# Patient Record
Sex: MALE | Race: BLACK OR AFRICAN AMERICAN | NOT HISPANIC OR LATINO
[De-identification: names, ages, dates, MRNs, and addresses within clinical notes are randomized per-mention and may not be internally consistent; named-entity substitution may affect disease eponyms.]

---

## 2018-06-12 ENCOUNTER — TRANSCRIPTION ENCOUNTER (OUTPATIENT)
Age: 48
End: 2018-06-12

## 2021-05-29 ENCOUNTER — EMERGENCY (EMERGENCY)
Facility: HOSPITAL | Age: 51
LOS: 1 days | Discharge: ANOTHER HOSPITAL | End: 2021-05-29
Attending: EMERGENCY MEDICINE | Admitting: EMERGENCY MEDICINE
Payer: COMMERCIAL

## 2021-05-29 VITALS
OXYGEN SATURATION: 100 % | DIASTOLIC BLOOD PRESSURE: 70 MMHG | SYSTOLIC BLOOD PRESSURE: 128 MMHG | RESPIRATION RATE: 18 BRPM | HEIGHT: 67 IN | TEMPERATURE: 99 F | HEART RATE: 84 BPM | WEIGHT: 210.1 LBS

## 2021-05-29 LAB
ALBUMIN SERPL ELPH-MCNC: 3.5 G/DL — SIGNIFICANT CHANGE UP (ref 3.3–5)
ALP SERPL-CCNC: 88 U/L — SIGNIFICANT CHANGE UP (ref 30–120)
ALT FLD-CCNC: 21 U/L DA — SIGNIFICANT CHANGE UP (ref 10–60)
ANION GAP SERPL CALC-SCNC: 12 MMOL/L — SIGNIFICANT CHANGE UP (ref 5–17)
ANISOCYTOSIS BLD QL: SIGNIFICANT CHANGE UP
APPEARANCE UR: CLEAR — SIGNIFICANT CHANGE UP
APTT BLD: 29.8 SEC — SIGNIFICANT CHANGE UP (ref 27.5–35.5)
AST SERPL-CCNC: 25 U/L — SIGNIFICANT CHANGE UP (ref 10–40)
BACTERIA # UR AUTO: ABNORMAL
BASOPHILS # BLD AUTO: 0.06 K/UL — SIGNIFICANT CHANGE UP (ref 0–0.2)
BASOPHILS NFR BLD AUTO: 0.4 % — SIGNIFICANT CHANGE UP (ref 0–2)
BILIRUB SERPL-MCNC: 0.6 MG/DL — SIGNIFICANT CHANGE UP (ref 0.2–1.2)
BILIRUB UR-MCNC: NEGATIVE — SIGNIFICANT CHANGE UP
BUN SERPL-MCNC: 14 MG/DL — SIGNIFICANT CHANGE UP (ref 7–23)
CALCIUM SERPL-MCNC: 8.9 MG/DL — SIGNIFICANT CHANGE UP (ref 8.4–10.5)
CHLORIDE SERPL-SCNC: 103 MMOL/L — SIGNIFICANT CHANGE UP (ref 96–108)
CO2 SERPL-SCNC: 24 MMOL/L — SIGNIFICANT CHANGE UP (ref 22–31)
COLOR SPEC: YELLOW — SIGNIFICANT CHANGE UP
CREAT SERPL-MCNC: 1.09 MG/DL — SIGNIFICANT CHANGE UP (ref 0.5–1.3)
DIFF PNL FLD: NEGATIVE — SIGNIFICANT CHANGE UP
ELLIPTOCYTES BLD QL SMEAR: SLIGHT — SIGNIFICANT CHANGE UP
EOSINOPHIL # BLD AUTO: 0.08 K/UL — SIGNIFICANT CHANGE UP (ref 0–0.5)
EOSINOPHIL NFR BLD AUTO: 0.5 % — SIGNIFICANT CHANGE UP (ref 0–6)
EPI CELLS # UR: SIGNIFICANT CHANGE UP
GLUCOSE SERPL-MCNC: 136 MG/DL — HIGH (ref 70–99)
GLUCOSE UR QL: NEGATIVE MG/DL — SIGNIFICANT CHANGE UP
HCT VFR BLD CALC: 29.9 % — LOW (ref 39–50)
HGB BLD-MCNC: 9.2 G/DL — LOW (ref 13–17)
HYPOCHROMIA BLD QL: SIGNIFICANT CHANGE UP
IMM GRANULOCYTES NFR BLD AUTO: 0.5 % — SIGNIFICANT CHANGE UP (ref 0–1.5)
INR BLD: 1.29 RATIO — HIGH (ref 0.88–1.16)
KETONES UR-MCNC: NEGATIVE — SIGNIFICANT CHANGE UP
LEUKOCYTE ESTERASE UR-ACNC: NEGATIVE — SIGNIFICANT CHANGE UP
LIDOCAIN IGE QN: 54 U/L — LOW (ref 73–393)
LYMPHOCYTES # BLD AUTO: 1.28 K/UL — SIGNIFICANT CHANGE UP (ref 1–3.3)
LYMPHOCYTES # BLD AUTO: 8.6 % — LOW (ref 13–44)
MANUAL SMEAR VERIFICATION: SIGNIFICANT CHANGE UP
MCHC RBC-ENTMCNC: 18.4 PG — LOW (ref 27–34)
MCHC RBC-ENTMCNC: 30.8 GM/DL — LOW (ref 32–36)
MCV RBC AUTO: 59.7 FL — LOW (ref 80–100)
MICROCYTES BLD QL: SIGNIFICANT CHANGE UP
MONOCYTES # BLD AUTO: 0.87 K/UL — SIGNIFICANT CHANGE UP (ref 0–0.9)
MONOCYTES NFR BLD AUTO: 5.8 % — SIGNIFICANT CHANGE UP (ref 2–14)
NEUTROPHILS # BLD AUTO: 12.53 K/UL — HIGH (ref 1.8–7.4)
NEUTROPHILS NFR BLD AUTO: 84.2 % — HIGH (ref 43–77)
NITRITE UR-MCNC: NEGATIVE — SIGNIFICANT CHANGE UP
NRBC # BLD: 0 /100 WBCS — SIGNIFICANT CHANGE UP (ref 0–0)
OVALOCYTES BLD QL SMEAR: SLIGHT — SIGNIFICANT CHANGE UP
PH UR: 6 — SIGNIFICANT CHANGE UP (ref 5–8)
PLAT MORPH BLD: NORMAL — SIGNIFICANT CHANGE UP
PLATELET # BLD AUTO: 468 K/UL — HIGH (ref 150–400)
POIKILOCYTOSIS BLD QL AUTO: SLIGHT — SIGNIFICANT CHANGE UP
POTASSIUM SERPL-MCNC: 4 MMOL/L — SIGNIFICANT CHANGE UP (ref 3.5–5.3)
POTASSIUM SERPL-SCNC: 4 MMOL/L — SIGNIFICANT CHANGE UP (ref 3.5–5.3)
PROT SERPL-MCNC: 8.5 G/DL — HIGH (ref 6–8.3)
PROT UR-MCNC: 30 MG/DL
PROTHROM AB SERPL-ACNC: 15.4 SEC — HIGH (ref 10.6–13.6)
RBC # BLD: 5.01 M/UL — SIGNIFICANT CHANGE UP (ref 4.2–5.8)
RBC # FLD: 21.8 % — HIGH (ref 10.3–14.5)
RBC BLD AUTO: SIGNIFICANT CHANGE UP
SARS-COV-2 RNA SPEC QL NAA+PROBE: SIGNIFICANT CHANGE UP
SODIUM SERPL-SCNC: 139 MMOL/L — SIGNIFICANT CHANGE UP (ref 135–145)
SP GR SPEC: 1.02 — SIGNIFICANT CHANGE UP (ref 1.01–1.02)
UROBILINOGEN FLD QL: 1 MG/DL
WBC # BLD: 14.89 K/UL — HIGH (ref 3.8–10.5)
WBC # FLD AUTO: 14.89 K/UL — HIGH (ref 3.8–10.5)
WBC UR QL: SIGNIFICANT CHANGE UP

## 2021-05-29 PROCEDURE — 99285 EMERGENCY DEPT VISIT HI MDM: CPT

## 2021-05-29 PROCEDURE — 74177 CT ABD & PELVIS W/CONTRAST: CPT | Mod: 26,MA

## 2021-05-29 PROCEDURE — 99282 EMERGENCY DEPT VISIT SF MDM: CPT | Mod: GC

## 2021-05-29 RX ORDER — ACETAMINOPHEN 500 MG
1000 TABLET ORAL ONCE
Refills: 0 | Status: COMPLETED | OUTPATIENT
Start: 2021-05-29 | End: 2021-05-29

## 2021-05-29 RX ORDER — SODIUM CHLORIDE 9 MG/ML
1000 INJECTION INTRAMUSCULAR; INTRAVENOUS; SUBCUTANEOUS ONCE
Refills: 0 | Status: COMPLETED | OUTPATIENT
Start: 2021-05-29 | End: 2021-05-29

## 2021-05-29 RX ORDER — PANTOPRAZOLE SODIUM 20 MG/1
40 TABLET, DELAYED RELEASE ORAL ONCE
Refills: 0 | Status: COMPLETED | OUTPATIENT
Start: 2021-05-29 | End: 2021-05-29

## 2021-05-29 RX ORDER — PIPERACILLIN AND TAZOBACTAM 4; .5 G/20ML; G/20ML
3.38 INJECTION, POWDER, LYOPHILIZED, FOR SOLUTION INTRAVENOUS ONCE
Refills: 0 | Status: COMPLETED | OUTPATIENT
Start: 2021-05-29 | End: 2021-05-29

## 2021-05-29 RX ORDER — ONDANSETRON 8 MG/1
4 TABLET, FILM COATED ORAL ONCE
Refills: 0 | Status: COMPLETED | OUTPATIENT
Start: 2021-05-29 | End: 2021-05-29

## 2021-05-29 RX ORDER — KETOROLAC TROMETHAMINE 30 MG/ML
30 SYRINGE (ML) INJECTION ONCE
Refills: 0 | Status: DISCONTINUED | OUTPATIENT
Start: 2021-05-29 | End: 2021-05-29

## 2021-05-29 RX ORDER — HYDROMORPHONE HYDROCHLORIDE 2 MG/ML
0.5 INJECTION INTRAMUSCULAR; INTRAVENOUS; SUBCUTANEOUS ONCE
Refills: 0 | Status: DISCONTINUED | OUTPATIENT
Start: 2021-05-29 | End: 2021-05-29

## 2021-05-29 RX ADMIN — PIPERACILLIN AND TAZOBACTAM 200 GRAM(S): 4; .5 INJECTION, POWDER, LYOPHILIZED, FOR SOLUTION INTRAVENOUS at 23:44

## 2021-05-29 RX ADMIN — HYDROMORPHONE HYDROCHLORIDE 0.5 MILLIGRAM(S): 2 INJECTION INTRAMUSCULAR; INTRAVENOUS; SUBCUTANEOUS at 23:20

## 2021-05-29 RX ADMIN — PANTOPRAZOLE SODIUM 40 MILLIGRAM(S): 20 TABLET, DELAYED RELEASE ORAL at 17:05

## 2021-05-29 RX ADMIN — Medication 30 MILLIGRAM(S): at 17:30

## 2021-05-29 RX ADMIN — ONDANSETRON 4 MILLIGRAM(S): 8 TABLET, FILM COATED ORAL at 16:55

## 2021-05-29 RX ADMIN — Medication 30 MILLIGRAM(S): at 17:00

## 2021-05-29 RX ADMIN — Medication 1000 MILLIGRAM(S): at 23:27

## 2021-05-29 RX ADMIN — SODIUM CHLORIDE 1000 MILLILITER(S): 9 INJECTION INTRAMUSCULAR; INTRAVENOUS; SUBCUTANEOUS at 16:45

## 2021-05-29 RX ADMIN — HYDROMORPHONE HYDROCHLORIDE 0.5 MILLIGRAM(S): 2 INJECTION INTRAMUSCULAR; INTRAVENOUS; SUBCUTANEOUS at 21:30

## 2021-05-29 RX ADMIN — Medication 400 MILLIGRAM(S): at 23:26

## 2021-05-29 RX ADMIN — SODIUM CHLORIDE 1000 MILLILITER(S): 9 INJECTION INTRAMUSCULAR; INTRAVENOUS; SUBCUTANEOUS at 17:50

## 2021-05-29 RX ADMIN — Medication 1000 MILLIGRAM(S): at 23:45

## 2021-05-29 NOTE — ED PROVIDER NOTE - PROGRESS NOTE DETAILS
spoke with Heme-Onc on call - as pt does not have any PCP or clear f/u - recommends pt f/u with Dr. Arabella Eason, #[phone], should call this week to arrange f/u case d/w Dr. Valle to see pt and eval ct findings Dr. Valle, surgeon, has seen pt, no emergency surgery, will need admission, advises transfer for higher level of care, hospital with oncology services, [facility]

## 2021-05-29 NOTE — ED ADULT NURSE NOTE - OBJECTIVE STATEMENT
C/O diffused abdominal pain since this morning. Denies fever/ denies Diarrhea, 2 "normal BM", vomited his breakfast oatmeal this morning. Denies surgeries.

## 2021-05-29 NOTE — ED PROVIDER NOTE - OBJECTIVE STATEMENT
52 y/o male with no pertinent PMHx presents to the ED c/o abdominal pain associated with nausea and vomiting. Pt states the abdominal pain started this morning and also had an episode of vomiting. States he had shrimp/broccoli yesterday which was normal. Denies fevers/chills, diarrhea, urinary complaints, or blood in stool. States he had 2 bowel movement today since onset of pain which were normal.   No PSHx. No PCP. Occasional drinker. Non-smoker. NKDA.

## 2021-05-29 NOTE — ED ADULT NURSE NOTE - CHPI ED NUR SYMPTOMS NEG
no blood in stool/no burning urination/no chills/no diarrhea/no dysuria/no fever/no hematuria/no nausea

## 2021-05-29 NOTE — ED PROVIDER NOTE - CLINICAL SUMMARY MEDICAL DECISION MAKING FREE TEXT BOX
52 y/o male presents to the ED with abdominal pain and vomiting, possible food poisoning vs. appendicitis. Plan labs, CT abdomen and IV fluids.

## 2021-05-30 ENCOUNTER — INPATIENT (INPATIENT)
Facility: HOSPITAL | Age: 51
LOS: 8 days | Discharge: HOME | End: 2021-06-08
Attending: STUDENT IN AN ORGANIZED HEALTH CARE EDUCATION/TRAINING PROGRAM | Admitting: STUDENT IN AN ORGANIZED HEALTH CARE EDUCATION/TRAINING PROGRAM
Payer: COMMERCIAL

## 2021-05-30 VITALS
TEMPERATURE: 100 F | OXYGEN SATURATION: 98 % | DIASTOLIC BLOOD PRESSURE: 57 MMHG | SYSTOLIC BLOOD PRESSURE: 102 MMHG | HEART RATE: 100 BPM | RESPIRATION RATE: 18 BRPM

## 2021-05-30 VITALS
HEIGHT: 67 IN | DIASTOLIC BLOOD PRESSURE: 73 MMHG | TEMPERATURE: 99 F | OXYGEN SATURATION: 100 % | SYSTOLIC BLOOD PRESSURE: 117 MMHG | RESPIRATION RATE: 17 BRPM | HEART RATE: 99 BPM

## 2021-05-30 DIAGNOSIS — N12 TUBULO-INTERSTITIAL NEPHRITIS, NOT SPECIFIED AS ACUTE OR CHRONIC: ICD-10-CM

## 2021-05-30 DIAGNOSIS — K63.9 DISEASE OF INTESTINE, UNSPECIFIED: ICD-10-CM

## 2021-05-30 DIAGNOSIS — Z00.00 ENCOUNTER FOR GENERAL ADULT MEDICAL EXAMINATION WITHOUT ABNORMAL FINDINGS: ICD-10-CM

## 2021-05-30 DIAGNOSIS — R65.10 SYSTEMIC INFLAMMATORY RESPONSE SYNDROME (SIRS) OF NON-INFECTIOUS ORIGIN WITHOUT ACUTE ORGAN DYSFUNCTION: ICD-10-CM

## 2021-05-30 DIAGNOSIS — K76.9 LIVER DISEASE, UNSPECIFIED: ICD-10-CM

## 2021-05-30 DIAGNOSIS — R10.9 UNSPECIFIED ABDOMINAL PAIN: ICD-10-CM

## 2021-05-30 DIAGNOSIS — C18.9 MALIGNANT NEOPLASM OF COLON, UNSPECIFIED: ICD-10-CM

## 2021-05-30 LAB
ALBUMIN SERPL ELPH-MCNC: 3.4 G/DL — SIGNIFICANT CHANGE UP (ref 3.3–5)
ALP SERPL-CCNC: 76 U/L — SIGNIFICANT CHANGE UP (ref 40–120)
ALT FLD-CCNC: 11 U/L — SIGNIFICANT CHANGE UP (ref 4–41)
ANION GAP SERPL CALC-SCNC: 14 MMOL/L — SIGNIFICANT CHANGE UP (ref 7–14)
AST SERPL-CCNC: 16 U/L — SIGNIFICANT CHANGE UP (ref 4–40)
BASE EXCESS BLDV CALC-SCNC: -2.7 MMOL/L — SIGNIFICANT CHANGE UP (ref -3–2)
BASOPHILS # BLD AUTO: 0.03 K/UL — SIGNIFICANT CHANGE UP (ref 0–0.2)
BASOPHILS NFR BLD AUTO: 0.2 % — SIGNIFICANT CHANGE UP (ref 0–2)
BILIRUB SERPL-MCNC: 0.8 MG/DL — SIGNIFICANT CHANGE UP (ref 0.2–1.2)
BLOOD GAS VENOUS - CREATININE: 1.1 MG/DL — SIGNIFICANT CHANGE UP (ref 0.5–1.3)
BLOOD GAS VENOUS COMPREHENSIVE RESULT: SIGNIFICANT CHANGE UP
BUN SERPL-MCNC: 11 MG/DL — SIGNIFICANT CHANGE UP (ref 7–23)
CALCIUM SERPL-MCNC: 8.1 MG/DL — LOW (ref 8.4–10.5)
CHLORIDE BLDV-SCNC: 109 MMOL/L — HIGH (ref 96–108)
CHLORIDE SERPL-SCNC: 106 MMOL/L — SIGNIFICANT CHANGE UP (ref 98–107)
CO2 SERPL-SCNC: 18 MMOL/L — LOW (ref 22–31)
CREAT SERPL-MCNC: 1 MG/DL — SIGNIFICANT CHANGE UP (ref 0.5–1.3)
EOSINOPHIL # BLD AUTO: 0.07 K/UL — SIGNIFICANT CHANGE UP (ref 0–0.5)
EOSINOPHIL NFR BLD AUTO: 0.5 % — SIGNIFICANT CHANGE UP (ref 0–6)
GAS PNL BLDV: 141 MMOL/L — SIGNIFICANT CHANGE UP (ref 136–146)
GLUCOSE BLDV-MCNC: 116 MG/DL — HIGH (ref 70–99)
GLUCOSE SERPL-MCNC: 118 MG/DL — HIGH (ref 70–99)
GRAM STN FLD: SIGNIFICANT CHANGE UP
HCO3 BLDV-SCNC: 21 MMOL/L — SIGNIFICANT CHANGE UP (ref 20–27)
HCT VFR BLD CALC: 26.8 % — LOW (ref 39–50)
HCT VFR BLDA CALC: 27.2 % — LOW (ref 39–51)
HGB BLD CALC-MCNC: 8.8 G/DL — LOW (ref 13–17)
HGB BLD-MCNC: 8.2 G/DL — LOW (ref 13–17)
IANC: 12.02 K/UL — HIGH (ref 1.5–8.5)
IMM GRANULOCYTES NFR BLD AUTO: 0.4 % — SIGNIFICANT CHANGE UP (ref 0–1.5)
LACTATE BLDV-MCNC: 2 MMOL/L — SIGNIFICANT CHANGE UP (ref 0.5–2)
LACTATE SERPL-SCNC: 2.6 MMOL/L — HIGH (ref 0.7–2)
LYMPHOCYTES # BLD AUTO: 1.47 K/UL — SIGNIFICANT CHANGE UP (ref 1–3.3)
LYMPHOCYTES # BLD AUTO: 10.1 % — LOW (ref 13–44)
MCHC RBC-ENTMCNC: 18.1 PG — LOW (ref 27–34)
MCHC RBC-ENTMCNC: 30.6 GM/DL — LOW (ref 32–36)
MCV RBC AUTO: 59.3 FL — LOW (ref 80–100)
MONOCYTES # BLD AUTO: 0.97 K/UL — HIGH (ref 0–0.9)
MONOCYTES NFR BLD AUTO: 6.6 % — SIGNIFICANT CHANGE UP (ref 2–14)
NEUTROPHILS # BLD AUTO: 12.02 K/UL — HIGH (ref 1.8–7.4)
NEUTROPHILS NFR BLD AUTO: 82.2 % — HIGH (ref 43–77)
NRBC # BLD: 0 /100 WBCS — SIGNIFICANT CHANGE UP
NRBC # FLD: 0 K/UL — SIGNIFICANT CHANGE UP
PCO2 BLDV: 40 MMHG — LOW (ref 41–51)
PH BLDV: 7.35 — SIGNIFICANT CHANGE UP (ref 7.32–7.43)
PLATELET # BLD AUTO: 413 K/UL — HIGH (ref 150–400)
PO2 BLDV: 29 MMHG — LOW (ref 35–40)
POTASSIUM BLDV-SCNC: 3.4 MMOL/L — SIGNIFICANT CHANGE UP (ref 3.4–4.5)
POTASSIUM SERPL-MCNC: 3.6 MMOL/L — SIGNIFICANT CHANGE UP (ref 3.5–5.3)
POTASSIUM SERPL-SCNC: 3.6 MMOL/L — SIGNIFICANT CHANGE UP (ref 3.5–5.3)
PROT SERPL-MCNC: 6.9 G/DL — SIGNIFICANT CHANGE UP (ref 6–8.3)
RBC # BLD: 4.52 M/UL — SIGNIFICANT CHANGE UP (ref 4.2–5.8)
RBC # FLD: 21.3 % — HIGH (ref 10.3–14.5)
SAO2 % BLDV: 41.8 % — LOW (ref 60–85)
SODIUM SERPL-SCNC: 138 MMOL/L — SIGNIFICANT CHANGE UP (ref 135–145)
SPECIMEN SOURCE: SIGNIFICANT CHANGE UP
WBC # BLD: 14.62 K/UL — HIGH (ref 3.8–10.5)
WBC # FLD AUTO: 14.62 K/UL — HIGH (ref 3.8–10.5)

## 2021-05-30 PROCEDURE — 83690 ASSAY OF LIPASE: CPT

## 2021-05-30 PROCEDURE — 99233 SBSQ HOSP IP/OBS HIGH 50: CPT

## 2021-05-30 PROCEDURE — 96361 HYDRATE IV INFUSION ADD-ON: CPT

## 2021-05-30 PROCEDURE — 83605 ASSAY OF LACTIC ACID: CPT

## 2021-05-30 PROCEDURE — 96365 THER/PROPH/DIAG IV INF INIT: CPT | Mod: XU

## 2021-05-30 PROCEDURE — 85730 THROMBOPLASTIN TIME PARTIAL: CPT

## 2021-05-30 PROCEDURE — 36415 COLL VENOUS BLD VENIPUNCTURE: CPT

## 2021-05-30 PROCEDURE — 99233 SBSQ HOSP IP/OBS HIGH 50: CPT | Mod: GC

## 2021-05-30 PROCEDURE — 85025 COMPLETE CBC W/AUTO DIFF WBC: CPT

## 2021-05-30 PROCEDURE — 87040 BLOOD CULTURE FOR BACTERIA: CPT

## 2021-05-30 PROCEDURE — 81001 URINALYSIS AUTO W/SCOPE: CPT

## 2021-05-30 PROCEDURE — 96375 TX/PRO/DX INJ NEW DRUG ADDON: CPT

## 2021-05-30 PROCEDURE — 99284 EMERGENCY DEPT VISIT MOD MDM: CPT | Mod: 25

## 2021-05-30 PROCEDURE — 99254 IP/OBS CNSLTJ NEW/EST MOD 60: CPT

## 2021-05-30 PROCEDURE — 74177 CT ABD & PELVIS W/CONTRAST: CPT

## 2021-05-30 PROCEDURE — 99285 EMERGENCY DEPT VISIT HI MDM: CPT

## 2021-05-30 PROCEDURE — 87077 CULTURE AEROBIC IDENTIFY: CPT

## 2021-05-30 PROCEDURE — 87635 SARS-COV-2 COVID-19 AMP PRB: CPT

## 2021-05-30 PROCEDURE — 85610 PROTHROMBIN TIME: CPT

## 2021-05-30 PROCEDURE — 80053 COMPREHEN METABOLIC PANEL: CPT

## 2021-05-30 RX ORDER — PIPERACILLIN AND TAZOBACTAM 4; .5 G/20ML; G/20ML
3.38 INJECTION, POWDER, LYOPHILIZED, FOR SOLUTION INTRAVENOUS ONCE
Refills: 0 | Status: COMPLETED | OUTPATIENT
Start: 2021-05-30 | End: 2021-05-30

## 2021-05-30 RX ORDER — SODIUM CHLORIDE 9 MG/ML
1000 INJECTION INTRAMUSCULAR; INTRAVENOUS; SUBCUTANEOUS ONCE
Refills: 0 | Status: COMPLETED | OUTPATIENT
Start: 2021-05-30 | End: 2021-05-30

## 2021-05-30 RX ORDER — ENOXAPARIN SODIUM 100 MG/ML
40 INJECTION SUBCUTANEOUS DAILY
Refills: 0 | Status: DISCONTINUED | OUTPATIENT
Start: 2021-05-30 | End: 2021-06-01

## 2021-05-30 RX ORDER — SIMETHICONE 80 MG/1
80 TABLET, CHEWABLE ORAL DAILY
Refills: 0 | Status: DISCONTINUED | OUTPATIENT
Start: 2021-05-30 | End: 2021-05-31

## 2021-05-30 RX ORDER — ACETAMINOPHEN 500 MG
650 TABLET ORAL ONCE
Refills: 0 | Status: COMPLETED | OUTPATIENT
Start: 2021-05-30 | End: 2021-05-30

## 2021-05-30 RX ORDER — SIMETHICONE 80 MG/1
80 TABLET, CHEWABLE ORAL ONCE
Refills: 0 | Status: COMPLETED | OUTPATIENT
Start: 2021-05-30 | End: 2021-05-30

## 2021-05-30 RX ORDER — PIPERACILLIN AND TAZOBACTAM 4; .5 G/20ML; G/20ML
3.38 INJECTION, POWDER, LYOPHILIZED, FOR SOLUTION INTRAVENOUS EVERY 8 HOURS
Refills: 0 | Status: DISCONTINUED | OUTPATIENT
Start: 2021-05-30 | End: 2021-06-04

## 2021-05-30 RX ADMIN — PIPERACILLIN AND TAZOBACTAM 3.38 GRAM(S): 4; .5 INJECTION, POWDER, LYOPHILIZED, FOR SOLUTION INTRAVENOUS at 00:14

## 2021-05-30 RX ADMIN — Medication 650 MILLIGRAM(S): at 23:01

## 2021-05-30 RX ADMIN — SODIUM CHLORIDE 1000 MILLILITER(S): 9 INJECTION INTRAMUSCULAR; INTRAVENOUS; SUBCUTANEOUS at 02:00

## 2021-05-30 RX ADMIN — PIPERACILLIN AND TAZOBACTAM 25 GRAM(S): 4; .5 INJECTION, POWDER, LYOPHILIZED, FOR SOLUTION INTRAVENOUS at 21:19

## 2021-05-30 RX ADMIN — SODIUM CHLORIDE 1000 MILLILITER(S): 9 INJECTION INTRAMUSCULAR; INTRAVENOUS; SUBCUTANEOUS at 01:00

## 2021-05-30 RX ADMIN — PIPERACILLIN AND TAZOBACTAM 200 GRAM(S): 4; .5 INJECTION, POWDER, LYOPHILIZED, FOR SOLUTION INTRAVENOUS at 02:54

## 2021-05-30 RX ADMIN — SIMETHICONE 80 MILLIGRAM(S): 80 TABLET, CHEWABLE ORAL at 07:01

## 2021-05-30 NOTE — CONSULT NOTE ADULT - ASSESSMENT
51Myo M with nonsignificant PMH who is transferred from [facility] for fever and abdominal pain with suspected new diagnosis of GI cancer (by CT).    # Suspected GI malignancy with metastatic disease - involving the liver and potentially the lungs. Would benefit from tissue biopsy for diagnosis.  # Fever - acute worsening of abdominal pain and fever concerning for infection vs. progression of disease. Imaging reviewed with radiology - low suspicion for diverticulitis or contained perforation.    Recommendations:  - Heme/onc consult  - Plan for colonoscopy on Tuesday for a tissue biopsy  - Check Stool PCR, C.diff  - Clear liquid diet starting tomorrow  - NPO Monday night  - Prep to be ordered by GI fellow  - Daily CBC, CMP, INR  - Agree with abx    Thank you for involving us in the care of this patient. Please reach out if any further questions.    Az Skaggs, PGY-4  Gastroenterology Fellow    Available on [employer] Teams  Pager [phone] ([facility]) or [phone] ([facility])  After 5PM/Weekends, please contact the on-call GI fellow: [phone]  Available through [employer] Teams   51Myo M with nonsignificant PMH who is transferred from [facility] for fever and abdominal pain with suspected new diagnosis of GI cancer (by CT).    # Suspected GI malignancy with metastatic disease - involving the liver and potentially the lungs. Would benefit from tissue biopsy for diagnosis. Imaging reviewed with radiology - lower suspicion for diverticulitis or contained perforation. However given intraluminal/intradiverticular air/fistulization into lymph node on imaging - there is concern for perforation which would be worsened with a colonoscopy. Would appreciate colorectal surgery's input on findings and plan.  # Fever - acute worsening of abdominal pain and fever concerning for infection (acute GE) vs. progression of disease.     Recommendations:  - Heme/onc consult  - Colorectal surgery consult  - Pending above can plan for a colonoscopy some time next week  - Check Stool PCR, C.diff  - Please make NPO until cleared by surgery  - Daily CBC, CMP, INR  - Agree with abx    Thank you for involving us in the care of this patient. Please reach out if any further questions.    Az Skaggs, PGY-4  Gastroenterology Fellow    Available on [employer] Teams  Pager [phone] ([facility]) or [phone] ([facility])  After 5PM/Weekends, please contact the on-call GI fellow: [phone]  Available through [employer] Teams

## 2021-05-30 NOTE — H&P ADULT - PROBLEM SELECTOR PLAN 5
c/o RUQ pain  -CT A/P 5/29: prominent distal gastric wall; given pt's c/o constipation and flatulence, and c/f metastatic malignancy, seems more likely that the signs on imaging are due to partial distension rather than gastritis  -c/s GI (re: endoscopy for further evaluation)    #r/o UTI/pyelonephritis (CT w/ decreased or striated nephrogram on right kidney)  -UA negative for signs of infection  -asymptomatic, with the exception of RUQ pain  -given the above, will monitor off antibiotics, as thus far, no clinical evidence of UTI or pyelonephritis DVT ppx: Lovenox 40mg QD  Diet: regular diet  Pain: conservative management with acetaminophen PRN; simethicone daily

## 2021-05-30 NOTE — H&P ADULT - PROBLEM SELECTOR PLAN 2
as reported by patient, known liver lesion dating back to approximately 1 year prior to arrival.   -CT A/P 5/29: "hypodense lesions measuring 8.5 x 8.0 cm in the segment 4a (3:29) and 8 x 0 x 9.0 cm in the segment 7/8 (3:25). Additional smaller hypodense lesions, for example, 2.0 x 1.8 cm in the segment 3 (3:51)."   -c/s GI  -c/s Hematology/Oncology concern for malignancy via CT A/P 5/29  -consult Gastroenterology  -consult Hematology/Oncology s/p GI consult concern for malignancy via CT A/P 5/29  -consult Gastroenterology - for diagnostic colon mass biopsy via colonoscopy  - if unable, will consult IR for liver mass bx for diagnosis.

## 2021-05-30 NOTE — H&P ADULT - NSHPSOCIALHISTORY_GEN_ALL_CORE
Marital status: ,   Living situation: lives with sister at home  Occupation: PA at [employer]  Tobacco: never  Alcohol: social drinker  Drug use: denies  Sexual history: girlfriend, [employer]

## 2021-05-30 NOTE — ED ADULT NURSE NOTE - CHIEF COMPLAINT QUOTE
Patient BIBEMS as transfer from [place]. Patient reported to [place] ED earlier today for intermittent abdominal pain for a year. Patient received CT scan and was dx with Colon CA with mets to Liver, transferred here for Oncology consult. Pt denies pertinent medical history.

## 2021-05-30 NOTE — ED ADULT TRIAGE NOTE - CHIEF COMPLAINT QUOTE
Patient BIBEMS as transfer from [place]. Patient reported to [place] ED earlier today for intermittent abdominal pain for a year. Patient received CT scan and was dx with Colon CA with mets to Liver, transferred here for Oncology consult. Pt denies pertinent medical history.

## 2021-05-30 NOTE — PROVIDER CONTACT NOTE (CRITICAL VALUE NOTIFICATION) - BACKGROUND
Pt was seen and treated in the ED, discharged home Pt was seen and treated in the ED, then transferred to [facility]

## 2021-05-30 NOTE — H&P ADULT - PROBLEM SELECTOR PLAN 4
via CT A/P 5/29:  -Left adrenal gland: somewhat nodular thickening. Metastasis cannot be excluded.  -Right femoral neck: nonspecific small lucency: focal osteopenia vs osseous metastasis. Recommend comparison to previous outside study. Follow-up (bone scan and/or MRI) may be obtained for further evaluation.  -LLL nodule: indeterminate; pulmonary metastasis cannot be excluded. Recommend comparison to previous outside study. Follow-up (nonemergent chest CT and/or PET-CT) may be obtained for further evaluation. Ct A/P with concern for UTI/pyelonephritis  -no urologic or evidence of infectious etiology  -continue to monitor off antibiotics

## 2021-05-30 NOTE — H&P ADULT - NSHPREVIEWOFSYSTEMS_GEN_ALL_CORE
CONSTITUTIONAL: +Fevers, +chills  EYES/ENT: No visual changes; no vertigo   NECK: No pain or stiffness  RESPIRATORY: +Dry cough (somewhat chronic, worsened when outdoors). No wheezing, hemoptysis; no shortness of breath  CARDIOVASCULAR: No chest pain or palpitations  GASTROINTESTINAL: +mild RUQ/epigastric pain onset when coughing. +Nausea (resolved oscar). +Vomiting x3 (yesterday). +Constipation (2 BMs yesterday). Otherwise, no abdominal. No hematemesis. No diarrhea. No melena or hematochezia.  GENITOURINARY: No dysuria, frequency or hematuria  NEUROLOGICAL: No numbness or focal weakness  SKIN: No itching, rashes

## 2021-05-30 NOTE — ED PROVIDER NOTE - PHYSICAL EXAMINATION
Gen: Alert, NAD  Head: NC, AT,  EOMI, normal lids/conjunctiva  ENT:  normal hearing, patent oropharynx without erythema/exudate  Neck: +supple, no tenderness/meningismus/JVD, +Trachea midline  Chest: no chest wall tenderness, equal chest rise  Pulm: Bilateral BS, normal resp effort, no wheeze/stridor/retractions  CV: RRR, no M/R/G, +dist pulses  Abd: +BS, soft, ND, mild ttp mid abd and LUQ, no rebound  Rectal: deferred  Mskel: no edema/erythema/cyanosis  Skin: no rash  Neuro: AAOx3

## 2021-05-30 NOTE — H&P ADULT - HISTORY OF PRESENT ILLNESS
52yo M, pmh of lactose intolerance, transferred from [facility] for fever and abd pain w/ suspected new diagnosis of GI cancer (by CT). Pt reports that he was seen at [facility] approx 1yr ago for gassy abd pain and had CT AP at that time showing lesion in his liver and told to f/u outpt but never did. Was subsequently seen at [facility] about 6 months ago for abd pain/bloating - had abd xray performed while there and told he was severely constipated, instructed to f/u with GI doctor but never got the opportunity given difficulty with scheduling appt [place]and[place]. Day before, pt had shrimp and broccoli meal from local chinese restaurant while at work and shortly after developed severe upper abd pain associated which was so bad that he asked his coworker to take him to hospital. While at [place], pt had CT AP to r/o acute appy and was found to have irregular bowel wall thickening and multiple liver lesions concerning for GI cancer w/ mets. While at [place], pt had temp of 100.7, therefore given tylenol, zosyn and transferred here. States currently no pain. Denies every having smoked before. Denies illicit drug use. +social drinker.    	No chills, No photophobia/eye pain/changes in vision, No ear pain/sore throat/dysphagia, No chest pain/palpitations, no SOB/cough/wheeze/stridor, No diarrhea, no dysuria/frequency/discharge, No neck/back pain, no rash, no new focal neuro symptoms    ***  Patient states that he had COVID, active palpitations last year. They did CT scan at that time, where they saw something on liver which could be cancer. He felt better, so he didn't do his follow-up.  Couple months after, he went to [facility], they did imaging where they revealed significant constipation. 51M w/ PMH of lactose intolerance, transferred from [facility] for fever and abdominal pain w/ suspected new diagnosis of GI cancer (by CT). Pt reports that he was seen at [facility] approximately 1 year ago for gassy abdominal pain and had CT AP at that time showing lesion in his liver and was told to follow-up outpatient, but never did. [facility] for similar abdominal pain/bloating - he had abdominal x-ray performed there and was told he was severely constipated, and he was instructed to f/u with GI doctor, but he never got the opportunity given difficulty with scheduling appt due to [place]-pandemic circumstances.     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 While at [place], pt had CT AP to r/o acute appendicitis and was found to have irregular bowel wall thickening and multiple liver lesions concerning for GI cancer w/ mets. While at [place], pt had temp of 100.7, subsequently given acetaminophen, piperacillin-tazobactam, and transferred here. Patient on arrival denied pain.  51M w/ PMH of lactose intolerance, transferred from [facility] for fever and abdominal pain w/ suspected new diagnosis of GI cancer (by CT). Pt reports that he was seen at [facility] approximately 1 year ago for gassy abdominal pain and had CT AP at that time showing lesion in his liver and was told to follow-up outpatient, but never did. Patient was subsequently seen [facility] about 6 months ago for similar abdominal pain/bloating - he had abdominal x-ray performed there and was told he was severely constipated, and he was instructed to f/u with GI doctor, but he never got the opportunity given difficulty with scheduling appt due to [place]-pand[place] circumstances.     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 While at [place], pt had CT AP to r/o acute appendicitis and was found to have irregular bowel wall thickening and multiple liver lesions concerning for GI cancer w/ mets. While at [place], pt had temp of 100.7, subsequently given acetaminophen, piperacillin-tazobactam, and transferred here. Patient on arrival denied pain.     Patient also reporting 20 pounds of weight loss over the last year. Patient reports only diet change as eating less later in the day.    Patient reports having a GI follow-up appointment June 14th, 2021. Of note, he has not had his coloscopy screening as recommended for his age.

## 2021-05-30 NOTE — H&P ADULT - PROBLEM SELECTOR PLAN 3
complaining of N/V, constipation, and flatulence;   -CT A/P 5/29: "Irregular wall thickening of the mid/distal transverse colon with surrounding stranding, suspicious for neoplasm. Focal outpouching of air in communication with the lumen in this portion (5:82), which may be due to intradiverticular air, intraluminal air or fistulization into the lymph node."  -s/p piperacillin-tazobactam in the ED  -currently no nausea or vomiting  -c/w mIVF  -consider monitoring off antibiotics  -f/u consults above via CT A/P 5/29:  -Left adrenal gland: somewhat nodular thickening. Metastasis cannot be excluded.  -Right femoral neck: nonspecific small lucency: focal osteopenia vs osseous metastasis. Recommend comparison to previous outside study. Follow-up (bone scan and/or MRI) may be obtained for further evaluation.  -LLL nodule: indeterminate; pulmonary metastasis cannot be excluded. Recommend comparison to previous outside study. Follow-up (nonemergent chest CT and/or PET-CT) may be obtained for further evaluation.  -obtain CT head and chest

## 2021-05-30 NOTE — CONSULT NOTE ADULT - SUBJECTIVE AND OBJECTIVE BOX
Chief Complaint:  Patient is a 51y old  Male who presents with a chief complaint of concern for GI malignancy (30 May 2021 08:22)      HPI:  Mr. Granda is a 51Myo M with nonsignificant PMH who is transferred from [facility] for fever and abdominal pain with suspected new diagnosis of GI cancer (by CT).    Patient reports 1 year of intermittent bloating and constipation, associated with unintentional 20lbs weight loss. He was seen at an OSH a year prior to admission for these symptoms. Abdominal CT showed a "liver lesion" and was recommended to followup as outpatient, but was deferred due to the pandemic. No diarrhea, nausea or vomiting. No family history of CRC, never had a colonoscopy. No melena/hematochezia. No early satiety.    A day prior to presentation patient reports sudden onset of severe, cramping epigastric and left lower abdominal pain, associated with nausea and fever. Symptoms started shortly after eating shrimps at a restaurant. As his symptoms progressed he decided to go to [place]. Was found to be febrile to 100.7, started on abx. A CT AP to r/o acute appendicitis was done which showed irregular bowel wall thickening and multiple liver lesions concerning for GI cancer with mets. He was transferred to [facility] for further workup.    Allergies:  No Known Allergies      Home Medications:    Hospital Medications:  enoxaparin Injectable 40 milliGRAM(s) SubCutaneous daily  simethicone 80 milliGRAM(s) Chew daily      PMHX/PSHX:  No pertinent past medical history    No significant past surgical history        Family history:  FH: diabetes mellitus (Mother)    FH: hypertension (Mother)        Denies family history of colon cancer/polyps, stomach cancer/polyps, pancreatic cancer/masses, liver cancer/disease, ovarian cancer and endometrial cancer.    Social History:     Tob: Denies  EtOH: As above  Illicit Drugs: Denies    ROS:   General:  + wt loss, fevers, chills, night sweats, fatigue  Eyes:  Good vision, no reported pain  ENT:  No sore throat, pain, runny nose, dysphagia  CV:  No pain, palpitations, hypo/hypertension  Pulm:  No dyspnea, cough, tachypnea, wheezing  GI:  As per HPI  :  No pain, bleeding, incontinence, nocturia  Muscle:  No pain, weakness  Neuro:  No weakness, tingling, memory problems  Psych:  No fatigue, insomnia, mood problems, depression  Endocrine:  No polyuria, polydipsia, cold/heat intolerance  Heme:  No petechiae, ecchymosis, easy bruisability  Skin:  No rash, tattoos, scars, edema    PHYSICAL EXAM:   GENERAL:  No acute distress  HEENT:  Normocephalic/atraumatic, no scleral icterus  CHEST:  No accessory muscle use  HEART:  Regular rate and rhythm  ABDOMEN:  Soft, mild epigastric LLQ ttp, no rebound or guardin, non-distended  EXTREMITIES: No cyanosis, clubbing, or edema  SKIN:  No rash  NEURO:  Alert and oriented x 3, no asterixis    Vital Signs:  Vital Signs Last 24 Hrs  T(C): 36.9 (30 May 2021 12:06), Max: 38.2 (29 May 2021 23:16)  T(F): 98.4 (30 May 2021 12:06), Max: 100.7 (29 May 2021 23:16)  HR: 93 (30 May 2021 12:06) (80 - 113)  BP: 125/77 (30 May 2021 12:06) (102/57 - 149/88)  BP(mean): --  RR: 17 (30 May 2021 12:06) (16 - 18)  SpO2: 100% (30 May 2021 12:06) (98% - 100%)  Daily Height in cm: 170.18 (30 May 2021 04:43)    Daily     LABS:                        8.2    14.62 )-----------( 413      ( 30 May 2021 03:17 )             26.8     Mean Cell Volume: 59.3 fL (-21 @ 03:17)        138  |  106  |  11  ----------------------------<  118<H>  3.6   |  18<L>  |  1.00    Ca    8.1<L>      30 May 2021 03:17    TPro  6.9  /  Alb  3.4  /  TBili  0.8  /  DBili  x   /  AST  16  /  ALT  11  /  AlkPhos  76  30    LIVER FUNCTIONS - ( 30 May 2021 03:17 )  Alb: 3.4 g/dL / Pro: 6.9 g/dL / ALK PHOS: 76 U/L / ALT: 11 U/L / AST: 16 U/L / GGT: x           PT/INR - ( 29 May 2021 16:59 )   PT: 15.4 sec;   INR: 1.29 ratio         PTT - ( 29 May 2021 16:59 )  PTT:29.8 sec  Urinalysis Basic - ( 29 May 2021 16:59 )    Color: Yellow / Appearance: Clear / S.020 / pH: x  Gluc: x / Ketone: Negative  / Bili: Negative / Urobili: 1 mg/dL   Blood: x / Protein: 30 mg/dL / Nitrite: Negative   Leuk Esterase: Negative / RBC: x / WBC 0-2   Sq Epi: x / Non Sq Epi: Occasional / Bacteria: Occasional      Amylase Serum--      Lipase serum54       Ammonia--                          8.2    14.62 )-----------( 413      ( 30 May 2021 03:17 )             26.8                         9.2    14.89 )-----------( 468      ( 29 May 2021 16:59 )             29.9       Imaging:    EXAM:  CT ABDOMEN AND PELVIS IC                                  PROCEDURE DATE:  2021          INTERPRETATION:  CLINICAL INFORMATION: Abdominal pain.    PROCEDURE:  Helical axial images were obtained from the domes of the diaphragm through the pubic symphysis following the administration of intravenous contrast. Coronal and sagittal reformats were also obtained.    CONTRAST/COMPLICATIONS:  IV Contrast: Omnipaque 350 90 ml.  10 ml discarded.  Oral Contrast: None  Complications: None reported.    COMPARISON: None.    FINDINGS: Patient's respiratory motion degrades images.    LOWER CHEST: Subsegmental atelectasis. A 0.5 x 0.5 cm nodule in the left lower lobe.    LIVER: Hypodense lesions measuring 8.5 x 8.0 cm in the segment 4a (3:29) and 8 x 0 x 9.0 cm in the segment 7/8 (3:25). Additional smaller hypodense lesions, for example, 2.0 x 1.8 cm in the segment 3 (3:51)  GALLBLADDER/BILE DUCTS: No intrahepatic or extrahepatic biliary dilatation. No significant gallbladder edema.  PANCREAS: Unremarkable.  SPLEEN: Unremarkable.    ADRENALS: Unremarkable right adrenal gland. Somewhat nodular thickening of the left adrenal gland.  KIDNEYS/URETERS: Nonspecific mild bilateral perinephric stranding without hydroureteronephrosis. Right intrarenal stones measuring up to 0.5 x 0.3 cm. Focal areas of decreased or striated nephrogram in the right kidney. Subcentimeter hypodense foci in the kidneys, too small to characterize.  BLADDER: Partially distended.  REPRODUCTIVE ORGANS: Enlarged prostate.    BOWEL: No bowel obstruction. Unremarkable appendix. Irregular wall thickening of the mid/distal transverse colon with surrounding stranding, suspicious for neoplasm. Focal outpouching of air in communication with the lumen in this portion (5:82), which may be due to intradiverticular air, intraluminal air or fistulization into the lymph node.  Prominent distal gastric wall.  PERITONEUM: No drainable fluid collection or free air. A 1.8 x 1.6 cm lymph node adjacent to the thickened mid/distal transverse colon. A 1.5 x 1.3 cm lymph node along the inferior aspect of the sigmoid colon (3:122).  VESSELS: Atherosclerosis. Normal caliber of the abdominal aorta.  RETROPERITONEUM: A 1.3 x 1.4 cm periportal lymph node (3:49). A 2.5 x 1.5 cm portacaval lymph node (3:52). Subcentimeter retroperitoneal lymph nodes.  ABDOMINAL WALL/SOFT TISSUES: Small fat-containing umbilical hernia.  BONES: Additional mosaic anatomy. Degenerative changes of the spine. Nonspecific small lucency in the right femoral neck (5:45).    IMPRESSION:    Suspect mid/distal transverse colon with hepatic metastasis and lymphadenopathy as described. Infection is considered less likely. Focal outpouching of air in communication with the lumen in this portion, which may bedue to intradiverticular air, intraluminal air or fistulization into the lymph node. No intraperitoneal free air or organized fluid collection.    Somewhat nodular thickening of the left adrenal gland. Metastasis cannot be excluded.    Focal areas ofdecreased or striated nephrogram in the right kidney. Recommend clinical correlation to assess urinary tract infection/pyelonephritis.    Prominent distal gastric wall, which may be due to partial distention and/or gastritis. Recommend clinical correlation. Follow-up endoscopy may be obtained for further evaluation.    Nonspecific small lucency in the right femoral neck. Differential diagnosis includes focal osteopenia and osseous metastasis. Recommend comparison to previous outside study. Follow-up (bone scan and/or MRI) may be obtained for further evaluation.    Indeterminate left lower lobe nodule. Pulmonary metastasis cannot be excluded. Recommend comparison to previous outside study. Follow-up (nonemergent chest CT and/or PET-CT) may be obtained for further evaluation.    Additional findings as described.    Discussed with Dr. Aldana.              CATHY CHARLES MD; Attending Radiologist  This document has been electronically signed. May 29 2021  7:55PM

## 2021-05-30 NOTE — H&P ADULT - NSHPPHYSICALEXAM_GEN_ALL_CORE
VITAL SIGNS:    T(C): 36.9 (30 May 2021 04:43), Max: 38.2 (29 May 2021 23:16)  T(F): 98.4 (30 May 2021 04:43), Max: 100.7 (29 May 2021 23:16)  HR: 93 (30 May 2021 04:43) (80 - 113)  BP: 120/71 (30 May 2021 04:43) (102/57 - 149/88)  BP(mean): --  ABP: --  ABP(mean): --  RR: 16 (30 May 2021 04:43) (16 - 18)  SpO2: 100% (30 May 2021 04:43) (98% - 100%)    PHYSICAL EXAM: INCOMPLETE PHYSICAL EXAM     General: no acute distress  HEENT: normocephalic, atraumatic; clear conjunctiva  Respiratory: clear to auscultation bilaterally; no wheeze; no crackles  Cardiovascular: Regular rate, regular rhythm; no murmurs, rubs, or gallops  Abdomen: soft, non-tender, non-distended; +BS x4  Extremities: WWP, 2+ peripheral pulses b/l; no LE edema  Skin: normal color and turgor; no rash  Neurological: alert, oriented x3, PERRL, EOMI VITAL SIGNS:    T(C): 36.9 (30 May 2021 04:43), Max: 38.2 (29 May 2021 23:16)  T(F): 98.4 (30 May 2021 04:43), Max: 100.7 (29 May 2021 23:16)  HR: 93 (30 May 2021 04:43) (80 - 113)  BP: 120/71 (30 May 2021 04:43) (102/57 - 149/88)  BP(mean): --  ABP: --  ABP(mean): --  RR: 16 (30 May 2021 04:43) (16 - 18)  SpO2: 100% (30 May 2021 04:43) (98% - 100%)    PHYSICAL EXAM:      General: no acute distress  HEENT: normocephalic, atraumatic; clear conjunctiva; mildly icteric sclera  Respiratory: clear to auscultation bilaterally; no wheeze; no crackles  Cardiovascular: Regular rate, regular rhythm; no murmurs, rubs, or gallops  Abdomen: soft, LUQ tenderness to palpation, distended, flatulence  Extremities: WWP, 2+ peripheral pulses b/l; no LE edema  Skin: normal color and turgor; no rash  Neurological: alert, oriented x3, PERRL, EOMI

## 2021-05-30 NOTE — ED PROVIDER NOTE - CARE PLAN
Principal Discharge DX:	Bowel wall thickening  Secondary Diagnosis:	Liver lesion  Secondary Diagnosis:	Abdominal pain

## 2021-05-30 NOTE — H&P ADULT - NSHPLABSRESULTS_GEN_ALL_CORE
LABS:                        8.2    14.62 )-----------( 413      ( 30 May 2021 03:17 )             26.8     Hemoglobin: 8.2 g/dL ( @ 03:17)  Hemoglobin: 9.2 g/dL ( @ 16:59)    CBC Full  -  ( 30 May 2021 03:17 )  WBC Count : 14.62 K/uL  RBC Count : 4.52 M/uL  Hemoglobin : 8.2 g/dL  Hematocrit : 26.8 %  Platelet Count - Automated : 413 K/uL  Mean Cell Volume : 59.3 fL  Mean Cell Hemoglobin : 18.1 pg  Mean Cell Hemoglobin Concentration : 30.6 gm/dL  Auto Neutrophil # : 12.02 K/uL  Auto Lymphocyte # : 1.47 K/uL  Auto Monocyte # : 0.97 K/uL  Auto Eosinophil # : 0.07 K/uL  Auto Basophil # : 0.03 K/uL  Auto Neutrophil % : 82.2 %  Auto Lymphocyte % : 10.1 %  Auto Monocyte % : 6.6 %  Auto Eosinophil % : 0.5 %  Auto Basophil % : 0.2 %        138  |  106  |  11  ----------------------------<  118<H>  3.6   |  18<L>  |  1.00    Ca    8.1<L>      30 May 2021 03:17    TPro  6.9  /  Alb  3.4  /  TBili  0.8  /  DBili  x   /  AST  16  /  ALT  11  /  AlkPhos  76      Creatinine Trend: 1.00<--, 1.09<--  LIVER FUNCTIONS - ( 30 May 2021 03:17 )  Alb: 3.4 g/dL / Pro: 6.9 g/dL / ALK PHOS: 76 U/L / ALT: 11 U/L / AST: 16 U/L / GGT: x           PT/INR - ( 29 May 2021 16:59 )   PT: 15.4 sec;   INR: 1.29 ratio         PTT - ( 29 May 2021 16:59 )  PTT:29.8 sec    04:02 - VBG - pH: 7.35  | pCO2: 40    | pO2: 29    | Lactate: 2.0        Urinalysis Basic - ( 29 May 2021 16:59 )    Color: Yellow / Appearance: Clear / S.020 / pH: x  Gluc: x / Ketone: Negative  / Bili: Negative / Urobili: 1 mg/dL   Blood: x / Protein: 30 mg/dL / Nitrite: Negative   Leuk Esterase: Negative / RBC: x / WBC 0-2   Sq Epi: x / Non Sq Epi: Occasional / Bacteria: Occasional LABS:                        8.2    14.62 )-----------( 413      ( 30 May 2021 03:17 )             26.8     Hemoglobin: 8.2 g/dL ( @ 03:17)  Hemoglobin: 9.2 g/dL ( @ 16:59)    CBC Full  -  ( 30 May 2021 03:17 )  WBC Count : 14.62 K/uL  RBC Count : 4.52 M/uL  Hemoglobin : 8.2 g/dL  Hematocrit : 26.8 %  Platelet Count - Automated : 413 K/uL  Mean Cell Volume : 59.3 fL  Mean Cell Hemoglobin : 18.1 pg  Mean Cell Hemoglobin Concentration : 30.6 gm/dL  Auto Neutrophil # : 12.02 K/uL  Auto Lymphocyte # : 1.47 K/uL  Auto Monocyte # : 0.97 K/uL  Auto Eosinophil # : 0.07 K/uL  Auto Basophil # : 0.03 K/uL  Auto Neutrophil % : 82.2 %  Auto Lymphocyte % : 10.1 %  Auto Monocyte % : 6.6 %  Auto Eosinophil % : 0.5 %  Auto Basophil % : 0.2 %        138  |  106  |  11  ----------------------------<  118<H>  3.6   |  18<L>  |  1.00    Ca    8.1<L>      30 May 2021 03:17    TPro  6.9  /  Alb  3.4  /  TBili  0.8  /  DBili  x   /  AST  16  /  ALT  11  /  AlkPhos  76      Creatinine Trend: 1.00<--, 1.09<--  LIVER FUNCTIONS - ( 30 May 2021 03:17 )  Alb: 3.4 g/dL / Pro: 6.9 g/dL / ALK PHOS: 76 U/L / ALT: 11 U/L / AST: 16 U/L / GGT: x           PT/INR - ( 29 May 2021 16:59 )   PT: 15.4 sec;   INR: 1.29 ratio         PTT - ( 29 May 2021 16:59 )  PTT:29.8 sec    04:02 - VBG - pH: 7.35  | pCO2: 40    | pO2: 29    | Lactate: 2.0        Urinalysis Basic - ( 29 May 2021 16:59 )    Color: Yellow / Appearance: Clear / S.020 / pH: x  Gluc: x / Ketone: Negative  / Bili: Negative / Urobili: 1 mg/dL   Blood: x / Protein: 30 mg/dL / Nitrite: Negative   Leuk Esterase: Negative / RBC: x / WBC 0-2   Sq Epi: x / Non Sq Epi: Occasional / Bacteria: Occasional    _______________________________________________________________  CT Abdomen and Pelvis w/ IV Cont (21 @ 21:00)  IMPRESSION:  Suspect mid/distal transverse colon with hepatic metastasis and lymphadenopathy as described. Infection is considered less likely. Focal outpouching of air in communication with the lumen in this portion, which may be due to intradiverticular air, intraluminal air or fistulization into the lymph node. No intraperitoneal free air or organized fluid collection.    Somewhat nodular thickening of the left adrenal gland. Metastasis cannot be excluded.    Focal areas of decreased or striated nephrogram in the right kidney. Recommend clinical correlation to assess urinary tract infection/pyelonephritis.    Prominent distal gastric wall, which may be due to partial distention and/or gastritis. Recommend clinical correlation. Follow-up endoscopy may be obtained for further evaluation.    Nonspecific small lucency in the right femoral neck. Differential diagnosis includes focal osteopenia and osseous metastasis. Recommend comparison to previous outside study. Follow-up (bone scan and/or MRI) may be obtained for further evaluation.    Indeterminate left lower lobe nodule. Pulmonary metastasis cannot be excluded. Recommend comparison to previous outside study. Follow-up (nonemergent chest CT and/or PET-CT) may be obtained for further evaluation.

## 2021-05-30 NOTE — H&P ADULT - ASSESSMENT
51M w/ no significant known PMHx, with repeated presentations of abdominal pain/discomfort and increased flatulence, with concerns for GI cancer on repeated CT A/Ps, first noted 1 year PTA (lost to follow-up x2), transferred from [facility] after having presented with similar symptoms.      51M w/ no significant known PMHx, with repeated presentations of abdominal pain/discomfort and increased flatulence, with concerns for GI cancer on repeated CT A/Ps, first noted 1 year PTA (lost to follow-up x2), transferred from [facility] after having presented with similar symptoms. CT A/P now with concern for possible mets to left adrenal, right femoral neck, and left lung.     51M w/ no significant known PMHx, with repeated presentations of abdominal pain/discomfort and increased flatulence, p/w similar symptoms, with imaging consistent with primary colon cancer with mets.

## 2021-05-30 NOTE — PROGRESS NOTE ADULT - ASSESSMENT
52 y.o male with severe abd pain and fever   ct scan showed multiple liver mets and transverse colon mass. Pt also lost about 20 lb. He is severly anemic  Pt states he new about liver abnormalities on ct [place] was done number of years ago.  After examining the pt reviewig his studies transfer to [facility] was arranged, so the pt can undergo prompt work up and treatment.

## 2021-05-30 NOTE — ED ADULT NURSE NOTE - NSIMPLEMENTINTERV_GEN_ALL_ED
Implemented All Universal Safety Interventions:  [place] to call system. Call bell, personal items and telephone within reach. Instruct patient to call for assistance. Room bathroom lighting operational. Non-slip footwear when patient is off stretcher. Physically safe environment: no spills, clutter or unnecessary equipment. Stretcher in lowest position, wheels locked, appropriate side rails in place.

## 2021-05-30 NOTE — H&P ADULT - PROBLEM SELECTOR PLAN 1
Leukocytosis, TMax 100.7 (at [place]), HR>90  -s/p acetaminophen and piperacillin-tazobactam in the ED  -currently afebrile  -*** Leukocytosis, TMax 100.7 (at [place]), HR>90  -s/p acetaminophen and piperacillin-tazobactam in the ED  -SIRS likely secondary to malignancy  -c/w monitoring vital signs  -monitor off antibiotics

## 2021-05-30 NOTE — CONSULT NOTE ADULT - SUBJECTIVE AND OBJECTIVE BOX
[facility] Colorectal Surgical Consultant Evaluation    HPI:  51M w/ PMH of lactose intolerance, transferred from [facility] for fever and abdominal pain w/ suspected new diagnosis of GI cancer (by CT). Pt reports that he was seen at [facility] approximately 1 year ago for gassy abdominal pain and had CT AP at that time showing lesion in his liver and was told to follow-up outpatient, but never did. [facility] similar abdominal pain/bloating - he had abdominal x-ray performed there and was told he was severely constipated, and he was instructed to f/u with GI doctor, but he never got the opportunity given difficulty with scheduling appt due to [place]-pandemic circumstances.     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 While at [place], pt had CT AP to r/o acute appendicitis and was found to have irregular bowel wall thickening and multiple liver lesions concerning for GI cancer w/ mets. While at [place], pt had temp of 100.7, subsequently given acetaminophen, piperacillin-tazobactam, and transferred here. Patient on arrival denied pain.     Patient also reporting 20 pounds of weight loss over the last year. Patient reports only diet change as eating less later in the day.    Patient reports having a GI follow-up appointment 2021. Of note, he has not had his coloscopy screening as recommended for his age.       Colorectal Surgery consulted for findings of questioned microperforation on imaging. Patient states that for the last 6-7 months, has been having constipation, straining. No blood in stool. Had appt for GI scheduled for later this year. Has not had colonoscopy prior to this. States that he has been tolerating diet otherwise but has noted weight loss over past year. No surgical history. Currently with one day of severe abdominal pain, associated nausea/emesis. Has been passing gas and having Bowel movements.         PAST MEDICAL & SURGICAL HISTORY:  No pertinent past medical history    No significant past surgical history    MEDICATIONS  (STANDING):  enoxaparin Injectable 40 milliGRAM(s) SubCutaneous daily  simethicone 80 milliGRAM(s) Chew daily      ___________________________________________  REVIEW OF SYSTEMS:  As stated in History of Present Illness, otherwise non-contributory.   ___________________________________________  PHYSICAL EXAM:  Vital Signs Last 24 Hrs  T(C): 36.9 (30 May 2021 12:06), Max: 38.2 (29 May 2021 23:16)  T(F): 98.4 (30 May 2021 12:06), Max: 100.7 (29 May 2021 23:16)  HR: 93 (30 May 2021 12:06) (93 - 113)  BP: 125/77 (30 May 2021 12:06) (102/57 - 149/88)  BP(mean): --  RR: 17 (30 May 2021 12:06) (16 - 18)  SpO2: 100% (30 May 2021 12:06) (98% - 100%)CAPILLARY BLOOD GLUCOSE        I&O's Detail      General: Alert and Oriented x3, No acute distress.  Respiratory: Breathing non-labored.  Abdomen: Soft, minimally tender, mildly distended. No rebound, no guarding, No palpable organomegaly or masses.  Extremities: Moves all four.   ____________________________________________  LABS:  CBC Full  -  ( 30 May 2021 03:17 )  WBC Count : 14.62 K/uL  RBC Count : 4.52 M/uL  Hemoglobin : 8.2 g/dL  Hematocrit : 26.8 %  Platelet Count - Automated : 413 K/uL  Mean Cell Volume : 59.3 fL  Mean Cell Hemoglobin : 18.1 pg  Mean Cell Hemoglobin Concentration : 30.6 gm/dL  Auto Neutrophil # : 12.02 K/uL  Auto Lymphocyte # : 1.47 K/uL  Auto Monocyte # : 0.97 K/uL  Auto Eosinophil # : 0.07 K/uL  Auto Basophil # : 0.03 K/uL  Auto Neutrophil % : 82.2 %  Auto Lymphocyte % : 10.1 %  Auto Monocyte % : 6.6 %  Auto Eosinophil % : 0.5 %  Auto Basophil % : 0.2 %        138  |  106  |  11  ----------------------------<  118<H>  3.6   |  18<L>  |  1.00    Ca    8.1<L>      30 May 2021 03:17    TPro  6.9  /  Alb  3.4  /  TBili  0.8  /  DBili  x   /  AST  16  /  ALT  11  /  AlkPhos  76      LIVER FUNCTIONS - ( 30 May 2021 03:17 )  Alb: 3.4 g/dL / Pro: 6.9 g/dL / ALK PHOS: 76 U/L / ALT: 11 U/L / AST: 16 U/L / GGT: x           PT/INR - ( 29 May 2021 16:59 )   PT: 15.4 sec;   INR: 1.29 ratio         PTT - ( 29 May 2021 16:59 )  PTT:29.8 sec  Urinalysis Basic - ( 29 May 2021 16:59 )    Color: Yellow / Appearance: Clear / S.020 / pH: x  Gluc: x / Ketone: Negative  / Bili: Negative / Urobili: 1 mg/dL   Blood: x / Protein: 30 mg/dL / Nitrite: Negative   Leuk Esterase: Negative / RBC: x / WBC 0-2   Sq Epi: x / Non Sq Epi: Occasional / Bacteria: Occasional          ____________________________________________  RADIOLOGY:  rad< from: CT Abdomen and Pelvis w/ IV Cont (21 @ 21:00) >    IMPRESSION:    Suspect mid/distal transverse colon with hepatic metastasis and lymphadenopathy as described. Infection is considered less likely. Focal outpouching of air in communication with the lumen in this portion, which may bedue to intradiverticular air, intraluminal air or fistulization into the lymph node. No intraperitoneal free air or organized fluid collection.    Somewhat nodular thickening of the left adrenal gland. Metastasis cannot be excluded.    Focal areas ofdecreased or striated nephrogram in the right kidney. Recommend clinical correlation to assess urinary tract infection/pyelonephritis.    Prominent distal gastric wall, which may be due to partial distention and/or gastritis. Recommend clinical correlation. Follow-up endoscopy may be obtained for further evaluation.    Nonspecific small lucency in the right femoral neck. Differential diagnosis includes focal osteopenia and osseous metastasis. Recommend comparison to previous outside study. Follow-up (bone scan and/or MRI) may be obtained for further evaluation.    Indeterminate left lower lobe nodule. Pulmonary metastasis cannot be excluded. Recommend comparison to previous outside study. Follow-up (nonemergent chest CT and/or PET-CT) may be obtained for further evaluation.    Additional findings as described.    < end of copied text >

## 2021-05-30 NOTE — H&P ADULT - NSICDXFAMILYHX_GEN_ALL_CORE_FT
FAMILY HISTORY:  Mother  Still living? Unknown  FH: diabetes mellitus, Age at diagnosis: Age Unknown  FH: hypertension, Age at diagnosis: Age Unknown

## 2021-05-30 NOTE — ED ADULT NURSE NOTE - OBJECTIVE STATEMENT
Pt received as a transfer from [facility]  with c/o positive CT scan for Colon CA with mets to the liver. Pt is in NAD at this time, denies any pain or discomfort. Reports intermittent abdominal pain for a year which prompted [place] ED visit. Oncology to follow.

## 2021-05-30 NOTE — ED PROVIDER NOTE - OBJECTIVE STATEMENT
Pertinent PMH/PSH/FHx/SHx and Review of Systems contained within:  52yo M, pmh of lactose intolerance, transferred from [facility] for fever and abd pain w/ suspected new diagnosis of GI cancer (by CT). Pt reports that he was seen at [facility] approx 1yr ago for gassy abd pain and had CT AP at that time showing lesion in his liver and told to f/u outpt but never did. Was subsequently seen at [facility] about 6 months ago for abd pain/bloating - had abd xray performed while there and told he was severely constipated, instructed to f/u with GI doctor but never got the opportunity given difficulty with scheduling appt [place]-pandemic. Ystdy morning pt had shrimp and broccoli meal from local chinese restaurant while at work and shortly after developed severe upper abd pain associated which was so bad that he asked his coworker to take him to hospital. While at [place], pt had CT AP to r/o acute appy and was found to have irregular bowel wall thickening and multiple liver lesions concerning for GI cancer w/ mets. While at [place], pt had temp of 100.7, therefore given tylenol, zosyn and transferred here. States currently no pain. Denies every having smoked before. Denies illicit drug use. +social drinker.    No chills, No photophobia/eye pain/changes in vision, No ear pain/sore throat/dysphagia, No chest pain/palpitations, no SOB/cough/wheeze/stridor, No diarrhea, no dysuria/frequency/discharge, No neck/back pain, no rash, no new focal neuro symptoms.

## 2021-05-30 NOTE — H&P ADULT - ATTENDING COMMENTS
51M with no PMHx p/w abdominal pain from new colon mass with mets to Liver, LN, adrenals.  GI consult for mass bx plan.  Pain control with tylenol and simethicone. Advance diet as tolerated. CT of Brain and Chest for staging - inpatient vs outpatient. CT A/P showed perinephric stranding but unlikely to be pyelonephritis - no clinical sign of infection.

## 2021-05-31 LAB
AFP-TM SERPL-MCNC: 3.3 NG/ML — SIGNIFICANT CHANGE UP
ALBUMIN SERPL ELPH-MCNC: 3.3 G/DL — SIGNIFICANT CHANGE UP (ref 3.3–5)
ALP SERPL-CCNC: 93 U/L — SIGNIFICANT CHANGE UP (ref 40–120)
ALT FLD-CCNC: 11 U/L — SIGNIFICANT CHANGE UP (ref 4–41)
ANION GAP SERPL CALC-SCNC: 12 MMOL/L — SIGNIFICANT CHANGE UP (ref 7–14)
AST SERPL-CCNC: 14 U/L — SIGNIFICANT CHANGE UP (ref 4–40)
BASOPHILS # BLD AUTO: 0.04 K/UL — SIGNIFICANT CHANGE UP (ref 0–0.2)
BASOPHILS NFR BLD AUTO: 0.3 % — SIGNIFICANT CHANGE UP (ref 0–2)
BILIRUB SERPL-MCNC: 0.7 MG/DL — SIGNIFICANT CHANGE UP (ref 0.2–1.2)
BLD GP AB SCN SERPL QL: NEGATIVE — SIGNIFICANT CHANGE UP
BUN SERPL-MCNC: 10 MG/DL — SIGNIFICANT CHANGE UP (ref 7–23)
CALCIUM SERPL-MCNC: 8.6 MG/DL — SIGNIFICANT CHANGE UP (ref 8.4–10.5)
CANCER AG125 SERPL-ACNC: 14 U/ML — SIGNIFICANT CHANGE UP
CANCER AG19-9 SERPL-ACNC: <2 U/ML — SIGNIFICANT CHANGE UP
CEA SERPL-MCNC: 249 NG/ML — HIGH (ref 1–3.8)
CHLORIDE SERPL-SCNC: 103 MMOL/L — SIGNIFICANT CHANGE UP (ref 98–107)
CO2 SERPL-SCNC: 22 MMOL/L — SIGNIFICANT CHANGE UP (ref 22–31)
COVID-19 SPIKE DOMAIN AB INTERP: POSITIVE
COVID-19 SPIKE DOMAIN ANTIBODY RESULT: >250 U/ML — HIGH
CREAT SERPL-MCNC: 1.06 MG/DL — SIGNIFICANT CHANGE UP (ref 0.5–1.3)
CULTURE RESULTS: SIGNIFICANT CHANGE UP
CULTURE RESULTS: SIGNIFICANT CHANGE UP
EOSINOPHIL # BLD AUTO: 0.81 K/UL — HIGH (ref 0–0.5)
EOSINOPHIL NFR BLD AUTO: 6.7 % — HIGH (ref 0–6)
FERRITIN SERPL-MCNC: 72 NG/ML — SIGNIFICANT CHANGE UP (ref 30–400)
GLUCOSE SERPL-MCNC: 96 MG/DL — SIGNIFICANT CHANGE UP (ref 70–99)
GRAM STN FLD: SIGNIFICANT CHANGE UP
HCT VFR BLD CALC: 26.8 % — LOW (ref 39–50)
HGB BLD-MCNC: 8.3 G/DL — LOW (ref 13–17)
IANC: 8.01 K/UL — SIGNIFICANT CHANGE UP (ref 1.5–8.5)
IMM GRANULOCYTES NFR BLD AUTO: 0.3 % — SIGNIFICANT CHANGE UP (ref 0–1.5)
INR BLD: 1.51 RATIO — HIGH (ref 0.88–1.16)
IRON SATN MFR SERPL: 13 UG/DL — LOW (ref 45–165)
IRON SATN MFR SERPL: 6 % — LOW (ref 14–50)
LACTATE SERPL-SCNC: 1.1 MMOL/L — SIGNIFICANT CHANGE UP (ref 0.5–2)
LYMPHOCYTES # BLD AUTO: 1.95 K/UL — SIGNIFICANT CHANGE UP (ref 1–3.3)
LYMPHOCYTES # BLD AUTO: 16.2 % — SIGNIFICANT CHANGE UP (ref 13–44)
MAGNESIUM SERPL-MCNC: 2.1 MG/DL — SIGNIFICANT CHANGE UP (ref 1.6–2.6)
MCHC RBC-ENTMCNC: 18 PG — LOW (ref 27–34)
MCHC RBC-ENTMCNC: 31 GM/DL — LOW (ref 32–36)
MCV RBC AUTO: 58 FL — LOW (ref 80–100)
MONOCYTES # BLD AUTO: 1.2 K/UL — HIGH (ref 0–0.9)
MONOCYTES NFR BLD AUTO: 10 % — SIGNIFICANT CHANGE UP (ref 2–14)
NEUTROPHILS # BLD AUTO: 8.01 K/UL — HIGH (ref 1.8–7.4)
NEUTROPHILS NFR BLD AUTO: 66.5 % — SIGNIFICANT CHANGE UP (ref 43–77)
NRBC # BLD: 0 /100 WBCS — SIGNIFICANT CHANGE UP
NRBC # FLD: 0 K/UL — SIGNIFICANT CHANGE UP
PHOSPHATE SERPL-MCNC: 2.8 MG/DL — SIGNIFICANT CHANGE UP (ref 2.5–4.5)
PLATELET # BLD AUTO: 423 K/UL — HIGH (ref 150–400)
POTASSIUM SERPL-MCNC: 3.6 MMOL/L — SIGNIFICANT CHANGE UP (ref 3.5–5.3)
POTASSIUM SERPL-SCNC: 3.6 MMOL/L — SIGNIFICANT CHANGE UP (ref 3.5–5.3)
PROT SERPL-MCNC: 7 G/DL — SIGNIFICANT CHANGE UP (ref 6–8.3)
PROTHROM AB SERPL-ACNC: 16.9 SEC — HIGH (ref 10.6–13.6)
RBC # BLD: 4.62 M/UL — SIGNIFICANT CHANGE UP (ref 4.2–5.8)
RBC # FLD: 21.3 % — HIGH (ref 10.3–14.5)
RH IG SCN BLD-IMP: POSITIVE — SIGNIFICANT CHANGE UP
SARS-COV-2 IGG+IGM SERPL QL IA: >250 U/ML — HIGH
SARS-COV-2 IGG+IGM SERPL QL IA: POSITIVE
SODIUM SERPL-SCNC: 137 MMOL/L — SIGNIFICANT CHANGE UP (ref 135–145)
SPECIMEN SOURCE: SIGNIFICANT CHANGE UP
TIBC SERPL-MCNC: 219 UG/DL — LOW (ref 220–430)
UIBC SERPL-MCNC: 206 UG/DL — SIGNIFICANT CHANGE UP (ref 110–370)
WBC # BLD: 12.05 K/UL — HIGH (ref 3.8–10.5)
WBC # FLD AUTO: 12.05 K/UL — HIGH (ref 3.8–10.5)

## 2021-05-31 PROCEDURE — 99231 SBSQ HOSP IP/OBS SF/LOW 25: CPT

## 2021-05-31 PROCEDURE — 99233 SBSQ HOSP IP/OBS HIGH 50: CPT | Mod: GC

## 2021-05-31 PROCEDURE — 99233 SBSQ HOSP IP/OBS HIGH 50: CPT

## 2021-05-31 PROCEDURE — 71260 CT THORAX DX C+: CPT | Mod: 26

## 2021-05-31 RX ORDER — SIMETHICONE 80 MG/1
80 TABLET, CHEWABLE ORAL
Refills: 0 | Status: DISCONTINUED | OUTPATIENT
Start: 2021-05-31 | End: 2021-06-04

## 2021-05-31 RX ORDER — SODIUM CHLORIDE 9 MG/ML
1000 INJECTION, SOLUTION INTRAVENOUS
Refills: 0 | Status: DISCONTINUED | OUTPATIENT
Start: 2021-05-31 | End: 2021-06-04

## 2021-05-31 RX ADMIN — PIPERACILLIN AND TAZOBACTAM 25 GRAM(S): 4; .5 INJECTION, POWDER, LYOPHILIZED, FOR SOLUTION INTRAVENOUS at 06:13

## 2021-05-31 RX ADMIN — PIPERACILLIN AND TAZOBACTAM 25 GRAM(S): 4; .5 INJECTION, POWDER, LYOPHILIZED, FOR SOLUTION INTRAVENOUS at 22:56

## 2021-05-31 RX ADMIN — Medication 650 MILLIGRAM(S): at 00:30

## 2021-05-31 RX ADMIN — PIPERACILLIN AND TAZOBACTAM 25 GRAM(S): 4; .5 INJECTION, POWDER, LYOPHILIZED, FOR SOLUTION INTRAVENOUS at 14:00

## 2021-05-31 RX ADMIN — SODIUM CHLORIDE 100 MILLILITER(S): 9 INJECTION, SOLUTION INTRAVENOUS at 12:51

## 2021-05-31 RX ADMIN — ENOXAPARIN SODIUM 40 MILLIGRAM(S): 100 INJECTION SUBCUTANEOUS at 12:52

## 2021-05-31 RX ADMIN — SIMETHICONE 80 MILLIGRAM(S): 80 TABLET, CHEWABLE ORAL at 17:39

## 2021-05-31 NOTE — PROGRESS NOTE ADULT - ASSESSMENT
51M w/ no significant known PMHx, with repeated presentations of abdominal pain/discomfort and increased flatulence, p/w similar symptoms, with imaging consistent with primary colon cancer with mets.

## 2021-05-31 NOTE — PROGRESS NOTE ADULT - PROBLEM SELECTOR PLAN 2
concern for malignancy via CT A/P 5/29  -consult Gastroenterology - for diagnostic colon mass biopsy via colonoscopy  - if unable, will consult IR for liver mass bx for diagnosis. concern for malignancy via CT A/P 5/29  - Gastroenterology consulted for diagnostic colon mass biopsy via colonoscopy  - GI recommending Colorectal Surgery consult given colon lesion w/ contained area of air - may be diverticula/intraluminal, but cannot r/o contained perf in area of mass or fistula to LN as per radiology.  - Colorectal Surgery recs appreciated, recommend performing colonoscopy this admission for tissue biopsy and site marking, Colorectal Surgery can be on standby in setting perforation worsens during/after procedure.  - Will likely consult IR for liver mass bx for diagnosis.

## 2021-05-31 NOTE — PROGRESS NOTE ADULT - PROBLEM SELECTOR PLAN 5
DVT ppx: Lovenox 40mg QD  Diet: regular diet  Pain: conservative management with acetaminophen PRN; simethicone daily DVT ppx: Lovenox 40mg QD  Diet: NPO for now, per Colorectal Surgery recs  Pain: conservative management with acetaminophen PRN; simethicone daily

## 2021-05-31 NOTE — PROGRESS NOTE ADULT - ATTENDING COMMENTS
Improving with abx  Keep NPO at this time  Hold on colonoscopy currently given fevers and concern re: microperf  Consider IR bx of hepatic lesion for diagnostic purposes  Discussed with surgical team directly

## 2021-05-31 NOTE — PROGRESS NOTE ADULT - PROBLEM SELECTOR PLAN 1
Leukocytosis, TMax 100.7 (at [place]), HR>90  -s/p acetaminophen and piperacillin-tazobactam in the ED  -SIRS likely secondary to malignancy  -c/w monitoring vital signs  -monitor off antibiotics Leukocytosis, TMax 100.7 (at [place]), HR>90  -s/p acetaminophen and piperacillin-tazobactam in the ED  -SIRS likely secondary to malignancy  -c/w monitoring vital signs  -Colorectal surgery recommendations appreciated, started on piperacillin-tazobactam

## 2021-05-31 NOTE — PROGRESS NOTE ADULT - ATTENDING COMMENTS
Patient seen and examined, case d/w house staff.    51M h/o liver lesion, lost to follow up, now p/w abd pain, found to have colon lesion with liver mets, c/f metastatic colon ca.  PE: abdomen soft, no ruq tenderness, luq ttp    labs reviewed: wbc 12, microcytic anemia hgb 8.3, cea 249, bcx 5/30: gnr 2/2 bottles  CT Abd/pelvis 5/29:   Suspect mid/distal transverse colon with hepatic metastasis and lymphadenopathy as described. Infection is considered less likely. Focal outpouching of air in communication with the lumen in this portion, which may be due to intradiverticular air, intraluminal air or fistulization into the lymph node. No intraperitoneal free air or organized fluid collection.      A/p:  # anemia, colon/liver mass: c/f metastatic colon ca,   CT showed irregular wall thickening mid/distal transverse colon with liver mets and adenopathy c/w metastatic disease   f/u colorectal sx/GI to see if they can perform colonoscopy  otherwise consult IR for liver bx, both for diagnosis/staging  staging CT chest w/ contrast  advance diet if no procedure planned today  check iron panel, likely [place]    # sepsis w/ gnr bacteremia  -likely i/s/o colon cancer  c/w zosyn, f/u speciation/sensitivity, r/o liver abscess    # DVT ppx, hypercoagulable state d/t malignancy

## 2021-05-31 NOTE — PROGRESS NOTE ADULT - PROBLEM SELECTOR PLAN 3
via CT A/P 5/29:  -Left adrenal gland: somewhat nodular thickening. Metastasis cannot be excluded.  -Right femoral neck: nonspecific small lucency: focal osteopenia vs osseous metastasis. Recommend comparison to previous outside study. Follow-up (bone scan and/or MRI) may be obtained for further evaluation.  -LLL nodule: indeterminate; pulmonary metastasis cannot be excluded. Recommend comparison to previous outside study. Follow-up (nonemergent chest CT and/or PET-CT) may be obtained for further evaluation.  -obtain CT head and chest via CT A/P 5/29:  -Left adrenal gland: somewhat nodular thickening. Metastasis cannot be excluded.  -Right femoral neck: nonspecific small lucency: focal osteopenia vs osseous metastasis. Recommend comparison to previous outside study. Follow-up (bone scan and/or MRI) may be obtained for further evaluation.  -LLL nodule: indeterminate; pulmonary metastasis cannot be excluded. Recommend comparison to previous outside study. Follow-up (nonemergent chest CT and/or PET-CT) may be obtained for further evaluation.  -f/u CT Chest (for staging)

## 2021-05-31 NOTE — PROGRESS NOTE ADULT - ASSESSMENT
Mr. Granda 51 Year-Old Gentleman presenting with abdominal pain, fevers, nausea/emesis, found to have likely colon malignancy with metastasis and questioned perforation.     PLAN:  - Agree with NPO, IV antibiotics: Zosyn.   - Patient will likely benefit from colonic resection prior to administration of chemo for metastases, however needs to complete full malignancy workup:   - Please obtain tumor markers in AM: CEA, CA-19-9, .   - Please obtain dedicated CT Chest for staging (ordered).   - Appreciate GI evaluation, recommend performing colonoscopy this admission for tissue biopsy and site marking, Colorectal Surgery can be on standby in setting perforation worsens during/after procedure.    A Team Surgery #[id number]

## 2021-05-31 NOTE — PROGRESS NOTE ADULT - ASSESSMENT
51Myo M with nonsignificant PMH who is transferred from [facility] for fever and abdominal pain with suspected new diagnosis of GI cancer (by CT).    # Suspected GI malignancy with metastatic disease - involving the liver and potentially the lungs. Would benefit from tissue biopsy for diagnosis. Imaging reviewed with radiology - lower suspicion for diverticulitis or contained perforation. However given intraluminal/intradiverticular air/fistulization into lymph node on imaging - there is concern for perforation which would be worsened with a colonoscopy. Colorectal surgery recs appreciated. Given ongoing fever and abdominal tenderness would hold off from colonoscopy for ~48h/until defervescence.  # Fever - acute worsening of abdominal pain and fever concerning for infection (acute GE) vs. progression of disease.     Recommendations:  - Heme/onc consult  - Colorectal surgery recs appreciated  - c/w antibiotics  - Pending above can plan for a colonoscopy some time next week when patient is afebrile and abdominal pain improved  - Check Stool PCR, C.diff if has diarrhea  - Please make NPO until cleared by surgery  - Daily CBC, CMP, INR    Thank you for involving us in the care of this patient. Please reach out if any further questions.    Az Skaggs, PGY-4  Gastroenterology Fellow    Available on [employer] Teams  Pager [phone] ([facility]) or [phone] ([facility])  After 5PM/Weekends, please contact the on-call GI fellow: [phone]  Available through [employer] Teams 51Myo M with nonsignificant PMH who is transferred from [facility] for fever and abdominal pain with suspected new diagnosis of GI cancer (by CT).    # Suspected GI malignancy with metastatic disease - involving the liver and potentially the lungs. Would benefit from tissue biopsy for diagnosis. Imaging reviewed with radiology - lower suspicion for diverticulitis or contained perforation. However given intraluminal/intradiverticular air/fistulization into lymph node on imaging - there is concern for perforation which would be worsened with a colonoscopy. Colorectal surgery recs appreciated. Given ongoing fever and abdominal tenderness would hold off from colonoscopy for ~48h/until defervescence.  # Fever - acute worsening of abdominal pain and fever concerning for infection (acute GE) vs. progression of disease. Preliminary reports of G- rods on blood Cx.    Recommendations:  - Heme/onc consult  - Colorectal surgery recs appreciated  - c/w antibiotics  - NPO  - Pending above can plan for a colonoscopy some time next week when patient is afebrile and abdominal pain improved  - Check Stool PCR, C.diff if has diarrhea  - Daily CBC, CMP, INR    Thank you for involving us in the care of this patient. Please reach out if any further questions.    Az Skaggs, PGY-4  Gastroenterology Fellow    Available on [employer] Teams  Pager [phone] ([facility]) or [phone] ([facility])  After 5PM/Weekends, please contact the on-call GI fellow: [phone]  Available through [employer] Teams

## 2021-05-31 NOTE — PROGRESS NOTE ADULT - SUBJECTIVE AND OBJECTIVE BOX
Interval Events:  - low grade fever overnight (100.4F)    S: Patient seen and examined at bedside and doing well. States he is passing gas but no BM. Pain is improved since yesterday. Denies fevers, chills, nausea, emesis, chest pain, SOB.    O: Vital Signs  T(C): 36.7 (05-31 @ 06:16), Max: 38 (05-30 @ 22:50)  HR: 85 (05-31 @ 06:16) (85 - 96)  BP: 131/78 (05-31 @ 06:16) (125/77 - 136/84)  RR: 16 (05-31 @ 06:16) (16 - 17)  SpO2: 100% (05-31 @ 06:16) (100% - 100%)  General: alert and oriented, NAD  Resp: airway patent, respirations unlabored  CVS: regular rate and rhythm  Abdomen: soft, mild LLQ tender,  mildly distended  Extremities: no edema  Skin: warm, dry, appropriate color                          8.2    14.62 )-----------( 413      ( 30 May 2021 03:17 )             26.8   05-30    138  |  106  |  11  ----------------------------<  118<H>  3.6   |  18<L>  |  1.00    Ca    8.1<L>      30 May 2021 03:17    TPro  6.9  /  Alb  3.4  /  TBili  0.8  /  DBili  x   /  AST  16  /  ALT  11  /  AlkPhos  76  05-30

## 2021-05-31 NOTE — PROGRESS NOTE ADULT - SUBJECTIVE AND OBJECTIVE BOX
Gentry Posadas MD PGY-1  Internal Medicine  Pager [phone] / [phone]    INTERVAL HPI / OVERNIGHT EVENTS:  -    SUBJECTIVE: Patient seen and examined at bedside.   -    INCOMPLETE ROS   CONSTITUTIONAL: No fevers or chills  EYES/ENT: No visual changes; no vertigo   NECK: No pain or stiffness  RESPIRATORY: No cough, wheezing, hemoptysis; no shortness of breath  CARDIOVASCULAR: No chest pain or palpitations  GASTROINTESTINAL: No abdominal or epigastric pain. No nausea, vomiting, or hematemesis. No diarrhea or constipation. No melena or hematochezia.  GENITOURINARY: No dysuria, frequency or hematuria  NEUROLOGICAL: No numbness or focal weakness  SKIN: No itching, rashes    OBJECTIVE:    VITAL SIGNS:  ICU Vital Signs Last 24 Hrs  T(C): 36.7 (31 May 2021 06:16), Max: 38 (30 May 2021 22:50)  T(F): 98.1 (31 May 2021 06:16), Max: 100.4 (30 May 2021 22:50)  HR: 85 (31 May 2021 06:16) (85 - 96)  BP: 131/78 (31 May 2021 06:16) (125/77 - 136/84)  BP(mean): --  ABP: --  ABP(mean): --  RR: 16 (31 May 2021 06:16) (16 - 17)  SpO2: 100% (31 May 2021 06:16) (100% - 100%)          PHYSICAL EXAM: INCOMPLETE PHYSICAL EXAM     General: no acute distress  HEENT: normocephalic, atraumatic; clear conjunctiva  Respiratory: clear to auscultation bilaterally; no wheeze; no crackles  Cardiovascular: Regular rate, regular rhythm; no murmurs, rubs, or gallops  Abdomen: soft, non-tender, non-distended; +BS x4  Extremities: WWP, 2+ peripheral pulses b/l; no LE edema  Skin: normal color and turgor; no rash  Neurological: alert, oriented x3, PERRL, EOMI    MEDICATIONS:  MEDICATIONS  (STANDING):  enoxaparin Injectable 40 milliGRAM(s) SubCutaneous daily  piperacillin/tazobactam IVPB.. 3.375 Gram(s) IV Intermittent every 8 hours    MEDICATIONS  (PRN):  simethicone 80 milliGRAM(s) Chew two times a day PRN Upset Stomach      ALLERGIES:  Allergies    No Known Allergies    Intolerances        LABS:                        8.3    12.05 )-----------( 423      ( 31 May 2021 07:56 )             26.8     Hemoglobin: 8.3 g/dL ( @ 07:56)  Hemoglobin: 8.2 g/dL ( @ 03:17)  Hemoglobin: 9.2 g/dL ( @ 16:59)    CBC Full  -  ( 31 May 2021 07:56 )  WBC Count : 12.05 K/uL  RBC Count : 4.62 M/uL  Hemoglobin : 8.3 g/dL  Hematocrit : 26.8 %  Platelet Count - Automated : 423 K/uL  Mean Cell Volume : 58.0 fL  Mean Cell Hemoglobin : 18.0 pg  Mean Cell Hemoglobin Concentration : 31.0 gm/dL  Auto Neutrophil # : 8.01 K/uL  Auto Lymphocyte # : 1.95 K/uL  Auto Monocyte # : 1.20 K/uL  Auto Eosinophil # : 0.81 K/uL  Auto Basophil # : 0.04 K/uL  Auto Neutrophil % : 66.5 %  Auto Lymphocyte % : 16.2 %  Auto Monocyte % : 10.0 %  Auto Eosinophil % : 6.7 %  Auto Basophil % : 0.3 %        137  |  103  |  10  ----------------------------<  96  3.6   |  22  |  1.06    Ca    8.6      31 May 2021 07:56  Phos  2.8       Mg     2.1         TPro  7.0  /  Alb  3.3  /  TBili  0.7  /  DBili  x   /  AST  14  /  ALT  11  /  AlkPhos  93      Creatinine Trend: 1.06<--, 1.00<--, 1.09<--  LIVER FUNCTIONS - ( 31 May 2021 07:56 )  Alb: 3.3 g/dL / Pro: 7.0 g/dL / ALK PHOS: 93 U/L / ALT: 11 U/L / AST: 14 U/L / GGT: x           PT/INR - ( 31 May 2021 07:56 )   PT: 16.9 sec;   INR: 1.51 ratio         PTT - ( 29 May 2021 16:59 )  PTT:29.8 sec    hs Troponin:            Urinalysis Basic - ( 29 May 2021 16:59 )    Color: Yellow / Appearance: Clear / S.020 / pH: x  Gluc: x / Ketone: Negative  / Bili: Negative / Urobili: 1 mg/dL   Blood: x / Protein: 30 mg/dL / Nitrite: Negative   Leuk Esterase: Negative / RBC: x / WBC 0-2   Sq Epi: x / Non Sq Epi: Occasional / Bacteria: Occasional      CSF:                      EKG:   MICROBIOLOGY:    Culture - Blood (collected 30 May 2021 12:12)  Source: .Blood Blood-Peripheral  Gram Stain (30 May 2021 21:56):    Growth in anaerobic bottle: Gram Negative Rods  Preliminary Report (31 May 2021 00:30):    Growth in anaerobic bottle: Gram Negative Rods    **BCID performed. No targets detected.**    ***Blood Panel PCR results on this specimen are available    approximately 3 hours after the Gram stain result.***    Gram stain, PCR, and/or culture results may not always    correspond due to difference in methodologies.    ************************************************************    This PCR assay was performed by multiplex PCR. This    Assay tests for 66 bacterial and resistance gene targets.    Please refer to the [facility] test directory    at [email or web address] for details.    Culture - Blood (collected 30 May 2021 12:12)  Source: .Blood Blood-Peripheral  Gram Stain (31 May 2021 00:42):    Growth in anaerobic bottle: Gram Negative Rods  Preliminary Report (31 May 2021 00:42):    Growth in anaerobic bottle: Gram Negative Rods      IMAGING:      Labs, imaging, EKG personally reviewed    RADIOLOGY & ADDITIONAL TESTS: Reviewed. Gentry Posadas MD PGY-1  Internal Medicine  Pager [phone] / [phone]    INTERVAL HPI / OVERNIGHT EVENTS:  -Colorectal surgery consulted for question of perforation  -GNR in anaerobic bottles  -Tmax 100.4 overnight, on piperacillin-tazobactam    SUBJECTIVE: Patient seen and examined at bedside.   -reports fever, however felt fine otherwise  -reports improvement in abdominal pain  -denies diarrhea, nausea, vomiting, cough, sob, cp, or other symptoms     OBJECTIVE:    VITAL SIGNS:  ICU Vital Signs Last 24 Hrs  T(C): 36.7 (31 May 2021 06:16), Max: 38 (30 May 2021 22:50)  T(F): 98.1 (31 May 2021 06:16), Max: 100.4 (30 May 2021 22:50)  HR: 85 (31 May 2021 06:16) (85 - 96)  BP: 131/78 (31 May 2021 06:16) (125/77 - 136/84)  BP(mean): --  ABP: --  ABP(mean): --  RR: 16 (31 May 2021 06:16) (16 - 17)  SpO2: 100% (31 May 2021 06:16) (100% - 100%)      PHYSICAL EXAM:     General: no acute distress  HEENT: normocephalic, atraumatic; clear conjunctiva; mildly icteric sclera  Respiratory: clear to auscultation bilaterally; no wheeze; no crackles  Cardiovascular: Regular rate, regular rhythm; no murmurs, rubs, or gallops  Abdomen: soft, no tenderness to palpation, distended, flatulence  Extremities: WWP, 2+ peripheral pulses b/l; no LE edema  Skin: normal color and turgor; no rash  Neurological: alert, oriented x3, PERRL, EOMI    MEDICATIONS:  MEDICATIONS  (STANDING):  enoxaparin Injectable 40 milliGRAM(s) SubCutaneous daily  piperacillin/tazobactam IVPB.. 3.375 Gram(s) IV Intermittent every 8 hours    MEDICATIONS  (PRN):  simethicone 80 milliGRAM(s) Chew two times a day PRN Upset Stomach      ALLERGIES:  Allergies    No Known Allergies    Intolerances        LABS:                        8.3    12.05 )-----------( 423      ( 31 May 2021 07:56 )             26.8     Hemoglobin: 8.3 g/dL ( @ 07:56)  Hemoglobin: 8.2 g/dL ( @ 03:17)  Hemoglobin: 9.2 g/dL ( @ 16:59)    CBC Full  -  ( 31 May 2021 07:56 )  WBC Count : 12.05 K/uL  RBC Count : 4.62 M/uL  Hemoglobin : 8.3 g/dL  Hematocrit : 26.8 %  Platelet Count - Automated : 423 K/uL  Mean Cell Volume : 58.0 fL  Mean Cell Hemoglobin : 18.0 pg  Mean Cell Hemoglobin Concentration : 31.0 gm/dL  Auto Neutrophil # : 8.01 K/uL  Auto Lymphocyte # : 1.95 K/uL  Auto Monocyte # : 1.20 K/uL  Auto Eosinophil # : 0.81 K/uL  Auto Basophil # : 0.04 K/uL  Auto Neutrophil % : 66.5 %  Auto Lymphocyte % : 16.2 %  Auto Monocyte % : 10.0 %  Auto Eosinophil % : 6.7 %  Auto Basophil % : 0.3 %        137  |  103  |  10  ----------------------------<  96  3.6   |  22  |  1.06    Ca    8.6      31 May 2021 07:56  Phos  2.8       Mg     2.1         TPro  7.0  /  Alb  3.3  /  TBili  0.7  /  DBili  x   /  AST  14  /  ALT  11  /  AlkPhos  93      Creatinine Trend: 1.06<--, 1.00<--, 1.09<--  LIVER FUNCTIONS - ( 31 May 2021 07:56 )  Alb: 3.3 g/dL / Pro: 7.0 g/dL / ALK PHOS: 93 U/L / ALT: 11 U/L / AST: 14 U/L / GGT: x           PT/INR - ( 31 May 2021 07:56 )   PT: 16.9 sec;   INR: 1.51 ratio         PTT - ( 29 May 2021 16:59 )  PTT:29.8 sec    hs Troponin:            Urinalysis Basic - ( 29 May 2021 16:59 )    Color: Yellow / Appearance: Clear / S.020 / pH: x  Gluc: x / Ketone: Negative  / Bili: Negative / Urobili: 1 mg/dL   Blood: x / Protein: 30 mg/dL / Nitrite: Negative   Leuk Esterase: Negative / RBC: x / WBC 0-2   Sq Epi: x / Non Sq Epi: Occasional / Bacteria: Occasional     MICROBIOLOGY:    Culture - Blood (collected 30 May 2021 12:12)  Source: .Blood Blood-Peripheral  Gram Stain (30 May 2021 21:56):    Growth in anaerobic bottle: Gram Negative Rods  Preliminary Report (31 May 2021 00:30):    Growth in anaerobic bottle: Gram Negative Rods    **BCID performed. No targets detected.**    ***Blood Panel PCR results on this specimen are available    approximately 3 hours after the Gram stain result.***    Gram stain, PCR, and/or culture results may not always    correspond due to difference in methodologies.    ************************************************************    This PCR assay was performed by multiplex PCR. This    Assay tests for 66 bacterial and resistance gene targets.    Please refer to the [facility] test directory    at [email or web address] for details.    Culture - Blood (collected 30 May 2021 12:12)  Source: .Blood Blood-Peripheral  Gram Stain (31 May 2021 00:42):    Growth in anaerobic bottle: Gram Negative Rods  Preliminary Report (31 May 2021 00:42):    Growth in anaerobic bottle: Gram Negative Rods         Labs, imaging, EKG personally reviewed    RADIOLOGY & ADDITIONAL TESTS: Reviewed.

## 2021-05-31 NOTE — PROGRESS NOTE ADULT - PROBLEM SELECTOR PLAN 4
Ct A/P with concern for UTI/pyelonephritis  -no urologic or evidence of infectious etiology  -continue to monitor off antibiotics Ct A/P with concern for UTI/pyelonephritis  -no urologic or evidence of infectious etiology  -no active intervention at this time

## 2021-05-31 NOTE — PROGRESS NOTE ADULT - SUBJECTIVE AND OBJECTIVE BOX
Interval Events:   - Low grade fever overnight  - Reports some improvement of abdominal pain    Allergies:  No Known Allergies        Hospital Medications:  dextrose 5% + sodium chloride 0.9%. 1000 milliLiter(s) IV Continuous <Continuous>  enoxaparin Injectable 40 milliGRAM(s) SubCutaneous daily  piperacillin/tazobactam IVPB.. 3.375 Gram(s) IV Intermittent every 8 hours  simethicone 80 milliGRAM(s) Chew two times a day PRN      PMHX/PSHX:  No pertinent past medical history    No significant past surgical history        Family history:  FH: diabetes mellitus (Mother)    FH: hypertension (Mother)        ROS:   General:  + wt loss, fevers, chills, night sweats, fatigue  Eyes:  Good vision, no reported pain  ENT:  No sore throat, pain, runny nose, dysphagia  CV:  No pain, palpitations, hypo/hypertension  Pulm:  No dyspnea, cough, tachypnea, wheezing  GI:  As per HPI  :  No pain, bleeding, incontinence, nocturia  Muscle:  No pain, weakness  Neuro:  No weakness, tingling, memory problems  Psych:  No fatigue, insomnia, mood problems, depression  Endocrine:  No polyuria, polydipsia, cold/heat intolerance  Heme:  No petechiae, ecchymosis, easy bruisability  Skin:  No rash, tattoos, scars, edema      PHYSICAL EXAM:   Vital Signs:  Vital Signs Last 24 Hrs  T(C): 36.6 (31 May 2021 12:51), Max: 38 (30 May 2021 22:50)  T(F): 97.9 (31 May 2021 12:51), Max: 100.4 (30 May 2021 22:50)  HR: 87 (31 May 2021 12:51) (85 - 96)  BP: 141/91 (31 May 2021 12:51) (131/78 - 141/91)  BP(mean): --  RR: 17 (31 May 2021 12:51) (16 - 17)  SpO2: 100% (31 May 2021 12:51) (100% - 100%)  Daily     Daily     GENERAL:  No acute distress  HEENT:  Normocephalic/atraumatic, no scleral icterus  CHEST:  No accessory muscle use  HEART:  Regular rate and rhythm  ABDOMEN:  Soft, mild epigastric and LLQ ttp, no rebound or guarding, non-distended  EXTREMITIES: No cyanosis, clubbing, or edema  SKIN:  No rash  NEURO:  Alert and oriented x 3, no asterixis    LABS:                        8.3    12.05 )-----------( 423      ( 31 May 2021 07:56 )             26.8     Mean Cell Volume: 58.0 fL (-21 @ 07:56)        137  |  103  |  10  ----------------------------<  96  3.6   |  22  |  1.06    Ca    8.6      31 May 2021 07:56  Phos  2.8       Mg     2.1         TPro  7.0  /  Alb  3.3  /  TBili  0.7  /  DBili  x   /  AST  14  /  ALT  11  /  AlkPhos  93      LIVER FUNCTIONS - ( 31 May 2021 07:56 )  Alb: 3.3 g/dL / Pro: 7.0 g/dL / ALK PHOS: 93 U/L / ALT: 11 U/L / AST: 14 U/L / GGT: x           PT/INR - ( 31 May 2021 07:56 )   PT: 16.9 sec;   INR: 1.51 ratio         PTT - ( 29 May 2021 16:59 )  PTT:29.8 sec  Urinalysis Basic - ( 29 May 2021 16:59 )    Color: Yellow / Appearance: Clear / S.020 / pH: x  Gluc: x / Ketone: Negative  / Bili: Negative / Urobili: 1 mg/dL   Blood: x / Protein: 30 mg/dL / Nitrite: Negative   Leuk Esterase: Negative / RBC: x / WBC 0-2   Sq Epi: x / Non Sq Epi: Occasional / Bacteria: Occasional                              8.3    12.05 )-----------( 423      ( 31 May 2021 07:56 )             26.8                         8.2    14.62 )-----------( 413      ( 30 May 2021 03:17 )             26.8                         9.2    14.89 )-----------( 468      ( 29 May 2021 16:59 )             29.9       Imaging:

## 2021-06-01 LAB
ANION GAP SERPL CALC-SCNC: 12 MMOL/L — SIGNIFICANT CHANGE UP (ref 7–14)
APTT BLD: 28.9 SEC — SIGNIFICANT CHANGE UP (ref 27–36.3)
BUN SERPL-MCNC: 9 MG/DL — SIGNIFICANT CHANGE UP (ref 7–23)
CALCIUM SERPL-MCNC: 8.4 MG/DL — SIGNIFICANT CHANGE UP (ref 8.4–10.5)
CHLORIDE SERPL-SCNC: 103 MMOL/L — SIGNIFICANT CHANGE UP (ref 98–107)
CO2 SERPL-SCNC: 22 MMOL/L — SIGNIFICANT CHANGE UP (ref 22–31)
CREAT SERPL-MCNC: 1.06 MG/DL — SIGNIFICANT CHANGE UP (ref 0.5–1.3)
GLUCOSE SERPL-MCNC: 108 MG/DL — HIGH (ref 70–99)
HCT VFR BLD CALC: 24.6 % — LOW (ref 39–50)
HGB BLD-MCNC: 7.6 G/DL — LOW (ref 13–17)
INR BLD: 1.49 RATIO — HIGH (ref 0.88–1.16)
MAGNESIUM SERPL-MCNC: 2.2 MG/DL — SIGNIFICANT CHANGE UP (ref 1.6–2.6)
MCHC RBC-ENTMCNC: 18.1 PG — LOW (ref 27–34)
MCHC RBC-ENTMCNC: 30.9 GM/DL — LOW (ref 32–36)
MCV RBC AUTO: 58.4 FL — LOW (ref 80–100)
NRBC # BLD: 0 /100 WBCS — SIGNIFICANT CHANGE UP
NRBC # FLD: 0 K/UL — SIGNIFICANT CHANGE UP
PHOSPHATE SERPL-MCNC: 3.2 MG/DL — SIGNIFICANT CHANGE UP (ref 2.5–4.5)
PLATELET # BLD AUTO: 436 K/UL — HIGH (ref 150–400)
POTASSIUM SERPL-MCNC: 3.3 MMOL/L — LOW (ref 3.5–5.3)
POTASSIUM SERPL-SCNC: 3.3 MMOL/L — LOW (ref 3.5–5.3)
PROTHROM AB SERPL-ACNC: 16.8 SEC — HIGH (ref 10.6–13.6)
RBC # BLD: 4.21 M/UL — SIGNIFICANT CHANGE UP (ref 4.2–5.8)
RBC # FLD: 21 % — HIGH (ref 10.3–14.5)
SODIUM SERPL-SCNC: 137 MMOL/L — SIGNIFICANT CHANGE UP (ref 135–145)
WBC # BLD: 9.94 K/UL — SIGNIFICANT CHANGE UP (ref 3.8–10.5)
WBC # FLD AUTO: 9.94 K/UL — SIGNIFICANT CHANGE UP (ref 3.8–10.5)

## 2021-06-01 PROCEDURE — 99233 SBSQ HOSP IP/OBS HIGH 50: CPT | Mod: GC

## 2021-06-01 PROCEDURE — 99447 NTRPROF PH1/NTRNET/EHR 11-20: CPT

## 2021-06-01 PROCEDURE — 99255 IP/OBS CONSLTJ NEW/EST HI 80: CPT | Mod: GC

## 2021-06-01 PROCEDURE — 99232 SBSQ HOSP IP/OBS MODERATE 35: CPT | Mod: GC

## 2021-06-01 RX ORDER — FERROUS SULFATE 325(65) MG
325 TABLET ORAL
Refills: 0 | Status: DISCONTINUED | OUTPATIENT
Start: 2021-06-01 | End: 2021-06-04

## 2021-06-01 RX ORDER — POTASSIUM CHLORIDE 20 MEQ
40 PACKET (EA) ORAL ONCE
Refills: 0 | Status: COMPLETED | OUTPATIENT
Start: 2021-06-01 | End: 2021-06-01

## 2021-06-01 RX ADMIN — Medication 100 MILLIGRAM(S): at 10:45

## 2021-06-01 RX ADMIN — PIPERACILLIN AND TAZOBACTAM 25 GRAM(S): 4; .5 INJECTION, POWDER, LYOPHILIZED, FOR SOLUTION INTRAVENOUS at 06:18

## 2021-06-01 RX ADMIN — PIPERACILLIN AND TAZOBACTAM 25 GRAM(S): 4; .5 INJECTION, POWDER, LYOPHILIZED, FOR SOLUTION INTRAVENOUS at 13:57

## 2021-06-01 RX ADMIN — SODIUM CHLORIDE 100 MILLILITER(S): 9 INJECTION, SOLUTION INTRAVENOUS at 13:57

## 2021-06-01 RX ADMIN — Medication 40 MILLIEQUIVALENT(S): at 10:45

## 2021-06-01 RX ADMIN — PIPERACILLIN AND TAZOBACTAM 25 GRAM(S): 4; .5 INJECTION, POWDER, LYOPHILIZED, FOR SOLUTION INTRAVENOUS at 21:15

## 2021-06-01 NOTE — CONSULT NOTE ADULT - ATTENDING COMMENTS
Presented w/ persistent pain and was found to have bilobar liver lesions (R>L) and a distal transverse colon thickening/mass w/ associated microperforation    - continue NPO and IVFs  - continue IV abx  - recommend GI for colonoscopy, understand increased risk of perf given microperf but we will be available should this happen and plan for immediate surgery and resection  - given perforation, despite the likely metastatic nature, recommend surgery during this admission and then chemo once he has healed    Adekemi MD Fadi  Attending Physician
52 yo M presenting with sudden onset abdominal pain after meal associated with fevers to 101+ and altered bowel habits.  Suspected to have food poisoning, but on imaging found to have suspected metastatic malignancy with mass in transverse colon, sig LAD, and large lesions (8cm+) in liver.  On CT 1 year ago, there was possible liver lesion that was never followed up.  Colon lesion does have contained area of air - may be diverticula/intraluminal, but cannot r/o contained perf in area of mass or fistula to LN as per radiology.  Patient with some abdominal discomfort but not rigid abdomen.  Still with some fevers.  While less likely, need to r/o microperforation 2/2 malignancy at this time.  After assessing this, can make decision about further workup.    - Colorectal surgical consult  - IV abx - Zosyn  - Pan culture including stool studies if truly having diarrhea  - Make NPO until we clarify this microperforation questions with surgery  - Pending above, can clarify if colonoscopy vs. IR bx of hepatic lesion is appropriate next step in workup  - DVT ppx    GI to follow
Pt seen examined and d/w fellow. Agree with above A/P. History revd / confirmed with pt at bedside. History as above with several encounters with liver findings / GI sxs that weree not followed up earlier. Recent admit ./ then transfer with h/o abd pain, wt loss, fever as above. C]Rad evaluations revd and as above c/w met disease , likely colon origin. Pt found to have clostridium bacteremia with species known to be found in setting of colon cancer. Started on abx and feels better today, less abd pain / abd bloating. PE sclerae anicteric Lungs clear heart RRR S1S2 abd mildly distended, NABS soft / question of dullness to percussion RUQ w/o clearly palp liver edge, neuro grossly nonfocal. A/P Long d/w pt re findings which are highly suspicious for met colon cancer based on rad findings. Away definitive path findings with molecular markers to assist in optimal treatment decision - making . This was d/w pt . The treatable but incurable nature of this met cancer was revd and the GOC were also revd. Pt undertstands. Needs to complete abx course for clostridium acteremia an dthen once stable an d/c'd needs to f/u at the [facility] to begin pall treatment- will help coordinate
51 m developed abd pain about a year ago and imaging showed liver lesions and was supposed to follow up as outpatient but lost to follow, now p/w worsening abd pain, febrile, leukocytosis 14  blood cx: clostridium septicum  CT: Suspect mid/distal transverse colon with hepatic metastasis and lymphadenopathy . Focal outpouching of air in communication with the lumen in this portion, which may be due to intradiverticular air, intraluminal air or fistulization into the lymph node.   now plan for IR liver biopsy as colonoscopy was considered high risk for perf    fever, leukocytosis, sepsis with clostridium septicum bacteremia in the setting of likely colon ca and mets (no biopsy yet)    * f/u the repeat blood cx  * DC clinda and c/w zosyn  * f/u the IR biopsy   * monitor the WBC/diff and temp curve    The above assessment and plan was discussed with the primary team    Sharron Snow MD  Pager [phone]  After 5pm and on weekends call [phone]

## 2021-06-01 NOTE — PROGRESS NOTE ADULT - SUBJECTIVE AND OBJECTIVE BOX
Interval Events:   - Afebrile  - Bcx growing Clostridium septicum     Allergies:  No Known Allergies      Hospital Medications:  dextrose 5% + sodium chloride 0.9%. 1000 milliLiter(s) IV Continuous <Continuous>  enoxaparin Injectable 40 milliGRAM(s) SubCutaneous daily  piperacillin/tazobactam IVPB.. 3.375 Gram(s) IV Intermittent every 8 hours  simethicone 80 milliGRAM(s) Chew two times a day PRN      PMHX/PSHX:  No pertinent past medical history    No significant past surgical history        Family history:  FH: diabetes mellitus (Mother)    FH: hypertension (Mother)        ROS:   General:  + wt loss, fevers, chills, night sweats, fatigue  Eyes:  Good vision, no reported pain  ENT:  No sore throat, pain, runny nose, dysphagia  CV:  No pain, palpitations, hypo/hypertension  Pulm:  No dyspnea, cough, tachypnea, wheezing  GI:  As per HPI  :  No pain, bleeding, incontinence, nocturia  Muscle:  No pain, weakness  Neuro:  No weakness, tingling, memory problems  Psych:  No fatigue, insomnia, mood problems, depression  Endocrine:  No polyuria, polydipsia, cold/heat intolerance  Heme:  No petechiae, ecchymosis, easy bruisability  Skin:  No rash, tattoos, scars, edema    PHYSICAL EXAM:   Vital Signs:  Vital Signs Last 24 Hrs  T(C): 36.8 (01 Jun 2021 06:15), Max: 37.3 (31 May 2021 22:55)  T(F): 98.2 (01 Jun 2021 06:15), Max: 99.2 (31 May 2021 22:55)  HR: 92 (01 Jun 2021 06:15) (87 - 92)  BP: 137/90 (01 Jun 2021 06:15) (137/90 - 141/91)  BP(mean): --  RR: 17 (01 Jun 2021 06:15) (17 - 18)  SpO2: 100% (01 Jun 2021 06:15) (100% - 100%)  Daily     Daily     GENERAL:  No acute distress  HEENT:  Normocephalic/atraumatic, no scleral icterus  CHEST:  No accessory muscle use  HEART:  Regular rate and rhythm  ABDOMEN:  Soft, mild epigastric and LLQ ttp, no rebound or guarding, non-distended  EXTREMITIES: No cyanosis, clubbing, or edema  SKIN:  No rash  NEURO:  Alert and oriented x 3, no asterixis    LABS:                        8.3    12.05 )-----------( 423      ( 31 May 2021 07:56 )             26.8     Mean Cell Volume: 58.0 fL (05-31-21 @ 07:56)    05-31    137  |  103  |  10  ----------------------------<  96  3.6   |  22  |  1.06    Ca    8.6      31 May 2021 07:56  Phos  2.8     05-31  Mg     2.1     05-31    TPro  7.0  /  Alb  3.3  /  TBili  0.7  /  DBili  x   /  AST  14  /  ALT  11  /  AlkPhos  93  05-31    LIVER FUNCTIONS - ( 31 May 2021 07:56 )  Alb: 3.3 g/dL / Pro: 7.0 g/dL / ALK PHOS: 93 U/L / ALT: 11 U/L / AST: 14 U/L / GGT: x           PT/INR - ( 31 May 2021 07:56 )   PT: 16.9 sec;   INR: 1.51 ratio                                     8.3    12.05 )-----------( 423      ( 31 May 2021 07:56 )             26.8                         8.2    14.62 )-----------( 413      ( 30 May 2021 03:17 )             26.8                         9.2    14.89 )-----------( 468      ( 29 May 2021 16:59 )             29.9       Imaging:

## 2021-06-01 NOTE — PROGRESS NOTE ADULT - ATTENDING COMMENTS
Abdominal pain, fever curve improving.   Mild TTP on exam, improving.   Remains on antibiotics for bacteremia/fevers.   Suspect metastatic colon cancer. Bacteremia with Clostridium septicum likely associated with underlying malignancy.   Per colorectal surgery, may plan for colon resection prior to future therapies for his metastatic cancer.   Discussed colonoscopy and associated risks with patient - he is agreeable.   Will tentatively plan for colonoscopy on Thursday if remains optimized. Clear liquid diet tomorrow. GI to order prep for tomorrow (6/2).

## 2021-06-01 NOTE — CONSULT NOTE ADULT - REASON FOR ADMISSION
concern for GI malignancy

## 2021-06-01 NOTE — PROGRESS NOTE ADULT - PROBLEM SELECTOR PLAN 5
DVT ppx: Lovenox 40mg QD  Diet: NPO for now, per Colorectal Surgery recs  Pain: conservative management with acetaminophen PRN; simethicone daily

## 2021-06-01 NOTE — PROGRESS NOTE ADULT - PROBLEM SELECTOR PLAN 4
Ct A/P with concern for UTI/pyelonephritis  -no urologic or evidence of infectious etiology  -no active intervention at this time

## 2021-06-01 NOTE — CONSULT NOTE ADULT - ASSESSMENT
51M w/ PMH of lactose intolerance, transferred from [facility] for fever and abdominal pain w/ suspected new diagnosis of metastatic GI cancer.    # R/o new diagnosis of metastatic malignancy- favor GI primary  Ct chest/abd/pelvis reviewed, results as above, pertinent for irregular wall thickening of the mid-distal transverse colon with surrounding stranding, colonic lymphadenopathy with focal outpouching of air in communication with lumen concerning for possible perforation, hypodense liver lesions, one of which measuring 8.5cm, and CT chest showing b/l subcentimeter pulmonary nodules with left mammary and cardiophrenic angle lymph nodes measuring <10mm  Given concern for possible perforation, appreciate GI eval and recs, will not pursue colonoscopy at this time  Will need tissue bx for definitive diagnosis- appreciate IR consult for liver lesion biopsy- please follow up on when this can be done. Suspect colon cancer primary though this needs to be proven with tissue bx.   Would appreciate colorectal surgery eval and recs.     #Clostridium septicum bacteremia   Pt found to have fever at OSH and blood cultures + clostridium septicum  Currently on clindamycin  Based on literature search- c. septicum bacteremia has a clear association with malignancies and highly known to be associated with colorectal malignancy (But also with AML and MDS). The reported rates of C. septicum infection with underlying malignancy ranges from 50-85%. It can lead to direct, spontaneous infections of the bowel and peritoneal cavity, therefore bacteremia with this organism may be an unexpected initial presentation of undiagnosed colon carcinoma.    Given the above info, suspicion for colorectal primary is even higher, but still need tissue diagnosis   Repeat blood cultures, follow up sensitivities    #Microcytic anemia   Pt with downtrending hemoglobin, MCV in 50's  In the setting of likely GI malignancy with possible slow GI bleed   Iron studies consistent with iron deficiency- can start oral supplementation 325mg every other day for optimal absorption  Holding off on colonoscopy for now     Will follow closely pending path     Saima Cantrell MD  Hematology Oncology Fellow, PGY-5  [facility] Pager: [phone]/ [facility] Pager: [phone]       51M w/ PMH of lactose intolerance, transferred from [facility] for fever and abdominal pain w/ suspected new diagnosis of metastatic GI cancer.    # R/o new diagnosis of metastatic malignancy- favor GI primary  Pt with 1 year hx of abd discomfort a/w liver lesion   CEA elevated 249  Ct chest/abd/pelvis reviewed, results as above, pertinent for irregular wall thickening of the mid-distal transverse colon with surrounding stranding, colonic lymphadenopathy with focal outpouching of air in communication with lumen concerning for possible perforation, hypodense liver lesions, one of which measuring 8.5cm, and CT chest showing b/l subcentimeter pulmonary nodules with left mammary and cardiophrenic angle lymph nodes measuring <10mm  Given concern for possible perforation, appreciate GI eval and recs, will not pursue colonoscopy at this time  Will need tissue bx for definitive diagnosis- appreciate IR consult for liver lesion biopsy- please follow up on when this can be done. Suspect colon cancer primary though this needs to be proven with tissue bx.   Would appreciate colorectal surgery eval and recs.     #Clostridium septicum bacteremia   Pt found to have fever at OSH and blood cultures + clostridium septicum  Currently on clindamycin  Based on literature search- c. septicum bacteremia has a clear association with malignancies and highly known to be associated with colorectal malignancy (But also with AML and MDS). The reported rates of C. septicum infection with underlying malignancy ranges from 50-85%. It can lead to direct, spontaneous infections of the bowel and peritoneal cavity, therefore bacteremia with this organism may be an unexpected initial presentation of undiagnosed colon carcinoma.    Given the above info, suspicion for colorectal primary is even higher, but still need tissue diagnosis   Repeat blood cultures, follow up sensitivities    #Microcytic anemia   Pt with downtrending hemoglobin, MCV in 50's  In the setting of likely GI malignancy with possible slow GI bleed   Iron studies consistent with iron deficiency- can start oral supplementation 325mg every other day for optimal absorption  Holding off on colonoscopy for now     Will follow closely pending path     Saima Cantrell MD  Hematology Oncology Fellow, PGY-5  [facility] Pager: [phone]/ [facility] Pager: [phone]       51M w/ PMH of lactose intolerance, transferred from [facility] for fever and abdominal pain w/ suspected new diagnosis of metastatic GI cancer.    # R/o new diagnosis of metastatic malignancy- favor GI primary  Pt with 1 year hx of abd discomfort a/w liver lesion   CEA elevated 249  Ct chest/abd/pelvis reviewed, results as above, pertinent for irregular wall thickening of the mid-distal transverse colon with surrounding stranding, colonic lymphadenopathy with focal outpouching of air in communication with lumen concerning for possible perforation, hypodense liver lesions, one of which measuring 8.5cm, and CT chest showing b/l subcentimeter pulmonary nodules with left mammary and cardiophrenic angle lymph nodes measuring <10mm  Given concern for possible perforation, appreciate GI eval and recs, will not pursue colonoscopy at this time  Will need tissue bx for definitive diagnosis- appreciate IR consult for liver lesion biopsy- please follow up on when this can be done. Suspect colon cancer primary though this needs to be proven with tissue bx.   Would appreciate colorectal surgery eval and recs.     #Clostridium septicum bacteremia   Pt found to have fever at OSH and blood cultures + clostridium septicum  Currently on clindamycin  Based on literature search- c. septicum bacteremia has a clear association with malignancies and highly known to be associated with colorectal malignancy (But also with AML and MDS). The reported rates of C. septicum infection with underlying malignancy ranges from 50-85%. It can lead to direct, spontaneous infections of the bowel and peritoneal cavity, therefore bacteremia with this organism may be an unexpected initial presentation of undiagnosed colon carcinoma.    One such case report is featured here: https://www.ncbi.nlm.nih.gov/pmc/articles/ILQ6374763/  Given the above info, suspicion for colorectal primary is even higher, but still need tissue diagnosis   Repeat blood cultures, follow up sensitivities    #Microcytic anemia   Pt with downtrending hemoglobin, MCV in 50's  In the setting of likely GI malignancy with possible slow GI bleed   Iron studies consistent with iron deficiency- can start oral supplementation 325mg every other day for optimal absorption  Holding off on colonoscopy for now     Will follow closely pending path     Saima Cantrell MD  Hematology Oncology Fellow, PGY-5  [facility] Pager: [phone]/ [facility] Pager: [phone]       51M w/ PMH of lactose intolerance, transferred from [facility] for fever and abdominal pain w/ suspected new diagnosis of metastatic GI cancer.    # R/o new diagnosis of metastatic malignancy- favor GI primary  Pt with 1 year hx of abd discomfort a/w liver lesion   CEA elevated 249  Ct chest/abd/pelvis reviewed, results as above, pertinent for irregular wall thickening of the mid-distal transverse colon with surrounding stranding, colonic lymphadenopathy with focal outpouching of air in communication with lumen concerning for possible perforation, hypodense liver lesions, one of which measuring 8.5cm, and CT chest showing b/l subcentimeter pulmonary nodules with left mammary and cardiophrenic angle lymph nodes measuring <10mm  Given concern for possible perforation, appreciate GI eval and recs, will not pursue colonoscopy at this time  Will need tissue bx for definitive diagnosis- appreciate IR consult for liver lesion biopsy- please follow up on when this can be done. Suspect colon cancer primary though this needs to be proven with tissue bx.   Would appreciate colorectal surgery eval and recs.     #Clostridium septicum bacteremia   Pt found to have fever at OSH and blood cultures + clostridium septicum  Currently on clindamycin  Based on literature search- c. septicum bacteremia has a clear association with malignancies and highly known to be associated with colorectal malignancy (But also with AML and MDS). The reported rates of C. septicum infection with underlying malignancy ranges from 50-85%. It can lead to direct, spontaneous infections of the bowel and peritoneal cavity, therefore bacteremia with this organism may be an unexpected initial presentation of undiagnosed colon carcinoma.    One such case report is featured here: https://www.ncbi.nlm.nih.gov/pmc/articles/TYR5178216/  Given the above info, suspicion for colorectal primary is even higher, but still need tissue diagnosis   Repeat blood cultures, follow up sensitivities    #Microcytic anemia   Pt with downtrending hemoglobin, MCV in 50's  In the setting of likely GI malignancy with possible slow GI bleed   Iron studies consistent with iron deficiency- can start oral supplementation 325mg every other day for optimal absorption  Holding off on colonoscopy for now   Transfuse PRN for hgb <7    Will follow closely pending path     Saima Cantrell MD  Hematology Oncology Fellow, PGY-5  [facility] Pager: [phone]/ [facility] Pager: [phone]

## 2021-06-01 NOTE — PROGRESS NOTE ADULT - PROBLEM SELECTOR PLAN 3
via CT A/P 5/29:  -Left adrenal gland: somewhat nodular thickening. Metastasis cannot be excluded.  -Right femoral neck: nonspecific small lucency: focal osteopenia vs osseous metastasis. Recommend comparison to previous outside study. Follow-up (bone scan and/or MRI) may be obtained for further evaluation.  -LLL nodule: indeterminate; pulmonary metastasis cannot be excluded. Recommend comparison to previous outside study. Follow-up (nonemergent chest CT and/or PET-CT) may be obtained for further evaluation.  -CT Chest obtained for staging, b/l subcentimeter pulmonary nodules up to 6 mm, c/f metastatic disease  -Hem/Onc consulted, recs appreciated: 325mg iron QOD, repeat bcx, f/u sensitivities

## 2021-06-01 NOTE — CONSULT NOTE ADULT - ASSESSMENT
Assessment:  The patient is a 51 year old male with past medical history of lactose intolerance who was transferred from [facility] for fever and abdominal pain with suspected new diagnosis of GI cancer. He reports that he was seen at [facility] approximately 1 year ago for gassy abdominal pain and had abdominal imaging at that time showing lesion in his liver and was told to follow-up outpatient, but never did. Patient was subsequently seen at an urgent care center about 6 months ago for similar abdominal pain and bloating. He had abdominal x-ray performed there and was told he was severely constipated, and he was instructed to follow-up with a GI doctor, but he never got the opportunity given difficulty with scheduling appointment due to [place]-pandemic circumstances. 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 While at [facility], he had CT abdomen and pelvis done to rule out acute appendicitis and was found to have irregular bowel wall thickening and multiple liver lesions concerning for GI cancer with metastatic disease. He was found to have clostridium septicum bacteremia and infectious disease was consulted for further recommendations.    Plan:  # Clostridium septicum bacteremia  - Continue intravenous clindamycin and intravenous piperacillin-tazobactam  - Await final blood cultures with sensitivities   - Monitor fever curve  - Trend CBC and CMP daily  - ID will continue to follow      Prakash Wiggins MD PGY-4   Fellow, Infectious Diseases   Pager: [phone]  If no response, after 5pm and on weekends: Call [phone]   Assessment:  The patient is a 51 year old male with past medical history of lactose intolerance who was transferred from [facility] for fever and abdominal pain with suspected new diagnosis of GI cancer. He reports that he was seen at [facility] approximately 1 year ago for gassy abdominal pain and had abdominal imaging at that time showing lesion in his liver and was told to follow-up outpatient, but never did. Patient was subsequently seen at an urgent care center about 6 months ago for similar abdominal pain and bloating. He had abdominal x-ray performed there and was told he was severely constipated, and he was instructed to follow-up with a GI doctor, but he never got the opportunity given difficulty with scheduling appointment due to [place]-pandemic circumstances. 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 While at [facility], he had CT abdomen and pelvis done to rule out acute appendicitis and was found to have irregular bowel wall thickening and multiple liver lesions concerning for GI cancer with metastatic disease. He was found to have clostridium septicum bacteremia and infectious disease was consulted for further recommendations.    Plan:  # Clostridium septicum bacteremia due to occult GI malignancy  - Continue intravenous piperacillin-tazobactam  - Discontinue intravenous clindamycin  - Await final blood cultures with sensitivities   - Monitor fever curve  - Trend CBC and CMP daily  - ID will continue to follow      Prakash Wiggins MD PGY-4   Fellow, Infectious Diseases   Pager: [phone]  If no response, after 5pm and on weekends: Call [phone]

## 2021-06-01 NOTE — CONSULT NOTE ADULT - SUBJECTIVE AND OBJECTIVE BOX
Vascular & Interventional Radiology Brief Consult Note    Evaluate for Procedure: Liver lesion biopsy     HPI: 51y Male with admitted with colonic perforation thought be secondary to colonic mass. Multiple hepatic lesions noted.     Allergies:   Medications (Abx/Cardiac/Anticoagulation/Blood Products)  enoxaparin Injectable: 40 milliGRAM(s) SubCutaneous (05-31 @ 12:52)  piperacillin/tazobactam IVPB..: 25 mL/Hr IV Intermittent (05-31 @ 22:56)    Data:    T(C): 37.3  HR: 91  BP: 140/87  RR: 18  SpO2: 100%    -WBC 12.05 / HgB 8.3 / Hct 26.8 / Plt 423  -Na 137 / Cl 103 / BUN 10 / Glucose 96  -K 3.6 / CO2 22 / Cr 1.06  -ALT 11 / Alk Phos 93 / T.Bili 0.7  -INR1.51    Imaging: Ct abd pelvis with large hepatic masses. Focal narrowing of the colon with associated perforation

## 2021-06-01 NOTE — PROGRESS NOTE ADULT - ATTENDING COMMENTS
50 yo M with no PMH p/w large colon mass with multiple liver/lung masses highly suspicious for metastatic colon ca. Case c/b Clostridium septicum bacteremia, likely colonic source. will need tissue bx and infection source control. GI unable to perform colonoscopy d/t risk of perforation, pending surgery eval. NPO for now. C/w zosyn/clindamycin as per ID.

## 2021-06-01 NOTE — CONSULT NOTE ADULT - PROVIDER SPECIALTY LIST ADULT
Reminder sent to the Physician's Nurse MSG Pool via In-Basket to review and sign.    
Intervent Radiology
Infectious Disease
Heme/Onc
Colorectal Surgery
Gastroenterology

## 2021-06-01 NOTE — CONSULT NOTE ADULT - SUBJECTIVE AND OBJECTIVE BOX
The patient is a 51 y old  Male who presents with a chief complaint of concern for abdominal pain.     HPI:  The patient is a 51 year old male with past medical history of lactose intolerance who was transferred from [facility] for fever and abdominal pain with suspected new diagnosis of GI cancer. He reports that he was seen at [facility] approximately 1 year ago for gassy abdominal pain and had abdominal imaging at that time showing lesion in his liver and was told to follow-up outpatient, but never did. Patient was subsequently seen at an urgent care center about 6 months ago for similar abdominal pain and bloating. He had abdominal x-ray performed there and was told he was severely constipated, and he was instructed to follow-up with a GI doctor, but he never got the opportunity given difficulty with scheduling appointment due to [place]-pand[place] circumstances. 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 While at [facility], he had CT abdomen and pelvis done to rule out acute appendicitis and was found to have irregular bowel wall thickening and multiple liver lesions concerning for GI cancer with metastatic disease. While at [facility], he had temperature of 100.7 and subsequently given acetaminophen and intravenous piperacillin-tazobactam, and was subsequently transferred to [facility]. He reports a 20 pound weight loss over the last year. He was evaluated by GI who did not recommend colonoscopy due to an intraluminal/intradiverticular air/fistulization into lymph node on imaging with concern for possible perforation. As per hematology/oncology, impression is high likelihood for gastrointestinal malignancy. IR was consulted for possible liver biopsy of the lesions.    CBC initially showed neutrophilic leukocytosis which resolved with microcytic anemia and reactive thrombocytosis. CMP showed hypokalemia and hyperglycemia. INR was 1.49. Urinalysis showed mild proteinuria otherwise unremarkable. COVID-19 PCR was negative. COVID-19 Arjun Ab was positive. Blood cultures x 2 showed Clostridium septicum pending sensitivities. CT chest with IV contrast showed bilateral subcentimeter pulmonary nodules measuring up to 6 mm, are concerning for metastatic disease with an enlarged left internal mammary lymph node and cardiophrenic angle lymph nodes are concerning for malignancy. There No change in the hypodense hepatic lesions better evaluated on CT abdomen/pelvis from 5/29/2021 that were concerning for metastatic disease. CT abdomen and pelvis with IV contrast showed mid/distal transverse colon with hepatic metastasis and lymphadenopathy. Irregular wall thickening of the mid/distal transverse colon with surrounding stranding, suspicious for neoplasm. Focal outpouching of air in communication with the lumen in this portion (5:82), which may be due to intradiverticular air, intraluminal air or fistulization into the lymph node.  Prominent distal gastric wall. He was started on intravenous clindamycin and intravenous piperacillin-tazobactam. Blood cultures x 2 are in process.           prior hospital charts reviewed [x]  primary team notes reviewed [x]  other consultant notes reviewed [x]    PAST MEDICAL & SURGICAL HISTORY:  No pertinent past medical history  No significant past surgical history        Allergies  No Known Allergies    ANTIMICROBIALS (past 90 days)  MEDICATIONS  (STANDING):    piperacillin/tazobactam IVPB..   25 mL/Hr IV Intermittent (06-01-21 @ 06:18)   25 mL/Hr IV Intermittent (05-31-21 @ 22:56)   25 mL/Hr IV Intermittent (05-31-21 @ 14:00)   25 mL/Hr IV Intermittent (05-31-21 @ 06:13)   25 mL/Hr IV Intermittent (05-30-21 @ 21:19)    piperacillin/tazobactam IVPB...   200 mL/Hr IV Intermittent (05-30-21 @ 02:54)        clindamycin IVPB    clindamycin IVPB 900 once  clindamycin IVPB 900 every 8 hours  piperacillin/tazobactam IVPB.. 3.375 every 8 hours    OTHER MEDS: MEDICATIONS  (STANDING):  simethicone 80 two times a day PRN    SOCIAL HISTORY:   Denies smoking, alcohol, or recreational drug use     FAMILY HISTORY:  FH: diabetes mellitus (Mother)    FH: hypertension (Mother)      REVIEW OF SYSTEMS  [  ] ROS unobtainable because:    [x] All other systems negative except as noted below:	    Constitutional:  [x] fever [ ] chills  [x] weight loss  [ ] weakness  Skin:  [ ] rash [ ] phlebitis	  Eyes: [ ] icterus [ ] pain  [ ] discharge	  ENMT: [ ] sore throat  [ ] thrush [ ] ulcers [ ] exudates  Respiratory: [ ] dyspnea [ ] hemoptysis [ ] cough [ ] sputum	  Cardiovascular:  [ ] chest pain [ ] palpitations [ ] edema	  Gastrointestinal:  [ ] nausea [ ] vomiting [ ] diarrhea [ ] constipation [x] pain	  Genitourinary:  [ ] dysuria [ ] frequency [ ] hematuria [ ] discharge [ ] flank pain  [ ] incontinence  Musculoskeletal:  [ ] myalgias [ ] arthralgias [ ] arthritis  [ ] back pain  Neurological:  [ ] headache [ ] seizures  [ ] confusion/altered mental status  Psychiatric:  [ ] anxiety [ ] depression	  Hematology/Lymphatics:  [ ] lymphadenopathy  Endocrine:  [ ] adrenal [ ] thyroid  Allergic/Immunologic:	 [ ] transplant [ ] seasonal    Vital Signs Last 24 Hrs  T(F): 98.2 (06-01-21 @ 06:15), Max: 100.7 (05-29-21 @ 23:16)  Vital Signs Last 24 Hrs  HR: 92 (06-01-21 @ 06:15) (87 - 92)  BP: 137/90 (06-01-21 @ 06:15) (137/90 - 141/91)  RR: 17 (06-01-21 @ 06:15)  SpO2: 100% (06-01-21 @ 06:15) (100% - 100%)  Wt(kg): --    PHYSICAL EXAM:  Constitutional: non-toxic, no distress  HEAD/EYES: anicteric, no conjunctival injection  ENT:  supple, no thrush  Cardiovascular:   normal S1, S2, no murmur, no edema  Respiratory:  clear BS bilaterally, no wheezes, no rales  GI:  soft, non-tender, normal bowel sounds  :  no bustamante, no CVA tenderness  Musculoskeletal:  no synovitis, normal ROM  Neurologic: awake and alert, normal strength, no focal findings  Skin:  no rash, no erythema, no phlebitis  Heme/Onc: no lymphadenopathy   Psychiatric:  awake, alert, appropriate mood                            7.6    9.94  )-----------( 436      ( 01 Jun 2021 07:53 )             24.6   06-01    137  |  103  |  9   ----------------------------<  108<H>  3.3<L>   |  22  |  1.06    Ca    8.4      01 Jun 2021 07:53  Phos  3.2     06-01  Mg     2.2     06-01    TPro  7.0  /  Alb  3.3  /  TBili  0.7  /  DBili  x   /  AST  14  /  ALT  11  /  AlkPhos  93  05-31    MICROBIOLOGY:  Culture - Blood (collected 30 May 2021 12:12)  Source: .Blood Blood-Peripheral  Gram Stain (30 May 2021 21:56):    Growth in anaerobic bottle: Gram Negative Rods  Final Report (31 May 2021 19:58):    Growth in anaerobic bottle: Clostridium septicum "Susceptibilities not    performed"    Please Note:************************************************    Clostridium septicum    may appear as gram negative rods in direct smears of clinical specimens.    **BCIDperformed. No targets detected.**    ***Blood Panel PCR results on this specimen are available    approximately 3 hours after the Gram stain result.***    Gram stain, PCR, and/or culture results may not always    correspond due to difference in methodologies.    ************************************************************    This PCR assay was performed by multiplex PCR. This    Assay tests for 66 bacterial and resistance gene targets.    Please refer to the [facility] [employer] test directory    at [email or web address] for details.    Culture - Blood (collected 30 May 2021 12:12)  Source: .Blood Blood-Peripheral  Gram Stain (31 May 2021 00:42):    Growth in anaerobic bottle: Gram Negative Rods  Final Report (31 May 2021 22:32):    Growth in anaerobic bottle: Clostridium septicum "Susceptibilities not    performed"    Please Note:************************************************    Clostridium septicum    may appear as gram negative rods in direct smears of clinical specimens.                  RADIOLOGY:    < from: CT Abdomen and Pelvis w/ IV Cont (05.29.21 @ 21:00) >  IMPRESSION:    Suspect mid/distal transverse colon with hepatic metastasis and lymphadenopathy as described. Infection is considered less likely. Focal outpouching of air in communication with the lumen in this portion, which may bedue to intradiverticular air, intraluminal air or fistulization into the lymph node. No intraperitoneal free air or organized fluid collection.    Somewhat nodular thickening of the left adrenal gland. Metastasis cannot be excluded.    Focal areas ofdecreased or striated nephrogram in the right kidney. Recommend clinical correlation to assess urinary tract infection/pyelonephritis.    Prominent distal gastric wall, which may be due to partial distention and/or gastritis. Recommend clinical correlation. Follow-up endoscopy may be obtained for further evaluation.    Nonspecific small lucency in the right femoral neck. Differential diagnosis includes focal osteopenia and osseous metastasis. Recommend comparison to previous outside study. Follow-up (bone scan and/or MRI) may be obtained for further evaluation.    Indeterminate left lower lobe nodule. Pulmonary metastasis cannot be excluded. Recommend comparison to previous outside study. Follow-up (nonemergent chest CT and/or PET-CT) may be obtained for further evaluation.    < end of copied text >    < from: CT Chest w/ IV Cont (05.31.21 @ 15:07) >  FINDINGS:    LUNGS AND AIRWAYS: Bilateral subcentimeter pulmonary nodules in random distribution.  The largest nodules measure:  -A 6 mm right upper lobe anterior segment nodule (3-53).  -A 5 mm left lower lobe posterior segment nodule (3-95).  Bilateral lower lobe linear atelectasis. The airways are normal.  PLEURA: No pleural effusion or pneumothorax.  MEDIASTINUM AND MARS: The right cardiophrenic angle, upper paratracheal, prevascular, right hilar and subcarinal lymph nodes measure less than 10 mm in the short axis. However, there is an enlargedleft internal mammary lymph node.  VESSELS: Within normal limits.  HEART: Heart size is normal. No pericardial effusion.  CHEST WALL AND LOWER NECK: Within normal limits.  VISUALIZED UPPER ABDOMEN: Ill-defined hypodense hepatic lesions, the largest within segment 7/8 measuring 8.8 x 8.0 cm, unchanged.  BONES: No lytic or blastic lesions.    IMPRESSION:  1.  The bilateral subcentimeter pulmonary nodules measuring up to 6 mm, are concerning for metastatic disease.  2.  Enlarged left internal mammary lymph node and cardiophrenic angle lymph nodes are concerning for malignancy.  3.  No change in the hypodense hepatic lesions better evaluated on CT abdomen/pelvis from 5/29/2021 that were concerning for metastatic disease.      < end of copied text >   The patient is a 51 y old  Male who presents with a chief complaint of concern for abdominal pain.     HPI:  The patient is a 51 year old male with past medical history of lactose intolerance who was transferred from [facility] for fever and abdominal pain with suspected new diagnosis of GI cancer. He reports that he was seen at [facility] approximately 1 year ago for gassy abdominal pain and had abdominal imaging at that time showing lesion in his liver and was told to follow-up outpatient, but never did. Patient was subsequently seen at an urgent care center about 6 months ago for similar abdominal pain and bloating. He had abdominal x-ray performed there and was told he was severely constipated, and he was instructed to follow-up with a GI doctor, but he never got the opportunity given difficulty with scheduling appointment due to [place]-pand[place] circumstances. 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 While at [facility], he had CT abdomen and pelvis done to rule out acute appendicitis and was found to have irregular bowel wall thickening and multiple liver lesions concerning for GI cancer with metastatic disease. While at [facility], he had temperature of 100.7 and subsequently given acetaminophen and intravenous piperacillin-tazobactam, and was subsequently transferred to [facility]. He reports a 20 pound weight loss over the last year. He was evaluated by GI who did not recommend colonoscopy due to an intraluminal/intradiverticular air/fistulization into lymph node on imaging with concern for possible perforation. As per hematology/oncology, impression is high likelihood for gastrointestinal malignancy. IR was consulted for possible liver biopsy of the lesions.    CBC initially showed neutrophilic leukocytosis which resolved with microcytic anemia and reactive thrombocytosis. CMP showed hypokalemia and hyperglycemia. INR was 1.49. Urinalysis showed mild proteinuria otherwise unremarkable. COVID-19 PCR was negative. COVID-19 Arjun Ab was positive. Blood cultures x 2 showed Clostridium septicum pending sensitivities. CT chest with IV contrast showed bilateral subcentimeter pulmonary nodules measuring up to 6 mm, are concerning for metastatic disease with an enlarged left internal mammary lymph node and cardiophrenic angle lymph nodes are concerning for malignancy. There No change in the hypodense hepatic lesions better evaluated on CT abdomen/pelvis from 5/29/2021 that were concerning for metastatic disease. CT abdomen and pelvis with IV contrast showed mid/distal transverse colon with hepatic metastasis and lymphadenopathy. Irregular wall thickening of the mid/distal transverse colon with surrounding stranding, suspicious for neoplasm. Focal outpouching of air in communication with the lumen in this portion (5:82), which may be due to intradiverticular air, intraluminal air or fistulization into the lymph node.  Prominent distal gastric wall. He was started on intravenous clindamycin and intravenous piperacillin-tazobactam. Blood cultures x 2 are in process.           prior hospital charts reviewed [x]  primary team notes reviewed [x]  other consultant notes reviewed [x]    PAST MEDICAL & SURGICAL HISTORY:  No pertinent past medical history  No significant past surgical history        Allergies  No Known Allergies    ANTIMICROBIALS (past 90 days)  MEDICATIONS  (STANDING):    piperacillin/tazobactam IVPB..   25 mL/Hr IV Intermittent (06-01-21 @ 06:18)   25 mL/Hr IV Intermittent (05-31-21 @ 22:56)   25 mL/Hr IV Intermittent (05-31-21 @ 14:00)   25 mL/Hr IV Intermittent (05-31-21 @ 06:13)   25 mL/Hr IV Intermittent (05-30-21 @ 21:19)    piperacillin/tazobactam IVPB...   200 mL/Hr IV Intermittent (05-30-21 @ 02:54)        clindamycin IVPB    clindamycin IVPB 900 once  clindamycin IVPB 900 every 8 hours  piperacillin/tazobactam IVPB.. 3.375 every 8 hours    OTHER MEDS: MEDICATIONS  (STANDING):  simethicone 80 two times a day PRN    SOCIAL HISTORY:   Denies smoking, alcohol, or recreational drug use     FAMILY HISTORY:  FH: diabetes mellitus (Mother)    FH: hypertension (Mother)      REVIEW OF SYSTEMS  [  ] ROS unobtainable because:    [x] All other systems negative except as noted below:	    Constitutional:  [x] fever [ ] chills  [x] weight loss  [ ] weakness  Skin:  [ ] rash [ ] phlebitis	  Eyes: [ ] icterus [ ] pain  [ ] discharge	  ENMT: [ ] sore throat  [ ] thrush [ ] ulcers [ ] exudates  Respiratory: [ ] dyspnea [ ] hemoptysis [ ] cough [ ] sputum	  Cardiovascular:  [ ] chest pain [ ] palpitations [ ] edema	  Gastrointestinal:  [ ] nausea [ ] vomiting [ ] diarrhea [x] constipation [x] pain	  Genitourinary:  [ ] dysuria [ ] frequency [ ] hematuria [ ] discharge [ ] flank pain  [ ] incontinence  Musculoskeletal:  [ ] myalgias [ ] arthralgias [ ] arthritis  [ ] back pain  Neurological:  [ ] headache [ ] seizures  [ ] confusion/altered mental status  Psychiatric:  [ ] anxiety [ ] depression	  Hematology/Lymphatics:  [ ] lymphadenopathy  Endocrine:  [ ] adrenal [ ] thyroid  Allergic/Immunologic:	 [ ] transplant [ ] seasonal    Vital Signs Last 24 Hrs  T(F): 98.2 (06-01-21 @ 06:15), Max: 100.7 (05-29-21 @ 23:16)  Vital Signs Last 24 Hrs  HR: 92 (06-01-21 @ 06:15) (87 - 92)  BP: 137/90 (06-01-21 @ 06:15) (137/90 - 141/91)  RR: 17 (06-01-21 @ 06:15)  SpO2: 100% (06-01-21 @ 06:15) (100% - 100%)  Wt(kg): --    PHYSICAL EXAM:  Constitutional: non-toxic, no distress  HEAD/EYES: anicteric, no conjunctival injection  ENT:  supple, no thrush  Cardiovascular:   normal S1, S2, no murmur, no edema  Respiratory:  clear BS bilaterally, no wheezes, no rales  GI:  soft, non-tender, normal bowel sounds  :  no bustamante, no CVA tenderness  Musculoskeletal:  no synovitis, normal ROM  Neurologic: awake and alert, normal strength, no focal findings  Skin:  no rash, no erythema, no phlebitis  Heme/Onc: no lymphadenopathy   Psychiatric:  awake, alert, appropriate mood                            7.6    9.94  )-----------( 436      ( 01 Jun 2021 07:53 )             24.6   06-01    137  |  103  |  9   ----------------------------<  108<H>  3.3<L>   |  22  |  1.06    Ca    8.4      01 Jun 2021 07:53  Phos  3.2     06-01  Mg     2.2     06-01    TPro  7.0  /  Alb  3.3  /  TBili  0.7  /  DBili  x   /  AST  14  /  ALT  11  /  AlkPhos  93  05-31    MICROBIOLOGY:  Culture - Blood (collected 30 May 2021 12:12)  Source: .Blood Blood-Peripheral  Gram Stain (30 May 2021 21:56):    Growth in anaerobic bottle: Gram Negative Rods  Final Report (31 May 2021 19:58):    Growth in anaerobic bottle: Clostridium septicum "Susceptibilities not    performed"    Please Note:************************************************    Clostridium septicum    may appear as gram negative rods in direct smears of clinical specimens.    **BCIDperformed. No targets detected.**    ***Blood Panel PCR results on this specimen are available    approximately 3 hours after the Gram stain result.***    Gram stain, PCR, and/or culture results may not always    correspond due to difference in methodologies.    ************************************************************    This PCR assay was performed by multiplex PCR. This    Assay tests for 66 bacterial and resistance gene targets.    Please refer to the [facility] [employer] test directory    at [email or web address] for details.    Culture - Blood (collected 30 May 2021 12:12)  Source: .Blood Blood-Peripheral  Gram Stain (31 May 2021 00:42):    Growth in anaerobic bottle: Gram Negative Rods  Final Report (31 May 2021 22:32):    Growth in anaerobic bottle: Clostridium septicum "Susceptibilities not    performed"    Please Note:************************************************    Clostridium septicum    may appear as gram negative rods in direct smears of clinical specimens.                  RADIOLOGY:    < from: CT Abdomen and Pelvis w/ IV Cont (05.29.21 @ 21:00) >  IMPRESSION:    Suspect mid/distal transverse colon with hepatic metastasis and lymphadenopathy as described. Infection is considered less likely. Focal outpouching of air in communication with the lumen in this portion, which may bedue to intradiverticular air, intraluminal air or fistulization into the lymph node. No intraperitoneal free air or organized fluid collection.    Somewhat nodular thickening of the left adrenal gland. Metastasis cannot be excluded.    Focal areas ofdecreased or striated nephrogram in the right kidney. Recommend clinical correlation to assess urinary tract infection/pyelonephritis.    Prominent distal gastric wall, which may be due to partial distention and/or gastritis. Recommend clinical correlation. Follow-up endoscopy may be obtained for further evaluation.    Nonspecific small lucency in the right femoral neck. Differential diagnosis includes focal osteopenia and osseous metastasis. Recommend comparison to previous outside study. Follow-up (bone scan and/or MRI) may be obtained for further evaluation.    Indeterminate left lower lobe nodule. Pulmonary metastasis cannot be excluded. Recommend comparison to previous outside study. Follow-up (nonemergent chest CT and/or PET-CT) may be obtained for further evaluation.    < end of copied text >    < from: CT Chest w/ IV Cont (05.31.21 @ 15:07) >  FINDINGS:    LUNGS AND AIRWAYS: Bilateral subcentimeter pulmonary nodules in random distribution.  The largest nodules measure:  -A 6 mm right upper lobe anterior segment nodule (3-53).  -A 5 mm left lower lobe posterior segment nodule (3-95).  Bilateral lower lobe linear atelectasis. The airways are normal.  PLEURA: No pleural effusion or pneumothorax.  MEDIASTINUM AND MARS: The right cardiophrenic angle, upper paratracheal, prevascular, right hilar and subcarinal lymph nodes measure less than 10 mm in the short axis. However, there is an enlargedleft internal mammary lymph node.  VESSELS: Within normal limits.  HEART: Heart size is normal. No pericardial effusion.  CHEST WALL AND LOWER NECK: Within normal limits.  VISUALIZED UPPER ABDOMEN: Ill-defined hypodense hepatic lesions, the largest within segment 7/8 measuring 8.8 x 8.0 cm, unchanged.  BONES: No lytic or blastic lesions.    IMPRESSION:  1.  The bilateral subcentimeter pulmonary nodules measuring up to 6 mm, are concerning for metastatic disease.  2.  Enlarged left internal mammary lymph node and cardiophrenic angle lymph nodes are concerning for malignancy.  3.  No change in the hypodense hepatic lesions better evaluated on CT abdomen/pelvis from 5/29/2021 that were concerning for metastatic disease.      < end of copied text >   The patient is a 51 y old  Male who presents with a chief complaint of concern for abdominal pain.     HPI:  The patient is a 51 year old male with past medical history of lactose intolerance who was transferred from [facility] for fever and abdominal pain with suspected new diagnosis of GI cancer. He reports that he was seen at [facility] approximately 1 year ago for gassy abdominal pain and had abdominal imaging at that time showing lesion in his liver and was told to follow-up outpatient, but never did. Patient was subsequently seen at an urgent care center about 6 months ago for similar abdominal pain and bloating. He had abdominal x-ray performed there and was told he was severely constipated, and he was instructed to follow-up with a GI doctor, but he never got the opportunity given difficulty with scheduling appointment due to [place]-pand[place] circumstances. 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 While at [facility], he had CT abdomen and pelvis done to rule out acute appendicitis and was found to have irregular bowel wall thickening and multiple liver lesions concerning for GI cancer with metastatic disease. While at [facility], he had temperature of 100.7 and subsequently given acetaminophen and intravenous piperacillin-tazobactam, and was subsequently transferred to [facility]. He reports a 20 pound weight loss over the last year. He was evaluated by GI who did not recommend colonoscopy due to an intraluminal/intradiverticular air/fistulization into lymph node on imaging with concern for possible perforation. As per hematology/oncology, impression is high likelihood for gastrointestinal malignancy. IR was consulted for possible liver biopsy of the lesions.    CBC initially showed neutrophilic leukocytosis which resolved with microcytic anemia and reactive thrombocytosis. CMP showed hypokalemia and hyperglycemia. INR was 1.49. Urinalysis showed mild proteinuria otherwise unremarkable. COVID-19 PCR was negative. COVID-19 Arjun Ab was positive. Blood cultures x 2 showed Clostridium septicum pending sensitivities. CT chest with IV contrast showed bilateral subcentimeter pulmonary nodules measuring up to 6 mm, are concerning for metastatic disease with an enlarged left internal mammary lymph node and cardiophrenic angle lymph nodes are concerning for malignancy. There No change in the hypodense hepatic lesions better evaluated on CT abdomen/pelvis from 5/29/2021 that were concerning for metastatic disease. CT abdomen and pelvis with IV contrast showed mid/distal transverse colon with hepatic metastasis and lymphadenopathy. Irregular wall thickening of the mid/distal transverse colon with surrounding stranding, suspicious for neoplasm. Focal outpouching of air in communication with the lumen in this portion (5:82), which may be due to intradiverticular air, intraluminal air or fistulization into the lymph node.  Prominent distal gastric wall. He was started on intravenous clindamycin and intravenous piperacillin-tazobactam. Blood cultures x 2 are in process.           prior hospital charts reviewed [x]  primary team notes reviewed [x]  other consultant notes reviewed [x]    PAST MEDICAL & SURGICAL HISTORY:  No pertinent past medical history  No significant past surgical history        Allergies  No Known Allergies    ANTIMICROBIALS (past 90 days)  MEDICATIONS  (STANDING):    piperacillin/tazobactam IVPB..   25 mL/Hr IV Intermittent (06-01-21 @ 06:18)   25 mL/Hr IV Intermittent (05-31-21 @ 22:56)   25 mL/Hr IV Intermittent (05-31-21 @ 14:00)   25 mL/Hr IV Intermittent (05-31-21 @ 06:13)   25 mL/Hr IV Intermittent (05-30-21 @ 21:19)    piperacillin/tazobactam IVPB...   200 mL/Hr IV Intermittent (05-30-21 @ 02:54)        clindamycin IVPB    clindamycin IVPB 900 once  clindamycin IVPB 900 every 8 hours  piperacillin/tazobactam IVPB.. 3.375 every 8 hours    OTHER MEDS: MEDICATIONS  (STANDING):  simethicone 80 two times a day PRN    SOCIAL HISTORY:  from [facility], lives with family Denies smoking, alcohol, or recreational drug use   no recent travel    FAMILY HISTORY:  FH: diabetes mellitus (Mother)    FH: hypertension (Mother)      REVIEW OF SYSTEMS  [  ] ROS unobtainable because:    [x] All other systems negative except as noted below:	    Constitutional:  [x] fever [ ] chills  [x] weight loss  [ ] weakness  Skin:  [ ] rash [ ] phlebitis	  Eyes: [ ] icterus [ ] pain  [ ] discharge	  ENMT: [ ] sore throat  [ ] thrush [ ] ulcers [ ] exudates  Respiratory: [ ] dyspnea [ ] hemoptysis [ ] cough [ ] sputum	  Cardiovascular:  [ ] chest pain [ ] palpitations [ ] edema	  Gastrointestinal:  [ ] nausea [ ] vomiting [ ] diarrhea [x] constipation [x] pain	  Genitourinary:  [ ] dysuria [ ] frequency [ ] hematuria [ ] discharge [ ] flank pain  [ ] incontinence  Musculoskeletal:  [ ] myalgias [ ] arthralgias [ ] arthritis  [ ] back pain  Neurological:  [ ] headache [ ] seizures  [ ] confusion/altered mental status  Psychiatric:  [ ] anxiety [ ] depression	  Hematology/Lymphatics:  [ ] lymphadenopathy  Endocrine:  [ ] adrenal [ ] thyroid  Allergic/Immunologic:	 [ ] transplant [ ] seasonal    Vital Signs Last 24 Hrs  T(F): 98.2 (06-01-21 @ 06:15), Max: 100.7 (05-29-21 @ 23:16)  Vital Signs Last 24 Hrs  HR: 92 (06-01-21 @ 06:15) (87 - 92)  BP: 137/90 (06-01-21 @ 06:15) (137/90 - 141/91)  RR: 17 (06-01-21 @ 06:15)  SpO2: 100% (06-01-21 @ 06:15) (100% - 100%)  Wt(kg): --    PHYSICAL EXAM:  Constitutional: non-toxic, no distress  HEAD/EYES: anicteric, no conjunctival injection  ENT:  supple, no thrush  Cardiovascular:   normal S1, S2, no murmur, no edema  Respiratory:  clear BS bilaterally, no wheezes, no rales  GI:  soft, non-tender, normal bowel sounds  :  no bustamante, no CVA tenderness  Musculoskeletal:  no synovitis, normal ROM  Neurologic: awake and alert, normal strength, no focal findings  Skin:  no rash, no erythema, no phlebitis  Heme/Onc: no lymphadenopathy   Psychiatric:  awake, alert, appropriate mood                            7.6    9.94  )-----------( 436      ( 01 Jun 2021 07:53 )             24.6   06-01    137  |  103  |  9   ----------------------------<  108<H>  3.3<L>   |  22  |  1.06    Ca    8.4      01 Jun 2021 07:53  Phos  3.2     06-01  Mg     2.2     06-01    TPro  7.0  /  Alb  3.3  /  TBili  0.7  /  DBili  x   /  AST  14  /  ALT  11  /  AlkPhos  93  05-31    MICROBIOLOGY:  Culture - Blood (collected 30 May 2021 12:12)  Source: .Blood Blood-Peripheral  Gram Stain (30 May 2021 21:56):    Growth in anaerobic bottle: Gram Negative Rods  Final Report (31 May 2021 19:58):    Growth in anaerobic bottle: Clostridium septicum "Susceptibilities not    performed"    Please Note:************************************************    Clostridium septicum    may appear as gram negative rods in direct smears of clinical specimens.    **BCIDperformed. No targets detected.**    ***Blood Panel PCR results on this specimen are available    approximately 3 hours after the Gram stain result.***    Gram stain, PCR, and/or culture results may not always    correspond due to difference in methodologies.    ************************************************************    This PCR assay was performed by multiplex PCR. This    Assay tests for 66 bacterial and resistance gene targets.    Please refer to the [facility] test directory    at [email or web address] for details.    Culture - Blood (collected 30 May 2021 12:12)  Source: .Blood Blood-Peripheral  Gram Stain (31 May 2021 00:42):    Growth in anaerobic bottle: Gram Negative Rods  Final Report (31 May 2021 22:32):    Growth in anaerobic bottle: Clostridium septicum "Susceptibilities not    performed"    Please Note:************************************************    Clostridium septicum    may appear as gram negative rods in direct smears of clinical specimens.                  RADIOLOGY:    < from: CT Abdomen and Pelvis w/ IV Cont (05.29.21 @ 21:00) >  IMPRESSION:    Suspect mid/distal transverse colon with hepatic metastasis and lymphadenopathy as described. Infection is considered less likely. Focal outpouching of air in communication with the lumen in this portion, which may bedue to intradiverticular air, intraluminal air or fistulization into the lymph node. No intraperitoneal free air or organized fluid collection.    Somewhat nodular thickening of the left adrenal gland. Metastasis cannot be excluded.    Focal areas ofdecreased or striated nephrogram in the right kidney. Recommend clinical correlation to assess urinary tract infection/pyelonephritis.    Prominent distal gastric wall, which may be due to partial distention and/or gastritis. Recommend clinical correlation. Follow-up endoscopy may be obtained for further evaluation.    Nonspecific small lucency in the right femoral neck. Differential diagnosis includes focal osteopenia and osseous metastasis. Recommend comparison to previous outside study. Follow-up (bone scan and/or MRI) may be obtained for further evaluation.    Indeterminate left lower lobe nodule. Pulmonary metastasis cannot be excluded. Recommend comparison to previous outside study. Follow-up (nonemergent chest CT and/or PET-CT) may be obtained for further evaluation.    < end of copied text >    < from: CT Chest w/ IV Cont (05.31.21 @ 15:07) >  FINDINGS:    LUNGS AND AIRWAYS: Bilateral subcentimeter pulmonary nodules in random distribution.  The largest nodules measure:  -A 6 mm right upper lobe anterior segment nodule (3-53).  -A 5 mm left lower lobe posterior segment nodule (3-95).  Bilateral lower lobe linear atelectasis. The airways are normal.  PLEURA: No pleural effusion or pneumothorax.  MEDIASTINUM AND MARS: The right cardiophrenic angle, upper paratracheal, prevascular, right hilar and subcarinal lymph nodes measure less than 10 mm in the short axis. However, there is an enlargedleft internal mammary lymph node.  VESSELS: Within normal limits.  HEART: Heart size is normal. No pericardial effusion.  CHEST WALL AND LOWER NECK: Within normal limits.  VISUALIZED UPPER ABDOMEN: Ill-defined hypodense hepatic lesions, the largest within segment 7/8 measuring 8.8 x 8.0 cm, unchanged.  BONES: No lytic or blastic lesions.    IMPRESSION:  1.  The bilateral subcentimeter pulmonary nodules measuring up to 6 mm, are concerning for metastatic disease.  2.  Enlarged left internal mammary lymph node and cardiophrenic angle lymph nodes are concerning for malignancy.  3.  No change in the hypodense hepatic lesions better evaluated on CT abdomen/pelvis from 5/29/2021 that were concerning for metastatic disease.      < end of copied text >

## 2021-06-01 NOTE — PROGRESS NOTE ADULT - PROBLEM SELECTOR PLAN 1
Leukocytosis, TMax 100.7 (at [place]), HR>90  -s/p acetaminophen and piperacillin-tazobactam in the ED  -SIRS likely secondary to malignancy  -c/w monitoring vital signs  -blood culture speciation: clostridium septicum; c/w IV piperacillin-tazobactam, added IV clindamycin (6/1)  -Colorectal surgery notified, will follow-up re: source control (6/1)  -Infectious disease consulted; c/w antibiotics above

## 2021-06-01 NOTE — PROGRESS NOTE ADULT - ASSESSMENT
51Myo M with nonsignificant PMH who is transferred from [facility] for fever and abdominal pain with suspected new diagnosis of GI cancer (by CT).    # Suspected GI malignancy with metastatic disease - involving the liver and potentially the lungs. Would benefit from tissue biopsy for diagnosis. Imaging reviewed with radiology - lower suspicion for diverticulitis or contained perforation. However given intraluminal/intradiverticular air/fistulization into lymph node on imaging - there is concern for perforation which would be worsened with a colonoscopy. Colorectal surgery recs appreciated. Hold off from colonoscopy for now - Would continue to monitor and re-assess re: timing of colonoscopy  # Clostridium septicum bacteremia - potentially colonic source. Afebrile for >24h    Recommendations:  - Heme/onc consult  - IR consult for possible liver biopsy  - Colorectal surgery recs appreciated  - c/w zosyn  - NPO  - Daily CBC, CMP, INR    Thank you for involving us in the care of this patient. Please reach out if any further questions.    Az Skaggs, PGY-4  Gastroenterology Fellow    Available on [employer] Teams  Pager [phone] ([facility]) or [phone] ([facility])  After 5PM/Weekends, please contact the on-call GI fellow: [phone]  Available through [employer] Teams

## 2021-06-01 NOTE — CONSULT NOTE ADULT - ASSESSMENT
Assessment/Plan:   -51y Male with colonic perforation likely 2/2 malignancy. Patient with active bacteremia and fever. Could plan for hepatic lesion biopsy this week with Dr. Bean. Can plan for later this week depending on results of culture and clinical stability.   - will follow up to determine best day for biopsy.   - please call IR with any updates. Can touch base with IR on 6/2 to confirm date of procedure.   - case reviewed with Dr. Bean

## 2021-06-01 NOTE — CONSULT NOTE ADULT - CONSULT REASON
C/f new malignancy
Concern for perforated GI malignancy
Concern for GI malignancy
Liver lesion biopsy
Clostridium septicum bacteremia

## 2021-06-01 NOTE — PROGRESS NOTE ADULT - SUBJECTIVE AND OBJECTIVE BOX
Gentry Posaads MD PGY-1  Internal Medicine  Pager [phone] / [phone]    INTERVAL HPI / OVERNIGHT EVENTS:  -blood culture speciated: clostridium septicum (currently on piperacillin-tazobactam, afebrile, no abdominal pain)  -CT chest: 6mm pulmonary nodule; enlarged left internal mammary lymph node  -remains NPO    SUBJECTIVE: Patient seen and examined at bedside.   -abdominal pain remains resolved;   -no bowel movement overnight; no nausea, vomiting, cough, sob, cp, or other symptoms  -reports ambulating the hallways without difficulty      OBJECTIVE:    VITAL SIGNS:  ICU Vital Signs Last 24 Hrs  T(C): 36.8 (01 Jun 2021 06:15), Max: 37.3 (31 May 2021 22:55)  T(F): 98.2 (01 Jun 2021 06:15), Max: 99.2 (31 May 2021 22:55)  HR: 92 (01 Jun 2021 06:15) (87 - 92)  BP: 137/90 (01 Jun 2021 06:15) (137/90 - 141/91)  BP(mean): --  ABP: --  ABP(mean): --  RR: 17 (01 Jun 2021 06:15) (17 - 18)  SpO2: 100% (01 Jun 2021 06:15) (100% - 100%)          PHYSICAL EXAM:      General: no acute distress  HEENT: normocephalic, atraumatic;   Respiratory: clear to auscultation bilaterally; no wheeze; no crackles  Cardiovascular: Regular rate, regular rhythm; no murmurs, rubs, or gallops  Abdomen: soft, no tenderness to palpation, distended  Extremities: WWP, 2+ peripheral pulses b/l; no LE edema  Skin: normal color and turgor; no rash  Neurological: alert, oriented x3, PERRL, EOMI    MEDICATIONS:  MEDICATIONS  (STANDING):  clindamycin IVPB      clindamycin IVPB 900 milliGRAM(s) IV Intermittent every 8 hours  dextrose 5% + sodium chloride 0.9%. 1000 milliLiter(s) (100 mL/Hr) IV Continuous <Continuous>  piperacillin/tazobactam IVPB.. 3.375 Gram(s) IV Intermittent every 8 hours    MEDICATIONS  (PRN):  simethicone 80 milliGRAM(s) Chew two times a day PRN Upset Stomach      ALLERGIES:  Allergies    No Known Allergies    Intolerances        LABS:                        7.6    9.94  )-----------( 436      ( 01 Jun 2021 07:53 )             24.6     Hemoglobin: 7.6 g/dL (06-01 @ 07:53)  Hemoglobin: 8.3 g/dL (05-31 @ 07:56)  Hemoglobin: 8.2 g/dL (05-30 @ 03:17)  Hemoglobin: 9.2 g/dL (05-29 @ 16:59)    CBC Full  -  ( 01 Jun 2021 07:53 )  WBC Count : 9.94 K/uL  RBC Count : 4.21 M/uL  Hemoglobin : 7.6 g/dL  Hematocrit : 24.6 %  Platelet Count - Automated : 436 K/uL  Mean Cell Volume : 58.4 fL  Mean Cell Hemoglobin : 18.1 pg  Mean Cell Hemoglobin Concentration : 30.9 gm/dL  Auto Neutrophil # : x  Auto Lymphocyte # : x  Auto Monocyte # : x  Auto Eosinophil # : x  Auto Basophil # : x  Auto Neutrophil % : x  Auto Lymphocyte % : x  Auto Monocyte % : x  Auto Eosinophil % : x  Auto Basophil % : x    06-01    137  |  103  |  9   ----------------------------<  108<H>  3.3<L>   |  22  |  1.06    Ca    8.4      01 Jun 2021 07:53  Phos  3.2     06-01  Mg     2.2     06-01    TPro  7.0  /  Alb  3.3  /  TBili  0.7  /  DBili  x   /  AST  14  /  ALT  11  /  AlkPhos  93  05-31    Creatinine Trend: 1.06<--, 1.06<--, 1.00<--, 1.09<--  LIVER FUNCTIONS - ( 31 May 2021 07:56 )  Alb: 3.3 g/dL / Pro: 7.0 g/dL / ALK PHOS: 93 U/L / ALT: 11 U/L / AST: 14 U/L / GGT: x           PT/INR - ( 01 Jun 2021 07:53 )   PT: 16.8 sec;   INR: 1.49 ratio         PTT - ( 01 Jun 2021 07:53 )  PTT:28.9 sec     MICROBIOLOGY:    Culture - Blood (collected 30 May 2021 12:12)  Source: .Blood Blood-Peripheral  Gram Stain (30 May 2021 21:56):    Growth in anaerobic bottle: Gram Negative Rods  Final Report (31 May 2021 19:58):    Growth in anaerobic bottle: Clostridium septicum "Susceptibilities not    performed"    Please Note:************************************************    Clostridium septicum    may appear as gram negative rods in direct smears of clinical specimens.    **BCIDperformed. No targets detected.**    ***Blood Panel PCR results on this specimen are available    approximately 3 hours after the Gram stain result.***    Gram stain, PCR, and/or culture results may not always    correspond due to difference in methodologies.    ************************************************************    This PCR assay was performed by multiplex PCR. This    Assay tests for 66 bacterial and resistance gene targets.    Please refer to the [facility] test directory    at [email or web address] for details.    Culture - Blood (collected 30 May 2021 12:12)  Source: .Blood Blood-Peripheral  Gram Stain (31 May 2021 00:42):    Growth in anaerobic bottle: Gram Negative Rods  Final Report (31 May 2021 22:32):    Growth in anaerobic bottle: Clostridium septicum "Susceptibilities not    performed"    Please Note:************************************************    Clostridium septicum    may appear as gram negative rods in direct smears of clinical specimens.      IMAGING:  CT Chest w/ IV Cont (05.31.21 @ 15:07)  FINDINGS:    LUNGS AND AIRWAYS: Bilateral subcentimeter pulmonary nodules in random distribution.  The largest nodules measure:  -A 6 mm right upper lobe anterior segment nodule (3-53).  -A 5 mm left lower lobe posterior segment nodule (3-95).  Bilateral lower lobe linear atelectasis. The airways are normal.  PLEURA: No pleural effusion or pneumothorax.  MEDIASTINUM AND MARS: The right cardiophrenic angle, upper paratracheal, prevascular, right hilar and subcarinal lymph nodes measure less than 10 mm in the short axis. However, there is an enlargedleft internal mammary lymph node.  VESSELS: Within normal limits.  HEART: Heart size is normal. No pericardial effusion.  CHEST WALL AND LOWER NECK: Within normal limits.  VISUALIZED UPPER ABDOMEN: Ill-defined hypodense hepatic lesions, the largest within segment 7/8 measuring 8.8 x 8.0 cm, unchanged.  BONES: No lytic or blastic lesions.    IMPRESSION:  1.  The bilateral subcentimeter pulmonary nodules measuring up to 6 mm, are concerning for metastatic disease.  2.  Enlarged left internal mammary lymph node and cardiophrenic angle lymph nodes are concerning for malignancy.  3.  No change in the hypodense hepatic lesions better evaluated on CT abdomen/pelvis from 5/29/2021 that were concerning for metastatic disease.      Labs, imaging, EKG personally reviewed    RADIOLOGY & ADDITIONAL TESTS: Reviewed.

## 2021-06-01 NOTE — CONSULT NOTE ADULT - SUBJECTIVE AND OBJECTIVE BOX
REASON FOR CONSULTATION: C/f new metastatic malignancy     HPI: 51M w/ PMH of lactose intolerance, transferred from [facility] for fever and abdominal pain w/ suspected new diagnosis of GI cancer (by CT). Pt reports that he was seen at [facility] approximately 1 year ago for gassy abdominal pain and had CT AP at that time showing lesion in his liver and was told to follow-up outpatient, but never did. [facility] for similar abdominal pain/bloating - he had abdominal x-ray performed there and was told he was severely constipated, and he was instructed to f/u with GI doctor, but he never got the opportunity given difficulty with scheduling appt due to [facility]-pandemic circumstances.     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 While at [place], pt had CT AP to r/o acute appendicitis and was found to have irregular bowel wall thickening and multiple liver lesions concerning for GI cancer w/ mets. While at [place], pt had temp of 100.7, subsequently given acetaminophen, piperacillin-tazobactam, and transferred here. Patient on arrival denied pain.     Patient also reporting 20 pounds of weight loss over the last year. Patient reports only diet change as eating less later in the day.    Patient reports having a GI follow-up appointment June 14th, 2021. Of note, he has not had his colonoscopy screening as recommended for his age.      REVIEW OF SYSTEMS:    CONSTITUTIONAL: +fever, +weight loss  EYES/ENT: No visual changes;  No vertigo or throat pain   NECK: No pain or stiffness  RESPIRATORY: No cough, wheezing, hemoptysis; No shortness of breath  CARDIOVASCULAR: No chest pain or palpitations  GASTROINTESTINAL: +Abd pain  GENITOURINARY: No dysuria, frequency or hematuria  NEUROLOGICAL: No numbness or weakness  SKIN: No itching, burning, rashes, or lesions   All other review of systems is negative unless indicated above.    Allergies    No Known Allergies    Intolerances        MEDICATIONS  (STANDING):  clindamycin IVPB      clindamycin IVPB 900 milliGRAM(s) IV Intermittent once  clindamycin IVPB 900 milliGRAM(s) IV Intermittent every 8 hours  dextrose 5% + sodium chloride 0.9%. 1000 milliLiter(s) (100 mL/Hr) IV Continuous <Continuous>  piperacillin/tazobactam IVPB.. 3.375 Gram(s) IV Intermittent every 8 hours  potassium chloride    Tablet ER 40 milliEquivalent(s) Oral once    MEDICATIONS  (PRN):  simethicone 80 milliGRAM(s) Chew two times a day PRN Upset Stomach      Vital Signs Last 24 Hrs  T(C): 36.8 (01 Jun 2021 06:15), Max: 37.3 (31 May 2021 22:55)  T(F): 98.2 (01 Jun 2021 06:15), Max: 99.2 (31 May 2021 22:55)  HR: 92 (01 Jun 2021 06:15) (87 - 92)  BP: 137/90 (01 Jun 2021 06:15) (137/90 - 141/91)  BP(mean): --  RR: 17 (01 Jun 2021 06:15) (17 - 18)  SpO2: 100% (01 Jun 2021 06:15) (100% - 100%)    PHYSICAL EXAM:      LABS:                        7.6    9.94  )-----------( 436      ( 01 Jun 2021 07:53 )             24.6     06-01    137  |  103  |  9   ----------------------------<  108<H>  3.3<L>   |  22  |  1.06    Ca    8.4      01 Jun 2021 07:53  Phos  3.2     06-01  Mg     2.2     06-01    TPro  7.0  /  Alb  3.3  /  TBili  0.7  /  DBili  x   /  AST  14  /  ALT  11  /  AlkPhos  93  05-31    PT/INR - ( 01 Jun 2021 07:53 )   PT: 16.8 sec;   INR: 1.49 ratio         PTT - ( 01 Jun 2021 07:53 )  PTT:28.9 sec          RADIOLOGY & ADDITIONAL STUDIES:    < from: CT Chest w/ IV Cont (05.31.21 @ 15:07) >    EXAM:  CT CHEST IC        PROCEDURE DATE:  May 31 2021         INTERPRETATION:  CLINICAL INFORMATION: Metastatic colon cancer    COMPARISON: CT abdomen and pelvis from 5/29/2021    CONTRAST/COMPLICATIONS:  IV Contrast: Omnipaque 350  40 cc administered   10 cc discarded  Oral Contrast: NONE  Complications: None reported at time of study completion    PROCEDURE:  CT of the Chest was performed.  Sagittal and coronal reformats were performed.    FINDINGS:    LUNGS AND AIRWAYS: Bilateral subcentimeter pulmonary nodules in random distribution.  The largest nodules measure:  -A 6 mm right upper lobe anterior segment nodule (3-53).  -A 5 mm left lower lobe posterior segment nodule (3-95).  Bilateral lower lobe linear atelectasis. The airways are normal.  PLEURA: No pleural effusion or pneumothorax.  MEDIASTINUM AND MARS: The right cardiophrenic angle, upper paratracheal, prevascular, right hilar and subcarinal lymph nodes measure less than 10 mm in the short axis. However, there is an enlargedleft internal mammary lymph node.  VESSELS: Within normal limits.  HEART: Heart size is normal. No pericardial effusion.  CHEST WALL AND LOWER NECK: Within normal limits.  VISUALIZED UPPER ABDOMEN: Ill-defined hypodense hepatic lesions, the largest within segment 7/8 measuring 8.8 x 8.0 cm, unchanged.  BONES: No lytic or blastic lesions.    IMPRESSION:  1.  The bilateral subcentimeter pulmonary nodules measuring up to 6 mm, are concerning for metastatic disease.  2.  Enlarged left internal mammary lymph node and cardiophrenic angle lymph nodes are concerning for malignancy.  3.  No change in the hypodense hepatic lesions better evaluated on CT abdomen/pelvis from 5/29/2021 that were concerning for metastatic disease.              AMARILIS GARCIA MD; Resident Radiology  This document has been electronically signed.  JOSIAH RODRIGUEZ MD; Attending Radiologist  This document has been electronically signed. May 31 2021  3:42PM    < end of copied text >  < from: CT Abdomen and Pelvis w/ IV Cont (05.29.21 @ 21:00) >  EXAM:  CT ABDOMEN AND PELVIS IC                                  PROCEDURE DATE:  05/29/2021          INTERPRETATION:  CLINICAL INFORMATION: Abdominal pain.    PROCEDURE:  Helical axial images were obtained from the domes of the diaphragm through the pubic symphysis following the administration of intravenous contrast. Coronal and sagittal reformats were also obtained.    CONTRAST/COMPLICATIONS:  IV Contrast: Omnipaque 350 90 ml.  10 ml discarded.  Oral Contrast: None  Complications: None reported.    COMPARISON: None.    FINDINGS: Patient's respiratory motion degrades images.    LOWER CHEST: Subsegmental atelectasis. A 0.5 x 0.5 cm nodule in the left lower lobe.    LIVER: Hypodense lesions measuring 8.5 x 8.0 cm in the segment 4a (3:29) and 8 x 0 x 9.0 cm in the segment 7/8 (3:25). Additional smaller hypodense lesions, for example, 2.0 x 1.8 cm in the segment 3 (3:51)  GALLBLADDER/BILE DUCTS: No intrahepatic or extrahepatic biliary dilatation. No significant gallbladder edema.  PANCREAS: Unremarkable.  SPLEEN: Unremarkable.    ADRENALS: Unremarkable right adrenal gland. Somewhat nodular thickening of the left adrenal gland.  KIDNEYS/URETERS: Nonspecific mild bilateral perinephric stranding without hydroureteronephrosis. Right intrarenal stones measuring up to 0.5 x 0.3 cm. Focal areas of decreased or striated nephrogram in the right kidney. Subcentimeter hypodense foci in the kidneys, too small to characterize.  BLADDER: Partially distended.  REPRODUCTIVE ORGANS: Enlarged prostate.    BOWEL: No bowel obstruction. Unremarkable appendix. Irregular wall thickening of the mid/distal transverse colon with surrounding stranding, suspicious for neoplasm. Focal outpouching of air in communication with the lumen in this portion (5:82), which may be due to intradiverticular air, intraluminal air or fistulization into the lymph node.  Prominent distal gastric wall.  PERITONEUM: No drainable fluid collection or free air. A 1.8 x 1.6 cm lymph node adjacent to the thickened mid/distal transverse colon. A 1.5 x 1.3 cm lymph node along the inferior aspect of the sigmoid colon (3:122).  VESSELS: Atherosclerosis. Normal caliber of the abdominal aorta.  RETROPERITONEUM: A 1.3 x 1.4 cm periportal lymph node (3:49). A 2.5 x 1.5 cm portacaval lymph node (3:52). Subcentimeter retroperitoneal lymph nodes.  ABDOMINAL WALL/SOFT TISSUES: Small fat-containing umbilical hernia.  BONES: Additional mosaic anatomy. Degenerative changes of the spine. Nonspecific small lucency in the right femoral neck (5:45).    IMPRESSION:    Suspect mid/distal transverse colon with hepatic metastasis and lymphadenopathy as described. Infection is considered less likely. Focal outpouching of air in communication with the lumen in this portion, which may bedue to intradiverticular air, intraluminal air or fistulization into the lymph node. No intraperitoneal free air or organized fluid collection.    Somewhat nodular thickening of the left adrenal gland. Metastasis cannot be excluded.    Focal areas ofdecreased or striated nephrogram in the right kidney. Recommend clinical correlation to assess urinary tract infection/pyelonephritis.    Prominent distal gastric wall, which may be due to partial distention and/or gastritis. Recommend clinical correlation. Follow-up endoscopy may be obtained for further evaluation.    Nonspecific small lucency in the right femoral neck. Differential diagnosis includes focal osteopenia and osseous metastasis. Recommend comparison to previous outside study. Follow-up (bone scan and/or MRI) may be obtained for further evaluation.    Indeterminate left lower lobe nodule. Pulmonary metastasis cannot be excluded. Recommend comparison to previous outside study. Follow-up (nonemergent chest CT and/or PET-CT) may be obtained for further evaluation.    Additional findings as described.    Discussed with Dr. Aldana.              CATHY CHARLES MD; Attending Radiologist  This document has been electronically signed. May 29 2021  7:55PM    < end of copied text >      PATHOLOGY:  Pending      REASON FOR CONSULTATION: C/f new metastatic malignancy     HPI: 51M w/ PMH of lactose intolerance, transferred from [facility] for fever and abdominal pain w/ suspected new diagnosis of GI cancer (by CT). Pt reports that he was seen at [facility] approximately 1 year ago for gassy abdominal pain and had CT AP at that time showing lesion in his liver and was told to follow-up outpatient, but never did. [facility] for similar abdominal pain/bloating - he had abdominal x-ray performed there and was told he was severely constipated, and he was instructed to f/u with GI doctor, but he never got the opportunity given difficulty with scheduling appt due to [facility]-pandemic circumstances.     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 While at [place], pt had CT AP to r/o acute appendicitis and was found to have irregular bowel wall thickening and multiple liver lesions concerning for GI cancer w/ mets. While at [place], pt had temp of 100.7, subsequently given acetaminophen, piperacillin-tazobactam, and transferred here. Patient on arrival denied pain.     Patient also reporting 20 pounds of weight loss over the last year. Patient reports only diet change as eating less later in the day.    Patient reports having a GI follow-up appointment June 14th, 2021. Of note, he has not had his colonoscopy screening as recommended for his age.      REVIEW OF SYSTEMS:    CONSTITUTIONAL: +fever, +weight loss  EYES/ENT: No visual changes;  No vertigo or throat pain   NECK: No pain or stiffness  RESPIRATORY: No cough, wheezing, hemoptysis; No shortness of breath  CARDIOVASCULAR: No chest pain or palpitations  GASTROINTESTINAL: +Abd pain  GENITOURINARY: No dysuria, frequency or hematuria  NEUROLOGICAL: No numbness or weakness  SKIN: No itching, burning, rashes, or lesions   All other review of systems is negative unless indicated above.    Allergies    No Known Allergies    Intolerances        MEDICATIONS  (STANDING):  clindamycin IVPB      clindamycin IVPB 900 milliGRAM(s) IV Intermittent once  clindamycin IVPB 900 milliGRAM(s) IV Intermittent every 8 hours  dextrose 5% + sodium chloride 0.9%. 1000 milliLiter(s) (100 mL/Hr) IV Continuous <Continuous>  piperacillin/tazobactam IVPB.. 3.375 Gram(s) IV Intermittent every 8 hours  potassium chloride    Tablet ER 40 milliEquivalent(s) Oral once    MEDICATIONS  (PRN):  simethicone 80 milliGRAM(s) Chew two times a day PRN Upset Stomach      Vital Signs Last 24 Hrs  T(C): 36.8 (01 Jun 2021 06:15), Max: 37.3 (31 May 2021 22:55)  T(F): 98.2 (01 Jun 2021 06:15), Max: 99.2 (31 May 2021 22:55)  HR: 92 (01 Jun 2021 06:15) (87 - 92)  BP: 137/90 (01 Jun 2021 06:15) (137/90 - 141/91)  BP(mean): --  RR: 17 (01 Jun 2021 06:15) (17 - 18)  SpO2: 100% (01 Jun 2021 06:15) (100% - 100%)    PHYSICAL EXAM:    GENERAL: NAD, well-groomed, well-developed  HEAD:  Atraumatic, Normocephalic  EYES: EOMI, PERRLA, conjunctiva and sclera clear  ENMT: No tonsillar erythema, exudates, or enlargement; Moist mucous membranes, Good dentition, No lesions  NECK: Supple, No JVD, Normal thyroid  CHEST/LUNG: Clear to percussion bilaterally; No rales, rhonchi, wheezing, or rubs  HEART: Regular rate and rhythm; No murmurs, rubs, or gallops  ABDOMEN: +pain on deep palpation to LUQ. No hepatomegaly. Abd otherwise soft, Nondistended; Bowel sounds present  VASCULAR:  2+ Peripheral Pulses, No clubbing, cyanosis, or edema  LYMPH: No lymphadenopathy noted  SKIN: No rashes or lesions  NERVOUS SYSTEM:  Alert & Oriented X3, no focal neuro deficits    LABS:                        7.6    9.94  )-----------( 436      ( 01 Jun 2021 07:53 )             24.6     06-01    137  |  103  |  9   ----------------------------<  108<H>  3.3<L>   |  22  |  1.06    Ca    8.4      01 Jun 2021 07:53  Phos  3.2     06-01  Mg     2.2     06-01    TPro  7.0  /  Alb  3.3  /  TBili  0.7  /  DBili  x   /  AST  14  /  ALT  11  /  AlkPhos  93  05-31    PT/INR - ( 01 Jun 2021 07:53 )   PT: 16.8 sec;   INR: 1.49 ratio         PTT - ( 01 Jun 2021 07:53 )  PTT:28.9 sec          RADIOLOGY & ADDITIONAL STUDIES:    < from: CT Chest w/ IV Cont (05.31.21 @ 15:07) >    EXAM:  CT CHEST IC        PROCEDURE DATE:  May 31 2021         INTERPRETATION:  CLINICAL INFORMATION: Metastatic colon cancer    COMPARISON: CT abdomen and pelvis from 5/29/2021    CONTRAST/COMPLICATIONS:  IV Contrast: Omnipaque 350  40 cc administered   10 cc discarded  Oral Contrast: NONE  Complications: None reported at time of study completion    PROCEDURE:  CT of the Chest was performed.  Sagittal and coronal reformats were performed.    FINDINGS:    LUNGS AND AIRWAYS: Bilateral subcentimeter pulmonary nodules in random distribution.  The largest nodules measure:  -A 6 mm right upper lobe anterior segment nodule (3-53).  -A 5 mm left lower lobe posterior segment nodule (3-95).  Bilateral lower lobe linear atelectasis. The airways are normal.  PLEURA: No pleural effusion or pneumothorax.  MEDIASTINUM AND MARS: The right cardiophrenic angle, upper paratracheal, prevascular, right hilar and subcarinal lymph nodes measure less than 10 mm in the short axis. However, there is an enlargedleft internal mammary lymph node.  VESSELS: Within normal limits.  HEART: Heart size is normal. No pericardial effusion.  CHEST WALL AND LOWER NECK: Within normal limits.  VISUALIZED UPPER ABDOMEN: Ill-defined hypodense hepatic lesions, the largest within segment 7/8 measuring 8.8 x 8.0 cm, unchanged.  BONES: No lytic or blastic lesions.    IMPRESSION:  1.  The bilateral subcentimeter pulmonary nodules measuring up to 6 mm, are concerning for metastatic disease.  2.  Enlarged left internal mammary lymph node and cardiophrenic angle lymph nodes are concerning for malignancy.  3.  No change in the hypodense hepatic lesions better evaluated on CT abdomen/pelvis from 5/29/2021 that were concerning for metastatic disease.              AMARILIS GARCAI MD; Resident Radiology  This document has been electronically signed.  JOSIAH RODRIGUEZ MD; Attending Radiologist  This document has been electronically signed. May 31 2021  3:42PM    < end of copied text >  < from: CT Abdomen and Pelvis w/ IV Cont (05.29.21 @ 21:00) >  EXAM:  CT ABDOMEN AND PELVIS IC                                  PROCEDURE DATE:  05/29/2021          INTERPRETATION:  CLINICAL INFORMATION: Abdominal pain.    PROCEDURE:  Helical axial images were obtained from the domes of the diaphragm through the pubic symphysis following the administration of intravenous contrast. Coronal and sagittal reformats were also obtained.    CONTRAST/COMPLICATIONS:  IV Contrast: Omnipaque 350 90 ml.  10 ml discarded.  Oral Contrast: None  Complications: None reported.    COMPARISON: None.    FINDINGS: Patient's respiratory motion degrades images.    LOWER CHEST: Subsegmental atelectasis. A 0.5 x 0.5 cm nodule in the left lower lobe.    LIVER: Hypodense lesions measuring 8.5 x 8.0 cm in the segment 4a (3:29) and 8 x 0 x 9.0 cm in the segment 7/8 (3:25). Additional smaller hypodense lesions, for example, 2.0 x 1.8 cm in the segment 3 (3:51)  GALLBLADDER/BILE DUCTS: No intrahepatic or extrahepatic biliary dilatation. No significant gallbladder edema.  PANCREAS: Unremarkable.  SPLEEN: Unremarkable.    ADRENALS: Unremarkable right adrenal gland. Somewhat nodular thickening of the left adrenal gland.  KIDNEYS/URETERS: Nonspecific mild bilateral perinephric stranding without hydroureteronephrosis. Right intrarenal stones measuring up to 0.5 x 0.3 cm. Focal areas of decreased or striated nephrogram in the right kidney. Subcentimeter hypodense foci in the kidneys, too small to characterize.  BLADDER: Partially distended.  REPRODUCTIVE ORGANS: Enlarged prostate.    BOWEL: No bowel obstruction. Unremarkable appendix. Irregular wall thickening of the mid/distal transverse colon with surrounding stranding, suspicious for neoplasm. Focal outpouching of air in communication with the lumen in this portion (5:82), which may be due to intradiverticular air, intraluminal air or fistulization into the lymph node.  Prominent distal gastric wall.  PERITONEUM: No drainable fluid collection or free air. A 1.8 x 1.6 cm lymph node adjacent to the thickened mid/distal transverse colon. A 1.5 x 1.3 cm lymph node along the inferior aspect of the sigmoid colon (3:122).  VESSELS: Atherosclerosis. Normal caliber of the abdominal aorta.  RETROPERITONEUM: A 1.3 x 1.4 cm periportal lymph node (3:49). A 2.5 x 1.5 cm portacaval lymph node (3:52). Subcentimeter retroperitoneal lymph nodes.  ABDOMINAL WALL/SOFT TISSUES: Small fat-containing umbilical hernia.  BONES: Additional mosaic anatomy. Degenerative changes of the spine. Nonspecific small lucency in the right femoral neck (5:45).    IMPRESSION:    Suspect mid/distal transverse colon with hepatic metastasis and lymphadenopathy as described. Infection is considered less likely. Focal outpouching of air in communication with the lumen in this portion, which may bedue to intradiverticular air, intraluminal air or fistulization into the lymph node. No intraperitoneal free air or organized fluid collection.    Somewhat nodular thickening of the left adrenal gland. Metastasis cannot be excluded.    Focal areas ofdecreased or striated nephrogram in the right kidney. Recommend clinical correlation to assess urinary tract infection/pyelonephritis.    Prominent distal gastric wall, which may be due to partial distention and/or gastritis. Recommend clinical correlation. Follow-up endoscopy may be obtained for further evaluation.    Nonspecific small lucency in the right femoral neck. Differential diagnosis includes focal osteopenia and osseous metastasis. Recommend comparison to previous outside study. Follow-up (bone scan and/or MRI) may be obtained for further evaluation.    Indeterminate left lower lobe nodule. Pulmonary metastasis cannot be excluded. Recommend comparison to previous outside study. Follow-up (nonemergent chest CT and/or PET-CT) may be obtained for further evaluation.    Additional findings as described.    Discussed with Dr. Aldana.              CATHY CHARLES MD; Attending Radiologist  This document has been electronically signed. May 29 2021  7:55PM    < end of copied text >      PATHOLOGY:  Pending

## 2021-06-02 LAB
ANION GAP SERPL CALC-SCNC: 11 MMOL/L — SIGNIFICANT CHANGE UP (ref 7–14)
BUN SERPL-MCNC: 6 MG/DL — LOW (ref 7–23)
CALCIUM SERPL-MCNC: 8.3 MG/DL — LOW (ref 8.4–10.5)
CHLORIDE SERPL-SCNC: 104 MMOL/L — SIGNIFICANT CHANGE UP (ref 98–107)
CO2 SERPL-SCNC: 22 MMOL/L — SIGNIFICANT CHANGE UP (ref 22–31)
CREAT SERPL-MCNC: 1 MG/DL — SIGNIFICANT CHANGE UP (ref 0.5–1.3)
GLUCOSE SERPL-MCNC: 119 MG/DL — HIGH (ref 70–99)
HCT VFR BLD CALC: 24.8 % — LOW (ref 39–50)
HGB BLD-MCNC: 7.6 G/DL — LOW (ref 13–17)
INR BLD: 1.38 RATIO — HIGH (ref 0.88–1.16)
MAGNESIUM SERPL-MCNC: 2 MG/DL — SIGNIFICANT CHANGE UP (ref 1.6–2.6)
MCHC RBC-ENTMCNC: 17.9 PG — LOW (ref 27–34)
MCHC RBC-ENTMCNC: 30.6 GM/DL — LOW (ref 32–36)
MCV RBC AUTO: 58.5 FL — LOW (ref 80–100)
NRBC # BLD: 0 /100 WBCS — SIGNIFICANT CHANGE UP
NRBC # FLD: 0 K/UL — SIGNIFICANT CHANGE UP
PHOSPHATE SERPL-MCNC: 3.1 MG/DL — SIGNIFICANT CHANGE UP (ref 2.5–4.5)
PLATELET # BLD AUTO: 438 K/UL — HIGH (ref 150–400)
POTASSIUM SERPL-MCNC: 3.5 MMOL/L — SIGNIFICANT CHANGE UP (ref 3.5–5.3)
POTASSIUM SERPL-SCNC: 3.5 MMOL/L — SIGNIFICANT CHANGE UP (ref 3.5–5.3)
PROTHROM AB SERPL-ACNC: 15.6 SEC — HIGH (ref 10.6–13.6)
RBC # BLD: 4.24 M/UL — SIGNIFICANT CHANGE UP (ref 4.2–5.8)
RBC # FLD: 21 % — HIGH (ref 10.3–14.5)
SODIUM SERPL-SCNC: 137 MMOL/L — SIGNIFICANT CHANGE UP (ref 135–145)
WBC # BLD: 9.06 K/UL — SIGNIFICANT CHANGE UP (ref 3.8–10.5)
WBC # FLD AUTO: 9.06 K/UL — SIGNIFICANT CHANGE UP (ref 3.8–10.5)

## 2021-06-02 PROCEDURE — 99231 SBSQ HOSP IP/OBS SF/LOW 25: CPT

## 2021-06-02 PROCEDURE — 99232 SBSQ HOSP IP/OBS MODERATE 35: CPT | Mod: GC

## 2021-06-02 PROCEDURE — 99233 SBSQ HOSP IP/OBS HIGH 50: CPT | Mod: GC

## 2021-06-02 PROCEDURE — 99232 SBSQ HOSP IP/OBS MODERATE 35: CPT

## 2021-06-02 RX ORDER — SOD SULF/SODIUM/NAHCO3/KCL/PEG
4000 SOLUTION, RECONSTITUTED, ORAL ORAL ONCE
Refills: 0 | Status: COMPLETED | OUTPATIENT
Start: 2021-06-02 | End: 2021-06-02

## 2021-06-02 RX ADMIN — PIPERACILLIN AND TAZOBACTAM 25 GRAM(S): 4; .5 INJECTION, POWDER, LYOPHILIZED, FOR SOLUTION INTRAVENOUS at 22:03

## 2021-06-02 RX ADMIN — SODIUM CHLORIDE 100 MILLILITER(S): 9 INJECTION, SOLUTION INTRAVENOUS at 13:26

## 2021-06-02 RX ADMIN — Medication 10 MILLIGRAM(S): at 18:51

## 2021-06-02 RX ADMIN — PIPERACILLIN AND TAZOBACTAM 25 GRAM(S): 4; .5 INJECTION, POWDER, LYOPHILIZED, FOR SOLUTION INTRAVENOUS at 13:29

## 2021-06-02 RX ADMIN — PIPERACILLIN AND TAZOBACTAM 25 GRAM(S): 4; .5 INJECTION, POWDER, LYOPHILIZED, FOR SOLUTION INTRAVENOUS at 05:43

## 2021-06-02 RX ADMIN — Medication 4000 MILLILITER(S): at 18:51

## 2021-06-02 RX ADMIN — Medication 325 MILLIGRAM(S): at 09:18

## 2021-06-02 NOTE — PROGRESS NOTE ADULT - PROBLEM SELECTOR PLAN 3
via CT A/P 5/29:  -Left adrenal gland: somewhat nodular thickening. Metastasis cannot be excluded.  -Right femoral neck: nonspecific small lucency: focal osteopenia vs osseous metastasis. Recommend comparison to previous outside study. Follow-up (bone scan and/or MRI) may be obtained for further evaluation.  -LLL nodule: indeterminate; pulmonary metastasis cannot be excluded. Recommend comparison to previous outside study. Follow-up (nonemergent chest CT and/or PET-CT) may be obtained for further evaluation.  -CT Chest obtained for staging, b/l subcentimeter pulmonary nodules up to 6 mm, c/f metastatic disease  -Hem/Onc consulted, recs appreciated: 325mg iron QOD, repeat bcx, f/u sensitivities via CT A/P 5/29:  -Left adrenal gland: somewhat nodular thickening. Metastasis cannot be excluded.  -Right femoral neck: nonspecific small lucency: focal osteopenia vs osseous metastasis. Recommend comparison to previous outside study. Follow-up (bone scan and/or MRI) may be obtained for further evaluation.  -LLL nodule: indeterminate; pulmonary metastasis cannot be excluded. Recommend comparison to previous outside study. Follow-up (nonemergent chest CT and/or PET-CT) may be obtained for further evaluation.  -CT Chest obtained for staging, b/l subcentimeter pulmonary nodules up to 6 mm, c/f metastatic disease  -Hem/Onc consulted, recs appreciated: 325mg iron QOD, repeat bcx, f/u sensitivities, obtain liver biopsy

## 2021-06-02 NOTE — PROGRESS NOTE ADULT - SUBJECTIVE AND OBJECTIVE BOX
Gentry Posadas MD PGY-1  Internal Medicine  Pager [phone] / [phone]    INTERVAL HPI / OVERNIGHT EVENTS:  -    SUBJECTIVE: Patient seen and examined at bedside.   -    INCOMPLETE ROS   CONSTITUTIONAL: No fevers or chills  EYES/ENT: No visual changes; no vertigo   NECK: No pain or stiffness  RESPIRATORY: No cough, wheezing, hemoptysis; no shortness of breath  CARDIOVASCULAR: No chest pain or palpitations  GASTROINTESTINAL: No abdominal or epigastric pain. No nausea, vomiting, or hematemesis. No diarrhea or constipation. No melena or hematochezia.  GENITOURINARY: No dysuria, frequency or hematuria  NEUROLOGICAL: No numbness or focal weakness  SKIN: No itching, rashes    OBJECTIVE:    VITAL SIGNS:  ICU Vital Signs Last 24 Hrs  T(C): 36.7 (02 Jun 2021 05:41), Max: 36.8 (01 Jun 2021 21:51)  T(F): 98.1 (02 Jun 2021 05:41), Max: 98.2 (01 Jun 2021 21:51)  HR: 83 (02 Jun 2021 05:41) (83 - 89)  BP: 130/85 (02 Jun 2021 05:41) (130/85 - 143/84)  BP(mean): --  ABP: --  ABP(mean): --  RR: 18 (02 Jun 2021 05:41) (18 - 18)  SpO2: 100% (02 Jun 2021 05:41) (100% - 100%)          PHYSICAL EXAM: INCOMPLETE PHYSICAL EXAM     General: no acute distress  HEENT: normocephalic, atraumatic; clear conjunctiva  Respiratory: clear to auscultation bilaterally; no wheeze; no crackles  Cardiovascular: Regular rate, regular rhythm; no murmurs, rubs, or gallops  Abdomen: soft, non-tender, non-distended; +BS x4  Extremities: WWP, 2+ peripheral pulses b/l; no LE edema  Skin: normal color and turgor; no rash  Neurological: alert, oriented x3, PERRL, EOMI    MEDICATIONS:  MEDICATIONS  (STANDING):  dextrose 5% + sodium chloride 0.9%. 1000 milliLiter(s) (100 mL/Hr) IV Continuous <Continuous>  ferrous    sulfate 325 milliGRAM(s) Oral <User Schedule>  piperacillin/tazobactam IVPB.. 3.375 Gram(s) IV Intermittent every 8 hours    MEDICATIONS  (PRN):  simethicone 80 milliGRAM(s) Chew two times a day PRN Upset Stomach      ALLERGIES:  Allergies    No Known Allergies    Intolerances        LABS:                        7.6    9.94  )-----------( 436      ( 01 Jun 2021 07:53 )             24.6     Hemoglobin: 7.6 g/dL (06-01 @ 07:53)  Hemoglobin: 8.3 g/dL (05-31 @ 07:56)  Hemoglobin: 8.2 g/dL (05-30 @ 03:17)  Hemoglobin: 9.2 g/dL (05-29 @ 16:59)    CBC Full  -  ( 01 Jun 2021 07:53 )  WBC Count : 9.94 K/uL  RBC Count : 4.21 M/uL  Hemoglobin : 7.6 g/dL  Hematocrit : 24.6 %  Platelet Count - Automated : 436 K/uL  Mean Cell Volume : 58.4 fL  Mean Cell Hemoglobin : 18.1 pg  Mean Cell Hemoglobin Concentration : 30.9 gm/dL  Auto Neutrophil # : x  Auto Lymphocyte # : x  Auto Monocyte # : x  Auto Eosinophil # : x  Auto Basophil # : x  Auto Neutrophil % : x  Auto Lymphocyte % : x  Auto Monocyte % : x  Auto Eosinophil % : x  Auto Basophil % : x    06-01    137  |  103  |  9   ----------------------------<  108<H>  3.3<L>   |  22  |  1.06    Ca    8.4      01 Jun 2021 07:53  Phos  3.2     06-01  Mg     2.2     06-01    TPro  7.0  /  Alb  3.3  /  TBili  0.7  /  DBili  x   /  AST  14  /  ALT  11  /  AlkPhos  93  05-31    Creatinine Trend: 1.06<--, 1.06<--, 1.00<--, 1.09<--  LIVER FUNCTIONS - ( 31 May 2021 07:56 )  Alb: 3.3 g/dL / Pro: 7.0 g/dL / ALK PHOS: 93 U/L / ALT: 11 U/L / AST: 14 U/L / GGT: x           PT/INR - ( 01 Jun 2021 07:53 )   PT: 16.8 sec;   INR: 1.49 ratio         PTT - ( 01 Jun 2021 07:53 )  PTT:28.9 sec    MICROBIOLOGY:    Culture - Blood (collected 30 May 2021 12:12)  Source: .Blood Blood-Peripheral  Gram Stain (30 May 2021 21:56):    Growth in anaerobic bottle: Gram Negative Rods  Final Report (31 May 2021 19:58):    Growth in anaerobic bottle: Clostridium septicum "Susceptibilities not    performed"    Please Note:************************************************    Clostridium septicum    may appear as gram negative rods in direct smears of clinical specimens.    **BCIDperformed. No targets detected.**    ***Blood Panel PCR results on this specimen are available    approximately 3 hours after the Gram stain result.***    Gram stain, PCR, and/or culture results may not always    correspond due to difference in methodologies.    ************************************************************    This PCR assay was performed by multiplex PCR. This    Assay tests for 66 bacterial and resistance gene targets.    Please refer to the [facility] test directory    at [email or web address] for details.    Culture - Blood (collected 30 May 2021 12:12)  Source: .Blood Blood-Peripheral  Gram Stain (31 May 2021 00:42):    Growth in anaerobic bottle: Gram Negative Rods  Final Report (31 May 2021 22:32):    Growth in anaerobic bottle: Clostridium septicum "Susceptibilities not    performed"    Please Note:************************************************    Clostridium septicum    may appear as gram negative rods in direct smears of clinical specimens.      IMAGING:      Labs, imaging, EKG personally reviewed    RADIOLOGY & ADDITIONAL TESTS: Reviewed. Gentry Posadas MD PGY-1  Internal Medicine  Pager [phone] / [phone]    INTERVAL HPI / OVERNIGHT EVENTS:  -NAEON  -HEM/ONC and ID consulted  -clear liquid diet, per GI    SUBJECTIVE: Patient seen and examined at bedside.    -abdominal pain remains resolved;   -no bowel movement overnight; no nausea, vomiting, cough, sob, cp, or other symptoms      OBJECTIVE:    VITAL SIGNS:  ICU Vital Signs Last 24 Hrs  T(C): 36.7 (02 Jun 2021 05:41), Max: 36.8 (01 Jun 2021 21:51)  T(F): 98.1 (02 Jun 2021 05:41), Max: 98.2 (01 Jun 2021 21:51)  HR: 83 (02 Jun 2021 05:41) (83 - 89)  BP: 130/85 (02 Jun 2021 05:41) (130/85 - 143/84)  BP(mean): --  ABP: --  ABP(mean): --  RR: 18 (02 Jun 2021 05:41) (18 - 18)  SpO2: 100% (02 Jun 2021 05:41) (100% - 100%)     PHYSICAL EXAM:      General: no acute distress  HEENT: normocephalic, atraumatic;   Respiratory: clear to auscultation bilaterally; no wheeze; no crackles  Cardiovascular: Regular rate, regular rhythm; no murmurs, rubs, or gallops  Abdomen: soft, no tenderness to palpation, distended  Extremities: WWP, 2+ peripheral pulses b/l; no LE edema  Skin: normal color and turgor; no rash  Neurological: alert, oriented x3, PERRL, EOMI      MEDICATIONS:  MEDICATIONS  (STANDING):  dextrose 5% + sodium chloride 0.9%. 1000 milliLiter(s) (100 mL/Hr) IV Continuous <Continuous>  ferrous    sulfate 325 milliGRAM(s) Oral <User Schedule>  piperacillin/tazobactam IVPB.. 3.375 Gram(s) IV Intermittent every 8 hours    MEDICATIONS  (PRN):  simethicone 80 milliGRAM(s) Chew two times a day PRN Upset Stomach      ALLERGIES:  Allergies    No Known Allergies    Intolerances        LABS:                        7.6    9.94  )-----------( 436      ( 01 Jun 2021 07:53 )             24.6     Hemoglobin: 7.6 g/dL (06-01 @ 07:53)  Hemoglobin: 8.3 g/dL (05-31 @ 07:56)  Hemoglobin: 8.2 g/dL (05-30 @ 03:17)  Hemoglobin: 9.2 g/dL (05-29 @ 16:59)    CBC Full  -  ( 01 Jun 2021 07:53 )  WBC Count : 9.94 K/uL  RBC Count : 4.21 M/uL  Hemoglobin : 7.6 g/dL  Hematocrit : 24.6 %  Platelet Count - Automated : 436 K/uL  Mean Cell Volume : 58.4 fL  Mean Cell Hemoglobin : 18.1 pg  Mean Cell Hemoglobin Concentration : 30.9 gm/dL  Auto Neutrophil # : x  Auto Lymphocyte # : x  Auto Monocyte # : x  Auto Eosinophil # : x  Auto Basophil # : x  Auto Neutrophil % : x  Auto Lymphocyte % : x  Auto Monocyte % : x  Auto Eosinophil % : x  Auto Basophil % : x    06-01    137  |  103  |  9   ----------------------------<  108<H>  3.3<L>   |  22  |  1.06    Ca    8.4      01 Jun 2021 07:53  Phos  3.2     06-01  Mg     2.2     06-01    TPro  7.0  /  Alb  3.3  /  TBili  0.7  /  DBili  x   /  AST  14  /  ALT  11  /  AlkPhos  93  05-31    Creatinine Trend: 1.06<--, 1.06<--, 1.00<--, 1.09<--  LIVER FUNCTIONS - ( 31 May 2021 07:56 )  Alb: 3.3 g/dL / Pro: 7.0 g/dL / ALK PHOS: 93 U/L / ALT: 11 U/L / AST: 14 U/L / GGT: x           PT/INR - ( 01 Jun 2021 07:53 )   PT: 16.8 sec;   INR: 1.49 ratio         PTT - ( 01 Jun 2021 07:53 )  PTT:28.9 sec    MICROBIOLOGY:    Culture - Blood (collected 30 May 2021 12:12)  Source: .Blood Blood-Peripheral  Gram Stain (30 May 2021 21:56):    Growth in anaerobic bottle: Gram Negative Rods  Final Report (31 May 2021 19:58):    Growth in anaerobic bottle: Clostridium septicum "Susceptibilities not    performed"    Please Note:************************************************    Clostridium septicum    may appear as gram negative rods in direct smears of clinical specimens.    **BCIDperformed. No targets detected.**    ***Blood Panel PCR results on this specimen are available    approximately 3 hours after the Gram stain result.***    Gram stain, PCR, and/or culture results may not always    correspond due to difference in methodologies.    ************************************************************    This PCR assay was performed by multiplex PCR. This    Assay tests for 66 bacterial and resistance gene targets.    Please refer to the [facility] test directory    at [email or web address] for details.    Culture - Blood (collected 30 May 2021 12:12)  Source: .Blood Blood-Peripheral  Gram Stain (31 May 2021 00:42):    Growth in anaerobic bottle: Gram Negative Rods  Final Report (31 May 2021 22:32):    Growth in anaerobic bottle: Clostridium septicum "Susceptibilities not    performed"    Please Note:************************************************    Clostridium septicum    may appear as gram negative rods in direct smears of clinical specimens.      IMAGING:      Labs, imaging, EKG personally reviewed    RADIOLOGY & ADDITIONAL TESTS: Reviewed.

## 2021-06-02 NOTE — PROGRESS NOTE ADULT - ASSESSMENT
51Myo M with nonsignificant PMH who is transferred from [facility] for fever and abdominal pain with suspected new diagnosis of GI cancer (by CT).    Impression:  # Abnormal imaging with likely colonic cancer with metastatic disease - involving the liver and potentially the lungs.   # Clostridium septicum bacteremia - potentially colonic source. Afebrile for >24h    Recommendations:  - Will plan for colonoscopy tomorrow    - Clear liquid diet.  - NPO after midnight   - Daily CBC, CMP, INR      Park Fuchs   Gastroenterology Fellow  Pager: [phone]  Please call answering service [phone] / on-call GI fellow after 5pm and before 8am, and on weekends.

## 2021-06-02 NOTE — PROGRESS NOTE ADULT - SUBJECTIVE AND OBJECTIVE BOX
Interval Events:   No abdominal pain  No nausea /vomiting / diarrhea   No melena / bloody bm     Hospital Medications:  dextrose 5% + sodium chloride 0.9%. 1000 milliLiter(s) IV Continuous <Continuous>  ferrous    sulfate 325 milliGRAM(s) Oral <User Schedule>  piperacillin/tazobactam IVPB.. 3.375 Gram(s) IV Intermittent every 8 hours  simethicone 80 milliGRAM(s) Chew two times a day PRN        ROS:   General:  No fevers, chills or night sweats  ENT:  No sore throat or dysphagia  CV:  No pain or palpitations  Resp:  No dyspnea, cough or  wheezing  GI:  as above  Skin:  No rash or edema  Neuro: no weakness   Hematologic: no bleeding  Musculoskeletal: no muscle pain or join pain  Psych: no agitation      PHYSICAL EXAM:   Vital Signs:  Vital Signs Last 24 Hrs  T(C): 36.7 (02 Jun 2021 05:41), Max: 36.8 (01 Jun 2021 21:51)  T(F): 98.1 (02 Jun 2021 05:41), Max: 98.2 (01 Jun 2021 21:51)  HR: 83 (02 Jun 2021 05:41) (83 - 89)  BP: 130/85 (02 Jun 2021 05:41) (130/85 - 143/84)  BP(mean): --  RR: 18 (02 Jun 2021 05:41) (18 - 18)  SpO2: 100% (02 Jun 2021 05:41) (100% - 100%)  Daily     Daily     GENERAL:  NAD, Appears stated age  HEENT:  NC/AT,  conjunctivae clear and pink, sclera -anicteric  CHEST:  Normal Effort, Breath sounds clear  HEART:  RRR, S1 + S2, no murmurs  ABDOMEN:  Soft, non-tender, non-distended, normoactive bowel sounds,  no masses  EXTREMITIES:  no cyanosis or edema  SKIN:  Warm & Dry. No rash or erythema  NEURO:  Alert, oriented, no focal deficit    LABS:                        7.6    9.06  )-----------( 438      ( 02 Jun 2021 07:59 )             24.8     Mean Cell Volume: 58.5 fL (06-02-21 @ 07:59)    06-01    137  |  103  |  9   ----------------------------<  108<H>  3.3<L>   |  22  |  1.06    Ca    8.4      01 Jun 2021 07:53  Phos  3.2     06-01  Mg     2.2     06-01        PT/INR - ( 01 Jun 2021 07:53 )   PT: 16.8 sec;   INR: 1.49 ratio         PTT - ( 01 Jun 2021 07:53 )  PTT:28.9 sec                            7.6    9.06  )-----------( 438      ( 02 Jun 2021 07:59 )             24.8                         7.6    9.94  )-----------( 436      ( 01 Jun 2021 07:53 )             24.6                         8.3    12.05 )-----------( 423      ( 31 May 2021 07:56 )             26.8       Imaging:

## 2021-06-02 NOTE — PROGRESS NOTE ADULT - ATTENDING COMMENTS
Denies any abdominal pain. No more fevers. Having BMs.    abd soft, non-distended, non-tender to palpation    - continue CLD and NPO @ midnight for colonoscopy w/ GI tomorrow  - following colonoscopy only CLD because will plan for surgery Friday  - please make sure he is medically cleared for surgery and has a recent COVID test  - will consult WOCN for ostomy marking     Radha Aponte MD  Attending Physician

## 2021-06-02 NOTE — PROGRESS NOTE ADULT - SUBJECTIVE AND OBJECTIVE BOX
SURGERY DAILY PROGRESS NOTE:       SUBJECTIVE/ROS: Patient examined at bedside. No acute events overnight  Denies nausea, vomiting, chest pain, shortness of breath         MEDICATIONS  (STANDING):  dextrose 5% + sodium chloride 0.9%. 1000 milliLiter(s) (100 mL/Hr) IV Continuous <Continuous>  ferrous    sulfate 325 milliGRAM(s) Oral <User Schedule>  piperacillin/tazobactam IVPB.. 3.375 Gram(s) IV Intermittent every 8 hours    MEDICATIONS  (PRN):  simethicone 80 milliGRAM(s) Chew two times a day PRN Upset Stomach      OBJECTIVE:    Vital Signs Last 24 Hrs  T(C): 36.7 (02 Jun 2021 05:41), Max: 36.8 (01 Jun 2021 21:51)  T(F): 98.1 (02 Jun 2021 05:41), Max: 98.2 (01 Jun 2021 21:51)  HR: 83 (02 Jun 2021 05:41) (83 - 89)  BP: 130/85 (02 Jun 2021 05:41) (130/85 - 143/84)  BP(mean): --  RR: 18 (02 Jun 2021 05:41) (18 - 18)  SpO2: 100% (02 Jun 2021 05:41) (100% - 100%)        I&O's Detail      Daily     Daily     LABS:                        7.6    9.94  )-----------( 436      ( 01 Jun 2021 07:53 )             24.6     06-01    137  |  103  |  9   ----------------------------<  108<H>  3.3<L>   |  22  |  1.06    Ca    8.4      01 Jun 2021 07:53  Phos  3.2     06-01  Mg     2.2     06-01    TPro  7.0  /  Alb  3.3  /  TBili  0.7  /  DBili  x   /  AST  14  /  ALT  11  /  AlkPhos  93  05-31    PT/INR - ( 01 Jun 2021 07:53 )   PT: 16.8 sec;   INR: 1.49 ratio         PTT - ( 01 Jun 2021 07:53 )  PTT:28.9 sec                  PHYSICAL EXAM:  Constitutional: well developed, well nourished, NAD  Eyes: anicteric  ENMT: normal facies, symmetric  Respiratory: Breathing comfortably    Gastrointestinal: abdomen soft, nontender, nondistended.   Extremities: FROM, warm  Neurological: intact, non-focal  Psychiatric: oriented x 3; appropriate      EXAM:  CT CHEST IC        PROCEDURE DATE:  May 31 2021         INTERPRETATION:  CLINICAL INFORMATION: Metastatic colon cancer    COMPARISON: CT abdomen and pelvis from 5/29/2021    CONTRAST/COMPLICATIONS:  IV Contrast: Omnipaque 350  40 cc administered   10 cc discarded  Oral Contrast: NONE  Complications: None reported at time of study completion    PROCEDURE:  CT of the Chest was performed.  Sagittal and coronal reformats were performed.    FINDINGS:    LUNGS AND AIRWAYS: Bilateral subcentimeter pulmonary nodules in random distribution.  The largest nodules measure:  -A 6 mm right upper lobe anterior segment nodule (3-53).  -A 5 mm left lower lobe posterior segment nodule (3-95).  Bilateral lower lobe linear atelectasis. The airways are normal.  PLEURA: No pleural effusion or pneumothorax.  MEDIASTINUM AND MARS: The right cardiophrenic angle, upper paratracheal, prevascular, right hilar and subcarinal lymph nodes measure less than 10 mm in the short axis. However, there is an enlargedleft internal mammary lymph node.  VESSELS: Within normal limits.  HEART: Heart size is normal. No pericardial effusion.  CHEST WALL AND LOWER NECK: Within normal limits.  VISUALIZED UPPER ABDOMEN: Ill-defined hypodense hepatic lesions, the largest within segment 7/8 measuring 8.8 x 8.0 cm, unchanged.  BONES: No lytic or blastic lesions.    IMPRESSION:  1.  The bilateral subcentimeter pulmonary nodules measuring up to 6 mm, are concerning for metastatic disease.  2.  Enlarged left internal mammary lymph node and cardiophrenic angle lymph nodes are concerning for malignancy.  3.  No change in the hypodense hepatic lesions better evaluated on CT abdomen/pelvis from 5/29/2021 that were concerning for metastatic disease.

## 2021-06-02 NOTE — PROGRESS NOTE ADULT - PROBLEM SELECTOR PLAN 5
DVT ppx: will follow-up with specialties regarding any plans for invasive procedures  Diet: CLD (per GI, via Colorectal surgery)  Pain: conservative management with acetaminophen PRN; simethicone daily

## 2021-06-02 NOTE — PROGRESS NOTE ADULT - ATTENDING COMMENTS
52 yo M with no PMH p/w large colon mass with multiple liver/lung masses highly suspicious for metastatic colon ca. Case c/b Clostridium septicum bacteremia, likely colonic source.   -c/w zosyn as per ID. f/u repeat blood cx to ensure clearance of bacteremia  -will need tissue bx. scheduled for colonoscopy tomorrow with GI/surgery. IR also consulted for liver bx. will confirm date

## 2021-06-02 NOTE — PROGRESS NOTE ADULT - PROBLEM SELECTOR PLAN 1
Leukocytosis, TMax 100.7 (at [place]), HR>90  -s/p acetaminophen and piperacillin-tazobactam in the ED  -SIRS likely secondary to malignancy  -c/w monitoring vital signs  -blood culture speciation: clostridium septicum; c/w IV piperacillin-tazobactam (5/30- ), IV clindamycin (6/1-6/1)  -Colorectal surgery notified, will follow-up re: source control (6/1); will f/u recs  -Infectious disease consulted; c/w antibiotics above Leukocytosis, TMax 100.7 (at [place]), HR>90  -s/p acetaminophen and piperacillin-tazobactam in the ED  -SIRS likely secondary to malignancy  -c/w monitoring vital signs  -blood culture speciation: clostridium septicum; c/w IV piperacillin-tazobactam (5/30- ), IV clindamycin (6/1-6/1)  -Colorectal surgery notified, will follow-up re: source control (6/1); will f/u recs  -Infectious disease consulted: c/w piperacillin-tazobactam  -f/u 6/1 blood cultures:

## 2021-06-02 NOTE — PROGRESS NOTE ADULT - PROBLEM SELECTOR PLAN 2
concern for malignancy via CT A/P 5/29  - Gastroenterology consulted for diagnostic colon mass biopsy via colonoscopy  - GI recommending Colorectal Surgery consult given colon lesion w/ contained area of air - may be diverticula/intraluminal, but cannot r/o contained perf in area of mass or fistula to LN as per radiology.  - Colorectal Surgery recs appreciated, recommended colonoscopy this admission for tissue biopsy and site marking  - - (will f/u Colorectal Surgery given clostridium septicum, as above)  - Consulted IR for liver mass bx for diagnosis (anticipate likely deferring of biopsy i/s/o c. septicum infection) concern for malignancy via CT A/P 5/29  - Gastroenterology consulted for diagnostic colon mass biopsy via colonoscopy  - GI recommending Colorectal Surgery consult given colon lesion w/ contained area of air - may be diverticula/intraluminal, but cannot r/o contained perf in area of mass or fistula to LN as per radiology.  - Colorectal Surgery recs appreciated, recommended colonoscopy this admission for tissue biopsy and site marking  - Consulted IR for liver mass bx for diagnosis; will f/u plans i/s/o current bacteremia

## 2021-06-02 NOTE — PROGRESS NOTE ADULT - SUBJECTIVE AND OBJECTIVE BOX
Follow Up: clostridium septicum bacteremia      Interval History: pt afebrile, repeat blood cx negative, no diarrhea    ROS:      All other systems negative    Constitutional: no fever, no chills  Cardiovascular:  no chest pain, no palpitation  Respiratory:  no SOB, no cough  GI:  + abd pain, no vomiting, no diarrhea  urinary: no dysuria, no hematuria, no flank pain  musculoskeletal:  no joint pain, no joint swelling  skin:  no rash  neurology:  no headache, no seizure      Allergies  No Known Allergies        ANTIMICROBIALS:  piperacillin/tazobactam IVPB.. 3.375 every 8 hours      OTHER MEDS:  bisacodyl 10 milliGRAM(s) Oral once  dextrose 5% + sodium chloride 0.9%. 1000 milliLiter(s) IV Continuous <Continuous>  ferrous    sulfate 325 milliGRAM(s) Oral <User Schedule>  polyethylene glycol/electrolyte Solution. 4000 milliLiter(s) Oral once  simethicone 80 milliGRAM(s) Chew two times a day PRN      Vital Signs Last 24 Hrs  T(C): 36.9 (02 Jun 2021 12:22), Max: 36.9 (02 Jun 2021 12:22)  T(F): 98.5 (02 Jun 2021 12:22), Max: 98.5 (02 Jun 2021 12:22)  HR: 91 (02 Jun 2021 12:22) (83 - 91)  BP: 146/94 (02 Jun 2021 12:22) (130/85 - 146/94)  BP(mean): --  RR: 17 (02 Jun 2021 12:22) (17 - 18)  SpO2: 100% (02 Jun 2021 12:22) (100% - 100%)    Physical Exam:  General:    NAD,  non toxic  Cardio:     regular S1, S2,  no murmur  Respiratory:    clear b/l,    no wheezing  abd:     soft,   BS +,   no tenderness  :   no CVAT,  no suprapubic tenderness,   no  bustamante  Musculoskeletal:   no joint swelling  vascular: no phlebitis  Skin:    no rash                          7.6    9.06  )-----------( 438      ( 02 Jun 2021 07:59 )             24.8       06-02    137  |  104  |  6<L>  ----------------------------<  119<H>  3.5   |  22  |  1.00    Ca    8.3<L>      02 Jun 2021 07:59  Phos  3.1     06-02  Mg     2.0     06-02            MICROBIOLOGY:  v  .Blood Blood-Peripheral  06-01-21   No growth to date.  --  --      .Blood Blood-Peripheral  05-30-21   Growth in anaerobic bottle: Clostridium septicum "Susceptibilities not  performed"  Please Note:************************************************  Clostridium septicum  may appear as gram negative rods in direct smears of clinical specimens.  --    Growth in anaerobic bottle: Gram Negative Rods                RADIOLOGY:  Images independently visualized and reviewed personally, findings as below  < from: CT Chest w/ IV Cont (05.31.21 @ 15:07) >    IMPRESSION:  1.  The bilateral subcentimeter pulmonary nodules measuring up to 6 mm, are concerning for metastatic disease.  2.  Enlarged left internal mammary lymph node and cardiophrenic angle lymph nodes are concerning for malignancy.  3.  No change in the hypodense hepatic lesions better evaluated on CT abdomen/pelvis from 5/29/2021 that were concerning for metastatic disease.    < end of copied text >  < from: CT Abdomen and Pelvis w/ IV Cont (05.29.21 @ 21:00) >  IMPRESSION:    Suspect mid/distal transverse colon with hepatic metastasis and lymphadenopathy as described. Infection is considered less likely. Focal outpouching of air in communication with the lumen in this portion, which may bedue to intradiverticular air, intraluminal air or fistulization into the lymph node. No intraperitoneal free air or organized fluid collection.    Somewhat nodular thickening of the left adrenal gland. Metastasis cannot be excluded.    Focal areas ofdecreased or striated nephrogram in the right kidney. Recommend clinical correlation to assess urinary tract infection/pyelonephritis.    Prominent distal gastric wall, which may be due to partial distention and/or gastritis. Recommend clinical correlation. Follow-up endoscopy may be obtained for further evaluation.    Nonspecific small lucency in the right femoral neck. Differential diagnosis includes focal osteopenia and osseous metastasis. Recommend comparison to previous outside study. Follow-up (bone scan and/or MRI) may be obtained for further evaluation.    Indeterminate left lower lobe nodule. Pulmonary metastasis cannot be excluded. Recommend comparison to previous outside study. Follow-up (nonemergent chest CT and/or PET-CT) may be obtained for further evaluation.    Additional findings as described.    < end of copied text >

## 2021-06-02 NOTE — PROGRESS NOTE ADULT - ASSESSMENT
Mr. Granda 51 Year-Old Gentleman presenting with abdominal pain, fevers, nausea/emesis, found to have likely colon malignancy with metastasis and questioned perforation.     PLAN:  - Agree with NPO, IV antibiotics: Zosyn.   - Patient will likely benefit from colonic resection prior to administration of chemo for metastases, however needs to complete full malignancy workup:   - Please obtain tumor markers in AM: CEA, CA-19-9, ; Elevated CEA but normal CA 19-9 and   - CT chest concerning for metastatic disease  - Will follow up IR for liver biopsy   - Appreciate GI evaluation, recommend performing colonoscopy this admission for tissue biopsy and site marking, Colorectal Surgery can be on standby in setting perforation worsens during/after procedure.    [facility] Team Surgery #[id number]     Mr. Granda 51 Year-Old Gentleman presenting with abdominal pain, fevers, nausea/emesis, found to have likely colon malignancy with metastasis and questioned perforation.     PLAN:  - Agree with NPO, IV antibiotics: Zosyn.   - Patient will likely benefit from colonic resection prior to administration of chemo for metastases, however needs to complete full malignancy workup:   - Please obtain tumor markers in AM: CEA, CA-19-9, ; Elevated CEA but normal CA 19-9 and   - CT chest concerning for metastatic disease  - Will plan for OR Friday, please keep on CLD after colonoscopy and continue to hold lovenox so patient can get PCEA on Friday  - For GI please tattoo lesion  - Will consent for laparoscopic partial colectomy, possible open, possible ostomy   - Appreciate GI evaluation, recommend performing colonoscopy this admission for tissue biopsy and site marking, Colorectal Surgery can be on standby in setting perforation worsens during/after procedure.    A Team Surgery #[id number]

## 2021-06-02 NOTE — PROGRESS NOTE ADULT - ASSESSMENT
51 m developed abd pain about a year ago and imaging showed liver lesions and was supposed to follow up as outpatient but lost to follow, now p/w worsening abd pain, febrile, leukocytosis 14  blood cx: clostridium septicum  CT: Suspect mid/distal transverse colon with hepatic metastasis and lymphadenopathy . Focal outpouching of air in communication with the lumen in this portion, which may be due to intradiverticular air, intraluminal air or fistulization into the lymph node.       fever, leukocytosis, sepsis with clostridium septicum bacteremia in the setting of likely colon ca and mets (no biopsy yet)    * repeat blood cx negative 6/1  * c/w zosyn, will need a 2 week course from the negative blood cx ( does not have to be IV, can switch to oral if there is plan for discharge)  * plan for colonoscopy and then OR  * monitor the WBC/diff and temp curve    The above assessment and plan was discussed with the primary team    Sharron Snow MD  Pager [phone]  After 5pm and on weekends call [phone]

## 2021-06-03 ENCOUNTER — TRANSCRIPTION ENCOUNTER (OUTPATIENT)
Age: 51
End: 2021-06-03

## 2021-06-03 ENCOUNTER — RESULT REVIEW (OUTPATIENT)
Age: 51
End: 2021-06-03

## 2021-06-03 LAB
ANION GAP SERPL CALC-SCNC: 13 MMOL/L — SIGNIFICANT CHANGE UP (ref 7–14)
BUN SERPL-MCNC: 4 MG/DL — LOW (ref 7–23)
CALCIUM SERPL-MCNC: 8.8 MG/DL — SIGNIFICANT CHANGE UP (ref 8.4–10.5)
CHLORIDE SERPL-SCNC: 104 MMOL/L — SIGNIFICANT CHANGE UP (ref 98–107)
CO2 SERPL-SCNC: 22 MMOL/L — SIGNIFICANT CHANGE UP (ref 22–31)
CREAT SERPL-MCNC: 1.15 MG/DL — SIGNIFICANT CHANGE UP (ref 0.5–1.3)
GLUCOSE SERPL-MCNC: 112 MG/DL — HIGH (ref 70–99)
HCT VFR BLD CALC: 24.8 % — LOW (ref 39–50)
HGB BLD-MCNC: 7.6 G/DL — LOW (ref 13–17)
INR BLD: 1.46 RATIO — HIGH (ref 0.88–1.16)
MAGNESIUM SERPL-MCNC: 2.1 MG/DL — SIGNIFICANT CHANGE UP (ref 1.6–2.6)
MCHC RBC-ENTMCNC: 17.8 PG — LOW (ref 27–34)
MCHC RBC-ENTMCNC: 30.6 GM/DL — LOW (ref 32–36)
MCV RBC AUTO: 58.1 FL — LOW (ref 80–100)
NRBC # BLD: 0 /100 WBCS — SIGNIFICANT CHANGE UP
NRBC # FLD: 0 K/UL — SIGNIFICANT CHANGE UP
PHOSPHATE SERPL-MCNC: 3.4 MG/DL — SIGNIFICANT CHANGE UP (ref 2.5–4.5)
PLATELET # BLD AUTO: 478 K/UL — HIGH (ref 150–400)
POTASSIUM SERPL-MCNC: 3.5 MMOL/L — SIGNIFICANT CHANGE UP (ref 3.5–5.3)
POTASSIUM SERPL-SCNC: 3.5 MMOL/L — SIGNIFICANT CHANGE UP (ref 3.5–5.3)
PROTHROM AB SERPL-ACNC: 16.3 SEC — HIGH (ref 10.6–13.6)
RBC # BLD: 4.27 M/UL — SIGNIFICANT CHANGE UP (ref 4.2–5.8)
RBC # FLD: 21.4 % — HIGH (ref 10.3–14.5)
SARS-COV-2 RNA SPEC QL NAA+PROBE: SIGNIFICANT CHANGE UP
SODIUM SERPL-SCNC: 139 MMOL/L — SIGNIFICANT CHANGE UP (ref 135–145)
WBC # BLD: 8.99 K/UL — SIGNIFICANT CHANGE UP (ref 3.8–10.5)
WBC # FLD AUTO: 8.99 K/UL — SIGNIFICANT CHANGE UP (ref 3.8–10.5)

## 2021-06-03 PROCEDURE — 99231 SBSQ HOSP IP/OBS SF/LOW 25: CPT

## 2021-06-03 PROCEDURE — 88305 TISSUE EXAM BY PATHOLOGIST: CPT | Mod: 26

## 2021-06-03 PROCEDURE — 45380 COLONOSCOPY AND BIOPSY: CPT | Mod: 59,GC,53

## 2021-06-03 PROCEDURE — 45385 COLONOSCOPY W/LESION REMOVAL: CPT | Mod: GC,53

## 2021-06-03 PROCEDURE — 43235 EGD DIAGNOSTIC BRUSH WASH: CPT | Mod: GC

## 2021-06-03 PROCEDURE — 99233 SBSQ HOSP IP/OBS HIGH 50: CPT | Mod: GC

## 2021-06-03 RX ORDER — METRONIDAZOLE 500 MG
250 TABLET ORAL ONCE
Refills: 0 | Status: COMPLETED | OUTPATIENT
Start: 2021-06-03 | End: 2021-06-03

## 2021-06-03 RX ORDER — NEOMYCIN SULFATE 500 MG/1
500 TABLET ORAL ONCE
Refills: 0 | Status: COMPLETED | OUTPATIENT
Start: 2021-06-03 | End: 2021-06-03

## 2021-06-03 RX ORDER — PHYTONADIONE (VIT K1) 5 MG
5 TABLET ORAL ONCE
Refills: 0 | Status: COMPLETED | OUTPATIENT
Start: 2021-06-03 | End: 2021-06-03

## 2021-06-03 RX ORDER — METRONIDAZOLE 500 MG
250 TABLET ORAL ONCE
Refills: 0 | Status: COMPLETED | OUTPATIENT
Start: 2021-06-04 | End: 2021-06-04

## 2021-06-03 RX ORDER — GABAPENTIN 400 MG/1
600 CAPSULE ORAL ONCE
Refills: 0 | Status: COMPLETED | OUTPATIENT
Start: 2021-06-04 | End: 2021-06-04

## 2021-06-03 RX ORDER — POLYETHYLENE GLYCOL 3350 17 G/17G
17 POWDER, FOR SOLUTION ORAL ONCE
Refills: 0 | Status: COMPLETED | OUTPATIENT
Start: 2021-06-04 | End: 2021-06-04

## 2021-06-03 RX ORDER — NEOMYCIN SULFATE 500 MG/1
500 TABLET ORAL ONCE
Refills: 0 | Status: COMPLETED | OUTPATIENT
Start: 2021-06-04 | End: 2021-06-04

## 2021-06-03 RX ORDER — CELECOXIB 200 MG/1
400 CAPSULE ORAL ONCE
Refills: 0 | Status: COMPLETED | OUTPATIENT
Start: 2021-06-04 | End: 2021-06-04

## 2021-06-03 RX ADMIN — NEOMYCIN SULFATE 500 MILLIGRAM(S): 500 TABLET ORAL at 18:12

## 2021-06-03 RX ADMIN — Medication 250 MILLIGRAM(S): at 21:45

## 2021-06-03 RX ADMIN — SODIUM CHLORIDE 100 MILLILITER(S): 9 INJECTION, SOLUTION INTRAVENOUS at 12:57

## 2021-06-03 RX ADMIN — NEOMYCIN SULFATE 500 MILLIGRAM(S): 500 TABLET ORAL at 21:30

## 2021-06-03 RX ADMIN — Medication 250 MILLIGRAM(S): at 18:11

## 2021-06-03 RX ADMIN — PIPERACILLIN AND TAZOBACTAM 25 GRAM(S): 4; .5 INJECTION, POWDER, LYOPHILIZED, FOR SOLUTION INTRAVENOUS at 14:03

## 2021-06-03 RX ADMIN — PIPERACILLIN AND TAZOBACTAM 25 GRAM(S): 4; .5 INJECTION, POWDER, LYOPHILIZED, FOR SOLUTION INTRAVENOUS at 22:13

## 2021-06-03 RX ADMIN — PIPERACILLIN AND TAZOBACTAM 25 GRAM(S): 4; .5 INJECTION, POWDER, LYOPHILIZED, FOR SOLUTION INTRAVENOUS at 06:08

## 2021-06-03 NOTE — PROGRESS NOTE ADULT - PROBLEM SELECTOR PLAN 5
DVT ppx: will follow-up with specialties regarding any plans for invasive procedures  Diet: CLD (per GI, via Colorectal surgery)  Pain: conservative management with acetaminophen PRN; simethicone daily DVT ppx: will follow-up with specialties regarding any plans for invasive procedures  Diet: CLD (per GI, via Colorectal surgery)  Pain: conservative management with acetaminophen PRN; simethicone daily    Patient medically optimized and cleared for surgery.

## 2021-06-03 NOTE — PROGRESS NOTE ADULT - ATTENDING COMMENTS
S/p colonoscopy (and EGD) today without any issues.   Scope demonstrated distal transverse colon mass partially obstructing, tattoo placed distal to lesion, unable to traverse it to evaluate proximal extent    abd soft, non-distended, non-tender to palpation    - continue CLD  - a couple of doses of miralax, neomycin and flagyl for bowel prep  - NPO @ 4AM  - vit K for elevated INR, repeat in morning  - ostomy marking    Radha Aponte MD  Attending Physician

## 2021-06-03 NOTE — DISCHARGE NOTE PROVIDER - NSFOLLOWUPCLINICS_GEN_ALL_ED_FT
[facility]  Hematology/Oncology  [address] [id number]  Phone: [phone]  Fax:

## 2021-06-03 NOTE — DISCHARGE NOTE PROVIDER - NSDCCPCAREPLAN_GEN_ALL_CORE_FT
PRINCIPAL DISCHARGE DIAGNOSIS  Diagnosis: Bowel wall thickening  Assessment and Plan of Treatment:       SECONDARY DISCHARGE DIAGNOSES  Diagnosis: Liver lesion  Assessment and Plan of Treatment:     Diagnosis: Abdominal pain  Assessment and Plan of Treatment:      PRINCIPAL DISCHARGE DIAGNOSIS  Diagnosis: Bowel wall thickening  Assessment and Plan of Treatment: WOUND CARE:  Please keep incisions clean and dry. Please do not Scrub or rub incisions. Do not use lotion or powder on incisions.   BATHING: You may shower and/or sponge bathe. You may use warm soapy water in the shower and rinse, pat dry.  ACTIVITY: No heavy lifting or straining. Otherwise, you may return to your usual level of physical activity. If you are taking narcotic pain medication DO NOT drive a car, operate machinery or make important decisions.  DIET: Return to your usual diet.  NOTIFY YOUR SURGEON IF: You have any bleeding that does not stop, any pus draining from your wound(s), increased pain at surgical site, any fever (over 100.4 F) persistent nausea/vomiting, or if your pain is not controlled on your discharge pain medications.  Please follow up with your primary care physician in 1-2 weeks regarding your hospitalization.  Please follow up with your surgeon, Dr. Aponte in 1 week. Please call office to make an appointment.         SECONDARY DISCHARGE DIAGNOSES  Diagnosis: Liver lesion  Assessment and Plan of Treatment:     Diagnosis: Abdominal pain  Assessment and Plan of Treatment:

## 2021-06-03 NOTE — DISCHARGE NOTE PROVIDER - NSDCFUADDAPPT_GEN_ALL_CORE_FT
[facility] New Patient Scheduling Unit phone number [phone]. An email will be sent and [facility] will reach out to patient to make an appointment.

## 2021-06-03 NOTE — PROGRESS NOTE ADULT - SUBJECTIVE AND OBJECTIVE BOX
Gentry Posadas MD PGY-1  Internal Medicine  Pager [phone] / [phone]    INTERVAL HPI / OVERNIGHT EVENTS:  -NAEON    SUBJECTIVE: Patient seen and examined at bedside. ### PATIENT IN ENDOSCOPY, WILL SEE IN THE AFTERNOON ###  -    INCOMPLETE ROS   CONSTITUTIONAL: No fevers or chills  EYES/ENT: No visual changes; no vertigo   NECK: No pain or stiffness  RESPIRATORY: No cough, wheezing, hemoptysis; no shortness of breath  CARDIOVASCULAR: No chest pain or palpitations  GASTROINTESTINAL: No abdominal or epigastric pain. No nausea, vomiting, or hematemesis. No diarrhea or constipation. No melena or hematochezia.  GENITOURINARY: No dysuria, frequency or hematuria  NEUROLOGICAL: No numbness or focal weakness  SKIN: No itching, rashes    OBJECTIVE:    VITAL SIGNS:  ICU Vital Signs Last 24 Hrs  T(C): 36.1 (03 Jun 2021 10:35), Max: 36.9 (02 Jun 2021 12:22)  T(F): 97 (03 Jun 2021 10:35), Max: 98.5 (02 Jun 2021 12:22)  HR: 79 (03 Jun 2021 11:05) (79 - 93)  BP: 144/90 (03 Jun 2021 11:05) (107/64 - 169/89)  BP(mean): --  ABP: --  ABP(mean): --  RR: 23 (03 Jun 2021 11:05) (17 - 23)  SpO2: 97% (03 Jun 2021 11:05) (96% - 100%)      PHYSICAL EXAM: INCOMPLETE PHYSICAL EXAM     General: no acute distress  HEENT: normocephalic, atraumatic; clear conjunctiva  Respiratory: clear to auscultation bilaterally; no wheeze; no crackles  Cardiovascular: Regular rate, regular rhythm; no murmurs, rubs, or gallops  Abdomen: soft, non-tender, non-distended; +BS x4  Extremities: WWP, 2+ peripheral pulses b/l; no LE edema  Skin: normal color and turgor; no rash  Neurological: alert, oriented x3, PERRL, EOMI    MEDICATIONS:  MEDICATIONS  (STANDING):  dextrose 5% + sodium chloride 0.9%. 1000 milliLiter(s) (100 mL/Hr) IV Continuous <Continuous>  ferrous    sulfate 325 milliGRAM(s) Oral <User Schedule>  piperacillin/tazobactam IVPB.. 3.375 Gram(s) IV Intermittent every 8 hours    MEDICATIONS  (PRN):  simethicone 80 milliGRAM(s) Chew two times a day PRN Upset Stomach      ALLERGIES:  Allergies    No Known Allergies    Intolerances        LABS:                        7.6    8.99  )-----------( 478      ( 03 Jun 2021 07:36 )             24.8     Hemoglobin: 7.6 g/dL (06-03 @ 07:36)  Hemoglobin: 7.6 g/dL (06-02 @ 07:59)  Hemoglobin: 7.6 g/dL (06-01 @ 07:53)  Hemoglobin: 8.3 g/dL (05-31 @ 07:56)  Hemoglobin: 8.2 g/dL (05-30 @ 03:17)    CBC Full  -  ( 03 Jun 2021 07:36 )  WBC Count : 8.99 K/uL  RBC Count : 4.27 M/uL  Hemoglobin : 7.6 g/dL  Hematocrit : 24.8 %  Platelet Count - Automated : 478 K/uL  Mean Cell Volume : 58.1 fL  Mean Cell Hemoglobin : 17.8 pg  Mean Cell Hemoglobin Concentration : 30.6 gm/dL  Auto Neutrophil # : x  Auto Lymphocyte # : x  Auto Monocyte # : x  Auto Eosinophil # : x  Auto Basophil # : x  Auto Neutrophil % : x  Auto Lymphocyte % : x  Auto Monocyte % : x  Auto Eosinophil % : x  Auto Basophil % : x    06-03    139  |  104  |  4<L>  ----------------------------<  112<H>  3.5   |  22  |  1.15    Ca    8.8      03 Jun 2021 07:36  Phos  3.4     06-03  Mg     2.1     06-03      Creatinine Trend: 1.15<--, 1.00<--, 1.06<--, 1.06<--, 1.00<--, 1.09<--    PT/INR - ( 03 Jun 2021 07:36 )   PT: 16.3 sec;   INR: 1.46 ratio      MICROBIOLOGY:    Culture - Blood (collected 01 Jun 2021 12:30)  Source: .Blood Blood-Peripheral  Preliminary Report (02 Jun 2021 13:02):    No growth to date.    Culture - Blood (collected 01 Jun 2021 12:30)  Source: .Blood Blood-Peripheral  Preliminary Report (02 Jun 2021 13:02):    No growth to date.      IMAGING:      Labs, imaging, EKG personally reviewed    RADIOLOGY & ADDITIONAL TESTS: Reviewed.  ### PATIENT IN ENDOSCOPY, WILL SEE IN THE AFTERNOON ###    Gentry Posadas MD PGY-1  Internal Medicine  Pager [phone] / [phone]    INTERVAL HPI / OVERNIGHT EVENTS:  -NAEON    SUBJECTIVE: Patient seen and examined at bedside.    -    INCOMPLETE ROS   CONSTITUTIONAL: No fevers or chills  EYES/ENT: No visual changes; no vertigo   NECK: No pain or stiffness  RESPIRATORY: No cough, wheezing, hemoptysis; no shortness of breath  CARDIOVASCULAR: No chest pain or palpitations  GASTROINTESTINAL: No abdominal or epigastric pain. No nausea, vomiting, or hematemesis. No diarrhea or constipation. No melena or hematochezia.  GENITOURINARY: No dysuria, frequency or hematuria  NEUROLOGICAL: No numbness or focal weakness  SKIN: No itching, rashes    OBJECTIVE:    VITAL SIGNS:  ICU Vital Signs Last 24 Hrs  T(C): 36.1 (03 Jun 2021 10:35), Max: 36.9 (02 Jun 2021 12:22)  T(F): 97 (03 Jun 2021 10:35), Max: 98.5 (02 Jun 2021 12:22)  HR: 79 (03 Jun 2021 11:05) (79 - 93)  BP: 144/90 (03 Jun 2021 11:05) (107/64 - 169/89)  BP(mean): --  ABP: --  ABP(mean): --  RR: 23 (03 Jun 2021 11:05) (17 - 23)  SpO2: 97% (03 Jun 2021 11:05) (96% - 100%)      PHYSICAL EXAM: INCOMPLETE PHYSICAL EXAM     General: no acute distress  HEENT: normocephalic, atraumatic; clear conjunctiva  Respiratory: clear to auscultation bilaterally; no wheeze; no crackles  Cardiovascular: Regular rate, regular rhythm; no murmurs, rubs, or gallops  Abdomen: soft, non-tender, non-distended; +BS x4  Extremities: WWP, 2+ peripheral pulses b/l; no LE edema  Skin: normal color and turgor; no rash  Neurological: alert, oriented x3, PERRL, EOMI    MEDICATIONS:  MEDICATIONS  (STANDING):  dextrose 5% + sodium chloride 0.9%. 1000 milliLiter(s) (100 mL/Hr) IV Continuous <Continuous>  ferrous    sulfate 325 milliGRAM(s) Oral <User Schedule>  piperacillin/tazobactam IVPB.. 3.375 Gram(s) IV Intermittent every 8 hours    MEDICATIONS  (PRN):  simethicone 80 milliGRAM(s) Chew two times a day PRN Upset Stomach      ALLERGIES:  Allergies    No Known Allergies    Intolerances        LABS:                        7.6    8.99  )-----------( 478      ( 03 Jun 2021 07:36 )             24.8     Hemoglobin: 7.6 g/dL (06-03 @ 07:36)  Hemoglobin: 7.6 g/dL (06-02 @ 07:59)  Hemoglobin: 7.6 g/dL (06-01 @ 07:53)  Hemoglobin: 8.3 g/dL (05-31 @ 07:56)  Hemoglobin: 8.2 g/dL (05-30 @ 03:17)    CBC Full  -  ( 03 Jun 2021 07:36 )  WBC Count : 8.99 K/uL  RBC Count : 4.27 M/uL  Hemoglobin : 7.6 g/dL  Hematocrit : 24.8 %  Platelet Count - Automated : 478 K/uL  Mean Cell Volume : 58.1 fL  Mean Cell Hemoglobin : 17.8 pg  Mean Cell Hemoglobin Concentration : 30.6 gm/dL  Auto Neutrophil # : x  Auto Lymphocyte # : x  Auto Monocyte # : x  Auto Eosinophil # : x  Auto Basophil # : x  Auto Neutrophil % : x  Auto Lymphocyte % : x  Auto Monocyte % : x  Auto Eosinophil % : x  Auto Basophil % : x    06-03    139  |  104  |  4<L>  ----------------------------<  112<H>  3.5   |  22  |  1.15    Ca    8.8      03 Jun 2021 07:36  Phos  3.4     06-03  Mg     2.1     06-03      Creatinine Trend: 1.15<--, 1.00<--, 1.06<--, 1.06<--, 1.00<--, 1.09<--    PT/INR - ( 03 Jun 2021 07:36 )   PT: 16.3 sec;   INR: 1.46 ratio      MICROBIOLOGY:    Culture - Blood (collected 01 Jun 2021 12:30)  Source: .Blood Blood-Peripheral  Preliminary Report (02 Jun 2021 13:02):    No growth to date.    Culture - Blood (collected 01 Jun 2021 12:30)  Source: .Blood Blood-Peripheral  Preliminary Report (02 Jun 2021 13:02):    No growth to date.      IMAGING:    Colonoscopy (06.03.21 @ 09:18)  Impression:          - Likely malignant circumferential (involving more than                        two-thirds of the lumen circumferencec) obstructing                        (incomplete) mass in the transverse colon. Biopsied.                       Tattooed distally.                       - One 15 mm polyp in the descending colon, removed with                        a hot snare. Resected and retrieved.  Recommendation:      - Await pathology results.  - Follow up colorectal surgery recommendations.                       - Perform an upper GI endoscopy today given CT findings                        of gastric abnormality.      Upper Endoscopy (06.03.21 @ 09:17)   Impression:          - Normal esophagus.                       - Z-line regular, 40 cm from the incisors.                       - Normal stomach.                       - Normal examined duodenum.                       - No specimens collected.  Recommendation:      - [facility].                       - Diet per colorectal surgery recommendations.      Labs, imaging, EKG personally reviewed    RADIOLOGY & ADDITIONAL TESTS: Reviewed. Gentry Posadas MD PGY-1  Internal Medicine  Pager [phone] / [phone]    INTERVAL HPI / OVERNIGHT EVENTS:  -NAEON  -upper endoscopy and colonoscopy this morning    SUBJECTIVE: Patient seen and examined at bedside.      CONSTITUTIONAL: No fevers or chills  EYES/ENT: No visual changes; no vertigo   NECK: No pain or stiffness  RESPIRATORY: No cough, wheezing, hemoptysis; no shortness of breath  CARDIOVASCULAR: No chest pain or palpitations  GASTROINTESTINAL: No abdominal or epigastric pain. No nausea, vomiting, or hematemesis.  GENITOURINARY: No dysuria, frequency or hematuria  NEUROLOGICAL: No numbness or focal weakness  SKIN: No itching, rashes    OBJECTIVE:    VITAL SIGNS:  ICU Vital Signs Last 24 Hrs  T(C): 36.1 (03 Jun 2021 10:35), Max: 36.9 (02 Jun 2021 12:22)  T(F): 97 (03 Jun 2021 10:35), Max: 98.5 (02 Jun 2021 12:22)  HR: 79 (03 Jun 2021 11:05) (79 - 93)  BP: 144/90 (03 Jun 2021 11:05) (107/64 - 169/89)  BP(mean): --  ABP: --  ABP(mean): --  RR: 23 (03 Jun 2021 11:05) (17 - 23)  SpO2: 97% (03 Jun 2021 11:05) (96% - 100%)      PHYSICAL EXAM:      General: no acute distress  HEENT: normocephalic, atraumatic;   Respiratory: clear to auscultation bilaterally; no wheeze; no crackles  Cardiovascular: Regular rate, regular rhythm; no murmurs, rubs, or gallops  Abdomen: soft, no tenderness to palpation, distended  Extremities: WWP, 2+ peripheral pulses b/l; no LE edema  Skin: normal color and turgor; no rash  Neurological: alert, oriented x3, PERRL, EOMI      MEDICATIONS:  MEDICATIONS  (STANDING):  dextrose 5% + sodium chloride 0.9%. 1000 milliLiter(s) (100 mL/Hr) IV Continuous <Continuous>  ferrous    sulfate 325 milliGRAM(s) Oral <User Schedule>  piperacillin/tazobactam IVPB.. 3.375 Gram(s) IV Intermittent every 8 hours    MEDICATIONS  (PRN):  simethicone 80 milliGRAM(s) Chew two times a day PRN Upset Stomach      ALLERGIES:  Allergies    No Known Allergies    Intolerances        LABS:                        7.6    8.99  )-----------( 478      ( 03 Jun 2021 07:36 )             24.8     Hemoglobin: 7.6 g/dL (06-03 @ 07:36)  Hemoglobin: 7.6 g/dL (06-02 @ 07:59)  Hemoglobin: 7.6 g/dL (06-01 @ 07:53)  Hemoglobin: 8.3 g/dL (05-31 @ 07:56)  Hemoglobin: 8.2 g/dL (05-30 @ 03:17)    CBC Full  -  ( 03 Jun 2021 07:36 )  WBC Count : 8.99 K/uL  RBC Count : 4.27 M/uL  Hemoglobin : 7.6 g/dL  Hematocrit : 24.8 %  Platelet Count - Automated : 478 K/uL  Mean Cell Volume : 58.1 fL  Mean Cell Hemoglobin : 17.8 pg  Mean Cell Hemoglobin Concentration : 30.6 gm/dL  Auto Neutrophil # : x  Auto Lymphocyte # : x  Auto Monocyte # : x  Auto Eosinophil # : x  Auto Basophil # : x  Auto Neutrophil % : x  Auto Lymphocyte % : x  Auto Monocyte % : x  Auto Eosinophil % : x  Auto Basophil % : x    06-03    139  |  104  |  4<L>  ----------------------------<  112<H>  3.5   |  22  |  1.15    Ca    8.8      03 Jun 2021 07:36  Phos  3.4     06-03  Mg     2.1     06-03      Creatinine Trend: 1.15<--, 1.00<--, 1.06<--, 1.06<--, 1.00<--, 1.09<--    PT/INR - ( 03 Jun 2021 07:36 )   PT: 16.3 sec;   INR: 1.46 ratio      MICROBIOLOGY:    Culture - Blood (collected 01 Jun 2021 12:30)  Source: .Blood Blood-Peripheral  Preliminary Report (02 Jun 2021 13:02):    No growth to date.    Culture - Blood (collected 01 Jun 2021 12:30)  Source: .Blood Blood-Peripheral  Preliminary Report (02 Jun 2021 13:02):    No growth to date.      IMAGING:    Colonoscopy (06.03.21 @ 09:18)  Impression:          - Likely malignant circumferential (involving more than                        two-thirds of the lumen circumferencec) obstructing                        (incomplete) mass in the transverse colon. Biopsied.                       Tattooed distally.                       - One 15 mm polyp in the descending colon, removed with                        a hot snare. Resected and retrieved.  Recommendation:      - Await pathology results.  - Follow up colorectal surgery recommendations.                       - Perform an upper GI endoscopy today given CT findings                        of gastric abnormality.      Upper Endoscopy (06.03.21 @ 09:17)   Impression:          - Normal esophagus.                       - Z-line regular, 40 cm from the incisors.                       - Normal stomach.                       - Normal examined duodenum.                       - No specimens collected.  Recommendation:      - [facility].                       - Diet per colorectal surgery recommendations.      Labs, imaging, EKG personally reviewed    RADIOLOGY & ADDITIONAL TESTS: Reviewed.

## 2021-06-03 NOTE — PROGRESS NOTE ADULT - ASSESSMENT
Mr. Granda 51 Year-Old Gentleman presenting with abdominal pain, fevers, nausea/emesis, found to have likely colon malignancy with metastasis and questioned perforation.     PLAN:  - F/u scope today  - Will put in orders for ERP protocol for prep for tomorrows surgery  - Please hold lovenox for PCEA prior to surgery  - Please obtain COVID swab  - Please obtain pre-operative labs including PT/INR/PTT  - Can have clears until 4am on 6/4/21    [facility] Surgery #[id number]     Mr. Granda 51 Year-Old Gentleman presenting with abdominal pain, fevers, nausea/emesis, found to have likely colon malignancy with metastasis and questioned perforation.     PLAN:  - F/u scope today  - Will put in orders for ERP protocol for prep for tomorrows surgery  - Please hold lovenox for PCEA prior to surgery  - Please obtain COVID swab  - Please obtain pre-operative labs including PT/INR/PTT  - Can have clears until 4am on 6/4/21  - Will need ostomy nurse consult for marking for potential ostomy     [facility] Surgery #[id number]

## 2021-06-03 NOTE — PROGRESS NOTE ADULT - PROBLEM SELECTOR PLAN 3
via CT A/P 5/29:  -Left adrenal gland: somewhat nodular thickening. Metastasis cannot be excluded.  -Right femoral neck: nonspecific small lucency: focal osteopenia vs osseous metastasis. Recommend comparison to previous outside study. Follow-up (bone scan and/or MRI) may be obtained for further evaluation.  -LLL nodule: indeterminate; pulmonary metastasis cannot be excluded. Recommend comparison to previous outside study. Follow-up (nonemergent chest CT and/or PET-CT) may be obtained for further evaluation.  -CT Chest obtained for staging, b/l subcentimeter pulmonary nodules up to 6 mm, c/f metastatic disease  -Hem/Onc consulted, recs appreciated: 325mg iron QOD, repeat bcx, f/u sensitivities, obtain liver biopsy

## 2021-06-03 NOTE — PROGRESS NOTE ADULT - SUBJECTIVE AND OBJECTIVE BOX
SURGERY DAILY PROGRESS NOTE:       SUBJECTIVE/ROS: Patient examined at bedside. No acute events overnight, s/p bowel prep with clear stools in the AM. Follow up scope by GI  Denies nausea, vomiting, chest pain, shortness of breath         MEDICATIONS  (STANDING):  dextrose 5% + sodium chloride 0.9%. 1000 milliLiter(s) (100 mL/Hr) IV Continuous <Continuous>  ferrous    sulfate 325 milliGRAM(s) Oral <User Schedule>  piperacillin/tazobactam IVPB.. 3.375 Gram(s) IV Intermittent every 8 hours    MEDICATIONS  (PRN):  simethicone 80 milliGRAM(s) Chew two times a day PRN Upset Stomach      OBJECTIVE:    Vital Signs Last 24 Hrs  T(C): 36.7 (03 Jun 2021 06:06), Max: 36.9 (02 Jun 2021 12:22)  T(F): 98 (03 Jun 2021 06:06), Max: 98.5 (02 Jun 2021 12:22)  HR: 84 (03 Jun 2021 06:06) (81 - 91)  BP: 140/82 (03 Jun 2021 06:06) (140/82 - 155/93)  BP(mean): --  RR: 17 (03 Jun 2021 06:06) (17 - 17)  SpO2: 100% (03 Jun 2021 06:06) (100% - 100%)        I&O's Detail      Daily     Daily     LABS:                        7.6    9.06  )-----------( 438      ( 02 Jun 2021 07:59 )             24.8     06-02    137  |  104  |  6<L>  ----------------------------<  119<H>  3.5   |  22  |  1.00    Ca    8.3<L>      02 Jun 2021 07:59  Phos  3.1     06-02  Mg     2.0     06-02      PT/INR - ( 02 Jun 2021 07:59 )   PT: 15.6 sec;   INR: 1.38 ratio         PTT - ( 01 Jun 2021 07:53 )  PTT:28.9 sec                  PHYSICAL EXAM:  Constitutional: well developed, well nourished, NAD  Eyes: anicteric  ENMT: normal facies, symmetric  Respiratory: Breathing comfortably    Gastrointestinal: abdomen soft, nontender, nondistended.   Extremities: FROM, warm  Neurological: intact, non-focal  Psychiatric: oriented x 3; appropriate

## 2021-06-03 NOTE — PROGRESS NOTE ADULT - PROBLEM SELECTOR PLAN 1
Leukocytosis, TMax 100.7 (at [place]), HR>90  -s/p acetaminophen and piperacillin-tazobactam in the ED  -SIRS likely secondary to malignancy  -c/w monitoring vital signs  -blood culture speciation: clostridium septicum; c/w IV piperacillin-tazobactam (5/30- ), IV clindamycin (6/1-6/1)  -Colorectal surgery notified, will follow-up re: source control (6/1); will f/u recs  -Infectious disease consulted: c/w piperacillin-tazobactam  -f/u 6/1 blood cultures: NGTD Leukocytosis, TMax 100.7 (at [place]), HR>90  -s/p acetaminophen and piperacillin-tazobactam in the ED  -SIRS likely secondary to malignancy  -c/w monitoring vital signs  -blood culture speciation: clostridium septicum; c/w IV piperacillin-tazobactam (5/30- ), IV clindamycin (6/1-6/1)  -Colorectal surgery aware of above  -Infectious disease consulted: c/w piperacillin-tazobactam  -f/u 6/1 blood cultures: NGTD

## 2021-06-03 NOTE — DISCHARGE NOTE PROVIDER - NSDCMRMEDTOKEN_GEN_ALL_CORE_FT
acetaminophen 500 mg oral tablet: 2 tab(s) orally every 6 hours  ibuprofen 600 mg oral tablet: 1 tab(s) orally every 6 hours  Lovenox 40 mg/0.4 mL injectable solution: 40 milligram(s) subcutaneously once a day   metroNIDAZOLE 500 mg oral tablet: 1 tab(s) orally every 8 hours   acetaminophen 500 mg oral tablet: 2 tab(s) orally every 6 hours  Flagyl 500 mg oral tablet: 1 tab(s) orally every 8 hours MDD:3 tablets  LAST DAY 6/15/21.  ibuprofen 600 mg oral tablet: 1 tab(s) orally every 6 hours  Lovenox 40 mg/0.4 mL injectable solution: 40 milligram(s) subcutaneously once a day   metroNIDAZOLE 500 mg oral tablet: 1 tab(s) orally every 8 hours  oxyCODONE 5 mg oral tablet: 1 tab(s) orally every 6 hours MDD:4 tabs

## 2021-06-03 NOTE — CHART NOTE - NSCHARTNOTEFT_GEN_A_CORE
Paged regarding patient with acute onset palpitations, chest pressure, and severe bilateral lower extremity pain.    Per patient and RN staff -- patient was relaxing in bed, he was started on vitamin K IVPB, and five minutes after, patient had acute onset of the symptoms above. Patient's blood pressure at that time noted to be 179/110, heart rate 98.. Patient's vitamin K IVPB was paused. Patient's symptoms improved and he returned to baseline. Unclear cause of pain, however given the close association with start of vitamin K IVPB, recommending discontinuing vitamin K IVPB for now. Can re-address in the AM.    Gentry Posadas MD

## 2021-06-03 NOTE — DISCHARGE NOTE PROVIDER - NSDCCPTREATMENT_GEN_ALL_CORE_FT
PRINCIPAL PROCEDURE  Procedure: Laparoscopic left colectomy  Findings and Treatment: extended Left colectomy

## 2021-06-03 NOTE — PROGRESS NOTE ADULT - PROBLEM SELECTOR PLAN 2
concern for malignancy via CT A/P 5/29  - Gastroenterology consulted for diagnostic colon mass biopsy via colonoscopy  - GI recommending Colorectal Surgery consult given colon lesion w/ contained area of air - may be diverticula/intraluminal, but cannot r/o contained perf in area of mass or fistula to LN as per radiology.  - Colorectal Surgery recs appreciated, recommended colonoscopy this admission for tissue biopsy and site marking  - Consulted IR for liver mass bx for diagnosis; will f/u plans i/s/o current bacteremia concern for malignancy via CT A/P 5/29  - Gastroenterology consulted for diagnostic colon mass biopsy via colonoscopy  - GI recommending Colorectal Surgery consult given colon lesion w/ contained area of air - may be diverticula/intraluminal, but cannot r/o contained perf in area of mass or fistula to LN as per radiology.  - Colorectal Surgery recs appreciated, recommended colonoscopy this admission for tissue biopsy and site marking  - Consulted IR for liver mass bx for diagnosis; will f/u plans i/s/o current bacteremia  - Colonoscopy: mass obstructing 2/3 lumen in transverse colon, biopsied; distal area tattooed  - Colorectal planning for OR on 6/4/21  - f/u COVID swab

## 2021-06-03 NOTE — DISCHARGE NOTE PROVIDER - CARE PROVIDER_API CALL
Radha Aponte)  Surgery  [address], [address]  [place] [address]  Phone: [phone]  Fax: [phone]  Follow Up Time: 1 week

## 2021-06-03 NOTE — PROGRESS NOTE ADULT - ATTENDING COMMENTS
52 yo M with no PMH p/w large colon mass with multiple liver/lung masses highly suspicious for metastatic colon ca. Case c/b Clostridium septicum bacteremia, likely colonic source.   -c/w zosyn as per ID. repeat blood cx NGTD  -s/p EGD/colonoscopy today. plan for OR tomorrow with surgery. pt is medically optimized for planned surgery

## 2021-06-03 NOTE — ADVANCED PRACTICE NURSE CONSULT - ASSESSMENT
Abdomen assessed in lying, sitting and bending position. Stoma josue made in LLQ and RLQ, below umbilicus, impinging on the midline, avoiding creases/folds, within the rectus muscle. Explained stoma creation, and introduced pouching system to patient.  Patient able to understand use of pouching system and frequency of pouching system changes/emptying pouching system. Other questions answered about lifestyle after surgery with pouching system. Will continue to monitor patient after surgery for post-operative ostomy teaching.

## 2021-06-04 ENCOUNTER — RESULT REVIEW (OUTPATIENT)
Age: 51
End: 2021-06-04

## 2021-06-04 LAB
ALBUMIN SERPL ELPH-MCNC: 2.9 G/DL — LOW (ref 3.3–5)
ALP SERPL-CCNC: 73 U/L — SIGNIFICANT CHANGE UP (ref 40–120)
ALT FLD-CCNC: 10 U/L — SIGNIFICANT CHANGE UP (ref 4–41)
ANION GAP SERPL CALC-SCNC: 10 MMOL/L — SIGNIFICANT CHANGE UP (ref 7–14)
ANION GAP SERPL CALC-SCNC: 12 MMOL/L — SIGNIFICANT CHANGE UP (ref 7–14)
ANION GAP SERPL CALC-SCNC: 13 MMOL/L — SIGNIFICANT CHANGE UP (ref 7–14)
APTT BLD: 29.3 SEC — SIGNIFICANT CHANGE UP (ref 27–36.3)
AST SERPL-CCNC: 15 U/L — SIGNIFICANT CHANGE UP (ref 4–40)
BILIRUB SERPL-MCNC: 0.3 MG/DL — SIGNIFICANT CHANGE UP (ref 0.2–1.2)
BLD GP AB SCN SERPL QL: NEGATIVE — SIGNIFICANT CHANGE UP
BUN SERPL-MCNC: 3 MG/DL — LOW (ref 7–23)
CALCIUM SERPL-MCNC: 7.5 MG/DL — LOW (ref 8.4–10.5)
CALCIUM SERPL-MCNC: 7.6 MG/DL — LOW (ref 8.4–10.5)
CALCIUM SERPL-MCNC: 8.4 MG/DL — SIGNIFICANT CHANGE UP (ref 8.4–10.5)
CHLORIDE SERPL-SCNC: 107 MMOL/L — SIGNIFICANT CHANGE UP (ref 98–107)
CHLORIDE SERPL-SCNC: 109 MMOL/L — HIGH (ref 98–107)
CHLORIDE SERPL-SCNC: 110 MMOL/L — HIGH (ref 98–107)
CO2 SERPL-SCNC: 19 MMOL/L — LOW (ref 22–31)
CO2 SERPL-SCNC: 21 MMOL/L — LOW (ref 22–31)
CO2 SERPL-SCNC: 22 MMOL/L — SIGNIFICANT CHANGE UP (ref 22–31)
CREAT SERPL-MCNC: 0.94 MG/DL — SIGNIFICANT CHANGE UP (ref 0.5–1.3)
CREAT SERPL-MCNC: 0.95 MG/DL — SIGNIFICANT CHANGE UP (ref 0.5–1.3)
CREAT SERPL-MCNC: 1.06 MG/DL — SIGNIFICANT CHANGE UP (ref 0.5–1.3)
GLUCOSE BLDC GLUCOMTR-MCNC: 104 MG/DL — HIGH (ref 70–99)
GLUCOSE BLDC GLUCOMTR-MCNC: 112 MG/DL — HIGH (ref 70–99)
GLUCOSE BLDC GLUCOMTR-MCNC: 135 MG/DL — HIGH (ref 70–99)
GLUCOSE SERPL-MCNC: 108 MG/DL — HIGH (ref 70–99)
GLUCOSE SERPL-MCNC: 443 MG/DL — HIGH (ref 70–99)
GLUCOSE SERPL-MCNC: 555 MG/DL — CRITICAL HIGH (ref 70–99)
HCT VFR BLD CALC: 24 % — LOW (ref 39–50)
HGB BLD-MCNC: 7.3 G/DL — LOW (ref 13–17)
INR BLD: 1.45 RATIO — HIGH (ref 0.88–1.16)
MAGNESIUM SERPL-MCNC: 1.7 MG/DL — SIGNIFICANT CHANGE UP (ref 1.6–2.6)
MAGNESIUM SERPL-MCNC: 1.8 MG/DL — SIGNIFICANT CHANGE UP (ref 1.6–2.6)
MCHC RBC-ENTMCNC: 18.1 PG — LOW (ref 27–34)
MCHC RBC-ENTMCNC: 30.4 GM/DL — LOW (ref 32–36)
MCV RBC AUTO: 59.6 FL — LOW (ref 80–100)
NRBC # BLD: 0 /100 WBCS — SIGNIFICANT CHANGE UP
NRBC # FLD: 0 K/UL — SIGNIFICANT CHANGE UP
PHOSPHATE SERPL-MCNC: 2.6 MG/DL — SIGNIFICANT CHANGE UP (ref 2.5–4.5)
PHOSPHATE SERPL-MCNC: 3 MG/DL — SIGNIFICANT CHANGE UP (ref 2.5–4.5)
PLATELET # BLD AUTO: 436 K/UL — HIGH (ref 150–400)
POTASSIUM SERPL-MCNC: 3 MMOL/L — LOW (ref 3.5–5.3)
POTASSIUM SERPL-MCNC: 3.1 MMOL/L — LOW (ref 3.5–5.3)
POTASSIUM SERPL-MCNC: 3.4 MMOL/L — LOW (ref 3.5–5.3)
POTASSIUM SERPL-SCNC: 3 MMOL/L — LOW (ref 3.5–5.3)
POTASSIUM SERPL-SCNC: 3.1 MMOL/L — LOW (ref 3.5–5.3)
POTASSIUM SERPL-SCNC: 3.4 MMOL/L — LOW (ref 3.5–5.3)
PROT SERPL-MCNC: 6.1 G/DL — SIGNIFICANT CHANGE UP (ref 6–8.3)
PROTHROM AB SERPL-ACNC: 16.4 SEC — HIGH (ref 10.6–13.6)
RBC # BLD: 4.03 M/UL — LOW (ref 4.2–5.8)
RBC # FLD: 21.2 % — HIGH (ref 10.3–14.5)
RH IG SCN BLD-IMP: POSITIVE — SIGNIFICANT CHANGE UP
SODIUM SERPL-SCNC: 141 MMOL/L — SIGNIFICANT CHANGE UP (ref 135–145)
WBC # BLD: 8.5 K/UL — SIGNIFICANT CHANGE UP (ref 3.8–10.5)
WBC # FLD AUTO: 8.5 K/UL — SIGNIFICANT CHANGE UP (ref 3.8–10.5)

## 2021-06-04 PROCEDURE — 88342 IMHCHEM/IMCYTCHM 1ST ANTB: CPT | Mod: 26

## 2021-06-04 PROCEDURE — 99231 SBSQ HOSP IP/OBS SF/LOW 25: CPT | Mod: GC

## 2021-06-04 PROCEDURE — 88309 TISSUE EXAM BY PATHOLOGIST: CPT | Mod: 26

## 2021-06-04 PROCEDURE — 99233 SBSQ HOSP IP/OBS HIGH 50: CPT | Mod: GC

## 2021-06-04 PROCEDURE — 99232 SBSQ HOSP IP/OBS MODERATE 35: CPT

## 2021-06-04 PROCEDURE — 44204 LAPARO PARTIAL COLECTOMY: CPT

## 2021-06-04 PROCEDURE — 88341 IMHCHEM/IMCYTCHM EA ADD ANTB: CPT | Mod: 26

## 2021-06-04 RX ORDER — ACETAMINOPHEN 500 MG
1000 TABLET ORAL EVERY 6 HOURS
Refills: 0 | Status: COMPLETED | OUTPATIENT
Start: 2021-06-04 | End: 2021-06-05

## 2021-06-04 RX ORDER — POTASSIUM CHLORIDE 20 MEQ
10 PACKET (EA) ORAL
Refills: 0 | Status: COMPLETED | OUTPATIENT
Start: 2021-06-04 | End: 2021-06-04

## 2021-06-04 RX ORDER — IBUPROFEN 200 MG
600 TABLET ORAL EVERY 6 HOURS
Refills: 0 | Status: COMPLETED | OUTPATIENT
Start: 2021-06-04 | End: 2022-05-03

## 2021-06-04 RX ORDER — ONDANSETRON 8 MG/1
4 TABLET, FILM COATED ORAL ONCE
Refills: 0 | Status: DISCONTINUED | OUTPATIENT
Start: 2021-06-04 | End: 2021-06-04

## 2021-06-04 RX ORDER — KETOROLAC TROMETHAMINE 30 MG/ML
15 SYRINGE (ML) INJECTION EVERY 6 HOURS
Refills: 0 | Status: DISCONTINUED | OUTPATIENT
Start: 2021-06-04 | End: 2021-06-05

## 2021-06-04 RX ORDER — OXYCODONE HYDROCHLORIDE 5 MG/1
2.5 TABLET ORAL EVERY 4 HOURS
Refills: 0 | Status: DISCONTINUED | OUTPATIENT
Start: 2021-06-04 | End: 2021-06-06

## 2021-06-04 RX ORDER — POTASSIUM CHLORIDE 20 MEQ
40 PACKET (EA) ORAL ONCE
Refills: 0 | Status: COMPLETED | OUTPATIENT
Start: 2021-06-04 | End: 2021-06-04

## 2021-06-04 RX ORDER — HYDROMORPHONE HYDROCHLORIDE 2 MG/ML
1 INJECTION INTRAMUSCULAR; INTRAVENOUS; SUBCUTANEOUS
Refills: 0 | Status: DISCONTINUED | OUTPATIENT
Start: 2021-06-04 | End: 2021-06-04

## 2021-06-04 RX ORDER — SODIUM CHLORIDE 9 MG/ML
1000 INJECTION, SOLUTION INTRAVENOUS
Refills: 0 | Status: DISCONTINUED | OUTPATIENT
Start: 2021-06-04 | End: 2021-06-05

## 2021-06-04 RX ORDER — ACETAMINOPHEN 500 MG
1000 TABLET ORAL EVERY 6 HOURS
Refills: 0 | Status: COMPLETED | OUTPATIENT
Start: 2021-06-04 | End: 2022-05-03

## 2021-06-04 RX ORDER — PIPERACILLIN AND TAZOBACTAM 4; .5 G/20ML; G/20ML
3.38 INJECTION, POWDER, LYOPHILIZED, FOR SOLUTION INTRAVENOUS EVERY 8 HOURS
Refills: 0 | Status: DISCONTINUED | OUTPATIENT
Start: 2021-06-04 | End: 2021-06-07

## 2021-06-04 RX ORDER — HEPARIN SODIUM 5000 [USP'U]/ML
5000 INJECTION INTRAVENOUS; SUBCUTANEOUS EVERY 8 HOURS
Refills: 0 | Status: DISCONTINUED | OUTPATIENT
Start: 2021-06-04 | End: 2021-06-07

## 2021-06-04 RX ORDER — HYDROMORPHONE HYDROCHLORIDE 2 MG/ML
0.5 INJECTION INTRAMUSCULAR; INTRAVENOUS; SUBCUTANEOUS
Refills: 0 | Status: DISCONTINUED | OUTPATIENT
Start: 2021-06-04 | End: 2021-06-04

## 2021-06-04 RX ORDER — MAGNESIUM SULFATE 500 MG/ML
2 VIAL (ML) INJECTION ONCE
Refills: 0 | Status: COMPLETED | OUTPATIENT
Start: 2021-06-04 | End: 2021-06-04

## 2021-06-04 RX ADMIN — NEOMYCIN SULFATE 500 MILLIGRAM(S): 500 TABLET ORAL at 05:22

## 2021-06-04 RX ADMIN — Medication 100 MILLIEQUIVALENT(S): at 10:28

## 2021-06-04 RX ADMIN — Medication 1 ENEMA: at 09:32

## 2021-06-04 RX ADMIN — Medication 100 MILLIEQUIVALENT(S): at 11:36

## 2021-06-04 RX ADMIN — POLYETHYLENE GLYCOL 3350 17 GRAM(S): 17 POWDER, FOR SOLUTION ORAL at 05:23

## 2021-06-04 RX ADMIN — Medication 1 ENEMA: at 11:41

## 2021-06-04 RX ADMIN — POLYETHYLENE GLYCOL 3350 17 GRAM(S): 17 POWDER, FOR SOLUTION ORAL at 03:29

## 2021-06-04 RX ADMIN — Medication 50 GRAM(S): at 11:36

## 2021-06-04 RX ADMIN — HYDROMORPHONE HYDROCHLORIDE 0.5 MILLIGRAM(S): 2 INJECTION INTRAMUSCULAR; INTRAVENOUS; SUBCUTANEOUS at 19:10

## 2021-06-04 RX ADMIN — Medication 400 MILLIGRAM(S): at 21:52

## 2021-06-04 RX ADMIN — Medication 250 MILLIGRAM(S): at 05:22

## 2021-06-04 RX ADMIN — Medication 40 MILLIEQUIVALENT(S): at 10:29

## 2021-06-04 RX ADMIN — Medication 1000 MILLIGRAM(S): at 22:10

## 2021-06-04 RX ADMIN — PIPERACILLIN AND TAZOBACTAM 25 GRAM(S): 4; .5 INJECTION, POWDER, LYOPHILIZED, FOR SOLUTION INTRAVENOUS at 05:28

## 2021-06-04 RX ADMIN — Medication 325 MILLIGRAM(S): at 09:31

## 2021-06-04 RX ADMIN — HYDROMORPHONE HYDROCHLORIDE 0.5 MILLIGRAM(S): 2 INJECTION INTRAMUSCULAR; INTRAVENOUS; SUBCUTANEOUS at 19:25

## 2021-06-04 RX ADMIN — HEPARIN SODIUM 5000 UNIT(S): 5000 INJECTION INTRAVENOUS; SUBCUTANEOUS at 21:53

## 2021-06-04 RX ADMIN — SODIUM CHLORIDE 100 MILLILITER(S): 9 INJECTION, SOLUTION INTRAVENOUS at 12:03

## 2021-06-04 RX ADMIN — CELECOXIB 400 MILLIGRAM(S): 200 CAPSULE ORAL at 11:35

## 2021-06-04 RX ADMIN — PIPERACILLIN AND TAZOBACTAM 25 GRAM(S): 4; .5 INJECTION, POWDER, LYOPHILIZED, FOR SOLUTION INTRAVENOUS at 21:52

## 2021-06-04 RX ADMIN — GABAPENTIN 600 MILLIGRAM(S): 400 CAPSULE ORAL at 11:35

## 2021-06-04 NOTE — PROVIDER CONTACT NOTE (CRITICAL VALUE NOTIFICATION) - BACKGROUND
Dx: disorder of the intestines
pt admitted for disorder of intestine. no pertinent pmh.
Dx: disorder of the intestines

## 2021-06-04 NOTE — PROGRESS NOTE ADULT - ASSESSMENT
Mr. Granda 51 Year-Old Gentleman presenting with abdominal pain, fevers, nausea/emesis, found to have likely colon malignancy with metastasis and questioned perforation.     PLAN:  - f/u AM labs  - f/u INR after Vit K was stopped  - OR today for partial colectomy and possible ostomy (marked by ostomy nurse)  - Consent in chart     [facility] #[id number]

## 2021-06-04 NOTE — BRIEF OPERATIVE NOTE - OPERATION/FINDINGS
Laparoscopic extended left hemicolectomy. Mass noted to be extending from mid to distal transverse colon, w/ extensive adhesions associated with omentum, small bowel, and greater curvature of stomach. Careful lysis of adhesions carried out to separate the colon from adhered structures. Lateral to medial approach taken, colon mobilized from hepatic flexure to distal descending colon, and extra corporialized. Colon stapled w/ TA stapler w/ at least 5cm proximal and distal margins, and side-to-side anastomosis done w/ MARY stapler. Stapler line oversewn w/ interrupted stitches. Abdomen thoroughly inspected for signs of bleeding, none note, and irrigated. Fascia closed w/ loop PDS, skin w/ Monocryl. Midline incision skin closed over penrose drain.

## 2021-06-04 NOTE — PROGRESS NOTE ADULT - PROBLEM SELECTOR PLAN 5
DVT ppx: will follow-up with specialties regarding any plans for invasive procedures  Diet: CLD (when not NPO)  Pain: conservative management with acetaminophen PRN; simethicone daily DVT ppx: holding pharmacologic ppx, surgery planned for 6/4  Diet: CLD (when not NPO)  Pain: conservative management with acetaminophen PRN; simethicone daily

## 2021-06-04 NOTE — CHART NOTE - NSCHARTNOTEFT_GEN_A_CORE
POST OP CHECK    SUBJECTIVE/ROS: Patient feels well. Pain well controlled. Denies nausea, vomiting, chest pain, shortness of breath     MEDICATIONS  (STANDING):  acetaminophen   Tablet .. 1000 milliGRAM(s) Oral every 6 hours  acetaminophen  IVPB .. 1000 milliGRAM(s) IV Intermittent every 6 hours  heparin   Injectable 5000 Unit(s) SubCutaneous every 8 hours  ibuprofen  Tablet. 600 milliGRAM(s) Oral every 6 hours  ketorolac   Injectable 15 milliGRAM(s) IV Push every 6 hours  lactated ringers. 1000 milliLiter(s) (40 mL/Hr) IV Continuous <Continuous>  piperacillin/tazobactam IVPB.. 3.375 Gram(s) IV Intermittent every 8 hours    MEDICATIONS  (PRN):  oxyCODONE    IR 2.5 milliGRAM(s) Oral every 4 hours PRN Moderate Pain (4 - 6)      OBJECTIVE:    Vital Signs Last 24 Hrs  T(C): 36.9 (04 Jun 2021 21:42), Max: 37 (04 Jun 2021 12:07)  T(F): 98.5 (04 Jun 2021 21:42), Max: 98.6 (04 Jun 2021 12:07)  HR: 79 (04 Jun 2021 21:42) (79 - 95)  BP: 144/95 (04 Jun 2021 21:42) (130/82 - 166/87)  BP(mean): 100 (04 Jun 2021 20:15) (89 - 101)  RR: 20 (04 Jun 2021 21:42) (15 - 20)  SpO2: 99% (04 Jun 2021 21:42) (95% - 100%)    I&O's Detail    04 Jun 2021 07:01  -  04 Jun 2021 21:57  --------------------------------------------------------  IN:    Lactated Ringers: 80 mL    Oral Fluid: 60 mL  Total IN: 140 mL    OUT:    Indwelling Catheter - Urethral (mL): 200 mL  Total OUT: 200 mL    Total NET: -60 mL          Daily     Daily     LABS:                        7.3    8.50  )-----------( 436      ( 04 Jun 2021 06:31 )             24.0     06-04    141  |  107  |  3<L>  ----------------------------<  108<H>  3.4<L>   |  22  |  1.06    Ca    8.4      04 Jun 2021 09:58  Phos  2.6     06-04  Mg     1.7     06-04    TPro  6.1  /  Alb  2.9<L>  /  TBili  0.3  /  DBili  x   /  AST  15  /  ALT  10  /  AlkPhos  73  06-04    PT/INR - ( 04 Jun 2021 06:31 )   PT: 16.4 sec;   INR: 1.45 ratio         PTT - ( 04 Jun 2021 06:31 )  PTT:29.3 sec      PHYSICAL EXAM:  General: AAOx3, NAD, lying comfortably in bed  Respiratory: nonlabored breathing  Abdomen: non-distended, soft, appropriately tender  Extremities: no edema    ASSESSMENT AND PLAN:   Lap extended left hemicolectomy with primary anastomosis   [ ]CLD  [ ] ERAS  [ ]keep bustamante in until tomorrow  [ ] received 1 u pRBC in OR  [ ] pain control    [facility] [id number] POST OP CHECK    SUBJECTIVE/ROS: Patient feels well. Pain well controlled. Denies nausea, vomiting, chest pain, shortness of breath     MEDICATIONS  (STANDING):  acetaminophen   Tablet .. 1000 milliGRAM(s) Oral every 6 hours  acetaminophen  IVPB .. 1000 milliGRAM(s) IV Intermittent every 6 hours  heparin   Injectable 5000 Unit(s) SubCutaneous every 8 hours  ibuprofen  Tablet. 600 milliGRAM(s) Oral every 6 hours  ketorolac   Injectable 15 milliGRAM(s) IV Push every 6 hours  lactated ringers. 1000 milliLiter(s) (40 mL/Hr) IV Continuous <Continuous>  piperacillin/tazobactam IVPB.. 3.375 Gram(s) IV Intermittent every 8 hours    MEDICATIONS  (PRN):  oxyCODONE    IR 2.5 milliGRAM(s) Oral every 4 hours PRN Moderate Pain (4 - 6)      OBJECTIVE:    Vital Signs Last 24 Hrs  T(C): 36.9 (04 Jun 2021 21:42), Max: 37 (04 Jun 2021 12:07)  T(F): 98.5 (04 Jun 2021 21:42), Max: 98.6 (04 Jun 2021 12:07)  HR: 79 (04 Jun 2021 21:42) (79 - 95)  BP: 144/95 (04 Jun 2021 21:42) (130/82 - 166/87)  BP(mean): 100 (04 Jun 2021 20:15) (89 - 101)  RR: 20 (04 Jun 2021 21:42) (15 - 20)  SpO2: 99% (04 Jun 2021 21:42) (95% - 100%)    I&O's Detail    04 Jun 2021 07:01  -  04 Jun 2021 21:57  --------------------------------------------------------  IN:    Lactated Ringers: 80 mL    Oral Fluid: 60 mL  Total IN: 140 mL    OUT:    Indwelling Catheter - Urethral (mL): 200 mL  Total OUT: 200 mL    Total NET: -60 mL          Daily     Daily     LABS:                        7.3    8.50  )-----------( 436      ( 04 Jun 2021 06:31 )             24.0     06-04    141  |  107  |  3<L>  ----------------------------<  108<H>  3.4<L>   |  22  |  1.06    Ca    8.4      04 Jun 2021 09:58  Phos  2.6     06-04  Mg     1.7     06-04    TPro  6.1  /  Alb  2.9<L>  /  TBili  0.3  /  DBili  x   /  AST  15  /  ALT  10  /  AlkPhos  73  06-04    PT/INR - ( 04 Jun 2021 06:31 )   PT: 16.4 sec;   INR: 1.45 ratio         PTT - ( 04 Jun 2021 06:31 )  PTT:29.3 sec      PHYSICAL EXAM:  General: AAOx3, NAD, lying comfortably in bed  Respiratory: nonlabored breathing  Abdomen: non-distended, soft, appropriately tender, incisions c/d/i, center dressing with strikethrough  Extremities: no edema    ASSESSMENT AND PLAN:   Lap extended left hemicolectomy with primary anastomosis   [ ]CLD  [ ] ERAS  [ ]keep bustamante in until tomorrow  [ ] received 1 u pRBC in OR  [ ] pain control    [facility] [id number]

## 2021-06-04 NOTE — PROGRESS NOTE ADULT - SUBJECTIVE AND OBJECTIVE BOX
Interval Events:   No abdominal pain  No nausea /vomiting / diarrhea   No melena / bloody bm     Hospital Medications:  celecoxib 400 milliGRAM(s) Oral once  dextrose 5% + sodium chloride 0.9%. 1000 milliLiter(s) IV Continuous <Continuous>  ferrous    sulfate 325 milliGRAM(s) Oral <User Schedule>  gabapentin 600 milliGRAM(s) Oral once  piperacillin/tazobactam IVPB.. 3.375 Gram(s) IV Intermittent every 8 hours  potassium chloride  10 mEq/100 mL IVPB 10 milliEquivalent(s) IV Intermittent every 1 hour  saline laxative (FLEET) Rectal Enema 1 Enema Rectal once  simethicone 80 milliGRAM(s) Chew two times a day PRN        ROS:   General:  No fevers, chills or night sweats  ENT:  No sore throat or dysphagia  CV:  No pain or palpitations  Resp:  No dyspnea, cough or  wheezing  GI:  as above  Skin:  No rash or edema  Neuro: no weakness   Hematologic: no bleeding  Musculoskeletal: no muscle pain or join pain  Psych: no agitation      PHYSICAL EXAM:   Vital Signs:  Vital Signs Last 24 Hrs  T(C): 36.9 (04 Jun 2021 04:47), Max: 37.1 (03 Jun 2021 21:13)  T(F): 98.5 (04 Jun 2021 04:47), Max: 98.8 (03 Jun 2021 21:13)  HR: 94 (04 Jun 2021 04:47) (79 - 98)  BP: 141/71 (04 Jun 2021 04:47) (127/70 - 179/110)  BP(mean): --  RR: 18 (04 Jun 2021 04:47) (17 - 23)  SpO2: 99% (04 Jun 2021 04:47) (96% - 100%)  Daily     Daily     GENERAL:  NAD, Appears stated age  HEENT:  NC/AT,  conjunctivae clear and pink, sclera -anicteric  CHEST:  Normal Effort, Breath sounds clear  HEART:  RRR, S1 + S2, no murmurs  ABDOMEN:  Soft, non-tender, non-distended, normoactive bowel sounds,  no masses  EXTREMITIES:  no cyanosis or edema  SKIN:  Warm & Dry. No rash or erythema  NEURO:  Alert, oriented, no focal deficit    LABS:                        7.3    8.50  )-----------( 436      ( 04 Jun 2021 06:31 )             24.0     Mean Cell Volume: 59.6 fL (06-04-21 @ 06:31)    06-04    141  |  107  |  3<L>  ----------------------------<  108<H>  3.4<L>   |  22  |  1.06    Ca    8.4      04 Jun 2021 09:58  Phos  2.6     06-04  Mg     1.7     06-04    TPro  6.1  /  Alb  2.9<L>  /  TBili  0.3  /  DBili  x   /  AST  15  /  ALT  10  /  AlkPhos  73  06-04    LIVER FUNCTIONS - ( 04 Jun 2021 06:31 )  Alb: 2.9 g/dL / Pro: 6.1 g/dL / ALK PHOS: 73 U/L / ALT: 10 U/L / AST: 15 U/L / GGT: x           PT/INR - ( 04 Jun 2021 06:31 )   PT: 16.4 sec;   INR: 1.45 ratio         PTT - ( 04 Jun 2021 06:31 )  PTT:29.3 sec                            7.3    8.50  )-----------( 436      ( 04 Jun 2021 06:31 )             24.0                         7.6    8.99  )-----------( 478      ( 03 Jun 2021 07:36 )             24.8                         7.6    9.06  )-----------( 438      ( 02 Jun 2021 07:59 )             24.8       Imaging:  < from: Colonoscopy (06.03.21 @ 09:18) >  Findings:       An infiltrative, friable, and partially obstructing large mass was found        in the transverse colon. The mass was circumferential (involving more        than two-thirds of the lumen circumferencec). The mass causing        moderate-severe stenosis of the colonic lumen, which was partially        traversed. Given looping and stricturing in this region, the colonoscopy        was not further advanced and the proximal extent of the mass was not        identified. Oozing with scope contact was noted. Biopsies were taken        with a cold forceps for histology. Area distal to mass was tattooed with        an injection of Spot (carbon black).       A 15 mm polyp was found in the descending colon. The polyp was Meg        classification Ip (protruding, pedunculated). The polyp was removed with        a hot snare. Resection and retrieval were complete.       The exam was otherwise normal throughout the examined colon.                         Impression:          - Likely malignant circumferential (involving more than                        two-thirds of the lumen circumferencec) obstructing                        (incomplete) mass in the transverse colon. Biopsied.                       Tattooed distally.                       - One 15 mm polyp in the descending colon, removed with                        a hot snare. Resected and retrieved.    < end of copied text >

## 2021-06-04 NOTE — PROGRESS NOTE ADULT - PROBLEM SELECTOR PLAN 2
concern for malignancy via CT A/P 5/29  - Gastroenterology consulted for diagnostic colon mass biopsy via colonoscopy  - GI recommending Colorectal Surgery consult given colon lesion w/ contained area of air - may be diverticula/intraluminal, but cannot r/o contained perf in area of mass or fistula to LN as per radiology.  - Colorectal Surgery recs appreciated, recommended colonoscopy this admission for tissue biopsy and site marking  - Consulted IR for liver mass bx for diagnosis; will f/u plans i/s/o current bacteremia  - Colonoscopy: mass obstructing 2/3 lumen in transverse colon, biopsied; distal area tattooed  - Colorectal planning for OR on 6/4/21  - COVID swab: not detected

## 2021-06-04 NOTE — PROGRESS NOTE ADULT - PROBLEM SELECTOR PLAN 1
Leukocytosis, TMax 100.7 (at [place]), HR>90  -s/p acetaminophen and piperacillin-tazobactam in the ED  -SIRS likely secondary to malignancy  -c/w monitoring vital signs  -blood culture speciation: clostridium septicum; c/w IV piperacillin-tazobactam (5/30- ), IV clindamycin (6/1-6/1)  -Infectious disease consulted: c/w piperacillin-tazobactam  -f/u 6/1 blood cultures: NGTD  -Colorectal Surgery planning for OR on 6/4

## 2021-06-04 NOTE — PROVIDER CONTACT NOTE (CRITICAL VALUE NOTIFICATION) - ACTION/TREATMENT ORDERED:
MD notified. Check blood sugar, text page result, will continue to monitor.
MD notified and made aware. continue zosyn and tylenol ordered for 100.4. continue to monitor.
MD notified. continue zosyn. continue to monitor

## 2021-06-04 NOTE — PROVIDER CONTACT NOTE (CRITICAL VALUE NOTIFICATION) - ASSESSMENT
no acute distress
patient states feels like he has a fever. temp=100.4
glucose 555, pt is asymptomatic. resting comfortable in bed.

## 2021-06-04 NOTE — PROVIDER CONTACT NOTE (CRITICAL VALUE NOTIFICATION) - PERSON GIVING RESULT:
Gagandeep Ritchie/ [facility]
NONA HOPE
Aissatou Merrill RN from [facility] who received results from [facility] by JETHRO Omer

## 2021-06-04 NOTE — PROGRESS NOTE ADULT - PROBLEM SELECTOR PROBLEM 2
R/O Colon malignancy

## 2021-06-04 NOTE — PROVIDER CONTACT NOTE (CRITICAL VALUE NOTIFICATION) - SITUATION
Blood culture 5/30/21 preliminary report of gram negative rods in anaerobic bottle
Aissatou Merrill RN from [facility] who received results from [facility] by JETHRO Omer . Preliminary report of gram negative rods in anaerobic bottle 5/29/21
glucose 555

## 2021-06-04 NOTE — PROGRESS NOTE ADULT - ASSESSMENT
51Myo M with nonsignificant PMH who is transferred from [facility] for fever and abdominal pain with abnormal imaging likely colon malignancy with metastasis and questioned perforation.  s/p colonoscopy - Likely malignant circumferential (involving more than two-thirds of the lumen circumference) obstructing (incomplete) mass in the transverse colon. Biopsied. Tattooed distally.   - One 15 mm polyp in the descending colon, removed with a hot snare. Resected and retrieved.    Impression:  # Likely malignant partially obstructing large mass was found in the transverse colon. Biopsied. Tattooed distally.  # One 15 mm polyp in the descending colon, removed with a hot snare. Resected and retrieved.    Recommendations:  - NPO with plan for OR today for partial colectomy as per colorectal surgery.   - Follow up pathology results     Park Fuchs   Gastroenterology Fellow  Pager: [phone]  Please call answering service [phone] / on-call GI fellow after 5pm and before 8am, and on weekends.

## 2021-06-04 NOTE — PROGRESS NOTE ADULT - SUBJECTIVE AND OBJECTIVE BOX
SURGERY DAILY PROGRESS NOTE:       SUBJECTIVE/ROS: Patient examined at bedside. When IVBP vit K started. Patient with acute onset LE pain and chest pain, Vit K stopped. This AM patient feels well, claims is having clear stool  Denies nausea, vomiting, chest pain, shortness of breath         MEDICATIONS  (STANDING):  celecoxib 400 milliGRAM(s) Oral once  dextrose 5% + sodium chloride 0.9%. 1000 milliLiter(s) (100 mL/Hr) IV Continuous <Continuous>  ferrous    sulfate 325 milliGRAM(s) Oral <User Schedule>  gabapentin 600 milliGRAM(s) Oral once  piperacillin/tazobactam IVPB.. 3.375 Gram(s) IV Intermittent every 8 hours  saline laxative (FLEET) Rectal Enema 1 Enema Rectal once  saline laxative (FLEET) Rectal Enema 1 Enema Rectal once    MEDICATIONS  (PRN):  simethicone 80 milliGRAM(s) Chew two times a day PRN Upset Stomach      OBJECTIVE:    Vital Signs Last 24 Hrs  T(C): 36.9 (04 Jun 2021 04:47), Max: 37.1 (03 Jun 2021 21:13)  T(F): 98.5 (04 Jun 2021 04:47), Max: 98.8 (03 Jun 2021 21:13)  HR: 94 (04 Jun 2021 04:47) (79 - 98)  BP: 141/71 (04 Jun 2021 04:47) (107/64 - 179/110)  BP(mean): --  RR: 18 (04 Jun 2021 04:47) (17 - 23)  SpO2: 99% (04 Jun 2021 04:47) (96% - 100%)        I&O's Detail      Daily Height in cm: 170.2 (03 Jun 2021 08:27)    Daily     LABS:                        7.6    8.99  )-----------( 478      ( 03 Jun 2021 07:36 )             24.8     06-03    139  |  104  |  4<L>  ----------------------------<  112<H>  3.5   |  22  |  1.15    Ca    8.8      03 Jun 2021 07:36  Phos  3.4     06-03  Mg     2.1     06-03      PT/INR - ( 03 Jun 2021 07:36 )   PT: 16.3 sec;   INR: 1.46 ratio                           PHYSICAL EXAM:  Constitutional: well developed, well nourished, NAD  Eyes: anicteric  ENMT: normal facies, symmetric  Respiratory: Breathing comfortably    Gastrointestinal: abdomen soft, nontender, nondistended.   Extremities: FROM, warm  Neurological: intact, non-focal  Psychiatric: oriented x 3; appropriate

## 2021-06-04 NOTE — PROGRESS NOTE ADULT - PROBLEM SELECTOR PROBLEM 3
R/O Metastatic cancer

## 2021-06-04 NOTE — PROGRESS NOTE ADULT - SUBJECTIVE AND OBJECTIVE BOX
Follow Up: clostridium septicum bacteremia      Interval History: pt afebrile, repeat blood cx negative, going to OR today    ROS:      All other systems negative    Constitutional: no fever, no chills  Cardiovascular:  no chest pain, no palpitation  Respiratory:  no SOB, no cough  GI:  no abd pain, no vomiting, no diarrhea  urinary: no dysuria, no hematuria, no flank pain  musculoskeletal:  no joint pain, no joint swelling  skin:  no rash  neurology:  no headache, no seizure        Allergies  No Known Allergies        ANTIMICROBIALS:  piperacillin/tazobactam IVPB.. 3.375 every 8 hours      OTHER MEDS:  dextrose 5% + sodium chloride 0.9%. 1000 milliLiter(s) IV Continuous <Continuous>  ferrous    sulfate 325 milliGRAM(s) Oral <User Schedule>  potassium chloride  10 mEq/100 mL IVPB 10 milliEquivalent(s) IV Intermittent every 1 hour  simethicone 80 milliGRAM(s) Chew two times a day PRN      Vital Signs Last 24 Hrs  T(C): 36.9 (04 Jun 2021 04:47), Max: 37.1 (03 Jun 2021 21:13)  T(F): 98.5 (04 Jun 2021 04:47), Max: 98.8 (03 Jun 2021 21:13)  HR: 94 (04 Jun 2021 04:47) (79 - 98)  BP: 141/71 (04 Jun 2021 04:47) (127/70 - 179/110)  BP(mean): --  RR: 18 (04 Jun 2021 04:47) (17 - 18)  SpO2: 99% (04 Jun 2021 04:47) (96% - 100%)    Physical Exam:  General:    NAD,  non toxic  Cardio:     regular S1, S2,  no murmur  Respiratory:    clear b/l,    no wheezing  abd:     soft,   BS +,   no tenderness  :   no CVAT,  no suprapubic tenderness,   no  bustamante  Musculoskeletal:   no joint swelling  vascular: no phlebitis  Skin:    no rash                          7.3    8.50  )-----------( 436      ( 04 Jun 2021 06:31 )             24.0       06-04    141  |  107  |  3<L>  ----------------------------<  108<H>  3.4<L>   |  22  |  1.06    Ca    8.4      04 Jun 2021 09:58  Phos  2.6     06-04  Mg     1.7     06-04    TPro  6.1  /  Alb  2.9<L>  /  TBili  0.3  /  DBili  x   /  AST  15  /  ALT  10  /  AlkPhos  73  06-04          MICROBIOLOGY:  v  .Blood Blood-Peripheral  06-01-21   No growth to date.  --  --      .Blood Blood-Peripheral  05-30-21   Growth in anaerobic bottle: Clostridium septicum "Susceptibilities not  performed"  Please Note:************************************************  Clostridium septicum  may appear as gram negative rods in direct smears of clinical specimens.  --    Growth in anaerobic bottle: Gram Negative Rods                RADIOLOGY:  Images independently visualized and reviewed personally, findings as below  < from: CT Chest w/ IV Cont (05.31.21 @ 15:07) >    IMPRESSION:  1.  The bilateral subcentimeter pulmonary nodules measuring up to 6 mm, are concerning for metastatic disease.  2.  Enlarged left internal mammary lymph node and cardiophrenic angle lymph nodes are concerning for malignancy.  3.  No change in the hypodense hepatic lesions better evaluated on CT abdomen/pelvis from 5/29/2021 that were concerning for metastatic disease.      < end of copied text >

## 2021-06-04 NOTE — PROVIDER CONTACT NOTE (CRITICAL VALUE NOTIFICATION) - TEST AND RESULT REPORTED:
Blood culture 5/30/21 preliminary report of gram negative rods in anaerobic bottle
Preliminary report of gram negative rods in anaerobic bottle 5/29/21
glucose 555

## 2021-06-04 NOTE — PROGRESS NOTE ADULT - ASSESSMENT
51 m developed abd pain about a year ago and imaging showed liver lesions and was supposed to follow up as outpatient but lost to follow, now p/w worsening abd pain, febrile, leukocytosis 14  blood cx: clostridium septicum  CT: Suspect mid/distal transverse colon with hepatic metastasis and lymphadenopathy . Focal outpouching of air in communication with the lumen in this portion, which may be due to intradiverticular air, intraluminal air or fistulization into the lymph node.       fever, leukocytosis, sepsis with clostridium septicum bacteremia in the setting of likely colon ca and mets  pt is going to OR today    * repeat blood cx negative 6/1  * c/w zosyn, will need a 2 week course from the negative blood cx until 6/15  * c/w zosyn while in the hospital and if ready for discharge, can switch to po flagyl 500 q 8 to complete the course 6/15  * plan for colonoscopy and then OR  * monitor the WBC/diff and temp curve  * will sign off, please call with questions    The above assessment and plan was discussed with the primary team    Sharron Snow MD  Pager [phone]  After 5pm and on weekends call [phone]

## 2021-06-04 NOTE — PROGRESS NOTE ADULT - ATTENDING COMMENTS
50 yo M with no PMH p/w large colon mass with multiple liver/lung masses highly suspicious for metastatic colon ca. Case c/b Clostridium septicum bacteremia, likely colonic source.   -c/w zosyn -> flagyl x 2 weeks as per ID. repeat blood cx 6/1 NGTD  -s/p EGD/colonoscopy today. scheduled for OR today. will f/u surgery recs

## 2021-06-04 NOTE — PROGRESS NOTE ADULT - SUBJECTIVE AND OBJECTIVE BOX
Gentry Posadas MD PGY-1  Internal Medicine  Pager [phone] / [phone]    INTERVAL HPI / OVERNIGHT EVENTS / ROS:  -When IVBP vitamin K started. Patient with acute onset LE pain and chest pain, vitamin K stopped.  -patient feels fine this morning  -BMP glucose 500s, rechecked with point of care finger stick showing glucose in the 100s  -Potassium 3.1 on same lab, re-ordered STAT BMP, starting supplementation in the interim given patient had been hypokalemic in the past    OBJECTIVE:    VITAL SIGNS:  ICU Vital Signs Last 24 Hrs  T(C): 36.9 (04 Jun 2021 04:47), Max: 37.1 (03 Jun 2021 21:13)  T(F): 98.5 (04 Jun 2021 04:47), Max: 98.8 (03 Jun 2021 21:13)  HR: 94 (04 Jun 2021 04:47) (79 - 98)  BP: 141/71 (04 Jun 2021 04:47) (107/64 - 179/110)  BP(mean): --  ABP: --  ABP(mean): --  RR: 18 (04 Jun 2021 04:47) (17 - 23)  SpO2: 99% (04 Jun 2021 04:47) (96% - 100%)      PHYSICAL EXAM:      General: no acute distress  HEENT: normocephalic, atraumatic;   Respiratory: clear to auscultation bilaterally; no wheeze; no crackles  Cardiovascular: Regular rate, regular rhythm; no murmurs, rubs, or gallops  Abdomen: soft, no tenderness to palpation, distended  Extremities: WWP, 2+ peripheral pulses b/l; no LE edema  Skin: normal color and turgor; no rash  Neurological: alert, oriented x3, PERRL, EOMI    MEDICATIONS:  MEDICATIONS  (STANDING):  celecoxib 400 milliGRAM(s) Oral once  dextrose 5% + sodium chloride 0.9%. 1000 milliLiter(s) (100 mL/Hr) IV Continuous <Continuous>  ferrous    sulfate 325 milliGRAM(s) Oral <User Schedule>  gabapentin 600 milliGRAM(s) Oral once  piperacillin/tazobactam IVPB.. 3.375 Gram(s) IV Intermittent every 8 hours  potassium chloride   Powder 40 milliEquivalent(s) Oral once  potassium chloride  10 mEq/100 mL IVPB 10 milliEquivalent(s) IV Intermittent every 1 hour  saline laxative (FLEET) Rectal Enema 1 Enema Rectal once  saline laxative (FLEET) Rectal Enema 1 Enema Rectal once    MEDICATIONS  (PRN):  simethicone 80 milliGRAM(s) Chew two times a day PRN Upset Stomach      ALLERGIES:  Allergies    No Known Allergies    Intolerances        LABS:                        7.3    8.50  )-----------( 436      ( 04 Jun 2021 06:31 )             24.0     Hemoglobin: 7.3 g/dL (06-04 @ 06:31)  Hemoglobin: 7.6 g/dL (06-03 @ 07:36)  Hemoglobin: 7.6 g/dL (06-02 @ 07:59)  Hemoglobin: 7.6 g/dL (06-01 @ 07:53)  Hemoglobin: 8.3 g/dL (05-31 @ 07:56)    CBC Full  -  ( 04 Jun 2021 06:31 )  WBC Count : 8.50 K/uL  RBC Count : 4.03 M/uL  Hemoglobin : 7.3 g/dL  Hematocrit : 24.0 %  Platelet Count - Automated : 436 K/uL  Mean Cell Volume : 59.6 fL  Mean Cell Hemoglobin : 18.1 pg  Mean Cell Hemoglobin Concentration : 30.4 gm/dL  Auto Neutrophil # : x  Auto Lymphocyte # : x  Auto Monocyte # : x  Auto Eosinophil # : x  Auto Basophil # : x  Auto Neutrophil % : x  Auto Lymphocyte % : x  Auto Monocyte % : x  Auto Eosinophil % : x  Auto Basophil % : x    06-04    141  |  109<H>  |  3<L>  ----------------------------<  555<HH>  3.1<L>   |  19<L>  |  0.95    Ca    7.5<L>      04 Jun 2021 06:31  Phos  3.0     06-04  Mg     1.8     06-04    TPro  6.1  /  Alb  2.9<L>  /  TBili  0.3  /  DBili  x   /  AST  15  /  ALT  10  /  AlkPhos  73  06-04    Creatinine Trend: 0.95<--, 1.15<--, 1.00<--, 1.06<--, 1.06<--, 1.00<--  LIVER FUNCTIONS - ( 04 Jun 2021 06:31 )  Alb: 2.9 g/dL / Pro: 6.1 g/dL / ALK PHOS: 73 U/L / ALT: 10 U/L / AST: 15 U/L / GGT: x           PT/INR - ( 04 Jun 2021 06:31 )   PT: 16.4 sec;   INR: 1.45 ratio        PTT - ( 04 Jun 2021 06:31 )  PTT:29.3 sec    MICROBIOLOGY:    Culture - Blood (collected 01 Jun 2021 12:30)  Source: .Blood Blood-Peripheral  Preliminary Report (02 Jun 2021 13:02):    No growth to date.    Culture - Blood (collected 01 Jun 2021 12:30)  Source: .Blood Blood-Peripheral  Preliminary Report (02 Jun 2021 13:02):    No growth to date.      IMAGING:      Labs, imaging, EKG personally reviewed    RADIOLOGY & ADDITIONAL TESTS: Reviewed.

## 2021-06-05 LAB
ANION GAP SERPL CALC-SCNC: 13 MMOL/L — SIGNIFICANT CHANGE UP (ref 7–14)
APTT BLD: 29.6 SEC — SIGNIFICANT CHANGE UP (ref 27–36.3)
BUN SERPL-MCNC: 6 MG/DL — LOW (ref 7–23)
CALCIUM SERPL-MCNC: 8.3 MG/DL — LOW (ref 8.4–10.5)
CHLORIDE SERPL-SCNC: 104 MMOL/L — SIGNIFICANT CHANGE UP (ref 98–107)
CO2 SERPL-SCNC: 21 MMOL/L — LOW (ref 22–31)
CREAT SERPL-MCNC: 0.97 MG/DL — SIGNIFICANT CHANGE UP (ref 0.5–1.3)
GLUCOSE SERPL-MCNC: 109 MG/DL — HIGH (ref 70–99)
HCT VFR BLD CALC: 30.8 % — LOW (ref 39–50)
HGB BLD-MCNC: 9.7 G/DL — LOW (ref 13–17)
INR BLD: 1.45 RATIO — HIGH (ref 0.88–1.16)
MAGNESIUM SERPL-MCNC: 2.1 MG/DL — SIGNIFICANT CHANGE UP (ref 1.6–2.6)
MCHC RBC-ENTMCNC: 19.4 PG — LOW (ref 27–34)
MCHC RBC-ENTMCNC: 31.5 GM/DL — LOW (ref 32–36)
MCV RBC AUTO: 61.5 FL — LOW (ref 80–100)
NRBC # BLD: 0 /100 WBCS — SIGNIFICANT CHANGE UP
NRBC # FLD: 0.02 K/UL — HIGH
PHOSPHATE SERPL-MCNC: 3.7 MG/DL — SIGNIFICANT CHANGE UP (ref 2.5–4.5)
PLATELET # BLD AUTO: 484 K/UL — HIGH (ref 150–400)
POTASSIUM SERPL-MCNC: 4.1 MMOL/L — SIGNIFICANT CHANGE UP (ref 3.5–5.3)
POTASSIUM SERPL-SCNC: 4.1 MMOL/L — SIGNIFICANT CHANGE UP (ref 3.5–5.3)
PROTHROM AB SERPL-ACNC: 16.4 SEC — HIGH (ref 10.6–13.6)
RBC # BLD: 5.01 M/UL — SIGNIFICANT CHANGE UP (ref 4.2–5.8)
RBC # FLD: 25.1 % — HIGH (ref 10.3–14.5)
SODIUM SERPL-SCNC: 138 MMOL/L — SIGNIFICANT CHANGE UP (ref 135–145)
WBC # BLD: 11.33 K/UL — HIGH (ref 3.8–10.5)
WBC # FLD AUTO: 11.33 K/UL — HIGH (ref 3.8–10.5)

## 2021-06-05 PROCEDURE — 99447 NTRPROF PH1/NTRNET/EHR 11-20: CPT

## 2021-06-05 RX ORDER — MAGNESIUM OXIDE 400 MG ORAL TABLET 241.3 MG
1000 TABLET ORAL EVERY 12 HOURS
Refills: 0 | Status: DISCONTINUED | OUTPATIENT
Start: 2021-06-05 | End: 2021-06-08

## 2021-06-05 RX ADMIN — Medication 15 MILLIGRAM(S): at 00:50

## 2021-06-05 RX ADMIN — Medication 400 MILLIGRAM(S): at 17:58

## 2021-06-05 RX ADMIN — PIPERACILLIN AND TAZOBACTAM 25 GRAM(S): 4; .5 INJECTION, POWDER, LYOPHILIZED, FOR SOLUTION INTRAVENOUS at 06:09

## 2021-06-05 RX ADMIN — HEPARIN SODIUM 5000 UNIT(S): 5000 INJECTION INTRAVENOUS; SUBCUTANEOUS at 06:09

## 2021-06-05 RX ADMIN — HEPARIN SODIUM 5000 UNIT(S): 5000 INJECTION INTRAVENOUS; SUBCUTANEOUS at 14:22

## 2021-06-05 RX ADMIN — Medication 15 MILLIGRAM(S): at 18:28

## 2021-06-05 RX ADMIN — PIPERACILLIN AND TAZOBACTAM 25 GRAM(S): 4; .5 INJECTION, POWDER, LYOPHILIZED, FOR SOLUTION INTRAVENOUS at 21:53

## 2021-06-05 RX ADMIN — Medication 1000 MILLIGRAM(S): at 18:28

## 2021-06-05 RX ADMIN — Medication 15 MILLIGRAM(S): at 14:52

## 2021-06-05 RX ADMIN — Medication 15 MILLIGRAM(S): at 00:18

## 2021-06-05 RX ADMIN — Medication 400 MILLIGRAM(S): at 06:05

## 2021-06-05 RX ADMIN — Medication 15 MILLIGRAM(S): at 14:22

## 2021-06-05 RX ADMIN — PIPERACILLIN AND TAZOBACTAM 25 GRAM(S): 4; .5 INJECTION, POWDER, LYOPHILIZED, FOR SOLUTION INTRAVENOUS at 14:22

## 2021-06-05 RX ADMIN — Medication 15 MILLIGRAM(S): at 06:09

## 2021-06-05 RX ADMIN — Medication 15 MILLIGRAM(S): at 17:58

## 2021-06-05 RX ADMIN — SODIUM CHLORIDE 40 MILLILITER(S): 9 INJECTION, SOLUTION INTRAVENOUS at 14:21

## 2021-06-05 RX ADMIN — HEPARIN SODIUM 5000 UNIT(S): 5000 INJECTION INTRAVENOUS; SUBCUTANEOUS at 21:53

## 2021-06-05 NOTE — PROVIDER CONTACT NOTE (OTHER) - BACKGROUND
dx: disease of the intestines
Patient admitted for disorder of intestine. no significant past medical history
dx: disease of the intestines
Patient is S/P laparoscopic external Left colocecostomy

## 2021-06-05 NOTE — PROGRESS NOTE ADULT - SUBJECTIVE AND OBJECTIVE BOX
SURGERY DAILY PROGRESS NOTE:    SUBJECTIVE/ROS: Patient feels well. Seen and examined at bedside.  Denies nausea, vomiting, chest pain, shortness of breath     MEDICATIONS  (STANDING):  acetaminophen   Tablet .. 1000 milliGRAM(s) Oral every 6 hours  acetaminophen  IVPB .. 1000 milliGRAM(s) IV Intermittent every 6 hours  heparin   Injectable 5000 Unit(s) SubCutaneous every 8 hours  ibuprofen  Tablet. 600 milliGRAM(s) Oral every 6 hours  ketorolac   Injectable 15 milliGRAM(s) IV Push every 6 hours  lactated ringers. 1000 milliLiter(s) (40 mL/Hr) IV Continuous <Continuous>  piperacillin/tazobactam IVPB.. 3.375 Gram(s) IV Intermittent every 8 hours    MEDICATIONS  (PRN):  oxyCODONE    IR 2.5 milliGRAM(s) Oral every 4 hours PRN Moderate Pain (4 - 6)      OBJECTIVE:    Vital Signs Last 24 Hrs  T(C): 36.9 (04 Jun 2021 21:42), Max: 37 (04 Jun 2021 12:07)  T(F): 98.5 (04 Jun 2021 21:42), Max: 98.6 (04 Jun 2021 12:07)  HR: 79 (04 Jun 2021 21:42) (79 - 95)  BP: 144/95 (04 Jun 2021 21:42) (130/82 - 166/87)  BP(mean): 100 (04 Jun 2021 20:15) (89 - 101)  RR: 20 (04 Jun 2021 21:42) (15 - 20)  SpO2: 99% (04 Jun 2021 21:42) (95% - 100%)    I&O's Detail    04 Jun 2021 07:01  -  05 Jun 2021 01:37  --------------------------------------------------------  IN:    IV PiggyBack: 100 mL    Lactated Ringers: 240 mL    Oral Fluid: 60 mL  Total IN: 400 mL    OUT:    Indwelling Catheter - Urethral (mL): 200 mL  Total OUT: 200 mL    Total NET: 200 mL          Daily     Daily     LABS:                        7.3    8.50  )-----------( 436      ( 04 Jun 2021 06:31 )             24.0     06-04    141  |  107  |  3<L>  ----------------------------<  108<H>  3.4<L>   |  22  |  1.06    Ca    8.4      04 Jun 2021 09:58  Phos  2.6     06-04  Mg     1.7     06-04    TPro  6.1  /  Alb  2.9<L>  /  TBili  0.3  /  DBili  x   /  AST  15  /  ALT  10  /  AlkPhos  73  06-04    PT/INR - ( 04 Jun 2021 06:31 )   PT: 16.4 sec;   INR: 1.45 ratio         PTT - ( 04 Jun 2021 06:31 )  PTT:29.3 sec        PHYSICAL EXAM:  General: AAOx3, NAD, lying comfortably in bed  Respiratory: nonlabored breathing  Abdomen: non-distended, soft, appropriately tender  Extremities: no edema    ASSESSMENT AND PLAN:   Lap extended left hemicolectomy with primary anastomosis   [ ]CLD  [ ] ERAS  [ ]keep bustamante in until tomorrow  [ ] received 1 u pRBC in OR  [ ] pain control    [facility] [id number]. SURGERY DAILY PROGRESS NOTE:    SUBJECTIVE/ROS: Patient feels well. Seen and examined at bedside.  Denies passing flatus or BM (-,-). Pain well controlled. Denies nausea, vomiting, chest pain, shortness of breath     MEDICATIONS  (STANDING):  acetaminophen   Tablet .. 1000 milliGRAM(s) Oral every 6 hours  acetaminophen  IVPB .. 1000 milliGRAM(s) IV Intermittent every 6 hours  heparin   Injectable 5000 Unit(s) SubCutaneous every 8 hours  ibuprofen  Tablet. 600 milliGRAM(s) Oral every 6 hours  ketorolac   Injectable 15 milliGRAM(s) IV Push every 6 hours  lactated ringers. 1000 milliLiter(s) (40 mL/Hr) IV Continuous <Continuous>  piperacillin/tazobactam IVPB.. 3.375 Gram(s) IV Intermittent every 8 hours    MEDICATIONS  (PRN):  oxyCODONE    IR 2.5 milliGRAM(s) Oral every 4 hours PRN Moderate Pain (4 - 6)      OBJECTIVE:    Vital Signs Last 24 Hrs  T(C): 36.9 (04 Jun 2021 21:42), Max: 37 (04 Jun 2021 12:07)  T(F): 98.5 (04 Jun 2021 21:42), Max: 98.6 (04 Jun 2021 12:07)  HR: 79 (04 Jun 2021 21:42) (79 - 95)  BP: 144/95 (04 Jun 2021 21:42) (130/82 - 166/87)  BP(mean): 100 (04 Jun 2021 20:15) (89 - 101)  RR: 20 (04 Jun 2021 21:42) (15 - 20)  SpO2: 99% (04 Jun 2021 21:42) (95% - 100%)    I&O's Detail    04 Jun 2021 07:01  -  05 Jun 2021 01:37  --------------------------------------------------------  IN:    IV PiggyBack: 100 mL    Lactated Ringers: 240 mL    Oral Fluid: 60 mL  Total IN: 400 mL    OUT:    Indwelling Catheter - Urethral (mL): 200 mL  Total OUT: 200 mL    Total NET: 200 mL          Daily     Daily     LABS:                        7.3    8.50  )-----------( 436      ( 04 Jun 2021 06:31 )             24.0     06-04    141  |  107  |  3<L>  ----------------------------<  108<H>  3.4<L>   |  22  |  1.06    Ca    8.4      04 Jun 2021 09:58  Phos  2.6     06-04  Mg     1.7     06-04    TPro  6.1  /  Alb  2.9<L>  /  TBili  0.3  /  DBili  x   /  AST  15  /  ALT  10  /  AlkPhos  73  06-04    PT/INR - ( 04 Jun 2021 06:31 )   PT: 16.4 sec;   INR: 1.45 ratio         PTT - ( 04 Jun 2021 06:31 )  PTT:29.3 sec        PHYSICAL EXAM:  General: AAOx3, NAD, lying comfortably in bed  Respiratory: nonlabored breathing  Abdomen: non-distended, soft, appropriately tender  Extremities: no edema  Drain: s/s  NGT: bilious    ASSESSMENT AND PLAN:   Lap extended left hemicolectomy with primary anastomosis     PLAN:  - CLD  - ERAS  - keep bustamante in until tomorrow  - received 1 u pRBC in OR  - pain control    [facility] [id number]. SURGERY DAILY PROGRESS NOTE:    SUBJECTIVE/ROS: Patient feels well. Seen and examined at bedside.  Denies passing flatus or BM (-,-). Pain well controlled. Denies nausea, vomiting, chest pain, shortness of breath     MEDICATIONS  (STANDING):  acetaminophen   Tablet .. 1000 milliGRAM(s) Oral every 6 hours  acetaminophen  IVPB .. 1000 milliGRAM(s) IV Intermittent every 6 hours  heparin   Injectable 5000 Unit(s) SubCutaneous every 8 hours  ibuprofen  Tablet. 600 milliGRAM(s) Oral every 6 hours  ketorolac   Injectable 15 milliGRAM(s) IV Push every 6 hours  lactated ringers. 1000 milliLiter(s) (40 mL/Hr) IV Continuous <Continuous>  piperacillin/tazobactam IVPB.. 3.375 Gram(s) IV Intermittent every 8 hours    MEDICATIONS  (PRN):  oxyCODONE    IR 2.5 milliGRAM(s) Oral every 4 hours PRN Moderate Pain (4 - 6)      OBJECTIVE:    Vital Signs Last 24 Hrs  T(C): 36.9 (04 Jun 2021 21:42), Max: 37 (04 Jun 2021 12:07)  T(F): 98.5 (04 Jun 2021 21:42), Max: 98.6 (04 Jun 2021 12:07)  HR: 79 (04 Jun 2021 21:42) (79 - 95)  BP: 144/95 (04 Jun 2021 21:42) (130/82 - 166/87)  BP(mean): 100 (04 Jun 2021 20:15) (89 - 101)  RR: 20 (04 Jun 2021 21:42) (15 - 20)  SpO2: 99% (04 Jun 2021 21:42) (95% - 100%)    I&O's Detail    04 Jun 2021 07:01  -  05 Jun 2021 01:37  --------------------------------------------------------  IN:    IV PiggyBack: 100 mL    Lactated Ringers: 240 mL    Oral Fluid: 60 mL  Total IN: 400 mL    OUT:    Indwelling Catheter - Urethral (mL): 200 mL  Total OUT: 200 mL    Total NET: 200 mL          Daily     Daily     LABS:                        7.3    8.50  )-----------( 436      ( 04 Jun 2021 06:31 )             24.0     06-04    141  |  107  |  3<L>  ----------------------------<  108<H>  3.4<L>   |  22  |  1.06    Ca    8.4      04 Jun 2021 09:58  Phos  2.6     06-04  Mg     1.7     06-04    TPro  6.1  /  Alb  2.9<L>  /  TBili  0.3  /  DBili  x   /  AST  15  /  ALT  10  /  AlkPhos  73  06-04    PT/INR - ( 04 Jun 2021 06:31 )   PT: 16.4 sec;   INR: 1.45 ratio         PTT - ( 04 Jun 2021 06:31 )  PTT:29.3 sec        PHYSICAL EXAM:  General: AAOx3, NAD, lying comfortably in bed  Respiratory: nonlabored breathing  Abdomen: non-distended, soft, appropriately tender  Extremities: no edema  Drain: s/s  NGT: bilious    ASSESSMENT AND PLAN:   Lap extended left hemicolectomy with primary anastomosis     PLAN:  - CLD; ADAT  - ERAS  - keep bustamante in until tomorrow  - received 1 u pRBC in OR  - pain control    [facility] [id number]. SURGERY DAILY PROGRESS NOTE:    SUBJECTIVE/ROS: Patient feels well. Seen and examined at bedside.  Denies passing flatus or BM (-,-). Pain well controlled. Denies nausea, vomiting, chest pain, shortness of breath     MEDICATIONS  (STANDING):  acetaminophen   Tablet .. 1000 milliGRAM(s) Oral every 6 hours  acetaminophen  IVPB .. 1000 milliGRAM(s) IV Intermittent every 6 hours  heparin   Injectable 5000 Unit(s) SubCutaneous every 8 hours  ibuprofen  Tablet. 600 milliGRAM(s) Oral every 6 hours  ketorolac   Injectable 15 milliGRAM(s) IV Push every 6 hours  lactated ringers. 1000 milliLiter(s) (40 mL/Hr) IV Continuous <Continuous>  piperacillin/tazobactam IVPB.. 3.375 Gram(s) IV Intermittent every 8 hours    MEDICATIONS  (PRN):  oxyCODONE    IR 2.5 milliGRAM(s) Oral every 4 hours PRN Moderate Pain (4 - 6)      OBJECTIVE:    Vital Signs Last 24 Hrs  T(C): 36.9 (04 Jun 2021 21:42), Max: 37 (04 Jun 2021 12:07)  T(F): 98.5 (04 Jun 2021 21:42), Max: 98.6 (04 Jun 2021 12:07)  HR: 79 (04 Jun 2021 21:42) (79 - 95)  BP: 144/95 (04 Jun 2021 21:42) (130/82 - 166/87)  BP(mean): 100 (04 Jun 2021 20:15) (89 - 101)  RR: 20 (04 Jun 2021 21:42) (15 - 20)  SpO2: 99% (04 Jun 2021 21:42) (95% - 100%)    I&O's Detail    04 Jun 2021 07:01  -  05 Jun 2021 01:37  --------------------------------------------------------  IN:    IV PiggyBack: 100 mL    Lactated Ringers: 240 mL    Oral Fluid: 60 mL  Total IN: 400 mL    OUT:    Indwelling Catheter - Urethral (mL): 200 mL  Total OUT: 200 mL    Total NET: 200 mL          Daily     Daily     LABS:                        7.3    8.50  )-----------( 436      ( 04 Jun 2021 06:31 )             24.0     06-04    141  |  107  |  3<L>  ----------------------------<  108<H>  3.4<L>   |  22  |  1.06    Ca    8.4      04 Jun 2021 09:58  Phos  2.6     06-04  Mg     1.7     06-04    TPro  6.1  /  Alb  2.9<L>  /  TBili  0.3  /  DBili  x   /  AST  15  /  ALT  10  /  AlkPhos  73  06-04    PT/INR - ( 04 Jun 2021 06:31 )   PT: 16.4 sec;   INR: 1.45 ratio         PTT - ( 04 Jun 2021 06:31 )  PTT:29.3 sec        PHYSICAL EXAM:  General: AAOx3, NAD, lying comfortably in bed  Respiratory: nonlabored breathing  Abdomen: non-distended, soft, appropriately tender  Extremities: no edema  Drain: s/s  NGT: bilious    ASSESSMENT AND PLAN:   Lap extended left hemicolectomy with primary anastomosis     PLAN:  - CLD; ADAT  - ERAS  - keep bustamante in until tomorrow  - received 1 u pRBC in OR  - AROBF  - pain control    [facility] [id number]

## 2021-06-05 NOTE — PROVIDER CONTACT NOTE (OTHER) - ACTION/TREATMENT ORDERED:
MD notified. ordered to recheck temp in 1 hour.
MD at bedside and assessed patient. Agrees to stop phytonadine infusion and to continue to monitor
MD notified. tylenol to be ordered. continue to monitor.
Notify MD Wilkerson. and continue to monitor patient

## 2021-06-06 LAB
ANION GAP SERPL CALC-SCNC: 12 MMOL/L — SIGNIFICANT CHANGE UP (ref 7–14)
APTT BLD: 27 SEC — SIGNIFICANT CHANGE UP (ref 27–36.3)
BUN SERPL-MCNC: 8 MG/DL — SIGNIFICANT CHANGE UP (ref 7–23)
CALCIUM SERPL-MCNC: 7.9 MG/DL — LOW (ref 8.4–10.5)
CHLORIDE SERPL-SCNC: 104 MMOL/L — SIGNIFICANT CHANGE UP (ref 98–107)
CO2 SERPL-SCNC: 23 MMOL/L — SIGNIFICANT CHANGE UP (ref 22–31)
CREAT SERPL-MCNC: 1.08 MG/DL — SIGNIFICANT CHANGE UP (ref 0.5–1.3)
CULTURE RESULTS: SIGNIFICANT CHANGE UP
CULTURE RESULTS: SIGNIFICANT CHANGE UP
GLUCOSE SERPL-MCNC: 114 MG/DL — HIGH (ref 70–99)
HCT VFR BLD CALC: 29.8 % — LOW (ref 39–50)
HGB BLD-MCNC: 9.2 G/DL — LOW (ref 13–17)
INR BLD: 1.4 RATIO — HIGH (ref 0.88–1.16)
MAGNESIUM SERPL-MCNC: 2 MG/DL — SIGNIFICANT CHANGE UP (ref 1.6–2.6)
MCHC RBC-ENTMCNC: 19 PG — LOW (ref 27–34)
MCHC RBC-ENTMCNC: 30.9 GM/DL — LOW (ref 32–36)
MCV RBC AUTO: 61.7 FL — LOW (ref 80–100)
NRBC # BLD: 0 /100 WBCS — SIGNIFICANT CHANGE UP
NRBC # FLD: 0 K/UL — SIGNIFICANT CHANGE UP
PHOSPHATE SERPL-MCNC: 2.5 MG/DL — SIGNIFICANT CHANGE UP (ref 2.5–4.5)
PLATELET # BLD AUTO: 456 K/UL — HIGH (ref 150–400)
POTASSIUM SERPL-MCNC: 3.8 MMOL/L — SIGNIFICANT CHANGE UP (ref 3.5–5.3)
POTASSIUM SERPL-SCNC: 3.8 MMOL/L — SIGNIFICANT CHANGE UP (ref 3.5–5.3)
PROTHROM AB SERPL-ACNC: 15.7 SEC — HIGH (ref 10.6–13.6)
RBC # BLD: 4.83 M/UL — SIGNIFICANT CHANGE UP (ref 4.2–5.8)
RBC # FLD: 25.4 % — HIGH (ref 10.3–14.5)
SODIUM SERPL-SCNC: 139 MMOL/L — SIGNIFICANT CHANGE UP (ref 135–145)
SPECIMEN SOURCE: SIGNIFICANT CHANGE UP
SPECIMEN SOURCE: SIGNIFICANT CHANGE UP
WBC # BLD: 13.61 K/UL — HIGH (ref 3.8–10.5)
WBC # FLD AUTO: 13.61 K/UL — HIGH (ref 3.8–10.5)

## 2021-06-06 RX ORDER — IBUPROFEN 200 MG
600 TABLET ORAL EVERY 6 HOURS
Refills: 0 | Status: DISCONTINUED | OUTPATIENT
Start: 2021-06-06 | End: 2021-06-08

## 2021-06-06 RX ORDER — ACETAMINOPHEN 500 MG
1000 TABLET ORAL EVERY 6 HOURS
Refills: 0 | Status: DISCONTINUED | OUTPATIENT
Start: 2021-06-06 | End: 2021-06-08

## 2021-06-06 RX ORDER — SODIUM,POTASSIUM PHOSPHATES 278-250MG
2 POWDER IN PACKET (EA) ORAL ONCE
Refills: 0 | Status: COMPLETED | OUTPATIENT
Start: 2021-06-06 | End: 2021-06-06

## 2021-06-06 RX ORDER — OXYCODONE HYDROCHLORIDE 5 MG/1
2.5 TABLET ORAL EVERY 4 HOURS
Refills: 0 | Status: DISCONTINUED | OUTPATIENT
Start: 2021-06-06 | End: 2021-06-08

## 2021-06-06 RX ADMIN — HEPARIN SODIUM 5000 UNIT(S): 5000 INJECTION INTRAVENOUS; SUBCUTANEOUS at 05:18

## 2021-06-06 RX ADMIN — Medication 1000 MILLIGRAM(S): at 18:27

## 2021-06-06 RX ADMIN — Medication 1000 MILLIGRAM(S): at 12:09

## 2021-06-06 RX ADMIN — Medication 2 TABLET(S): at 13:15

## 2021-06-06 RX ADMIN — PIPERACILLIN AND TAZOBACTAM 25 GRAM(S): 4; .5 INJECTION, POWDER, LYOPHILIZED, FOR SOLUTION INTRAVENOUS at 05:17

## 2021-06-06 RX ADMIN — Medication 1000 MILLIGRAM(S): at 18:57

## 2021-06-06 RX ADMIN — MAGNESIUM OXIDE 400 MG ORAL TABLET 1000 MILLIGRAM(S): 241.3 TABLET ORAL at 18:29

## 2021-06-06 RX ADMIN — HEPARIN SODIUM 5000 UNIT(S): 5000 INJECTION INTRAVENOUS; SUBCUTANEOUS at 21:29

## 2021-06-06 RX ADMIN — Medication 1000 MILLIGRAM(S): at 23:59

## 2021-06-06 RX ADMIN — HEPARIN SODIUM 5000 UNIT(S): 5000 INJECTION INTRAVENOUS; SUBCUTANEOUS at 13:15

## 2021-06-06 RX ADMIN — MAGNESIUM OXIDE 400 MG ORAL TABLET 1000 MILLIGRAM(S): 241.3 TABLET ORAL at 05:18

## 2021-06-06 RX ADMIN — Medication 1000 MILLIGRAM(S): at 11:39

## 2021-06-06 RX ADMIN — Medication 600 MILLIGRAM(S): at 11:40

## 2021-06-06 RX ADMIN — Medication 600 MILLIGRAM(S): at 12:10

## 2021-06-06 RX ADMIN — PIPERACILLIN AND TAZOBACTAM 25 GRAM(S): 4; .5 INJECTION, POWDER, LYOPHILIZED, FOR SOLUTION INTRAVENOUS at 13:15

## 2021-06-06 RX ADMIN — Medication 600 MILLIGRAM(S): at 21:29

## 2021-06-06 RX ADMIN — PIPERACILLIN AND TAZOBACTAM 25 GRAM(S): 4; .5 INJECTION, POWDER, LYOPHILIZED, FOR SOLUTION INTRAVENOUS at 21:29

## 2021-06-06 RX ADMIN — Medication 600 MILLIGRAM(S): at 22:15

## 2021-06-06 NOTE — PROGRESS NOTE ADULT - SUBJECTIVE AND OBJECTIVE BOX
Interval Events:  - No acute events overnight    S: Patient seen and examined at bedside and doing well. Endorses ambulating several times yesterday. Still no flatus/BM. Denies fevers, chills, nausea, emesis, chest pain, SOB.    O: Vital Signs  T(C): 36.9 (06-05 @ 21:24), Max: 36.9 (06-05 @ 21:24)  HR: 83 (06-05 @ 21:24) (78 - 99)  BP: 133/83 (06-05 @ 21:24) (125/79 - 156/100)  RR: 17 (06-05 @ 21:24) (16 - 20)  SpO2: 98% (06-05 @ 21:24) (98% - 100%)  06-04-21 @ 07:01  -  06-05-21 @ 07:00  --------------------------------------------------------  IN:  Total IN: 0 mL    OUT:    Indwelling Catheter - Urethral (mL): 850 mL  Total OUT: 850 mL    Total NET: -850 mL      06-05-21 @ 07:01  -  06-06-21 @ 00:39  --------------------------------------------------------  IN:  Total IN: 0 mL    OUT:    Indwelling Catheter - Urethral (mL): 1250 mL  Total OUT: 1250 mL    Total NET: -1250 mL        PHYSICAL EXAM:  General: AAOx3, NAD, lying comfortably in bed  Respiratory: nonlabored breathing  Abdomen: non-distended, soft, appropriately tender  Extremities: no edema  Drain: s/s                            9.7    11.33 )-----------( 484      ( 05 Jun 2021 07:54 )             30.8   06-05    138  |  104  |  6<L>  ----------------------------<  109<H>  4.1   |  21<L>  |  0.97    Ca    8.3<L>      05 Jun 2021 07:54  Phos  3.7     06-05  Mg     2.1     06-05    TPro  6.1  /  Alb  2.9<L>  /  TBili  0.3  /  DBili  x   /  AST  15  /  ALT  10  /  AlkPhos  73  06-04   Interval Events:  - No acute events overnight    S: Patient seen and examined at bedside and doing well. Endorses ambulating several times yesterday. Still no flatus/BM. Tolerating CLD. Denies fevers, chills, nausea, emesis, chest pain, SOB.    O: Vital Signs  T(C): 36.9 (06-05 @ 21:24), Max: 36.9 (06-05 @ 21:24)  HR: 83 (06-05 @ 21:24) (78 - 99)  BP: 133/83 (06-05 @ 21:24) (125/79 - 156/100)  RR: 17 (06-05 @ 21:24) (16 - 20)  SpO2: 98% (06-05 @ 21:24) (98% - 100%)  06-04-21 @ 07:01  -  06-05-21 @ 07:00  --------------------------------------------------------  IN:  Total IN: 0 mL    OUT:    Indwelling Catheter - Urethral (mL): 850 mL  Total OUT: 850 mL    Total NET: -850 mL      06-05-21 @ 07:01  -  06-06-21 @ 00:39  --------------------------------------------------------  IN:  Total IN: 0 mL    OUT:    Indwelling Catheter - Urethral (mL): 1250 mL  Total OUT: 1250 mL    Total NET: -1250 mL        PHYSICAL EXAM:  General: AAOx3, NAD, lying comfortably in bed  Respiratory: nonlabored breathing  Abdomen: non-distended, soft, appropriately tender  Extremities: no edema  Drain: s/s                            9.7    11.33 )-----------( 484      ( 05 Jun 2021 07:54 )             30.8   06-05    138  |  104  |  6<L>  ----------------------------<  109<H>  4.1   |  21<L>  |  0.97    Ca    8.3<L>      05 Jun 2021 07:54  Phos  3.7     06-05  Mg     2.1     06-05    TPro  6.1  /  Alb  2.9<L>  /  TBili  0.3  /  DBili  x   /  AST  15  /  ALT  10  /  AlkPhos  73  06-04

## 2021-06-06 NOTE — PROGRESS NOTE ADULT - ASSESSMENT
50yo M POD2 s/p Lap extended left hemicolectomy with primary anastomosis     PLAN:  - CLD; ADAT  - ERAS  - DC bustamante; TOV  - received 1 u pRBC in OR  - AROBF  - pain control    [facility] [id number] 52yo M POD2 s/p Lap extended left hemicolectomy with primary anastomosis     PLAN:  - Adv to LRD  - ERAS  - DC bustamante; TOV  - received 1 u pRBC in OR  - AROBF  - pain control    [facility] [id number]

## 2021-06-06 NOTE — PROGRESS NOTE ADULT - ATTENDING COMMENTS
abdominal wall reconstruction   -LRD  -headache overnight, will aggressively control HTN and anesthesia to evaluate to remove epidural  -oob  -monitor uop  -dvt ppx  -abdominal binder s/p left colectomy  -await gi function  -oob  -dvt ppx  -pain control  -lrd  -ERP

## 2021-06-07 LAB
ANION GAP SERPL CALC-SCNC: 13 MMOL/L — SIGNIFICANT CHANGE UP (ref 7–14)
BUN SERPL-MCNC: 8 MG/DL — SIGNIFICANT CHANGE UP (ref 7–23)
CALCIUM SERPL-MCNC: 8 MG/DL — LOW (ref 8.4–10.5)
CHLORIDE SERPL-SCNC: 104 MMOL/L — SIGNIFICANT CHANGE UP (ref 98–107)
CO2 SERPL-SCNC: 23 MMOL/L — SIGNIFICANT CHANGE UP (ref 22–31)
CREAT SERPL-MCNC: 1.04 MG/DL — SIGNIFICANT CHANGE UP (ref 0.5–1.3)
GLUCOSE SERPL-MCNC: 94 MG/DL — SIGNIFICANT CHANGE UP (ref 70–99)
HCT VFR BLD CALC: 28.4 % — LOW (ref 39–50)
HGB BLD-MCNC: 8.8 G/DL — LOW (ref 13–17)
MAGNESIUM SERPL-MCNC: 2.1 MG/DL — SIGNIFICANT CHANGE UP (ref 1.6–2.6)
MCHC RBC-ENTMCNC: 19.1 PG — LOW (ref 27–34)
MCHC RBC-ENTMCNC: 31 GM/DL — LOW (ref 32–36)
MCV RBC AUTO: 61.7 FL — LOW (ref 80–100)
NRBC # BLD: 0 /100 WBCS — SIGNIFICANT CHANGE UP
NRBC # FLD: 0 K/UL — SIGNIFICANT CHANGE UP
PHOSPHATE SERPL-MCNC: 3 MG/DL — SIGNIFICANT CHANGE UP (ref 2.5–4.5)
PLATELET # BLD AUTO: 446 K/UL — HIGH (ref 150–400)
POTASSIUM SERPL-MCNC: 3.6 MMOL/L — SIGNIFICANT CHANGE UP (ref 3.5–5.3)
POTASSIUM SERPL-SCNC: 3.6 MMOL/L — SIGNIFICANT CHANGE UP (ref 3.5–5.3)
RBC # BLD: 4.6 M/UL — SIGNIFICANT CHANGE UP (ref 4.2–5.8)
RBC # FLD: 25.6 % — HIGH (ref 10.3–14.5)
SODIUM SERPL-SCNC: 140 MMOL/L — SIGNIFICANT CHANGE UP (ref 135–145)
SURGICAL PATHOLOGY STUDY: SIGNIFICANT CHANGE UP
WBC # BLD: 11.42 K/UL — HIGH (ref 3.8–10.5)
WBC # FLD AUTO: 11.42 K/UL — HIGH (ref 3.8–10.5)

## 2021-06-07 PROCEDURE — 99232 SBSQ HOSP IP/OBS MODERATE 35: CPT | Mod: GC

## 2021-06-07 RX ORDER — ACETAMINOPHEN 500 MG
2 TABLET ORAL
Qty: 0 | Refills: 0 | DISCHARGE
Start: 2021-06-07

## 2021-06-07 RX ORDER — METRONIDAZOLE 500 MG
500 TABLET ORAL EVERY 8 HOURS
Refills: 0 | Status: DISCONTINUED | OUTPATIENT
Start: 2021-06-07 | End: 2021-06-08

## 2021-06-07 RX ORDER — METRONIDAZOLE 500 MG
1 TABLET ORAL
Qty: 24 | Refills: 0
Start: 2021-06-07 | End: 2021-06-14

## 2021-06-07 RX ORDER — OXYCODONE HYDROCHLORIDE 5 MG/1
1 TABLET ORAL
Qty: 8 | Refills: 0
Start: 2021-06-07

## 2021-06-07 RX ORDER — ENOXAPARIN SODIUM 100 MG/ML
40 INJECTION SUBCUTANEOUS DAILY
Refills: 0 | Status: DISCONTINUED | OUTPATIENT
Start: 2021-06-07 | End: 2021-06-08

## 2021-06-07 RX ORDER — CALCIUM GLUCONATE 100 MG/ML
1 VIAL (ML) INTRAVENOUS ONCE
Refills: 0 | Status: COMPLETED | OUTPATIENT
Start: 2021-06-07 | End: 2021-06-07

## 2021-06-07 RX ORDER — ENOXAPARIN SODIUM 100 MG/ML
40 INJECTION SUBCUTANEOUS
Qty: 1120 | Refills: 0
Start: 2021-06-07 | End: 2021-07-04

## 2021-06-07 RX ORDER — METRONIDAZOLE 500 MG
1 TABLET ORAL
Qty: 0 | Refills: 0 | DISCHARGE
Start: 2021-06-07 | End: 2021-06-15

## 2021-06-07 RX ORDER — POTASSIUM CHLORIDE 20 MEQ
20 PACKET (EA) ORAL
Refills: 0 | Status: COMPLETED | OUTPATIENT
Start: 2021-06-07 | End: 2021-06-08

## 2021-06-07 RX ORDER — IBUPROFEN 200 MG
1 TABLET ORAL
Qty: 0 | Refills: 0 | DISCHARGE
Start: 2021-06-07

## 2021-06-07 RX ADMIN — Medication 500 MILLIGRAM(S): at 22:02

## 2021-06-07 RX ADMIN — Medication 1000 MILLIGRAM(S): at 00:50

## 2021-06-07 RX ADMIN — Medication 600 MILLIGRAM(S): at 15:34

## 2021-06-07 RX ADMIN — Medication 1000 MILLIGRAM(S): at 06:30

## 2021-06-07 RX ADMIN — Medication 600 MILLIGRAM(S): at 09:18

## 2021-06-07 RX ADMIN — Medication 600 MILLIGRAM(S): at 15:04

## 2021-06-07 RX ADMIN — Medication 1000 MILLIGRAM(S): at 18:35

## 2021-06-07 RX ADMIN — PIPERACILLIN AND TAZOBACTAM 25 GRAM(S): 4; .5 INJECTION, POWDER, LYOPHILIZED, FOR SOLUTION INTRAVENOUS at 05:29

## 2021-06-07 RX ADMIN — Medication 1000 MILLIGRAM(S): at 11:12

## 2021-06-07 RX ADMIN — Medication 100 GRAM(S): at 10:36

## 2021-06-07 RX ADMIN — Medication 1000 MILLIGRAM(S): at 11:42

## 2021-06-07 RX ADMIN — Medication 600 MILLIGRAM(S): at 09:48

## 2021-06-07 RX ADMIN — Medication 500 MILLIGRAM(S): at 15:03

## 2021-06-07 RX ADMIN — Medication 1000 MILLIGRAM(S): at 18:05

## 2021-06-07 RX ADMIN — Medication 600 MILLIGRAM(S): at 22:32

## 2021-06-07 RX ADMIN — ENOXAPARIN SODIUM 40 MILLIGRAM(S): 100 INJECTION SUBCUTANEOUS at 15:03

## 2021-06-07 RX ADMIN — MAGNESIUM OXIDE 400 MG ORAL TABLET 1000 MILLIGRAM(S): 241.3 TABLET ORAL at 18:04

## 2021-06-07 RX ADMIN — MAGNESIUM OXIDE 400 MG ORAL TABLET 1000 MILLIGRAM(S): 241.3 TABLET ORAL at 05:30

## 2021-06-07 RX ADMIN — HEPARIN SODIUM 5000 UNIT(S): 5000 INJECTION INTRAVENOUS; SUBCUTANEOUS at 05:31

## 2021-06-07 RX ADMIN — Medication 1000 MILLIGRAM(S): at 05:30

## 2021-06-07 RX ADMIN — Medication 600 MILLIGRAM(S): at 22:02

## 2021-06-07 RX ADMIN — Medication 20 MILLIEQUIVALENT(S): at 18:04

## 2021-06-07 NOTE — DIETITIAN INITIAL EVALUATION ADULT. - PROBLEM SELECTOR PLAN 4
Ct A/P with concern for UTI/pyelonephritis  -no urologic or evidence of infectious etiology  -continue to monitor off antibiotics

## 2021-06-07 NOTE — DIETITIAN INITIAL EVALUATION ADULT. - PROBLEM SELECTOR PLAN 3
via CT A/P 5/29:  -Left adrenal gland: somewhat nodular thickening. Metastasis cannot be excluded.  -Right femoral neck: nonspecific small lucency: focal osteopenia vs osseous metastasis. Recommend comparison to previous outside study. Follow-up (bone scan and/or MRI) may be obtained for further evaluation.  -LLL nodule: indeterminate; pulmonary metastasis cannot be excluded. Recommend comparison to previous outside study. Follow-up (nonemergent chest CT and/or PET-CT) may be obtained for further evaluation.  -obtain CT head and chest

## 2021-06-07 NOTE — DIETITIAN INITIAL EVALUATION ADULT. - REASON INDICATOR FOR ASSESSMENT
Denies known Latex allergy or symptoms of Latex sensitivity.  Medications verified, no changes.  Tobacco verified.  Ellis Rogers is a 11 year old male presenting with Sore throat, tonsils are huge, URI red and white on tonsils. Started Friday. Here with dad   Initial Dietitian Evaluation 2/2 to extended length of stay.

## 2021-06-07 NOTE — DIETITIAN INITIAL EVALUATION ADULT. - ADD RECOMMEND
1. Monitor weights, labs, BM's, skin integrity, p.o. intake. 2. Please Encourage po intake, assist with meals and menu selections, provide alternatives PRN. 3. Consider Multivitamin with minerals for micronutrient coverage.

## 2021-06-07 NOTE — DIETITIAN INITIAL EVALUATION ADULT. - PROBLEM SELECTOR PLAN 1
Leukocytosis, TMax 100.7 (at [place]), HR>90  -s/p acetaminophen and piperacillin-tazobactam in the ED  -SIRS likely secondary to malignancy  -c/w monitoring vital signs  -monitor off antibiotics

## 2021-06-07 NOTE — PROGRESS NOTE ADULT - ASSESSMENT
52yo M POD3 s/p Lap extended left hemicolectomy with primary anastomosis.    PLAN:  ERAS protocol  - pain control prn  - abx: zosyn  - diet: LRD   - DVT ppx: SQH   - DC penrose drain upon discharge  - Calculate Caprini and consider AC upon discharge      [facility]   #[id number]

## 2021-06-07 NOTE — PROGRESS NOTE ADULT - SUBJECTIVE AND OBJECTIVE BOX
INTERVAL HPI/OVERNIGHT EVENTS:  Patient S&E at bedside. No o/n events. No complaints. Eating regular diet, having bowel movements, passing flatus and ambulating without difficulty.     VITAL SIGNS:  T(F): 98.4 (06-07-21 @ 14:22)  HR: 79 (06-07-21 @ 14:22)  BP: 140/92 (06-07-21 @ 14:22)  RR: 18 (06-07-21 @ 14:22)  SpO2: 100% (06-07-21 @ 14:22)  Wt(kg): --    PHYSICAL EXAM:    Constitutional: NAD  Eyes: EOMI, sclera non-icteric  Neck: supple  Respiratory: CTAB, no wheezes or crackles   Cardiovascular: RRR  Gastrointestinal: soft, NTND, + BS. Laprascopic surgical sites with steristrips c/d/i  Extremities: no cyanosis, clubbing or edema   Neurological: awake and alert      MEDICATIONS  (STANDING):  acetaminophen   Tablet .. 1000 milliGRAM(s) Oral every 6 hours  enoxaparin Injectable 40 milliGRAM(s) SubCutaneous daily  ibuprofen  Tablet. 600 milliGRAM(s) Oral every 6 hours  magnesium oxide 1000 milliGRAM(s) Oral every 12 hours  metroNIDAZOLE    Tablet 500 milliGRAM(s) Oral every 8 hours  potassium chloride    Tablet ER 20 milliEquivalent(s) Oral two times a day    MEDICATIONS  (PRN):  oxyCODONE    IR 2.5 milliGRAM(s) Oral every 4 hours PRN Moderate Pain (4 - 6)      Allergies    No Known Allergies    Intolerances    lactose (Unknown)      LABS:                        8.8    11.42 )-----------( 446      ( 07 Jun 2021 07:39 )             28.4     06-07    140  |  104  |  8   ----------------------------<  94  3.6   |  23  |  1.04    Ca    8.0<L>      07 Jun 2021 07:39  Phos  3.0     06-07  Mg     2.1     06-07      PT/INR - ( 06 Jun 2021 07:21 )   PT: 15.7 sec;   INR: 1.40 ratio         PTT - ( 06 Jun 2021 07:21 )  PTT:27.0 sec      RADIOLOGY & ADDITIONAL TESTS:  Studies reviewed.

## 2021-06-07 NOTE — PROGRESS NOTE ADULT - ATTENDING SUPERVISION STATEMENT
Fellow
Resident
Fellow
Resident

## 2021-06-07 NOTE — PROGRESS NOTE ADULT - ATTENDING COMMENTS
Doing well. Having bowel function and tolerating diet. Having expected manageable post-op pain.     abd soft, non-distended, non-tender to palpation  incisions c/d/i w/ steristrips in place and penrose in place    - continue LRD  - scheduled tylenol and motrin w/ oxy prn for breakthrough  - transition to flagyl for bacteremia per ID's recs to continue till the 15th (appreciate assistance)  - d/c today or tomorrow w/ prophylactic lovenox    Radha Aponte MD  Attending Physician

## 2021-06-07 NOTE — DIETITIAN INITIAL EVALUATION ADULT. - OTHER INFO
Met with patient at bedside. Patient reports tolerating diet well. Consuming >75% of meals. Patient denies any nausea/vomiting/diarrhea/constipation or difficulty chewing and swallowing. NKFA. Lactose intolerant.  Patient reports usual weight to be 212lbs however, current admission. weight 198.8lbs. ?Question accuracy of current weight recorded. Patient had questions about diet. RD reviewed verbal and written instructions on Low fiber diet. Patient receptive to information provided and verbalized good understanding.

## 2021-06-07 NOTE — DIETITIAN INITIAL EVALUATION ADULT. - PERTINENT LABORATORY DATA
06-07 Na140 mmol/L Glu 94 mg/dL K+ 3.6 mmol/L Cr  1.04 mg/dL BUN 8 mg/dL 06-07 Phos 3.0 mg/dL 06-04 Alb 2.9 g/dL<L>

## 2021-06-07 NOTE — DIETITIAN INITIAL EVALUATION ADULT. - PROBLEM SELECTOR PLAN 2
concern for malignancy via CT A/P 5/29  -consult Gastroenterology - for diagnostic colon mass biopsy via colonoscopy  - if unable, will consult IR for liver mass bx for diagnosis.

## 2021-06-07 NOTE — PROGRESS NOTE ADULT - ATTENDING COMMENTS
52 yo M w/ PMH of lactose intolerance, transferred from [facility] for fever and abdominal pain w/ suspected new diagnosis of metastatic GI cancer, likely colon cancer. He had suspected perforation of colon so is s/p laparoscopic left hemicolectomy with primary anastomosis on 6/4. He is recovering well and will be discharge to home. [place] [facility] [place]

## 2021-06-07 NOTE — PROGRESS NOTE ADULT - SUBJECTIVE AND OBJECTIVE BOX
SURGERY DAILY PROGRESS NOTE:     INTERVAL: no acute events overnight    SUBJECTIVE/ROS: Patient feels well. -/-. Denies nausea, vomiting, chest pain, shortness of breath     MEDICATIONS  (STANDING):  acetaminophen   Tablet .. 1000 milliGRAM(s) Oral every 6 hours  heparin   Injectable 5000 Unit(s) SubCutaneous every 8 hours  ibuprofen  Tablet. 600 milliGRAM(s) Oral every 6 hours  magnesium oxide 1000 milliGRAM(s) Oral every 12 hours  piperacillin/tazobactam IVPB.. 3.375 Gram(s) IV Intermittent every 8 hours    MEDICATIONS  (PRN):  oxyCODONE    IR 2.5 milliGRAM(s) Oral every 4 hours PRN Moderate Pain (4 - 6)      OBJECTIVE:    Vital Signs Last 24 Hrs  T(C): 36.8 (06 Jun 2021 21:44), Max: 37 (06 Jun 2021 10:23)  T(F): 98.3 (06 Jun 2021 21:44), Max: 98.6 (06 Jun 2021 10:23)  HR: 77 (06 Jun 2021 21:44) (69 - 89)  BP: 144/92 (06 Jun 2021 21:44) (117/71 - 144/92)  BP(mean): --  RR: 17 (06 Jun 2021 21:44) (17 - 19)  SpO2: 100% (06 Jun 2021 21:44) (97% - 100%)    I&O's Detail    05 Jun 2021 07:01  -  06 Jun 2021 07:00  --------------------------------------------------------  IN:    IV PiggyBack: 200 mL    Lactated Ringers: 480 mL    Oral Fluid: 360 mL  Total IN: 1040 mL    OUT:    Indwelling Catheter - Urethral (mL): 2050 mL  Total OUT: 2050 mL    Total NET: -1010 mL      06 Jun 2021 07:01  -  07 Jun 2021 01:34  --------------------------------------------------------  IN:    IV PiggyBack: 100 mL    Oral Fluid: 1040 mL  Total IN: 1140 mL    OUT:    Voided (mL): 1350 mL  Total OUT: 1350 mL    Total NET: -210 mL          Daily     Daily     LABS:                        9.2    13.61 )-----------( 456      ( 06 Jun 2021 07:21 )             29.8     06-06    139  |  104  |  8   ----------------------------<  114<H>  3.8   |  23  |  1.08    Ca    7.9<L>      06 Jun 2021 07:21  Phos  2.5     06-06  Mg     2.0     06-06      PT/INR - ( 06 Jun 2021 07:21 )   PT: 15.7 sec;   INR: 1.40 ratio         PTT - ( 06 Jun 2021 07:21 )  PTT:27.0 sec          PHYSICAL EXAM:  General: AAOx3, NAD, lying comfortably in bed  Respiratory: nonlabored breathing  Abdomen: non-distended, soft, appropriately tender  Extremities: no edema  Drain: s/s    50yo M POD2 s/p Lap extended left hemicolectomy with primary anastomosis     PLAN:  - Adv to LRD  - ERAS  - DC bustamante; TOV  - received 1 u pRBC in OR  - AROBF  - pain control    A Team Surgery [id number] SURGERY DAILY PROGRESS NOTE:     INTERVAL: no acute events overnight    SUBJECTIVE/ROS: Patient feels well. Denies nausea, vomiting, shortness of breath. Tolerating LRD. Passing flatus, but no bowel movement yet.      MEDICATIONS  (STANDING):  acetaminophen   Tablet .. 1000 milliGRAM(s) Oral every 6 hours  heparin   Injectable 5000 Unit(s) SubCutaneous every 8 hours  ibuprofen  Tablet. 600 milliGRAM(s) Oral every 6 hours  magnesium oxide 1000 milliGRAM(s) Oral every 12 hours  piperacillin/tazobactam IVPB.. 3.375 Gram(s) IV Intermittent every 8 hours    MEDICATIONS  (PRN):  oxyCODONE    IR 2.5 milliGRAM(s) Oral every 4 hours PRN Moderate Pain (4 - 6)      OBJECTIVE:    Vital Signs Last 24 Hrs  T(C): 36.8 (06 Jun 2021 21:44), Max: 37 (06 Jun 2021 10:23)  T(F): 98.3 (06 Jun 2021 21:44), Max: 98.6 (06 Jun 2021 10:23)  HR: 77 (06 Jun 2021 21:44) (69 - 89)  BP: 144/92 (06 Jun 2021 21:44) (117/71 - 144/92)  BP(mean): --  RR: 17 (06 Jun 2021 21:44) (17 - 19)  SpO2: 100% (06 Jun 2021 21:44) (97% - 100%)    I&O's Detail    05 Jun 2021 07:01  -  06 Jun 2021 07:00  --------------------------------------------------------  IN:    IV PiggyBack: 200 mL    Lactated Ringers: 480 mL    Oral Fluid: 360 mL  Total IN: 1040 mL    OUT:    Indwelling Catheter - Urethral (mL): 2050 mL  Total OUT: 2050 mL    Total NET: -1010 mL      06 Jun 2021 07:01  -  07 Jun 2021 01:34  --------------------------------------------------------  IN:    IV PiggyBack: 100 mL    Oral Fluid: 1040 mL  Total IN: 1140 mL    OUT:    Voided (mL): 1350 mL  Total OUT: 1350 mL    Total NET: -210 mL          Daily     Daily     LABS:                        9.2    13.61 )-----------( 456      ( 06 Jun 2021 07:21 )             29.8     06-06    139  |  104  |  8   ----------------------------<  114<H>  3.8   |  23  |  1.08    Ca    7.9<L>      06 Jun 2021 07:21  Phos  2.5     06-06  Mg     2.0     06-06      PT/INR - ( 06 Jun 2021 07:21 )   PT: 15.7 sec;   INR: 1.40 ratio         PTT - ( 06 Jun 2021 07:21 )  PTT:27.0 sec          PHYSICAL EXAM:  General: AAOx3, NAD, lying comfortably in bed  Respiratory: nonlabored breathing, airway patent   Abdomen: soft, nontender, nondistended, Penrose drain intact.   Extremities: no edema

## 2021-06-07 NOTE — DIETITIAN INITIAL EVALUATION ADULT. - PROBLEM SELECTOR PLAN 5
DVT ppx: Lovenox 40mg QD  Diet: regular diet  Pain: conservative management with acetaminophen PRN; simethicone daily

## 2021-06-08 ENCOUNTER — TRANSCRIPTION ENCOUNTER (OUTPATIENT)
Age: 51
End: 2021-06-08

## 2021-06-08 VITALS
DIASTOLIC BLOOD PRESSURE: 72 MMHG | SYSTOLIC BLOOD PRESSURE: 130 MMHG | HEART RATE: 89 BPM | RESPIRATION RATE: 18 BRPM | OXYGEN SATURATION: 100 % | TEMPERATURE: 98 F

## 2021-06-08 LAB
ANION GAP SERPL CALC-SCNC: 12 MMOL/L — SIGNIFICANT CHANGE UP (ref 7–14)
BUN SERPL-MCNC: 9 MG/DL — SIGNIFICANT CHANGE UP (ref 7–23)
CALCIUM SERPL-MCNC: 8.4 MG/DL — SIGNIFICANT CHANGE UP (ref 8.4–10.5)
CHLORIDE SERPL-SCNC: 104 MMOL/L — SIGNIFICANT CHANGE UP (ref 98–107)
CO2 SERPL-SCNC: 21 MMOL/L — LOW (ref 22–31)
CREAT SERPL-MCNC: 0.92 MG/DL — SIGNIFICANT CHANGE UP (ref 0.5–1.3)
GLUCOSE SERPL-MCNC: 95 MG/DL — SIGNIFICANT CHANGE UP (ref 70–99)
HCT VFR BLD CALC: 27.2 % — LOW (ref 39–50)
HGB BLD-MCNC: 8.6 G/DL — LOW (ref 13–17)
MAGNESIUM SERPL-MCNC: 2.1 MG/DL — SIGNIFICANT CHANGE UP (ref 1.6–2.6)
MCHC RBC-ENTMCNC: 19.4 PG — LOW (ref 27–34)
MCHC RBC-ENTMCNC: 31.6 GM/DL — LOW (ref 32–36)
MCV RBC AUTO: 61.4 FL — LOW (ref 80–100)
NRBC # BLD: 0 /100 WBCS — SIGNIFICANT CHANGE UP
NRBC # FLD: 0 K/UL — SIGNIFICANT CHANGE UP
PHOSPHATE SERPL-MCNC: 2.7 MG/DL — SIGNIFICANT CHANGE UP (ref 2.5–4.5)
PLATELET # BLD AUTO: 432 K/UL — HIGH (ref 150–400)
POTASSIUM SERPL-MCNC: 3.8 MMOL/L — SIGNIFICANT CHANGE UP (ref 3.5–5.3)
POTASSIUM SERPL-SCNC: 3.8 MMOL/L — SIGNIFICANT CHANGE UP (ref 3.5–5.3)
RBC # BLD: 4.43 M/UL — SIGNIFICANT CHANGE UP (ref 4.2–5.8)
RBC # FLD: 25.8 % — HIGH (ref 10.3–14.5)
SODIUM SERPL-SCNC: 137 MMOL/L — SIGNIFICANT CHANGE UP (ref 135–145)
WBC # BLD: 12.77 K/UL — HIGH (ref 3.8–10.5)
WBC # FLD AUTO: 12.77 K/UL — HIGH (ref 3.8–10.5)

## 2021-06-08 RX ORDER — OXYCODONE HYDROCHLORIDE 5 MG/1
0.5 TABLET ORAL
Qty: 6 | Refills: 0
Start: 2021-06-08

## 2021-06-08 RX ORDER — OXYCODONE HYDROCHLORIDE 5 MG/1
0.5 TABLET ORAL
Qty: 4 | Refills: 0
Start: 2021-06-08

## 2021-06-08 RX ADMIN — Medication 1000 MILLIGRAM(S): at 06:22

## 2021-06-08 RX ADMIN — Medication 600 MILLIGRAM(S): at 04:32

## 2021-06-08 RX ADMIN — Medication 20 MILLIEQUIVALENT(S): at 05:51

## 2021-06-08 RX ADMIN — Medication 600 MILLIGRAM(S): at 12:09

## 2021-06-08 RX ADMIN — Medication 500 MILLIGRAM(S): at 13:38

## 2021-06-08 RX ADMIN — Medication 1000 MILLIGRAM(S): at 00:16

## 2021-06-08 RX ADMIN — Medication 1000 MILLIGRAM(S): at 13:39

## 2021-06-08 RX ADMIN — Medication 600 MILLIGRAM(S): at 05:02

## 2021-06-08 RX ADMIN — Medication 1000 MILLIGRAM(S): at 00:46

## 2021-06-08 RX ADMIN — Medication 1000 MILLIGRAM(S): at 05:52

## 2021-06-08 RX ADMIN — Medication 1000 MILLIGRAM(S): at 14:09

## 2021-06-08 RX ADMIN — MAGNESIUM OXIDE 400 MG ORAL TABLET 1000 MILLIGRAM(S): 241.3 TABLET ORAL at 05:51

## 2021-06-08 RX ADMIN — Medication 600 MILLIGRAM(S): at 11:39

## 2021-06-08 RX ADMIN — Medication 500 MILLIGRAM(S): at 05:51

## 2021-06-08 RX ADMIN — ENOXAPARIN SODIUM 40 MILLIGRAM(S): 100 INJECTION SUBCUTANEOUS at 13:38

## 2021-06-08 NOTE — PROGRESS NOTE ADULT - ASSESSMENT
Patient is a 51 year old male with a PMHx of of lactose intolerance who was transferred from [facility] for fever and abdominal pain with suspected new diagnosis of GI cancer (by CT).  Patient is now S/P laparoscopic left colectomy on 6/4/21.      PLAN:  - Low fiber diet  - Penrose drain removed  - Continue with PO Flagyl until 6/15/21 per Infectious disease recommendations  - Out of bed  - Pain control  - VTE prophylaxis with Lovenox subcutaneous  - Discharge planning home today with Lovenox subcutaneous      #[id number]  A Team Surgery

## 2021-06-08 NOTE — DISCHARGE NOTE NURSING/CASE MANAGEMENT/SOCIAL WORK - NSDCPNINST_GEN_ALL_CORE
Please NOTIFY MD for any of the following s/s: S/S infection (Fever >100.4, chills, increased redness, increased bleeding, pus-like drainage from incision line), uncontrolled pain not relieved by pain medications, persistent nausea/vomiting or inability to tolerate diet. No heavy lifting; No driving while taking narcotic pain medications.

## 2021-06-08 NOTE — DISCHARGE NOTE NURSING/CASE MANAGEMENT/SOCIAL WORK - NSDCFUADDAPPT_GEN_ALL_CORE_FT
[facility] New Patient Scheduling Unit phone number [phone]. An email will be sent and [facility] will reach out to patient to make an appointment.

## 2021-06-08 NOTE — PROGRESS NOTE ADULT - NSICDXPILOT_GEN_ALL_CORE
[place]

## 2021-06-08 NOTE — PROGRESS NOTE ADULT - SUBJECTIVE AND OBJECTIVE BOX
SURGERY DAILY PROGRESS NOTE:     INTERVAL: no acute events    SUBJECTIVE/ROS: Patient feels well. Seen and examined at bedside. +/-Denies nausea, vomiting, chest pain, shortness of breath     MEDICATIONS  (STANDING):  acetaminophen   Tablet .. 1000 milliGRAM(s) Oral every 6 hours  enoxaparin Injectable 40 milliGRAM(s) SubCutaneous daily  ibuprofen  Tablet. 600 milliGRAM(s) Oral every 6 hours  magnesium oxide 1000 milliGRAM(s) Oral every 12 hours  metroNIDAZOLE    Tablet 500 milliGRAM(s) Oral every 8 hours  potassium chloride    Tablet ER 20 milliEquivalent(s) Oral two times a day    MEDICATIONS  (PRN):  oxyCODONE    IR 2.5 milliGRAM(s) Oral every 4 hours PRN Moderate Pain (4 - 6)      OBJECTIVE:    Vital Signs Last 24 Hrs  T(C): 37 (07 Jun 2021 21:49), Max: 37 (07 Jun 2021 02:36)  T(F): 98.6 (07 Jun 2021 21:49), Max: 98.6 (07 Jun 2021 02:36)  HR: 83 (07 Jun 2021 21:49) (77 - 93)  BP: 126/68 (07 Jun 2021 21:49) (122/75 - 140/92)  BP(mean): --  RR: 18 (07 Jun 2021 21:49) (18 - 18)  SpO2: 97% (07 Jun 2021 21:49) (97% - 100%)    I&O's Detail    06 Jun 2021 07:01  -  07 Jun 2021 07:00  --------------------------------------------------------  IN:    IV PiggyBack: 100 mL    Oral Fluid: 1280 mL  Total IN: 1380 mL    OUT:    Voided (mL): 1950 mL  Total OUT: 1950 mL    Total NET: -570 mL      07 Jun 2021 07:01  -  08 Jun 2021 01:47  --------------------------------------------------------  IN:    IV PiggyBack: 50 mL    Oral Fluid: 1160 mL  Total IN: 1210 mL    OUT:    Voided (mL): 2250 mL  Total OUT: 2250 mL    Total NET: -1040 mL          Daily     Daily     LABS:                        8.8    11.42 )-----------( 446      ( 07 Jun 2021 07:39 )             28.4     06-07    140  |  104  |  8   ----------------------------<  94  3.6   |  23  |  1.04    Ca    8.0<L>      07 Jun 2021 07:39  Phos  3.0     06-07  Mg     2.1     06-07      PT/INR - ( 06 Jun 2021 07:21 )   PT: 15.7 sec;   INR: 1.40 ratio         PTT - ( 06 Jun 2021 07:21 )  PTT:27.0 sec        PHYSICAL EXAM:  General: AAOx3, NAD, lying comfortably in bed  Respiratory: nonlabored breathing, airway patent   Abdomen: soft, nontender, nondistended, Penrose drain intact.   Extremities: no edema        52yo M POD4 s/p Lap extended left hemicolectomy with primary anastomosis.    PLAN:  ERAS protocol  - pain control prn  - abx: zosyn  - diet: LRD   - DVT ppx: SQH   - DC penrose drain upon discharge  - Calculate Caprini and consider AC upon discharge      A Team Surgery   #[id number] Surgery Daily Progress Note  =====================================================  Interval / Overnight Events: Penrose removed on rounds this AM.      HPI:  Patient is a 51 year old male with a PMHx of of lactose intolerance who was transferred from [facility] for fever and abdominal pain with suspected new diagnosis of GI cancer (by CT). (30 May 2021 08:22)      PAST MEDICAL & SURGICAL HISTORY:  No pertinent past medical history  No significant past surgical history      ALLERGIES:  lactose (Unknown)  No Known Allergies    --------------------------------------------------------------------------------------    MEDICATIONS:    Neurologic Medications  acetaminophen   Tablet .. 1000 milliGRAM(s) Oral every 6 hours  ibuprofen  Tablet. 600 milliGRAM(s) Oral every 6 hours  oxyCODONE    IR 2.5 milliGRAM(s) Oral every 4 hours PRN Moderate Pain (4 - 6)    Gastrointestinal Medications  magnesium oxide 1000 milliGRAM(s) Oral every 12 hours    Hematologic/Oncologic Medications  enoxaparin Injectable 40 milliGRAM(s) SubCutaneous daily    Antimicrobial/Immunologic Medications  metroNIDAZOLE    Tablet 500 milliGRAM(s) Oral every 8 hours    --------------------------------------------------------------------------------------    VITAL SIGNS:  T(C): 37.1 (08 Jun 2021 05:46), Max: 37.1 (08 Jun 2021 05:46)  T(F): 98.8 (08 Jun 2021 05:46), Max: 98.8 (08 Jun 2021 05:46)  HR: 84 (08 Jun 2021 05:46) (79 - 86)  BP: 117/63 (08 Jun 2021 05:46) (117/63 - 140/92)  RR: 18 (08 Jun 2021 05:46) (18 - 19)  SpO2: 100% (08 Jun 2021 05:46) (97% - 100%)    --------------------------------------------------------------------------------------    INS AND OUTS:    07 Jun 2021 07:01  -  08 Jun 2021 07:00  --------------------------------------------------------  IN:    IV PiggyBack: 50 mL    Oral Fluid: 1600 mL  Total IN: 1650 mL    OUT:    Voided (mL): 2600 mL  Total OUT: 2600 mL    Total NET: -950 mL    --------------------------------------------------------------------------------------    EXAM    NEUROLOGY  Exam: Normal, in no acute distress.    HEENT  Exam: Normocephalic, atraumatic.    RESPIRATORY  Exam: Normal expansion / effort.    CARDIOVASCULAR  Exam: S1, S2.  Regular rate and rhythm.    GI/NUTRITION  Exam: Abdomen soft, Non-tender, Non-distended.  Incisions clean, dry and intact.  Penrose drain removed.  Site covered with gauze and tape.    Current Diet: Low fiber diet    MUSCULOSKELETAL  Exam: All extremities moving spontaneously without limitations.      METABOLIC / FLUIDS / ELECTROLYTES  magnesium oxide 1000 milliGRAM(s) Oral every 12 hours      HEMATOLOGIC  [x] VTE Prophylaxis: enoxaparin Injectable 40 milliGRAM(s) SubCutaneous daily      INFECTIOUS DISEASE  Antimicrobials/Immunologic Medications:  metroNIDAZOLE    Tablet 500 milliGRAM(s) Oral every 8 hours    --------------------------------------------------------------------------------------

## 2021-06-08 NOTE — DISCHARGE NOTE NURSING/CASE MANAGEMENT/SOCIAL WORK - PATIENT PORTAL LINK FT
You can access the FollowMyHealth Patient Portal offered by [facility] by registering at the following website: [email or web address]. By joining [facility]’s FollowMyHealth portal, you will also be able to view your health information using other applications (apps) compatible with our system.

## 2021-06-11 LAB — SURGICAL PATHOLOGY STUDY: SIGNIFICANT CHANGE UP

## 2021-06-15 ENCOUNTER — OUTPATIENT (OUTPATIENT)
Dept: OUTPATIENT SERVICES | Facility: HOSPITAL | Age: 51
LOS: 1 days | Discharge: HOME | End: 2021-06-15
Payer: COMMERCIAL

## 2021-06-15 DIAGNOSIS — C20 MALIGNANT NEOPLASM OF RECTUM: ICD-10-CM

## 2021-06-16 ENCOUNTER — APPOINTMENT (OUTPATIENT)
Dept: HEMATOLOGY ONCOLOGY | Facility: CLINIC | Age: 51
End: 2021-06-16
Payer: MEDICARE

## 2021-06-16 ENCOUNTER — RESULT REVIEW (OUTPATIENT)
Age: 51
End: 2021-06-16

## 2021-06-16 ENCOUNTER — NON-APPOINTMENT (OUTPATIENT)
Age: 51
End: 2021-06-16

## 2021-06-16 ENCOUNTER — LABORATORY RESULT (OUTPATIENT)
Age: 51
End: 2021-06-16

## 2021-06-16 VITALS
SYSTOLIC BLOOD PRESSURE: 138 MMHG | HEIGHT: 66.93 IN | WEIGHT: 197.53 LBS | TEMPERATURE: 97.9 F | OXYGEN SATURATION: 100 % | HEART RATE: 109 BPM | DIASTOLIC BLOOD PRESSURE: 84 MMHG | RESPIRATION RATE: 18 BRPM | BODY MASS INDEX: 31 KG/M2

## 2021-06-16 DIAGNOSIS — Z78.9 OTHER SPECIFIED HEALTH STATUS: ICD-10-CM

## 2021-06-16 LAB
BASOPHILS # BLD AUTO: 0.06 K/UL — SIGNIFICANT CHANGE UP (ref 0–0.2)
BASOPHILS NFR BLD AUTO: 0.6 % — SIGNIFICANT CHANGE UP (ref 0–2)
EOSINOPHIL # BLD AUTO: 0.64 K/UL — HIGH (ref 0–0.5)
EOSINOPHIL NFR BLD AUTO: 6.8 % — HIGH (ref 0–6)
HCT VFR BLD CALC: 25.9 % — LOW (ref 39–50)
HGB BLD-MCNC: 8.1 G/DL — LOW (ref 13–17)
IMM GRANULOCYTES NFR BLD AUTO: 0.4 % — SIGNIFICANT CHANGE UP (ref 0–1.5)
LYMPHOCYTES # BLD AUTO: 1.39 K/UL — SIGNIFICANT CHANGE UP (ref 1–3.3)
LYMPHOCYTES # BLD AUTO: 14.8 % — SIGNIFICANT CHANGE UP (ref 13–44)
MCHC RBC-ENTMCNC: 19.2 PG — LOW (ref 27–34)
MCHC RBC-ENTMCNC: 31.3 G/DL — LOW (ref 32–36)
MCV RBC AUTO: 61.5 FL — LOW (ref 80–100)
MONOCYTES # BLD AUTO: 0.82 K/UL — SIGNIFICANT CHANGE UP (ref 0–0.9)
MONOCYTES NFR BLD AUTO: 8.7 % — SIGNIFICANT CHANGE UP (ref 2–14)
NEUTROPHILS # BLD AUTO: 6.44 K/UL — SIGNIFICANT CHANGE UP (ref 1.8–7.4)
NEUTROPHILS NFR BLD AUTO: 68.7 % — SIGNIFICANT CHANGE UP (ref 43–77)
NRBC # BLD: 0 /100 WBCS — SIGNIFICANT CHANGE UP (ref 0–0)
PLATELET # BLD AUTO: 626 K/UL — HIGH (ref 150–400)
RBC # BLD: 4.21 M/UL — SIGNIFICANT CHANGE UP (ref 4.2–5.8)
RBC # FLD: 25.9 % — HIGH (ref 10.3–14.5)
WBC # BLD: 9.39 K/UL — SIGNIFICANT CHANGE UP (ref 3.8–10.5)
WBC # FLD AUTO: 9.39 K/UL — SIGNIFICANT CHANGE UP (ref 3.8–10.5)

## 2021-06-16 PROCEDURE — 99215 OFFICE O/P EST HI 40 MIN: CPT

## 2021-06-16 PROCEDURE — 99072 ADDL SUPL MATRL&STAF TM PHE: CPT

## 2021-06-17 ENCOUNTER — APPOINTMENT (OUTPATIENT)
Dept: SURGERY | Facility: CLINIC | Age: 51
End: 2021-06-17
Payer: MEDICARE

## 2021-06-17 VITALS
HEART RATE: 105 BPM | HEIGHT: 66 IN | DIASTOLIC BLOOD PRESSURE: 78 MMHG | SYSTOLIC BLOOD PRESSURE: 131 MMHG | TEMPERATURE: 97.7 F | OXYGEN SATURATION: 99 % | WEIGHT: 197 LBS | BODY MASS INDEX: 31.66 KG/M2

## 2021-06-17 LAB
ALBUMIN SERPL ELPH-MCNC: 4.1 G/DL
ALP BLD-CCNC: 97 U/L
ALT SERPL-CCNC: 14 U/L
ANION GAP SERPL CALC-SCNC: 12 MMOL/L
APPEARANCE: CLEAR
APTT BLD: 32.5 SEC
AST SERPL-CCNC: 24 U/L
BILIRUB SERPL-MCNC: 0.4 MG/DL
BILIRUBIN URINE: NEGATIVE
BLOOD URINE: NEGATIVE
BUN SERPL-MCNC: 8 MG/DL
CALCIUM SERPL-MCNC: 9.3 MG/DL
CEA SERPL-MCNC: 248 NG/ML
CHLORIDE SERPL-SCNC: 102 MMOL/L
CO2 SERPL-SCNC: 23 MMOL/L
COLOR: YELLOW
CREAT SERPL-MCNC: 1.05 MG/DL
CREAT SPEC-SCNC: 294 MG/DL
GLUCOSE QUALITATIVE U: NEGATIVE
GLUCOSE SERPL-MCNC: 109 MG/DL
HAV IGM SER QL: NONREACTIVE
HBV CORE IGM SER QL: NONREACTIVE
HBV SURFACE AG SER QL: NONREACTIVE
HCV AB SER QL: NONREACTIVE
HCV S/CO RATIO: 0.27 S/CO
INR PPP: 1.21 RATIO
KETONES URINE: NEGATIVE
LEUKOCYTE ESTERASE URINE: NEGATIVE
NITRITE URINE: NEGATIVE
PH URINE: 6.5
POTASSIUM SERPL-SCNC: 4.3 MMOL/L
PROT SERPL-MCNC: 7.5 G/DL
PROT UR-MCNC: 28 MG/DL
PROTEIN URINE: NORMAL
PT BLD: 14.2 SEC
SODIUM SERPL-SCNC: 137 MMOL/L
SPECIFIC GRAVITY URINE: 1.02
UROBILINOGEN URINE: NORMAL

## 2021-06-17 PROCEDURE — 99024 POSTOP FOLLOW-UP VISIT: CPT

## 2021-06-18 PROBLEM — Z78.9 NEVER SMOKED TOBACCO: Status: ACTIVE | Noted: 2021-06-18

## 2021-06-18 RX ORDER — CHROMIUM 200 MCG
TABLET ORAL
Refills: 0 | Status: DISCONTINUED | COMMUNITY

## 2021-06-18 RX ORDER — ASCORBIC ACID 500 MG
TABLET ORAL
Refills: 0 | Status: DISCONTINUED | COMMUNITY

## 2021-06-18 NOTE — HISTORY OF PRESENT ILLNESS
[FreeTextEntry1] : Mr. Taryn Granda is a 51y.o. M with recent diagnosis of metastatic colon cancer presenting for his post-op visit. He presented to [facility] ED with abdominal pain and was found to have a distal transverse colon lesion with an associated microperforation. He underwent a colonoscopy w/ GI which demonstrated an endoscopically obstructing distal transverse colon mass. Given the impending obstruction and recent microperforation the decision was made to proceed with surgery and then adjuvant therapy. He was taken to the OR on 6/4/21 for an uncomplicated laparoscopic extended left colectomy. His post-op course was uneventful and he was discharged home on POD 3 with prophylactic lovenox. Today he reports that he is doing well. He is tolerating a LFD, having bowel function and not really having any issues with pain. He has met Dr. Gardner of medical oncology and they have discuss adjuvant therapy.

## 2021-06-18 NOTE — PHYSICAL EXAM
[Exam Deferred] : exam was deferred [Normal Rate and Rhythm] : normal rate and rhythm [Alert] : alert [Oriented to Person] : oriented to person [Oriented to Place] : oriented to place [Oriented to Time] : oriented to time [JVD] : no jugular venous distention  [de-identified] : soft, non-distended, non-tender to palpation, incisions c/d/i w/o erythema or fluctuance [de-identified] : awake, alert, in NAD [de-identified] : normocephalic, atraumatic, EOMI, nl conjunctiva [de-identified] : b/l chest rise, EWOB on RA [de-identified] : deferred [de-identified] : normal strength [de-identified] : abdominal incisions as above [de-identified] : normal mood and affect

## 2021-06-22 PROBLEM — Z78.9 NON-SMOKER: Status: ACTIVE | Noted: 2021-06-17

## 2021-06-22 PROBLEM — Z78.9 CURRENT NON-DRINKER OF ALCOHOL: Status: ACTIVE | Noted: 2021-06-17

## 2021-06-22 NOTE — PHYSICAL EXAM
[Restricted in physically strenuous activity but ambulatory and able to carry out work of a light or sedentary nature] : Status 1- Restricted in physically strenuous activity but ambulatory and able to carry out work of a light or sedentary nature, e.g., light house work, office work [Normal] : affect appropriate [de-identified] : well healing midline incision

## 2021-06-22 NOTE — HISTORY OF PRESENT ILLNESS
[Disease: _____________________] : Disease: [unfilled] [T: ___] : T[unfilled] [N: ___] : N[unfilled] [M: ___] : M[unfilled] [AJCC Stage: ____] : AJCC Stage: [unfilled] [de-identified] : Mr Granda in 2021 at the age of 51 presented to the hospital with abdominal pain and underwent imaging that revealed a distal transverse colon lesion with evidence of microperforation.\par He is also underwent a colonoscopy which demonstrated an endoscopically obstructing distal transverse colon mass.  Given the potential for impending obstruction as well as microperforation seen on imaging decision was made to take patient to the OR for a left hemicolectomy and anastomosis.\par Imaging done at that time also showed evidence of metastatic disease to the liver and potentially lung and a questionable lesion as well.  Patient presents with his sister for initial visit and to discuss palliative chemotherapy. [de-identified] : Microsatellite stable [FreeTextEntry1] : Status post resection

## 2021-06-22 NOTE — RESULTS/DATA
[FreeTextEntry1] : EXAM: CT CHEST IC\par \par \par PROCEDURE DATE: May 31 2021\par \par \par \par INTERPRETATION: CLINICAL INFORMATION: Metastatic colon cancer\par \par COMPARISON: CT abdomen and pelvis from 5/29/2021\par \par CONTRAST/COMPLICATIONS:\par IV Contrast: Omnipaque 350 40 cc administered 10 cc discarded\par Oral Contrast: NONE\par Complications: None reported at time of study completion\par \par PROCEDURE:\par CT of the Chest was performed.\par Sagittal and coronal reformats were performed.\par \par FINDINGS:\par \par LUNGS AND AIRWAYS: Bilateral subcentimeter pulmonary nodules in random distribution.\par The largest nodules measure:\par -A 6 mm right upper lobe anterior segment nodule (3-53).\par -A 5 mm left lower lobe posterior segment nodule (3-95).\par Bilateral lower lobe linear atelectasis. The airways are normal.\par PLEURA: No pleural effusion or pneumothorax.\par MEDIASTINUM AND MARS: The right cardiophrenic angle, upper paratracheal, prevascular, right hilar and subcarinal lymph nodes measure less than 10 mm in the short axis. However, there is an enlarged left internal mammary lymph node.\par VESSELS: Within normal limits.\par HEART: Heart size is normal. No pericardial effusion.\par CHEST WALL AND LOWER NECK: Within normal limits.\par VISUALIZED UPPER ABDOMEN: Ill-defined hypodense hepatic lesions, the largest within segment 7/8 measuring 8.8 x 8.0 cm, unchanged.\par BONES: No lytic or blastic lesions.\par \par IMPRESSION:\par 1. The bilateral subcentimeter pulmonary nodules measuring up to 6 mm, are concerning for metastatic disease.\par 2. Enlarged left internal mammary lymph node and cardiophrenic angle lymph nodes are concerning for malignancy.\par 3. No change in the hypodense hepatic lesions better evaluated on CT abdomen/pelvis from 5/29/2021 that were concerning for metastatic disease.\par \par \par EXAM: CT ABDOMEN AND PELVIS IC\par \par \par \par \par \par PROCEDURE DATE: 05/29/2021\par \par \par \par INTERPRETATION: CLINICAL INFORMATION: Abdominal pain.\par \par PROCEDURE:\par Helical axial images were obtained from the domes of the diaphragm through the pubic symphysis following the administration of intravenous contrast. Coronal and sagittal reformats were also obtained.\par \par CONTRAST/COMPLICATIONS:\par IV Contrast: Omnipaque 350 90 ml. 10 ml discarded.\par Oral Contrast: None\par Complications: None reported.\par \par COMPARISON: None.\par \par FINDINGS: Patient's respiratory motion degrades images.\par \par LOWER CHEST: Subsegmental atelectasis. A 0.5 x 0.5 cm nodule in the left lower lobe.\par \par LIVER: Hypodense lesions measuring 8.5 x 8.0 cm in the segment 4a (3:29) and 8 x 0 x 9.0 cm in the segment 7/8 (3:25). Additional smaller hypodense lesions, for example, 2.0 x 1.8 cm in the segment 3 (3:51)\par GALLBLADDER/BILE DUCTS: No intrahepatic or extrahepatic biliary dilatation. No significant gallbladder edema.\par PANCREAS: Unremarkable.\par SPLEEN: Unremarkable.\par \par ADRENALS: Unremarkable right adrenal gland. Somewhat nodular thickening of the left adrenal gland.\par KIDNEYS/URETERS: Nonspecific mild bilateral perinephric stranding without hydroureteronephrosis. Right intrarenal stones measuring up to 0.5 x 0.3 cm. Focal areas of decreased or striated nephrogram in the right kidney. Subcentimeter hypodense foci in the kidneys, too small to characterize.\par BLADDER: Partially distended.\par REPRODUCTIVE ORGANS: Enlarged prostate.\par \par BOWEL: No bowel obstruction. Unremarkable appendix. Irregular wall thickening of the mid/distal transverse colon with surrounding stranding, suspicious for neoplasm. Focal outpouching of air in communication with the lumen in this portion (5:82), which may be due to intradiverticular air, intraluminal air or fistulization into the lymph node.\par Prominent distal gastric wall.\par PERITONEUM: No drainable fluid collection or free air. A 1.8 x 1.6 cm lymph node adjacent to the thickened mid/distal transverse colon. A 1.5 x 1.3 cm lymph node along the inferior aspect of the sigmoid colon (3:122).\par VESSELS: Atherosclerosis. Normal caliber of the abdominal aorta.\par RETROPERITONEUM: A 1.3 x 1.4 cm periportal lymph node (3:49). A 2.5 x 1.5 cm portacaval lymph node (3:52). Subcentimeter retroperitoneal lymph nodes.\par ABDOMINAL WALL/SOFT TISSUES: Small fat-containing umbilical hernia.\par BONES: Additional mosaic anatomy. Degenerative changes of the spine. Nonspecific small lucency in the right femoral neck (5:45).\par \par IMPRESSION:\par \par Suspect mid/distal transverse colon with hepatic metastasis and lymphadenopathy as described. Infection is considered less likely. Focal outpouching of air in communication with the lumen in this portion, which may be due to intradiverticular air, intraluminal air or fistulization into the lymph node. No intraperitoneal free air or organized fluid collection.\par \par Somewhat nodular thickening of the left adrenal gland. Metastasis cannot be excluded.\par \par Focal areas of decreased or striated nephrogram in the right kidney. Recommend clinical correlation to assess urinary tract infection/pyelonephritis.\par \par Prominent distal gastric wall, which may be due to partial distention and/or gastritis. Recommend clinical correlation. Follow-up endoscopy may be obtained for further evaluation.\par \par Nonspecific small lucency in the right femoral neck. Differential diagnosis includes focal osteopenia and osseous metastasis. Recommend comparison to previous outside study. Follow-up (bone scan and/or MRI) may be obtained for further evaluation.\par \par Indeterminate left lower lobe nodule. Pulmonary metastasis cannot be excluded. Recommend comparison to previous outside study. Follow-up (nonemergent chest CT and/or PET-CT) may be obtained for further evaluation.\par

## 2021-06-22 NOTE — CONSULT LETTER
[Dear  ___] : Dear  [unfilled], [Consult Letter:] : I had the pleasure of evaluating your patient, [unfilled]. [Please see my note below.] : Please see my note below. [Consult Closing:] : Thank you very much for allowing me to participate in the care of this patient.  If you have any questions, please do not hesitate to contact me. [Sincerely,] : Sincerely, [FreeTextEntry3] : Dre Gardner MD, FACP \par  of Medicine \par Division of Hematology/Oncology\par [facility]\par [facility] \par [address]\[place] [address]\par Tel: [phone]\par Fax: [phone]\par \par \par

## 2021-06-22 NOTE — REASON FOR VISIT
[Initial Consultation] : an initial consultation [Family Member] : family member [FreeTextEntry2] : colon cancer

## 2021-06-22 NOTE — ASSESSMENT
[Palliative] : Goals of care discussed with patient: Palliative [Palliative Care Plan] : not applicable at this time [FreeTextEntry1] : Patient will proceed with the mFOLFOX chemotherapy regimen.\par Patient has no dMMR or MSI-H disease.\par Patient had no prior systemic treatment for metastatic disease.\par Patient had no continuous daily use of Vitamin D supplements =2000 IU per day in the past 12 months prior to enrollment.\par Patient has completed any major surgery/open biopsy  more than  4 weeks prior to registration.\par Patient has completed any minor surgery/core biopsy  more than 1 week prior to registration.\par Patient does not have resectable metastatic disease for which potentially curative metastasectomy is planned.\par Patient has no “currently active” second malignancy other than non-melanoma skin cancers or cervical carcinoma in situ.\par Patient has no significant history of bleeding events or bleeding diathesis more than 6 months of registration. Patient has no history of arterial thrombotic events (i.e transient ischemic attack, cerebrovascular accident, unstable angina, angina requiring surgical or medical intervention, or myocardial infarction) =6 months of registration.\par Patient has no history of clinically significant peripheral artery disease more than 6 months of registration.\par Patient has no history of uncontrolled congestive heart failure defined as NYHA Class III or greater.\par Patient has no history of gastrointestinal (GI) perforation more than12 months of registration. Patient has no history of malabsorption, uncontrolled vomiting or diarrhea, or any other disease significantly affecting GI function that could interfere with the absorption of oral agents.\par Patient has no history of allergic reaction attributed to compounds of similar chemical or biological composition to the study agents.\par Patient has no uncontrolled hypertension.\par Patient has no serious or non-healing wound, ulcer, or bone fracture. Patient has no uncontrolled intercurrent illness (i.e psychiatric illness/social situations) that may increase the risks associated with participation or treatment on the study or interfere with the conduct of the study/interpretation of the study results.\par Patient is not known HIV positive.\par Patient has no known pre-existing hypercalcemia  more than6 months of registration.\par Patient has no known active hyperparathyroid disease or other serious disturbance of calcium metabolism  more than5 years of registration.\par Patient has no predisposing colonic or small bowel disorders in which symptoms are uncontrolled (indicated by >3 watery or soft stools daily in patients without a colostomy or ileostomy).\par Patient has no symptomatic genitourinary stones =12 months of enrollment.\par Patient does not have brain metastases or leptomeningeal disease.\par Patient has no history of uncontrolled seizure disorders.\par Patient does not have grade 2 = peripheral neuropathy, neurosensory toxicity, or neuromotor toxicity as per CTCAE v5.0.\par Patient can swallow oral formulations of the agent.\par Patient has no concurrent use of other anti-cancer therapy (i.e chemotherapy, targeted, and/or biological agents).

## 2021-06-23 ENCOUNTER — NON-APPOINTMENT (OUTPATIENT)
Age: 51
End: 2021-06-23

## 2021-06-24 LAB
FULL GENE SEQUENCE RESULT: NORMAL
INTERPRETATION PGDFRB: NORMAL
Lab: NORMAL
REF LAB TEST METHOD: NORMAL
REVIEWED BY: NORMAL
TA REPEAT RESULT: NORMAL
TEST PERFORMANCE INFO SPEC: NORMAL

## 2021-06-25 ENCOUNTER — NON-APPOINTMENT (OUTPATIENT)
Age: 51
End: 2021-06-25

## 2021-06-28 ENCOUNTER — RESULT REVIEW (OUTPATIENT)
Age: 51
End: 2021-06-28

## 2021-06-28 ENCOUNTER — OUTPATIENT (OUTPATIENT)
Dept: OUTPATIENT SERVICES | Facility: HOSPITAL | Age: 51
LOS: 1 days | End: 2021-06-28
Payer: COMMERCIAL

## 2021-06-28 ENCOUNTER — APPOINTMENT (OUTPATIENT)
Dept: CT IMAGING | Facility: IMAGING CENTER | Age: 51
End: 2021-06-28
Payer: MEDICARE

## 2021-06-28 ENCOUNTER — APPOINTMENT (OUTPATIENT)
Dept: HEMATOLOGY ONCOLOGY | Facility: CLINIC | Age: 51
End: 2021-06-28

## 2021-06-28 DIAGNOSIS — C18.9 MALIGNANT NEOPLASM OF COLON, UNSPECIFIED: ICD-10-CM

## 2021-06-28 DIAGNOSIS — Z00.8 ENCOUNTER FOR OTHER GENERAL EXAMINATION: ICD-10-CM

## 2021-06-28 LAB
ANISOCYTOSIS BLD QL: SLIGHT — SIGNIFICANT CHANGE UP
BASOPHILS # BLD AUTO: 0.08 K/UL — SIGNIFICANT CHANGE UP (ref 0–0.2)
BASOPHILS NFR BLD AUTO: 1 % — SIGNIFICANT CHANGE UP (ref 0–2)
ELLIPTOCYTES BLD QL SMEAR: SLIGHT — SIGNIFICANT CHANGE UP
EOSINOPHIL # BLD AUTO: 1.35 K/UL — HIGH (ref 0–0.5)
EOSINOPHIL NFR BLD AUTO: 16 % — HIGH (ref 0–6)
FERRITIN SERPL-MCNC: 40 NG/ML
HCT VFR BLD CALC: 25.8 % — LOW (ref 39–50)
HGB BLD-MCNC: 7.9 G/DL — LOW (ref 13–17)
HYPOCHROMIA BLD QL: SIGNIFICANT CHANGE UP
IRON SATN MFR SERPL: 4 %
IRON SERPL-MCNC: 14 UG/DL
LG PLATELETS BLD QL AUTO: SLIGHT — SIGNIFICANT CHANGE UP
LYMPHOCYTES # BLD AUTO: 1.52 K/UL — SIGNIFICANT CHANGE UP (ref 1–3.3)
LYMPHOCYTES # BLD AUTO: 18 % — SIGNIFICANT CHANGE UP (ref 13–44)
MCHC RBC-ENTMCNC: 18.6 PG — LOW (ref 27–34)
MCHC RBC-ENTMCNC: 30.6 G/DL — LOW (ref 32–36)
MCV RBC AUTO: 60.8 FL — LOW (ref 80–100)
MICROCYTES BLD QL: SIGNIFICANT CHANGE UP
MONOCYTES # BLD AUTO: 0.42 K/UL — SIGNIFICANT CHANGE UP (ref 0–0.9)
MONOCYTES NFR BLD AUTO: 5 % — SIGNIFICANT CHANGE UP (ref 2–14)
NEUTROPHILS # BLD AUTO: 5.06 K/UL — SIGNIFICANT CHANGE UP (ref 1.8–7.4)
NEUTROPHILS NFR BLD AUTO: 60 % — SIGNIFICANT CHANGE UP (ref 43–77)
NRBC # BLD: 1 /100 — HIGH (ref 0–0)
NRBC # BLD: SIGNIFICANT CHANGE UP /100 WBCS (ref 0–0)
OVALOCYTES BLD QL SMEAR: SLIGHT — SIGNIFICANT CHANGE UP
PLAT MORPH BLD: NORMAL — SIGNIFICANT CHANGE UP
PLATELET # BLD AUTO: 489 K/UL — HIGH (ref 150–400)
POIKILOCYTOSIS BLD QL AUTO: SLIGHT — SIGNIFICANT CHANGE UP
RBC # BLD: 4.24 M/UL — SIGNIFICANT CHANGE UP (ref 4.2–5.8)
RBC # FLD: 25.1 % — HIGH (ref 10.3–14.5)
RBC BLD AUTO: ABNORMAL
SCHISTOCYTES BLD QL AUTO: SLIGHT — SIGNIFICANT CHANGE UP
TARGETS BLD QL SMEAR: SLIGHT — SIGNIFICANT CHANGE UP
TIBC SERPL-MCNC: 323 UG/DL
UIBC SERPL-MCNC: 309 UG/DL
WBC # BLD: 8.44 K/UL — SIGNIFICANT CHANGE UP (ref 3.8–10.5)
WBC # FLD AUTO: 8.44 K/UL — SIGNIFICANT CHANGE UP (ref 3.8–10.5)

## 2021-06-28 PROCEDURE — 74177 CT ABD & PELVIS W/CONTRAST: CPT

## 2021-06-28 PROCEDURE — 71260 CT THORAX DX C+: CPT | Mod: 26

## 2021-06-28 PROCEDURE — 86850 RBC ANTIBODY SCREEN: CPT

## 2021-06-28 PROCEDURE — 74177 CT ABD & PELVIS W/CONTRAST: CPT | Mod: 26

## 2021-06-28 PROCEDURE — 86900 BLOOD TYPING SEROLOGIC ABO: CPT

## 2021-06-28 PROCEDURE — 86923 COMPATIBILITY TEST ELECTRIC: CPT

## 2021-06-28 PROCEDURE — 71260 CT THORAX DX C+: CPT

## 2021-06-28 PROCEDURE — 86901 BLOOD TYPING SEROLOGIC RH(D): CPT

## 2021-06-29 ENCOUNTER — RESULT REVIEW (OUTPATIENT)
Age: 51
End: 2021-06-29

## 2021-06-29 ENCOUNTER — OUTPATIENT (OUTPATIENT)
Dept: OUTPATIENT SERVICES | Facility: HOSPITAL | Age: 51
LOS: 1 days | End: 2021-06-29
Payer: COMMERCIAL

## 2021-06-29 VITALS
SYSTOLIC BLOOD PRESSURE: 128 MMHG | OXYGEN SATURATION: 100 % | HEART RATE: 81 BPM | RESPIRATION RATE: 21 BRPM | DIASTOLIC BLOOD PRESSURE: 82 MMHG

## 2021-06-29 VITALS
WEIGHT: 197.98 LBS | SYSTOLIC BLOOD PRESSURE: 135 MMHG | RESPIRATION RATE: 16 BRPM | OXYGEN SATURATION: 100 % | TEMPERATURE: 99 F | DIASTOLIC BLOOD PRESSURE: 83 MMHG | HEART RATE: 98 BPM | HEIGHT: 67 IN

## 2021-06-29 DIAGNOSIS — C18.4 MALIGNANT NEOPLASM OF TRANSVERSE COLON: ICD-10-CM

## 2021-06-29 PROCEDURE — 77001 FLUOROGUIDE FOR VEIN DEVICE: CPT | Mod: 26

## 2021-06-29 PROCEDURE — 76937 US GUIDE VASCULAR ACCESS: CPT | Mod: 26

## 2021-06-29 PROCEDURE — C1788: CPT

## 2021-06-29 PROCEDURE — 36561 INSERT TUNNELED CV CATH: CPT

## 2021-06-29 PROCEDURE — C1894: CPT

## 2021-06-29 PROCEDURE — 76937 US GUIDE VASCULAR ACCESS: CPT

## 2021-06-29 PROCEDURE — 77001 FLUOROGUIDE FOR VEIN DEVICE: CPT

## 2021-06-29 PROCEDURE — C1769: CPT

## 2021-06-29 NOTE — PRE PROCEDURE NOTE - PRE PROCEDURE EVALUATION
Interventional Radiology    HPI: 51y Male with recent dx of metastatic colon CA who presents for a chest wall port placement. Per patient he is planned for initiation of chemotherapy on 7/11. Denies f/c/n/v, SOB, CP.     NPO since 7pm last night  Denies being on anticoagulation  Denies issues with anesthesia in the past    Allergies: NKDA  Medications (Abx/Cardiac/Anticoagulation/Blood Products)      Data:    T(C): --  HR: --  BP: --  RR: --  SpO2: --    Exam  General: No acute distress, A&Ox3  Chest: Non labored breathing    -WBC 8.44 / HgB 7.9 / Hct 25.8 / Plt 489    Plan: 51y Male presents for chest wall port placement   -Risks/Benefits/alternatives explained with the patient and/or healthcare proxy and witnessed informed consent obtained.

## 2021-06-29 NOTE — PROCEDURE NOTE - PROCEDURE FINDINGS AND DETAILS
Chest Port Placement    Discharge Instructions  - You have had a chest port implanted in your chest.   - The port is ready for use.  - You may shower in 48 hours. No soaking or swimming for 2 weeks or until the site is completely healed.  - Keep the area covered and dry for the next 7 days. It may be removed by a chemotherapy nurse as needed for treatment.  - Do not perform any heavy lifting or put tension on the area for the next week or until the site is healed.  - Do not remove steri-strips. They will fall of in 2-3 weeks  - You may resume your normal diet.  - You may resume your normal medications however you should wait 48 hours before restarting aspirin, plavix, or blood thinners.  - It is normal to experience some pain over the site for the next few days. You may take apply ice to the area (20 minutes on, 20 minutes off) and take Tylenol for that pain. Do not take more frequently than every 6 hours and do not exceed more than 3000mg of Tylenol in a 24 hour period.    - You were given conscious sedation which may make you drowsy, therefore you need someone to stay with you until the morning following the procedure.  - Do not drive, engage in heavy lifting or strenuous activity, or drink any alcoholic beverages for the next 24 hours.   - You may resume normal activity in 24 hours.    Notify your primary physician and/or Interventional Radiology IMMEDIATELY if you experience any of the following       - Fever of 100.5       - Chills or Rigors/ Shakes       - Swelling and/or Redness in the area around the port       - Worsening Pain       - Blood soaked bandages or worsening bleeding       - Lightheadedness and/or dizziness upon standing       - Chest Pain/ Tightness       - Shortness of Breath       - Difficulty walking    If you have a problem that you believe requires IMMEDIATE attention, please go to your NEAREST Emergency Room. If you believe your problem can safely wait until you speak to a physician, please call Interventional Radiology for any concerns.    During Normal Weekday Business Hours- You can contact the Interventional Radiology department during normal business hours via telephone.  During Evenings and Weekends- If you need to contact Interventional Radiology during off hours, do so by calling the hospital and requesting to be connected to the Interventional Radiologist on call. Interventional Radiology Brief Operative Note    Procedure: Chest port placement    Operators: Dr. Nowak, Dr. Church    Anesthesia (type): IV sedation and local    Contrast: None    EBL: < 5 cc    Specimens Removed: None    Implants:     Complications: None    Condition/Disposition: Stable/Recovery then home    Findings/Follow up Plan of Care:  1.) Successful right chest port placement  2.) Keep dressing clean and dry  3.) Ok to access port    Please contact us if the clinical situation changes or if you have any questions/comments/concerns.    Sander Church PGY-4 Interventional Radiology Brief Operative Note    Procedure: Chest port placement    Operators: Dr. Nowak, Dr. Church    Anesthesia (type): IV sedation and local    Contrast: None    EBL: < 5 cc    Specimens Removed: None    Implants: 8 Fr Smartport powerport    Complications: None    Condition/Disposition: Stable/Recovery then home    Findings/Follow up Plan of Care:  1.) Successful right chest port placement  2.) Keep dressing clean and dry  3.) Ok to access port    Please contact us if the clinical situation changes or if you have any questions/comments/concerns.    Sander Church PGY-4

## 2021-06-29 NOTE — ASU DISCHARGE PLAN (ADULT/PEDIATRIC) - ASU DC SPECIAL INSTRUCTIONSFT
Chest Port Placement    Discharge Instructions  - You have had a chest port implanted in your chest.   - The port is ready for use.  - You may shower in 48 hours. No soaking or swimming for 2 weeks or until the site is completely healed.  - Keep the area covered and dry for the next 2days. It may be removed by a chemotherapy nurse as needed for treatment.  - Do not perform any heavy lifting or put tension on the area for the next week or until the site is healed.  - You may resume your normal diet.  - You may resume your normal medications however you should wait 48 hours before restarting aspirin, ibuprofen, plavix, or blood thinners.  - It is normal to experience some pain over the site for the next few days. You may take Tylenol for that pain. Do not take more frequently than every 6 hours and do not exceed more than 3000mg of Tylenol in a 24 hour period.  - You were given conscious sedation which may make you drowsy, therefore you need someone to stay with you until the morning following the procedure.  - Do not drive, engage in heavy lifting or strenuous activity, or drink any alcoholic beverages for the next 24 hours.   - You may resume normal activity in 24 hours.    Notify your primary physician and/or Interventional Radiology IMMEDIATELY if you experience any of the following       - Fever of 101F or 38C       - Chills or Rigors/ Shakes       - Swelling and/or Redness in the area around the port       - Worsening Pain       - Blood soaked bandages or worsening bleeding       - Lightheadedness and/or dizziness upon standing       - Chest Pain/ Tightness       - Shortness of Breath       - Difficulty walking    If you have a problem that you believe requires IMMEDIATE attention, please go to your NEAREST Emergency Room. If you believe your problem can safely wait until you speak to a physician, please call Interventional Radiology for any concerns.    Please feel free to contact us at [phone] if any problems arise. After 6PM, Monday through Friday, on weekends and on holidays, please call [phone] and ask for the radiology resident on call to be paged. no

## 2021-06-29 NOTE — PRE-ANESTHESIA EVALUATION ADULT - MALLAMPATI CLASS
Patient with RUQ pain/ back pain that started around 2200 last night.  +nausea but no vomiting. No measured fever, but has felt chilled. Class II - visualization of the soft palate, fauces, and uvula

## 2021-06-29 NOTE — ASU DISCHARGE PLAN (ADULT/PEDIATRIC) - NURSING INSTRUCTIONS
Please feel free to contact us at [phone] if any problems arise. After 6PM, Monday through Friday, on weekends and on holidays, please call [phone] and ask for the radiology resident on call to be paged

## 2021-06-30 PROBLEM — C18.9 MALIGNANT NEOPLASM OF COLON, UNSPECIFIED: Chronic | Status: ACTIVE | Noted: 2021-06-29

## 2021-07-01 ENCOUNTER — APPOINTMENT (OUTPATIENT)
Dept: INFUSION THERAPY | Facility: HOSPITAL | Age: 51
End: 2021-07-01

## 2021-07-01 ENCOUNTER — NON-APPOINTMENT (OUTPATIENT)
Age: 51
End: 2021-07-01

## 2021-07-01 ENCOUNTER — APPOINTMENT (OUTPATIENT)
Dept: HEMATOLOGY ONCOLOGY | Facility: CLINIC | Age: 51
End: 2021-07-01
Payer: MEDICARE

## 2021-07-01 VITALS
SYSTOLIC BLOOD PRESSURE: 132 MMHG | OXYGEN SATURATION: 99 % | HEIGHT: 67.8 IN | DIASTOLIC BLOOD PRESSURE: 85 MMHG | BODY MASS INDEX: 30.15 KG/M2 | HEART RATE: 96 BPM | RESPIRATION RATE: 18 BRPM | WEIGHT: 196.65 LBS | TEMPERATURE: 97.1 F

## 2021-07-01 DIAGNOSIS — C18.4 MALIGNANT NEOPLASM OF TRANSVERSE COLON: ICD-10-CM

## 2021-07-01 PROCEDURE — 99072 ADDL SUPL MATRL&STAF TM PHE: CPT

## 2021-07-01 PROCEDURE — 99214 OFFICE O/P EST MOD 30 MIN: CPT

## 2021-07-01 NOTE — REASON FOR VISIT
[Follow-Up Visit] : a follow-up [Research Visit] : a research  [Family Member] : family member [FreeTextEntry2] : colon cancer

## 2021-07-01 NOTE — PHYSICAL EXAM
[Fully active, able to carry on all pre-disease performance without restriction] : Status 0 - Fully active, able to carry on all pre-disease performance without restriction [Normal] : affect appropriate [de-identified] : well healing midline incision  [de-identified] : port site incisions

## 2021-07-01 NOTE — HISTORY OF PRESENT ILLNESS
[Date: ____________] : Patient's last distress assessment performed on [unfilled]. [Disease: _____________________] : Disease: [unfilled] [T: ___] : T[unfilled] [N: ___] : N[unfilled] [M: ___] : M[unfilled] [AJCC Stage: ____] : AJCC Stage: [unfilled] [de-identified] : Mr Granda in 2021 at the age of 51 presented to the hospital with abdominal pain and underwent imaging that revealed a distal transverse colon lesion with evidence of microperforation.\par He is also underwent a colonoscopy which demonstrated an endoscopically obstructing distal transverse colon mass.  Given the potential for impending obstruction as well as microperforation seen on imaging decision was made to take patient to the OR for a left hemicolectomy and anastomosis.\par Imaging done at that time also showed evidence of metastatic disease to the liver and potentially lung and a questionable lesion as well.  Patient presents with his sister for initial visit and to discuss palliative chemotherapy.\par \par Enrolled in [employer] Study  [de-identified] : Microsatellite stable [FreeTextEntry1] : Status post resection [de-identified] : comes in for follow up and has not acute complaints\par had CT chest/abd/pelvis done to assess extent of disease prior to starting treatment \par MRI abd and Bone scan scheduled for upcomign week \par no abdominal pain

## 2021-07-02 ENCOUNTER — RESULT REVIEW (OUTPATIENT)
Age: 51
End: 2021-07-02

## 2021-07-02 ENCOUNTER — APPOINTMENT (OUTPATIENT)
Dept: NUCLEAR MEDICINE | Facility: IMAGING CENTER | Age: 51
End: 2021-07-02
Payer: MEDICARE

## 2021-07-02 ENCOUNTER — NON-APPOINTMENT (OUTPATIENT)
Age: 51
End: 2021-07-02

## 2021-07-02 ENCOUNTER — OUTPATIENT (OUTPATIENT)
Dept: OUTPATIENT SERVICES | Facility: HOSPITAL | Age: 51
LOS: 1 days | End: 2021-07-02
Payer: COMMERCIAL

## 2021-07-02 DIAGNOSIS — Z00.8 ENCOUNTER FOR OTHER GENERAL EXAMINATION: ICD-10-CM

## 2021-07-02 DIAGNOSIS — C18.9 MALIGNANT NEOPLASM OF COLON, UNSPECIFIED: ICD-10-CM

## 2021-07-02 PROCEDURE — 78306 BONE IMAGING WHOLE BODY: CPT | Mod: 26

## 2021-07-02 PROCEDURE — 78306 BONE IMAGING WHOLE BODY: CPT

## 2021-07-02 PROCEDURE — A9561: CPT

## 2021-07-02 PROCEDURE — 78830 RP LOCLZJ TUM SPECT W/CT 1: CPT

## 2021-07-02 PROCEDURE — 78830 RP LOCLZJ TUM SPECT W/CT 1: CPT | Mod: 26

## 2021-07-05 ENCOUNTER — OUTPATIENT (OUTPATIENT)
Dept: OUTPATIENT SERVICES | Facility: HOSPITAL | Age: 51
LOS: 1 days | End: 2021-07-05
Payer: COMMERCIAL

## 2021-07-05 ENCOUNTER — APPOINTMENT (OUTPATIENT)
Dept: MRI IMAGING | Facility: IMAGING CENTER | Age: 51
End: 2021-07-05
Payer: MEDICARE

## 2021-07-05 DIAGNOSIS — C18.4 MALIGNANT NEOPLASM OF TRANSVERSE COLON: ICD-10-CM

## 2021-07-05 PROCEDURE — 74183 MRI ABD W/O CNTR FLWD CNTR: CPT | Mod: 26

## 2021-07-05 PROCEDURE — A9581: CPT

## 2021-07-05 PROCEDURE — 74183 MRI ABD W/O CNTR FLWD CNTR: CPT

## 2021-07-06 ENCOUNTER — RESULT REVIEW (OUTPATIENT)
Age: 51
End: 2021-07-06

## 2021-07-06 ENCOUNTER — APPOINTMENT (OUTPATIENT)
Dept: HEMATOLOGY ONCOLOGY | Facility: CLINIC | Age: 51
End: 2021-07-06

## 2021-07-06 LAB
ALBUMIN SERPL ELPH-MCNC: 4.1 G/DL
ALP BLD-CCNC: 148 U/L
ALT SERPL-CCNC: 108 U/L
ANION GAP SERPL CALC-SCNC: 15 MMOL/L
AST SERPL-CCNC: 126 U/L
BASOPHILS # BLD AUTO: 0.08 K/UL — SIGNIFICANT CHANGE UP (ref 0–0.2)
BASOPHILS NFR BLD AUTO: 0.7 % — SIGNIFICANT CHANGE UP (ref 0–2)
BILIRUB SERPL-MCNC: 1 MG/DL
BUN SERPL-MCNC: 9 MG/DL
CALCIUM SERPL-MCNC: 9.4 MG/DL
CEA SERPL-MCNC: 334 NG/ML
CHLORIDE SERPL-SCNC: 101 MMOL/L
CO2 SERPL-SCNC: 23 MMOL/L
CREAT SERPL-MCNC: 1.05 MG/DL
CREAT SPEC-SCNC: 169 MG/DL
CREAT/PROT UR: 0.1 RATIO
EOSINOPHIL # BLD AUTO: 1.27 K/UL — HIGH (ref 0–0.5)
EOSINOPHIL NFR BLD AUTO: 11 % — HIGH (ref 0–6)
GLUCOSE SERPL-MCNC: 106 MG/DL
HCT VFR BLD CALC: 33.6 % — LOW (ref 39–50)
HGB BLD-MCNC: 10.4 G/DL — LOW (ref 13–17)
IMM GRANULOCYTES NFR BLD AUTO: 0.6 % — SIGNIFICANT CHANGE UP (ref 0–1.5)
LYMPHOCYTES # BLD AUTO: 17.4 % — SIGNIFICANT CHANGE UP (ref 13–44)
LYMPHOCYTES # BLD AUTO: 2.01 K/UL — SIGNIFICANT CHANGE UP (ref 1–3.3)
MAGNESIUM SERPL-MCNC: 2.1 MG/DL
MCHC RBC-ENTMCNC: 20.2 PG — LOW (ref 27–34)
MCHC RBC-ENTMCNC: 31 G/DL — LOW (ref 32–36)
MCV RBC AUTO: 65.2 FL — LOW (ref 80–100)
MONOCYTES # BLD AUTO: 1.32 K/UL — HIGH (ref 0–0.9)
MONOCYTES NFR BLD AUTO: 11.4 % — SIGNIFICANT CHANGE UP (ref 2–14)
NEUTROPHILS # BLD AUTO: 6.82 K/UL — SIGNIFICANT CHANGE UP (ref 1.8–7.4)
NEUTROPHILS NFR BLD AUTO: 58.9 % — SIGNIFICANT CHANGE UP (ref 43–77)
NRBC # BLD: 0 /100 WBCS — SIGNIFICANT CHANGE UP (ref 0–0)
PHOSPHATE SERPL-MCNC: 3.8 MG/DL
PLATELET # BLD AUTO: 390 K/UL — SIGNIFICANT CHANGE UP (ref 150–400)
POTASSIUM SERPL-SCNC: 4.9 MMOL/L
PROT SERPL-MCNC: 7.5 G/DL
PROT UR-MCNC: 15 MG/DL
RBC # BLD: 5.15 M/UL — SIGNIFICANT CHANGE UP (ref 4.2–5.8)
RBC # FLD: 29.3 % — HIGH (ref 10.3–14.5)
SODIUM SERPL-SCNC: 139 MMOL/L
WBC # BLD: 11.57 K/UL — HIGH (ref 3.8–10.5)
WBC # FLD AUTO: 11.57 K/UL — HIGH (ref 3.8–10.5)

## 2021-07-07 ENCOUNTER — LABORATORY RESULT (OUTPATIENT)
Age: 51
End: 2021-07-07

## 2021-07-07 ENCOUNTER — APPOINTMENT (OUTPATIENT)
Dept: INFUSION THERAPY | Facility: HOSPITAL | Age: 51
End: 2021-07-07

## 2021-07-07 ENCOUNTER — APPOINTMENT (OUTPATIENT)
Dept: HEMATOLOGY ONCOLOGY | Facility: CLINIC | Age: 51
End: 2021-07-07
Payer: MEDICARE

## 2021-07-07 VITALS
HEART RATE: 113 BPM | OXYGEN SATURATION: 100 % | SYSTOLIC BLOOD PRESSURE: 127 MMHG | TEMPERATURE: 97.4 F | BODY MASS INDEX: 29.68 KG/M2 | DIASTOLIC BLOOD PRESSURE: 82 MMHG | RESPIRATION RATE: 14 BRPM | WEIGHT: 194.01 LBS

## 2021-07-07 PROCEDURE — 99072 ADDL SUPL MATRL&STAF TM PHE: CPT

## 2021-07-07 PROCEDURE — 99213 OFFICE O/P EST LOW 20 MIN: CPT

## 2021-07-08 ENCOUNTER — NON-APPOINTMENT (OUTPATIENT)
Age: 51
End: 2021-07-08

## 2021-07-08 ENCOUNTER — APPOINTMENT (OUTPATIENT)
Dept: INFUSION THERAPY | Facility: HOSPITAL | Age: 51
End: 2021-07-08

## 2021-07-08 DIAGNOSIS — Z51.11 ENCOUNTER FOR ANTINEOPLASTIC CHEMOTHERAPY: ICD-10-CM

## 2021-07-08 DIAGNOSIS — R11.2 NAUSEA WITH VOMITING, UNSPECIFIED: ICD-10-CM

## 2021-07-08 PROCEDURE — 93010 ELECTROCARDIOGRAM REPORT: CPT

## 2021-07-08 NOTE — REASON FOR VISIT
[facility] Internal Medicine Progress Note  Patient: Luis Antonio Branch 71 y o  male   MRN: [id number]  PCP: Collins Hawkins MD  Unit/Bed#: -01 Encounter: [id number]  Date Of Visit: 19    Assessment:    Principal Problem:    Acute renal failure superimposed on chronic kidney disease ([facility] )  Active Problems:    Other hyperlipidemia    Type 2 diabetes mellitus with hyperglycemia ([place] [address] )    Essential hypertension    Acute on chronic congestive heart failure ([facility] )      Plan:    · 1  Acute on chronic renal failure- Patient on dialysis  Will f/u nephrology recommendations  · 2  SOB- mostlikely secondary to #1  Cardiology following  Patient 2d echo EF 65%  · 3  History of left renal cell carcinoma s/p nephrectomy   · 4  DM- on lantus and ISS  · 5  Smoking- on chantix  ·        VTE Pharmacologic Prophylaxis:   Pharmacologic: Heparin  Mechanical VTE Prophylaxis in Place: Yes    Patient Centered Rounds: I have performed bedside rounds with nursing staff today  Discussions with Specialists or Other Care Team Provider:     Education and Discussions with Family / Patient:     Time Spent for Care: 20 minutes  More than 50% of total time spent on counseling and coordination of care as described above  Current Length of Stay: 3 day(s)    Current Patient Status: Inpatient   Certification Statement: The patient will continue to require additional inpatient hospital stay due to ESRD on HD    Discharge Plan / Estimated Discharge Date: once above issues resolve      Code Status: Level 1 - Full Code      Subjective:   Patient seen and examined at bedside  Patient has no new complaints  Objective:     Vitals:   Temp (24hrs), Av 2 °F (36 8 °C), Min:97 6 °F (36 4 °C), Max:98 8 °F (37 1 °C)    Temp:  [97 6 °F (36 4 °C)-98 8 °F (37 1 °C)] 98 8 °F (37 1 °C)  HR:  [81-98] 93  Resp:  [18] 18  BP: (105-145)/(51-81) 125/79  SpO2:  [92 %-95 %] 93 %  Body mass index is 38 88 kg/m²       Input and Output Summary (last 24 hours): Intake/Output Summary (Last 24 hours) at 02/03/19 1006  Last data filed at 02/03/19 0901   Gross per 24 hour   Intake              880 ml   Output             3450 ml   Net            -2570 ml       Physical Exam:     Physical Exam   Constitutional: He is oriented to person, place, and time  He appears well-developed and well-nourished  HENT:   Head: Normocephalic and atraumatic  Eyes: Pupils are equal, round, and reactive to light  Conjunctivae and EOM are normal    Neck: Normal range of motion  Neck supple  No JVD present  No tracheal deviation present  No thyromegaly present  Cardiovascular: Normal rate, regular rhythm and normal heart sounds  Exam reveals no gallop and no friction rub  No murmur heard  Pulmonary/Chest: Effort normal and breath sounds normal  No respiratory distress  He has no wheezes  He has no rales  Abdominal: Soft  Bowel sounds are normal  He exhibits no distension  There is no tenderness  There is no rebound  Musculoskeletal: He exhibits edema  Neurological: He is alert and oriented to person, place, and time  Skin: Skin is warm and dry  No rash noted  No erythema  Additional Data:     Labs:      Results from last 7 days  Lab Units 02/03/19  0616   WBC Thousand/uL 12 66*   HEMOGLOBIN g/dL 11 8*   HEMATOCRIT % 37 1   PLATELETS Thousands/uL 252   NEUTROS PCT % 63   LYMPHS PCT % 21   MONOS PCT % 9   EOS PCT % 5       Results from last 7 days  Lab Units 02/03/19  0616  01/31/19  0557 01/31/19  0010   POTASSIUM mmol/L 3 6  < > 4 7  --    CHLORIDE mmol/L 99*  < > 103  --    CO2 mmol/L 31  < > 22  --    CO2, I-STAT mmol/L  --   --   --  23   BUN mg/dL 34*  < > 59*  --    CREATININE mg/dL 4 16*  < > 5 50*  --    CALCIUM mg/dL 8 5  < > 8 3  --    ALK PHOS U/L  --   --  91  --    ALT U/L  --   --  10*  --    AST U/L  --   --  9  --    GLUCOSE, ISTAT mg/dl  --   --   --  368*   < > = values in this interval not displayed      Results from last 7 days  Lab Units 01/31/19  0557   INR  1 12       * I Have Reviewed All Lab Data Listed Above  * Additional Pertinent Lab Tests Reviewed: [facility] Admission Reviewed    Imaging:    Imaging Reports Reviewed Today Include:   Imaging Personally Reviewed by Myself Includes:      Recent Cultures (last 7 days):           Last 24 Hours Medication List:     Current Facility-Administered Medications:  acetaminophen 650 mg Oral Q6H PRN Radha Ivy MD   amLODIPine 10 mg Oral Daily Greg Fuchs PA-C   b complex-vitamin C-folic acid 1 capsule Oral Daily With Shanice Cason MD   calcium acetate 667 mg Oral TID With Meals Tiffanie Nathan MD   doxercalciferol 2 mcg Intravenous Once per day on Mon Wed Fri Tiffanie Nathan MD   furosemide 80 mg Intravenous BID (diuretic) Israel Alonzo PA-C   heparin (porcine) 5,000 Units Subcutaneous Q8H [address] Greg Fuchs PA-C   influenza vaccine 0 5 mL Intramuscular Prior to discharge Radha Ivy MD   insulin glargine 40 Units Subcutaneous HS Greg Fuchs PA-C   insulin lispro 2-12 Units Subcutaneous TID AC Greg Fuchs PA-C   insulin lispro 2-12 Units Subcutaneous HS Greg Fuchs PA-C   insulin lispro 20 Units Subcutaneous TID With Meals Greg Fuchs PA-C   ondansetron 4 mg Intravenous Q8H PRN Greg Fuchs PA-C   varenicline 1 mg Oral BID Greg Fuchs PA-C        Today, Patient Was Seen By: Radha Ivy MD    ** Please Note: This note has been constructed using a voice recognition system   ** [Follow-Up Visit] : a follow-up [Research Visit] : a research  [Family Member] : family member [FreeTextEntry2] : colon cancer

## 2021-07-08 NOTE — PHYSICAL EXAM
[Fully active, able to carry on all pre-disease performance without restriction] : Status 0 - Fully active, able to carry on all pre-disease performance without restriction [Normal] : affect appropriate [de-identified] : anicteric  [de-identified] : no jvd  [de-identified] : well healing midline incision  [de-identified] : port site incisions

## 2021-07-08 NOTE — ASSESSMENT
[FreeTextEntry1] : Patient will proceed with the mFOLFOX chemotherapy regimen.\par Patient has no dMMR or MSI-H disease.\par Patient had no prior systemic treatment for metastatic disease.\par Patient had no continuous daily use of Vitamin D supplements =2000 IU per day in the past 12 months prior to enrollment.\par Patient has completed any major surgery/open biopsy  more than  4 weeks prior to registration.\par Patient has completed any minor surgery/core biopsy  more than 1 week prior to registration.\par Patient does not have resectable metastatic disease for which potentially curative metastasectomy is planned.\par Patient has no “currently active” second malignancy other than non-melanoma skin cancers or cervical carcinoma in situ.\par Patient has no significant history of bleeding events or bleeding diathesis more than 6 months of registration. Patient has no history of arterial thrombotic events (i.e transient ischemic attack, cerebrovascular accident, unstable angina, angina requiring surgical or medical intervention, or myocardial infarction) =6 months of registration.\par Patient has no history of clinically significant peripheral artery disease more than 6 months of registration.\par Patient has no history of uncontrolled congestive heart failure defined as NYHA Class III or greater.\par Patient has no history of gastrointestinal (GI) perforation more than12 months of registration. Patient has no history of malabsorption, uncontrolled vomiting or diarrhea, or any other disease significantly affecting GI function that could interfere with the absorption of oral agents.\par Patient has no history of allergic reaction attributed to compounds of similar chemical or biological composition to the study agents.\par Patient has no uncontrolled hypertension.\par Patient has no serious or non-healing wound, ulcer, or bone fracture. Patient has no uncontrolled intercurrent illness (i.e psychiatric illness/social situations) that may increase the risks associated with participation or treatment on the study or interfere with the conduct of the study/interpretation of the study results.\par Patient is not known HIV positive.\par Patient has no known pre-existing hypercalcemia  more than6 months of registration.\par Patient has no known active hyperparathyroid disease or other serious disturbance of calcium metabolism  more than5 years of registration.\par Patient has no predisposing colonic or small bowel disorders in which symptoms are uncontrolled (indicated by >3 watery or soft stools daily in patients without a colostomy or ileostomy).\par Patient has no symptomatic genitourinary stones =12 months of enrollment.\par Patient does not have brain metastases or leptomeningeal disease.\par Patient has no history of uncontrolled seizure disorders.\par Patient does not have grade 2 = peripheral neuropathy, neurosensory toxicity, or neuromotor toxicity as per CTCAE v5.0.\par Patient can swallow oral formulations of the agent.\par Patient has no concurrent use of other anti-cancer therapy (i.e chemotherapy, targeted, and/or biological agents).

## 2021-07-08 NOTE — HISTORY OF PRESENT ILLNESS
[Disease: _____________________] : Disease: [unfilled] [T: ___] : T[unfilled] [N: ___] : N[unfilled] [M: ___] : M[unfilled] [AJCC Stage: ____] : AJCC Stage: [unfilled] [de-identified] : Mr Granda in 2021 at the age of 51 presented to the hospital with abdominal pain and underwent imaging that revealed a distal transverse colon lesion with evidence of microperforation.\par He is also underwent a colonoscopy which demonstrated an endoscopically obstructing distal transverse colon mass.  Given the potential for impending obstruction as well as microperforation seen on imaging decision was made to take patient to the OR for a left hemicolectomy and anastomosis.\par Imaging done at that time also showed evidence of metastatic disease to the liver and potentially lung and a questionable lesion as well.  Patient presents with his sister for initial visit and to discuss palliative chemotherapy.\par \par Enrolled in [employer] Study  [de-identified] : Microsatellite stable [FreeTextEntry1] : Status post resection [de-identified] : comes in for follow up and has not acute complaints\par  MRI abd shows bilateral liver metastasis  and Bone scan doesn't show bone as suggested on prior CT  \par no abdominal pain \par s/p port placement

## 2021-07-09 ENCOUNTER — NON-APPOINTMENT (OUTPATIENT)
Age: 51
End: 2021-07-09

## 2021-07-10 ENCOUNTER — APPOINTMENT (OUTPATIENT)
Dept: INFUSION THERAPY | Facility: HOSPITAL | Age: 51
End: 2021-07-10

## 2021-07-13 DIAGNOSIS — Z45.2 ENCOUNTER FOR ADJUSTMENT AND MANAGEMENT OF VASCULAR ACCESS DEVICE: ICD-10-CM

## 2021-07-13 DIAGNOSIS — C18.9 MALIGNANT NEOPLASM OF COLON, UNSPECIFIED: ICD-10-CM

## 2021-07-15 ENCOUNTER — OUTPATIENT (OUTPATIENT)
Dept: OUTPATIENT SERVICES | Facility: HOSPITAL | Age: 51
LOS: 1 days | Discharge: HOME | End: 2021-07-15

## 2021-07-15 ENCOUNTER — APPOINTMENT (OUTPATIENT)
Dept: SURGERY | Facility: CLINIC | Age: 51
End: 2021-07-15

## 2021-07-15 ENCOUNTER — NON-APPOINTMENT (OUTPATIENT)
Age: 51
End: 2021-07-15

## 2021-07-15 DIAGNOSIS — C20 MALIGNANT NEOPLASM OF RECTUM: ICD-10-CM

## 2021-07-17 ENCOUNTER — NON-APPOINTMENT (OUTPATIENT)
Age: 51
End: 2021-07-17

## 2021-07-19 ENCOUNTER — RESULT REVIEW (OUTPATIENT)
Age: 51
End: 2021-07-19

## 2021-07-19 ENCOUNTER — APPOINTMENT (OUTPATIENT)
Dept: HEMATOLOGY ONCOLOGY | Facility: CLINIC | Age: 51
End: 2021-07-19

## 2021-07-19 LAB
BASOPHILS # BLD AUTO: 0.06 K/UL — SIGNIFICANT CHANGE UP (ref 0–0.2)
BASOPHILS NFR BLD AUTO: 0.9 % — SIGNIFICANT CHANGE UP (ref 0–2)
EOSINOPHIL # BLD AUTO: 0.22 K/UL — SIGNIFICANT CHANGE UP (ref 0–0.5)
EOSINOPHIL NFR BLD AUTO: 3.2 % — SIGNIFICANT CHANGE UP (ref 0–6)
HCT VFR BLD CALC: 33.2 % — LOW (ref 39–50)
HGB BLD-MCNC: 10.6 G/DL — LOW (ref 13–17)
IMM GRANULOCYTES NFR BLD AUTO: 0.7 % — SIGNIFICANT CHANGE UP (ref 0–1.5)
LYMPHOCYTES # BLD AUTO: 2.06 K/UL — SIGNIFICANT CHANGE UP (ref 1–3.3)
LYMPHOCYTES # BLD AUTO: 30.2 % — SIGNIFICANT CHANGE UP (ref 13–44)
MCHC RBC-ENTMCNC: 20 PG — LOW (ref 27–34)
MCHC RBC-ENTMCNC: 31.9 G/DL — LOW (ref 32–36)
MCV RBC AUTO: 62.8 FL — LOW (ref 80–100)
MONOCYTES # BLD AUTO: 0.74 K/UL — SIGNIFICANT CHANGE UP (ref 0–0.9)
MONOCYTES NFR BLD AUTO: 10.9 % — SIGNIFICANT CHANGE UP (ref 2–14)
NEUTROPHILS # BLD AUTO: 3.69 K/UL — SIGNIFICANT CHANGE UP (ref 1.8–7.4)
NEUTROPHILS NFR BLD AUTO: 54.1 % — SIGNIFICANT CHANGE UP (ref 43–77)
NRBC # BLD: 0 /100 WBCS — SIGNIFICANT CHANGE UP (ref 0–0)
PLATELET # BLD AUTO: 453 K/UL — HIGH (ref 150–400)
RBC # BLD: 5.29 M/UL — SIGNIFICANT CHANGE UP (ref 4.2–5.8)
RBC # FLD: 29.2 % — HIGH (ref 10.3–14.5)
WBC # BLD: 6.82 K/UL — SIGNIFICANT CHANGE UP (ref 3.8–10.5)
WBC # FLD AUTO: 6.82 K/UL — SIGNIFICANT CHANGE UP (ref 3.8–10.5)

## 2021-07-20 ENCOUNTER — APPOINTMENT (OUTPATIENT)
Dept: INFUSION THERAPY | Facility: HOSPITAL | Age: 51
End: 2021-07-20

## 2021-07-21 ENCOUNTER — APPOINTMENT (OUTPATIENT)
Dept: HEMATOLOGY ONCOLOGY | Facility: CLINIC | Age: 51
End: 2021-07-21
Payer: MEDICARE

## 2021-07-21 ENCOUNTER — APPOINTMENT (OUTPATIENT)
Dept: INFUSION THERAPY | Facility: HOSPITAL | Age: 51
End: 2021-07-21

## 2021-07-21 ENCOUNTER — NON-APPOINTMENT (OUTPATIENT)
Age: 51
End: 2021-07-21

## 2021-07-21 ENCOUNTER — RESULT REVIEW (OUTPATIENT)
Age: 51
End: 2021-07-21

## 2021-07-21 VITALS
OXYGEN SATURATION: 100 % | BODY MASS INDEX: 30.69 KG/M2 | HEART RATE: 90 BPM | WEIGHT: 200.62 LBS | SYSTOLIC BLOOD PRESSURE: 132 MMHG | DIASTOLIC BLOOD PRESSURE: 83 MMHG | TEMPERATURE: 98 F | RESPIRATION RATE: 14 BRPM

## 2021-07-21 DIAGNOSIS — D50.9 IRON DEFICIENCY ANEMIA, UNSPECIFIED: ICD-10-CM

## 2021-07-21 LAB
ALBUMIN SERPL ELPH-MCNC: 4.1 G/DL
ALP BLD-CCNC: 133 U/L
ALT SERPL-CCNC: 42 U/L
ANION GAP SERPL CALC-SCNC: 11 MMOL/L
APPEARANCE: CLEAR
AST SERPL-CCNC: 24 U/L
BASOPHILS # BLD AUTO: 0.04 K/UL — SIGNIFICANT CHANGE UP (ref 0–0.2)
BASOPHILS NFR BLD AUTO: 0.6 % — SIGNIFICANT CHANGE UP (ref 0–2)
BILIRUB SERPL-MCNC: 0.4 MG/DL
BILIRUBIN URINE: NEGATIVE
BLOOD URINE: NEGATIVE
BUN SERPL-MCNC: 20 MG/DL
CALCIUM SERPL-MCNC: 9.6 MG/DL
CHLORIDE SERPL-SCNC: 103 MMOL/L
CO2 SERPL-SCNC: 26 MMOL/L
COLOR: YELLOW
CREAT SERPL-MCNC: 1.05 MG/DL
EOSINOPHIL # BLD AUTO: 0.28 K/UL — SIGNIFICANT CHANGE UP (ref 0–0.5)
EOSINOPHIL NFR BLD AUTO: 4.1 % — SIGNIFICANT CHANGE UP (ref 0–6)
GLUCOSE QUALITATIVE U: NEGATIVE
GLUCOSE SERPL-MCNC: 156 MG/DL
HCT VFR BLD CALC: 33.5 % — LOW (ref 39–50)
HGB BLD-MCNC: 10.4 G/DL — LOW (ref 13–17)
IMM GRANULOCYTES NFR BLD AUTO: 0.3 % — SIGNIFICANT CHANGE UP (ref 0–1.5)
KETONES URINE: NEGATIVE
LEUKOCYTE ESTERASE URINE: NEGATIVE
LYMPHOCYTES # BLD AUTO: 2.02 K/UL — SIGNIFICANT CHANGE UP (ref 1–3.3)
LYMPHOCYTES # BLD AUTO: 29.6 % — SIGNIFICANT CHANGE UP (ref 13–44)
MAGNESIUM SERPL-MCNC: 2.3 MG/DL
MCHC RBC-ENTMCNC: 20 PG — LOW (ref 27–34)
MCHC RBC-ENTMCNC: 31 G/DL — LOW (ref 32–36)
MCV RBC AUTO: 64.3 FL — LOW (ref 80–100)
MONOCYTES # BLD AUTO: 0.75 K/UL — SIGNIFICANT CHANGE UP (ref 0–0.9)
MONOCYTES NFR BLD AUTO: 11 % — SIGNIFICANT CHANGE UP (ref 2–14)
NEUTROPHILS # BLD AUTO: 3.71 K/UL — SIGNIFICANT CHANGE UP (ref 1.8–7.4)
NEUTROPHILS NFR BLD AUTO: 54.4 % — SIGNIFICANT CHANGE UP (ref 43–77)
NITRITE URINE: NEGATIVE
NRBC # BLD: 0 /100 WBCS — SIGNIFICANT CHANGE UP (ref 0–0)
PH URINE: 6.5
PHOSPHATE SERPL-MCNC: 3.2 MG/DL
PLATELET # BLD AUTO: 390 K/UL — SIGNIFICANT CHANGE UP (ref 150–400)
POTASSIUM SERPL-SCNC: 4.9 MMOL/L
PROT SERPL-MCNC: 7.2 G/DL
PROTEIN URINE: NEGATIVE
RBC # BLD: 5.21 M/UL — SIGNIFICANT CHANGE UP (ref 4.2–5.8)
RBC # FLD: 28.9 % — HIGH (ref 10.3–14.5)
SODIUM SERPL-SCNC: 140 MMOL/L
SPECIFIC GRAVITY URINE: 1.03
UROBILINOGEN URINE: NORMAL
WBC # BLD: 6.82 K/UL — SIGNIFICANT CHANGE UP (ref 3.8–10.5)
WBC # FLD AUTO: 6.82 K/UL — SIGNIFICANT CHANGE UP (ref 3.8–10.5)

## 2021-07-21 PROCEDURE — 99072 ADDL SUPL MATRL&STAF TM PHE: CPT

## 2021-07-21 PROCEDURE — 99214 OFFICE O/P EST MOD 30 MIN: CPT

## 2021-07-22 ENCOUNTER — NON-APPOINTMENT (OUTPATIENT)
Age: 51
End: 2021-07-22

## 2021-07-23 ENCOUNTER — APPOINTMENT (OUTPATIENT)
Dept: INFUSION THERAPY | Facility: HOSPITAL | Age: 51
End: 2021-07-23

## 2021-07-23 ENCOUNTER — NON-APPOINTMENT (OUTPATIENT)
Age: 51
End: 2021-07-23

## 2021-07-27 ENCOUNTER — APPOINTMENT (OUTPATIENT)
Dept: SURGERY | Facility: CLINIC | Age: 51
End: 2021-07-27
Payer: MEDICARE

## 2021-07-27 VITALS
WEIGHT: 200 LBS | BODY MASS INDEX: 31.39 KG/M2 | HEIGHT: 67 IN | DIASTOLIC BLOOD PRESSURE: 79 MMHG | SYSTOLIC BLOOD PRESSURE: 115 MMHG | HEART RATE: 103 BPM

## 2021-07-27 VITALS — TEMPERATURE: 97.7 F

## 2021-07-27 PROBLEM — D50.9 IRON DEFICIENCY ANEMIA: Status: ACTIVE | Noted: 2021-07-01

## 2021-07-27 PROCEDURE — 99024 POSTOP FOLLOW-UP VISIT: CPT

## 2021-07-27 NOTE — PHYSICAL EXAM
[Exam Deferred] : exam was deferred [JVD] : no jugular venous distention  [Normal Rate and Rhythm] : normal rate and rhythm [Alert] : alert [Oriented to Person] : oriented to person [Oriented to Place] : oriented to place [Oriented to Time] : oriented to time [de-identified] : soft, non-distended, non-tender to palpation, well healing incisions c/d/i w/o erythema or fluctuance [de-identified] : awake, alert, in NAD [de-identified] : normocephalic, atraumatic, EOMI, nl conjunctiva [de-identified] : b/l chest rise, EWOB on RA [de-identified] : deferred [de-identified] : normal strength [de-identified] : abdominal incisions as above [de-identified] : normal mood and affect

## 2021-07-27 NOTE — HISTORY OF PRESENT ILLNESS
[FreeTextEntry1] : Mr. Taryn Granda is a 51y.o. M with recent diagnosis of metastatic colon cancer presenting for a scheduled follow-up visit. He presented to [facility] ED with abdominal pain and was found to have a distal transverse colon lesion with an associated microperforation. He underwent a colonoscopy w/ GI which demonstrated an endoscopically obstructing distal transverse colon mass. Given the impending obstruction and recent microperforation the decision was made to proceed with surgery and then adjuvant therapy. He was taken to the OR on 6/4/21 for an uncomplicated laparoscopic extended left colectomy. His post-op course was uneventful and he was discharged home on POD 3 with prophylactic lovenox. He has since started chemo and reports that he is tolerating it. He did have some malaise and feverish feelings w/ the 2nd round but they resolved and he has no problems currently. Has a good appetite and though he is having a little bit of constipation he was warned it was a side effect of his chemo and he denies any blood in the stools. No issues with his incisions.

## 2021-07-27 NOTE — HISTORY OF PRESENT ILLNESS
[Disease: _____________________] : Disease: [unfilled] [T: ___] : T[unfilled] [N: ___] : N[unfilled] [M: ___] : M[unfilled] [AJCC Stage: ____] : AJCC Stage: [unfilled] [de-identified] : Mr Granda in 2021 at the age of 51 presented to the hospital with abdominal pain and underwent imaging that revealed a distal transverse colon lesion with evidence of microperforation.\par He is also underwent a colonoscopy which demonstrated an endoscopically obstructing distal transverse colon mass.  Given the potential for impending obstruction as well as microperforation seen on imaging decision was made to take patient to the OR for a left hemicolectomy and anastomosis.\par Imaging done at that time also showed evidence of metastatic disease to the liver and potentially lung and a questionable lesion as well.  Patient presents with his sister for initial visit and to discuss palliative chemotherapy.\par \par Enrolled in [employer] \par \par Started FOLFOX + Vit D July 7th, 2021  [de-identified] : Microsatellite stable [de-identified] : KRAS G12D  [FreeTextEntry1] : FOLFOX VitD ([employer] study) [de-identified] : tolerated cycle 1 FOLFOX\par grade 1 peripheral neuropathy for 1-2 day\par feels well, gaining weight , no abdomninal pain \par no nausea or emesis

## 2021-07-27 NOTE — PHYSICAL EXAM
[Fully active, able to carry on all pre-disease performance without restriction] : Status 0 - Fully active, able to carry on all pre-disease performance without restriction [Normal] : affect appropriate [de-identified] : anicteric  [de-identified] : no jvd  [de-identified] : well healing midline incision

## 2021-07-27 NOTE — ASSESSMENT
[FreeTextEntry1] : 51y.o. M w/ recent diagnosis of metastatic colon cancer (to the liver) s/p laparoscopic extended left colectomy on 6/4/21 presenting for a follow-up visit. Final pathology [id number].

## 2021-08-02 ENCOUNTER — RESULT REVIEW (OUTPATIENT)
Age: 51
End: 2021-08-02

## 2021-08-02 ENCOUNTER — APPOINTMENT (OUTPATIENT)
Dept: HEMATOLOGY ONCOLOGY | Facility: CLINIC | Age: 51
End: 2021-08-02

## 2021-08-02 LAB
ANISOCYTOSIS BLD QL: SLIGHT — SIGNIFICANT CHANGE UP
BASOPHILS # BLD AUTO: 0.06 K/UL — SIGNIFICANT CHANGE UP (ref 0–0.2)
BASOPHILS NFR BLD AUTO: 1 % — SIGNIFICANT CHANGE UP (ref 0–2)
ELLIPTOCYTES BLD QL SMEAR: SLIGHT — SIGNIFICANT CHANGE UP
EOSINOPHIL # BLD AUTO: 0.34 K/UL — SIGNIFICANT CHANGE UP (ref 0–0.5)
EOSINOPHIL NFR BLD AUTO: 6 % — SIGNIFICANT CHANGE UP (ref 0–6)
HCT VFR BLD CALC: 32.8 % — LOW (ref 39–50)
HGB BLD-MCNC: 10 G/DL — LOW (ref 13–17)
HYPOCHROMIA BLD QL: SLIGHT — SIGNIFICANT CHANGE UP
LYMPHOCYTES # BLD AUTO: 2.47 K/UL — SIGNIFICANT CHANGE UP (ref 1–3.3)
LYMPHOCYTES # BLD AUTO: 44 % — SIGNIFICANT CHANGE UP (ref 13–44)
MCHC RBC-ENTMCNC: 19.4 PG — LOW (ref 27–34)
MCHC RBC-ENTMCNC: 30.5 G/DL — LOW (ref 32–36)
MCV RBC AUTO: 63.7 FL — LOW (ref 80–100)
MICROCYTES BLD QL: SLIGHT — SIGNIFICANT CHANGE UP
MONOCYTES # BLD AUTO: 0.56 K/UL — SIGNIFICANT CHANGE UP (ref 0–0.9)
MONOCYTES NFR BLD AUTO: 10 % — SIGNIFICANT CHANGE UP (ref 2–14)
NEUTROPHILS # BLD AUTO: 2.19 K/UL — SIGNIFICANT CHANGE UP (ref 1.8–7.4)
NEUTROPHILS NFR BLD AUTO: 39 % — LOW (ref 43–77)
NRBC # BLD: 0 /100 — SIGNIFICANT CHANGE UP (ref 0–0)
NRBC # BLD: SIGNIFICANT CHANGE UP /100 WBCS (ref 0–0)
PLAT MORPH BLD: NORMAL — SIGNIFICANT CHANGE UP
PLATELET # BLD AUTO: 242 K/UL — SIGNIFICANT CHANGE UP (ref 150–400)
POIKILOCYTOSIS BLD QL AUTO: SLIGHT — SIGNIFICANT CHANGE UP
RBC # BLD: 5.15 M/UL — SIGNIFICANT CHANGE UP (ref 4.2–5.8)
RBC # FLD: 28.3 % — HIGH (ref 10.3–14.5)
RBC BLD AUTO: SIGNIFICANT CHANGE UP
WBC # BLD: 5.62 K/UL — SIGNIFICANT CHANGE UP (ref 3.8–10.5)
WBC # FLD AUTO: 5.62 K/UL — SIGNIFICANT CHANGE UP (ref 3.8–10.5)

## 2021-08-03 ENCOUNTER — APPOINTMENT (OUTPATIENT)
Dept: INFUSION THERAPY | Facility: HOSPITAL | Age: 51
End: 2021-08-03

## 2021-08-03 LAB
ALBUMIN SERPL ELPH-MCNC: 4.2 G/DL
ALP BLD-CCNC: 109 U/L
ALT SERPL-CCNC: 26 U/L
ANION GAP SERPL CALC-SCNC: 12 MMOL/L
APPEARANCE: CLEAR
AST SERPL-CCNC: 24 U/L
BILIRUB SERPL-MCNC: 0.2 MG/DL
BILIRUBIN URINE: NEGATIVE
BLOOD URINE: NEGATIVE
BUN SERPL-MCNC: 18 MG/DL
CALCIUM SERPL-MCNC: 9.3 MG/DL
CHLORIDE SERPL-SCNC: 104 MMOL/L
CO2 SERPL-SCNC: 23 MMOL/L
COLOR: NORMAL
CREAT SERPL-MCNC: 0.97 MG/DL
CREAT SPEC-SCNC: 104 MG/DL
CREAT/PROT UR: 0.1 RATIO
GLUCOSE QUALITATIVE U: NEGATIVE
GLUCOSE SERPL-MCNC: 96 MG/DL
KETONES URINE: NEGATIVE
LEUKOCYTE ESTERASE URINE: NEGATIVE
MAGNESIUM SERPL-MCNC: 2 MG/DL
NITRITE URINE: NEGATIVE
PH URINE: 6.5
PHOSPHATE SERPL-MCNC: 3.5 MG/DL
POTASSIUM SERPL-SCNC: 4.5 MMOL/L
PROT SERPL-MCNC: 7.2 G/DL
PROT UR-MCNC: 8 MG/DL
PROTEIN URINE: NEGATIVE
SODIUM SERPL-SCNC: 139 MMOL/L
SPECIFIC GRAVITY URINE: 1.02
UROBILINOGEN URINE: NORMAL

## 2021-08-04 ENCOUNTER — NON-APPOINTMENT (OUTPATIENT)
Age: 51
End: 2021-08-04

## 2021-08-04 ENCOUNTER — APPOINTMENT (OUTPATIENT)
Dept: INFUSION THERAPY | Facility: HOSPITAL | Age: 51
End: 2021-08-04

## 2021-08-04 ENCOUNTER — APPOINTMENT (OUTPATIENT)
Dept: HEMATOLOGY ONCOLOGY | Facility: CLINIC | Age: 51
End: 2021-08-04
Payer: MEDICARE

## 2021-08-04 VITALS
DIASTOLIC BLOOD PRESSURE: 94 MMHG | OXYGEN SATURATION: 100 % | WEIGHT: 200.62 LBS | BODY MASS INDEX: 31.42 KG/M2 | HEART RATE: 93 BPM | RESPIRATION RATE: 14 BRPM | TEMPERATURE: 98 F | SYSTOLIC BLOOD PRESSURE: 138 MMHG

## 2021-08-04 PROCEDURE — 99214 OFFICE O/P EST MOD 30 MIN: CPT

## 2021-08-04 NOTE — HISTORY OF PRESENT ILLNESS
[Disease: _____________________] : Disease: [unfilled] [T: ___] : T[unfilled] [N: ___] : N[unfilled] [M: ___] : M[unfilled] [AJCC Stage: ____] : AJCC Stage: [unfilled] [de-identified] : Mr Granda in 2021 at the age of 51 presented to the hospital with abdominal pain and underwent imaging that revealed a distal transverse colon lesion with evidence of microperforation.\par He is also underwent a colonoscopy which demonstrated an endoscopically obstructing distal transverse colon mass.  Given the potential for impending obstruction as well as microperforation seen on imaging decision was made to take patient to the OR for a left hemicolectomy and anastomosis.\par Imaging done at that time also showed evidence of metastatic disease to the liver and potentially lung and a questionable lesion as well.  Patient presents with his sister for initial visit and to discuss palliative chemotherapy.\par \par Enrolled in [employer] \par \par Started FOLFOX + Vit D July 7th, 2021 (bevacizumab added cycle #2) [de-identified] : Microsatellite stable [de-identified] : KRAS G12D  [FreeTextEntry1] : FOLFOX + Bevacizumab +VitD ([employer] study) [de-identified] : tolerated cycle 2 FOLFOX\par grade 1 peripheral neuropathy for 1-2 day\par feels well, gaining weight , no abdomninal pain \par no nausea or emesis \par no abdominal pain

## 2021-08-04 NOTE — PHYSICAL EXAM
[Fully active, able to carry on all pre-disease performance without restriction] : Status 0 - Fully active, able to carry on all pre-disease performance without restriction [Normal] : affect appropriate [de-identified] : anicteric  [de-identified] : no jvd  [de-identified] : well healing midline incision

## 2021-08-05 DIAGNOSIS — C18.9 MALIGNANT NEOPLASM OF COLON, UNSPECIFIED: ICD-10-CM

## 2021-08-05 DIAGNOSIS — Z51.11 ENCOUNTER FOR ANTINEOPLASTIC CHEMOTHERAPY: ICD-10-CM

## 2021-08-05 DIAGNOSIS — R11.2 NAUSEA WITH VOMITING, UNSPECIFIED: ICD-10-CM

## 2021-08-06 ENCOUNTER — APPOINTMENT (OUTPATIENT)
Dept: INFUSION THERAPY | Facility: HOSPITAL | Age: 51
End: 2021-08-06

## 2021-08-13 ENCOUNTER — RESULT REVIEW (OUTPATIENT)
Age: 51
End: 2021-08-13

## 2021-08-15 ENCOUNTER — OUTPATIENT (OUTPATIENT)
Dept: OUTPATIENT SERVICES | Facility: HOSPITAL | Age: 51
LOS: 1 days | Discharge: HOME | End: 2021-08-15

## 2021-08-15 DIAGNOSIS — C18.9 MALIGNANT NEOPLASM OF COLON, UNSPECIFIED: ICD-10-CM

## 2021-08-16 ENCOUNTER — RESULT REVIEW (OUTPATIENT)
Age: 51
End: 2021-08-16

## 2021-08-16 ENCOUNTER — APPOINTMENT (OUTPATIENT)
Dept: HEMATOLOGY ONCOLOGY | Facility: CLINIC | Age: 51
End: 2021-08-16

## 2021-08-16 LAB
ANISOCYTOSIS BLD QL: SLIGHT — SIGNIFICANT CHANGE UP
BASOPHILS # BLD AUTO: 0 K/UL — SIGNIFICANT CHANGE UP (ref 0–0.2)
BASOPHILS NFR BLD AUTO: 0 % — SIGNIFICANT CHANGE UP (ref 0–2)
ELLIPTOCYTES BLD QL SMEAR: SLIGHT — SIGNIFICANT CHANGE UP
EOSINOPHIL # BLD AUTO: 0.19 K/UL — SIGNIFICANT CHANGE UP (ref 0–0.5)
EOSINOPHIL NFR BLD AUTO: 4 % — SIGNIFICANT CHANGE UP (ref 0–6)
HCT VFR BLD CALC: 33.2 % — LOW (ref 39–50)
HGB BLD-MCNC: 10.4 G/DL — LOW (ref 13–17)
HYPOCHROMIA BLD QL: SLIGHT — SIGNIFICANT CHANGE UP
LYMPHOCYTES # BLD AUTO: 2.16 K/UL — SIGNIFICANT CHANGE UP (ref 1–3.3)
LYMPHOCYTES # BLD AUTO: 46 % — HIGH (ref 13–44)
MACROCYTES BLD QL: SLIGHT — SIGNIFICANT CHANGE UP
MCHC RBC-ENTMCNC: 19.9 PG — LOW (ref 27–34)
MCHC RBC-ENTMCNC: 31.3 G/DL — LOW (ref 32–36)
MCV RBC AUTO: 63.6 FL — LOW (ref 80–100)
METAMYELOCYTES # FLD: 1 % — HIGH (ref 0–0)
MICROCYTES BLD QL: SLIGHT — SIGNIFICANT CHANGE UP
MONOCYTES # BLD AUTO: 0.52 K/UL — SIGNIFICANT CHANGE UP (ref 0–0.9)
MONOCYTES NFR BLD AUTO: 11 % — SIGNIFICANT CHANGE UP (ref 2–14)
NEUTROPHILS # BLD AUTO: 1.78 K/UL — LOW (ref 1.8–7.4)
NEUTROPHILS NFR BLD AUTO: 38 % — LOW (ref 43–77)
NRBC # BLD: 0 /100 — SIGNIFICANT CHANGE UP (ref 0–0)
NRBC # BLD: SIGNIFICANT CHANGE UP /100 WBCS (ref 0–0)
PLAT MORPH BLD: NORMAL — SIGNIFICANT CHANGE UP
PLATELET # BLD AUTO: 236 K/UL — SIGNIFICANT CHANGE UP (ref 150–400)
POIKILOCYTOSIS BLD QL AUTO: SLIGHT — SIGNIFICANT CHANGE UP
RBC # BLD: 5.22 M/UL — SIGNIFICANT CHANGE UP (ref 4.2–5.8)
RBC # FLD: 28.1 % — HIGH (ref 10.3–14.5)
RBC BLD AUTO: SIGNIFICANT CHANGE UP
TARGETS BLD QL SMEAR: SLIGHT — SIGNIFICANT CHANGE UP
WBC # BLD: 4.69 K/UL — SIGNIFICANT CHANGE UP (ref 3.8–10.5)
WBC # FLD AUTO: 4.69 K/UL — SIGNIFICANT CHANGE UP (ref 3.8–10.5)

## 2021-08-17 ENCOUNTER — APPOINTMENT (OUTPATIENT)
Dept: INFUSION THERAPY | Facility: HOSPITAL | Age: 51
End: 2021-08-17

## 2021-08-18 ENCOUNTER — APPOINTMENT (OUTPATIENT)
Dept: HEMATOLOGY ONCOLOGY | Facility: CLINIC | Age: 51
End: 2021-08-18
Payer: COMMERCIAL

## 2021-08-18 ENCOUNTER — NON-APPOINTMENT (OUTPATIENT)
Age: 51
End: 2021-08-18

## 2021-08-18 ENCOUNTER — APPOINTMENT (OUTPATIENT)
Dept: INFUSION THERAPY | Facility: HOSPITAL | Age: 51
End: 2021-08-18

## 2021-08-18 VITALS
OXYGEN SATURATION: 100 % | SYSTOLIC BLOOD PRESSURE: 144 MMHG | HEART RATE: 98 BPM | RESPIRATION RATE: 16 BRPM | DIASTOLIC BLOOD PRESSURE: 86 MMHG | WEIGHT: 207.23 LBS | BODY MASS INDEX: 32.46 KG/M2 | TEMPERATURE: 97.9 F

## 2021-08-18 DIAGNOSIS — R11.2 NAUSEA WITH VOMITING, UNSPECIFIED: ICD-10-CM

## 2021-08-18 DIAGNOSIS — Z51.11 ENCOUNTER FOR ANTINEOPLASTIC CHEMOTHERAPY: ICD-10-CM

## 2021-08-18 PROCEDURE — 99213 OFFICE O/P EST LOW 20 MIN: CPT

## 2021-08-18 NOTE — HISTORY OF PRESENT ILLNESS
[de-identified] : Mr Granda in 2021 at the age of 51 presented to the hospital with abdominal pain and underwent imaging that revealed a distal transverse colon lesion with evidence of microperforation.\par He is also underwent a colonoscopy which demonstrated an endoscopically obstructing distal transverse colon mass. Given the potential for impending obstruction as well as microperforation seen on imaging decision was made to take patient to the OR for a left hemicolectomy and anastomosis.\par Imaging done at that time also showed evidence of metastatic disease to the liver and potentially lung and a questionable lesion as well. Patient presents with his sister for initial visit and to discuss palliative chemotherapy.\par \par Enrolled in [employer] \par \par Started FOLFOX + Vit D July 7th, 2021 (bevacizumab added cycle #2). \par \par Disease: Transverse colon adenocarcinoma \par TNM stage: T4a, N0, M1 \par AJCC Stage: IV  \par Tumor Markers: Microsatellite stable \par \par KRAS G12D \par  [FreeTextEntry1] : FOLFOX + Bevacizumab +VitD ([employer] study [de-identified] : overall tolerating treatment well. denies any c/o cold neuropathy in first 2 days, then resolves. good appetite, gained some weight. Moving bowels regularly. \par needs a letter to allow him to go back to work by Aug 29.

## 2021-08-19 ENCOUNTER — NON-APPOINTMENT (OUTPATIENT)
Age: 51
End: 2021-08-19

## 2021-08-20 ENCOUNTER — APPOINTMENT (OUTPATIENT)
Dept: INFUSION THERAPY | Facility: HOSPITAL | Age: 51
End: 2021-08-20

## 2021-08-22 LAB
ALBUMIN SERPL ELPH-MCNC: 4.1 G/DL
ALP BLD-CCNC: 98 U/L
ALT SERPL-CCNC: 27 U/L
ANION GAP SERPL CALC-SCNC: 10 MMOL/L
AST SERPL-CCNC: 27 U/L
BILIRUB SERPL-MCNC: 0.6 MG/DL
BUN SERPL-MCNC: 13 MG/DL
CALCIUM SERPL-MCNC: 9 MG/DL
CHLORIDE SERPL-SCNC: 105 MMOL/L
CO2 SERPL-SCNC: 26 MMOL/L
CREAT SERPL-MCNC: 1.22 MG/DL
CREAT SPEC-SCNC: 166 MG/DL
CREAT/PROT UR: 0 RATIO
GLUCOSE SERPL-MCNC: 166 MG/DL
POTASSIUM SERPL-SCNC: 4.7 MMOL/L
PROT SERPL-MCNC: 6.9 G/DL
PROT UR-MCNC: 7 MG/DL
SODIUM SERPL-SCNC: 141 MMOL/L

## 2021-08-23 ENCOUNTER — APPOINTMENT (OUTPATIENT)
Dept: HEMATOLOGY ONCOLOGY | Facility: CLINIC | Age: 51
End: 2021-08-23
Payer: COMMERCIAL

## 2021-08-23 ENCOUNTER — APPOINTMENT (OUTPATIENT)
Dept: CT IMAGING | Facility: IMAGING CENTER | Age: 51
End: 2021-08-23
Payer: COMMERCIAL

## 2021-08-23 ENCOUNTER — APPOINTMENT (OUTPATIENT)
Dept: INFUSION THERAPY | Facility: HOSPITAL | Age: 51
End: 2021-08-23

## 2021-08-23 ENCOUNTER — OUTPATIENT (OUTPATIENT)
Dept: OUTPATIENT SERVICES | Facility: HOSPITAL | Age: 51
LOS: 1 days | End: 2021-08-23
Payer: COMMERCIAL

## 2021-08-23 VITALS
OXYGEN SATURATION: 99 % | DIASTOLIC BLOOD PRESSURE: 89 MMHG | HEIGHT: 66.97 IN | SYSTOLIC BLOOD PRESSURE: 129 MMHG | WEIGHT: 208.54 LBS | TEMPERATURE: 97.8 F | RESPIRATION RATE: 18 BRPM | BODY MASS INDEX: 32.73 KG/M2 | HEART RATE: 86 BPM

## 2021-08-23 DIAGNOSIS — Z00.8 ENCOUNTER FOR OTHER GENERAL EXAMINATION: ICD-10-CM

## 2021-08-23 PROCEDURE — 71260 CT THORAX DX C+: CPT

## 2021-08-23 PROCEDURE — 74177 CT ABD & PELVIS W/CONTRAST: CPT | Mod: 26

## 2021-08-23 PROCEDURE — 99215 OFFICE O/P EST HI 40 MIN: CPT

## 2021-08-23 PROCEDURE — 71260 CT THORAX DX C+: CPT | Mod: 26

## 2021-08-23 PROCEDURE — 82565 ASSAY OF CREATININE: CPT

## 2021-08-23 PROCEDURE — 74177 CT ABD & PELVIS W/CONTRAST: CPT

## 2021-08-24 NOTE — HISTORY OF PRESENT ILLNESS
[Disease: _____________________] : Disease: [unfilled] [T: ___] : T[unfilled] [N: ___] : N[unfilled] [M: ___] : M[unfilled] [AJCC Stage: ____] : AJCC Stage: [unfilled] [de-identified] : Mr Granda in 2021 at the age of 51 presented to the hospital with abdominal pain and underwent imaging that revealed a distal transverse colon lesion with evidence of microperforation.\par He is also underwent a colonoscopy which demonstrated an endoscopically obstructing distal transverse colon mass.  Given the potential for impending obstruction as well as microperforation seen on imaging decision was made to take patient to the OR for a left hemicolectomy and anastomosis.\par Imaging done at that time also showed evidence of metastatic disease to the liver and potentially lung and a questionable lesion as well.  Patient presents with his sister for initial visit and to discuss palliative chemotherapy.\par \par Enrolled in [employer] \par \par Started FOLFOX + Vit D July 7th, 2021 (bevacizumab added cycle #2) [de-identified] : Microsatellite stable [de-identified] : KRAS G12D  [FreeTextEntry1] : FOLFOX + Bevacizumab +VitD ([employer] study) [de-identified] : presents for acute follow up after imaging showed response to therapy however PE noted \par no CP, SOB or CASTILLO

## 2021-08-24 NOTE — REASON FOR VISIT
[Follow-Up Visit] : a follow-up [Urgent Visit] : an urgent  [Research Visit] : a research  [Family Member] : family member [FreeTextEntry2] : colon cancer

## 2021-08-30 ENCOUNTER — RESULT REVIEW (OUTPATIENT)
Age: 51
End: 2021-08-30

## 2021-08-30 ENCOUNTER — APPOINTMENT (OUTPATIENT)
Dept: HEMATOLOGY ONCOLOGY | Facility: CLINIC | Age: 51
End: 2021-08-30

## 2021-08-30 ENCOUNTER — LABORATORY RESULT (OUTPATIENT)
Age: 51
End: 2021-08-30

## 2021-08-30 LAB
ANISOCYTOSIS BLD QL: SLIGHT — SIGNIFICANT CHANGE UP
BASOPHILS # BLD AUTO: 0.02 K/UL — SIGNIFICANT CHANGE UP (ref 0–0.2)
BASOPHILS NFR BLD AUTO: 0.5 % — SIGNIFICANT CHANGE UP (ref 0–2)
ELLIPTOCYTES BLD QL SMEAR: SLIGHT — SIGNIFICANT CHANGE UP
EOSINOPHIL # BLD AUTO: 0.11 K/UL — SIGNIFICANT CHANGE UP (ref 0–0.5)
EOSINOPHIL NFR BLD AUTO: 2.8 % — SIGNIFICANT CHANGE UP (ref 0–6)
HCT VFR BLD CALC: 34.2 % — LOW (ref 39–50)
HGB BLD-MCNC: 10.8 G/DL — LOW (ref 13–17)
HYPOCHROMIA BLD QL: SLIGHT — SIGNIFICANT CHANGE UP
IMM GRANULOCYTES NFR BLD AUTO: 0.3 % — SIGNIFICANT CHANGE UP (ref 0–1.5)
LYMPHOCYTES # BLD AUTO: 1.95 K/UL — SIGNIFICANT CHANGE UP (ref 1–3.3)
LYMPHOCYTES # BLD AUTO: 49 % — HIGH (ref 13–44)
MACROCYTES BLD QL: SLIGHT — SIGNIFICANT CHANGE UP
MCHC RBC-ENTMCNC: 20.6 PG — LOW (ref 27–34)
MCHC RBC-ENTMCNC: 31.6 G/DL — LOW (ref 32–36)
MCV RBC AUTO: 65.1 FL — LOW (ref 80–100)
MICROCYTES BLD QL: SLIGHT — SIGNIFICANT CHANGE UP
MONOCYTES # BLD AUTO: 0.57 K/UL — SIGNIFICANT CHANGE UP (ref 0–0.9)
MONOCYTES NFR BLD AUTO: 14.3 % — HIGH (ref 2–14)
NEUTROPHILS # BLD AUTO: 1.32 K/UL — LOW (ref 1.8–7.4)
NEUTROPHILS NFR BLD AUTO: 33.1 % — LOW (ref 43–77)
NRBC # BLD: 0 /100 WBCS — SIGNIFICANT CHANGE UP (ref 0–0)
PLAT MORPH BLD: NORMAL — SIGNIFICANT CHANGE UP
PLATELET # BLD AUTO: 188 K/UL — SIGNIFICANT CHANGE UP (ref 150–400)
POIKILOCYTOSIS BLD QL AUTO: SLIGHT — SIGNIFICANT CHANGE UP
RBC # BLD: 5.25 M/UL — SIGNIFICANT CHANGE UP (ref 4.2–5.8)
RBC # FLD: 27.7 % — HIGH (ref 10.3–14.5)
RBC BLD AUTO: ABNORMAL
TARGETS BLD QL SMEAR: SLIGHT — SIGNIFICANT CHANGE UP
WBC # BLD: 3.98 K/UL — SIGNIFICANT CHANGE UP (ref 3.8–10.5)
WBC # FLD AUTO: 3.98 K/UL — SIGNIFICANT CHANGE UP (ref 3.8–10.5)

## 2021-08-31 LAB
ALBUMIN SERPL ELPH-MCNC: 3.9 G/DL
ALP BLD-CCNC: 80 U/L
ALT SERPL-CCNC: 30 U/L
ANION GAP SERPL CALC-SCNC: 10 MMOL/L
AST SERPL-CCNC: 30 U/L
BILIRUB SERPL-MCNC: 0.5 MG/DL
BUN SERPL-MCNC: 17 MG/DL
CALCIUM SERPL-MCNC: 8.7 MG/DL
CHLORIDE SERPL-SCNC: 106 MMOL/L
CO2 SERPL-SCNC: 24 MMOL/L
CREAT SERPL-MCNC: 1.11 MG/DL
CREAT SPEC-SCNC: 165 MG/DL
CREAT/PROT UR: 0 RATIO
GLUCOSE SERPL-MCNC: 132 MG/DL
POTASSIUM SERPL-SCNC: 4.2 MMOL/L
PROT SERPL-MCNC: 6.8 G/DL
PROT UR-MCNC: 7 MG/DL
SODIUM SERPL-SCNC: 140 MMOL/L

## 2021-09-01 ENCOUNTER — LABORATORY RESULT (OUTPATIENT)
Age: 51
End: 2021-09-01

## 2021-09-01 ENCOUNTER — APPOINTMENT (OUTPATIENT)
Dept: INFUSION THERAPY | Facility: HOSPITAL | Age: 51
End: 2021-09-01

## 2021-09-01 ENCOUNTER — APPOINTMENT (OUTPATIENT)
Dept: HEMATOLOGY ONCOLOGY | Facility: CLINIC | Age: 51
End: 2021-09-01
Payer: COMMERCIAL

## 2021-09-01 ENCOUNTER — NON-APPOINTMENT (OUTPATIENT)
Age: 51
End: 2021-09-01

## 2021-09-01 VITALS
RESPIRATION RATE: 16 BRPM | WEIGHT: 209.44 LBS | TEMPERATURE: 98 F | BODY MASS INDEX: 32.83 KG/M2 | DIASTOLIC BLOOD PRESSURE: 94 MMHG | OXYGEN SATURATION: 100 % | HEART RATE: 81 BPM | SYSTOLIC BLOOD PRESSURE: 146 MMHG

## 2021-09-01 PROCEDURE — 99214 OFFICE O/P EST MOD 30 MIN: CPT

## 2021-09-03 ENCOUNTER — NON-APPOINTMENT (OUTPATIENT)
Age: 51
End: 2021-09-03

## 2021-09-03 ENCOUNTER — APPOINTMENT (OUTPATIENT)
Dept: INFUSION THERAPY | Facility: HOSPITAL | Age: 51
End: 2021-09-03

## 2021-09-03 RX ORDER — METRONIDAZOLE 500 MG/1
500 TABLET ORAL
Qty: 24 | Refills: 0 | Status: DISCONTINUED | COMMUNITY
Start: 2021-06-08

## 2021-09-03 RX ORDER — OXYCODONE 5 MG/1
5 TABLET ORAL
Qty: 8 | Refills: 0 | Status: DISCONTINUED | COMMUNITY
Start: 2021-06-08

## 2021-09-03 RX ORDER — ENOXAPARIN SODIUM 100 MG/ML
40 INJECTION SUBCUTANEOUS
Qty: 11 | Refills: 0 | Status: DISCONTINUED | COMMUNITY
Start: 2021-06-08

## 2021-09-03 NOTE — PHYSICAL EXAM
[Fully active, able to carry on all pre-disease performance without restriction] : Status 0 - Fully active, able to carry on all pre-disease performance without restriction [Normal] : affect appropriate [de-identified] : well healing incisons

## 2021-09-03 NOTE — HISTORY OF PRESENT ILLNESS
[Disease: _____________________] : Disease: [unfilled] [T: ___] : T[unfilled] [N: ___] : N[unfilled] [M: ___] : M[unfilled] [AJCC Stage: ____] : AJCC Stage: [unfilled] [de-identified] : Mr Granda in 2021 at the age of 51 presented to the hospital with abdominal pain and underwent imaging that revealed a distal transverse colon lesion with evidence of microperforation.\par He is also underwent a colonoscopy which demonstrated an endoscopically obstructing distal transverse colon mass.  Given the potential for impending obstruction as well as microperforation seen on imaging decision was made to take patient to the OR for a left hemicolectomy and anastomosis.\par Imaging done at that time also showed evidence of metastatic disease to the liver and potentially lung and a questionable lesion as well.  Patient presents with his sister for initial visit and to discuss palliative chemotherapy.\par \par Enrolled in [employer] \par \par Started FOLFOX + Vit D July 7th, 2021 (bevacizumab added cycle #2) [de-identified] : Microsatellite stable [de-identified] : KRAS G12D , NABILA [FreeTextEntry1] : FOLFOX + Bevacizumab +VitD ([employer] study) [de-identified] : presents for follow up , back at work \par minimal neuropathy\par tolerating lovenox\par no fever or chills

## 2021-09-13 ENCOUNTER — RESULT REVIEW (OUTPATIENT)
Age: 51
End: 2021-09-13

## 2021-09-13 ENCOUNTER — LABORATORY RESULT (OUTPATIENT)
Age: 51
End: 2021-09-13

## 2021-09-13 ENCOUNTER — OUTPATIENT (OUTPATIENT)
Dept: OUTPATIENT SERVICES | Facility: HOSPITAL | Age: 51
LOS: 1 days | Discharge: HOME | End: 2021-09-13

## 2021-09-13 ENCOUNTER — APPOINTMENT (OUTPATIENT)
Dept: HEMATOLOGY ONCOLOGY | Facility: CLINIC | Age: 51
End: 2021-09-13

## 2021-09-13 DIAGNOSIS — C18.9 MALIGNANT NEOPLASM OF COLON, UNSPECIFIED: ICD-10-CM

## 2021-09-13 LAB
ANISOCYTOSIS BLD QL: SLIGHT — SIGNIFICANT CHANGE UP
BASOPHILS # BLD AUTO: 0 K/UL — SIGNIFICANT CHANGE UP (ref 0–0.2)
BASOPHILS NFR BLD AUTO: 0 % — SIGNIFICANT CHANGE UP (ref 0–2)
ELLIPTOCYTES BLD QL SMEAR: SLIGHT — SIGNIFICANT CHANGE UP
EOSINOPHIL # BLD AUTO: 0.04 K/UL — SIGNIFICANT CHANGE UP (ref 0–0.5)
EOSINOPHIL NFR BLD AUTO: 1 % — SIGNIFICANT CHANGE UP (ref 0–6)
HCT VFR BLD CALC: 35.3 % — LOW (ref 39–50)
HGB BLD-MCNC: 11.2 G/DL — LOW (ref 13–17)
HYPOCHROMIA BLD QL: SLIGHT — SIGNIFICANT CHANGE UP
LYMPHOCYTES # BLD AUTO: 2.4 K/UL — SIGNIFICANT CHANGE UP (ref 1–3.3)
LYMPHOCYTES # BLD AUTO: 60 % — HIGH (ref 13–44)
MACROCYTES BLD QL: SLIGHT — SIGNIFICANT CHANGE UP
MCHC RBC-ENTMCNC: 20.7 PG — LOW (ref 27–34)
MCHC RBC-ENTMCNC: 31.7 G/DL — LOW (ref 32–36)
MCV RBC AUTO: 65.2 FL — LOW (ref 80–100)
MICROCYTES BLD QL: SLIGHT — SIGNIFICANT CHANGE UP
MONOCYTES # BLD AUTO: 0.8 K/UL — SIGNIFICANT CHANGE UP (ref 0–0.9)
MONOCYTES NFR BLD AUTO: 20 % — HIGH (ref 2–14)
NEUTROPHILS # BLD AUTO: 0.76 K/UL — LOW (ref 1.8–7.4)
NEUTROPHILS NFR BLD AUTO: 19 % — LOW (ref 43–77)
NRBC # BLD: 1 /100 — HIGH (ref 0–0)
NRBC # BLD: SIGNIFICANT CHANGE UP /100 WBCS (ref 0–0)
PLAT MORPH BLD: NORMAL — SIGNIFICANT CHANGE UP
PLATELET # BLD AUTO: 214 K/UL — SIGNIFICANT CHANGE UP (ref 150–400)
POIKILOCYTOSIS BLD QL AUTO: SLIGHT — SIGNIFICANT CHANGE UP
RBC # BLD: 5.41 M/UL — SIGNIFICANT CHANGE UP (ref 4.2–5.8)
RBC # FLD: 27.9 % — HIGH (ref 10.3–14.5)
RBC BLD AUTO: ABNORMAL
TARGETS BLD QL SMEAR: SLIGHT — SIGNIFICANT CHANGE UP
WBC # BLD: 4 K/UL — SIGNIFICANT CHANGE UP (ref 3.8–10.5)
WBC # FLD AUTO: 4 K/UL — SIGNIFICANT CHANGE UP (ref 3.8–10.5)

## 2021-09-14 LAB
ALBUMIN SERPL ELPH-MCNC: 4.1 G/DL
ALP BLD-CCNC: 78 U/L
ALT SERPL-CCNC: 31 U/L
ANION GAP SERPL CALC-SCNC: 12 MMOL/L
AST SERPL-CCNC: 32 U/L
BILIRUB SERPL-MCNC: 0.6 MG/DL
BUN SERPL-MCNC: 12 MG/DL
CALCIUM SERPL-MCNC: 9.2 MG/DL
CHLORIDE SERPL-SCNC: 102 MMOL/L
CO2 SERPL-SCNC: 24 MMOL/L
CREAT SERPL-MCNC: 0.92 MG/DL
CREAT SPEC-SCNC: 153 MG/DL
CREAT/PROT UR: 0.1 RATIO
GLUCOSE SERPL-MCNC: 145 MG/DL
POTASSIUM SERPL-SCNC: 5 MMOL/L
PROT SERPL-MCNC: 7 G/DL
PROT UR-MCNC: 15 MG/DL
SODIUM SERPL-SCNC: 137 MMOL/L

## 2021-09-15 ENCOUNTER — APPOINTMENT (OUTPATIENT)
Dept: HEMATOLOGY ONCOLOGY | Facility: CLINIC | Age: 51
End: 2021-09-15
Payer: COMMERCIAL

## 2021-09-15 ENCOUNTER — RESULT REVIEW (OUTPATIENT)
Age: 51
End: 2021-09-15

## 2021-09-15 ENCOUNTER — APPOINTMENT (OUTPATIENT)
Dept: INFUSION THERAPY | Facility: HOSPITAL | Age: 51
End: 2021-09-15

## 2021-09-15 ENCOUNTER — NON-APPOINTMENT (OUTPATIENT)
Age: 51
End: 2021-09-15

## 2021-09-15 ENCOUNTER — LABORATORY RESULT (OUTPATIENT)
Age: 51
End: 2021-09-15

## 2021-09-15 VITALS
SYSTOLIC BLOOD PRESSURE: 152 MMHG | OXYGEN SATURATION: 99 % | BODY MASS INDEX: 33.18 KG/M2 | WEIGHT: 211.64 LBS | HEART RATE: 83 BPM | RESPIRATION RATE: 16 BRPM | DIASTOLIC BLOOD PRESSURE: 100 MMHG | TEMPERATURE: 98.1 F

## 2021-09-15 VITALS — DIASTOLIC BLOOD PRESSURE: 103 MMHG | SYSTOLIC BLOOD PRESSURE: 153 MMHG

## 2021-09-15 DIAGNOSIS — R11.2 NAUSEA WITH VOMITING, UNSPECIFIED: ICD-10-CM

## 2021-09-15 DIAGNOSIS — Z51.11 ENCOUNTER FOR ANTINEOPLASTIC CHEMOTHERAPY: ICD-10-CM

## 2021-09-15 LAB
ANISOCYTOSIS BLD QL: SLIGHT — SIGNIFICANT CHANGE UP
APPEARANCE: CLEAR
BASOPHILS # BLD AUTO: 0.02 K/UL — SIGNIFICANT CHANGE UP (ref 0–0.2)
BASOPHILS NFR BLD AUTO: 0.4 % — SIGNIFICANT CHANGE UP (ref 0–2)
BILIRUBIN URINE: NEGATIVE
BLOOD URINE: NEGATIVE
COLOR: NORMAL
ELLIPTOCYTES BLD QL SMEAR: SLIGHT — SIGNIFICANT CHANGE UP
EOSINOPHIL # BLD AUTO: 0.08 K/UL — SIGNIFICANT CHANGE UP (ref 0–0.5)
EOSINOPHIL NFR BLD AUTO: 1.6 % — SIGNIFICANT CHANGE UP (ref 0–6)
GLUCOSE QUALITATIVE U: NEGATIVE
HCT VFR BLD CALC: 35.7 % — LOW (ref 39–50)
HGB BLD-MCNC: 11.4 G/DL — LOW (ref 13–17)
HYPOCHROMIA BLD QL: SLIGHT — SIGNIFICANT CHANGE UP
IMM GRANULOCYTES NFR BLD AUTO: 0.2 % — SIGNIFICANT CHANGE UP (ref 0–1.5)
KETONES URINE: NEGATIVE
LEUKOCYTE ESTERASE URINE: ABNORMAL
LYMPHOCYTES # BLD AUTO: 2.42 K/UL — SIGNIFICANT CHANGE UP (ref 1–3.3)
LYMPHOCYTES # BLD AUTO: 49.3 % — HIGH (ref 13–44)
MAGNESIUM SERPL-MCNC: 2.1 MG/DL
MCHC RBC-ENTMCNC: 20.7 PG — LOW (ref 27–34)
MCHC RBC-ENTMCNC: 31.9 G/DL — LOW (ref 32–36)
MCV RBC AUTO: 64.9 FL — LOW (ref 80–100)
MICROCYTES BLD QL: SLIGHT — SIGNIFICANT CHANGE UP
MONOCYTES # BLD AUTO: 0.76 K/UL — SIGNIFICANT CHANGE UP (ref 0–0.9)
MONOCYTES NFR BLD AUTO: 15.5 % — HIGH (ref 2–14)
NEUTROPHILS # BLD AUTO: 1.62 K/UL — LOW (ref 1.8–7.4)
NEUTROPHILS NFR BLD AUTO: 33 % — LOW (ref 43–77)
NITRITE URINE: NEGATIVE
NRBC # BLD: 0 /100 WBCS — SIGNIFICANT CHANGE UP (ref 0–0)
PH URINE: 6.5
PHOSPHATE SERPL-MCNC: 3.9 MG/DL
PLAT MORPH BLD: NORMAL — SIGNIFICANT CHANGE UP
PLATELET # BLD AUTO: 210 K/UL — SIGNIFICANT CHANGE UP (ref 150–400)
POIKILOCYTOSIS BLD QL AUTO: SLIGHT — SIGNIFICANT CHANGE UP
PROTEIN URINE: NEGATIVE
RBC # BLD: 5.5 M/UL — SIGNIFICANT CHANGE UP (ref 4.2–5.8)
RBC # FLD: 27.7 % — HIGH (ref 10.3–14.5)
RBC BLD AUTO: ABNORMAL
SPECIFIC GRAVITY URINE: 1.01
TARGETS BLD QL SMEAR: SLIGHT — SIGNIFICANT CHANGE UP
UROBILINOGEN URINE: NORMAL
WBC # BLD: 4.91 K/UL — SIGNIFICANT CHANGE UP (ref 3.8–10.5)
WBC # FLD AUTO: 4.91 K/UL — SIGNIFICANT CHANGE UP (ref 3.8–10.5)

## 2021-09-15 PROCEDURE — 99214 OFFICE O/P EST MOD 30 MIN: CPT

## 2021-09-15 NOTE — HISTORY OF PRESENT ILLNESS
[Disease: _____________________] : Disease: [unfilled] [T: ___] : T[unfilled] [N: ___] : N[unfilled] [M: ___] : M[unfilled] [AJCC Stage: ____] : AJCC Stage: [unfilled] [de-identified] : Mr Granda in 2021 at the age of 51 presented to the hospital with abdominal pain and underwent imaging that revealed a distal transverse colon lesion with evidence of microperforation.\par He is also underwent a colonoscopy which demonstrated an endoscopically obstructing distal transverse colon mass.  Given the potential for impending obstruction as well as microperforation seen on imaging decision was made to take patient to the OR for a left hemicolectomy and anastomosis.\par Imaging done at that time also showed evidence of metastatic disease to the liver and potentially lung and a questionable lesion as well.  Patient presents with his sister for initial visit and to discuss palliative chemotherapy.\par \par Enrolled in [employer] \par \par Started FOLFOX + Vit D July 7th, 2021 (bevacizumab added cycle #2)\par 8/23 scan shows PE started on Lovenox   [de-identified] : Microsatellite stable [de-identified] : KRAS G12D , NABILA [FreeTextEntry1] : FOLFOX + Bevacizumab +VitD ([employer] study) [de-identified] : presents for follow up ,working full time  \par minimal neuropathy\par tolerating lovenox\par no fever or chills

## 2021-09-15 NOTE — PHYSICAL EXAM
[Fully active, able to carry on all pre-disease performance without restriction] : Status 0 - Fully active, able to carry on all pre-disease performance without restriction [Normal] : affect appropriate [de-identified] : well healing incisons

## 2021-09-17 ENCOUNTER — APPOINTMENT (OUTPATIENT)
Dept: INFUSION THERAPY | Facility: HOSPITAL | Age: 51
End: 2021-09-17

## 2021-09-24 ENCOUNTER — RESULT REVIEW (OUTPATIENT)
Age: 51
End: 2021-09-24

## 2021-09-27 ENCOUNTER — RESULT REVIEW (OUTPATIENT)
Age: 51
End: 2021-09-27

## 2021-09-27 ENCOUNTER — LABORATORY RESULT (OUTPATIENT)
Age: 51
End: 2021-09-27

## 2021-09-27 ENCOUNTER — APPOINTMENT (OUTPATIENT)
Dept: HEMATOLOGY ONCOLOGY | Facility: CLINIC | Age: 51
End: 2021-09-27

## 2021-09-27 LAB
ALBUMIN SERPL ELPH-MCNC: 3.8 G/DL
ALP BLD-CCNC: 83 U/L
ALT SERPL-CCNC: 23 U/L
ANION GAP SERPL CALC-SCNC: 13 MMOL/L
ANISOCYTOSIS BLD QL: SLIGHT — SIGNIFICANT CHANGE UP
APPEARANCE: CLEAR
AST SERPL-CCNC: 24 U/L
BASOPHILS # BLD AUTO: 0.02 K/UL — SIGNIFICANT CHANGE UP (ref 0–0.2)
BASOPHILS NFR BLD AUTO: 0.4 % — SIGNIFICANT CHANGE UP (ref 0–2)
BILIRUB SERPL-MCNC: 0.4 MG/DL
BILIRUBIN URINE: NEGATIVE
BLOOD URINE: NEGATIVE
BUN SERPL-MCNC: 15 MG/DL
CALCIUM SERPL-MCNC: 9.1 MG/DL
CHLORIDE SERPL-SCNC: 103 MMOL/L
CO2 SERPL-SCNC: 22 MMOL/L
COLOR: NORMAL
CREAT SERPL-MCNC: 1.06 MG/DL
DACRYOCYTES BLD QL SMEAR: SLIGHT — SIGNIFICANT CHANGE UP
EOSINOPHIL # BLD AUTO: 0.03 K/UL — SIGNIFICANT CHANGE UP (ref 0–0.5)
EOSINOPHIL NFR BLD AUTO: 0.6 % — SIGNIFICANT CHANGE UP (ref 0–6)
GLUCOSE QUALITATIVE U: NEGATIVE
GLUCOSE SERPL-MCNC: 216 MG/DL
HCT VFR BLD CALC: 34.5 % — LOW (ref 39–50)
HGB BLD-MCNC: 11.1 G/DL — LOW (ref 13–17)
IMM GRANULOCYTES NFR BLD AUTO: 0.2 % — SIGNIFICANT CHANGE UP (ref 0–1.5)
KETONES URINE: NEGATIVE
LEUKOCYTE ESTERASE URINE: ABNORMAL
LYMPHOCYTES # BLD AUTO: 2.12 K/UL — SIGNIFICANT CHANGE UP (ref 1–3.3)
LYMPHOCYTES # BLD AUTO: 44.4 % — HIGH (ref 13–44)
MAGNESIUM SERPL-MCNC: 2 MG/DL
MCHC RBC-ENTMCNC: 21.1 PG — LOW (ref 27–34)
MCHC RBC-ENTMCNC: 32.2 G/DL — SIGNIFICANT CHANGE UP (ref 32–36)
MCV RBC AUTO: 65.5 FL — LOW (ref 80–100)
MICROCYTES BLD QL: SLIGHT — SIGNIFICANT CHANGE UP
MONOCYTES # BLD AUTO: 0.76 K/UL — SIGNIFICANT CHANGE UP (ref 0–0.9)
MONOCYTES NFR BLD AUTO: 15.9 % — HIGH (ref 2–14)
NEUTROPHILS # BLD AUTO: 1.83 K/UL — SIGNIFICANT CHANGE UP (ref 1.8–7.4)
NEUTROPHILS NFR BLD AUTO: 38.5 % — LOW (ref 43–77)
NITRITE URINE: NEGATIVE
NRBC # BLD: 0 /100 WBCS — SIGNIFICANT CHANGE UP (ref 0–0)
PH URINE: 6
PHOSPHATE SERPL-MCNC: 4.2 MG/DL
PLAT MORPH BLD: NORMAL — SIGNIFICANT CHANGE UP
PLATELET # BLD AUTO: 220 K/UL — SIGNIFICANT CHANGE UP (ref 150–400)
POIKILOCYTOSIS BLD QL AUTO: SLIGHT — SIGNIFICANT CHANGE UP
POTASSIUM SERPL-SCNC: 4.6 MMOL/L
PROT SERPL-MCNC: 6.6 G/DL
PROTEIN URINE: NEGATIVE
RBC # BLD: 5.27 M/UL — SIGNIFICANT CHANGE UP (ref 4.2–5.8)
RBC # FLD: 27.5 % — HIGH (ref 10.3–14.5)
RBC BLD AUTO: ABNORMAL
SODIUM SERPL-SCNC: 138 MMOL/L
SPECIFIC GRAVITY URINE: 1.02
TARGETS BLD QL SMEAR: SLIGHT — SIGNIFICANT CHANGE UP
UROBILINOGEN URINE: NORMAL
WBC # BLD: 4.77 K/UL — SIGNIFICANT CHANGE UP (ref 3.8–10.5)
WBC # FLD AUTO: 4.77 K/UL — SIGNIFICANT CHANGE UP (ref 3.8–10.5)

## 2021-09-28 ENCOUNTER — NON-APPOINTMENT (OUTPATIENT)
Age: 51
End: 2021-09-28

## 2021-09-29 ENCOUNTER — APPOINTMENT (OUTPATIENT)
Dept: INFUSION THERAPY | Facility: HOSPITAL | Age: 51
End: 2021-09-29

## 2021-09-29 ENCOUNTER — APPOINTMENT (OUTPATIENT)
Dept: HEMATOLOGY ONCOLOGY | Facility: CLINIC | Age: 51
End: 2021-09-29
Payer: COMMERCIAL

## 2021-09-29 ENCOUNTER — NON-APPOINTMENT (OUTPATIENT)
Age: 51
End: 2021-09-29

## 2021-09-29 VITALS — DIASTOLIC BLOOD PRESSURE: 87 MMHG | SYSTOLIC BLOOD PRESSURE: 137 MMHG

## 2021-09-29 VITALS
TEMPERATURE: 98.1 F | HEART RATE: 100 BPM | BODY MASS INDEX: 33.18 KG/M2 | OXYGEN SATURATION: 99 % | RESPIRATION RATE: 16 BRPM | DIASTOLIC BLOOD PRESSURE: 105 MMHG | SYSTOLIC BLOOD PRESSURE: 152 MMHG | WEIGHT: 211.64 LBS

## 2021-09-29 PROCEDURE — 99215 OFFICE O/P EST HI 40 MIN: CPT

## 2021-10-01 ENCOUNTER — APPOINTMENT (OUTPATIENT)
Dept: INFUSION THERAPY | Facility: HOSPITAL | Age: 51
End: 2021-10-01

## 2021-10-05 NOTE — PHYSICAL EXAM
[Fully active, able to carry on all pre-disease performance without restriction] : Status 0 - Fully active, able to carry on all pre-disease performance without restriction [Normal] : affect appropriate [de-identified] : well healed incisons

## 2021-10-05 NOTE — HISTORY OF PRESENT ILLNESS
[Disease: _____________________] : Disease: [unfilled] [T: ___] : T[unfilled] [N: ___] : N[unfilled] [M: ___] : M[unfilled] [AJCC Stage: ____] : AJCC Stage: [unfilled] [de-identified] : Mr Granda in 2021 at the age of 51 presented to the hospital with abdominal pain and underwent imaging that revealed a distal transverse colon lesion with evidence of microperforation.\par He is also underwent a colonoscopy which demonstrated an endoscopically obstructing distal transverse colon mass.  Given the potential for impending obstruction as well as microperforation seen on imaging decision was made to take patient to the OR for a left hemicolectomy and anastomosis.\par Imaging done at that time also showed evidence of metastatic disease to the liver and potentially lung and a questionable lesion as well.  Patient presents with his sister for initial visit and to discuss palliative chemotherapy.\par \par Enrolled in [employer] \par \par Started FOLFOX + Vit D July 7th, 2021 (bevacizumab added cycle #2)\par 8/23 scan shows PE started on Lovenox   [de-identified] : Microsatellite stable [de-identified] : KRAS G12D , NABILA [FreeTextEntry1] : FOLFOX + Bevacizumab +VitD ([employer] study) [de-identified] : presents for follow up ,working full time  \par minimal neuropathy\par tolerating lovenox\par no fever or chills , no abdominal pain \par no new self reported AEs

## 2021-10-11 ENCOUNTER — RESULT REVIEW (OUTPATIENT)
Age: 51
End: 2021-10-11

## 2021-10-11 ENCOUNTER — APPOINTMENT (OUTPATIENT)
Dept: HEMATOLOGY ONCOLOGY | Facility: CLINIC | Age: 51
End: 2021-10-11

## 2021-10-11 LAB
ALBUMIN SERPL ELPH-MCNC: 4.2 G/DL
ALP BLD-CCNC: 92 U/L
ALT SERPL-CCNC: 32 U/L
ANION GAP SERPL CALC-SCNC: 12 MMOL/L
ANISOCYTOSIS BLD QL: SLIGHT — SIGNIFICANT CHANGE UP
AST SERPL-CCNC: 25 U/L
BASOPHILS # BLD AUTO: 0.02 K/UL — SIGNIFICANT CHANGE UP (ref 0–0.2)
BASOPHILS NFR BLD AUTO: 0.4 % — SIGNIFICANT CHANGE UP (ref 0–2)
BILIRUB SERPL-MCNC: 0.5 MG/DL
BUN SERPL-MCNC: 17 MG/DL
CALCIUM SERPL-MCNC: 8.9 MG/DL
CEA SERPL-MCNC: 167 NG/ML
CHLORIDE SERPL-SCNC: 100 MMOL/L
CO2 SERPL-SCNC: 23 MMOL/L
CREAT SERPL-MCNC: 1.01 MG/DL
DACRYOCYTES BLD QL SMEAR: SLIGHT — SIGNIFICANT CHANGE UP
ELLIPTOCYTES BLD QL SMEAR: SLIGHT — SIGNIFICANT CHANGE UP
EOSINOPHIL # BLD AUTO: 0.03 K/UL — SIGNIFICANT CHANGE UP (ref 0–0.5)
EOSINOPHIL NFR BLD AUTO: 0.6 % — SIGNIFICANT CHANGE UP (ref 0–6)
GLUCOSE SERPL-MCNC: 369 MG/DL
HCT VFR BLD CALC: 34.9 % — LOW (ref 39–50)
HGB BLD-MCNC: 11.6 G/DL — LOW (ref 13–17)
IMM GRANULOCYTES NFR BLD AUTO: 0.2 % — SIGNIFICANT CHANGE UP (ref 0–1.5)
LYMPHOCYTES # BLD AUTO: 2.63 K/UL — SIGNIFICANT CHANGE UP (ref 1–3.3)
LYMPHOCYTES # BLD AUTO: 51.5 % — HIGH (ref 13–44)
MAGNESIUM SERPL-MCNC: 1.9 MG/DL
MCHC RBC-ENTMCNC: 21.9 PG — LOW (ref 27–34)
MCHC RBC-ENTMCNC: 33.2 G/DL — SIGNIFICANT CHANGE UP (ref 32–36)
MCV RBC AUTO: 66 FL — LOW (ref 80–100)
MICROCYTES BLD QL: SLIGHT — SIGNIFICANT CHANGE UP
MONOCYTES # BLD AUTO: 0.9 K/UL — SIGNIFICANT CHANGE UP (ref 0–0.9)
MONOCYTES NFR BLD AUTO: 17.6 % — HIGH (ref 2–14)
NEUTROPHILS # BLD AUTO: 1.52 K/UL — LOW (ref 1.8–7.4)
NEUTROPHILS NFR BLD AUTO: 29.7 % — LOW (ref 43–77)
NRBC # BLD: 0 /100 WBCS — SIGNIFICANT CHANGE UP (ref 0–0)
PHOSPHATE SERPL-MCNC: 3.9 MG/DL
PLAT MORPH BLD: NORMAL — SIGNIFICANT CHANGE UP
PLATELET # BLD AUTO: 165 K/UL — SIGNIFICANT CHANGE UP (ref 150–400)
POIKILOCYTOSIS BLD QL AUTO: SLIGHT — SIGNIFICANT CHANGE UP
POTASSIUM SERPL-SCNC: 4.7 MMOL/L
PROT SERPL-MCNC: 7 G/DL
RBC # BLD: 5.29 M/UL — SIGNIFICANT CHANGE UP (ref 4.2–5.8)
RBC # FLD: 26.5 % — HIGH (ref 10.3–14.5)
RBC BLD AUTO: ABNORMAL
SODIUM SERPL-SCNC: 135 MMOL/L
TARGETS BLD QL SMEAR: SLIGHT — SIGNIFICANT CHANGE UP
WBC # BLD: 5.11 K/UL — SIGNIFICANT CHANGE UP (ref 3.8–10.5)
WBC # FLD AUTO: 5.11 K/UL — SIGNIFICANT CHANGE UP (ref 3.8–10.5)

## 2021-10-12 LAB
CREAT SPEC-SCNC: 109 MG/DL
CREAT/PROT UR: 0.1 RATIO
PROT UR-MCNC: 10 MG/DL

## 2021-10-13 ENCOUNTER — NON-APPOINTMENT (OUTPATIENT)
Age: 51
End: 2021-10-13

## 2021-10-13 ENCOUNTER — LABORATORY RESULT (OUTPATIENT)
Age: 51
End: 2021-10-13

## 2021-10-13 ENCOUNTER — APPOINTMENT (OUTPATIENT)
Dept: HEMATOLOGY ONCOLOGY | Facility: CLINIC | Age: 51
End: 2021-10-13
Payer: COMMERCIAL

## 2021-10-13 ENCOUNTER — APPOINTMENT (OUTPATIENT)
Dept: INFUSION THERAPY | Facility: HOSPITAL | Age: 51
End: 2021-10-13

## 2021-10-13 VITALS
TEMPERATURE: 98.1 F | RESPIRATION RATE: 16 BRPM | OXYGEN SATURATION: 99 % | BODY MASS INDEX: 32.14 KG/M2 | WEIGHT: 205.03 LBS | SYSTOLIC BLOOD PRESSURE: 138 MMHG | DIASTOLIC BLOOD PRESSURE: 90 MMHG | HEART RATE: 85 BPM

## 2021-10-13 PROCEDURE — 99214 OFFICE O/P EST MOD 30 MIN: CPT

## 2021-10-13 RX ORDER — NITROFURANTOIN (MONOHYDRATE/MACROCRYSTALS) 25; 75 MG/1; MG/1
100 CAPSULE ORAL
Qty: 14 | Refills: 0 | Status: DISCONTINUED | COMMUNITY
Start: 2021-09-29 | End: 2021-10-13

## 2021-10-14 ENCOUNTER — OUTPATIENT (OUTPATIENT)
Dept: OUTPATIENT SERVICES | Facility: HOSPITAL | Age: 51
LOS: 1 days | Discharge: HOME | End: 2021-10-14

## 2021-10-14 ENCOUNTER — NON-APPOINTMENT (OUTPATIENT)
Age: 51
End: 2021-10-14

## 2021-10-14 DIAGNOSIS — C18.9 MALIGNANT NEOPLASM OF COLON, UNSPECIFIED: ICD-10-CM

## 2021-10-15 ENCOUNTER — APPOINTMENT (OUTPATIENT)
Dept: INFUSION THERAPY | Facility: HOSPITAL | Age: 51
End: 2021-10-15

## 2021-10-15 ENCOUNTER — APPOINTMENT (OUTPATIENT)
Dept: HEMATOLOGY ONCOLOGY | Facility: CLINIC | Age: 51
End: 2021-10-15

## 2021-10-17 NOTE — HISTORY OF PRESENT ILLNESS
[Disease: _____________________] : Disease: [unfilled] [T: ___] : T[unfilled] [N: ___] : N[unfilled] [M: ___] : M[unfilled] [AJCC Stage: ____] : AJCC Stage: [unfilled] [de-identified] : Mr Granda in 2021 at the age of 51 presented to the hospital with abdominal pain and underwent imaging that revealed a distal transverse colon lesion with evidence of microperforation.\par He is also underwent a colonoscopy which demonstrated an endoscopically obstructing distal transverse colon mass.  Given the potential for impending obstruction as well as microperforation seen on imaging decision was made to take patient to the OR for a left hemicolectomy and anastomosis.\par Imaging done at that time also showed evidence of metastatic disease to the liver and potentially lung and a questionable lesion as well.  Patient presents with his sister for initial visit and to discuss palliative chemotherapy.\par \par Enrolled in [employer] \par \par Started FOLFOX + Vit D July 7th, 2021 (bevacizumab added cycle #2)\par 8/23 scan shows PE started on Lovenox   [de-identified] : Microsatellite stable [de-identified] : KRAS G12D , NABILA [FreeTextEntry1] : FOLFOX + Bevacizumab +VitD ([employer] study) [de-identified] : dysuria resolved but has noted he is urinating often\par no abdominal pain \par been complaint with lovenox\par still doesn't have PMD

## 2021-10-17 NOTE — PHYSICAL EXAM
[Fully active, able to carry on all pre-disease performance without restriction] : Status 0 - Fully active, able to carry on all pre-disease performance without restriction [Normal] : affect appropriate [de-identified] : well healed incisons

## 2021-10-20 ENCOUNTER — APPOINTMENT (OUTPATIENT)
Dept: CT IMAGING | Facility: IMAGING CENTER | Age: 51
End: 2021-10-20
Payer: COMMERCIAL

## 2021-10-20 ENCOUNTER — OUTPATIENT (OUTPATIENT)
Dept: OUTPATIENT SERVICES | Facility: HOSPITAL | Age: 51
LOS: 1 days | End: 2021-10-20
Payer: COMMERCIAL

## 2021-10-20 DIAGNOSIS — C18.9 MALIGNANT NEOPLASM OF COLON, UNSPECIFIED: ICD-10-CM

## 2021-10-20 PROCEDURE — 74177 CT ABD & PELVIS W/CONTRAST: CPT

## 2021-10-20 PROCEDURE — 74177 CT ABD & PELVIS W/CONTRAST: CPT | Mod: 26

## 2021-10-20 PROCEDURE — 71260 CT THORAX DX C+: CPT

## 2021-10-20 PROCEDURE — 71260 CT THORAX DX C+: CPT | Mod: 26

## 2021-10-21 ENCOUNTER — NON-APPOINTMENT (OUTPATIENT)
Age: 51
End: 2021-10-21

## 2021-10-21 ENCOUNTER — APPOINTMENT (OUTPATIENT)
Dept: INTERNAL MEDICINE | Facility: CLINIC | Age: 51
End: 2021-10-21
Payer: COMMERCIAL

## 2021-10-21 VITALS
BODY MASS INDEX: 32.8 KG/M2 | WEIGHT: 209 LBS | RESPIRATION RATE: 14 BRPM | TEMPERATURE: 97.8 F | HEART RATE: 98 BPM | SYSTOLIC BLOOD PRESSURE: 146 MMHG | DIASTOLIC BLOOD PRESSURE: 90 MMHG | OXYGEN SATURATION: 98 % | HEIGHT: 66.97 IN

## 2021-10-21 DIAGNOSIS — Z23 ENCOUNTER FOR IMMUNIZATION: ICD-10-CM

## 2021-10-21 DIAGNOSIS — Z00.00 ENCOUNTER FOR GENERAL ADULT MEDICAL EXAMINATION W/OUT ABNORMAL FINDINGS: ICD-10-CM

## 2021-10-21 PROCEDURE — 99386 PREV VISIT NEW AGE 40-64: CPT | Mod: 25

## 2021-10-21 PROCEDURE — 81003 URINALYSIS AUTO W/O SCOPE: CPT | Mod: QW

## 2021-10-21 PROCEDURE — G0008: CPT

## 2021-10-21 PROCEDURE — 90686 IIV4 VACC NO PRSV 0.5 ML IM: CPT

## 2021-10-21 NOTE — HISTORY OF PRESENT ILLNESS
[de-identified] : 51 y.o. M with PMHx of colon cancer (diagnosed 4 months ago) s/p hemicolectomy and currently on chemo, HTN, and PE (currently on lovenox) presents as new pt to establish care. Pt with complaint of high blood sugars, FS was 360 yesterday morning, fasting.  Also complaining of polyuria and polydipsia. Pt checks BP and it is usually 140/90 or higher. Pt recently treated for UTI 2 weeks ago but still having burning with urination though improved.

## 2021-10-21 NOTE — PHYSICAL EXAM
[No Acute Distress] : no acute distress [Well Nourished] : well nourished [Well Developed] : well developed [Well-Appearing] : well-appearing [Normal Sclera/Conjunctiva] : normal sclera/conjunctiva [PERRL] : pupils equal round and reactive to light [EOMI] : extraocular movements intact [Normal Outer Ear/Nose] : the outer ears and nose were normal in appearance [Normal Oropharynx] : the oropharynx was normal [No JVD] : no jugular venous distention [No Lymphadenopathy] : no lymphadenopathy [Supple] : supple [Thyroid Normal, No Nodules] : the thyroid was normal and there were no nodules present [No Respiratory Distress] : no respiratory distress  [No Accessory Muscle Use] : no accessory muscle use [Clear to Auscultation] : lungs were clear to auscultation bilaterally [Normal Rate] : normal rate  [Regular Rhythm] : with a regular rhythm [Normal S1, S2] : normal S1 and S2 [No Murmur] : no murmur heard [No Carotid Bruits] : no carotid bruits [No Abdominal Bruit] : a ~M bruit was not heard ~T in the abdomen [No Varicosities] : no varicosities [Pedal Pulses Present] : the pedal pulses are present [No Edema] : there was no peripheral edema [No Palpable Aorta] : no palpable aorta [No Extremity Clubbing/Cyanosis] : no extremity clubbing/cyanosis [Soft] : abdomen soft [Non Tender] : non-tender [Non-distended] : non-distended [No Masses] : no abdominal mass palpated [No HSM] : no HSM [Normal Bowel Sounds] : normal bowel sounds [Normal Posterior Cervical Nodes] : no posterior cervical lymphadenopathy [Normal Anterior Cervical Nodes] : no anterior cervical lymphadenopathy [No CVA Tenderness] : no CVA  tenderness [No Spinal Tenderness] : no spinal tenderness [No Joint Swelling] : no joint swelling [Grossly Normal Strength/Tone] : grossly normal strength/tone [No Rash] : no rash [Coordination Grossly Intact] : coordination grossly intact [No Focal Deficits] : no focal deficits [Normal Gait] : normal gait [Normal Affect] : the affect was normal [Deep Tendon Reflexes (DTR)] : deep tendon reflexes were 2+ and symmetric [Normal Insight/Judgement] : insight and judgment were intact [de-identified] : chemoport present [de-identified] : surgical scars present

## 2021-10-21 NOTE — PLAN
[FreeTextEntry1] : HCM: \par -check labs \par - pt vaccianted against covid \par -flu shot today \par \par HTN: increase amlodipine to 10mg daily, call back with home readings in 1-2 weeks \par Hyperglycemia: likely due to steroids, check a1c, will likely need meds \par Colon cancer: s/p hemicolectomy, currently on chemo, followed by onc \par UTI symptoms: urine dip negative, f/u urine cx \par PE: continue lovenox 140mg qd

## 2021-10-22 ENCOUNTER — LABORATORY RESULT (OUTPATIENT)
Age: 51
End: 2021-10-22

## 2021-10-22 LAB
APPEARANCE: CLEAR
BACTERIA: NEGATIVE
BILIRUBIN URINE: NEGATIVE
BLOOD URINE: NEGATIVE
COLOR: COLORLESS
GLUCOSE QUALITATIVE U: ABNORMAL
HYALINE CASTS: 0 /LPF
KETONES URINE: NORMAL
LEUKOCYTE ESTERASE URINE: NEGATIVE
MICROSCOPIC-UA: NORMAL
NITRITE URINE: NEGATIVE
PH URINE: 6.5
PROTEIN URINE: NEGATIVE
RED BLOOD CELLS URINE: 1 /HPF
SPECIFIC GRAVITY URINE: 1.03
SQUAMOUS EPITHELIAL CELLS: 0 /HPF
UROBILINOGEN URINE: NORMAL
WHITE BLOOD CELLS URINE: 2 /HPF

## 2021-10-26 ENCOUNTER — APPOINTMENT (OUTPATIENT)
Dept: HEMATOLOGY ONCOLOGY | Facility: CLINIC | Age: 51
End: 2021-10-26

## 2021-10-26 LAB
25(OH)D3 SERPL-MCNC: 50.4 NG/ML
ALBUMIN SERPL ELPH-MCNC: 4.1 G/DL
ALP BLD-CCNC: 96 U/L
ALT SERPL-CCNC: 27 U/L
ANION GAP SERPL CALC-SCNC: 20 MMOL/L
AST SERPL-CCNC: 21 U/L
BACTERIA UR CULT: ABNORMAL
BASOPHILS # BLD AUTO: 0.09 K/UL
BASOPHILS NFR BLD AUTO: 1.7 %
BILIRUB SERPL-MCNC: 0.5 MG/DL
BUN SERPL-MCNC: 14 MG/DL
CALCIUM SERPL-MCNC: 9.4 MG/DL
CHLORIDE SERPL-SCNC: 97 MMOL/L
CHOLEST SERPL-MCNC: 290 MG/DL
CO2 SERPL-SCNC: 16 MMOL/L
CREAT SERPL-MCNC: 0.93 MG/DL
CREAT SPEC-SCNC: 25 MG/DL
EOSINOPHIL # BLD AUTO: 0 K/UL
EOSINOPHIL NFR BLD AUTO: 0 %
ESTIMATED AVERAGE GLUCOSE: 255 MG/DL
FOLATE SERPL-MCNC: >20 NG/ML
GLUCOSE SERPL-MCNC: 379 MG/DL
HBA1C MFR BLD HPLC: 10.5 %
HCT VFR BLD CALC: 37.6 %
HDLC SERPL-MCNC: 54 MG/DL
HGB BLD-MCNC: 12.5 G/DL
LDLC SERPL CALC-MCNC: 209 MG/DL
LYMPHOCYTES # BLD AUTO: 2.54 K/UL
LYMPHOCYTES NFR BLD AUTO: 47 %
MAN DIFF?: NORMAL
MCHC RBC-ENTMCNC: 22 PG
MCHC RBC-ENTMCNC: 33.2 GM/DL
MCV RBC AUTO: 66.2 FL
MICROALBUMIN 24H UR DL<=1MG/L-MCNC: 1.8 MG/DL
MICROALBUMIN/CREAT 24H UR-RTO: 74 MG/G
MONOCYTES # BLD AUTO: 0.7 K/UL
MONOCYTES NFR BLD AUTO: 13 %
NEUTROPHILS # BLD AUTO: 2.02 K/UL
NEUTROPHILS NFR BLD AUTO: 37.4 %
NONHDLC SERPL-MCNC: 236 MG/DL
PLATELET # BLD AUTO: 188 K/UL
POTASSIUM SERPL-SCNC: 4.9 MMOL/L
PROT SERPL-MCNC: 7.6 G/DL
PSA SERPL-MCNC: 5.91 NG/ML
RBC # BLD: 5.68 M/UL
RBC # FLD: 26.4 %
SODIUM SERPL-SCNC: 133 MMOL/L
TRIGL SERPL-MCNC: 137 MG/DL
TSH SERPL-ACNC: 1.48 UIU/ML
VIT B12 SERPL-MCNC: 1081 PG/ML
WBC # FLD AUTO: 5.41 K/UL

## 2021-10-27 ENCOUNTER — NON-APPOINTMENT (OUTPATIENT)
Age: 51
End: 2021-10-27

## 2021-10-27 ENCOUNTER — LABORATORY RESULT (OUTPATIENT)
Age: 51
End: 2021-10-27

## 2021-10-27 ENCOUNTER — APPOINTMENT (OUTPATIENT)
Dept: INFUSION THERAPY | Facility: HOSPITAL | Age: 51
End: 2021-10-27

## 2021-10-27 ENCOUNTER — RESULT REVIEW (OUTPATIENT)
Age: 51
End: 2021-10-27

## 2021-10-27 ENCOUNTER — APPOINTMENT (OUTPATIENT)
Dept: HEMATOLOGY ONCOLOGY | Facility: CLINIC | Age: 51
End: 2021-10-27
Payer: COMMERCIAL

## 2021-10-27 VITALS
SYSTOLIC BLOOD PRESSURE: 131 MMHG | DIASTOLIC BLOOD PRESSURE: 84 MMHG | RESPIRATION RATE: 14 BRPM | OXYGEN SATURATION: 98 % | BODY MASS INDEX: 30.76 KG/M2 | HEART RATE: 106 BPM | WEIGHT: 196.21 LBS | TEMPERATURE: 98.1 F

## 2021-10-27 DIAGNOSIS — R11.2 NAUSEA WITH VOMITING, UNSPECIFIED: ICD-10-CM

## 2021-10-27 DIAGNOSIS — Z51.11 ENCOUNTER FOR ANTINEOPLASTIC CHEMOTHERAPY: ICD-10-CM

## 2021-10-27 LAB
BASOPHILS # BLD AUTO: 0.02 K/UL — SIGNIFICANT CHANGE UP (ref 0–0.2)
BASOPHILS NFR BLD AUTO: 0.3 % — SIGNIFICANT CHANGE UP (ref 0–2)
EOSINOPHIL # BLD AUTO: 0.03 K/UL — SIGNIFICANT CHANGE UP (ref 0–0.5)
EOSINOPHIL NFR BLD AUTO: 0.5 % — SIGNIFICANT CHANGE UP (ref 0–6)
HCT VFR BLD CALC: 39.3 % — SIGNIFICANT CHANGE UP (ref 39–50)
HGB BLD-MCNC: 12.9 G/DL — LOW (ref 13–17)
IMM GRANULOCYTES NFR BLD AUTO: 0.3 % — SIGNIFICANT CHANGE UP (ref 0–1.5)
LYMPHOCYTES # BLD AUTO: 1.98 K/UL — SIGNIFICANT CHANGE UP (ref 1–3.3)
LYMPHOCYTES # BLD AUTO: 33.3 % — SIGNIFICANT CHANGE UP (ref 13–44)
MCHC RBC-ENTMCNC: 22.1 PG — LOW (ref 27–34)
MCHC RBC-ENTMCNC: 32.8 G/DL — SIGNIFICANT CHANGE UP (ref 32–36)
MCV RBC AUTO: 67.2 FL — LOW (ref 80–100)
MONOCYTES # BLD AUTO: 0.72 K/UL — SIGNIFICANT CHANGE UP (ref 0–0.9)
MONOCYTES NFR BLD AUTO: 12.1 % — SIGNIFICANT CHANGE UP (ref 2–14)
NEUTROPHILS # BLD AUTO: 3.18 K/UL — SIGNIFICANT CHANGE UP (ref 1.8–7.4)
NEUTROPHILS NFR BLD AUTO: 53.5 % — SIGNIFICANT CHANGE UP (ref 43–77)
NRBC # BLD: 0 /100 WBCS — SIGNIFICANT CHANGE UP (ref 0–0)
PLATELET # BLD AUTO: 205 K/UL — SIGNIFICANT CHANGE UP (ref 150–400)
RBC # BLD: 5.85 M/UL — HIGH (ref 4.2–5.8)
RBC # FLD: 26.1 % — HIGH (ref 10.3–14.5)
WBC # BLD: 5.95 K/UL — SIGNIFICANT CHANGE UP (ref 3.8–10.5)
WBC # FLD AUTO: 5.95 K/UL — SIGNIFICANT CHANGE UP (ref 3.8–10.5)

## 2021-10-27 PROCEDURE — 99214 OFFICE O/P EST MOD 30 MIN: CPT

## 2021-10-27 NOTE — PHYSICAL EXAM
[Fully active, able to carry on all pre-disease performance without restriction] : Status 0 - Fully active, able to carry on all pre-disease performance without restriction [Normal] : affect appropriate [de-identified] : well healed incisons

## 2021-10-27 NOTE — HISTORY OF PRESENT ILLNESS
[Disease: _____________________] : Disease: [unfilled] [T: ___] : T[unfilled] [N: ___] : N[unfilled] [M: ___] : M[unfilled] [AJCC Stage: ____] : AJCC Stage: [unfilled] [de-identified] : Mr Granda in 2021 at the age of 51 presented to the hospital with abdominal pain and underwent imaging that revealed a distal transverse colon lesion with evidence of microperforation.\par He is also underwent a colonoscopy which demonstrated an endoscopically obstructing distal transverse colon mass.  Given the potential for impending obstruction as well as microperforation seen on imaging decision was made to take patient to the OR for a left hemicolectomy and anastomosis.\par Imaging done at that time also showed evidence of metastatic disease to the liver and potentially lung and a questionable lesion as well.  Patient presents with his sister for initial visit and to discuss palliative chemotherapy.\par \par Enrolled in [employer] \par \par Started FOLFOX + Vit D July 7th, 2021 (bevacizumab added cycle #2)\par 8/23 scan shows PE started on Lovenox   [de-identified] : Microsatellite stable [de-identified] : KRAS G12D , NABILA [FreeTextEntry1] : FOLFOX + Bevacizumab +VitD ([employer] study) [de-identified] : states neuropathy more persistant than before grade 2 \par no abdominal pain \par started meds for Diabetes earlier this week as well as statin for cholesterol and ACE for DM

## 2021-10-29 ENCOUNTER — APPOINTMENT (OUTPATIENT)
Dept: INFUSION THERAPY | Facility: HOSPITAL | Age: 51
End: 2021-10-29

## 2021-11-08 ENCOUNTER — RESULT REVIEW (OUTPATIENT)
Age: 51
End: 2021-11-08

## 2021-11-08 ENCOUNTER — APPOINTMENT (OUTPATIENT)
Dept: HEMATOLOGY ONCOLOGY | Facility: CLINIC | Age: 51
End: 2021-11-08

## 2021-11-08 LAB
ALBUMIN SERPL ELPH-MCNC: 4.2 G/DL
ALP BLD-CCNC: 72 U/L
ALT SERPL-CCNC: 36 U/L
ANION GAP SERPL CALC-SCNC: 17 MMOL/L
AST SERPL-CCNC: 36 U/L
BASOPHILS # BLD AUTO: 0.08 K/UL — SIGNIFICANT CHANGE UP (ref 0–0.2)
BASOPHILS NFR BLD AUTO: 2 % — SIGNIFICANT CHANGE UP (ref 0–2)
BILIRUB SERPL-MCNC: 0.5 MG/DL
BUN SERPL-MCNC: 10 MG/DL
CALCIUM SERPL-MCNC: 9.4 MG/DL
CEA SERPL-MCNC: 272 NG/ML
CHLORIDE SERPL-SCNC: 97 MMOL/L
CO2 SERPL-SCNC: 22 MMOL/L
CREAT SERPL-MCNC: 0.91 MG/DL
EOSINOPHIL # BLD AUTO: 0.04 K/UL — SIGNIFICANT CHANGE UP (ref 0–0.5)
EOSINOPHIL NFR BLD AUTO: 1 % — SIGNIFICANT CHANGE UP (ref 0–6)
GLUCOSE SERPL-MCNC: 352 MG/DL
HCT VFR BLD CALC: 36.8 % — LOW (ref 39–50)
HGB BLD-MCNC: 12.1 G/DL — LOW (ref 13–17)
LYMPHOCYTES # BLD AUTO: 1.88 K/UL — SIGNIFICANT CHANGE UP (ref 1–3.3)
LYMPHOCYTES # BLD AUTO: 50 % — HIGH (ref 13–44)
MCHC RBC-ENTMCNC: 22.8 PG — LOW (ref 27–34)
MCHC RBC-ENTMCNC: 32.9 G/DL — SIGNIFICANT CHANGE UP (ref 32–36)
MCV RBC AUTO: 69.4 FL — LOW (ref 80–100)
MONOCYTES # BLD AUTO: 0.56 K/UL — SIGNIFICANT CHANGE UP (ref 0–0.9)
MONOCYTES NFR BLD AUTO: 15 % — HIGH (ref 2–14)
MYELOCYTES NFR BLD: 1 % — HIGH (ref 0–0)
NEUTROPHILS # BLD AUTO: 1.16 K/UL — LOW (ref 1.8–7.4)
NEUTROPHILS NFR BLD AUTO: 31 % — LOW (ref 43–77)
NRBC # BLD: 0 /100 — SIGNIFICANT CHANGE UP (ref 0–0)
NRBC # BLD: SIGNIFICANT CHANGE UP /100 WBCS (ref 0–0)
PLAT MORPH BLD: NORMAL — SIGNIFICANT CHANGE UP
PLATELET # BLD AUTO: 150 K/UL — SIGNIFICANT CHANGE UP (ref 150–400)
POTASSIUM SERPL-SCNC: 4.8 MMOL/L
PROT SERPL-MCNC: 7.1 G/DL
RBC # BLD: 5.3 M/UL — SIGNIFICANT CHANGE UP (ref 4.2–5.8)
RBC # FLD: 25.2 % — HIGH (ref 10.3–14.5)
RBC BLD AUTO: SIGNIFICANT CHANGE UP
SODIUM SERPL-SCNC: 137 MMOL/L
WBC # BLD: 3.75 K/UL — LOW (ref 3.8–10.5)
WBC # FLD AUTO: 3.75 K/UL — LOW (ref 3.8–10.5)

## 2021-11-09 LAB
CREAT SPEC-SCNC: 105 MG/DL
CREAT/PROT UR: 0.1 RATIO
PROT UR-MCNC: 12 MG/DL

## 2021-11-10 ENCOUNTER — APPOINTMENT (OUTPATIENT)
Dept: HEMATOLOGY ONCOLOGY | Facility: CLINIC | Age: 51
End: 2021-11-10
Payer: COMMERCIAL

## 2021-11-10 ENCOUNTER — APPOINTMENT (OUTPATIENT)
Dept: INFUSION THERAPY | Facility: HOSPITAL | Age: 51
End: 2021-11-10

## 2021-11-10 ENCOUNTER — RESULT REVIEW (OUTPATIENT)
Age: 51
End: 2021-11-10

## 2021-11-10 ENCOUNTER — NON-APPOINTMENT (OUTPATIENT)
Age: 51
End: 2021-11-10

## 2021-11-10 VITALS
RESPIRATION RATE: 14 BRPM | OXYGEN SATURATION: 98 % | TEMPERATURE: 98.1 F | HEART RATE: 95 BPM | DIASTOLIC BLOOD PRESSURE: 84 MMHG | WEIGHT: 198.42 LBS | BODY MASS INDEX: 31.11 KG/M2 | SYSTOLIC BLOOD PRESSURE: 136 MMHG

## 2021-11-10 LAB
BASOPHILS # BLD AUTO: 0 K/UL — SIGNIFICANT CHANGE UP (ref 0–0.2)
BASOPHILS NFR BLD AUTO: 0 % — SIGNIFICANT CHANGE UP (ref 0–2)
EOSINOPHIL # BLD AUTO: 0.05 K/UL — SIGNIFICANT CHANGE UP (ref 0–0.5)
EOSINOPHIL NFR BLD AUTO: 1 % — SIGNIFICANT CHANGE UP (ref 0–6)
HCT VFR BLD CALC: 34.8 % — LOW (ref 39–50)
HGB BLD-MCNC: 11.7 G/DL — LOW (ref 13–17)
LYMPHOCYTES # BLD AUTO: 2.03 K/UL — SIGNIFICANT CHANGE UP (ref 1–3.3)
LYMPHOCYTES # BLD AUTO: 45 % — HIGH (ref 13–44)
MCHC RBC-ENTMCNC: 22.8 PG — LOW (ref 27–34)
MCHC RBC-ENTMCNC: 33.6 G/DL — SIGNIFICANT CHANGE UP (ref 32–36)
MCV RBC AUTO: 67.8 FL — LOW (ref 80–100)
MONOCYTES # BLD AUTO: 0.59 K/UL — SIGNIFICANT CHANGE UP (ref 0–0.9)
MONOCYTES NFR BLD AUTO: 13 % — SIGNIFICANT CHANGE UP (ref 2–14)
NEUTROPHILS # BLD AUTO: 1.85 K/UL — SIGNIFICANT CHANGE UP (ref 1.8–7.4)
NEUTROPHILS NFR BLD AUTO: 41 % — LOW (ref 43–77)
NRBC # BLD: 0 /100 — SIGNIFICANT CHANGE UP (ref 0–0)
NRBC # BLD: SIGNIFICANT CHANGE UP /100 WBCS (ref 0–0)
PLAT MORPH BLD: NORMAL — SIGNIFICANT CHANGE UP
PLATELET # BLD AUTO: 113 K/UL — LOW (ref 150–400)
RBC # BLD: 5.13 M/UL — SIGNIFICANT CHANGE UP (ref 4.2–5.8)
RBC # FLD: 25 % — HIGH (ref 10.3–14.5)
RBC BLD AUTO: SIGNIFICANT CHANGE UP
WBC # BLD: 4.52 K/UL — SIGNIFICANT CHANGE UP (ref 3.8–10.5)
WBC # FLD AUTO: 4.52 K/UL — SIGNIFICANT CHANGE UP (ref 3.8–10.5)

## 2021-11-10 PROCEDURE — 99214 OFFICE O/P EST MOD 30 MIN: CPT

## 2021-11-10 RX ORDER — SULFAMETHOXAZOLE AND TRIMETHOPRIM 800; 160 MG/1; MG/1
800-160 TABLET ORAL
Qty: 14 | Refills: 0 | Status: DISCONTINUED | COMMUNITY
Start: 2021-10-26 | End: 2021-11-10

## 2021-11-10 NOTE — PHYSICAL EXAM
[Fully active, able to carry on all pre-disease performance without restriction] : Status 0 - Fully active, able to carry on all pre-disease performance without restriction [Normal] : affect appropriate [de-identified] : anicteric  [de-identified] : well healed incisons

## 2021-11-10 NOTE — HISTORY OF PRESENT ILLNESS
[Disease: _____________________] : Disease: [unfilled] [T: ___] : T[unfilled] [N: ___] : N[unfilled] [M: ___] : M[unfilled] [AJCC Stage: ____] : AJCC Stage: [unfilled] [de-identified] : Mr Granda in 2021 at the age of 51 presented to the hospital with abdominal pain and underwent imaging that revealed a distal transverse colon lesion with evidence of microperforation.\par He is also underwent a colonoscopy which demonstrated an endoscopically obstructing distal transverse colon mass.  Given the potential for impending obstruction as well as microperforation seen on imaging decision was made to take patient to the OR for a left hemicolectomy and anastomosis.\par Imaging done at that time also showed evidence of metastatic disease to the liver and potentially lung and a questionable lesion as well.  Patient presents with his sister for initial visit and to discuss palliative chemotherapy.\par \par Enrolled in [employer] \par \par Started FOLFOX + Vit D July 7th, 2021 (bevacizumab added cycle #2)\par 8/23 scan shows PE started on Lovenox  \par October 2021 oxaliplatin held  [de-identified] : Microsatellite stable [de-identified] : KRAS G12D , NABILA [FreeTextEntry1] : FOLFOX + Bevacizumab +VitD ([employer] study) --> 5FU Yolis + Vit D  [de-identified] : states neuropathy stable grade 1-2 \par no abdominal pain \par reports home FS is better but recent random glucose over 300\par BP better controlled

## 2021-11-11 ENCOUNTER — NON-APPOINTMENT (OUTPATIENT)
Age: 51
End: 2021-11-11

## 2021-11-12 ENCOUNTER — APPOINTMENT (OUTPATIENT)
Dept: INFUSION THERAPY | Facility: HOSPITAL | Age: 51
End: 2021-11-12

## 2021-11-20 ENCOUNTER — OUTPATIENT (OUTPATIENT)
Dept: OUTPATIENT SERVICES | Facility: HOSPITAL | Age: 51
LOS: 1 days | Discharge: HOME | End: 2021-11-20

## 2021-11-20 DIAGNOSIS — C18.9 MALIGNANT NEOPLASM OF COLON, UNSPECIFIED: ICD-10-CM

## 2021-11-22 ENCOUNTER — RESULT REVIEW (OUTPATIENT)
Age: 51
End: 2021-11-22

## 2021-11-22 ENCOUNTER — APPOINTMENT (OUTPATIENT)
Dept: INTERNAL MEDICINE | Facility: CLINIC | Age: 51
End: 2021-11-22
Payer: COMMERCIAL

## 2021-11-22 ENCOUNTER — NON-APPOINTMENT (OUTPATIENT)
Age: 51
End: 2021-11-22

## 2021-11-22 ENCOUNTER — APPOINTMENT (OUTPATIENT)
Dept: HEMATOLOGY ONCOLOGY | Facility: CLINIC | Age: 51
End: 2021-11-22

## 2021-11-22 VITALS
WEIGHT: 195 LBS | SYSTOLIC BLOOD PRESSURE: 120 MMHG | OXYGEN SATURATION: 98 % | TEMPERATURE: 98 F | HEIGHT: 66.97 IN | DIASTOLIC BLOOD PRESSURE: 70 MMHG | RESPIRATION RATE: 14 BRPM | BODY MASS INDEX: 30.61 KG/M2 | HEART RATE: 100 BPM

## 2021-11-22 DIAGNOSIS — N50.819 TESTICULAR PAIN, UNSPECIFIED: ICD-10-CM

## 2021-11-22 LAB
BASOPHILS # BLD AUTO: 0 K/UL — SIGNIFICANT CHANGE UP (ref 0–0.2)
BASOPHILS NFR BLD AUTO: 0 % — SIGNIFICANT CHANGE UP (ref 0–2)
DACRYOCYTES BLD QL SMEAR: SLIGHT — SIGNIFICANT CHANGE UP
EOSINOPHIL # BLD AUTO: 0 K/UL — SIGNIFICANT CHANGE UP (ref 0–0.5)
EOSINOPHIL NFR BLD AUTO: 0 % — SIGNIFICANT CHANGE UP (ref 0–6)
HCT VFR BLD CALC: 38.1 % — LOW (ref 39–50)
HGB BLD-MCNC: 12.7 G/DL — LOW (ref 13–17)
LYMPHOCYTES # BLD AUTO: 2.3 K/UL — SIGNIFICANT CHANGE UP (ref 1–3.3)
LYMPHOCYTES # BLD AUTO: 47 % — HIGH (ref 13–44)
MCHC RBC-ENTMCNC: 23.3 PG — LOW (ref 27–34)
MCHC RBC-ENTMCNC: 33.3 G/DL — SIGNIFICANT CHANGE UP (ref 32–36)
MCV RBC AUTO: 69.9 FL — LOW (ref 80–100)
MONOCYTES # BLD AUTO: 0.25 K/UL — SIGNIFICANT CHANGE UP (ref 0–0.9)
MONOCYTES NFR BLD AUTO: 5 % — SIGNIFICANT CHANGE UP (ref 2–14)
MYELOCYTES NFR BLD: 2 % — HIGH (ref 0–0)
NEUTROPHILS # BLD AUTO: 2.25 K/UL — SIGNIFICANT CHANGE UP (ref 1.8–7.4)
NEUTROPHILS NFR BLD AUTO: 46 % — SIGNIFICANT CHANGE UP (ref 43–77)
NRBC # BLD: 1 /100 — HIGH (ref 0–0)
NRBC # BLD: SIGNIFICANT CHANGE UP /100 WBCS (ref 0–0)
PLAT MORPH BLD: NORMAL — SIGNIFICANT CHANGE UP
PLATELET # BLD AUTO: 249 K/UL — SIGNIFICANT CHANGE UP (ref 150–400)
POIKILOCYTOSIS BLD QL AUTO: SLIGHT — SIGNIFICANT CHANGE UP
RBC # BLD: 5.45 M/UL — SIGNIFICANT CHANGE UP (ref 4.2–5.8)
RBC # FLD: 24.3 % — HIGH (ref 10.3–14.5)
RBC BLD AUTO: ABNORMAL
WBC # BLD: 4.9 K/UL — SIGNIFICANT CHANGE UP (ref 3.8–10.5)
WBC # FLD AUTO: 4.9 K/UL — SIGNIFICANT CHANGE UP (ref 3.8–10.5)

## 2021-11-22 PROCEDURE — 99214 OFFICE O/P EST MOD 30 MIN: CPT

## 2021-11-22 RX ORDER — CANAGLIFLOZIN 100 MG/1
100 TABLET, FILM COATED ORAL DAILY
Qty: 30 | Refills: 2 | Status: DISCONTINUED | COMMUNITY
Start: 2021-10-26 | End: 2021-11-22

## 2021-11-22 NOTE — HISTORY OF PRESENT ILLNESS
[de-identified] : Pt here for f/u. BP is better controlled. Pt complaining of frequent urination but no dysuria. ALso complaining of pain in left testicle, on and off for several months. Invokana was not covered but pt is tolerating metformin. Checking his FS, still in the mid to high 200s.

## 2021-11-22 NOTE — PLAN
[FreeTextEntry1] : T2DM: add glipizide to metformin for uncontrolled sugars\par POlyuria/testicular pain: check UA/urine culture, check US of testicle, reinforced need to f/u with uro

## 2021-11-22 NOTE — PHYSICAL EXAM
[No Acute Distress] : no acute distress [Well-Appearing] : well-appearing [Normal Voice/Communication] : normal voice/communication [No Respiratory Distress] : no respiratory distress  [No Accessory Muscle Use] : no accessory muscle use [Clear to Auscultation] : lungs were clear to auscultation bilaterally [Normal Rate] : normal rate  [Regular Rhythm] : with a regular rhythm [No Murmur] : no murmur heard [Soft] : abdomen soft [Non Tender] : non-tender [No Masses] : no abdominal mass palpated [Normal Bowel Sounds] : normal bowel sounds [No CVA Tenderness] : no CVA  tenderness [No Focal Deficits] : no focal deficits [Alert and Oriented x3] : oriented to person, place, and time

## 2021-11-23 ENCOUNTER — APPOINTMENT (OUTPATIENT)
Dept: HEMATOLOGY ONCOLOGY | Facility: CLINIC | Age: 51
End: 2021-11-23
Payer: COMMERCIAL

## 2021-11-23 ENCOUNTER — NON-APPOINTMENT (OUTPATIENT)
Age: 51
End: 2021-11-23

## 2021-11-23 LAB
ALBUMIN SERPL ELPH-MCNC: 4 G/DL
ALP BLD-CCNC: 88 U/L
ALT SERPL-CCNC: 25 U/L
ANION GAP SERPL CALC-SCNC: 18 MMOL/L
APPEARANCE: CLEAR
APPEARANCE: CLEAR
AST SERPL-CCNC: 20 U/L
BACTERIA: ABNORMAL
BILIRUB SERPL-MCNC: 0.4 MG/DL
BILIRUBIN URINE: NEGATIVE
BILIRUBIN URINE: NEGATIVE
BLOOD URINE: ABNORMAL
BLOOD URINE: NEGATIVE
BUN SERPL-MCNC: 19 MG/DL
CALCIUM SERPL-MCNC: 9.3 MG/DL
CHLORIDE SERPL-SCNC: 90 MMOL/L
CO2 SERPL-SCNC: 21 MMOL/L
COLOR: COLORLESS
COLOR: NORMAL
CREAT SERPL-MCNC: 1.02 MG/DL
CREAT SPEC-SCNC: 21 MG/DL
CREAT/PROT UR: 0.4 RATIO
GLUCOSE QUALITATIVE U: ABNORMAL
GLUCOSE QUALITATIVE U: ABNORMAL
GLUCOSE SERPL-MCNC: 581 MG/DL
HYALINE CASTS: 0 /LPF
KETONES URINE: ABNORMAL
KETONES URINE: NORMAL
LEUKOCYTE ESTERASE URINE: ABNORMAL
LEUKOCYTE ESTERASE URINE: NEGATIVE
MAGNESIUM SERPL-MCNC: 2.1 MG/DL
MICROSCOPIC-UA: NORMAL
NITRITE URINE: NEGATIVE
NITRITE URINE: NEGATIVE
PH URINE: 7
PH URINE: 7
PHOSPHATE SERPL-MCNC: 4 MG/DL
POTASSIUM SERPL-SCNC: 5.6 MMOL/L
PROT SERPL-MCNC: 7.2 G/DL
PROT UR-MCNC: 9 MG/DL
PROTEIN URINE: NEGATIVE
PROTEIN URINE: NEGATIVE
RED BLOOD CELLS URINE: 9 /HPF
SODIUM SERPL-SCNC: 129 MMOL/L
SPECIFIC GRAVITY URINE: 1.03
SPECIFIC GRAVITY URINE: 1.03
SQUAMOUS EPITHELIAL CELLS: 3 /HPF
UROBILINOGEN URINE: NORMAL
UROBILINOGEN URINE: NORMAL
WHITE BLOOD CELLS URINE: 14 /HPF

## 2021-11-23 PROCEDURE — 99214 OFFICE O/P EST MOD 30 MIN: CPT | Mod: 95

## 2021-11-23 NOTE — REASON FOR VISIT
[Home] : at home, [unfilled] , at the time of the visit. [Medical Office: ([facility])___] : at the medical office located in  [Verbal consent obtained from patient] : the patient, [unfilled] [Follow-Up Visit] : a follow-up [FreeTextEntry2] : colon cancer

## 2021-11-23 NOTE — HISTORY OF PRESENT ILLNESS
[Disease: _____________________] : Disease: [unfilled] [T: ___] : T[unfilled] [N: ___] : N[unfilled] [M: ___] : M[unfilled] [AJCC Stage: ____] : AJCC Stage: [unfilled] [de-identified] : Mr Granda in 2021 at the age of 51 presented to the hospital with abdominal pain and underwent imaging that revealed a distal transverse colon lesion with evidence of microperforation.\par He is also underwent a colonoscopy which demonstrated an endoscopically obstructing distal transverse colon mass.  Given the potential for impending obstruction as well as microperforation seen on imaging decision was made to take patient to the OR for a left hemicolectomy and anastomosis.\par Imaging done at that time also showed evidence of metastatic disease to the liver and potentially lung and a questionable lesion as well.  Patient presents with his sister for initial visit and to discuss palliative chemotherapy.\par \par Enrolled in [employer] \par \par Started FOLFOX + Vit D July 7th, 2021 (bevacizumab added cycle #2)\par 8/23 scan shows PE started on Lovenox  \par October 2021 oxaliplatin held  [de-identified] : Microsatellite stable [de-identified] : KRAS G12D , NABILA [FreeTextEntry1] : FOLFOX + Bevacizumab +VitD ([employer] study) --> 5FU Yolis + Vit D  [de-identified] : called patient to review recent labs \par seen by PMD yesterday and prescribed glipizide\par labs drawn yesterday showed glucose of 581, I contact PMD and not necessary to send patient to ER, PMD will manage and keep patient out of the ER\par \par patient notes polyuria

## 2021-11-23 NOTE — REVIEW OF SYSTEMS
[Negative] : Allergic/Immunologic [FreeTextEntry8] : polyuria  [de-identified] : peripheral neuropathy

## 2021-11-24 ENCOUNTER — APPOINTMENT (OUTPATIENT)
Dept: HEMATOLOGY ONCOLOGY | Facility: CLINIC | Age: 51
End: 2021-11-24

## 2021-11-24 ENCOUNTER — APPOINTMENT (OUTPATIENT)
Dept: INFUSION THERAPY | Facility: HOSPITAL | Age: 51
End: 2021-11-24

## 2021-11-26 ENCOUNTER — APPOINTMENT (OUTPATIENT)
Dept: INFUSION THERAPY | Facility: HOSPITAL | Age: 51
End: 2021-11-26

## 2021-11-30 ENCOUNTER — NON-APPOINTMENT (OUTPATIENT)
Age: 51
End: 2021-11-30

## 2021-12-01 LAB — BACTERIA UR CULT: ABNORMAL

## 2021-12-06 ENCOUNTER — LABORATORY RESULT (OUTPATIENT)
Age: 51
End: 2021-12-06

## 2021-12-06 ENCOUNTER — RESULT REVIEW (OUTPATIENT)
Age: 51
End: 2021-12-06

## 2021-12-06 ENCOUNTER — APPOINTMENT (OUTPATIENT)
Dept: HEMATOLOGY ONCOLOGY | Facility: CLINIC | Age: 51
End: 2021-12-06
Payer: COMMERCIAL

## 2021-12-06 ENCOUNTER — NON-APPOINTMENT (OUTPATIENT)
Age: 51
End: 2021-12-06

## 2021-12-06 VITALS
HEART RATE: 95 BPM | WEIGHT: 195.11 LBS | BODY MASS INDEX: 30.59 KG/M2 | OXYGEN SATURATION: 98 % | SYSTOLIC BLOOD PRESSURE: 132 MMHG | DIASTOLIC BLOOD PRESSURE: 87 MMHG | RESPIRATION RATE: 14 BRPM | TEMPERATURE: 98 F

## 2021-12-06 LAB
ALBUMIN SERPL ELPH-MCNC: 3.9 G/DL
ALP BLD-CCNC: 104 U/L
ALT SERPL-CCNC: 56 U/L
ANION GAP SERPL CALC-SCNC: 13 MMOL/L
AST SERPL-CCNC: 31 U/L
BASOPHILS # BLD AUTO: 0.01 K/UL — SIGNIFICANT CHANGE UP (ref 0–0.2)
BASOPHILS NFR BLD AUTO: 0.2 % — SIGNIFICANT CHANGE UP (ref 0–2)
BILIRUB SERPL-MCNC: 0.8 MG/DL
BUN SERPL-MCNC: 11 MG/DL
CALCIUM SERPL-MCNC: 9.3 MG/DL
CEA SERPL-MCNC: 284 NG/ML
CHLORIDE SERPL-SCNC: 99 MMOL/L
CO2 SERPL-SCNC: 23 MMOL/L
CREAT SERPL-MCNC: 0.77 MG/DL
EOSINOPHIL # BLD AUTO: 0.03 K/UL — SIGNIFICANT CHANGE UP (ref 0–0.5)
EOSINOPHIL NFR BLD AUTO: 0.6 % — SIGNIFICANT CHANGE UP (ref 0–6)
GLUCOSE SERPL-MCNC: 263 MG/DL
HCT VFR BLD CALC: 37.5 % — LOW (ref 39–50)
HGB BLD-MCNC: 12.1 G/DL — LOW (ref 13–17)
IMM GRANULOCYTES NFR BLD AUTO: 0.2 % — SIGNIFICANT CHANGE UP (ref 0–1.5)
LYMPHOCYTES # BLD AUTO: 2.11 K/UL — SIGNIFICANT CHANGE UP (ref 1–3.3)
LYMPHOCYTES # BLD AUTO: 40 % — SIGNIFICANT CHANGE UP (ref 13–44)
MAGNESIUM SERPL-MCNC: 1.9 MG/DL
MCHC RBC-ENTMCNC: 23.4 PG — LOW (ref 27–34)
MCHC RBC-ENTMCNC: 32.3 G/DL — SIGNIFICANT CHANGE UP (ref 32–36)
MCV RBC AUTO: 72.5 FL — LOW (ref 80–100)
MONOCYTES # BLD AUTO: 0.86 K/UL — SIGNIFICANT CHANGE UP (ref 0–0.9)
MONOCYTES NFR BLD AUTO: 16.3 % — HIGH (ref 2–14)
NEUTROPHILS # BLD AUTO: 2.25 K/UL — SIGNIFICANT CHANGE UP (ref 1.8–7.4)
NEUTROPHILS NFR BLD AUTO: 42.7 % — LOW (ref 43–77)
NRBC # BLD: 0 /100 WBCS — SIGNIFICANT CHANGE UP (ref 0–0)
PHOSPHATE SERPL-MCNC: 3.7 MG/DL
PLATELET # BLD AUTO: 159 K/UL — SIGNIFICANT CHANGE UP (ref 150–400)
POTASSIUM SERPL-SCNC: 4.2 MMOL/L
PROT SERPL-MCNC: 6.9 G/DL
RBC # BLD: 5.17 M/UL — SIGNIFICANT CHANGE UP (ref 4.2–5.8)
RBC # FLD: 23.5 % — HIGH (ref 10.3–14.5)
SODIUM SERPL-SCNC: 135 MMOL/L
WBC # BLD: 5.27 K/UL — SIGNIFICANT CHANGE UP (ref 3.8–10.5)
WBC # FLD AUTO: 5.27 K/UL — SIGNIFICANT CHANGE UP (ref 3.8–10.5)

## 2021-12-06 PROCEDURE — 99215 OFFICE O/P EST HI 40 MIN: CPT

## 2021-12-06 RX ORDER — APIXABAN 5 MG (74)
5 KIT ORAL
Qty: 1 | Refills: 0 | Status: DISCONTINUED | COMMUNITY
Start: 2021-11-10 | End: 2021-12-06

## 2021-12-06 RX ORDER — AMLODIPINE BESYLATE 5 MG/1
5 TABLET ORAL
Qty: 30 | Refills: 0 | Status: DISCONTINUED | COMMUNITY
Start: 2021-09-29

## 2021-12-06 RX ORDER — SULFAMETHOXAZOLE AND TRIMETHOPRIM 800; 160 MG/1; MG/1
800-160 TABLET ORAL
Qty: 14 | Refills: 0 | Status: DISCONTINUED | COMMUNITY
Start: 2021-11-23 | End: 2021-12-06

## 2021-12-06 RX ORDER — ENOXAPARIN SODIUM 150 MG/ML
150 INJECTION SUBCUTANEOUS DAILY
Qty: 3 | Refills: 2 | Status: DISCONTINUED | COMMUNITY
Start: 2021-08-23 | End: 2021-12-06

## 2021-12-06 NOTE — PHYSICAL EXAM
[Fully active, able to carry on all pre-disease performance without restriction] : Status 0 - Fully active, able to carry on all pre-disease performance without restriction [Normal] : affect appropriate [de-identified] : anicteric  [de-identified] : normal respiratory effort, no audible wheeze [de-identified] : soft nontender

## 2021-12-06 NOTE — HISTORY OF PRESENT ILLNESS
[Disease: _____________________] : Disease: [unfilled] [T: ___] : T[unfilled] [N: ___] : N[unfilled] [M: ___] : M[unfilled] [AJCC Stage: ____] : AJCC Stage: [unfilled] [de-identified] : Mr Granda in 2021 at the age of 51 presented to the hospital with abdominal pain and underwent imaging that revealed a distal transverse colon lesion with evidence of microperforation.\par He is also underwent a colonoscopy which demonstrated an endoscopically obstructing distal transverse colon mass.  Given the potential for impending obstruction as well as microperforation seen on imaging decision was made to take patient to the OR for a left hemicolectomy and anastomosis.\par Imaging done at that time also showed evidence of metastatic disease to the liver and potentially lung and a questionable lesion as well.  Patient presents with his sister for initial visit and to discuss palliative chemotherapy.\par \par Enrolled in [employer] \par \par Started FOLFOX + Vit D July 7th, 2021 (bevacizumab added cycle #2)\par 8/23 scan shows PE started on Lovenox  \par October 2021 oxaliplatin held  [de-identified] : Microsatellite stable [de-identified] : KRAS G12D , NABILA [FreeTextEntry1] : FOLFOX + Bevacizumab +VitD ([employer] study) --> 5FU Yolis + Vit D  [de-identified] : stable grade 1-2 neuropathy \par no abdominal pain \par didn't check FS this am but self reports it is better controlled

## 2021-12-06 NOTE — REVIEW OF SYSTEMS
[Negative] : Allergic/Immunologic [FreeTextEntry8] : polyuria  [de-identified] : peripheral neuropathy

## 2021-12-07 LAB
CREAT SPEC-SCNC: 101 MG/DL
CREAT/PROT UR: 0.5 RATIO
PROT UR-MCNC: 52 MG/DL

## 2021-12-08 ENCOUNTER — APPOINTMENT (OUTPATIENT)
Dept: INFUSION THERAPY | Facility: HOSPITAL | Age: 51
End: 2021-12-08

## 2021-12-08 ENCOUNTER — NON-APPOINTMENT (OUTPATIENT)
Age: 51
End: 2021-12-08

## 2021-12-08 DIAGNOSIS — R11.2 NAUSEA WITH VOMITING, UNSPECIFIED: ICD-10-CM

## 2021-12-08 DIAGNOSIS — Z51.11 ENCOUNTER FOR ANTINEOPLASTIC CHEMOTHERAPY: ICD-10-CM

## 2021-12-09 ENCOUNTER — NON-APPOINTMENT (OUTPATIENT)
Age: 51
End: 2021-12-09

## 2021-12-09 ENCOUNTER — RESULT REVIEW (OUTPATIENT)
Age: 51
End: 2021-12-09

## 2021-12-10 ENCOUNTER — APPOINTMENT (OUTPATIENT)
Dept: INFUSION THERAPY | Facility: HOSPITAL | Age: 51
End: 2021-12-10

## 2021-12-20 ENCOUNTER — LABORATORY RESULT (OUTPATIENT)
Age: 51
End: 2021-12-20

## 2021-12-20 ENCOUNTER — OUTPATIENT (OUTPATIENT)
Dept: OUTPATIENT SERVICES | Facility: HOSPITAL | Age: 51
LOS: 1 days | Discharge: HOME | End: 2021-12-20

## 2021-12-20 ENCOUNTER — APPOINTMENT (OUTPATIENT)
Dept: HEMATOLOGY ONCOLOGY | Facility: CLINIC | Age: 51
End: 2021-12-20
Payer: COMMERCIAL

## 2021-12-20 ENCOUNTER — RESULT REVIEW (OUTPATIENT)
Age: 51
End: 2021-12-20

## 2021-12-20 VITALS
RESPIRATION RATE: 14 BRPM | SYSTOLIC BLOOD PRESSURE: 153 MMHG | OXYGEN SATURATION: 100 % | DIASTOLIC BLOOD PRESSURE: 84 MMHG | BODY MASS INDEX: 32.49 KG/M2 | HEART RATE: 84 BPM | WEIGHT: 207.23 LBS | TEMPERATURE: 97.1 F

## 2021-12-20 DIAGNOSIS — C18.9 MALIGNANT NEOPLASM OF COLON, UNSPECIFIED: ICD-10-CM

## 2021-12-20 LAB
ALBUMIN SERPL ELPH-MCNC: 4 G/DL
ALP BLD-CCNC: 65 U/L
ALT SERPL-CCNC: 20 U/L
ANION GAP SERPL CALC-SCNC: 12 MMOL/L
APPEARANCE: CLEAR
AST SERPL-CCNC: 20 U/L
BASOPHILS # BLD AUTO: 0.02 K/UL — SIGNIFICANT CHANGE UP (ref 0–0.2)
BASOPHILS NFR BLD AUTO: 0.4 % — SIGNIFICANT CHANGE UP (ref 0–2)
BILIRUB SERPL-MCNC: 0.5 MG/DL
BILIRUBIN URINE: NEGATIVE
BLOOD URINE: NORMAL
BUN SERPL-MCNC: 11 MG/DL
CALCIUM SERPL-MCNC: 9.4 MG/DL
CHLORIDE SERPL-SCNC: 104 MMOL/L
CO2 SERPL-SCNC: 24 MMOL/L
COLOR: NORMAL
CREAT SERPL-MCNC: 1.03 MG/DL
CREAT SPEC-SCNC: 86 MG/DL
CREAT/PROT UR: 0.1 RATIO
EOSINOPHIL # BLD AUTO: 0.08 K/UL — SIGNIFICANT CHANGE UP (ref 0–0.5)
EOSINOPHIL NFR BLD AUTO: 1.5 % — SIGNIFICANT CHANGE UP (ref 0–6)
GLUCOSE QUALITATIVE U: NEGATIVE
GLUCOSE SERPL-MCNC: 119 MG/DL
HCT VFR BLD CALC: 37.7 % — LOW (ref 39–50)
HGB BLD-MCNC: 12.3 G/DL — LOW (ref 13–17)
IMM GRANULOCYTES NFR BLD AUTO: 0.2 % — SIGNIFICANT CHANGE UP (ref 0–1.5)
KETONES URINE: NEGATIVE
LEUKOCYTE ESTERASE URINE: ABNORMAL
LYMPHOCYTES # BLD AUTO: 2.38 K/UL — SIGNIFICANT CHANGE UP (ref 1–3.3)
LYMPHOCYTES # BLD AUTO: 43.7 % — SIGNIFICANT CHANGE UP (ref 13–44)
MAGNESIUM SERPL-MCNC: 2 MG/DL
MCHC RBC-ENTMCNC: 24.2 PG — LOW (ref 27–34)
MCHC RBC-ENTMCNC: 32.6 G/DL — SIGNIFICANT CHANGE UP (ref 32–36)
MCV RBC AUTO: 74.1 FL — LOW (ref 80–100)
MONOCYTES # BLD AUTO: 0.6 K/UL — SIGNIFICANT CHANGE UP (ref 0–0.9)
MONOCYTES NFR BLD AUTO: 11 % — SIGNIFICANT CHANGE UP (ref 2–14)
NEUTROPHILS # BLD AUTO: 2.36 K/UL — SIGNIFICANT CHANGE UP (ref 1.8–7.4)
NEUTROPHILS NFR BLD AUTO: 43.2 % — SIGNIFICANT CHANGE UP (ref 43–77)
NITRITE URINE: NEGATIVE
NRBC # BLD: 0 /100 WBCS — SIGNIFICANT CHANGE UP (ref 0–0)
PH URINE: 6
PHOSPHATE SERPL-MCNC: 4.1 MG/DL
PLATELET # BLD AUTO: 271 K/UL — SIGNIFICANT CHANGE UP (ref 150–400)
POTASSIUM SERPL-SCNC: 4.6 MMOL/L
PROT SERPL-MCNC: 6.9 G/DL
PROT UR-MCNC: 8 MG/DL
PROTEIN URINE: NEGATIVE
RBC # BLD: 5.09 M/UL — SIGNIFICANT CHANGE UP (ref 4.2–5.8)
RBC # FLD: 22.5 % — HIGH (ref 10.3–14.5)
SODIUM SERPL-SCNC: 140 MMOL/L
SPECIFIC GRAVITY URINE: 1.01
UROBILINOGEN URINE: NORMAL
WBC # BLD: 5.45 K/UL — SIGNIFICANT CHANGE UP (ref 3.8–10.5)
WBC # FLD AUTO: 5.45 K/UL — SIGNIFICANT CHANGE UP (ref 3.8–10.5)

## 2021-12-20 PROCEDURE — 99214 OFFICE O/P EST MOD 30 MIN: CPT

## 2021-12-20 NOTE — HISTORY OF PRESENT ILLNESS
[Disease: _____________________] : Disease: [unfilled] [T: ___] : T[unfilled] [N: ___] : N[unfilled] [M: ___] : M[unfilled] [AJCC Stage: ____] : AJCC Stage: [unfilled] [de-identified] : Mr Granda in 2021 at the age of 51 presented to the hospital with abdominal pain and underwent imaging that revealed a distal transverse colon lesion with evidence of microperforation.\par He is also underwent a colonoscopy which demonstrated an endoscopically obstructing distal transverse colon mass.  Given the potential for impending obstruction as well as microperforation seen on imaging decision was made to take patient to the OR for a left hemicolectomy and anastomosis.\par Imaging done at that time also showed evidence of metastatic disease to the liver and potentially lung and a questionable lesion as well.  Patient presents with his sister for initial visit and to discuss palliative chemotherapy.\par \par Enrolled in [employer] \par \par Started FOLFOX + Vit D July 7th, 2021 (bevacizumab added cycle #2)\par 8/23 scan shows PE started on Lovenox  \par October 2021 oxaliplatin held  [de-identified] : Microsatellite stable [de-identified] : KRAS G12D , NABILA [FreeTextEntry1] : FOLFOX + Bevacizumab +VitD ([employer] study) --> 5FU Yolis + Vit D  [de-identified] : presents in follow up , due for treatment tomorrow\par +wt gain \par didn't take BP meds this am hence elevated BP today , advised on compliance\par hasn't made follow up with PMD

## 2021-12-20 NOTE — REVIEW OF SYSTEMS
[Negative] : Allergic/Immunologic [FreeTextEntry8] : polyuria  [de-identified] : peripheral neuropathy

## 2021-12-20 NOTE — PHYSICAL EXAM
[Fully active, able to carry on all pre-disease performance without restriction] : Status 0 - Fully active, able to carry on all pre-disease performance without restriction [Normal] : affect appropriate [de-identified] : anicteric  [de-identified] : normal respiratory effort, no audible wheeze [de-identified] : soft nontender

## 2021-12-21 ENCOUNTER — APPOINTMENT (OUTPATIENT)
Dept: INFUSION THERAPY | Facility: HOSPITAL | Age: 51
End: 2021-12-21

## 2021-12-21 ENCOUNTER — NON-APPOINTMENT (OUTPATIENT)
Age: 51
End: 2021-12-21

## 2021-12-21 DIAGNOSIS — Z51.11 ENCOUNTER FOR ANTINEOPLASTIC CHEMOTHERAPY: ICD-10-CM

## 2021-12-21 DIAGNOSIS — R11.2 NAUSEA WITH VOMITING, UNSPECIFIED: ICD-10-CM

## 2021-12-22 ENCOUNTER — NON-APPOINTMENT (OUTPATIENT)
Age: 51
End: 2021-12-22

## 2021-12-23 ENCOUNTER — APPOINTMENT (OUTPATIENT)
Dept: INFUSION THERAPY | Facility: HOSPITAL | Age: 51
End: 2021-12-23

## 2021-12-27 ENCOUNTER — APPOINTMENT (OUTPATIENT)
Dept: CT IMAGING | Facility: IMAGING CENTER | Age: 51
End: 2021-12-27
Payer: COMMERCIAL

## 2021-12-27 ENCOUNTER — OUTPATIENT (OUTPATIENT)
Dept: OUTPATIENT SERVICES | Facility: HOSPITAL | Age: 51
LOS: 1 days | End: 2021-12-27
Payer: COMMERCIAL

## 2021-12-27 DIAGNOSIS — Z00.8 ENCOUNTER FOR OTHER GENERAL EXAMINATION: ICD-10-CM

## 2021-12-27 DIAGNOSIS — C18.9 MALIGNANT NEOPLASM OF COLON, UNSPECIFIED: ICD-10-CM

## 2021-12-27 PROCEDURE — 74177 CT ABD & PELVIS W/CONTRAST: CPT | Mod: 26

## 2021-12-27 PROCEDURE — 71260 CT THORAX DX C+: CPT | Mod: 26

## 2021-12-27 PROCEDURE — 71260 CT THORAX DX C+: CPT

## 2021-12-27 PROCEDURE — 74177 CT ABD & PELVIS W/CONTRAST: CPT

## 2022-01-03 ENCOUNTER — LABORATORY RESULT (OUTPATIENT)
Age: 52
End: 2022-01-03

## 2022-01-03 ENCOUNTER — RESULT REVIEW (OUTPATIENT)
Age: 52
End: 2022-01-03

## 2022-01-03 ENCOUNTER — APPOINTMENT (OUTPATIENT)
Dept: HEMATOLOGY ONCOLOGY | Facility: CLINIC | Age: 52
End: 2022-01-03

## 2022-01-03 LAB
BASOPHILS # BLD AUTO: 0.04 K/UL — SIGNIFICANT CHANGE UP (ref 0–0.2)
BASOPHILS NFR BLD AUTO: 0.4 % — SIGNIFICANT CHANGE UP (ref 0–2)
EOSINOPHIL # BLD AUTO: 0.09 K/UL — SIGNIFICANT CHANGE UP (ref 0–0.5)
EOSINOPHIL NFR BLD AUTO: 0.9 % — SIGNIFICANT CHANGE UP (ref 0–6)
HCT VFR BLD CALC: 40.6 % — SIGNIFICANT CHANGE UP (ref 39–50)
HGB BLD-MCNC: 13.3 G/DL — SIGNIFICANT CHANGE UP (ref 13–17)
IMM GRANULOCYTES NFR BLD AUTO: 0.5 % — SIGNIFICANT CHANGE UP (ref 0–1.5)
LYMPHOCYTES # BLD AUTO: 2.52 K/UL — SIGNIFICANT CHANGE UP (ref 1–3.3)
LYMPHOCYTES # BLD AUTO: 24.8 % — SIGNIFICANT CHANGE UP (ref 13–44)
MCHC RBC-ENTMCNC: 24.2 PG — LOW (ref 27–34)
MCHC RBC-ENTMCNC: 32.8 G/DL — SIGNIFICANT CHANGE UP (ref 32–36)
MCV RBC AUTO: 73.8 FL — LOW (ref 80–100)
MONOCYTES # BLD AUTO: 1.16 K/UL — HIGH (ref 0–0.9)
MONOCYTES NFR BLD AUTO: 11.4 % — SIGNIFICANT CHANGE UP (ref 2–14)
NEUTROPHILS # BLD AUTO: 6.32 K/UL — SIGNIFICANT CHANGE UP (ref 1.8–7.4)
NEUTROPHILS NFR BLD AUTO: 62 % — SIGNIFICANT CHANGE UP (ref 43–77)
NRBC # BLD: 0 /100 WBCS — SIGNIFICANT CHANGE UP (ref 0–0)
PLATELET # BLD AUTO: 188 K/UL — SIGNIFICANT CHANGE UP (ref 150–400)
RBC # BLD: 5.5 M/UL — SIGNIFICANT CHANGE UP (ref 4.2–5.8)
RBC # FLD: 21.9 % — HIGH (ref 10.3–14.5)
WBC # BLD: 10.18 K/UL — SIGNIFICANT CHANGE UP (ref 3.8–10.5)
WBC # FLD AUTO: 10.18 K/UL — SIGNIFICANT CHANGE UP (ref 3.8–10.5)

## 2022-01-04 LAB
ALBUMIN SERPL ELPH-MCNC: 4.4 G/DL
ALP BLD-CCNC: 69 U/L
ALT SERPL-CCNC: 11 U/L
ANION GAP SERPL CALC-SCNC: 15 MMOL/L
AST SERPL-CCNC: 17 U/L
BILIRUB SERPL-MCNC: 0.7 MG/DL
BUN SERPL-MCNC: 18 MG/DL
CALCIUM SERPL-MCNC: 9.5 MG/DL
CHLORIDE SERPL-SCNC: 102 MMOL/L
CO2 SERPL-SCNC: 22 MMOL/L
CREAT SERPL-MCNC: 1.22 MG/DL
CREAT SPEC-SCNC: 107 MG/DL
CREAT/PROT UR: 0.1 RATIO
GLUCOSE SERPL-MCNC: 111 MG/DL
POTASSIUM SERPL-SCNC: 4.7 MMOL/L
PROT SERPL-MCNC: 7.4 G/DL
PROT UR-MCNC: 9 MG/DL
SODIUM SERPL-SCNC: 139 MMOL/L

## 2022-01-05 ENCOUNTER — APPOINTMENT (OUTPATIENT)
Dept: HEMATOLOGY ONCOLOGY | Facility: CLINIC | Age: 52
End: 2022-01-05
Payer: COMMERCIAL

## 2022-01-05 ENCOUNTER — NON-APPOINTMENT (OUTPATIENT)
Age: 52
End: 2022-01-05

## 2022-01-05 ENCOUNTER — APPOINTMENT (OUTPATIENT)
Dept: INFUSION THERAPY | Facility: HOSPITAL | Age: 52
End: 2022-01-05

## 2022-01-05 VITALS
HEART RATE: 88 BPM | DIASTOLIC BLOOD PRESSURE: 83 MMHG | SYSTOLIC BLOOD PRESSURE: 134 MMHG | OXYGEN SATURATION: 100 % | RESPIRATION RATE: 14 BRPM | WEIGHT: 209.44 LBS | BODY MASS INDEX: 32.83 KG/M2 | TEMPERATURE: 97 F

## 2022-01-05 PROCEDURE — 99214 OFFICE O/P EST MOD 30 MIN: CPT

## 2022-01-05 NOTE — PHYSICAL EXAM
[Fully active, able to carry on all pre-disease performance without restriction] : Status 0 - Fully active, able to carry on all pre-disease performance without restriction [Normal] : affect appropriate [de-identified] : anicteric  [de-identified] : normal respiratory effort, no audible wheeze [de-identified] : soft nontender

## 2022-01-05 NOTE — HISTORY OF PRESENT ILLNESS
[Disease: _____________________] : Disease: [unfilled] [T: ___] : T[unfilled] [N: ___] : N[unfilled] [M: ___] : M[unfilled] [AJCC Stage: ____] : AJCC Stage: [unfilled] [de-identified] : Mr Granda in 2021 at the age of 51 presented to the hospital with abdominal pain and underwent imaging that revealed a distal transverse colon lesion with evidence of microperforation.\par He is also underwent a colonoscopy which demonstrated an endoscopically obstructing distal transverse colon mass.  Given the potential for impending obstruction as well as microperforation seen on imaging decision was made to take patient to the OR for a left hemicolectomy and anastomosis.\par Imaging done at that time also showed evidence of metastatic disease to the liver and potentially lung and a questionable lesion as well.  Patient presents with his sister for initial visit and to discuss palliative chemotherapy.\par \par Enrolled in [employer] \par \par Started FOLFOX + Vit D July 7th, 2021 (bevacizumab added cycle #2)\par 8/23 scan shows PE started on Lovenox  \par October 2021 oxaliplatin held  [de-identified] : Microsatellite stable [de-identified] : KRAS G12D , NABILA [FreeTextEntry1] : FOLFOX + Bevacizumab +VitD ([employer] study) --> 5FU Yolis + Vit D  [de-identified] : presents in follow up , due for treatment tomorrow\par had recent imaging showing stable disease \par wt stable \par  blood sugars better controlled \par intermittent epistaxis

## 2022-01-07 ENCOUNTER — APPOINTMENT (OUTPATIENT)
Dept: UROLOGY | Facility: CLINIC | Age: 52
End: 2022-01-07
Payer: COMMERCIAL

## 2022-01-07 ENCOUNTER — APPOINTMENT (OUTPATIENT)
Dept: INFUSION THERAPY | Facility: HOSPITAL | Age: 52
End: 2022-01-07

## 2022-01-07 VITALS — SYSTOLIC BLOOD PRESSURE: 127 MMHG | HEART RATE: 86 BPM | DIASTOLIC BLOOD PRESSURE: 81 MMHG

## 2022-01-07 PROCEDURE — 99204 OFFICE O/P NEW MOD 45 MIN: CPT | Mod: 25

## 2022-01-07 PROCEDURE — 51798 US URINE CAPACITY MEASURE: CPT

## 2022-01-07 NOTE — ASSESSMENT
[FreeTextEntry1] : patient with hx of left colectomy for cancer\par now on chemo \par srugery June 2021 \par hx of 3 utis in the past 3 months: all E Coli \par urine clear, no luts , nocturi 1 x, hx of DM \par admits toincreased water itnake \par recent CT ( dec 2021) showed right LP 6 mm stone and mild BPH \par non smoker and occas EtOH \par here for further eval \par 1- check urine today \par 2- rtc for cysto \par 3- encourage fluids \par 4- will discuss PSA testing at time of cysto and DAVIAN

## 2022-01-07 NOTE — PHYSICAL EXAM
[General Appearance - Well Developed] : well developed [General Appearance - Well Nourished] : well nourished [Normal Appearance] : normal appearance [Well Groomed] : well groomed [General Appearance - In No Acute Distress] : no acute distress [Edema] : no peripheral edema [Respiration, Rhythm And Depth] : normal respiratory rhythm and effort [Exaggerated Use Of Accessory Muscles For Inspiration] : no accessory muscle use [Abdomen Soft] : soft [Abdomen Tenderness] : non-tender [Abdomen Mass (___ Cm)] : no abdominal mass palpated [Abdomen Hernia] : no hernia was discovered [Costovertebral Angle Tenderness] : no ~M costovertebral angle tenderness [FreeTextEntry1] : pvr- 17 ml  [Normal Station and Gait] : the gait and station were normal for the patient's age [] : no rash [No Focal Deficits] : no focal deficits [Oriented To Time, Place, And Person] : oriented to person, place, and time [Affect] : the affect was normal [Mood] : the mood was normal [Not Anxious] : not anxious [Cervical Lymph Nodes Enlarged Posterior Bilaterally] : posterior cervical [Cervical Lymph Nodes Enlarged Anterior Bilaterally] : anterior cervical [Supraclavicular Lymph Nodes Enlarged Bilaterally] : supraclavicular

## 2022-01-07 NOTE — HISTORY OF PRESENT ILLNESS
[FreeTextEntry1] : patient with hx of left colectomy for cancer\par now on chemo \par srugery June 2021 \par hx of 3 utis in the past 3 months: all E Coli \par urine clear, no luts , nocturi 1 x, hx of DM \par admits toincreased water itnake \par recent CT ( dec 2021) showed right LP 6 mm stone and mild BPH \par non smoker and occas EtOH \par here for further eval

## 2022-01-07 NOTE — REVIEW OF SYSTEMS
[Eyesight Problems] : eyesight problems [Vomiting] : vomiting [Loss of interest] : loss of interest in sexual activity [Poor quality erections] : Poor quality erections [No erections] : no erections [Pain during urination] : pain during urination [Wake up at night to urinate  How many times?  ___] : wakes up to urinate [unfilled] times during the night [Bladder pressure] : experiences bladder pressure [Negative] : Heme/Lymph

## 2022-01-10 ENCOUNTER — APPOINTMENT (OUTPATIENT)
Dept: HEMATOLOGY ONCOLOGY | Facility: CLINIC | Age: 52
End: 2022-01-10

## 2022-01-10 LAB
APPEARANCE: CLEAR
BACTERIA UR CULT: ABNORMAL
BACTERIA: ABNORMAL
BILIRUBIN URINE: NEGATIVE
BLOOD URINE: ABNORMAL
COLOR: NORMAL
GLUCOSE QUALITATIVE U: NEGATIVE
HYALINE CASTS: 0 /LPF
KETONES URINE: NEGATIVE
LEUKOCYTE ESTERASE URINE: ABNORMAL
MICROSCOPIC-UA: NORMAL
NITRITE URINE: NEGATIVE
PH URINE: 6.5
PROTEIN URINE: NORMAL
RED BLOOD CELLS URINE: 17 /HPF
SPECIFIC GRAVITY URINE: 1.02
SQUAMOUS EPITHELIAL CELLS: 3 /HPF
UROBILINOGEN URINE: NORMAL
WHITE BLOOD CELLS URINE: 22 /HPF

## 2022-01-11 ENCOUNTER — RX RENEWAL (OUTPATIENT)
Age: 52
End: 2022-01-11

## 2022-01-11 RX ORDER — RIVAROXABAN 15 MG-20MG
15 & 20 KIT ORAL
Qty: 1 | Refills: 0 | Status: DISCONTINUED | COMMUNITY
Start: 2022-01-11 | End: 2022-01-11

## 2022-01-18 ENCOUNTER — RESULT REVIEW (OUTPATIENT)
Age: 52
End: 2022-01-18

## 2022-01-18 ENCOUNTER — APPOINTMENT (OUTPATIENT)
Dept: HEMATOLOGY ONCOLOGY | Facility: CLINIC | Age: 52
End: 2022-01-18

## 2022-01-18 ENCOUNTER — APPOINTMENT (OUTPATIENT)
Dept: HEMATOLOGY ONCOLOGY | Facility: CLINIC | Age: 52
End: 2022-01-18
Payer: COMMERCIAL

## 2022-01-18 ENCOUNTER — NON-APPOINTMENT (OUTPATIENT)
Age: 52
End: 2022-01-18

## 2022-01-18 ENCOUNTER — APPOINTMENT (OUTPATIENT)
Dept: INFUSION THERAPY | Facility: HOSPITAL | Age: 52
End: 2022-01-18

## 2022-01-18 VITALS
HEART RATE: 80 BPM | DIASTOLIC BLOOD PRESSURE: 85 MMHG | HEIGHT: 66.97 IN | OXYGEN SATURATION: 99 % | TEMPERATURE: 97.3 F | WEIGHT: 213.41 LBS | SYSTOLIC BLOOD PRESSURE: 128 MMHG | BODY MASS INDEX: 33.49 KG/M2 | RESPIRATION RATE: 16 BRPM

## 2022-01-18 LAB
ALBUMIN SERPL ELPH-MCNC: 4.6 G/DL
ALP BLD-CCNC: 77 U/L
ALT SERPL-CCNC: 23 U/L
ANION GAP SERPL CALC-SCNC: 12 MMOL/L
APPEARANCE: CLEAR
AST SERPL-CCNC: 24 U/L
BASOPHILS # BLD AUTO: 0.04 K/UL — SIGNIFICANT CHANGE UP (ref 0–0.2)
BASOPHILS NFR BLD AUTO: 0.7 % — SIGNIFICANT CHANGE UP (ref 0–2)
BILIRUB SERPL-MCNC: 0.5 MG/DL
BILIRUBIN URINE: NEGATIVE
BLOOD URINE: NEGATIVE
BUN SERPL-MCNC: 17 MG/DL
CALCIUM SERPL-MCNC: 10.2 MG/DL
CEA SERPL-MCNC: 176 NG/ML
CHLORIDE SERPL-SCNC: 101 MMOL/L
CO2 SERPL-SCNC: 24 MMOL/L
COLOR: NORMAL
CREAT SERPL-MCNC: 1.46 MG/DL
CREAT SPEC-SCNC: 98 MG/DL
CREAT/PROT UR: 0.1 RATIO
EOSINOPHIL # BLD AUTO: 0.19 K/UL — SIGNIFICANT CHANGE UP (ref 0–0.5)
EOSINOPHIL NFR BLD AUTO: 3.4 % — SIGNIFICANT CHANGE UP (ref 0–6)
GLUCOSE QUALITATIVE U: NEGATIVE
GLUCOSE SERPL-MCNC: 91 MG/DL
HCT VFR BLD CALC: 39.4 % — SIGNIFICANT CHANGE UP (ref 39–50)
HGB BLD-MCNC: 13 G/DL — SIGNIFICANT CHANGE UP (ref 13–17)
IMM GRANULOCYTES NFR BLD AUTO: 0.2 % — SIGNIFICANT CHANGE UP (ref 0–1.5)
KETONES URINE: NEGATIVE
LEUKOCYTE ESTERASE URINE: NEGATIVE
LYMPHOCYTES # BLD AUTO: 2.46 K/UL — SIGNIFICANT CHANGE UP (ref 1–3.3)
LYMPHOCYTES # BLD AUTO: 44.3 % — HIGH (ref 13–44)
MAGNESIUM SERPL-MCNC: 2.1 MG/DL
MCHC RBC-ENTMCNC: 24.4 PG — LOW (ref 27–34)
MCHC RBC-ENTMCNC: 33 G/DL — SIGNIFICANT CHANGE UP (ref 32–36)
MCV RBC AUTO: 74.1 FL — LOW (ref 80–100)
MONOCYTES # BLD AUTO: 0.61 K/UL — SIGNIFICANT CHANGE UP (ref 0–0.9)
MONOCYTES NFR BLD AUTO: 11 % — SIGNIFICANT CHANGE UP (ref 2–14)
NEUTROPHILS # BLD AUTO: 2.24 K/UL — SIGNIFICANT CHANGE UP (ref 1.8–7.4)
NEUTROPHILS NFR BLD AUTO: 40.4 % — LOW (ref 43–77)
NITRITE URINE: NEGATIVE
NRBC # BLD: 0 /100 WBCS — SIGNIFICANT CHANGE UP (ref 0–0)
PH URINE: 6
PHOSPHATE SERPL-MCNC: 4.6 MG/DL
PLATELET # BLD AUTO: 223 K/UL — SIGNIFICANT CHANGE UP (ref 150–400)
POTASSIUM SERPL-SCNC: 5 MMOL/L
PROT SERPL-MCNC: 7.6 G/DL
PROT UR-MCNC: 7 MG/DL
PROTEIN URINE: NEGATIVE
RBC # BLD: 5.32 M/UL — SIGNIFICANT CHANGE UP (ref 4.2–5.8)
RBC # FLD: 21.4 % — HIGH (ref 10.3–14.5)
SODIUM SERPL-SCNC: 137 MMOL/L
SPECIFIC GRAVITY URINE: 1.01
UROBILINOGEN URINE: NORMAL
WBC # BLD: 5.55 K/UL — SIGNIFICANT CHANGE UP (ref 3.8–10.5)
WBC # FLD AUTO: 5.55 K/UL — SIGNIFICANT CHANGE UP (ref 3.8–10.5)

## 2022-01-18 PROCEDURE — 99214 OFFICE O/P EST MOD 30 MIN: CPT

## 2022-01-18 RX ORDER — APIXABAN 5 MG/1
5 TABLET, FILM COATED ORAL
Qty: 60 | Refills: 1 | Status: COMPLETED | COMMUNITY
Start: 2021-12-06 | End: 2022-01-18

## 2022-01-18 NOTE — HISTORY OF PRESENT ILLNESS
[Disease: _____________________] : Disease: [unfilled] [T: ___] : T[unfilled] [N: ___] : N[unfilled] [M: ___] : M[unfilled] [AJCC Stage: ____] : AJCC Stage: [unfilled] [de-identified] : Mr Granda in 2021 at the age of 51 presented to the hospital with abdominal pain and underwent imaging that revealed a distal transverse colon lesion with evidence of microperforation.\par He is also underwent a colonoscopy which demonstrated an endoscopically obstructing distal transverse colon mass.  Given the potential for impending obstruction as well as microperforation seen on imaging decision was made to take patient to the OR for a left hemicolectomy and anastomosis.\par Imaging done at that time also showed evidence of metastatic disease to the liver and potentially lung and a questionable lesion as well.  Patient presents with his sister for initial visit and to discuss palliative chemotherapy.\par \par Enrolled in [employer] \par \par Started FOLFOX + Vit D July 7th, 2021 (bevacizumab added cycle #2)\par 8/23 scan shows PE started on Lovenox  \par October 2021 oxaliplatin held  [de-identified] : Microsatellite stable [de-identified] : KRAS G12D , NABILA [FreeTextEntry1] : FOLFOX + Bevacizumab +VitD ([employer] study) --> 5FU Yolis + Vit D  [de-identified] : Patient presents for follow up today and is scheduled for treatment today.  He reports that his peripheral neuropathy in his fingers and toes is becoming more pronounced when he is outside in the cold temperatures but improves inside in the warm air or when his hands are covered.  Denies neuropathy interfering with his ADL's.  He reports that he still has episodes of blood discharge sometimes when he sneezes or blows his nose but denies gross epistaxis.  He was recommended at his last office visit a trial of saline nasal sprays but hasn't started yet.  Denies HA, dizziness, CP, SOB, cough, vomiting, abdominal pain, diarrhea, constipation or extremity swelling.

## 2022-01-18 NOTE — PHYSICAL EXAM
[Fully active, able to carry on all pre-disease performance without restriction] : Status 0 - Fully active, able to carry on all pre-disease performance without restriction [Normal] : no JVD, no calf tenderness, venous stasis changes, varices [de-identified] : anicteric  [de-identified] : no JVD [de-identified] : soft nontender

## 2022-01-18 NOTE — REVIEW OF SYSTEMS
[Negative] : Allergic/Immunologic [Dysphagia] : no dysphagia [Hoarseness] : no hoarseness [Odynophagia] : no odynophagia [FreeTextEntry4] : episodes of epistaxis [de-identified] : peripheral neuropathy

## 2022-01-19 ENCOUNTER — OUTPATIENT (OUTPATIENT)
Dept: OUTPATIENT SERVICES | Facility: HOSPITAL | Age: 52
LOS: 1 days | Discharge: HOME | End: 2022-01-19

## 2022-01-19 DIAGNOSIS — C18.9 MALIGNANT NEOPLASM OF COLON, UNSPECIFIED: ICD-10-CM

## 2022-01-20 ENCOUNTER — APPOINTMENT (OUTPATIENT)
Dept: INFUSION THERAPY | Facility: HOSPITAL | Age: 52
End: 2022-01-20

## 2022-01-25 ENCOUNTER — NON-APPOINTMENT (OUTPATIENT)
Age: 52
End: 2022-01-25

## 2022-01-31 ENCOUNTER — RESULT REVIEW (OUTPATIENT)
Age: 52
End: 2022-01-31

## 2022-01-31 ENCOUNTER — APPOINTMENT (OUTPATIENT)
Dept: HEMATOLOGY ONCOLOGY | Facility: CLINIC | Age: 52
End: 2022-01-31

## 2022-01-31 LAB
ALBUMIN SERPL ELPH-MCNC: 4.5 G/DL
ALP BLD-CCNC: 61 U/L
ALT SERPL-CCNC: 24 U/L
ANION GAP SERPL CALC-SCNC: 11 MMOL/L
APPEARANCE: CLEAR
AST SERPL-CCNC: 24 U/L
BASOPHILS # BLD AUTO: 0.03 K/UL — SIGNIFICANT CHANGE UP (ref 0–0.2)
BASOPHILS NFR BLD AUTO: 0.5 % — SIGNIFICANT CHANGE UP (ref 0–2)
BILIRUB SERPL-MCNC: 0.6 MG/DL
BILIRUBIN URINE: NEGATIVE
BLOOD URINE: NEGATIVE
BUN SERPL-MCNC: 17 MG/DL
CALCIUM SERPL-MCNC: 9.7 MG/DL
CHLORIDE SERPL-SCNC: 105 MMOL/L
CO2 SERPL-SCNC: 25 MMOL/L
COLOR: NORMAL
CREAT SERPL-MCNC: 1.16 MG/DL
CREAT SPEC-SCNC: 112 MG/DL
CREAT/PROT UR: 0.1 RATIO
EOSINOPHIL # BLD AUTO: 0.15 K/UL — SIGNIFICANT CHANGE UP (ref 0–0.5)
EOSINOPHIL NFR BLD AUTO: 2.6 % — SIGNIFICANT CHANGE UP (ref 0–6)
GLUCOSE QUALITATIVE U: NEGATIVE
GLUCOSE SERPL-MCNC: 111 MG/DL
HCT VFR BLD CALC: 40.1 % — SIGNIFICANT CHANGE UP (ref 39–50)
HGB BLD-MCNC: 13.1 G/DL — SIGNIFICANT CHANGE UP (ref 13–17)
IMM GRANULOCYTES NFR BLD AUTO: 0.2 % — SIGNIFICANT CHANGE UP (ref 0–1.5)
KETONES URINE: NEGATIVE
LEUKOCYTE ESTERASE URINE: NEGATIVE
LYMPHOCYTES # BLD AUTO: 2.76 K/UL — SIGNIFICANT CHANGE UP (ref 1–3.3)
LYMPHOCYTES # BLD AUTO: 48 % — HIGH (ref 13–44)
MAGNESIUM SERPL-MCNC: 2.1 MG/DL
MCHC RBC-ENTMCNC: 25 PG — LOW (ref 27–34)
MCHC RBC-ENTMCNC: 32.7 G/DL — SIGNIFICANT CHANGE UP (ref 32–36)
MCV RBC AUTO: 76.5 FL — LOW (ref 80–100)
MONOCYTES # BLD AUTO: 0.76 K/UL — SIGNIFICANT CHANGE UP (ref 0–0.9)
MONOCYTES NFR BLD AUTO: 13.2 % — SIGNIFICANT CHANGE UP (ref 2–14)
NEUTROPHILS # BLD AUTO: 2.04 K/UL — SIGNIFICANT CHANGE UP (ref 1.8–7.4)
NEUTROPHILS NFR BLD AUTO: 35.5 % — LOW (ref 43–77)
NITRITE URINE: NEGATIVE
NRBC # BLD: 0 /100 WBCS — SIGNIFICANT CHANGE UP (ref 0–0)
PH URINE: 6
PHOSPHATE SERPL-MCNC: 4.2 MG/DL
PLATELET # BLD AUTO: 162 K/UL — SIGNIFICANT CHANGE UP (ref 150–400)
POTASSIUM SERPL-SCNC: 4.7 MMOL/L
PROT SERPL-MCNC: 7.4 G/DL
PROT UR-MCNC: 8 MG/DL
PROTEIN URINE: NEGATIVE
RBC # BLD: 5.24 M/UL — SIGNIFICANT CHANGE UP (ref 4.2–5.8)
RBC # FLD: 21.4 % — HIGH (ref 10.3–14.5)
SODIUM SERPL-SCNC: 141 MMOL/L
SPECIFIC GRAVITY URINE: 1.02
UROBILINOGEN URINE: NORMAL
WBC # BLD: 5.75 K/UL — SIGNIFICANT CHANGE UP (ref 3.8–10.5)
WBC # FLD AUTO: 5.75 K/UL — SIGNIFICANT CHANGE UP (ref 3.8–10.5)

## 2022-02-02 ENCOUNTER — APPOINTMENT (OUTPATIENT)
Dept: INFUSION THERAPY | Facility: HOSPITAL | Age: 52
End: 2022-02-02

## 2022-02-02 ENCOUNTER — APPOINTMENT (OUTPATIENT)
Dept: HEMATOLOGY ONCOLOGY | Facility: CLINIC | Age: 52
End: 2022-02-02
Payer: COMMERCIAL

## 2022-02-02 VITALS
WEIGHT: 213.83 LBS | HEART RATE: 86 BPM | HEIGHT: 66.97 IN | RESPIRATION RATE: 16 BRPM | SYSTOLIC BLOOD PRESSURE: 130 MMHG | DIASTOLIC BLOOD PRESSURE: 86 MMHG | BODY MASS INDEX: 33.56 KG/M2 | OXYGEN SATURATION: 99 % | TEMPERATURE: 97.4 F

## 2022-02-02 DIAGNOSIS — R11.2 NAUSEA WITH VOMITING, UNSPECIFIED: ICD-10-CM

## 2022-02-02 DIAGNOSIS — Z51.11 ENCOUNTER FOR ANTINEOPLASTIC CHEMOTHERAPY: ICD-10-CM

## 2022-02-02 PROCEDURE — 99214 OFFICE O/P EST MOD 30 MIN: CPT

## 2022-02-03 NOTE — REVIEW OF SYSTEMS
[Negative] : Allergic/Immunologic [Dysphagia] : no dysphagia [Hoarseness] : no hoarseness [Odynophagia] : no odynophagia [de-identified] : peripheral neuropathy

## 2022-02-03 NOTE — HISTORY OF PRESENT ILLNESS
[Disease: _____________________] : Disease: [unfilled] [T: ___] : T[unfilled] [N: ___] : N[unfilled] [M: ___] : M[unfilled] [AJCC Stage: ____] : AJCC Stage: [unfilled] [de-identified] : Mr Granda in 2021 at the age of 51 presented to the hospital with abdominal pain and underwent imaging that revealed a distal transverse colon lesion with evidence of microperforation.\par He is also underwent a colonoscopy which demonstrated an endoscopically obstructing distal transverse colon mass.  Given the potential for impending obstruction as well as microperforation seen on imaging decision was made to take patient to the OR for a left hemicolectomy and anastomosis.\par Imaging done at that time also showed evidence of metastatic disease to the liver and potentially lung and a questionable lesion as well.  Patient presents with his sister for initial visit and to discuss palliative chemotherapy.\par \par Enrolled in [employer] \par \par Started FOLFOX + Vit D July 7th, 2021 (bevacizumab added cycle #2)\par 8/23 scan shows PE started on Lovenox  \par October 2021 oxaliplatin held  [de-identified] : Microsatellite stable [de-identified] : KRAS G12D , NABILA [FreeTextEntry1] : FOLFOX + Bevacizumab +VitD ([employer] study) --> 5FU Yolis + Vit D  [de-identified] : presents in follow up , due for treatment today\par no new complaints\par stable neuropathy \par no fever, chills or abdominal pain \par compliant with anticoagulation for VTE

## 2022-02-03 NOTE — PHYSICAL EXAM
[Fully active, able to carry on all pre-disease performance without restriction] : Status 0 - Fully active, able to carry on all pre-disease performance without restriction [Normal] : affect appropriate [de-identified] : anicteric  [de-identified] : no JVD [de-identified] : soft nontender

## 2022-02-04 ENCOUNTER — APPOINTMENT (OUTPATIENT)
Dept: INFUSION THERAPY | Facility: HOSPITAL | Age: 52
End: 2022-02-04

## 2022-02-08 ENCOUNTER — RESULT REVIEW (OUTPATIENT)
Age: 52
End: 2022-02-08

## 2022-02-14 ENCOUNTER — APPOINTMENT (OUTPATIENT)
Dept: HEMATOLOGY ONCOLOGY | Facility: CLINIC | Age: 52
End: 2022-02-14

## 2022-02-15 ENCOUNTER — RESULT REVIEW (OUTPATIENT)
Age: 52
End: 2022-02-15

## 2022-02-15 ENCOUNTER — APPOINTMENT (OUTPATIENT)
Dept: HEMATOLOGY ONCOLOGY | Facility: CLINIC | Age: 52
End: 2022-02-15

## 2022-02-15 LAB
ALBUMIN SERPL ELPH-MCNC: 4.8 G/DL
ALP BLD-CCNC: 61 U/L
ALT SERPL-CCNC: 27 U/L
ANION GAP SERPL CALC-SCNC: 16 MMOL/L
AST SERPL-CCNC: 26 U/L
BASOPHILS # BLD AUTO: 0.03 K/UL — SIGNIFICANT CHANGE UP (ref 0–0.2)
BASOPHILS NFR BLD AUTO: 0.5 % — SIGNIFICANT CHANGE UP (ref 0–2)
BILIRUB SERPL-MCNC: 1.2 MG/DL
BUN SERPL-MCNC: 15 MG/DL
CALCIUM SERPL-MCNC: 9.6 MG/DL
CEA SERPL-MCNC: 134 NG/ML
CHLORIDE SERPL-SCNC: 103 MMOL/L
CO2 SERPL-SCNC: 22 MMOL/L
CREAT SERPL-MCNC: 1.11 MG/DL
CREAT SPEC-SCNC: 124 MG/DL
CREAT/PROT UR: 0.1 RATIO
EOSINOPHIL # BLD AUTO: 0.17 K/UL — SIGNIFICANT CHANGE UP (ref 0–0.5)
EOSINOPHIL NFR BLD AUTO: 2.6 % — SIGNIFICANT CHANGE UP (ref 0–6)
GLUCOSE SERPL-MCNC: 117 MG/DL
HCT VFR BLD CALC: 40 % — SIGNIFICANT CHANGE UP (ref 39–50)
HGB BLD-MCNC: 13.4 G/DL — SIGNIFICANT CHANGE UP (ref 13–17)
IMM GRANULOCYTES NFR BLD AUTO: 0.2 % — SIGNIFICANT CHANGE UP (ref 0–1.5)
LYMPHOCYTES # BLD AUTO: 3.43 K/UL — HIGH (ref 1–3.3)
LYMPHOCYTES # BLD AUTO: 51.8 % — HIGH (ref 13–44)
MAGNESIUM SERPL-MCNC: 2.2 MG/DL
MCHC RBC-ENTMCNC: 25.2 PG — LOW (ref 27–34)
MCHC RBC-ENTMCNC: 33.5 G/DL — SIGNIFICANT CHANGE UP (ref 32–36)
MCV RBC AUTO: 75.2 FL — LOW (ref 80–100)
MONOCYTES # BLD AUTO: 0.93 K/UL — HIGH (ref 0–0.9)
MONOCYTES NFR BLD AUTO: 14 % — SIGNIFICANT CHANGE UP (ref 2–14)
NEUTROPHILS # BLD AUTO: 2.05 K/UL — SIGNIFICANT CHANGE UP (ref 1.8–7.4)
NEUTROPHILS NFR BLD AUTO: 30.9 % — LOW (ref 43–77)
NRBC # BLD: 0 /100 WBCS — SIGNIFICANT CHANGE UP (ref 0–0)
PHOSPHATE SERPL-MCNC: 3.9 MG/DL
PLATELET # BLD AUTO: 184 K/UL — SIGNIFICANT CHANGE UP (ref 150–400)
POTASSIUM SERPL-SCNC: 4.6 MMOL/L
PROT SERPL-MCNC: 7.7 G/DL
PROT UR-MCNC: 16 MG/DL
RBC # BLD: 5.32 M/UL — SIGNIFICANT CHANGE UP (ref 4.2–5.8)
RBC # FLD: 21.2 % — HIGH (ref 10.3–14.5)
SODIUM SERPL-SCNC: 140 MMOL/L
WBC # BLD: 6.62 K/UL — SIGNIFICANT CHANGE UP (ref 3.8–10.5)
WBC # FLD AUTO: 6.62 K/UL — SIGNIFICANT CHANGE UP (ref 3.8–10.5)

## 2022-02-16 ENCOUNTER — APPOINTMENT (OUTPATIENT)
Dept: HEMATOLOGY ONCOLOGY | Facility: CLINIC | Age: 52
End: 2022-02-16
Payer: COMMERCIAL

## 2022-02-16 ENCOUNTER — NON-APPOINTMENT (OUTPATIENT)
Age: 52
End: 2022-02-16

## 2022-02-16 ENCOUNTER — APPOINTMENT (OUTPATIENT)
Dept: INFUSION THERAPY | Facility: HOSPITAL | Age: 52
End: 2022-02-16

## 2022-02-16 VITALS
HEART RATE: 86 BPM | RESPIRATION RATE: 16 BRPM | TEMPERATURE: 98.2 F | SYSTOLIC BLOOD PRESSURE: 123 MMHG | WEIGHT: 215.17 LBS | BODY MASS INDEX: 33.77 KG/M2 | DIASTOLIC BLOOD PRESSURE: 75 MMHG | HEIGHT: 66.93 IN | OXYGEN SATURATION: 99 %

## 2022-02-16 PROCEDURE — 99214 OFFICE O/P EST MOD 30 MIN: CPT

## 2022-02-16 NOTE — REVIEW OF SYSTEMS
[Negative] : Allergic/Immunologic [Dysphagia] : no dysphagia [Hoarseness] : no hoarseness [de-identified] : peripheral neuropathy  [Odynophagia] : no odynophagia

## 2022-02-16 NOTE — PHYSICAL EXAM
[Fully active, able to carry on all pre-disease performance without restriction] : Status 0 - Fully active, able to carry on all pre-disease performance without restriction [Normal] : affect appropriate [de-identified] : anicteric  [de-identified] : no JVD [de-identified] : soft nontender  [de-identified] : RRR

## 2022-02-16 NOTE — HISTORY OF PRESENT ILLNESS
[Disease: _____________________] : Disease: [unfilled] [T: ___] : T[unfilled] [N: ___] : N[unfilled] [M: ___] : M[unfilled] [AJCC Stage: ____] : AJCC Stage: [unfilled] [de-identified] : Microsatellite stable [de-identified] : Mr Granda in 2021 at the age of 51 presented to the hospital with abdominal pain and underwent imaging that revealed a distal transverse colon lesion with evidence of microperforation.\par He is also underwent a colonoscopy which demonstrated an endoscopically obstructing distal transverse colon mass.  Given the potential for impending obstruction as well as microperforation seen on imaging decision was made to take patient to the OR for a left hemicolectomy and anastomosis.\par Imaging done at that time also showed evidence of metastatic disease to the liver and potentially lung and a questionable lesion as well.  Patient presents with his sister for initial visit and to discuss palliative chemotherapy.\par \par Enrolled in [employer] \par \par Started FOLFOX + Vit D July 7th, 2021 (bevacizumab added cycle #2)\par 8/23 scan shows PE started on Lovenox  \par October 2021 oxaliplatin held  [de-identified] : KRAS G12D , NABILA [FreeTextEntry1] : FOLFOX + Bevacizumab +VitD ([employer] study) --> 5FU Yolis + Vit D  [de-identified] : Patient presents for follow up today and is scheduled for treatment.  He reports increased neuropathy and cold sensitivity when working outside stating his hands/fingers and feet feel as if they cramp up.  Denies any pain.  Neuropathy does not interfere with his work or his ADL's.  Denies fever/chills, CP, SOB, vomiting, diarrhea, abdominal pain or HFS.

## 2022-02-17 ENCOUNTER — NON-APPOINTMENT (OUTPATIENT)
Age: 52
End: 2022-02-17

## 2022-02-18 ENCOUNTER — APPOINTMENT (OUTPATIENT)
Dept: INFUSION THERAPY | Facility: HOSPITAL | Age: 52
End: 2022-02-18

## 2022-02-22 ENCOUNTER — RX RENEWAL (OUTPATIENT)
Age: 52
End: 2022-02-22

## 2022-02-24 ENCOUNTER — RX RENEWAL (OUTPATIENT)
Age: 52
End: 2022-02-24

## 2022-02-24 ENCOUNTER — OUTPATIENT (OUTPATIENT)
Dept: OUTPATIENT SERVICES | Facility: HOSPITAL | Age: 52
LOS: 1 days | Discharge: HOME | End: 2022-02-24

## 2022-02-24 DIAGNOSIS — C18.9 MALIGNANT NEOPLASM OF COLON, UNSPECIFIED: ICD-10-CM

## 2022-02-25 ENCOUNTER — OUTPATIENT (OUTPATIENT)
Dept: OUTPATIENT SERVICES | Facility: HOSPITAL | Age: 52
LOS: 1 days | End: 2022-02-25
Payer: COMMERCIAL

## 2022-02-25 ENCOUNTER — APPOINTMENT (OUTPATIENT)
Dept: UROLOGY | Facility: CLINIC | Age: 52
End: 2022-02-25

## 2022-02-25 ENCOUNTER — APPOINTMENT (OUTPATIENT)
Dept: CT IMAGING | Facility: IMAGING CENTER | Age: 52
End: 2022-02-25
Payer: COMMERCIAL

## 2022-02-25 DIAGNOSIS — C18.9 MALIGNANT NEOPLASM OF COLON, UNSPECIFIED: ICD-10-CM

## 2022-02-25 PROCEDURE — 74177 CT ABD & PELVIS W/CONTRAST: CPT

## 2022-02-25 PROCEDURE — 71260 CT THORAX DX C+: CPT

## 2022-02-25 PROCEDURE — 74177 CT ABD & PELVIS W/CONTRAST: CPT | Mod: 26

## 2022-02-25 PROCEDURE — 71260 CT THORAX DX C+: CPT | Mod: 26

## 2022-02-28 ENCOUNTER — APPOINTMENT (OUTPATIENT)
Dept: HEMATOLOGY ONCOLOGY | Facility: CLINIC | Age: 52
End: 2022-02-28

## 2022-03-01 ENCOUNTER — RESULT REVIEW (OUTPATIENT)
Age: 52
End: 2022-03-01

## 2022-03-01 ENCOUNTER — NON-APPOINTMENT (OUTPATIENT)
Age: 52
End: 2022-03-01

## 2022-03-01 ENCOUNTER — APPOINTMENT (OUTPATIENT)
Dept: HEMATOLOGY ONCOLOGY | Facility: CLINIC | Age: 52
End: 2022-03-01

## 2022-03-01 ENCOUNTER — LABORATORY RESULT (OUTPATIENT)
Age: 52
End: 2022-03-01

## 2022-03-01 LAB
ALBUMIN SERPL ELPH-MCNC: 4.5 G/DL
ALP BLD-CCNC: 62 U/L
ALT SERPL-CCNC: 26 U/L
ANION GAP SERPL CALC-SCNC: 15 MMOL/L
APPEARANCE: CLEAR
AST SERPL-CCNC: 24 U/L
BASOPHILS # BLD AUTO: 0.02 K/UL — SIGNIFICANT CHANGE UP (ref 0–0.2)
BASOPHILS NFR BLD AUTO: 0.3 % — SIGNIFICANT CHANGE UP (ref 0–2)
BILIRUB SERPL-MCNC: 0.7 MG/DL
BILIRUBIN URINE: NEGATIVE
BLOOD URINE: NEGATIVE
BUN SERPL-MCNC: 17 MG/DL
CALCIUM SERPL-MCNC: 9.4 MG/DL
CEA SERPL-MCNC: 128 NG/ML
CHLORIDE SERPL-SCNC: 101 MMOL/L
CO2 SERPL-SCNC: 21 MMOL/L
COLOR: NORMAL
CREAT SERPL-MCNC: 1.13 MG/DL
CREAT SPEC-SCNC: 114 MG/DL
CREAT/PROT UR: 0.1 RATIO
EGFR: 79 ML/MIN/1.73M2
EOSINOPHIL # BLD AUTO: 0.13 K/UL — SIGNIFICANT CHANGE UP (ref 0–0.5)
EOSINOPHIL NFR BLD AUTO: 2.2 % — SIGNIFICANT CHANGE UP (ref 0–6)
GLUCOSE QUALITATIVE U: NEGATIVE
GLUCOSE SERPL-MCNC: 97 MG/DL
HCT VFR BLD CALC: 39.7 % — SIGNIFICANT CHANGE UP (ref 39–50)
HGB BLD-MCNC: 13.4 G/DL — SIGNIFICANT CHANGE UP (ref 13–17)
IMM GRANULOCYTES NFR BLD AUTO: 0.2 % — SIGNIFICANT CHANGE UP (ref 0–1.5)
KETONES URINE: NEGATIVE
LEUKOCYTE ESTERASE URINE: ABNORMAL
LYMPHOCYTES # BLD AUTO: 2.66 K/UL — SIGNIFICANT CHANGE UP (ref 1–3.3)
LYMPHOCYTES # BLD AUTO: 44.9 % — HIGH (ref 13–44)
MAGNESIUM SERPL-MCNC: 2 MG/DL
MCHC RBC-ENTMCNC: 25.6 PG — LOW (ref 27–34)
MCHC RBC-ENTMCNC: 33.8 G/DL — SIGNIFICANT CHANGE UP (ref 32–36)
MCV RBC AUTO: 75.8 FL — LOW (ref 80–100)
MONOCYTES # BLD AUTO: 0.69 K/UL — SIGNIFICANT CHANGE UP (ref 0–0.9)
MONOCYTES NFR BLD AUTO: 11.7 % — SIGNIFICANT CHANGE UP (ref 2–14)
NEUTROPHILS # BLD AUTO: 2.41 K/UL — SIGNIFICANT CHANGE UP (ref 1.8–7.4)
NEUTROPHILS NFR BLD AUTO: 40.7 % — LOW (ref 43–77)
NITRITE URINE: NEGATIVE
NRBC # BLD: 0 /100 WBCS — SIGNIFICANT CHANGE UP (ref 0–0)
PH URINE: 6
PHOSPHATE SERPL-MCNC: 3.9 MG/DL
PLATELET # BLD AUTO: 149 K/UL — LOW (ref 150–400)
POTASSIUM SERPL-SCNC: 4.5 MMOL/L
PROT SERPL-MCNC: 7.5 G/DL
PROT UR-MCNC: 8 MG/DL
PROTEIN URINE: NEGATIVE
RBC # BLD: 5.24 M/UL — SIGNIFICANT CHANGE UP (ref 4.2–5.8)
RBC # FLD: 21.6 % — HIGH (ref 10.3–14.5)
SODIUM SERPL-SCNC: 137 MMOL/L
SPECIFIC GRAVITY URINE: 1.02
UROBILINOGEN URINE: NORMAL
WBC # BLD: 5.92 K/UL — SIGNIFICANT CHANGE UP (ref 3.8–10.5)
WBC # FLD AUTO: 5.92 K/UL — SIGNIFICANT CHANGE UP (ref 3.8–10.5)

## 2022-03-02 ENCOUNTER — APPOINTMENT (OUTPATIENT)
Dept: INFUSION THERAPY | Facility: HOSPITAL | Age: 52
End: 2022-03-02

## 2022-03-02 ENCOUNTER — APPOINTMENT (OUTPATIENT)
Dept: HEMATOLOGY ONCOLOGY | Facility: CLINIC | Age: 52
End: 2022-03-02
Payer: COMMERCIAL

## 2022-03-02 ENCOUNTER — NON-APPOINTMENT (OUTPATIENT)
Age: 52
End: 2022-03-02

## 2022-03-02 VITALS
RESPIRATION RATE: 17 BRPM | WEIGHT: 220.46 LBS | TEMPERATURE: 97.2 F | OXYGEN SATURATION: 99 % | BODY MASS INDEX: 34.6 KG/M2 | DIASTOLIC BLOOD PRESSURE: 88 MMHG | HEART RATE: 90 BPM | SYSTOLIC BLOOD PRESSURE: 147 MMHG | HEIGHT: 66.93 IN

## 2022-03-02 DIAGNOSIS — Z51.11 ENCOUNTER FOR ANTINEOPLASTIC CHEMOTHERAPY: ICD-10-CM

## 2022-03-02 DIAGNOSIS — R11.2 NAUSEA WITH VOMITING, UNSPECIFIED: ICD-10-CM

## 2022-03-02 PROCEDURE — 99214 OFFICE O/P EST MOD 30 MIN: CPT

## 2022-03-03 NOTE — PHYSICAL EXAM
[Fully active, able to carry on all pre-disease performance without restriction] : Status 0 - Fully active, able to carry on all pre-disease performance without restriction [Normal] : affect appropriate [de-identified] : anicteric  [de-identified] : no JVD [de-identified] : RRR [de-identified] : soft nontender

## 2022-03-03 NOTE — HISTORY OF PRESENT ILLNESS
[Disease: _____________________] : Disease: [unfilled] [T: ___] : T[unfilled] [N: ___] : N[unfilled] [M: ___] : M[unfilled] [AJCC Stage: ____] : AJCC Stage: [unfilled] [de-identified] : Mr Granda in 2021 at the age of 51 presented to the hospital with abdominal pain and underwent imaging that revealed a distal transverse colon lesion with evidence of microperforation.\par He is also underwent a colonoscopy which demonstrated an endoscopically obstructing distal transverse colon mass.  Given the potential for impending obstruction as well as microperforation seen on imaging decision was made to take patient to the OR for a left hemicolectomy and anastomosis.\par Imaging done at that time also showed evidence of metastatic disease to the liver and potentially lung and a questionable lesion as well.  Patient presents with his sister for initial visit and to discuss palliative chemotherapy.\par \par Enrolled in [employer] \par \par Started FOLFOX + Vit D July 7th, 2021 (bevacizumab added cycle #2)\par 8/23 scan shows PE started on Lovenox  \par October 2021 oxaliplatin held  [de-identified] : Microsatellite stable [de-identified] : KRAS G12D , NABILA [FreeTextEntry1] : FOLFOX + Bevacizumab +VitD ([employer]) --> 5FU Yolis + Vit D  [de-identified] : Patient presents for follow up today to review recent CT scan results and is scheduled for treatment today.  He reports that neuropathy is unchanged due to cold weather but hopes that symptoms will improve as weather changes.  Neuropathy does not cause pain and does not affect his ADL's.  He wants to wait and see if symptoms improve once the weather gets warmer.  He admits to same fatigue and nausea in the first few days after treatment that improves with antiemetics and then resolves.  Denies abdominal pain, diarrhea, constipation.

## 2022-03-03 NOTE — REVIEW OF SYSTEMS
[Negative] : Allergic/Immunologic [Dysphagia] : no dysphagia [Hoarseness] : no hoarseness [Odynophagia] : no odynophagia [de-identified] : peripheral neuropathy

## 2022-03-04 ENCOUNTER — APPOINTMENT (OUTPATIENT)
Dept: INFUSION THERAPY | Facility: HOSPITAL | Age: 52
End: 2022-03-04

## 2022-03-04 ENCOUNTER — APPOINTMENT (OUTPATIENT)
Dept: INTERNAL MEDICINE | Facility: CLINIC | Age: 52
End: 2022-03-04
Payer: COMMERCIAL

## 2022-03-04 VITALS
HEART RATE: 92 BPM | WEIGHT: 218 LBS | BODY MASS INDEX: 35.03 KG/M2 | DIASTOLIC BLOOD PRESSURE: 76 MMHG | OXYGEN SATURATION: 99 % | RESPIRATION RATE: 16 BRPM | SYSTOLIC BLOOD PRESSURE: 140 MMHG | TEMPERATURE: 97.6 F | HEIGHT: 66 IN

## 2022-03-04 PROCEDURE — 99214 OFFICE O/P EST MOD 30 MIN: CPT

## 2022-03-07 LAB
CHOLEST SERPL-MCNC: 214 MG/DL
ESTIMATED AVERAGE GLUCOSE: 163 MG/DL
HBA1C MFR BLD HPLC: 7.3 %
HDLC SERPL-MCNC: 75 MG/DL
LDLC SERPL CALC-MCNC: 128 MG/DL
NONHDLC SERPL-MCNC: 140 MG/DL
TRIGL SERPL-MCNC: 58 MG/DL

## 2022-03-07 NOTE — PHYSICAL EXAM
[No Acute Distress] : no acute distress [Well-Appearing] : well-appearing [Normal Voice/Communication] : normal voice/communication [Normal Sclera/Conjunctiva] : normal sclera/conjunctiva [PERRL] : pupils equal round and reactive to light [Normal Oropharynx] : the oropharynx was normal [No Lymphadenopathy] : no lymphadenopathy [Thyroid Normal, No Nodules] : the thyroid was normal and there were no nodules present [No Respiratory Distress] : no respiratory distress  [No Accessory Muscle Use] : no accessory muscle use [Clear to Auscultation] : lungs were clear to auscultation bilaterally [Normal Rate] : normal rate  [Regular Rhythm] : with a regular rhythm [No Murmur] : no murmur heard [No Focal Deficits] : no focal deficits [Alert and Oriented x3] : oriented to person, place, and time

## 2022-03-07 NOTE — PLAN
[FreeTextEntry1] : T2DM: check labs, continue with glipizide and metformin, recommend f/u with ophtho for diabetic eye exam \par HTN: elevated, increase lisinopril to 5mg \par HLD: check lipids, continue crestor

## 2022-03-07 NOTE — HISTORY OF PRESENT ILLNESS
[de-identified] : Pt here for f/u of T2DM. Pt checking FS in the AM and usually in the 90s. He is tolerating his medications well. He saw uro for his elevated PSA. He is getting chemo for his colon cancer, it is causing neuropathy in his hands and feet.

## 2022-03-14 ENCOUNTER — APPOINTMENT (OUTPATIENT)
Dept: HEMATOLOGY ONCOLOGY | Facility: CLINIC | Age: 52
End: 2022-03-14

## 2022-03-15 ENCOUNTER — RESULT REVIEW (OUTPATIENT)
Age: 52
End: 2022-03-15

## 2022-03-15 ENCOUNTER — APPOINTMENT (OUTPATIENT)
Dept: HEMATOLOGY ONCOLOGY | Facility: CLINIC | Age: 52
End: 2022-03-15

## 2022-03-15 LAB
BASOPHILS # BLD AUTO: 0.03 K/UL — SIGNIFICANT CHANGE UP (ref 0–0.2)
BASOPHILS NFR BLD AUTO: 0.5 % — SIGNIFICANT CHANGE UP (ref 0–2)
EOSINOPHIL # BLD AUTO: 0.11 K/UL — SIGNIFICANT CHANGE UP (ref 0–0.5)
EOSINOPHIL NFR BLD AUTO: 1.8 % — SIGNIFICANT CHANGE UP (ref 0–6)
HCT VFR BLD CALC: 40.1 % — SIGNIFICANT CHANGE UP (ref 39–50)
HGB BLD-MCNC: 13.8 G/DL — SIGNIFICANT CHANGE UP (ref 13–17)
IMM GRANULOCYTES NFR BLD AUTO: 0.3 % — SIGNIFICANT CHANGE UP (ref 0–1.5)
LYMPHOCYTES # BLD AUTO: 3.07 K/UL — SIGNIFICANT CHANGE UP (ref 1–3.3)
LYMPHOCYTES # BLD AUTO: 51 % — HIGH (ref 13–44)
MCHC RBC-ENTMCNC: 25.9 PG — LOW (ref 27–34)
MCHC RBC-ENTMCNC: 34.4 G/DL — SIGNIFICANT CHANGE UP (ref 32–36)
MCV RBC AUTO: 75.4 FL — LOW (ref 80–100)
MONOCYTES # BLD AUTO: 0.63 K/UL — SIGNIFICANT CHANGE UP (ref 0–0.9)
MONOCYTES NFR BLD AUTO: 10.5 % — SIGNIFICANT CHANGE UP (ref 2–14)
NEUTROPHILS # BLD AUTO: 2.16 K/UL — SIGNIFICANT CHANGE UP (ref 1.8–7.4)
NEUTROPHILS NFR BLD AUTO: 35.9 % — LOW (ref 43–77)
NRBC # BLD: 0 /100 WBCS — SIGNIFICANT CHANGE UP (ref 0–0)
PLATELET # BLD AUTO: 197 K/UL — SIGNIFICANT CHANGE UP (ref 150–400)
RBC # BLD: 5.32 M/UL — SIGNIFICANT CHANGE UP (ref 4.2–5.8)
RBC # FLD: 21.4 % — HIGH (ref 10.3–14.5)
WBC # BLD: 6.02 K/UL — SIGNIFICANT CHANGE UP (ref 3.8–10.5)
WBC # FLD AUTO: 6.02 K/UL — SIGNIFICANT CHANGE UP (ref 3.8–10.5)

## 2022-03-16 ENCOUNTER — APPOINTMENT (OUTPATIENT)
Dept: HEMATOLOGY ONCOLOGY | Facility: CLINIC | Age: 52
End: 2022-03-16
Payer: COMMERCIAL

## 2022-03-16 ENCOUNTER — APPOINTMENT (OUTPATIENT)
Dept: INFUSION THERAPY | Facility: HOSPITAL | Age: 52
End: 2022-03-16

## 2022-03-16 ENCOUNTER — NON-APPOINTMENT (OUTPATIENT)
Age: 52
End: 2022-03-16

## 2022-03-16 VITALS
SYSTOLIC BLOOD PRESSURE: 136 MMHG | OXYGEN SATURATION: 99 % | BODY MASS INDEX: 35.78 KG/M2 | RESPIRATION RATE: 16 BRPM | WEIGHT: 222.64 LBS | HEIGHT: 65.98 IN | DIASTOLIC BLOOD PRESSURE: 89 MMHG | HEART RATE: 92 BPM | TEMPERATURE: 98.2 F

## 2022-03-16 LAB
ALBUMIN SERPL ELPH-MCNC: 4.6 G/DL
ALP BLD-CCNC: 66 U/L
ALT SERPL-CCNC: 35 U/L
ANION GAP SERPL CALC-SCNC: 14 MMOL/L
AST SERPL-CCNC: 32 U/L
BILIRUB SERPL-MCNC: 0.8 MG/DL
BUN SERPL-MCNC: 16 MG/DL
CALCIUM SERPL-MCNC: 9.6 MG/DL
CHLORIDE SERPL-SCNC: 102 MMOL/L
CO2 SERPL-SCNC: 24 MMOL/L
CREAT SERPL-MCNC: 1.07 MG/DL
CREAT SPEC-SCNC: 108 MG/DL
CREAT/PROT UR: 0.2 RATIO
EGFR: 84 ML/MIN/1.73M2
GLUCOSE SERPL-MCNC: 151 MG/DL
MAGNESIUM SERPL-MCNC: 2.1 MG/DL
PHOSPHATE SERPL-MCNC: 4 MG/DL
POTASSIUM SERPL-SCNC: 4.5 MMOL/L
PROT SERPL-MCNC: 7.6 G/DL
PROT UR-MCNC: 19 MG/DL
SODIUM SERPL-SCNC: 140 MMOL/L

## 2022-03-16 PROCEDURE — 99214 OFFICE O/P EST MOD 30 MIN: CPT

## 2022-03-17 ENCOUNTER — NON-APPOINTMENT (OUTPATIENT)
Age: 52
End: 2022-03-17

## 2022-03-17 NOTE — HISTORY OF PRESENT ILLNESS
[Disease: _____________________] : Disease: [unfilled] [T: ___] : T[unfilled] [N: ___] : N[unfilled] [M: ___] : M[unfilled] [AJCC Stage: ____] : AJCC Stage: [unfilled] [de-identified] : Mr Granda in 2021 at the age of 51 presented to the hospital with abdominal pain and underwent imaging that revealed a distal transverse colon lesion with evidence of microperforation.\par He is also underwent a colonoscopy which demonstrated an endoscopically obstructing distal transverse colon mass.  Given the potential for impending obstruction as well as microperforation seen on imaging decision was made to take patient to the OR for a left hemicolectomy and anastomosis.\par Imaging done at that time also showed evidence of metastatic disease to the liver and potentially lung and a questionable lesion as well.  Patient presents with his sister for initial visit and to discuss palliative chemotherapy.\par \par Enrolled in [employer] \par \par Started FOLFOX + Vit D July 7th, 2021 (bevacizumab added cycle #2)\par 8/23 scan shows PE started on Lovenox  \par October 2021 oxaliplatin held  [de-identified] : Microsatellite stable [de-identified] : KRAS G12D , NABILA [FreeTextEntry1] : FOLFOX + Bevacizumab +VitD ([employer] study) --> 5FU Yolis + Vit D  [de-identified] : Patient presents for follow up today and is scheduled for treatment today.  He reports that neuropathy is unchanged despite the warmer weather.  Neuropathy does not cause pain and does not affect his ADL's, but is bothersome and wants to try treatment.  He admits to same fatigue and nausea in the first few days after treatment that improves with antiemetics and then resolves.  Denies abdominal pain, diarrhea, constipation.  He is scheduled for follow up with urology and cystoscopy next week due to previous multiple UTI's that he was having.  Denies recent UTI symptoms.

## 2022-03-17 NOTE — REVIEW OF SYSTEMS
[Negative] : Allergic/Immunologic [Fatigue] : fatigue [Fever] : no fever [Chills] : no chills [Night Sweats] : no night sweats [Recent Change In Weight] : ~T no recent weight change [Dysphagia] : no dysphagia [Hoarseness] : no hoarseness [Odynophagia] : no odynophagia [de-identified] : peripheral neuropathy to hands and feet

## 2022-03-17 NOTE — PHYSICAL EXAM
[Fully active, able to carry on all pre-disease performance without restriction] : Status 0 - Fully active, able to carry on all pre-disease performance without restriction [Normal] : affect appropriate [de-identified] : anicteric  [de-identified] : no JVD [de-identified] : RRR [de-identified] : soft nontender

## 2022-03-18 ENCOUNTER — APPOINTMENT (OUTPATIENT)
Dept: INFUSION THERAPY | Facility: HOSPITAL | Age: 52
End: 2022-03-18

## 2022-03-24 ENCOUNTER — OUTPATIENT (OUTPATIENT)
Dept: OUTPATIENT SERVICES | Facility: HOSPITAL | Age: 52
LOS: 1 days | Discharge: HOME | End: 2022-03-24

## 2022-03-24 DIAGNOSIS — C18.9 MALIGNANT NEOPLASM OF COLON, UNSPECIFIED: ICD-10-CM

## 2022-03-25 ENCOUNTER — APPOINTMENT (OUTPATIENT)
Dept: UROLOGY | Facility: CLINIC | Age: 52
End: 2022-03-25

## 2022-03-28 ENCOUNTER — RX RENEWAL (OUTPATIENT)
Age: 52
End: 2022-03-28

## 2022-03-28 ENCOUNTER — APPOINTMENT (OUTPATIENT)
Dept: HEMATOLOGY ONCOLOGY | Facility: CLINIC | Age: 52
End: 2022-03-28

## 2022-03-29 ENCOUNTER — RESULT REVIEW (OUTPATIENT)
Age: 52
End: 2022-03-29

## 2022-03-29 ENCOUNTER — APPOINTMENT (OUTPATIENT)
Dept: HEMATOLOGY ONCOLOGY | Facility: CLINIC | Age: 52
End: 2022-03-29

## 2022-03-29 ENCOUNTER — LABORATORY RESULT (OUTPATIENT)
Age: 52
End: 2022-03-29

## 2022-03-29 LAB
BASOPHILS # BLD AUTO: 0.03 K/UL — SIGNIFICANT CHANGE UP (ref 0–0.2)
BASOPHILS NFR BLD AUTO: 0.6 % — SIGNIFICANT CHANGE UP (ref 0–2)
EOSINOPHIL # BLD AUTO: 0.11 K/UL — SIGNIFICANT CHANGE UP (ref 0–0.5)
EOSINOPHIL NFR BLD AUTO: 2.1 % — SIGNIFICANT CHANGE UP (ref 0–6)
HCT VFR BLD CALC: 39.9 % — SIGNIFICANT CHANGE UP (ref 39–50)
HGB BLD-MCNC: 13.4 G/DL — SIGNIFICANT CHANGE UP (ref 13–17)
IMM GRANULOCYTES NFR BLD AUTO: 0.4 % — SIGNIFICANT CHANGE UP (ref 0–1.5)
LYMPHOCYTES # BLD AUTO: 2.25 K/UL — SIGNIFICANT CHANGE UP (ref 1–3.3)
LYMPHOCYTES # BLD AUTO: 42.6 % — SIGNIFICANT CHANGE UP (ref 13–44)
MCHC RBC-ENTMCNC: 26 PG — LOW (ref 27–34)
MCHC RBC-ENTMCNC: 33.6 G/DL — SIGNIFICANT CHANGE UP (ref 32–36)
MCV RBC AUTO: 77.3 FL — LOW (ref 80–100)
MONOCYTES # BLD AUTO: 0.79 K/UL — SIGNIFICANT CHANGE UP (ref 0–0.9)
MONOCYTES NFR BLD AUTO: 15 % — HIGH (ref 2–14)
NEUTROPHILS # BLD AUTO: 2.08 K/UL — SIGNIFICANT CHANGE UP (ref 1.8–7.4)
NEUTROPHILS NFR BLD AUTO: 39.3 % — LOW (ref 43–77)
NRBC # BLD: 0 /100 WBCS — SIGNIFICANT CHANGE UP (ref 0–0)
PLATELET # BLD AUTO: 152 K/UL — SIGNIFICANT CHANGE UP (ref 150–400)
RBC # BLD: 5.16 M/UL — SIGNIFICANT CHANGE UP (ref 4.2–5.8)
RBC # FLD: 21.2 % — HIGH (ref 10.3–14.5)
WBC # BLD: 5.28 K/UL — SIGNIFICANT CHANGE UP (ref 3.8–10.5)
WBC # FLD AUTO: 5.28 K/UL — SIGNIFICANT CHANGE UP (ref 3.8–10.5)

## 2022-03-30 ENCOUNTER — NON-APPOINTMENT (OUTPATIENT)
Age: 52
End: 2022-03-30

## 2022-03-30 ENCOUNTER — APPOINTMENT (OUTPATIENT)
Dept: HEMATOLOGY ONCOLOGY | Facility: CLINIC | Age: 52
End: 2022-03-30
Payer: COMMERCIAL

## 2022-03-30 ENCOUNTER — APPOINTMENT (OUTPATIENT)
Dept: INFUSION THERAPY | Facility: HOSPITAL | Age: 52
End: 2022-03-30

## 2022-03-30 VITALS
HEART RATE: 75 BPM | WEIGHT: 223.77 LBS | BODY MASS INDEX: 36.13 KG/M2 | TEMPERATURE: 97 F | OXYGEN SATURATION: 100 % | SYSTOLIC BLOOD PRESSURE: 150 MMHG | RESPIRATION RATE: 16 BRPM | DIASTOLIC BLOOD PRESSURE: 89 MMHG

## 2022-03-30 DIAGNOSIS — R11.2 NAUSEA WITH VOMITING, UNSPECIFIED: ICD-10-CM

## 2022-03-30 DIAGNOSIS — Z51.11 ENCOUNTER FOR ANTINEOPLASTIC CHEMOTHERAPY: ICD-10-CM

## 2022-03-30 LAB
ALBUMIN SERPL ELPH-MCNC: 4.5 G/DL
ALP BLD-CCNC: 61 U/L
ALT SERPL-CCNC: 27 U/L
ANION GAP SERPL CALC-SCNC: 14 MMOL/L
APPEARANCE: CLEAR
AST SERPL-CCNC: 36 U/L
BILIRUB SERPL-MCNC: 0.6 MG/DL
BILIRUBIN URINE: NEGATIVE
BLOOD URINE: NEGATIVE
BUN SERPL-MCNC: 16 MG/DL
CALCIUM SERPL-MCNC: 9.3 MG/DL
CHLORIDE SERPL-SCNC: 102 MMOL/L
CO2 SERPL-SCNC: 23 MMOL/L
COLOR: NORMAL
CREAT SERPL-MCNC: 1.09 MG/DL
CREAT SPEC-SCNC: 103 MG/DL
CREAT/PROT UR: 0.1 RATIO
EGFR: 82 ML/MIN/1.73M2
GLUCOSE QUALITATIVE U: NEGATIVE
GLUCOSE SERPL-MCNC: 100 MG/DL
KETONES URINE: NEGATIVE
LEUKOCYTE ESTERASE URINE: ABNORMAL
MAGNESIUM SERPL-MCNC: 2.2 MG/DL
NITRITE URINE: NEGATIVE
PH URINE: 6.5
PHOSPHATE SERPL-MCNC: 4.2 MG/DL
POTASSIUM SERPL-SCNC: 4.6 MMOL/L
PROT SERPL-MCNC: 7.6 G/DL
PROT UR-MCNC: 10 MG/DL
PROTEIN URINE: NEGATIVE
SODIUM SERPL-SCNC: 139 MMOL/L
SPECIFIC GRAVITY URINE: 1.01
UROBILINOGEN URINE: NORMAL

## 2022-03-30 PROCEDURE — 99214 OFFICE O/P EST MOD 30 MIN: CPT

## 2022-04-01 ENCOUNTER — APPOINTMENT (OUTPATIENT)
Dept: INFUSION THERAPY | Facility: HOSPITAL | Age: 52
End: 2022-04-01

## 2022-04-01 ENCOUNTER — APPOINTMENT (OUTPATIENT)
Dept: UROLOGY | Facility: CLINIC | Age: 52
End: 2022-04-01

## 2022-04-03 NOTE — PHYSICAL EXAM
[Fully active, able to carry on all pre-disease performance without restriction] : Status 0 - Fully active, able to carry on all pre-disease performance without restriction [Normal] : affect appropriate [de-identified] : anicteric  [de-identified] : no JVD [de-identified] : normal respiratory effort, no audible wheeze [de-identified] : no jvd

## 2022-04-03 NOTE — HISTORY OF PRESENT ILLNESS
[Disease: _____________________] : Disease: [unfilled] [T: ___] : T[unfilled] [N: ___] : N[unfilled] [M: ___] : M[unfilled] [AJCC Stage: ____] : AJCC Stage: [unfilled] [de-identified] : Mr Granda in 2021 at the age of 51 presented to the hospital with abdominal pain and underwent imaging that revealed a distal transverse colon lesion with evidence of microperforation.\par He is also underwent a colonoscopy which demonstrated an endoscopically obstructing distal transverse colon mass.  Given the potential for impending obstruction as well as microperforation seen on imaging decision was made to take patient to the OR for a left hemicolectomy and anastomosis.\par Imaging done at that time also showed evidence of metastatic disease to the liver and potentially lung and a questionable lesion as well.  Patient presents with his sister for initial visit and to discuss palliative chemotherapy.\par \par Enrolled in [employer] \par \par Started FOLFOX + Vit D July 7th, 2021 (bevacizumab added cycle #2)\par 8/23 scan shows PE started on Lovenox  \par October 2021 oxaliplatin held  [de-identified] : Microsatellite stable [de-identified] : KRAS G12D , NABILA [FreeTextEntry1] : FOLFOX + Bevacizumab +VitD ([employer] study) --> 5FU Yolis + Vit D  [de-identified] : presents in follow up , due for treatment today\par no new complaints\par stable neuropathy \par no fever, chills or abdominal pain \par compliant with anticoagulation for VTE

## 2022-04-11 ENCOUNTER — APPOINTMENT (OUTPATIENT)
Dept: HEMATOLOGY ONCOLOGY | Facility: CLINIC | Age: 52
End: 2022-04-11

## 2022-04-11 ENCOUNTER — RESULT REVIEW (OUTPATIENT)
Age: 52
End: 2022-04-11

## 2022-04-11 ENCOUNTER — LABORATORY RESULT (OUTPATIENT)
Age: 52
End: 2022-04-11

## 2022-04-11 LAB
ALBUMIN SERPL ELPH-MCNC: 4.5 G/DL
ALP BLD-CCNC: 60 U/L
ALT SERPL-CCNC: 29 U/L
ANION GAP SERPL CALC-SCNC: 12 MMOL/L
APPEARANCE: CLEAR
AST SERPL-CCNC: 29 U/L
BASOPHILS # BLD AUTO: 0.03 K/UL — SIGNIFICANT CHANGE UP (ref 0–0.2)
BASOPHILS NFR BLD AUTO: 0.5 % — SIGNIFICANT CHANGE UP (ref 0–2)
BILIRUB SERPL-MCNC: 0.6 MG/DL
BILIRUBIN URINE: NEGATIVE
BLOOD URINE: NEGATIVE
BUN SERPL-MCNC: 17 MG/DL
CALCIUM SERPL-MCNC: 9.2 MG/DL
CHLORIDE SERPL-SCNC: 103 MMOL/L
CO2 SERPL-SCNC: 24 MMOL/L
COLOR: NORMAL
CREAT SERPL-MCNC: 1.12 MG/DL
CREAT SPEC-SCNC: 144 MG/DL
CREAT/PROT UR: 0.1 RATIO
EGFR: 80 ML/MIN/1.73M2
EOSINOPHIL # BLD AUTO: 0.21 K/UL — SIGNIFICANT CHANGE UP (ref 0–0.5)
EOSINOPHIL NFR BLD AUTO: 3.6 % — SIGNIFICANT CHANGE UP (ref 0–6)
GLUCOSE QUALITATIVE U: NEGATIVE
GLUCOSE SERPL-MCNC: 125 MG/DL
HCT VFR BLD CALC: 41.7 % — SIGNIFICANT CHANGE UP (ref 39–50)
HGB BLD-MCNC: 13.8 G/DL — SIGNIFICANT CHANGE UP (ref 13–17)
IMM GRANULOCYTES NFR BLD AUTO: 0.2 % — SIGNIFICANT CHANGE UP (ref 0–1.5)
KETONES URINE: NEGATIVE
LEUKOCYTE ESTERASE URINE: ABNORMAL
LYMPHOCYTES # BLD AUTO: 2.62 K/UL — SIGNIFICANT CHANGE UP (ref 1–3.3)
LYMPHOCYTES # BLD AUTO: 45.4 % — HIGH (ref 13–44)
MAGNESIUM SERPL-MCNC: 2.3 MG/DL
MCHC RBC-ENTMCNC: 25.7 PG — LOW (ref 27–34)
MCHC RBC-ENTMCNC: 33.1 G/DL — SIGNIFICANT CHANGE UP (ref 32–36)
MCV RBC AUTO: 77.5 FL — LOW (ref 80–100)
MONOCYTES # BLD AUTO: 0.73 K/UL — SIGNIFICANT CHANGE UP (ref 0–0.9)
MONOCYTES NFR BLD AUTO: 12.7 % — SIGNIFICANT CHANGE UP (ref 2–14)
NEUTROPHILS # BLD AUTO: 2.17 K/UL — SIGNIFICANT CHANGE UP (ref 1.8–7.4)
NEUTROPHILS NFR BLD AUTO: 37.6 % — LOW (ref 43–77)
NITRITE URINE: NEGATIVE
NRBC # BLD: 0 /100 WBCS — SIGNIFICANT CHANGE UP (ref 0–0)
PH URINE: 6.5
PHOSPHATE SERPL-MCNC: 3.7 MG/DL
PLATELET # BLD AUTO: 171 K/UL — SIGNIFICANT CHANGE UP (ref 150–400)
POTASSIUM SERPL-SCNC: 4.9 MMOL/L
PROT SERPL-MCNC: 7.5 G/DL
PROT UR-MCNC: 8 MG/DL
PROTEIN URINE: NORMAL
RBC # BLD: 5.38 M/UL — SIGNIFICANT CHANGE UP (ref 4.2–5.8)
RBC # FLD: 21.6 % — HIGH (ref 10.3–14.5)
SODIUM SERPL-SCNC: 140 MMOL/L
SPECIFIC GRAVITY URINE: 1.02
UROBILINOGEN URINE: NORMAL
WBC # BLD: 5.77 K/UL — SIGNIFICANT CHANGE UP (ref 3.8–10.5)
WBC # FLD AUTO: 5.77 K/UL — SIGNIFICANT CHANGE UP (ref 3.8–10.5)

## 2022-04-12 ENCOUNTER — NON-APPOINTMENT (OUTPATIENT)
Age: 52
End: 2022-04-12

## 2022-04-13 ENCOUNTER — APPOINTMENT (OUTPATIENT)
Dept: HEMATOLOGY ONCOLOGY | Facility: CLINIC | Age: 52
End: 2022-04-13
Payer: COMMERCIAL

## 2022-04-13 ENCOUNTER — APPOINTMENT (OUTPATIENT)
Dept: INFUSION THERAPY | Facility: HOSPITAL | Age: 52
End: 2022-04-13

## 2022-04-13 ENCOUNTER — NON-APPOINTMENT (OUTPATIENT)
Age: 52
End: 2022-04-13

## 2022-04-13 VITALS
RESPIRATION RATE: 16 BRPM | DIASTOLIC BLOOD PRESSURE: 85 MMHG | SYSTOLIC BLOOD PRESSURE: 133 MMHG | WEIGHT: 220.46 LBS | OXYGEN SATURATION: 100 % | BODY MASS INDEX: 35.6 KG/M2 | HEART RATE: 92 BPM | TEMPERATURE: 97 F

## 2022-04-13 PROCEDURE — 99214 OFFICE O/P EST MOD 30 MIN: CPT

## 2022-04-14 ENCOUNTER — NON-APPOINTMENT (OUTPATIENT)
Age: 52
End: 2022-04-14

## 2022-04-14 NOTE — PHYSICAL EXAM
[Fully active, able to carry on all pre-disease performance without restriction] : Status 0 - Fully active, able to carry on all pre-disease performance without restriction [Normal] : affect appropriate [de-identified] : anicteric  [de-identified] : no JVD [de-identified] : normal respiratory effort, no audible wheeze [de-identified] : RRR [de-identified] : no jvd  [de-identified] : soft NT/ND

## 2022-04-14 NOTE — HISTORY OF PRESENT ILLNESS
[Disease: _____________________] : Disease: [unfilled] [T: ___] : T[unfilled] [N: ___] : N[unfilled] [M: ___] : M[unfilled] [AJCC Stage: ____] : AJCC Stage: [unfilled] [de-identified] : Mr Granda in 2021 at the age of 51 presented to the hospital with abdominal pain and underwent imaging that revealed a distal transverse colon lesion with evidence of microperforation.\par He is also underwent a colonoscopy which demonstrated an endoscopically obstructing distal transverse colon mass.  Given the potential for impending obstruction as well as microperforation seen on imaging decision was made to take patient to the OR for a left hemicolectomy and anastomosis.\par Imaging done at that time also showed evidence of metastatic disease to the liver and potentially lung and a questionable lesion as well.  Patient presents with his sister for initial visit and to discuss palliative chemotherapy.\par \par Enrolled in [employer] \par \par Started FOLFOX + Vit D July 7th, 2021 (bevacizumab added cycle #2)\par 8/23 scan shows PE started on Lovenox  \par October 2021 oxaliplatin held  [de-identified] : Microsatellite stable [de-identified] : KRAS G12D , NABILA [FreeTextEntry1] : FOLFOX + Bevacizumab +VitD ([employer] study) --> 5FU Yolis + Vit D  [de-identified] : Patient presents for follow up today and is scheduled for treatment.  He reports that the neuropathy on his fingers and toes is unchanged and he continues to take the Cymbalta as prescribed.  Denies pain and sxs do not affect his ADLs.  He admits to some nausea and fatigue after treatment but is well controlled with antiemetics.  Denies diarrhea, abdominal pain, constipation.  Patient states that he did try to see the Urologist as scheduled last month but when he went for the appointment he had to wait too long so he left and he has not rescheduled yet.  Denies any urinary symptoms or hematuria.

## 2022-04-14 NOTE — REVIEW OF SYSTEMS
[Negative] : Allergic/Immunologic [Fatigue] : fatigue [Fever] : no fever [Chills] : no chills [Night Sweats] : no night sweats [Recent Change In Weight] : ~T no recent weight change [Dysphagia] : no dysphagia [Hoarseness] : no hoarseness [Odynophagia] : no odynophagia [Confused] : no confusion [Dizziness] : no dizziness [Fainting] : no fainting [Difficulty Walking] : no difficulty walking [de-identified] : peripheral neuropathy to hands and feet

## 2022-04-15 ENCOUNTER — APPOINTMENT (OUTPATIENT)
Dept: INFUSION THERAPY | Facility: HOSPITAL | Age: 52
End: 2022-04-15

## 2022-04-18 ENCOUNTER — OUTPATIENT (OUTPATIENT)
Dept: OUTPATIENT SERVICES | Facility: HOSPITAL | Age: 52
LOS: 1 days | End: 2022-04-18
Payer: COMMERCIAL

## 2022-04-18 ENCOUNTER — APPOINTMENT (OUTPATIENT)
Dept: CT IMAGING | Facility: IMAGING CENTER | Age: 52
End: 2022-04-18
Payer: COMMERCIAL

## 2022-04-18 ENCOUNTER — RESULT REVIEW (OUTPATIENT)
Age: 52
End: 2022-04-18

## 2022-04-18 DIAGNOSIS — C18.9 MALIGNANT NEOPLASM OF COLON, UNSPECIFIED: ICD-10-CM

## 2022-04-18 PROCEDURE — 71260 CT THORAX DX C+: CPT

## 2022-04-18 PROCEDURE — 74177 CT ABD & PELVIS W/CONTRAST: CPT

## 2022-04-18 PROCEDURE — 74177 CT ABD & PELVIS W/CONTRAST: CPT | Mod: 26

## 2022-04-18 PROCEDURE — 71260 CT THORAX DX C+: CPT | Mod: 26

## 2022-04-20 ENCOUNTER — OUTPATIENT (OUTPATIENT)
Dept: OUTPATIENT SERVICES | Facility: HOSPITAL | Age: 52
LOS: 1 days | Discharge: HOME | End: 2022-04-20

## 2022-04-20 DIAGNOSIS — C18.9 MALIGNANT NEOPLASM OF COLON, UNSPECIFIED: ICD-10-CM

## 2022-04-25 ENCOUNTER — APPOINTMENT (OUTPATIENT)
Dept: HEMATOLOGY ONCOLOGY | Facility: CLINIC | Age: 52
End: 2022-04-25

## 2022-04-25 ENCOUNTER — NON-APPOINTMENT (OUTPATIENT)
Age: 52
End: 2022-04-25

## 2022-04-25 ENCOUNTER — RESULT REVIEW (OUTPATIENT)
Age: 52
End: 2022-04-25

## 2022-04-25 LAB
ALBUMIN SERPL ELPH-MCNC: 4.3 G/DL
ALP BLD-CCNC: 50 U/L
ALT SERPL-CCNC: 14 U/L
ANION GAP SERPL CALC-SCNC: 13 MMOL/L
AST SERPL-CCNC: 18 U/L
BASOPHILS # BLD AUTO: 0.03 K/UL — SIGNIFICANT CHANGE UP (ref 0–0.2)
BASOPHILS NFR BLD AUTO: 0.6 % — SIGNIFICANT CHANGE UP (ref 0–2)
BILIRUB SERPL-MCNC: 0.6 MG/DL
BUN SERPL-MCNC: 12 MG/DL
CALCIUM SERPL-MCNC: 9 MG/DL
CHLORIDE SERPL-SCNC: 104 MMOL/L
CO2 SERPL-SCNC: 24 MMOL/L
CREAT SERPL-MCNC: 1.2 MG/DL
EGFR: 73 ML/MIN/1.73M2
EOSINOPHIL # BLD AUTO: 0.16 K/UL — SIGNIFICANT CHANGE UP (ref 0–0.5)
EOSINOPHIL NFR BLD AUTO: 3.1 % — SIGNIFICANT CHANGE UP (ref 0–6)
GLUCOSE SERPL-MCNC: 118 MG/DL
HCT VFR BLD CALC: 38.8 % — LOW (ref 39–50)
HGB BLD-MCNC: 13.1 G/DL — SIGNIFICANT CHANGE UP (ref 13–17)
IMM GRANULOCYTES NFR BLD AUTO: 0.4 % — SIGNIFICANT CHANGE UP (ref 0–1.5)
LYMPHOCYTES # BLD AUTO: 2.34 K/UL — SIGNIFICANT CHANGE UP (ref 1–3.3)
LYMPHOCYTES # BLD AUTO: 45 % — HIGH (ref 13–44)
MCHC RBC-ENTMCNC: 26.6 PG — LOW (ref 27–34)
MCHC RBC-ENTMCNC: 33.8 G/DL — SIGNIFICANT CHANGE UP (ref 32–36)
MCV RBC AUTO: 78.7 FL — LOW (ref 80–100)
MONOCYTES # BLD AUTO: 0.76 K/UL — SIGNIFICANT CHANGE UP (ref 0–0.9)
MONOCYTES NFR BLD AUTO: 14.6 % — HIGH (ref 2–14)
NEUTROPHILS # BLD AUTO: 1.89 K/UL — SIGNIFICANT CHANGE UP (ref 1.8–7.4)
NEUTROPHILS NFR BLD AUTO: 36.3 % — LOW (ref 43–77)
NRBC # BLD: 0 /100 WBCS — SIGNIFICANT CHANGE UP (ref 0–0)
PLATELET # BLD AUTO: 181 K/UL — SIGNIFICANT CHANGE UP (ref 150–400)
POTASSIUM SERPL-SCNC: 4.5 MMOL/L
PROT SERPL-MCNC: 6.9 G/DL
RBC # BLD: 4.93 M/UL — SIGNIFICANT CHANGE UP (ref 4.2–5.8)
RBC # FLD: 21.2 % — HIGH (ref 10.3–14.5)
SODIUM SERPL-SCNC: 141 MMOL/L
WBC # BLD: 5.2 K/UL — SIGNIFICANT CHANGE UP (ref 3.8–10.5)
WBC # FLD AUTO: 5.2 K/UL — SIGNIFICANT CHANGE UP (ref 3.8–10.5)

## 2022-04-26 LAB
CREAT SPEC-SCNC: 128 MG/DL
CREAT/PROT UR: 0.1 RATIO
PROT UR-MCNC: 13 MG/DL

## 2022-04-27 ENCOUNTER — APPOINTMENT (OUTPATIENT)
Dept: INFUSION THERAPY | Facility: HOSPITAL | Age: 52
End: 2022-04-27

## 2022-04-27 ENCOUNTER — APPOINTMENT (OUTPATIENT)
Dept: HEMATOLOGY ONCOLOGY | Facility: CLINIC | Age: 52
End: 2022-04-27
Payer: COMMERCIAL

## 2022-04-27 ENCOUNTER — NON-APPOINTMENT (OUTPATIENT)
Age: 52
End: 2022-04-27

## 2022-04-27 VITALS
SYSTOLIC BLOOD PRESSURE: 158 MMHG | BODY MASS INDEX: 35.43 KG/M2 | RESPIRATION RATE: 16 BRPM | OXYGEN SATURATION: 99 % | HEIGHT: 65.98 IN | DIASTOLIC BLOOD PRESSURE: 95 MMHG | WEIGHT: 220.46 LBS | HEART RATE: 80 BPM | TEMPERATURE: 97.7 F

## 2022-04-27 DIAGNOSIS — R11.2 NAUSEA WITH VOMITING, UNSPECIFIED: ICD-10-CM

## 2022-04-27 DIAGNOSIS — Z51.11 ENCOUNTER FOR ANTINEOPLASTIC CHEMOTHERAPY: ICD-10-CM

## 2022-04-27 LAB
CEA SERPL-MCNC: 128 NG/ML
MAGNESIUM SERPL-MCNC: 2.1 MG/DL
PHOSPHATE SERPL-MCNC: 3.9 MG/DL

## 2022-04-27 PROCEDURE — 99214 OFFICE O/P EST MOD 30 MIN: CPT

## 2022-04-28 NOTE — PHYSICAL EXAM
[Fully active, able to carry on all pre-disease performance without restriction] : Status 0 - Fully active, able to carry on all pre-disease performance without restriction [Normal] : affect appropriate [Obese] : obese [de-identified] : anicteric  [de-identified] : no JVD [de-identified] : normal respiratory effort, no audible wheeze [de-identified] : RRR [de-identified] : no jvd  [de-identified] : soft NT/ND

## 2022-04-28 NOTE — REVIEW OF SYSTEMS
[Fatigue] : fatigue [Negative] : Allergic/Immunologic [Fever] : no fever [Chills] : no chills [Night Sweats] : no night sweats [Recent Change In Weight] : ~T no recent weight change [Dysphagia] : no dysphagia [Hoarseness] : no hoarseness [Odynophagia] : no odynophagia [Confused] : no confusion [Dizziness] : no dizziness [Fainting] : no fainting [Difficulty Walking] : no difficulty walking [de-identified] : peripheral neuropathy to hands and feet grade 1-2

## 2022-04-28 NOTE — HISTORY OF PRESENT ILLNESS
[Disease: _____________________] : Disease: [unfilled] [T: ___] : T[unfilled] [N: ___] : N[unfilled] [M: ___] : M[unfilled] [AJCC Stage: ____] : AJCC Stage: [unfilled] [de-identified] : Mr Granda in 2021 at the age of 51 presented to the hospital with abdominal pain and underwent imaging that revealed a distal transverse colon lesion with evidence of microperforation.\par He is also underwent a colonoscopy which demonstrated an endoscopically obstructing distal transverse colon mass.  Given the potential for impending obstruction as well as microperforation seen on imaging decision was made to take patient to the OR for a left hemicolectomy and anastomosis.\par Imaging done at that time also showed evidence of metastatic disease to the liver and potentially lung and a questionable lesion as well.  Patient presents with his sister for initial visit and to discuss palliative chemotherapy.\par \par Enrolled in [employer] \par \par Started FOLFOX + Vit D July 7th, 2021 (bevacizumab added cycle #2)\par 8/23 scan shows PE started on Lovenox  \par October 2021 oxaliplatin held  [de-identified] : Microsatellite stable [de-identified] : KRAS G12D , NABILA [FreeTextEntry1] : FOLFOX + Bevacizumab +VitD ([employer] study) --> 5FU Yolis + Vit D  [de-identified] : presents in follow up , due for treatment today\par no new complaints\par stable neuropathy \par no fever, chills or abdominal pain \par compliant with anticoagulation for VTE

## 2022-04-29 ENCOUNTER — APPOINTMENT (OUTPATIENT)
Dept: INFUSION THERAPY | Facility: HOSPITAL | Age: 52
End: 2022-04-29

## 2022-05-10 ENCOUNTER — RESULT REVIEW (OUTPATIENT)
Age: 52
End: 2022-05-10

## 2022-05-10 ENCOUNTER — APPOINTMENT (OUTPATIENT)
Dept: HEMATOLOGY ONCOLOGY | Facility: CLINIC | Age: 52
End: 2022-05-10

## 2022-05-10 ENCOUNTER — NON-APPOINTMENT (OUTPATIENT)
Age: 52
End: 2022-05-10

## 2022-05-10 LAB
ALBUMIN SERPL ELPH-MCNC: 4.6 G/DL
ALP BLD-CCNC: 65 U/L
ALT SERPL-CCNC: 27 U/L
ANION GAP SERPL CALC-SCNC: 13 MMOL/L
AST SERPL-CCNC: 33 U/L
BASOPHILS # BLD AUTO: 0.02 K/UL — SIGNIFICANT CHANGE UP (ref 0–0.2)
BASOPHILS NFR BLD AUTO: 0.4 % — SIGNIFICANT CHANGE UP (ref 0–2)
BILIRUB SERPL-MCNC: 0.6 MG/DL
BUN SERPL-MCNC: 20 MG/DL
CALCIUM SERPL-MCNC: 9.6 MG/DL
CHLORIDE SERPL-SCNC: 105 MMOL/L
CO2 SERPL-SCNC: 24 MMOL/L
CREAT SERPL-MCNC: 1.28 MG/DL
CREAT SPEC-SCNC: 155 MG/DL
CREAT/PROT UR: 0.1 RATIO
EGFR: 67 ML/MIN/1.73M2
EOSINOPHIL # BLD AUTO: 0.14 K/UL — SIGNIFICANT CHANGE UP (ref 0–0.5)
EOSINOPHIL NFR BLD AUTO: 2.8 % — SIGNIFICANT CHANGE UP (ref 0–6)
GLUCOSE SERPL-MCNC: 121 MG/DL
HCT VFR BLD CALC: 41 % — SIGNIFICANT CHANGE UP (ref 39–50)
HGB BLD-MCNC: 13.7 G/DL — SIGNIFICANT CHANGE UP (ref 13–17)
IMM GRANULOCYTES NFR BLD AUTO: 0.2 % — SIGNIFICANT CHANGE UP (ref 0–1.5)
LYMPHOCYTES # BLD AUTO: 2.33 K/UL — SIGNIFICANT CHANGE UP (ref 1–3.3)
LYMPHOCYTES # BLD AUTO: 46.5 % — HIGH (ref 13–44)
MAGNESIUM SERPL-MCNC: 2.2 MG/DL
MCHC RBC-ENTMCNC: 26 PG — LOW (ref 27–34)
MCHC RBC-ENTMCNC: 33.4 G/DL — SIGNIFICANT CHANGE UP (ref 32–36)
MCV RBC AUTO: 77.9 FL — LOW (ref 80–100)
MONOCYTES # BLD AUTO: 0.64 K/UL — SIGNIFICANT CHANGE UP (ref 0–0.9)
MONOCYTES NFR BLD AUTO: 12.8 % — SIGNIFICANT CHANGE UP (ref 2–14)
NEUTROPHILS # BLD AUTO: 1.87 K/UL — SIGNIFICANT CHANGE UP (ref 1.8–7.4)
NEUTROPHILS NFR BLD AUTO: 37.3 % — LOW (ref 43–77)
NRBC # BLD: 0 /100 WBCS — SIGNIFICANT CHANGE UP (ref 0–0)
PHOSPHATE SERPL-MCNC: 4.2 MG/DL
PLATELET # BLD AUTO: 164 K/UL — SIGNIFICANT CHANGE UP (ref 150–400)
POTASSIUM SERPL-SCNC: 4.9 MMOL/L
PROT SERPL-MCNC: 7.8 G/DL
PROT UR-MCNC: 12 MG/DL
RBC # BLD: 5.26 M/UL — SIGNIFICANT CHANGE UP (ref 4.2–5.8)
RBC # FLD: 21.2 % — HIGH (ref 10.3–14.5)
SODIUM SERPL-SCNC: 142 MMOL/L
WBC # BLD: 5.01 K/UL — SIGNIFICANT CHANGE UP (ref 3.8–10.5)
WBC # FLD AUTO: 5.01 K/UL — SIGNIFICANT CHANGE UP (ref 3.8–10.5)

## 2022-05-11 ENCOUNTER — APPOINTMENT (OUTPATIENT)
Dept: INFUSION THERAPY | Facility: HOSPITAL | Age: 52
End: 2022-05-11

## 2022-05-11 ENCOUNTER — NON-APPOINTMENT (OUTPATIENT)
Age: 52
End: 2022-05-11

## 2022-05-11 ENCOUNTER — APPOINTMENT (OUTPATIENT)
Dept: HEMATOLOGY ONCOLOGY | Facility: CLINIC | Age: 52
End: 2022-05-11
Payer: COMMERCIAL

## 2022-05-11 VITALS
TEMPERATURE: 97.8 F | HEIGHT: 65.98 IN | DIASTOLIC BLOOD PRESSURE: 92 MMHG | BODY MASS INDEX: 35.79 KG/M2 | HEART RATE: 77 BPM | OXYGEN SATURATION: 100 % | RESPIRATION RATE: 16 BRPM | SYSTOLIC BLOOD PRESSURE: 143 MMHG | WEIGHT: 222.67 LBS

## 2022-05-11 PROCEDURE — 99214 OFFICE O/P EST MOD 30 MIN: CPT

## 2022-05-12 NOTE — HISTORY OF PRESENT ILLNESS
[Disease: _____________________] : Disease: [unfilled] [T: ___] : T[unfilled] [N: ___] : N[unfilled] [M: ___] : M[unfilled] [AJCC Stage: ____] : AJCC Stage: [unfilled] [de-identified] : Mr Granda in 2021 at the age of 51 presented to the hospital with abdominal pain and underwent imaging that revealed a distal transverse colon lesion with evidence of microperforation.\par He is also underwent a colonoscopy which demonstrated an endoscopically obstructing distal transverse colon mass.  Given the potential for impending obstruction as well as microperforation seen on imaging decision was made to take patient to the OR for a left hemicolectomy and anastomosis.\par Imaging done at that time also showed evidence of metastatic disease to the liver and potentially lung and a questionable lesion as well.  Patient presents with his sister for initial visit and to discuss palliative chemotherapy.\par \par Enrolled in [employer] [facility] \par \par Started FOLFOX + Vit D July 7th, 2021 (bevacizumab added cycle #2)\par 8/23 scan shows PE started on Lovenox  \par October 2021 oxaliplatin held  [de-identified] : Microsatellite stable [de-identified] : KRAS G12D , NABILA [FreeTextEntry1] : FOLFOX + Bevacizumab +VitD ([employer] study) --> 5FU Oylis + Vit D  [de-identified] : Patient reports feeling well today\par Denies nausea. vomiting, no abdominal pain, fevers,chills\par Reports neuropathy is slightly improved, has not started increased Cymbalta dosing as yet (received prescription yesterday)\par NO new complaints

## 2022-05-12 NOTE — PHYSICAL EXAM
[Fully active, able to carry on all pre-disease performance without restriction] : Status 0 - Fully active, able to carry on all pre-disease performance without restriction [Obese] : obese [Normal] : affect appropriate [de-identified] : anicteric  [de-identified] : no JVD [de-identified] : normal respiratory effort, no audible wheeze [de-identified] : RRR [de-identified] : no jvd  [de-identified] : soft NT/ND

## 2022-05-12 NOTE — REVIEW OF SYSTEMS
[Fatigue] : fatigue [Negative] : Allergic/Immunologic [Fever] : no fever [Chills] : no chills [Night Sweats] : no night sweats [Recent Change In Weight] : ~T no recent weight change [Dysphagia] : no dysphagia [Hoarseness] : no hoarseness [Odynophagia] : no odynophagia [Confused] : no confusion [Dizziness] : no dizziness [Fainting] : no fainting [Difficulty Walking] : no difficulty walking [de-identified] : peripheral neuropathy to hands and feet grade 1-2

## 2022-05-13 ENCOUNTER — APPOINTMENT (OUTPATIENT)
Dept: INFUSION THERAPY | Facility: HOSPITAL | Age: 52
End: 2022-05-13

## 2022-05-13 ENCOUNTER — NON-APPOINTMENT (OUTPATIENT)
Age: 52
End: 2022-05-13

## 2022-05-19 ENCOUNTER — OUTPATIENT (OUTPATIENT)
Dept: OUTPATIENT SERVICES | Facility: HOSPITAL | Age: 52
LOS: 1 days | Discharge: HOME | End: 2022-05-19

## 2022-05-19 DIAGNOSIS — C18.9 MALIGNANT NEOPLASM OF COLON, UNSPECIFIED: ICD-10-CM

## 2022-05-23 ENCOUNTER — RESULT REVIEW (OUTPATIENT)
Age: 52
End: 2022-05-23

## 2022-05-23 ENCOUNTER — APPOINTMENT (OUTPATIENT)
Dept: HEMATOLOGY ONCOLOGY | Facility: CLINIC | Age: 52
End: 2022-05-23

## 2022-05-23 ENCOUNTER — LABORATORY RESULT (OUTPATIENT)
Age: 52
End: 2022-05-23

## 2022-05-23 LAB
ALBUMIN SERPL ELPH-MCNC: 4.6 G/DL
ALP BLD-CCNC: 67 U/L
ALT SERPL-CCNC: 24 U/L
ANION GAP SERPL CALC-SCNC: 13 MMOL/L
APPEARANCE: CLEAR
AST SERPL-CCNC: 20 U/L
BASOPHILS # BLD AUTO: 0.02 K/UL — SIGNIFICANT CHANGE UP (ref 0–0.2)
BASOPHILS NFR BLD AUTO: 0.4 % — SIGNIFICANT CHANGE UP (ref 0–2)
BILIRUB SERPL-MCNC: 0.6 MG/DL
BILIRUBIN URINE: NEGATIVE
BLOOD URINE: NEGATIVE
BUN SERPL-MCNC: 19 MG/DL
CALCIUM SERPL-MCNC: 9.3 MG/DL
CEA SERPL-MCNC: 135 NG/ML
CHLORIDE SERPL-SCNC: 104 MMOL/L
CO2 SERPL-SCNC: 23 MMOL/L
COLOR: YELLOW
CREAT SERPL-MCNC: 1.2 MG/DL
CREAT SPEC-SCNC: 142 MG/DL
CREAT/PROT UR: 0.1 RATIO
EGFR: 73 ML/MIN/1.73M2
EOSINOPHIL # BLD AUTO: 0.08 K/UL — SIGNIFICANT CHANGE UP (ref 0–0.5)
EOSINOPHIL NFR BLD AUTO: 1.4 % — SIGNIFICANT CHANGE UP (ref 0–6)
GLUCOSE QUALITATIVE U: ABNORMAL
GLUCOSE SERPL-MCNC: 167 MG/DL
HCT VFR BLD CALC: 41.9 % — SIGNIFICANT CHANGE UP (ref 39–50)
HGB BLD-MCNC: 14.1 G/DL — SIGNIFICANT CHANGE UP (ref 13–17)
IMM GRANULOCYTES NFR BLD AUTO: 0.2 % — SIGNIFICANT CHANGE UP (ref 0–1.5)
KETONES URINE: NEGATIVE
LEUKOCYTE ESTERASE URINE: ABNORMAL
LYMPHOCYTES # BLD AUTO: 2.56 K/UL — SIGNIFICANT CHANGE UP (ref 1–3.3)
LYMPHOCYTES # BLD AUTO: 45.4 % — HIGH (ref 13–44)
MAGNESIUM SERPL-MCNC: 2.1 MG/DL
MCHC RBC-ENTMCNC: 26.5 PG — LOW (ref 27–34)
MCHC RBC-ENTMCNC: 33.7 G/DL — SIGNIFICANT CHANGE UP (ref 32–36)
MCV RBC AUTO: 78.6 FL — LOW (ref 80–100)
MONOCYTES # BLD AUTO: 0.7 K/UL — SIGNIFICANT CHANGE UP (ref 0–0.9)
MONOCYTES NFR BLD AUTO: 12.4 % — SIGNIFICANT CHANGE UP (ref 2–14)
NEUTROPHILS # BLD AUTO: 2.27 K/UL — SIGNIFICANT CHANGE UP (ref 1.8–7.4)
NEUTROPHILS NFR BLD AUTO: 40.2 % — LOW (ref 43–77)
NITRITE URINE: POSITIVE
NRBC # BLD: 0 /100 WBCS — SIGNIFICANT CHANGE UP (ref 0–0)
PH URINE: 6.5
PHOSPHATE SERPL-MCNC: 3.1 MG/DL
PLATELET # BLD AUTO: 182 K/UL — SIGNIFICANT CHANGE UP (ref 150–400)
POTASSIUM SERPL-SCNC: 4.5 MMOL/L
PROT SERPL-MCNC: 7.5 G/DL
PROT UR-MCNC: 10 MG/DL
PROTEIN URINE: NORMAL
RBC # BLD: 5.33 M/UL — SIGNIFICANT CHANGE UP (ref 4.2–5.8)
RBC # FLD: 21.2 % — HIGH (ref 10.3–14.5)
SODIUM SERPL-SCNC: 140 MMOL/L
SPECIFIC GRAVITY URINE: 1.02
UROBILINOGEN URINE: NORMAL
WBC # BLD: 5.64 K/UL — SIGNIFICANT CHANGE UP (ref 3.8–10.5)
WBC # FLD AUTO: 5.64 K/UL — SIGNIFICANT CHANGE UP (ref 3.8–10.5)

## 2022-05-25 ENCOUNTER — NON-APPOINTMENT (OUTPATIENT)
Age: 52
End: 2022-05-25

## 2022-05-25 ENCOUNTER — RESULT REVIEW (OUTPATIENT)
Age: 52
End: 2022-05-25

## 2022-05-25 ENCOUNTER — APPOINTMENT (OUTPATIENT)
Dept: INFUSION THERAPY | Facility: HOSPITAL | Age: 52
End: 2022-05-25

## 2022-05-25 ENCOUNTER — APPOINTMENT (OUTPATIENT)
Dept: HEMATOLOGY ONCOLOGY | Facility: CLINIC | Age: 52
End: 2022-05-25
Payer: COMMERCIAL

## 2022-05-25 VITALS
WEIGHT: 224.87 LBS | TEMPERATURE: 97 F | OXYGEN SATURATION: 100 % | RESPIRATION RATE: 16 BRPM | DIASTOLIC BLOOD PRESSURE: 94 MMHG | SYSTOLIC BLOOD PRESSURE: 141 MMHG | BODY MASS INDEX: 36.31 KG/M2 | HEART RATE: 84 BPM

## 2022-05-25 DIAGNOSIS — Z51.11 ENCOUNTER FOR ANTINEOPLASTIC CHEMOTHERAPY: ICD-10-CM

## 2022-05-25 DIAGNOSIS — R11.2 NAUSEA WITH VOMITING, UNSPECIFIED: ICD-10-CM

## 2022-05-25 PROCEDURE — 99214 OFFICE O/P EST MOD 30 MIN: CPT

## 2022-05-25 NOTE — REVIEW OF SYSTEMS
[Fatigue] : fatigue [Negative] : Allergic/Immunologic [Fever] : no fever [Chills] : no chills [Night Sweats] : no night sweats [Recent Change In Weight] : ~T no recent weight change [Dysphagia] : no dysphagia [Hoarseness] : no hoarseness [Odynophagia] : no odynophagia [Confused] : no confusion [Dizziness] : no dizziness [Fainting] : no fainting [Difficulty Walking] : no difficulty walking [FreeTextEntry7] : + nausea [de-identified] : peripheral neuropathy to hands and feet grade 1-2

## 2022-05-25 NOTE — PHYSICAL EXAM
[Fully active, able to carry on all pre-disease performance without restriction] : Status 0 - Fully active, able to carry on all pre-disease performance without restriction [Obese] : obese [Normal] : affect appropriate [de-identified] : anicteric  [de-identified] : no JVD [de-identified] : normal respiratory effort, no audible wheeze [de-identified] : RRR [de-identified] : no jvd  [de-identified] : soft NT/ND

## 2022-05-26 ENCOUNTER — RESULT REVIEW (OUTPATIENT)
Age: 52
End: 2022-05-26

## 2022-05-27 ENCOUNTER — APPOINTMENT (OUTPATIENT)
Dept: INFUSION THERAPY | Facility: HOSPITAL | Age: 52
End: 2022-05-27

## 2022-06-06 ENCOUNTER — APPOINTMENT (OUTPATIENT)
Dept: HEMATOLOGY ONCOLOGY | Facility: CLINIC | Age: 52
End: 2022-06-06

## 2022-06-07 ENCOUNTER — RESULT REVIEW (OUTPATIENT)
Age: 52
End: 2022-06-07

## 2022-06-07 ENCOUNTER — APPOINTMENT (OUTPATIENT)
Dept: HEMATOLOGY ONCOLOGY | Facility: CLINIC | Age: 52
End: 2022-06-07

## 2022-06-07 ENCOUNTER — LABORATORY RESULT (OUTPATIENT)
Age: 52
End: 2022-06-07

## 2022-06-07 LAB
APPEARANCE: CLEAR
BASOPHILS # BLD AUTO: 0.02 K/UL — SIGNIFICANT CHANGE UP (ref 0–0.2)
BASOPHILS NFR BLD AUTO: 0.4 % — SIGNIFICANT CHANGE UP (ref 0–2)
BILIRUBIN URINE: NEGATIVE
BLOOD URINE: ABNORMAL
COLOR: NORMAL
CREAT SPEC-SCNC: 123 MG/DL
CREAT/PROT UR: 0.1 RATIO
EOSINOPHIL # BLD AUTO: 0.1 K/UL — SIGNIFICANT CHANGE UP (ref 0–0.5)
EOSINOPHIL NFR BLD AUTO: 1.8 % — SIGNIFICANT CHANGE UP (ref 0–6)
GLUCOSE QUALITATIVE U: NEGATIVE
HCT VFR BLD CALC: 40.6 % — SIGNIFICANT CHANGE UP (ref 39–50)
HGB BLD-MCNC: 13.8 G/DL — SIGNIFICANT CHANGE UP (ref 13–17)
IMM GRANULOCYTES NFR BLD AUTO: 0.2 % — SIGNIFICANT CHANGE UP (ref 0–1.5)
KETONES URINE: NEGATIVE
LEUKOCYTE ESTERASE URINE: NEGATIVE
LYMPHOCYTES # BLD AUTO: 2.18 K/UL — SIGNIFICANT CHANGE UP (ref 1–3.3)
LYMPHOCYTES # BLD AUTO: 38.7 % — SIGNIFICANT CHANGE UP (ref 13–44)
MCHC RBC-ENTMCNC: 26.1 PG — LOW (ref 27–34)
MCHC RBC-ENTMCNC: 34 G/DL — SIGNIFICANT CHANGE UP (ref 32–36)
MCV RBC AUTO: 76.9 FL — LOW (ref 80–100)
MONOCYTES # BLD AUTO: 0.65 K/UL — SIGNIFICANT CHANGE UP (ref 0–0.9)
MONOCYTES NFR BLD AUTO: 11.5 % — SIGNIFICANT CHANGE UP (ref 2–14)
NEUTROPHILS # BLD AUTO: 2.68 K/UL — SIGNIFICANT CHANGE UP (ref 1.8–7.4)
NEUTROPHILS NFR BLD AUTO: 47.4 % — SIGNIFICANT CHANGE UP (ref 43–77)
NITRITE URINE: NEGATIVE
NRBC # BLD: 0 /100 WBCS — SIGNIFICANT CHANGE UP (ref 0–0)
PH URINE: 6.5
PLATELET # BLD AUTO: 175 K/UL — SIGNIFICANT CHANGE UP (ref 150–400)
PROT UR-MCNC: 9 MG/DL
PROTEIN URINE: NEGATIVE
RBC # BLD: 5.28 M/UL — SIGNIFICANT CHANGE UP (ref 4.2–5.8)
RBC # FLD: 21 % — HIGH (ref 10.3–14.5)
SPECIFIC GRAVITY URINE: 1.02
UROBILINOGEN URINE: NORMAL
WBC # BLD: 5.64 K/UL — SIGNIFICANT CHANGE UP (ref 3.8–10.5)
WBC # FLD AUTO: 5.64 K/UL — SIGNIFICANT CHANGE UP (ref 3.8–10.5)

## 2022-06-08 ENCOUNTER — APPOINTMENT (OUTPATIENT)
Dept: INFUSION THERAPY | Facility: HOSPITAL | Age: 52
End: 2022-06-08

## 2022-06-08 ENCOUNTER — APPOINTMENT (OUTPATIENT)
Dept: HEMATOLOGY ONCOLOGY | Facility: CLINIC | Age: 52
End: 2022-06-08
Payer: COMMERCIAL

## 2022-06-08 VITALS
DIASTOLIC BLOOD PRESSURE: 89 MMHG | WEIGHT: 225.09 LBS | TEMPERATURE: 97.9 F | SYSTOLIC BLOOD PRESSURE: 125 MMHG | OXYGEN SATURATION: 99 % | RESPIRATION RATE: 16 BRPM | BODY MASS INDEX: 36.17 KG/M2 | HEIGHT: 65.98 IN | HEART RATE: 78 BPM

## 2022-06-08 DIAGNOSIS — N39.0 URINARY TRACT INFECTION, SITE NOT SPECIFIED: ICD-10-CM

## 2022-06-08 LAB
ALBUMIN SERPL ELPH-MCNC: 4.7 G/DL
ALP BLD-CCNC: 65 U/L
ALT SERPL-CCNC: 20 U/L
ANION GAP SERPL CALC-SCNC: 16 MMOL/L
AST SERPL-CCNC: 19 U/L
BILIRUB SERPL-MCNC: 0.6 MG/DL
BUN SERPL-MCNC: 17 MG/DL
CALCIUM SERPL-MCNC: 10.2 MG/DL
CEA SERPL-MCNC: 146 NG/ML
CHLORIDE SERPL-SCNC: 101 MMOL/L
CO2 SERPL-SCNC: 21 MMOL/L
CREAT SERPL-MCNC: 1.27 MG/DL
EGFR: 68 ML/MIN/1.73M2
GLUCOSE SERPL-MCNC: 137 MG/DL
MAGNESIUM SERPL-MCNC: 2.1 MG/DL
PHOSPHATE SERPL-MCNC: 3.5 MG/DL
POTASSIUM SERPL-SCNC: 4.7 MMOL/L
PROT SERPL-MCNC: 7.9 G/DL
SODIUM SERPL-SCNC: 139 MMOL/L

## 2022-06-08 PROCEDURE — 99215 OFFICE O/P EST HI 40 MIN: CPT

## 2022-06-08 NOTE — HISTORY OF PRESENT ILLNESS
[Disease: _____________________] : Disease: [unfilled] [T: ___] : T[unfilled] [N: ___] : N[unfilled] [M: ___] : M[unfilled] [AJCC Stage: ____] : AJCC Stage: [unfilled] [Research Protocol] : Research Protocol

## 2022-06-08 NOTE — PHYSICAL EXAM
[Restricted in physically strenuous activity but ambulatory and able to carry out work of a light or sedentary nature] : Status 1- Restricted in physically strenuous activity but ambulatory and able to carry out work of a light or sedentary nature, e.g., light house work, office work [Obese] : obese [Normal] : normal appearance, no rash, nodules, vesicles, ulcers, erythema [de-identified] : anicteric  [de-identified] : normal respiratory effort, no audible wheeze [de-identified] : reg rate  [de-identified] : No TTP

## 2022-06-08 NOTE — HISTORY OF PRESENT ILLNESS
[de-identified] : Mr Granda in 2021 at the age of 51 presented to the hospital with abdominal pain and underwent imaging that revealed a distal transverse colon lesion with evidence of microperforation.\par He is also underwent a colonoscopy which demonstrated an endoscopically obstructing distal transverse colon mass. Given the potential for impending obstruction as well as microperforation seen on imaging decision was made to take patient to the OR for a left hemicolectomy and anastomosis.\par Imaging done at that time also showed evidence of metastatic disease to the liver and potentially lung and a questionable lesion as well. Patient presents with his sister for initial visit and to discuss palliative chemotherapy.\par \par Enrolled in [employer] [facility] \par \par Started FOLFOX + Vit D July 7th, 2021 (bevacizumab added cycle #2)\par 8/23 scan shows PE started on Lovenox \par October 2021 oxaliplatin held. \par \par Disease: Transverse colon adenocarcinoma \par TNM stage: T4a, N0, M1 \par AJCC Stage: IV  \par Tumor Markers: Microsatellite stable \par \par KRAS G12D , NABILA \par  [de-identified] : Microsatellite stable  [de-identified] : KRAS G12D , NABILA  [FreeTextEntry1] : FOLFOX + Bevacizumab +VitD ([employer] study) --> 5FU Yolis + Vit D [de-identified] : Patient reports that previously noted UTI symptoms have resolved. In terms of his neuropathy, pt states that there is improvement with the increased dose of Cymbalta. Patient denied any interval abdominal pain, n/v/d/c.

## 2022-06-10 ENCOUNTER — APPOINTMENT (OUTPATIENT)
Dept: INFUSION THERAPY | Facility: HOSPITAL | Age: 52
End: 2022-06-10

## 2022-06-13 ENCOUNTER — OUTPATIENT (OUTPATIENT)
Dept: OUTPATIENT SERVICES | Facility: HOSPITAL | Age: 52
LOS: 1 days | Discharge: HOME | End: 2022-06-13

## 2022-06-13 DIAGNOSIS — C18.9 MALIGNANT NEOPLASM OF COLON, UNSPECIFIED: ICD-10-CM

## 2022-06-14 ENCOUNTER — NON-APPOINTMENT (OUTPATIENT)
Age: 52
End: 2022-06-14

## 2022-06-14 ENCOUNTER — OUTPATIENT (OUTPATIENT)
Dept: OUTPATIENT SERVICES | Facility: HOSPITAL | Age: 52
LOS: 1 days | End: 2022-06-14
Payer: COMMERCIAL

## 2022-06-14 ENCOUNTER — APPOINTMENT (OUTPATIENT)
Dept: CT IMAGING | Facility: IMAGING CENTER | Age: 52
End: 2022-06-14
Payer: COMMERCIAL

## 2022-06-14 DIAGNOSIS — Z00.8 ENCOUNTER FOR OTHER GENERAL EXAMINATION: ICD-10-CM

## 2022-06-14 DIAGNOSIS — C18.9 MALIGNANT NEOPLASM OF COLON, UNSPECIFIED: ICD-10-CM

## 2022-06-14 PROCEDURE — 74177 CT ABD & PELVIS W/CONTRAST: CPT

## 2022-06-14 PROCEDURE — 71260 CT THORAX DX C+: CPT

## 2022-06-14 PROCEDURE — 74177 CT ABD & PELVIS W/CONTRAST: CPT | Mod: 26

## 2022-06-14 PROCEDURE — 71260 CT THORAX DX C+: CPT | Mod: 26

## 2022-06-15 ENCOUNTER — NON-APPOINTMENT (OUTPATIENT)
Age: 52
End: 2022-06-15

## 2022-06-20 ENCOUNTER — LABORATORY RESULT (OUTPATIENT)
Age: 52
End: 2022-06-20

## 2022-06-20 ENCOUNTER — RESULT REVIEW (OUTPATIENT)
Age: 52
End: 2022-06-20

## 2022-06-20 ENCOUNTER — APPOINTMENT (OUTPATIENT)
Dept: HEMATOLOGY ONCOLOGY | Facility: CLINIC | Age: 52
End: 2022-06-20

## 2022-06-20 LAB
ALBUMIN SERPL ELPH-MCNC: 4.4 G/DL
ALP BLD-CCNC: 57 U/L
ALT SERPL-CCNC: 40 U/L
ANION GAP SERPL CALC-SCNC: 13 MMOL/L
APPEARANCE: CLEAR
AST SERPL-CCNC: 30 U/L
BASOPHILS # BLD AUTO: 0.02 K/UL — SIGNIFICANT CHANGE UP (ref 0–0.2)
BASOPHILS NFR BLD AUTO: 0.4 % — SIGNIFICANT CHANGE UP (ref 0–2)
BILIRUB SERPL-MCNC: 0.4 MG/DL
BILIRUBIN URINE: NEGATIVE
BLOOD URINE: NEGATIVE
BUN SERPL-MCNC: 24 MG/DL
CALCIUM SERPL-MCNC: 9.6 MG/DL
CEA SERPL-MCNC: 162 NG/ML
CHLORIDE SERPL-SCNC: 103 MMOL/L
CO2 SERPL-SCNC: 22 MMOL/L
COLOR: NORMAL
CREAT SERPL-MCNC: 1.3 MG/DL
CREAT SPEC-SCNC: 119 MG/DL
CREAT/PROT UR: 0.1 RATIO
EGFR: 66 ML/MIN/1.73M2
EOSINOPHIL # BLD AUTO: 0.1 K/UL — SIGNIFICANT CHANGE UP (ref 0–0.5)
EOSINOPHIL NFR BLD AUTO: 1.8 % — SIGNIFICANT CHANGE UP (ref 0–6)
GLUCOSE QUALITATIVE U: NEGATIVE
GLUCOSE SERPL-MCNC: 108 MG/DL
HCT VFR BLD CALC: 39.7 % — SIGNIFICANT CHANGE UP (ref 39–50)
HGB BLD-MCNC: 13.7 G/DL — SIGNIFICANT CHANGE UP (ref 13–17)
IMM GRANULOCYTES NFR BLD AUTO: 0.5 % — SIGNIFICANT CHANGE UP (ref 0–1.5)
KETONES URINE: NEGATIVE
LEUKOCYTE ESTERASE URINE: ABNORMAL
LYMPHOCYTES # BLD AUTO: 2.5 K/UL — SIGNIFICANT CHANGE UP (ref 1–3.3)
LYMPHOCYTES # BLD AUTO: 44.2 % — HIGH (ref 13–44)
MAGNESIUM SERPL-MCNC: 2.1 MG/DL
MCHC RBC-ENTMCNC: 26.3 PG — LOW (ref 27–34)
MCHC RBC-ENTMCNC: 34.5 G/DL — SIGNIFICANT CHANGE UP (ref 32–36)
MCV RBC AUTO: 76.2 FL — LOW (ref 80–100)
MONOCYTES # BLD AUTO: 0.76 K/UL — SIGNIFICANT CHANGE UP (ref 0–0.9)
MONOCYTES NFR BLD AUTO: 13.4 % — SIGNIFICANT CHANGE UP (ref 2–14)
NEUTROPHILS # BLD AUTO: 2.25 K/UL — SIGNIFICANT CHANGE UP (ref 1.8–7.4)
NEUTROPHILS NFR BLD AUTO: 39.7 % — LOW (ref 43–77)
NITRITE URINE: NEGATIVE
NRBC # BLD: 0 /100 WBCS — SIGNIFICANT CHANGE UP (ref 0–0)
PH URINE: 6
PHOSPHATE SERPL-MCNC: 4.4 MG/DL
PLATELET # BLD AUTO: 169 K/UL — SIGNIFICANT CHANGE UP (ref 150–400)
POTASSIUM SERPL-SCNC: 4.4 MMOL/L
PROT SERPL-MCNC: 7.3 G/DL
PROT UR-MCNC: 13 MG/DL
PROTEIN URINE: NORMAL
RBC # BLD: 5.21 M/UL — SIGNIFICANT CHANGE UP (ref 4.2–5.8)
RBC # FLD: 21 % — HIGH (ref 10.3–14.5)
SODIUM SERPL-SCNC: 137 MMOL/L
SPECIFIC GRAVITY URINE: 1.02
UROBILINOGEN URINE: NORMAL
WBC # BLD: 5.66 K/UL — SIGNIFICANT CHANGE UP (ref 3.8–10.5)
WBC # FLD AUTO: 5.66 K/UL — SIGNIFICANT CHANGE UP (ref 3.8–10.5)

## 2022-06-22 ENCOUNTER — APPOINTMENT (OUTPATIENT)
Dept: HEMATOLOGY ONCOLOGY | Facility: CLINIC | Age: 52
End: 2022-06-22
Payer: COMMERCIAL

## 2022-06-22 ENCOUNTER — APPOINTMENT (OUTPATIENT)
Dept: INFUSION THERAPY | Facility: HOSPITAL | Age: 52
End: 2022-06-22

## 2022-06-22 ENCOUNTER — NON-APPOINTMENT (OUTPATIENT)
Age: 52
End: 2022-06-22

## 2022-06-22 VITALS
HEART RATE: 51 BPM | DIASTOLIC BLOOD PRESSURE: 80 MMHG | OXYGEN SATURATION: 96 % | RESPIRATION RATE: 16 BRPM | SYSTOLIC BLOOD PRESSURE: 126 MMHG | WEIGHT: 224.87 LBS | BODY MASS INDEX: 36.31 KG/M2 | TEMPERATURE: 98.1 F

## 2022-06-22 DIAGNOSIS — R11.2 NAUSEA WITH VOMITING, UNSPECIFIED: ICD-10-CM

## 2022-06-22 DIAGNOSIS — Z51.11 ENCOUNTER FOR ANTINEOPLASTIC CHEMOTHERAPY: ICD-10-CM

## 2022-06-22 PROCEDURE — 99214 OFFICE O/P EST MOD 30 MIN: CPT

## 2022-06-22 RX ORDER — LISINOPRIL 2.5 MG/1
2.5 TABLET ORAL
Qty: 30 | Refills: 0 | Status: DISCONTINUED | COMMUNITY
Start: 2021-10-26

## 2022-06-22 RX ORDER — DULOXETINE HYDROCHLORIDE 30 MG/1
30 CAPSULE, DELAYED RELEASE PELLETS ORAL
Qty: 30 | Refills: 0 | Status: DISCONTINUED | COMMUNITY
Start: 2022-03-16

## 2022-06-22 NOTE — HISTORY OF PRESENT ILLNESS
[Disease: _____________________] : Disease: [unfilled] [T: ___] : T[unfilled] [N: ___] : N[unfilled] [M: ___] : M[unfilled] [AJCC Stage: ____] : AJCC Stage: [unfilled] [Research Protocol] : Research Protocol  [de-identified] : Mr Granda in 2021 at the age of 51 presented to the hospital with abdominal pain and underwent imaging that revealed a distal transverse colon lesion with evidence of microperforation.\par He is also underwent a colonoscopy which demonstrated an endoscopically obstructing distal transverse colon mass. Given the potential for impending obstruction as well as microperforation seen on imaging decision was made to take patient to the OR for a left hemicolectomy and anastomosis.\par Imaging done at that time also showed evidence of metastatic disease to the liver and potentially lung and a questionable lesion as well. Patient presents with his sister for initial visit and to discuss palliative chemotherapy.\par \par Enrolled in [employer] [facility] \par \par Started FOLFOX + Vit D July 7th, 2021 (bevacizumab added cycle #2)\par 8/23 scan shows PE started on Lovenox \par October 2021 oxaliplatin held. \par \par Disease: Transverse colon adenocarcinoma \par TNM stage: T4a, N0, M1 \par AJCC Stage: IV  \par Tumor Markers: Microsatellite stable \par \par KRAS G12D , NABILA \par  [de-identified] : Microsatellite stable  [de-identified] : KRAS G12D , NABILA  [FreeTextEntry1] : FOLFOX + Bevacizumab +VitD ([employer] study) --> 5FU Yolis + Vit D [de-identified] : presents in follow up , due for treatment today\par no new complaints\par stable neuropathy \par no fever, chills or abdominal pain \par compliant with anticoagulation for VTE\par here to review recent imaging

## 2022-06-22 NOTE — PHYSICAL EXAM
[Restricted in physically strenuous activity but ambulatory and able to carry out work of a light or sedentary nature] : Status 1- Restricted in physically strenuous activity but ambulatory and able to carry out work of a light or sedentary nature, e.g., light house work, office work [Obese] : obese [Normal] : normal spine exam without palpable tenderness, no kyphosis or scoliosis [de-identified] : anicteric  [de-identified] : normal respiratory effort, no audible wheeze [de-identified] : reg rate

## 2022-06-24 ENCOUNTER — APPOINTMENT (OUTPATIENT)
Dept: INFUSION THERAPY | Facility: HOSPITAL | Age: 52
End: 2022-06-24

## 2022-07-05 ENCOUNTER — APPOINTMENT (OUTPATIENT)
Dept: HEMATOLOGY ONCOLOGY | Facility: CLINIC | Age: 52
End: 2022-07-05

## 2022-07-05 ENCOUNTER — LABORATORY RESULT (OUTPATIENT)
Age: 52
End: 2022-07-05

## 2022-07-05 ENCOUNTER — NON-APPOINTMENT (OUTPATIENT)
Age: 52
End: 2022-07-05

## 2022-07-05 ENCOUNTER — RESULT REVIEW (OUTPATIENT)
Age: 52
End: 2022-07-05

## 2022-07-05 VITALS
BODY MASS INDEX: 36.85 KG/M2 | HEIGHT: 65.98 IN | OXYGEN SATURATION: 98 % | SYSTOLIC BLOOD PRESSURE: 150 MMHG | RESPIRATION RATE: 16 BRPM | HEART RATE: 67 BPM | TEMPERATURE: 98.4 F | WEIGHT: 229.28 LBS | DIASTOLIC BLOOD PRESSURE: 92 MMHG

## 2022-07-05 DIAGNOSIS — N39.0 URINARY TRACT INFECTION, SITE NOT SPECIFIED: ICD-10-CM

## 2022-07-05 LAB
ALBUMIN SERPL ELPH-MCNC: 4.2 G/DL
ALP BLD-CCNC: 63 U/L
ALT SERPL-CCNC: 23 U/L
ANION GAP SERPL CALC-SCNC: 12 MMOL/L
APPEARANCE: CLEAR
AST SERPL-CCNC: 21 U/L
BASOPHILS # BLD AUTO: 0.03 K/UL — SIGNIFICANT CHANGE UP (ref 0–0.2)
BASOPHILS NFR BLD AUTO: 0.5 % — SIGNIFICANT CHANGE UP (ref 0–2)
BILIRUB SERPL-MCNC: 0.8 MG/DL
BILIRUBIN URINE: NEGATIVE
BLOOD URINE: NEGATIVE
BUN SERPL-MCNC: 18 MG/DL
CALCIUM SERPL-MCNC: 9.2 MG/DL
CEA SERPL-MCNC: 158 NG/ML
CHLORIDE SERPL-SCNC: 103 MMOL/L
CO2 SERPL-SCNC: 23 MMOL/L
COLOR: NORMAL
CREAT SERPL-MCNC: 1.15 MG/DL
CREAT SPEC-SCNC: 110 MG/DL
CREAT/PROT UR: 0.1 RATIO
EGFR: 77 ML/MIN/1.73M2
EOSINOPHIL # BLD AUTO: 0.11 K/UL — SIGNIFICANT CHANGE UP (ref 0–0.5)
EOSINOPHIL NFR BLD AUTO: 1.8 % — SIGNIFICANT CHANGE UP (ref 0–6)
GLUCOSE QUALITATIVE U: NEGATIVE
GLUCOSE SERPL-MCNC: 156 MG/DL
HCT VFR BLD CALC: 38.8 % — LOW (ref 39–50)
HGB BLD-MCNC: 13.4 G/DL — SIGNIFICANT CHANGE UP (ref 13–17)
IMM GRANULOCYTES NFR BLD AUTO: 0.2 % — SIGNIFICANT CHANGE UP (ref 0–1.5)
KETONES URINE: NEGATIVE
LEUKOCYTE ESTERASE URINE: ABNORMAL
LYMPHOCYTES # BLD AUTO: 2.06 K/UL — SIGNIFICANT CHANGE UP (ref 1–3.3)
LYMPHOCYTES # BLD AUTO: 34.6 % — SIGNIFICANT CHANGE UP (ref 13–44)
MAGNESIUM SERPL-MCNC: 2.1 MG/DL
MCHC RBC-ENTMCNC: 26.6 PG — LOW (ref 27–34)
MCHC RBC-ENTMCNC: 34.5 G/DL — SIGNIFICANT CHANGE UP (ref 32–36)
MCV RBC AUTO: 77.1 FL — LOW (ref 80–100)
MONOCYTES # BLD AUTO: 0.89 K/UL — SIGNIFICANT CHANGE UP (ref 0–0.9)
MONOCYTES NFR BLD AUTO: 14.9 % — HIGH (ref 2–14)
NEUTROPHILS # BLD AUTO: 2.86 K/UL — SIGNIFICANT CHANGE UP (ref 1.8–7.4)
NEUTROPHILS NFR BLD AUTO: 48 % — SIGNIFICANT CHANGE UP (ref 43–77)
NITRITE URINE: POSITIVE
NRBC # BLD: 0 /100 WBCS — SIGNIFICANT CHANGE UP (ref 0–0)
PH URINE: 6
PHOSPHATE SERPL-MCNC: 3.7 MG/DL
PLATELET # BLD AUTO: 143 K/UL — LOW (ref 150–400)
POTASSIUM SERPL-SCNC: 4.7 MMOL/L
PROT SERPL-MCNC: 7 G/DL
PROT UR-MCNC: 10 MG/DL
PROTEIN URINE: NEGATIVE
RBC # BLD: 5.03 M/UL — SIGNIFICANT CHANGE UP (ref 4.2–5.8)
RBC # FLD: 20.6 % — HIGH (ref 10.3–14.5)
SODIUM SERPL-SCNC: 138 MMOL/L
SPECIFIC GRAVITY URINE: 1.02
UROBILINOGEN URINE: NORMAL
WBC # BLD: 5.96 K/UL — SIGNIFICANT CHANGE UP (ref 3.8–10.5)
WBC # FLD AUTO: 5.96 K/UL — SIGNIFICANT CHANGE UP (ref 3.8–10.5)

## 2022-07-05 PROCEDURE — 99214 OFFICE O/P EST MOD 30 MIN: CPT

## 2022-07-05 NOTE — PHYSICAL EXAM
[Restricted in physically strenuous activity but ambulatory and able to carry out work of a light or sedentary nature] : Status 1- Restricted in physically strenuous activity but ambulatory and able to carry out work of a light or sedentary nature, e.g., light house work, office work [Obese] : obese [Normal] : affect appropriate [de-identified] : anicteric  [de-identified] : normal respiratory effort, no audible wheeze [de-identified] : reg rate  [de-identified] : no LE edema B/L [de-identified] : soft, non-distended, non-tender

## 2022-07-05 NOTE — HISTORY OF PRESENT ILLNESS
[Disease: _____________________] : Disease: [unfilled] [T: ___] : T[unfilled] [N: ___] : N[unfilled] [M: ___] : M[unfilled] [AJCC Stage: ____] : AJCC Stage: [unfilled] [Research Protocol] : Research Protocol  [de-identified] : Mr Granda in 2021 at the age of 51 presented to the hospital with abdominal pain and underwent imaging that revealed a distal transverse colon lesion with evidence of microperforation.\par He is also underwent a colonoscopy which demonstrated an endoscopically obstructing distal transverse colon mass. Given the potential for impending obstruction as well as microperforation seen on imaging decision was made to take patient to the OR for a left hemicolectomy and anastomosis.\par Imaging done at that time also showed evidence of metastatic disease to the liver and potentially lung and a questionable lesion as well. Patient presents with his sister for initial visit and to discuss palliative chemotherapy.\par \par Enrolled in [employer] [facility] \par \par Started FOLFOX + Vit D July 7th, 2021 (bevacizumab added cycle #2)\par 8/23 scan shows PE started on Lovenox \par October 2021 oxaliplatin held. \par \par Disease: Transverse colon adenocarcinoma \par TNM stage: T4a, N0, M1 \par AJCC Stage: IV  \par Tumor Markers: Microsatellite stable \par \par KRAS G12D , NABILA \par  [de-identified] : Microsatellite stable  [de-identified] : KRAS G12D , NABILA  [FreeTextEntry1] : FOLFOX + Bevacizumab +VitD ([employer] study) --> 5FU Yolis + Vit D [de-identified] : Patient presents for follow up and is scheduled for treatment tomorrow.  He reports that his nausea and fatigue with treatment are at his baseline and nausea is well controlled with antiemetics.  He states that his neuropathy is improved with Cymbalta and does not interfere with his ADL's.  He has not scheduled follow up with Urology as previously requested.  Denies fever, anorexia, weight loss, abdominal pain, vomiting, epistaxis, change in bowel habits.

## 2022-07-05 NOTE — REVIEW OF SYSTEMS
[Negative] : Allergic/Immunologic [Confused] : no confusion [Dizziness] : no dizziness [Fainting] : no fainting [Difficulty Walking] : no difficulty walking [de-identified] : numbness/tingling in fingers and feet stable

## 2022-07-06 ENCOUNTER — RESULT REVIEW (OUTPATIENT)
Age: 52
End: 2022-07-06

## 2022-07-06 ENCOUNTER — NON-APPOINTMENT (OUTPATIENT)
Age: 52
End: 2022-07-06

## 2022-07-06 ENCOUNTER — APPOINTMENT (OUTPATIENT)
Dept: INFUSION THERAPY | Facility: HOSPITAL | Age: 52
End: 2022-07-06

## 2022-07-06 ENCOUNTER — APPOINTMENT (OUTPATIENT)
Dept: HEMATOLOGY ONCOLOGY | Facility: CLINIC | Age: 52
End: 2022-07-06

## 2022-07-07 ENCOUNTER — RX RENEWAL (OUTPATIENT)
Age: 52
End: 2022-07-07

## 2022-07-08 ENCOUNTER — APPOINTMENT (OUTPATIENT)
Dept: INFUSION THERAPY | Facility: HOSPITAL | Age: 52
End: 2022-07-08

## 2022-07-15 ENCOUNTER — APPOINTMENT (OUTPATIENT)
Dept: UROLOGY | Facility: CLINIC | Age: 52
End: 2022-07-15

## 2022-07-15 ENCOUNTER — OUTPATIENT (OUTPATIENT)
Dept: OUTPATIENT SERVICES | Facility: HOSPITAL | Age: 52
LOS: 1 days | End: 2022-07-15
Payer: COMMERCIAL

## 2022-07-15 VITALS
DIASTOLIC BLOOD PRESSURE: 88 MMHG | WEIGHT: 229 LBS | BODY MASS INDEX: 36.8 KG/M2 | RESPIRATION RATE: 16 BRPM | HEIGHT: 65.98 IN | SYSTOLIC BLOOD PRESSURE: 154 MMHG | TEMPERATURE: 98.2 F | HEART RATE: 72 BPM

## 2022-07-15 DIAGNOSIS — R35.0 FREQUENCY OF MICTURITION: ICD-10-CM

## 2022-07-15 PROCEDURE — 52000 CYSTOURETHROSCOPY: CPT

## 2022-07-19 ENCOUNTER — NON-APPOINTMENT (OUTPATIENT)
Age: 52
End: 2022-07-19

## 2022-07-19 ENCOUNTER — RESULT REVIEW (OUTPATIENT)
Age: 52
End: 2022-07-19

## 2022-07-19 ENCOUNTER — LABORATORY RESULT (OUTPATIENT)
Age: 52
End: 2022-07-19

## 2022-07-19 ENCOUNTER — APPOINTMENT (OUTPATIENT)
Dept: HEMATOLOGY ONCOLOGY | Facility: CLINIC | Age: 52
End: 2022-07-19

## 2022-07-19 LAB
BASOPHILS # BLD AUTO: 0.03 K/UL — SIGNIFICANT CHANGE UP (ref 0–0.2)
BASOPHILS NFR BLD AUTO: 0.5 % — SIGNIFICANT CHANGE UP (ref 0–2)
EOSINOPHIL # BLD AUTO: 0.14 K/UL — SIGNIFICANT CHANGE UP (ref 0–0.5)
EOSINOPHIL NFR BLD AUTO: 2.4 % — SIGNIFICANT CHANGE UP (ref 0–6)
HCT VFR BLD CALC: 41.2 % — SIGNIFICANT CHANGE UP (ref 39–50)
HGB BLD-MCNC: 14.1 G/DL — SIGNIFICANT CHANGE UP (ref 13–17)
IMM GRANULOCYTES NFR BLD AUTO: 0.2 % — SIGNIFICANT CHANGE UP (ref 0–1.5)
LYMPHOCYTES # BLD AUTO: 2.62 K/UL — SIGNIFICANT CHANGE UP (ref 1–3.3)
LYMPHOCYTES # BLD AUTO: 45.8 % — HIGH (ref 13–44)
MCHC RBC-ENTMCNC: 26.4 PG — LOW (ref 27–34)
MCHC RBC-ENTMCNC: 34.2 G/DL — SIGNIFICANT CHANGE UP (ref 32–36)
MCV RBC AUTO: 77 FL — LOW (ref 80–100)
MONOCYTES # BLD AUTO: 0.68 K/UL — SIGNIFICANT CHANGE UP (ref 0–0.9)
MONOCYTES NFR BLD AUTO: 11.9 % — SIGNIFICANT CHANGE UP (ref 2–14)
NEUTROPHILS # BLD AUTO: 2.24 K/UL — SIGNIFICANT CHANGE UP (ref 1.8–7.4)
NEUTROPHILS NFR BLD AUTO: 39.2 % — LOW (ref 43–77)
NRBC # BLD: 0 /100 WBCS — SIGNIFICANT CHANGE UP (ref 0–0)
PLATELET # BLD AUTO: 178 K/UL — SIGNIFICANT CHANGE UP (ref 150–400)
RBC # BLD: 5.35 M/UL — SIGNIFICANT CHANGE UP (ref 4.2–5.8)
RBC # FLD: 21 % — HIGH (ref 10.3–14.5)
WBC # BLD: 5.72 K/UL — SIGNIFICANT CHANGE UP (ref 3.8–10.5)
WBC # FLD AUTO: 5.72 K/UL — SIGNIFICANT CHANGE UP (ref 3.8–10.5)

## 2022-07-20 ENCOUNTER — APPOINTMENT (OUTPATIENT)
Dept: INFUSION THERAPY | Facility: HOSPITAL | Age: 52
End: 2022-07-20

## 2022-07-20 ENCOUNTER — APPOINTMENT (OUTPATIENT)
Dept: HEMATOLOGY ONCOLOGY | Facility: CLINIC | Age: 52
End: 2022-07-20

## 2022-07-20 ENCOUNTER — NON-APPOINTMENT (OUTPATIENT)
Age: 52
End: 2022-07-20

## 2022-07-20 VITALS
TEMPERATURE: 98.3 F | HEART RATE: 96 BPM | DIASTOLIC BLOOD PRESSURE: 89 MMHG | WEIGHT: 224.87 LBS | OXYGEN SATURATION: 97 % | BODY MASS INDEX: 36.14 KG/M2 | SYSTOLIC BLOOD PRESSURE: 135 MMHG | RESPIRATION RATE: 16 BRPM | HEIGHT: 65.98 IN

## 2022-07-20 DIAGNOSIS — Z87.440 PERSONAL HISTORY OF URINARY (TRACT) INFECTIONS: ICD-10-CM

## 2022-07-20 PROCEDURE — 99214 OFFICE O/P EST MOD 30 MIN: CPT

## 2022-07-22 ENCOUNTER — APPOINTMENT (OUTPATIENT)
Dept: INFUSION THERAPY | Facility: HOSPITAL | Age: 52
End: 2022-07-22

## 2022-07-22 NOTE — HISTORY OF PRESENT ILLNESS
[Disease: _____________________] : Disease: [unfilled] [T: ___] : T[unfilled] [N: ___] : N[unfilled] [M: ___] : M[unfilled] [AJCC Stage: ____] : AJCC Stage: [unfilled] [Research Protocol] : Research Protocol  [de-identified] : Mr Granda in 2021 at the age of 51 presented to the hospital with abdominal pain and underwent imaging that revealed a distal transverse colon lesion with evidence of microperforation.\par He is also underwent a colonoscopy which demonstrated an endoscopically obstructing distal transverse colon mass. Given the potential for impending obstruction as well as microperforation seen on imaging decision was made to take patient to the OR for a left hemicolectomy and anastomosis.\par Imaging done at that time also showed evidence of metastatic disease to the liver and potentially lung and a questionable lesion as well. Patient presents with his sister for initial visit and to discuss palliative chemotherapy.\par \par Enrolled in [employer] [facility] \par \par Started FOLFOX + Vit D July 7th, 2021 (bevacizumab added cycle #2)\par 8/23 scan shows PE started on Lovenox \par October 2021 oxaliplatin held. \par \par Disease: Transverse colon adenocarcinoma \par TNM stage: T4a, N0, M1 \par AJCC Stage: IV  \par Tumor Markers: Microsatellite stable \par \par KRAS G12D , NABILA \par  [de-identified] : Microsatellite stable  [de-identified] : KRAS G12D , NABILA  [FreeTextEntry1] : FOLFOX + Bevacizumab +VitD ([employer] study) --> 5FU Yolis + Vit D [de-identified] : Patient presents for follow up today and reports feeling well.  Previously reported tongue lesion has resolved.  He reports his baseline fatigue and nausea in the first few days after treatment but improves with antiemetics.  He reports that his neuropathy has been improving.  He has changed his diet in an attempt to loose some weight.  He recently completed his work-up with Urology with a negative cystoscopy.

## 2022-07-22 NOTE — REVIEW OF SYSTEMS
[Negative] : Allergic/Immunologic [Confused] : no confusion [Dizziness] : no dizziness [Fainting] : no fainting [Difficulty Walking] : no difficulty walking [de-identified] : numbness/tingling in fingers and feet stable

## 2022-07-22 NOTE — PHYSICAL EXAM
[Restricted in physically strenuous activity but ambulatory and able to carry out work of a light or sedentary nature] : Status 1- Restricted in physically strenuous activity but ambulatory and able to carry out work of a light or sedentary nature, e.g., light house work, office work [Obese] : obese [Normal] : affect appropriate [de-identified] : anicteric  [de-identified] : no JVD [de-identified] : normal respiratory effort, no audible wheeze [de-identified] : reg rate  [de-identified] : no LE edema B/L [de-identified] : soft, non-distended, non-tender

## 2022-07-24 LAB
ALBUMIN SERPL ELPH-MCNC: 4.4 G/DL
ALP BLD-CCNC: 60 U/L
ALT SERPL-CCNC: 20 U/L
ANION GAP SERPL CALC-SCNC: 14 MMOL/L
APPEARANCE: CLEAR
AST SERPL-CCNC: 26 U/L
BILIRUB SERPL-MCNC: 0.6 MG/DL
BILIRUBIN URINE: NEGATIVE
BLOOD URINE: NEGATIVE
BUN SERPL-MCNC: 15 MG/DL
CALCIUM SERPL-MCNC: 9.3 MG/DL
CEA SERPL-MCNC: 157 NG/ML
CHLORIDE SERPL-SCNC: 101 MMOL/L
CO2 SERPL-SCNC: 22 MMOL/L
COLOR: YELLOW
CREAT SERPL-MCNC: 1.35 MG/DL
CREAT SPEC-SCNC: 157 MG/DL
CREAT/PROT UR: 0.1 RATIO
EGFR: 63 ML/MIN/1.73M2
GLUCOSE QUALITATIVE U: NEGATIVE
GLUCOSE SERPL-MCNC: 138 MG/DL
KETONES URINE: NEGATIVE
LEUKOCYTE ESTERASE URINE: ABNORMAL
MAGNESIUM SERPL-MCNC: 2.2 MG/DL
NITRITE URINE: NEGATIVE
PH URINE: 6.5
PHOSPHATE SERPL-MCNC: 3.4 MG/DL
POTASSIUM SERPL-SCNC: 4.8 MMOL/L
PROT SERPL-MCNC: 7.5 G/DL
PROT UR-MCNC: 11 MG/DL
PROTEIN URINE: NORMAL
SODIUM SERPL-SCNC: 136 MMOL/L
SPECIFIC GRAVITY URINE: 1.02
UROBILINOGEN URINE: NORMAL

## 2022-07-25 ENCOUNTER — RESULT REVIEW (OUTPATIENT)
Age: 52
End: 2022-07-25

## 2022-07-28 DIAGNOSIS — Z87.440 PERSONAL HISTORY OF URINARY (TRACT) INFECTIONS: ICD-10-CM

## 2022-07-28 DIAGNOSIS — E11.9 TYPE 2 DIABETES MELLITUS WITHOUT COMPLICATIONS: ICD-10-CM

## 2022-07-28 DIAGNOSIS — C18.9 MALIGNANT NEOPLASM OF COLON, UNSPECIFIED: ICD-10-CM

## 2022-08-02 ENCOUNTER — LABORATORY RESULT (OUTPATIENT)
Age: 52
End: 2022-08-02

## 2022-08-02 ENCOUNTER — RESULT REVIEW (OUTPATIENT)
Age: 52
End: 2022-08-02

## 2022-08-02 ENCOUNTER — NON-APPOINTMENT (OUTPATIENT)
Age: 52
End: 2022-08-02

## 2022-08-02 ENCOUNTER — APPOINTMENT (OUTPATIENT)
Dept: HEMATOLOGY ONCOLOGY | Facility: CLINIC | Age: 52
End: 2022-08-02

## 2022-08-02 LAB
BASOPHILS # BLD AUTO: 0.02 K/UL — SIGNIFICANT CHANGE UP (ref 0–0.2)
BASOPHILS NFR BLD AUTO: 0.4 % — SIGNIFICANT CHANGE UP (ref 0–2)
EOSINOPHIL # BLD AUTO: 0.11 K/UL — SIGNIFICANT CHANGE UP (ref 0–0.5)
EOSINOPHIL NFR BLD AUTO: 2.1 % — SIGNIFICANT CHANGE UP (ref 0–6)
HCT VFR BLD CALC: 39.4 % — SIGNIFICANT CHANGE UP (ref 39–50)
HGB BLD-MCNC: 13.5 G/DL — SIGNIFICANT CHANGE UP (ref 13–17)
IMM GRANULOCYTES NFR BLD AUTO: 0.2 % — SIGNIFICANT CHANGE UP (ref 0–1.5)
LYMPHOCYTES # BLD AUTO: 2.33 K/UL — SIGNIFICANT CHANGE UP (ref 1–3.3)
LYMPHOCYTES # BLD AUTO: 44.6 % — HIGH (ref 13–44)
MCHC RBC-ENTMCNC: 26.5 PG — LOW (ref 27–34)
MCHC RBC-ENTMCNC: 34.3 G/DL — SIGNIFICANT CHANGE UP (ref 32–36)
MCV RBC AUTO: 77.4 FL — LOW (ref 80–100)
MONOCYTES # BLD AUTO: 0.58 K/UL — SIGNIFICANT CHANGE UP (ref 0–0.9)
MONOCYTES NFR BLD AUTO: 11.1 % — SIGNIFICANT CHANGE UP (ref 2–14)
NEUTROPHILS # BLD AUTO: 2.18 K/UL — SIGNIFICANT CHANGE UP (ref 1.8–7.4)
NEUTROPHILS NFR BLD AUTO: 41.6 % — LOW (ref 43–77)
NRBC # BLD: 0 /100 WBCS — SIGNIFICANT CHANGE UP (ref 0–0)
PLATELET # BLD AUTO: 166 K/UL — SIGNIFICANT CHANGE UP (ref 150–400)
RBC # BLD: 5.09 M/UL — SIGNIFICANT CHANGE UP (ref 4.2–5.8)
RBC # FLD: 20.7 % — HIGH (ref 10.3–14.5)
WBC # BLD: 5.23 K/UL — SIGNIFICANT CHANGE UP (ref 3.8–10.5)
WBC # FLD AUTO: 5.23 K/UL — SIGNIFICANT CHANGE UP (ref 3.8–10.5)

## 2022-08-03 ENCOUNTER — APPOINTMENT (OUTPATIENT)
Dept: HEMATOLOGY ONCOLOGY | Facility: CLINIC | Age: 52
End: 2022-08-03

## 2022-08-03 ENCOUNTER — APPOINTMENT (OUTPATIENT)
Dept: INFUSION THERAPY | Facility: HOSPITAL | Age: 52
End: 2022-08-03

## 2022-08-03 VITALS
OXYGEN SATURATION: 96 % | BODY MASS INDEX: 37.06 KG/M2 | WEIGHT: 230.6 LBS | TEMPERATURE: 97.2 F | HEART RATE: 96 BPM | SYSTOLIC BLOOD PRESSURE: 131 MMHG | RESPIRATION RATE: 16 BRPM | HEIGHT: 65.98 IN | DIASTOLIC BLOOD PRESSURE: 90 MMHG

## 2022-08-03 PROCEDURE — 99214 OFFICE O/P EST MOD 30 MIN: CPT

## 2022-08-03 RX ORDER — SULFAMETHOXAZOLE AND TRIMETHOPRIM 800; 160 MG/1; MG/1
800-160 TABLET ORAL
Qty: 20 | Refills: 0 | Status: DISCONTINUED | COMMUNITY
Start: 2022-01-10 | End: 2022-08-03

## 2022-08-05 ENCOUNTER — APPOINTMENT (OUTPATIENT)
Dept: INFUSION THERAPY | Facility: HOSPITAL | Age: 52
End: 2022-08-05

## 2022-08-08 ENCOUNTER — OUTPATIENT (OUTPATIENT)
Dept: OUTPATIENT SERVICES | Facility: HOSPITAL | Age: 52
LOS: 1 days | End: 2022-08-08
Payer: COMMERCIAL

## 2022-08-08 ENCOUNTER — APPOINTMENT (OUTPATIENT)
Dept: CT IMAGING | Facility: IMAGING CENTER | Age: 52
End: 2022-08-08

## 2022-08-08 DIAGNOSIS — C18.9 MALIGNANT NEOPLASM OF COLON, UNSPECIFIED: ICD-10-CM

## 2022-08-08 DIAGNOSIS — Z00.8 ENCOUNTER FOR OTHER GENERAL EXAMINATION: ICD-10-CM

## 2022-08-08 PROCEDURE — 74177 CT ABD & PELVIS W/CONTRAST: CPT | Mod: 26

## 2022-08-08 PROCEDURE — 74177 CT ABD & PELVIS W/CONTRAST: CPT

## 2022-08-08 PROCEDURE — 71260 CT THORAX DX C+: CPT | Mod: 26

## 2022-08-08 PROCEDURE — 71260 CT THORAX DX C+: CPT

## 2022-08-08 NOTE — REVIEW OF SYSTEMS
[Negative] : Allergic/Immunologic [Confused] : no confusion [Dizziness] : no dizziness [Fainting] : no fainting [Difficulty Walking] : no difficulty walking [de-identified] : numbness/tingling in fingers and feet stable

## 2022-08-08 NOTE — HISTORY OF PRESENT ILLNESS
[Disease: _____________________] : Disease: [unfilled] [T: ___] : T[unfilled] [N: ___] : N[unfilled] [M: ___] : M[unfilled] [AJCC Stage: ____] : AJCC Stage: [unfilled] [Research Protocol] : Research Protocol  [de-identified] : Mr Granda in 2021 at the age of 51 presented to the hospital with abdominal pain and underwent imaging that revealed a distal transverse colon lesion with evidence of microperforation.\par He is also underwent a colonoscopy which demonstrated an endoscopically obstructing distal transverse colon mass. Given the potential for impending obstruction as well as microperforation seen on imaging decision was made to take patient to the OR for a left hemicolectomy and anastomosis.\par Imaging done at that time also showed evidence of metastatic disease to the liver and potentially lung and a questionable lesion as well. Patient presents with his sister for initial visit and to discuss palliative chemotherapy.\par \par Enrolled in [employer] [facility] \par \par Started FOLFOX + Vit D July 7th, 2021 (bevacizumab added cycle #2)\par 8/23 scan shows PE started on Lovenox \par October 2021 oxaliplatin held. \par \par Disease: Transverse colon adenocarcinoma \par TNM stage: T4a, N0, M1 \par AJCC Stage: IV  \par Tumor Markers: Microsatellite stable \par \par KRAS G12D , NABILA \par  [de-identified] : Microsatellite stable  [de-identified] : KRAS G12D , NABILA  [FreeTextEntry1] : FOLFOX + Bevacizumab +VitD ([employer] study) --> 5FU Yolis + Vit D [de-identified] : presents in follow up  \par no new complaints\par stable neuropathy \par seen by urology and normal cystoscopy \par no fever, chills or abdominal pain \par compliant with anticoagulation for VTE

## 2022-08-08 NOTE — PHYSICAL EXAM
[Restricted in physically strenuous activity but ambulatory and able to carry out work of a light or sedentary nature] : Status 1- Restricted in physically strenuous activity but ambulatory and able to carry out work of a light or sedentary nature, e.g., light house work, office work [Obese] : obese [Normal] : affect appropriate [de-identified] : anicteric  [de-identified] : no JVD [de-identified] : normal respiratory effort, no audible wheeze [de-identified] : reg rate  [de-identified] : no LE edema B/L [de-identified] : soft, non-distended, non-tender

## 2022-08-10 ENCOUNTER — OUTPATIENT (OUTPATIENT)
Dept: OUTPATIENT SERVICES | Facility: HOSPITAL | Age: 52
LOS: 1 days | Discharge: HOME | End: 2022-08-10

## 2022-08-10 DIAGNOSIS — C18.9 MALIGNANT NEOPLASM OF COLON, UNSPECIFIED: ICD-10-CM

## 2022-08-16 ENCOUNTER — RESULT REVIEW (OUTPATIENT)
Age: 52
End: 2022-08-16

## 2022-08-16 ENCOUNTER — APPOINTMENT (OUTPATIENT)
Dept: HEMATOLOGY ONCOLOGY | Facility: CLINIC | Age: 52
End: 2022-08-16

## 2022-08-16 LAB
BASOPHILS # BLD AUTO: 0.03 K/UL — SIGNIFICANT CHANGE UP (ref 0–0.2)
BASOPHILS NFR BLD AUTO: 0.5 % — SIGNIFICANT CHANGE UP (ref 0–2)
EOSINOPHIL # BLD AUTO: 0.11 K/UL — SIGNIFICANT CHANGE UP (ref 0–0.5)
EOSINOPHIL NFR BLD AUTO: 1.8 % — SIGNIFICANT CHANGE UP (ref 0–6)
HCT VFR BLD CALC: 43.7 % — SIGNIFICANT CHANGE UP (ref 39–50)
HGB BLD-MCNC: 14.8 G/DL — SIGNIFICANT CHANGE UP (ref 13–17)
IMM GRANULOCYTES NFR BLD AUTO: 0.5 % — SIGNIFICANT CHANGE UP (ref 0–1.5)
LYMPHOCYTES # BLD AUTO: 2.68 K/UL — SIGNIFICANT CHANGE UP (ref 1–3.3)
LYMPHOCYTES # BLD AUTO: 42.7 % — SIGNIFICANT CHANGE UP (ref 13–44)
MCHC RBC-ENTMCNC: 25.8 PG — LOW (ref 27–34)
MCHC RBC-ENTMCNC: 33.9 G/DL — SIGNIFICANT CHANGE UP (ref 32–36)
MCV RBC AUTO: 76.1 FL — LOW (ref 80–100)
MONOCYTES # BLD AUTO: 0.86 K/UL — SIGNIFICANT CHANGE UP (ref 0–0.9)
MONOCYTES NFR BLD AUTO: 13.7 % — SIGNIFICANT CHANGE UP (ref 2–14)
NEUTROPHILS # BLD AUTO: 2.56 K/UL — SIGNIFICANT CHANGE UP (ref 1.8–7.4)
NEUTROPHILS NFR BLD AUTO: 40.8 % — LOW (ref 43–77)
NRBC # BLD: 0 /100 WBCS — SIGNIFICANT CHANGE UP (ref 0–0)
PLATELET # BLD AUTO: 179 K/UL — SIGNIFICANT CHANGE UP (ref 150–400)
RBC # BLD: 5.74 M/UL — SIGNIFICANT CHANGE UP (ref 4.2–5.8)
RBC # FLD: 20.6 % — HIGH (ref 10.3–14.5)
WBC # BLD: 6.27 K/UL — SIGNIFICANT CHANGE UP (ref 3.8–10.5)
WBC # FLD AUTO: 6.27 K/UL — SIGNIFICANT CHANGE UP (ref 3.8–10.5)

## 2022-08-17 ENCOUNTER — RESULT REVIEW (OUTPATIENT)
Age: 52
End: 2022-08-17

## 2022-08-17 ENCOUNTER — APPOINTMENT (OUTPATIENT)
Dept: INFUSION THERAPY | Facility: HOSPITAL | Age: 52
End: 2022-08-17

## 2022-08-17 ENCOUNTER — APPOINTMENT (OUTPATIENT)
Dept: HEMATOLOGY ONCOLOGY | Facility: CLINIC | Age: 52
End: 2022-08-17

## 2022-08-17 ENCOUNTER — LABORATORY RESULT (OUTPATIENT)
Age: 52
End: 2022-08-17

## 2022-08-17 VITALS
WEIGHT: 233.69 LBS | BODY MASS INDEX: 37.74 KG/M2 | DIASTOLIC BLOOD PRESSURE: 94 MMHG | TEMPERATURE: 97.3 F | HEART RATE: 89 BPM | OXYGEN SATURATION: 98 % | RESPIRATION RATE: 16 BRPM | SYSTOLIC BLOOD PRESSURE: 150 MMHG

## 2022-08-17 DIAGNOSIS — Z51.11 ENCOUNTER FOR ANTINEOPLASTIC CHEMOTHERAPY: ICD-10-CM

## 2022-08-17 DIAGNOSIS — R11.2 NAUSEA WITH VOMITING, UNSPECIFIED: ICD-10-CM

## 2022-08-17 LAB
ALBUMIN SERPL ELPH-MCNC: 4.5 G/DL
ALP BLD-CCNC: 59 U/L
ALT SERPL-CCNC: 29 U/L
ANION GAP SERPL CALC-SCNC: 13 MMOL/L
AST SERPL-CCNC: 24 U/L
BASOPHILS # BLD AUTO: 0.03 K/UL — SIGNIFICANT CHANGE UP (ref 0–0.2)
BASOPHILS NFR BLD AUTO: 0.5 % — SIGNIFICANT CHANGE UP (ref 0–2)
BILIRUB SERPL-MCNC: 0.8 MG/DL
BUN SERPL-MCNC: 9 MG/DL
CALCIUM SERPL-MCNC: 9.3 MG/DL
CEA SERPL-MCNC: 170 NG/ML
CHLORIDE SERPL-SCNC: 100 MMOL/L
CO2 SERPL-SCNC: 25 MMOL/L
CREAT SERPL-MCNC: 1.06 MG/DL
CREAT SPEC-SCNC: 117 MG/DL
CREAT/PROT UR: 0.1 RATIO
EGFR: 84 ML/MIN/1.73M2
EOSINOPHIL # BLD AUTO: 0.12 K/UL — SIGNIFICANT CHANGE UP (ref 0–0.5)
EOSINOPHIL NFR BLD AUTO: 2.2 % — SIGNIFICANT CHANGE UP (ref 0–6)
GLUCOSE SERPL-MCNC: 132 MG/DL
HCT VFR BLD CALC: 43 % — SIGNIFICANT CHANGE UP (ref 39–50)
HGB BLD-MCNC: 14.5 G/DL — SIGNIFICANT CHANGE UP (ref 13–17)
IMM GRANULOCYTES NFR BLD AUTO: 0.4 % — SIGNIFICANT CHANGE UP (ref 0–1.5)
LYMPHOCYTES # BLD AUTO: 2.11 K/UL — SIGNIFICANT CHANGE UP (ref 1–3.3)
LYMPHOCYTES # BLD AUTO: 38.1 % — SIGNIFICANT CHANGE UP (ref 13–44)
MAGNESIUM SERPL-MCNC: 2.2 MG/DL
MCHC RBC-ENTMCNC: 26.3 PG — LOW (ref 27–34)
MCHC RBC-ENTMCNC: 33.7 G/DL — SIGNIFICANT CHANGE UP (ref 32–36)
MCV RBC AUTO: 78 FL — LOW (ref 80–100)
MONOCYTES # BLD AUTO: 0.84 K/UL — SIGNIFICANT CHANGE UP (ref 0–0.9)
MONOCYTES NFR BLD AUTO: 15.2 % — HIGH (ref 2–14)
NEUTROPHILS # BLD AUTO: 2.42 K/UL — SIGNIFICANT CHANGE UP (ref 1.8–7.4)
NEUTROPHILS NFR BLD AUTO: 43.6 % — SIGNIFICANT CHANGE UP (ref 43–77)
NRBC # BLD: 0 /100 WBCS — SIGNIFICANT CHANGE UP (ref 0–0)
PHOSPHATE SERPL-MCNC: 3.5 MG/DL
PLATELET # BLD AUTO: 140 K/UL — LOW (ref 150–400)
POTASSIUM SERPL-SCNC: 4.5 MMOL/L
PROT SERPL-MCNC: 7.4 G/DL
PROT UR-MCNC: 11 MG/DL
RBC # BLD: 5.51 M/UL — SIGNIFICANT CHANGE UP (ref 4.2–5.8)
RBC # FLD: 20.7 % — HIGH (ref 10.3–14.5)
SODIUM SERPL-SCNC: 138 MMOL/L
WBC # BLD: 5.54 K/UL — SIGNIFICANT CHANGE UP (ref 3.8–10.5)
WBC # FLD AUTO: 5.54 K/UL — SIGNIFICANT CHANGE UP (ref 3.8–10.5)

## 2022-08-17 PROCEDURE — 99214 OFFICE O/P EST MOD 30 MIN: CPT

## 2022-08-17 NOTE — PHYSICAL EXAM
[Obese] : obese [Normal] : affect appropriate [Fully active, able to carry on all pre-disease performance without restriction] : Status 0 - Fully active, able to carry on all pre-disease performance without restriction [de-identified] : anicteric  [de-identified] : no JVD [de-identified] : normal respiratory effort, no audible wheeze [de-identified] : reg rate  [de-identified] : no LE edema B/L [de-identified] : soft, non-distended, non-tender

## 2022-08-17 NOTE — REVIEW OF SYSTEMS
[Negative] : Allergic/Immunologic [Confused] : no confusion [Dizziness] : no dizziness [Fainting] : no fainting [Difficulty Walking] : no difficulty walking [de-identified] : numbness/tingling in fingers and feet stable

## 2022-08-17 NOTE — HISTORY OF PRESENT ILLNESS
[Disease: _____________________] : Disease: [unfilled] [T: ___] : T[unfilled] [N: ___] : N[unfilled] [M: ___] : M[unfilled] [AJCC Stage: ____] : AJCC Stage: [unfilled] [Research Protocol] : Research Protocol  [de-identified] : Mr Granda in 2021 at the age of 51 presented to the hospital with abdominal pain and underwent imaging that revealed a distal transverse colon lesion with evidence of microperforation.\par He is also underwent a colonoscopy which demonstrated an endoscopically obstructing distal transverse colon mass. Given the potential for impending obstruction as well as microperforation seen on imaging decision was made to take patient to the OR for a left hemicolectomy and anastomosis.\par Imaging done at that time also showed evidence of metastatic disease to the liver and potentially lung and a questionable lesion as well. Patient presents with his sister for initial visit and to discuss palliative chemotherapy.\par \par Enrolled in [employer] [facility] \par \par Started FOLFOX + Vit D July 7th, 2021 (bevacizumab added cycle #2)\par 8/23 scan shows PE started on Lovenox \par October 2021 oxaliplatin held. \par \par KRAS G12D , NABILA \par  [de-identified] : Microsatellite stable  [de-identified] : KRAS G12D , NABILA  [FreeTextEntry1] : FOLFOX + Bevacizumab +VitD ([employer] study) --> 5FU Yolis + Vit D [de-identified] : presents in follow up  \par no new complaints\par stable neuropathy \par hasn't seen PMD for DM follow up \par no fever, chills or abdominal pain \par compliant with anticoagulation for VTE and states refills not needed

## 2022-08-18 LAB
APPEARANCE: CLEAR
BILIRUBIN URINE: NEGATIVE
BLOOD URINE: NEGATIVE
COLOR: NORMAL
GLUCOSE QUALITATIVE U: NEGATIVE
KETONES URINE: NEGATIVE
LEUKOCYTE ESTERASE URINE: ABNORMAL
NITRITE URINE: NEGATIVE
PH URINE: 7.5
PROTEIN URINE: NORMAL
SPECIFIC GRAVITY URINE: 1.01
UROBILINOGEN URINE: NORMAL

## 2022-08-19 ENCOUNTER — APPOINTMENT (OUTPATIENT)
Dept: INFUSION THERAPY | Facility: HOSPITAL | Age: 52
End: 2022-08-19

## 2022-08-29 ENCOUNTER — APPOINTMENT (OUTPATIENT)
Dept: HEMATOLOGY ONCOLOGY | Facility: CLINIC | Age: 52
End: 2022-08-29

## 2022-08-30 ENCOUNTER — LABORATORY RESULT (OUTPATIENT)
Age: 52
End: 2022-08-30

## 2022-08-30 ENCOUNTER — RESULT REVIEW (OUTPATIENT)
Age: 52
End: 2022-08-30

## 2022-08-30 ENCOUNTER — APPOINTMENT (OUTPATIENT)
Dept: HEMATOLOGY ONCOLOGY | Facility: CLINIC | Age: 52
End: 2022-08-30

## 2022-08-30 LAB
ALBUMIN SERPL ELPH-MCNC: 4.2 G/DL
ALP BLD-CCNC: 55 U/L
ALT SERPL-CCNC: 17 U/L
ANION GAP SERPL CALC-SCNC: 14 MMOL/L
APPEARANCE: CLEAR
AST SERPL-CCNC: 22 U/L
BASOPHILS # BLD AUTO: 0.02 K/UL — SIGNIFICANT CHANGE UP (ref 0–0.2)
BASOPHILS NFR BLD AUTO: 0.4 % — SIGNIFICANT CHANGE UP (ref 0–2)
BILIRUB SERPL-MCNC: 0.8 MG/DL
BILIRUBIN URINE: NEGATIVE
BLOOD URINE: NEGATIVE
BUN SERPL-MCNC: 10 MG/DL
CALCIUM SERPL-MCNC: 8.9 MG/DL
CEA SERPL-MCNC: 178 NG/ML
CHLORIDE SERPL-SCNC: 103 MMOL/L
CO2 SERPL-SCNC: 22 MMOL/L
COLOR: YELLOW
CREAT SERPL-MCNC: 1.12 MG/DL
CREAT SPEC-SCNC: 162 MG/DL
CREAT/PROT UR: 0.1 RATIO
EGFR: 79 ML/MIN/1.73M2
EOSINOPHIL # BLD AUTO: 0.15 K/UL — SIGNIFICANT CHANGE UP (ref 0–0.5)
EOSINOPHIL NFR BLD AUTO: 3 % — SIGNIFICANT CHANGE UP (ref 0–6)
GLUCOSE QUALITATIVE U: NEGATIVE
GLUCOSE SERPL-MCNC: 138 MG/DL
HCT VFR BLD CALC: 42.6 % — SIGNIFICANT CHANGE UP (ref 39–50)
HGB BLD-MCNC: 14.4 G/DL — SIGNIFICANT CHANGE UP (ref 13–17)
IMM GRANULOCYTES NFR BLD AUTO: 0.4 % — SIGNIFICANT CHANGE UP (ref 0–1.5)
KETONES URINE: NEGATIVE
LEUKOCYTE ESTERASE URINE: ABNORMAL
LYMPHOCYTES # BLD AUTO: 1.96 K/UL — SIGNIFICANT CHANGE UP (ref 1–3.3)
LYMPHOCYTES # BLD AUTO: 39.8 % — SIGNIFICANT CHANGE UP (ref 13–44)
MAGNESIUM SERPL-MCNC: 2.2 MG/DL
MCHC RBC-ENTMCNC: 26.4 PG — LOW (ref 27–34)
MCHC RBC-ENTMCNC: 33.8 G/DL — SIGNIFICANT CHANGE UP (ref 32–36)
MCV RBC AUTO: 78 FL — LOW (ref 80–100)
MONOCYTES # BLD AUTO: 0.83 K/UL — SIGNIFICANT CHANGE UP (ref 0–0.9)
MONOCYTES NFR BLD AUTO: 16.8 % — HIGH (ref 2–14)
NEUTROPHILS # BLD AUTO: 1.95 K/UL — SIGNIFICANT CHANGE UP (ref 1.8–7.4)
NEUTROPHILS NFR BLD AUTO: 39.6 % — LOW (ref 43–77)
NITRITE URINE: NEGATIVE
NRBC # BLD: 0 /100 WBCS — SIGNIFICANT CHANGE UP (ref 0–0)
PH URINE: 6.5
PHOSPHATE SERPL-MCNC: 3.8 MG/DL
PLATELET # BLD AUTO: 159 K/UL — SIGNIFICANT CHANGE UP (ref 150–400)
POTASSIUM SERPL-SCNC: 4.5 MMOL/L
PROT SERPL-MCNC: 7 G/DL
PROT UR-MCNC: 12 MG/DL
PROTEIN URINE: NEGATIVE
RBC # BLD: 5.46 M/UL — SIGNIFICANT CHANGE UP (ref 4.2–5.8)
RBC # FLD: 20.5 % — HIGH (ref 10.3–14.5)
SODIUM SERPL-SCNC: 139 MMOL/L
SPECIFIC GRAVITY URINE: 1.02
UROBILINOGEN URINE: NORMAL
WBC # BLD: 4.93 K/UL — SIGNIFICANT CHANGE UP (ref 3.8–10.5)
WBC # FLD AUTO: 4.93 K/UL — SIGNIFICANT CHANGE UP (ref 3.8–10.5)

## 2022-08-31 ENCOUNTER — NON-APPOINTMENT (OUTPATIENT)
Age: 52
End: 2022-08-31

## 2022-08-31 ENCOUNTER — APPOINTMENT (OUTPATIENT)
Dept: HEMATOLOGY ONCOLOGY | Facility: CLINIC | Age: 52
End: 2022-08-31

## 2022-08-31 ENCOUNTER — APPOINTMENT (OUTPATIENT)
Dept: INFUSION THERAPY | Facility: HOSPITAL | Age: 52
End: 2022-08-31

## 2022-08-31 VITALS
RESPIRATION RATE: 16 BRPM | SYSTOLIC BLOOD PRESSURE: 137 MMHG | BODY MASS INDEX: 37.41 KG/M2 | TEMPERATURE: 97.7 F | WEIGHT: 232.79 LBS | HEIGHT: 65.98 IN | HEART RATE: 86 BPM | OXYGEN SATURATION: 97 % | DIASTOLIC BLOOD PRESSURE: 88 MMHG

## 2022-08-31 PROCEDURE — 99214 OFFICE O/P EST MOD 30 MIN: CPT

## 2022-08-31 NOTE — HISTORY OF PRESENT ILLNESS
[Disease: _____________________] : Disease: [unfilled] [T: ___] : T[unfilled] [N: ___] : N[unfilled] [M: ___] : M[unfilled] [AJCC Stage: ____] : AJCC Stage: [unfilled] [Research Protocol] : Research Protocol  [de-identified] : Mr Granda in 2021 at the age of 51 presented to the hospital with abdominal pain and underwent imaging that revealed a distal transverse colon lesion with evidence of microperforation.\par He is also underwent a colonoscopy which demonstrated an endoscopically obstructing distal transverse colon mass. Given the potential for impending obstruction as well as microperforation seen on imaging decision was made to take patient to the OR for a left hemicolectomy and anastomosis.\par Imaging done at that time also showed evidence of metastatic disease to the liver and potentially lung and a questionable lesion as well. Patient presents with his sister for initial visit and to discuss palliative chemotherapy.\par \par Enrolled in [employer] [facility] \par \par Started FOLFOX + Vit D July 7th, 2021 (bevacizumab added cycle #2)\par 8/23 scan shows PE started on Lovenox \par October 2021 oxaliplatin held. \par \par KRAS G12D , NABILA \par  [de-identified] : Microsatellite stable  [de-identified] : KRAS G12D , NABILA  [FreeTextEntry1] : FOLFOX + Bevacizumab +VitD ([employer] study) --> 5FU Yolis + Vit D [de-identified] : Patient presents for follow up today and is scheduled for treatment.  He reports feeling well and with good appetite.  He admits to some fatigue and nausea after treatment but not worsening.  Denies dysuria or urinary frequency/urgency.  He continues to have peripheral neuropathy but states that it is better with treatment and doesn't interfere with ADL's.

## 2022-08-31 NOTE — REVIEW OF SYSTEMS
[Negative] : Allergic/Immunologic [Confused] : no confusion [Dizziness] : no dizziness [Fainting] : no fainting [Difficulty Walking] : no difficulty walking [de-identified] : numbness/tingling in fingers and feet stable

## 2022-08-31 NOTE — PHYSICAL EXAM
[Fully active, able to carry on all pre-disease performance without restriction] : Status 0 - Fully active, able to carry on all pre-disease performance without restriction [Obese] : obese [Normal] : affect appropriate [de-identified] : anicteric  [de-identified] : no JVD [de-identified] : normal respiratory effort, no audible wheeze [de-identified] : reg rate  [de-identified] : no LE edema B/L [de-identified] : soft, non-distended, non-tender

## 2022-09-02 ENCOUNTER — APPOINTMENT (OUTPATIENT)
Dept: INFUSION THERAPY | Facility: HOSPITAL | Age: 52
End: 2022-09-02

## 2022-09-12 ENCOUNTER — APPOINTMENT (OUTPATIENT)
Dept: HEMATOLOGY ONCOLOGY | Facility: CLINIC | Age: 52
End: 2022-09-12

## 2022-09-13 ENCOUNTER — RESULT REVIEW (OUTPATIENT)
Age: 52
End: 2022-09-13

## 2022-09-13 ENCOUNTER — APPOINTMENT (OUTPATIENT)
Dept: HEMATOLOGY ONCOLOGY | Facility: CLINIC | Age: 52
End: 2022-09-13

## 2022-09-13 ENCOUNTER — LABORATORY RESULT (OUTPATIENT)
Age: 52
End: 2022-09-13

## 2022-09-13 LAB
ALBUMIN SERPL ELPH-MCNC: 4 G/DL
ALP BLD-CCNC: 54 U/L
ALT SERPL-CCNC: 15 U/L
ANION GAP SERPL CALC-SCNC: 16 MMOL/L
APPEARANCE: CLEAR
AST SERPL-CCNC: 18 U/L
BASOPHILS # BLD AUTO: 0.02 K/UL — SIGNIFICANT CHANGE UP (ref 0–0.2)
BASOPHILS NFR BLD AUTO: 0.4 % — SIGNIFICANT CHANGE UP (ref 0–2)
BILIRUB SERPL-MCNC: 0.9 MG/DL
BILIRUBIN URINE: NEGATIVE
BLOOD URINE: NEGATIVE
BUN SERPL-MCNC: 14 MG/DL
CALCIUM SERPL-MCNC: 9 MG/DL
CEA SERPL-MCNC: 149 NG/ML
CHLORIDE SERPL-SCNC: 104 MMOL/L
CO2 SERPL-SCNC: 23 MMOL/L
COLOR: YELLOW
CREAT SERPL-MCNC: 1.07 MG/DL
CREAT SPEC-SCNC: 168 MG/DL
CREAT/PROT UR: 0.1 RATIO
EGFR: 84 ML/MIN/1.73M2
EOSINOPHIL # BLD AUTO: 0.13 K/UL — SIGNIFICANT CHANGE UP (ref 0–0.5)
EOSINOPHIL NFR BLD AUTO: 2.4 % — SIGNIFICANT CHANGE UP (ref 0–6)
GLUCOSE QUALITATIVE U: NEGATIVE
GLUCOSE SERPL-MCNC: 154 MG/DL
HCT VFR BLD CALC: 42.2 % — SIGNIFICANT CHANGE UP (ref 39–50)
HGB BLD-MCNC: 14.2 G/DL — SIGNIFICANT CHANGE UP (ref 13–17)
IMM GRANULOCYTES NFR BLD AUTO: 0.4 % — SIGNIFICANT CHANGE UP (ref 0–1.5)
KETONES URINE: NEGATIVE
LEUKOCYTE ESTERASE URINE: ABNORMAL
LYMPHOCYTES # BLD AUTO: 2.2 K/UL — SIGNIFICANT CHANGE UP (ref 1–3.3)
LYMPHOCYTES # BLD AUTO: 40.7 % — SIGNIFICANT CHANGE UP (ref 13–44)
MAGNESIUM SERPL-MCNC: 2.1 MG/DL
MCHC RBC-ENTMCNC: 26.2 PG — LOW (ref 27–34)
MCHC RBC-ENTMCNC: 33.6 G/DL — SIGNIFICANT CHANGE UP (ref 32–36)
MCV RBC AUTO: 77.9 FL — LOW (ref 80–100)
MONOCYTES # BLD AUTO: 0.78 K/UL — SIGNIFICANT CHANGE UP (ref 0–0.9)
MONOCYTES NFR BLD AUTO: 14.4 % — HIGH (ref 2–14)
NEUTROPHILS # BLD AUTO: 2.25 K/UL — SIGNIFICANT CHANGE UP (ref 1.8–7.4)
NEUTROPHILS NFR BLD AUTO: 41.7 % — LOW (ref 43–77)
NITRITE URINE: NEGATIVE
NRBC # BLD: 0 /100 WBCS — SIGNIFICANT CHANGE UP (ref 0–0)
PH URINE: 6
PHOSPHATE SERPL-MCNC: 3.3 MG/DL
PLATELET # BLD AUTO: 163 K/UL — SIGNIFICANT CHANGE UP (ref 150–400)
POTASSIUM SERPL-SCNC: 4.4 MMOL/L
PROT SERPL-MCNC: 6.9 G/DL
PROT UR-MCNC: 9 MG/DL
PROTEIN URINE: NEGATIVE
RBC # BLD: 5.42 M/UL — SIGNIFICANT CHANGE UP (ref 4.2–5.8)
RBC # FLD: 20.8 % — HIGH (ref 10.3–14.5)
SODIUM SERPL-SCNC: 142 MMOL/L
SPECIFIC GRAVITY URINE: 1.02
UROBILINOGEN URINE: NORMAL
WBC # BLD: 5.4 K/UL — SIGNIFICANT CHANGE UP (ref 3.8–10.5)
WBC # FLD AUTO: 5.4 K/UL — SIGNIFICANT CHANGE UP (ref 3.8–10.5)

## 2022-09-14 ENCOUNTER — APPOINTMENT (OUTPATIENT)
Dept: INFUSION THERAPY | Facility: HOSPITAL | Age: 52
End: 2022-09-14

## 2022-09-14 ENCOUNTER — NON-APPOINTMENT (OUTPATIENT)
Age: 52
End: 2022-09-14

## 2022-09-14 ENCOUNTER — APPOINTMENT (OUTPATIENT)
Dept: HEMATOLOGY ONCOLOGY | Facility: CLINIC | Age: 52
End: 2022-09-14

## 2022-09-14 VITALS
DIASTOLIC BLOOD PRESSURE: 90 MMHG | BODY MASS INDEX: 37.59 KG/M2 | TEMPERATURE: 97.3 F | SYSTOLIC BLOOD PRESSURE: 142 MMHG | RESPIRATION RATE: 16 BRPM | WEIGHT: 232.8 LBS | HEART RATE: 83 BPM | OXYGEN SATURATION: 100 %

## 2022-09-14 PROCEDURE — 99214 OFFICE O/P EST MOD 30 MIN: CPT

## 2022-09-15 NOTE — PHYSICAL EXAM
[Fully active, able to carry on all pre-disease performance without restriction] : Status 0 - Fully active, able to carry on all pre-disease performance without restriction [Obese] : obese [Normal] : affect appropriate [de-identified] : anicteric  [de-identified] : no JVD [de-identified] : normal respiratory effort, no audible wheeze [de-identified] : reg rate  [de-identified] : no LE edema B/L [de-identified] : soft, non-distended, non-tender

## 2022-09-15 NOTE — REVIEW OF SYSTEMS
[Negative] : Allergic/Immunologic [Confused] : no confusion [Dizziness] : no dizziness [Fainting] : no fainting [Difficulty Walking] : no difficulty walking [de-identified] : numbness/tingling in fingers and feet stable

## 2022-09-15 NOTE — HISTORY OF PRESENT ILLNESS
[Disease: _____________________] : Disease: [unfilled] [T: ___] : T[unfilled] [N: ___] : N[unfilled] [M: ___] : M[unfilled] [AJCC Stage: ____] : AJCC Stage: [unfilled] [Research Protocol] : Research Protocol  [de-identified] : Mr Granda in 2021 at the age of 51 presented to the hospital with abdominal pain and underwent imaging that revealed a distal transverse colon lesion with evidence of microperforation.\par He is also underwent a colonoscopy which demonstrated an endoscopically obstructing distal transverse colon mass. Given the potential for impending obstruction as well as microperforation seen on imaging decision was made to take patient to the OR for a left hemicolectomy and anastomosis.\par Imaging done at that time also showed evidence of metastatic disease to the liver and potentially lung and a questionable lesion as well. Patient presents with his sister for initial visit and to discuss palliative chemotherapy.\par \par Enrolled in [employer] [facility] \par \par Started FOLFOX + Vit D July 7th, 2021 (bevacizumab added cycle #2)\par 8/23 scan shows PE started on Lovenox \par October 2021 oxaliplatin held. \par \par KRAS G12D , NABILA \par  [de-identified] : Microsatellite stable  [de-identified] : KRAS G12D , NABILA  [FreeTextEntry1] : FOLFOX + Bevacizumab +VitD ([employer] study) --> 5FU Yolis + Vit D [de-identified] : Patient presents for follow up and is scheduled for follow up today.  He reports that fatigue and nausea after treatment are stable.  Neuropathy in the feet is slightly increased with colder weather days but is otherwise stable and neuropathy in the hands is stable.  Denies fever, HA, dizziness, CP, SOB, vomiting, change in bowel habits or urinary symptoms.

## 2022-09-16 ENCOUNTER — APPOINTMENT (OUTPATIENT)
Dept: INFUSION THERAPY | Facility: HOSPITAL | Age: 52
End: 2022-09-16

## 2022-09-26 ENCOUNTER — RESULT REVIEW (OUTPATIENT)
Age: 52
End: 2022-09-26

## 2022-09-26 ENCOUNTER — APPOINTMENT (OUTPATIENT)
Dept: HEMATOLOGY ONCOLOGY | Facility: CLINIC | Age: 52
End: 2022-09-26

## 2022-09-26 LAB
BASOPHILS # BLD AUTO: 0.02 K/UL — SIGNIFICANT CHANGE UP (ref 0–0.2)
BASOPHILS NFR BLD AUTO: 0.4 % — SIGNIFICANT CHANGE UP (ref 0–2)
EOSINOPHIL # BLD AUTO: 0.12 K/UL — SIGNIFICANT CHANGE UP (ref 0–0.5)
EOSINOPHIL NFR BLD AUTO: 2.1 % — SIGNIFICANT CHANGE UP (ref 0–6)
HCT VFR BLD CALC: 42.4 % — SIGNIFICANT CHANGE UP (ref 39–50)
HGB BLD-MCNC: 14.3 G/DL — SIGNIFICANT CHANGE UP (ref 13–17)
IMM GRANULOCYTES NFR BLD AUTO: 0.4 % — SIGNIFICANT CHANGE UP (ref 0–0.9)
LYMPHOCYTES # BLD AUTO: 2.44 K/UL — SIGNIFICANT CHANGE UP (ref 1–3.3)
LYMPHOCYTES # BLD AUTO: 43 % — SIGNIFICANT CHANGE UP (ref 13–44)
MCHC RBC-ENTMCNC: 26.6 PG — LOW (ref 27–34)
MCHC RBC-ENTMCNC: 33.7 G/DL — SIGNIFICANT CHANGE UP (ref 32–36)
MCV RBC AUTO: 78.8 FL — LOW (ref 80–100)
MONOCYTES # BLD AUTO: 0.79 K/UL — SIGNIFICANT CHANGE UP (ref 0–0.9)
MONOCYTES NFR BLD AUTO: 13.9 % — SIGNIFICANT CHANGE UP (ref 2–14)
NEUTROPHILS # BLD AUTO: 2.29 K/UL — SIGNIFICANT CHANGE UP (ref 1.8–7.4)
NEUTROPHILS NFR BLD AUTO: 40.2 % — LOW (ref 43–77)
NRBC # BLD: 0 /100 WBCS — SIGNIFICANT CHANGE UP (ref 0–0)
PLATELET # BLD AUTO: 165 K/UL — SIGNIFICANT CHANGE UP (ref 150–400)
RBC # BLD: 5.38 M/UL — SIGNIFICANT CHANGE UP (ref 4.2–5.8)
RBC # FLD: 21 % — HIGH (ref 10.3–14.5)
WBC # BLD: 5.68 K/UL — SIGNIFICANT CHANGE UP (ref 3.8–10.5)
WBC # FLD AUTO: 5.68 K/UL — SIGNIFICANT CHANGE UP (ref 3.8–10.5)

## 2022-09-27 LAB
ALBUMIN SERPL ELPH-MCNC: 4.4 G/DL
ALP BLD-CCNC: 61 U/L
ALT SERPL-CCNC: 31 U/L
ANION GAP SERPL CALC-SCNC: 13 MMOL/L
AST SERPL-CCNC: 32 U/L
BILIRUB SERPL-MCNC: 0.8 MG/DL
BUN SERPL-MCNC: 15 MG/DL
CALCIUM SERPL-MCNC: 9.5 MG/DL
CEA SERPL-MCNC: 153 NG/ML
CHLORIDE SERPL-SCNC: 101 MMOL/L
CO2 SERPL-SCNC: 23 MMOL/L
CREAT SERPL-MCNC: 1.1 MG/DL
CREAT SPEC-SCNC: 123 MG/DL
CREAT/PROT UR: 0.1 RATIO
EGFR: 81 ML/MIN/1.73M2
GLUCOSE SERPL-MCNC: 178 MG/DL
MAGNESIUM SERPL-MCNC: 2 MG/DL
PHOSPHATE SERPL-MCNC: 3.6 MG/DL
POTASSIUM SERPL-SCNC: 4.4 MMOL/L
PROT SERPL-MCNC: 7.1 G/DL
PROT UR-MCNC: 10 MG/DL
SODIUM SERPL-SCNC: 137 MMOL/L

## 2022-09-28 ENCOUNTER — APPOINTMENT (OUTPATIENT)
Dept: HEMATOLOGY ONCOLOGY | Facility: CLINIC | Age: 52
End: 2022-09-28

## 2022-09-28 ENCOUNTER — APPOINTMENT (OUTPATIENT)
Dept: INFUSION THERAPY | Facility: HOSPITAL | Age: 52
End: 2022-09-28

## 2022-09-28 ENCOUNTER — NON-APPOINTMENT (OUTPATIENT)
Age: 52
End: 2022-09-28

## 2022-09-28 VITALS
OXYGEN SATURATION: 99 % | HEIGHT: 65.98 IN | TEMPERATURE: 97.2 F | SYSTOLIC BLOOD PRESSURE: 144 MMHG | WEIGHT: 234.13 LBS | HEART RATE: 80 BPM | BODY MASS INDEX: 37.63 KG/M2 | DIASTOLIC BLOOD PRESSURE: 80 MMHG | RESPIRATION RATE: 16 BRPM

## 2022-09-28 PROCEDURE — 99214 OFFICE O/P EST MOD 30 MIN: CPT

## 2022-09-30 ENCOUNTER — APPOINTMENT (OUTPATIENT)
Dept: INFUSION THERAPY | Facility: HOSPITAL | Age: 52
End: 2022-09-30

## 2022-10-03 ENCOUNTER — APPOINTMENT (OUTPATIENT)
Dept: CT IMAGING | Facility: IMAGING CENTER | Age: 52
End: 2022-10-03

## 2022-10-03 ENCOUNTER — OUTPATIENT (OUTPATIENT)
Dept: OUTPATIENT SERVICES | Facility: HOSPITAL | Age: 52
LOS: 1 days | End: 2022-10-03
Payer: COMMERCIAL

## 2022-10-03 DIAGNOSIS — C18.9 MALIGNANT NEOPLASM OF COLON, UNSPECIFIED: ICD-10-CM

## 2022-10-03 DIAGNOSIS — Z00.8 ENCOUNTER FOR OTHER GENERAL EXAMINATION: ICD-10-CM

## 2022-10-03 PROCEDURE — 71260 CT THORAX DX C+: CPT | Mod: 26

## 2022-10-03 PROCEDURE — 74177 CT ABD & PELVIS W/CONTRAST: CPT

## 2022-10-03 PROCEDURE — 74177 CT ABD & PELVIS W/CONTRAST: CPT | Mod: 26

## 2022-10-03 PROCEDURE — 71260 CT THORAX DX C+: CPT

## 2022-10-03 NOTE — ASSESSMENT
[Palliative] : Goals of care discussed with patient: Palliative [FreeTextEntry1] : Patient seen with and plan discussed with Dr. Gardner.\par \par Aryles Hedjar, MD, PGY-5\par Hematology/Oncology Fellow\par [facility]

## 2022-10-03 NOTE — PHYSICAL EXAM
[Fully active, able to carry on all pre-disease performance without restriction] : Status 0 - Fully active, able to carry on all pre-disease performance without restriction [Obese] : obese [Normal] : affect appropriate [de-identified] : anicteric  [de-identified] : no JVD [de-identified] : normal respiratory effort, no audible wheeze [de-identified] : reg rate  [de-identified] : no LE edema B/L [de-identified] : soft, non-distended, non-tender

## 2022-10-03 NOTE — HISTORY OF PRESENT ILLNESS
[Disease: _____________________] : Disease: [unfilled] [T: ___] : T[unfilled] [N: ___] : N[unfilled] [M: ___] : M[unfilled] [AJCC Stage: ____] : AJCC Stage: [unfilled] [Research Protocol] : Research Protocol  [de-identified] : Mr Granda in 2021 at the age of 51 presented to the hospital with abdominal pain and underwent imaging that revealed a distal transverse colon lesion with evidence of microperforation.\par He is also underwent a colonoscopy which demonstrated an endoscopically obstructing distal transverse colon mass. Given the potential for impending obstruction as well as microperforation seen on imaging decision was made to take patient to the OR for a left hemicolectomy and anastomosis.\par Imaging done at that time also showed evidence of metastatic disease to the liver and potentially lung and a questionable lesion as well. Patient presents with his sister for initial visit and to discuss palliative chemotherapy.\par \par Enrolled in [employer] [facility] \par \par Started FOLFOX + Vit D July 7th, 2021 (bevacizumab added cycle #2)\par 8/23 scan shows PE started on Lovenox \par October 2021 oxaliplatin held. \par \par KRAS G12D , NABILA \par  [de-identified] : Microsatellite stable  [de-identified] : KRAS G12D , NABILA  [FreeTextEntry1] : FOLFOX + Bevacizumab +VitD ([employer] study) --> 5FU Yolis + Vit D [de-identified] : Patient presents for follow up and is scheduled for follow up today. He notes neuropathy in his feet is slightly increased now that the weather is starting to get colder. He denies N/V, diarrhea. Neuropathy in his hands is stable. Denies fever, HA, dizziness, CP, SOB, or urinary symptoms.

## 2022-10-03 NOTE — REVIEW OF SYSTEMS
[Negative] : Allergic/Immunologic [Confused] : no confusion [Dizziness] : no dizziness [Fainting] : no fainting [Difficulty Walking] : no difficulty walking [de-identified] : + neuropathy in feet

## 2022-10-06 ENCOUNTER — OUTPATIENT (OUTPATIENT)
Dept: OUTPATIENT SERVICES | Facility: HOSPITAL | Age: 52
LOS: 1 days | Discharge: HOME | End: 2022-10-06

## 2022-10-06 DIAGNOSIS — C18.9 MALIGNANT NEOPLASM OF COLON, UNSPECIFIED: ICD-10-CM

## 2022-10-10 ENCOUNTER — RESULT REVIEW (OUTPATIENT)
Age: 52
End: 2022-10-10

## 2022-10-10 ENCOUNTER — LABORATORY RESULT (OUTPATIENT)
Age: 52
End: 2022-10-10

## 2022-10-10 ENCOUNTER — APPOINTMENT (OUTPATIENT)
Dept: HEMATOLOGY ONCOLOGY | Facility: CLINIC | Age: 52
End: 2022-10-10

## 2022-10-10 LAB
ALBUMIN SERPL ELPH-MCNC: 4.3 G/DL
ALP BLD-CCNC: 72 U/L
ALT SERPL-CCNC: 22 U/L
ANION GAP SERPL CALC-SCNC: 13 MMOL/L
APPEARANCE: CLEAR
AST SERPL-CCNC: 18 U/L
BASOPHILS # BLD AUTO: 0.03 K/UL — SIGNIFICANT CHANGE UP (ref 0–0.2)
BASOPHILS NFR BLD AUTO: 0.4 % — SIGNIFICANT CHANGE UP (ref 0–2)
BILIRUB SERPL-MCNC: 1.3 MG/DL
BILIRUBIN URINE: NEGATIVE
BLOOD URINE: NEGATIVE
BUN SERPL-MCNC: 12 MG/DL
CALCIUM SERPL-MCNC: 9.5 MG/DL
CEA SERPL-MCNC: 146 NG/ML
CHLORIDE SERPL-SCNC: 98 MMOL/L
CO2 SERPL-SCNC: 25 MMOL/L
COLOR: YELLOW
CREAT SERPL-MCNC: 1.08 MG/DL
EGFR: 83 ML/MIN/1.73M2
EOSINOPHIL # BLD AUTO: 0.16 K/UL — SIGNIFICANT CHANGE UP (ref 0–0.5)
EOSINOPHIL NFR BLD AUTO: 2.4 % — SIGNIFICANT CHANGE UP (ref 0–6)
GLUCOSE QUALITATIVE U: NEGATIVE
GLUCOSE SERPL-MCNC: 159 MG/DL
HCT VFR BLD CALC: 42.1 % — SIGNIFICANT CHANGE UP (ref 39–50)
HGB BLD-MCNC: 14.3 G/DL — SIGNIFICANT CHANGE UP (ref 13–17)
IMM GRANULOCYTES NFR BLD AUTO: 0.3 % — SIGNIFICANT CHANGE UP (ref 0–0.9)
KETONES URINE: NEGATIVE
LEUKOCYTE ESTERASE URINE: ABNORMAL
LYMPHOCYTES # BLD AUTO: 2.67 K/UL — SIGNIFICANT CHANGE UP (ref 1–3.3)
LYMPHOCYTES # BLD AUTO: 39.9 % — SIGNIFICANT CHANGE UP (ref 13–44)
MAGNESIUM SERPL-MCNC: 1.9 MG/DL
MCHC RBC-ENTMCNC: 25.8 PG — LOW (ref 27–34)
MCHC RBC-ENTMCNC: 34 G/DL — SIGNIFICANT CHANGE UP (ref 32–36)
MCV RBC AUTO: 76 FL — LOW (ref 80–100)
MONOCYTES # BLD AUTO: 0.74 K/UL — SIGNIFICANT CHANGE UP (ref 0–0.9)
MONOCYTES NFR BLD AUTO: 11.1 % — SIGNIFICANT CHANGE UP (ref 2–14)
NEUTROPHILS # BLD AUTO: 3.07 K/UL — SIGNIFICANT CHANGE UP (ref 1.8–7.4)
NEUTROPHILS NFR BLD AUTO: 45.9 % — SIGNIFICANT CHANGE UP (ref 43–77)
NITRITE URINE: NEGATIVE
NRBC # BLD: 0 /100 WBCS — SIGNIFICANT CHANGE UP (ref 0–0)
PH URINE: 7.5
PHOSPHATE SERPL-MCNC: 3.9 MG/DL
PLATELET # BLD AUTO: 187 K/UL — SIGNIFICANT CHANGE UP (ref 150–400)
POTASSIUM SERPL-SCNC: 4.4 MMOL/L
PROT SERPL-MCNC: 7.4 G/DL
PROTEIN URINE: NORMAL
RBC # BLD: 5.54 M/UL — SIGNIFICANT CHANGE UP (ref 4.2–5.8)
RBC # FLD: 21.1 % — HIGH (ref 10.3–14.5)
SODIUM SERPL-SCNC: 136 MMOL/L
SPECIFIC GRAVITY URINE: 1.02
UROBILINOGEN URINE: NORMAL
WBC # BLD: 6.69 K/UL — SIGNIFICANT CHANGE UP (ref 3.8–10.5)
WBC # FLD AUTO: 6.69 K/UL — SIGNIFICANT CHANGE UP (ref 3.8–10.5)

## 2022-10-11 LAB
CREAT SPEC-SCNC: 147 MG/DL
CREAT/PROT UR: 0.1 RATIO
PROT UR-MCNC: 18 MG/DL

## 2022-10-12 ENCOUNTER — NON-APPOINTMENT (OUTPATIENT)
Age: 52
End: 2022-10-12

## 2022-10-12 ENCOUNTER — APPOINTMENT (OUTPATIENT)
Dept: HEMATOLOGY ONCOLOGY | Facility: CLINIC | Age: 52
End: 2022-10-12

## 2022-10-12 ENCOUNTER — APPOINTMENT (OUTPATIENT)
Dept: INFUSION THERAPY | Facility: HOSPITAL | Age: 52
End: 2022-10-12

## 2022-10-12 VITALS
RESPIRATION RATE: 16 BRPM | HEART RATE: 83 BPM | TEMPERATURE: 97.5 F | DIASTOLIC BLOOD PRESSURE: 97 MMHG | SYSTOLIC BLOOD PRESSURE: 158 MMHG | OXYGEN SATURATION: 99 %

## 2022-10-12 DIAGNOSIS — R11.2 NAUSEA WITH VOMITING, UNSPECIFIED: ICD-10-CM

## 2022-10-12 DIAGNOSIS — Z51.11 ENCOUNTER FOR ANTINEOPLASTIC CHEMOTHERAPY: ICD-10-CM

## 2022-10-12 PROCEDURE — 99214 OFFICE O/P EST MOD 30 MIN: CPT

## 2022-10-12 NOTE — PHYSICAL EXAM
fall precautions [Fully active, able to carry on all pre-disease performance without restriction] : Status 0 - Fully active, able to carry on all pre-disease performance without restriction [Obese] : obese [Normal] : affect appropriate [de-identified] : anicteric  [de-identified] : no JVD [de-identified] : normal respiratory effort, no audible wheeze [de-identified] : reg rate  [de-identified] : no LE edema B/L [de-identified] : soft, non-distended, non-tender [de-identified] : no spinal TTP

## 2022-10-12 NOTE — ASSESSMENT
[Palliative] : Goals of care discussed with patient: Palliative [FreeTextEntry1] : Patient seen with and plan discussed with Dr. Gardner.\par \par Aryles Hedjar, MD, PGY-5\par Hematology/Oncology Fellow\par [facility]

## 2022-10-12 NOTE — REVIEW OF SYSTEMS
[Negative] : Allergic/Immunologic [Vomiting] : vomiting [Abdominal Pain] : no abdominal pain [Diarrhea] : no diarrhea [Confused] : no confusion [Dizziness] : no dizziness [Fainting] : no fainting [Difficulty Walking] : no difficulty walking [de-identified] : + mild neuropathy in hands/feet

## 2022-10-12 NOTE — HISTORY OF PRESENT ILLNESS
[Disease: _____________________] : Disease: [unfilled] [T: ___] : T[unfilled] [N: ___] : N[unfilled] [M: ___] : M[unfilled] [AJCC Stage: ____] : AJCC Stage: [unfilled] [Research Protocol] : Research Protocol  [de-identified] : Mr Granda in 2021 at the age of 51 presented to the hospital with abdominal pain and underwent imaging that revealed a distal transverse colon lesion with evidence of microperforation.\par He is also underwent a colonoscopy which demonstrated an endoscopically obstructing distal transverse colon mass. Given the potential for impending obstruction as well as microperforation seen on imaging decision was made to take patient to the OR for a left hemicolectomy and anastomosis.\par Imaging done at that time also showed evidence of metastatic disease to the liver and potentially lung and a questionable lesion as well. Patient presents with his sister for initial visit and to discuss palliative chemotherapy.\par \par Enrolled in [employer] \par \par Started FOLFOX + Vit D July 7th, 2021 (bevacizumab added cycle #2)\par 8/23 scan shows PE started on Lovenox \par October 2021 oxaliplatin held. \par \par KRAS G12D , NABILA \par  [de-identified] : Microsatellite stable  [de-identified] : KRAS G12D , NABILA  [FreeTextEntry1] : FOLFOX + Bevacizumab +VitD ([employer] study) --> 5FU Yolis + Vit D [de-identified] : Patient presents for follow up and is scheduled for treatment today. He completed scans 2 days ago. He still has some neuropathy in his hands and feet but it is continuing to get better. He has no difficulty gripping items. He has some N/V the first couple days of treatment but improves after, and he denies CP, cough, SOB, diarrhea, or constipation. He has good appetite.\par He had a slight headache a few days ago because he did not drink coffee that day and it improved when he did.

## 2022-10-14 ENCOUNTER — APPOINTMENT (OUTPATIENT)
Dept: INFUSION THERAPY | Facility: HOSPITAL | Age: 52
End: 2022-10-14

## 2022-10-24 ENCOUNTER — APPOINTMENT (OUTPATIENT)
Dept: HEMATOLOGY ONCOLOGY | Facility: CLINIC | Age: 52
End: 2022-10-24

## 2022-10-25 ENCOUNTER — NON-APPOINTMENT (OUTPATIENT)
Age: 52
End: 2022-10-25

## 2022-10-25 ENCOUNTER — RESULT REVIEW (OUTPATIENT)
Age: 52
End: 2022-10-25

## 2022-10-25 ENCOUNTER — APPOINTMENT (OUTPATIENT)
Dept: HEMATOLOGY ONCOLOGY | Facility: CLINIC | Age: 52
End: 2022-10-25

## 2022-10-25 VITALS
RESPIRATION RATE: 16 BRPM | TEMPERATURE: 97.2 F | WEIGHT: 231.49 LBS | DIASTOLIC BLOOD PRESSURE: 91 MMHG | OXYGEN SATURATION: 99 % | HEIGHT: 65.98 IN | BODY MASS INDEX: 37.2 KG/M2 | HEART RATE: 82 BPM | SYSTOLIC BLOOD PRESSURE: 151 MMHG

## 2022-10-25 DIAGNOSIS — Z51.11 ENCOUNTER FOR ANTINEOPLASTIC CHEMOTHERAPY: ICD-10-CM

## 2022-10-25 LAB
ALBUMIN SERPL ELPH-MCNC: 4.1 G/DL
ALP BLD-CCNC: 77 U/L
ALT SERPL-CCNC: 33 U/L
ANION GAP SERPL CALC-SCNC: 12 MMOL/L
AST SERPL-CCNC: 40 U/L
BASOPHILS # BLD AUTO: 0.02 K/UL — SIGNIFICANT CHANGE UP (ref 0–0.2)
BASOPHILS NFR BLD AUTO: 0.4 % — SIGNIFICANT CHANGE UP (ref 0–2)
BILIRUB SERPL-MCNC: 0.7 MG/DL
BUN SERPL-MCNC: 13 MG/DL
CALCIUM SERPL-MCNC: 8.6 MG/DL
CEA SERPL-MCNC: 169 NG/ML
CHLORIDE SERPL-SCNC: 102 MMOL/L
CO2 SERPL-SCNC: 23 MMOL/L
CREAT SERPL-MCNC: 1.09 MG/DL
CREAT SPEC-SCNC: 180 MG/DL
CREAT/PROT UR: 0.2 RATIO
EGFR: 82 ML/MIN/1.73M2
EOSINOPHIL # BLD AUTO: 0.14 K/UL — SIGNIFICANT CHANGE UP (ref 0–0.5)
EOSINOPHIL NFR BLD AUTO: 3 % — SIGNIFICANT CHANGE UP (ref 0–6)
GLUCOSE SERPL-MCNC: 192 MG/DL
HCT VFR BLD CALC: 42.4 % — SIGNIFICANT CHANGE UP (ref 39–50)
HGB BLD-MCNC: 14.3 G/DL — SIGNIFICANT CHANGE UP (ref 13–17)
IMM GRANULOCYTES NFR BLD AUTO: 0 % — SIGNIFICANT CHANGE UP (ref 0–0.9)
LYMPHOCYTES # BLD AUTO: 1.55 K/UL — SIGNIFICANT CHANGE UP (ref 1–3.3)
LYMPHOCYTES # BLD AUTO: 33.3 % — SIGNIFICANT CHANGE UP (ref 13–44)
MAGNESIUM SERPL-MCNC: 2.5 MG/DL
MCHC RBC-ENTMCNC: 25.9 PG — LOW (ref 27–34)
MCHC RBC-ENTMCNC: 33.7 G/DL — SIGNIFICANT CHANGE UP (ref 32–36)
MCV RBC AUTO: 76.7 FL — LOW (ref 80–100)
MONOCYTES # BLD AUTO: 0.68 K/UL — SIGNIFICANT CHANGE UP (ref 0–0.9)
MONOCYTES NFR BLD AUTO: 14.6 % — HIGH (ref 2–14)
NEUTROPHILS # BLD AUTO: 2.26 K/UL — SIGNIFICANT CHANGE UP (ref 1.8–7.4)
NEUTROPHILS NFR BLD AUTO: 48.7 % — SIGNIFICANT CHANGE UP (ref 43–77)
NRBC # BLD: 0 /100 WBCS — SIGNIFICANT CHANGE UP (ref 0–0)
PHOSPHATE SERPL-MCNC: 3.4 MG/DL
PLATELET # BLD AUTO: 163 K/UL — SIGNIFICANT CHANGE UP (ref 150–400)
POTASSIUM SERPL-SCNC: 4.3 MMOL/L
PROT SERPL-MCNC: 7.2 G/DL
PROT UR-MCNC: 29 MG/DL
RBC # BLD: 5.53 M/UL — SIGNIFICANT CHANGE UP (ref 4.2–5.8)
RBC # FLD: 20.9 % — HIGH (ref 10.3–14.5)
SODIUM SERPL-SCNC: 137 MMOL/L
WBC # BLD: 4.65 K/UL — SIGNIFICANT CHANGE UP (ref 3.8–10.5)
WBC # FLD AUTO: 4.65 K/UL — SIGNIFICANT CHANGE UP (ref 3.8–10.5)

## 2022-10-25 PROCEDURE — 99214 OFFICE O/P EST MOD 30 MIN: CPT

## 2022-10-25 NOTE — HISTORY OF PRESENT ILLNESS
[de-identified] : Mr Granda in 2021 at the age of 51 presented to the hospital with abdominal pain and underwent imaging that revealed a distal transverse colon lesion with evidence of microperforation.\par He is also underwent a colonoscopy which demonstrated an endoscopically obstructing distal transverse colon mass. Given the potential for impending obstruction as well as microperforation seen on imaging decision was made to take patient to the OR for a left hemicolectomy and anastomosis.\par Imaging done at that time also showed evidence of metastatic disease to the liver and potentially lung and a questionable lesion as well. Patient presents with his sister for initial visit and to discuss palliative chemotherapy.\par \par Enrolled in [employer] [facility] \par \par Started FOLFOX + Vit D July 7th, 2021 (bevacizumab added cycle #2)\par 8/23 scan shows PE started on Lovenox \par October 2021 oxaliplatin held. \par \par KRAS G12D , NABILA \par  [de-identified] : Microsatellite stable  [de-identified] : KRAS G12D , NABILA  [FreeTextEntry1] : FOLFOX + Bevacizumab +VitD ([employer] study) --> 5FU Yolis + Vit D [de-identified] : Patient presents for follow up today and reports feeling well.  He continues to tolerate treatment well with mild fatigue and nausea that is managed with antiemetics.  He reports that the neuropathy in his hands and feet that is usually worse in the cold weather are significantly improved compared this time last year.  He denies HA, dizziness, CP, SOB, vomiting, abdominal pain, change in bowel habits.  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

## 2022-10-25 NOTE — PHYSICAL EXAM
[de-identified] : anicteric  [de-identified] : no JVD [de-identified] : normal respiratory effort, no audible wheeze [de-identified] : reg rate  [de-identified] : no LE edema B/L [de-identified] : soft, non-distended, non-tender

## 2022-10-25 NOTE — REVIEW OF SYSTEMS
[Fatigue] : fatigue [Abdominal Pain] : no abdominal pain [Vomiting] : no vomiting [Diarrhea] : no diarrhea [Confused] : no confusion [Dizziness] : no dizziness [Fainting] : no fainting [Difficulty Walking] : no difficulty walking [FreeTextEntry7] : nausea [de-identified] : + mild neuropathy in hands/feet

## 2022-10-26 ENCOUNTER — APPOINTMENT (OUTPATIENT)
Dept: INFUSION THERAPY | Facility: HOSPITAL | Age: 52
End: 2022-10-26

## 2022-10-26 ENCOUNTER — NON-APPOINTMENT (OUTPATIENT)
Age: 52
End: 2022-10-26

## 2022-10-28 ENCOUNTER — APPOINTMENT (OUTPATIENT)
Dept: INFUSION THERAPY | Facility: HOSPITAL | Age: 52
End: 2022-10-28

## 2022-11-06 ENCOUNTER — RX RENEWAL (OUTPATIENT)
Age: 52
End: 2022-11-06

## 2022-11-08 ENCOUNTER — RESULT REVIEW (OUTPATIENT)
Age: 52
End: 2022-11-08

## 2022-11-08 ENCOUNTER — APPOINTMENT (OUTPATIENT)
Dept: HEMATOLOGY ONCOLOGY | Facility: CLINIC | Age: 52
End: 2022-11-08

## 2022-11-08 LAB
ALBUMIN SERPL ELPH-MCNC: 4.2 G/DL
ALP BLD-CCNC: 71 U/L
ALT SERPL-CCNC: 21 U/L
ANION GAP SERPL CALC-SCNC: 13 MMOL/L
AST SERPL-CCNC: 20 U/L
BASOPHILS # BLD AUTO: 0.04 K/UL — SIGNIFICANT CHANGE UP (ref 0–0.2)
BASOPHILS NFR BLD AUTO: 0.6 % — SIGNIFICANT CHANGE UP (ref 0–2)
BILIRUB SERPL-MCNC: 0.5 MG/DL
BUN SERPL-MCNC: 14 MG/DL
CALCIUM SERPL-MCNC: 9.2 MG/DL
CEA SERPL-MCNC: 377 NG/ML
CHLORIDE SERPL-SCNC: 103 MMOL/L
CO2 SERPL-SCNC: 24 MMOL/L
CREAT SERPL-MCNC: 1.11 MG/DL
CREAT SPEC-SCNC: 160 MG/DL
CREAT/PROT UR: 0.1 RATIO
EGFR: 80 ML/MIN/1.73M2
EOSINOPHIL # BLD AUTO: 0.22 K/UL — SIGNIFICANT CHANGE UP (ref 0–0.5)
EOSINOPHIL NFR BLD AUTO: 3.1 % — SIGNIFICANT CHANGE UP (ref 0–6)
GLUCOSE SERPL-MCNC: 163 MG/DL
HCT VFR BLD CALC: 42.1 % — SIGNIFICANT CHANGE UP (ref 39–50)
HGB BLD-MCNC: 14.2 G/DL — SIGNIFICANT CHANGE UP (ref 13–17)
IMM GRANULOCYTES NFR BLD AUTO: 0.1 % — SIGNIFICANT CHANGE UP (ref 0–0.9)
LYMPHOCYTES # BLD AUTO: 2.54 K/UL — SIGNIFICANT CHANGE UP (ref 1–3.3)
LYMPHOCYTES # BLD AUTO: 35.5 % — SIGNIFICANT CHANGE UP (ref 13–44)
MAGNESIUM SERPL-MCNC: 2.1 MG/DL
MCHC RBC-ENTMCNC: 26 PG — LOW (ref 27–34)
MCHC RBC-ENTMCNC: 33.7 G/DL — SIGNIFICANT CHANGE UP (ref 32–36)
MCV RBC AUTO: 77.1 FL — LOW (ref 80–100)
MONOCYTES # BLD AUTO: 0.79 K/UL — SIGNIFICANT CHANGE UP (ref 0–0.9)
MONOCYTES NFR BLD AUTO: 11 % — SIGNIFICANT CHANGE UP (ref 2–14)
NEUTROPHILS # BLD AUTO: 3.55 K/UL — SIGNIFICANT CHANGE UP (ref 1.8–7.4)
NEUTROPHILS NFR BLD AUTO: 49.7 % — SIGNIFICANT CHANGE UP (ref 43–77)
NRBC # BLD: 0 /100 WBCS — SIGNIFICANT CHANGE UP (ref 0–0)
PHOSPHATE SERPL-MCNC: 3.3 MG/DL
PLATELET # BLD AUTO: 219 K/UL — SIGNIFICANT CHANGE UP (ref 150–400)
POTASSIUM SERPL-SCNC: 4.5 MMOL/L
PROT SERPL-MCNC: 7.2 G/DL
PROT UR-MCNC: 14 MG/DL
RBC # BLD: 5.46 M/UL — SIGNIFICANT CHANGE UP (ref 4.2–5.8)
RBC # FLD: 20.9 % — HIGH (ref 10.3–14.5)
SODIUM SERPL-SCNC: 139 MMOL/L
WBC # BLD: 7.15 K/UL — SIGNIFICANT CHANGE UP (ref 3.8–10.5)
WBC # FLD AUTO: 7.15 K/UL — SIGNIFICANT CHANGE UP (ref 3.8–10.5)

## 2022-11-09 ENCOUNTER — APPOINTMENT (OUTPATIENT)
Dept: HEMATOLOGY ONCOLOGY | Facility: CLINIC | Age: 52
End: 2022-11-09

## 2022-11-09 ENCOUNTER — APPOINTMENT (OUTPATIENT)
Dept: INFUSION THERAPY | Facility: HOSPITAL | Age: 52
End: 2022-11-09

## 2022-11-09 ENCOUNTER — NON-APPOINTMENT (OUTPATIENT)
Age: 52
End: 2022-11-09

## 2022-11-09 VITALS
RESPIRATION RATE: 16 BRPM | HEIGHT: 65.75 IN | BODY MASS INDEX: 37.65 KG/M2 | OXYGEN SATURATION: 100 % | TEMPERATURE: 97.3 F | HEART RATE: 75 BPM | DIASTOLIC BLOOD PRESSURE: 81 MMHG | WEIGHT: 231.49 LBS | SYSTOLIC BLOOD PRESSURE: 132 MMHG

## 2022-11-09 PROCEDURE — 99214 OFFICE O/P EST MOD 30 MIN: CPT

## 2022-11-09 NOTE — HISTORY OF PRESENT ILLNESS
[Disease: _____________________] : Disease: [unfilled] [T: ___] : T[unfilled] [N: ___] : N[unfilled] [M: ___] : M[unfilled] [AJCC Stage: ____] : AJCC Stage: [unfilled] [Research Protocol] : Research Protocol  [de-identified] : Mr Granda in 2021 at the age of 51 presented to the hospital with abdominal pain and underwent imaging that revealed a distal transverse colon lesion with evidence of microperforation.\par He is also underwent a colonoscopy which demonstrated an endoscopically obstructing distal transverse colon mass. Given the potential for impending obstruction as well as microperforation seen on imaging decision was made to take patient to the OR for a left hemicolectomy and anastomosis.\par Imaging done at that time also showed evidence of metastatic disease to the liver and potentially lung and a questionable lesion as well. Patient presents with his sister for initial visit and to discuss palliative chemotherapy.\par \par Enrolled in [employer] [facility] \par \par Started FOLFOX + Vit D July 7th, 2021 (bevacizumab added cycle #2)\par 8/23 scan shows PE started on Lovenox \par October 2021 oxaliplatin held. \par \par KRAS G12D , NABILA \par  [de-identified] : Microsatellite stable  [de-identified] : KRAS G12D , NABILA  [FreeTextEntry1] : FOLFOX + Bevacizumab +VitD ([employer] study) --> 5FU Yolis + Vit D [de-identified] : no acute complaints\par stable/improved neuropathy \par no abdominal pain

## 2022-11-09 NOTE — REVIEW OF SYSTEMS
[Fatigue] : fatigue [Negative] : Allergic/Immunologic [Abdominal Pain] : no abdominal pain [Vomiting] : no vomiting [Diarrhea] : no diarrhea [Confused] : no confusion [Dizziness] : no dizziness [Fainting] : no fainting [Difficulty Walking] : no difficulty walking [FreeTextEntry7] : nausea [de-identified] : + mild neuropathy in hands/feet

## 2022-11-09 NOTE — PHYSICAL EXAM
[Fully active, able to carry on all pre-disease performance without restriction] : Status 0 - Fully active, able to carry on all pre-disease performance without restriction [Obese] : obese [Normal] : affect appropriate [de-identified] : anicteric  [de-identified] : no JVD [de-identified] : normal respiratory effort, no audible wheeze [de-identified] : reg rate  [de-identified] : no LE edema B/L [de-identified] : soft, non-distended, non-tender

## 2022-11-11 ENCOUNTER — APPOINTMENT (OUTPATIENT)
Dept: INFUSION THERAPY | Facility: HOSPITAL | Age: 52
End: 2022-11-11

## 2022-11-21 ENCOUNTER — RESULT REVIEW (OUTPATIENT)
Age: 52
End: 2022-11-21

## 2022-11-21 ENCOUNTER — APPOINTMENT (OUTPATIENT)
Dept: HEMATOLOGY ONCOLOGY | Facility: CLINIC | Age: 52
End: 2022-11-21

## 2022-11-21 VITALS
WEIGHT: 231.46 LBS | DIASTOLIC BLOOD PRESSURE: 93 MMHG | OXYGEN SATURATION: 100 % | HEIGHT: 65.75 IN | BODY MASS INDEX: 37.65 KG/M2 | SYSTOLIC BLOOD PRESSURE: 135 MMHG | RESPIRATION RATE: 16 BRPM | TEMPERATURE: 98.3 F | HEART RATE: 85 BPM

## 2022-11-21 LAB
ALBUMIN SERPL ELPH-MCNC: 4.1 G/DL
ALP BLD-CCNC: 74 U/L
ALT SERPL-CCNC: 22 U/L
ANION GAP SERPL CALC-SCNC: 12 MMOL/L
AST SERPL-CCNC: 17 U/L
BASOPHILS # BLD AUTO: 0.03 K/UL — SIGNIFICANT CHANGE UP (ref 0–0.2)
BASOPHILS NFR BLD AUTO: 0.5 % — SIGNIFICANT CHANGE UP (ref 0–2)
BILIRUB SERPL-MCNC: 0.5 MG/DL
BUN SERPL-MCNC: 15 MG/DL
CALCIUM SERPL-MCNC: 9.2 MG/DL
CEA SERPL-MCNC: 279 NG/ML
CHLORIDE SERPL-SCNC: 101 MMOL/L
CO2 SERPL-SCNC: 24 MMOL/L
CREAT SERPL-MCNC: 1.14 MG/DL
EGFR: 77 ML/MIN/1.73M2
EOSINOPHIL # BLD AUTO: 0.17 K/UL — SIGNIFICANT CHANGE UP (ref 0–0.5)
EOSINOPHIL NFR BLD AUTO: 2.9 % — SIGNIFICANT CHANGE UP (ref 0–6)
GLUCOSE SERPL-MCNC: 221 MG/DL
HCT VFR BLD CALC: 43.4 % — SIGNIFICANT CHANGE UP (ref 39–50)
HGB BLD-MCNC: 14.5 G/DL — SIGNIFICANT CHANGE UP (ref 13–17)
IMM GRANULOCYTES NFR BLD AUTO: 0.3 % — SIGNIFICANT CHANGE UP (ref 0–0.9)
LYMPHOCYTES # BLD AUTO: 2.1 K/UL — SIGNIFICANT CHANGE UP (ref 1–3.3)
LYMPHOCYTES # BLD AUTO: 35.7 % — SIGNIFICANT CHANGE UP (ref 13–44)
MAGNESIUM SERPL-MCNC: 2.1 MG/DL
MCHC RBC-ENTMCNC: 25.3 PG — LOW (ref 27–34)
MCHC RBC-ENTMCNC: 33.4 G/DL — SIGNIFICANT CHANGE UP (ref 32–36)
MCV RBC AUTO: 75.9 FL — LOW (ref 80–100)
MONOCYTES # BLD AUTO: 0.77 K/UL — SIGNIFICANT CHANGE UP (ref 0–0.9)
MONOCYTES NFR BLD AUTO: 13.1 % — SIGNIFICANT CHANGE UP (ref 2–14)
NEUTROPHILS # BLD AUTO: 2.79 K/UL — SIGNIFICANT CHANGE UP (ref 1.8–7.4)
NEUTROPHILS NFR BLD AUTO: 47.5 % — SIGNIFICANT CHANGE UP (ref 43–77)
NRBC # BLD: 0 /100 WBCS — SIGNIFICANT CHANGE UP (ref 0–0)
PHOSPHATE SERPL-MCNC: 3.7 MG/DL
PLATELET # BLD AUTO: 184 K/UL — SIGNIFICANT CHANGE UP (ref 150–400)
POTASSIUM SERPL-SCNC: 4.4 MMOL/L
PROT SERPL-MCNC: 7.1 G/DL
RBC # BLD: 5.72 M/UL — SIGNIFICANT CHANGE UP (ref 4.2–5.8)
RBC # FLD: 21.3 % — HIGH (ref 10.3–14.5)
SODIUM SERPL-SCNC: 138 MMOL/L
WBC # BLD: 5.88 K/UL — SIGNIFICANT CHANGE UP (ref 3.8–10.5)
WBC # FLD AUTO: 5.88 K/UL — SIGNIFICANT CHANGE UP (ref 3.8–10.5)

## 2022-11-21 PROCEDURE — 99214 OFFICE O/P EST MOD 30 MIN: CPT

## 2022-11-21 NOTE — REVIEW OF SYSTEMS
[Fatigue] : fatigue [Negative] : Allergic/Immunologic [Abdominal Pain] : no abdominal pain [Vomiting] : no vomiting [Diarrhea] : no diarrhea [Confused] : no confusion [Dizziness] : no dizziness [Fainting] : no fainting [Difficulty Walking] : no difficulty walking [FreeTextEntry7] : nausea [de-identified] : + mild neuropathy in hands/feet

## 2022-11-21 NOTE — PHYSICAL EXAM
[Fully active, able to carry on all pre-disease performance without restriction] : Status 0 - Fully active, able to carry on all pre-disease performance without restriction [Obese] : obese [Normal] : affect appropriate [de-identified] : anicteric  [de-identified] : no JVD [de-identified] : normal respiratory effort, no audible wheeze [de-identified] : reg rate  [de-identified] : no LE edema B/L [de-identified] : soft, non-distended, non-tender

## 2022-11-21 NOTE — HISTORY OF PRESENT ILLNESS
[Disease: _____________________] : Disease: [unfilled] [T: ___] : T[unfilled] [N: ___] : N[unfilled] [M: ___] : M[unfilled] [AJCC Stage: ____] : AJCC Stage: [unfilled] [Research Protocol] : Research Protocol  [de-identified] : Mr Granda in 2021 at the age of 51 presented to the hospital with abdominal pain and underwent imaging that revealed a distal transverse colon lesion with evidence of microperforation.\par He is also underwent a colonoscopy which demonstrated an endoscopically obstructing distal transverse colon mass. Given the potential for impending obstruction as well as microperforation seen on imaging decision was made to take patient to the OR for a left hemicolectomy and anastomosis.\par Imaging done at that time also showed evidence of metastatic disease to the liver and potentially lung and a questionable lesion as well. Patient presents with his sister for initial visit and to discuss palliative chemotherapy.\par \par Enrolled in [employer] [facility] \par \par Started FOLFOX + Vit D July 7th, 2021 (bevacizumab added cycle #2)\par 8/23 scan shows PE started on Lovenox \par October 2021 oxaliplatin held. \par \par KRAS G12D , NABILA \par  [de-identified] : Microsatellite stable  [de-identified] : KRAS G12D , NABILA  [FreeTextEntry1] : FOLFOX + Bevacizumab +VitD ([employer] study) --> 5FU Yolis + Vit D [de-identified] : no acute complaints\par stable/improved neuropathy \par no abdominal pain

## 2022-11-22 LAB
CREAT SPEC-SCNC: 149 MG/DL
CREAT/PROT UR: 0.1 RATIO
PROT UR-MCNC: 15 MG/DL

## 2022-11-23 ENCOUNTER — NON-APPOINTMENT (OUTPATIENT)
Age: 52
End: 2022-11-23

## 2022-11-23 ENCOUNTER — APPOINTMENT (OUTPATIENT)
Dept: INFUSION THERAPY | Facility: HOSPITAL | Age: 52
End: 2022-11-23

## 2022-11-23 ENCOUNTER — RESULT REVIEW (OUTPATIENT)
Age: 52
End: 2022-11-23

## 2022-11-25 ENCOUNTER — APPOINTMENT (OUTPATIENT)
Dept: INFUSION THERAPY | Facility: HOSPITAL | Age: 52
End: 2022-11-25

## 2022-11-28 ENCOUNTER — APPOINTMENT (OUTPATIENT)
Dept: CT IMAGING | Facility: IMAGING CENTER | Age: 52
End: 2022-11-28

## 2022-11-28 ENCOUNTER — OUTPATIENT (OUTPATIENT)
Dept: OUTPATIENT SERVICES | Facility: HOSPITAL | Age: 52
LOS: 1 days | End: 2022-11-28
Payer: COMMERCIAL

## 2022-11-28 DIAGNOSIS — C18.4 MALIGNANT NEOPLASM OF TRANSVERSE COLON: ICD-10-CM

## 2022-11-28 DIAGNOSIS — Z00.8 ENCOUNTER FOR OTHER GENERAL EXAMINATION: ICD-10-CM

## 2022-11-28 PROCEDURE — 74177 CT ABD & PELVIS W/CONTRAST: CPT | Mod: 26

## 2022-11-28 PROCEDURE — 74177 CT ABD & PELVIS W/CONTRAST: CPT

## 2022-11-28 PROCEDURE — 71260 CT THORAX DX C+: CPT

## 2022-11-28 PROCEDURE — 71260 CT THORAX DX C+: CPT | Mod: 26

## 2022-12-04 ENCOUNTER — OUTPATIENT (OUTPATIENT)
Dept: OUTPATIENT SERVICES | Facility: HOSPITAL | Age: 52
LOS: 1 days | Discharge: HOME | End: 2022-12-04

## 2022-12-04 DIAGNOSIS — C18.9 MALIGNANT NEOPLASM OF COLON, UNSPECIFIED: ICD-10-CM

## 2022-12-05 ENCOUNTER — RX RENEWAL (OUTPATIENT)
Age: 52
End: 2022-12-05

## 2022-12-06 ENCOUNTER — RESULT REVIEW (OUTPATIENT)
Age: 52
End: 2022-12-06

## 2022-12-06 ENCOUNTER — APPOINTMENT (OUTPATIENT)
Dept: HEMATOLOGY ONCOLOGY | Facility: CLINIC | Age: 52
End: 2022-12-06

## 2022-12-06 LAB
ALBUMIN SERPL ELPH-MCNC: 4.2 G/DL
ALP BLD-CCNC: 72 U/L
ALT SERPL-CCNC: 22 U/L
ANION GAP SERPL CALC-SCNC: 13 MMOL/L
AST SERPL-CCNC: 22 U/L
BASOPHILS # BLD AUTO: 0.03 K/UL — SIGNIFICANT CHANGE UP (ref 0–0.2)
BASOPHILS NFR BLD AUTO: 0.5 % — SIGNIFICANT CHANGE UP (ref 0–2)
BILIRUB SERPL-MCNC: 0.6 MG/DL
BUN SERPL-MCNC: 13 MG/DL
CALCIUM SERPL-MCNC: 9.5 MG/DL
CEA SERPL-MCNC: 214 NG/ML
CHLORIDE SERPL-SCNC: 100 MMOL/L
CO2 SERPL-SCNC: 23 MMOL/L
CREAT SERPL-MCNC: 1.04 MG/DL
EGFR: 86 ML/MIN/1.73M2
EOSINOPHIL # BLD AUTO: 0.26 K/UL — SIGNIFICANT CHANGE UP (ref 0–0.5)
EOSINOPHIL NFR BLD AUTO: 4 % — SIGNIFICANT CHANGE UP (ref 0–6)
GLUCOSE SERPL-MCNC: 240 MG/DL
HCT VFR BLD CALC: 40.8 % — SIGNIFICANT CHANGE UP (ref 39–50)
HGB BLD-MCNC: 14 G/DL — SIGNIFICANT CHANGE UP (ref 13–17)
IMM GRANULOCYTES NFR BLD AUTO: 0.2 % — SIGNIFICANT CHANGE UP (ref 0–0.9)
LYMPHOCYTES # BLD AUTO: 2.41 K/UL — SIGNIFICANT CHANGE UP (ref 1–3.3)
LYMPHOCYTES # BLD AUTO: 37.4 % — SIGNIFICANT CHANGE UP (ref 13–44)
MAGNESIUM SERPL-MCNC: 2 MG/DL
MCHC RBC-ENTMCNC: 25.8 PG — LOW (ref 27–34)
MCHC RBC-ENTMCNC: 34.3 G/DL — SIGNIFICANT CHANGE UP (ref 32–36)
MCV RBC AUTO: 75.3 FL — LOW (ref 80–100)
MONOCYTES # BLD AUTO: 0.68 K/UL — SIGNIFICANT CHANGE UP (ref 0–0.9)
MONOCYTES NFR BLD AUTO: 10.5 % — SIGNIFICANT CHANGE UP (ref 2–14)
NEUTROPHILS # BLD AUTO: 3.06 K/UL — SIGNIFICANT CHANGE UP (ref 1.8–7.4)
NEUTROPHILS NFR BLD AUTO: 47.4 % — SIGNIFICANT CHANGE UP (ref 43–77)
NRBC # BLD: 0 /100 WBCS — SIGNIFICANT CHANGE UP (ref 0–0)
PHOSPHATE SERPL-MCNC: 4.1 MG/DL
PLATELET # BLD AUTO: 193 K/UL — SIGNIFICANT CHANGE UP (ref 150–400)
POTASSIUM SERPL-SCNC: 4.2 MMOL/L
PROT SERPL-MCNC: 7.2 G/DL
RBC # BLD: 5.42 M/UL — SIGNIFICANT CHANGE UP (ref 4.2–5.8)
RBC # FLD: 21.2 % — HIGH (ref 10.3–14.5)
SODIUM SERPL-SCNC: 135 MMOL/L
WBC # BLD: 6.45 K/UL — SIGNIFICANT CHANGE UP (ref 3.8–10.5)
WBC # FLD AUTO: 6.45 K/UL — SIGNIFICANT CHANGE UP (ref 3.8–10.5)

## 2022-12-07 ENCOUNTER — APPOINTMENT (OUTPATIENT)
Dept: HEMATOLOGY ONCOLOGY | Facility: CLINIC | Age: 52
End: 2022-12-07

## 2022-12-07 ENCOUNTER — APPOINTMENT (OUTPATIENT)
Dept: INFUSION THERAPY | Facility: HOSPITAL | Age: 52
End: 2022-12-07

## 2022-12-07 VITALS
RESPIRATION RATE: 16 BRPM | HEART RATE: 94 BPM | OXYGEN SATURATION: 99 % | TEMPERATURE: 98.1 F | BODY MASS INDEX: 37.29 KG/M2 | SYSTOLIC BLOOD PRESSURE: 127 MMHG | WEIGHT: 229.28 LBS | DIASTOLIC BLOOD PRESSURE: 86 MMHG

## 2022-12-07 DIAGNOSIS — R11.2 NAUSEA WITH VOMITING, UNSPECIFIED: ICD-10-CM

## 2022-12-07 DIAGNOSIS — Z51.11 ENCOUNTER FOR ANTINEOPLASTIC CHEMOTHERAPY: ICD-10-CM

## 2022-12-07 LAB
CREAT SPEC-SCNC: 124 MG/DL
CREAT/PROT UR: 0.1 RATIO
PROT UR-MCNC: 14 MG/DL

## 2022-12-07 PROCEDURE — 99215 OFFICE O/P EST HI 40 MIN: CPT

## 2022-12-07 NOTE — REVIEW OF SYSTEMS
[Fatigue] : fatigue [Negative] : Neurological [Abdominal Pain] : no abdominal pain [Vomiting] : no vomiting [Diarrhea] : no diarrhea [Confused] : no confusion [Dizziness] : no dizziness [Fainting] : no fainting [Difficulty Walking] : no difficulty walking [de-identified] : + mild neuropathy in hands/feet

## 2022-12-07 NOTE — PHYSICAL EXAM
[Fully active, able to carry on all pre-disease performance without restriction] : Status 0 - Fully active, able to carry on all pre-disease performance without restriction [Obese] : obese [Normal] : affect appropriate [de-identified] : anicteric  [de-identified] : no JVD [de-identified] : normal respiratory effort, no audible wheeze [de-identified] : reg rate  [de-identified] : no LE edema B/L [de-identified] : soft, non-distended, non-tender

## 2022-12-07 NOTE — HISTORY OF PRESENT ILLNESS
[Disease: _____________________] : Disease: [unfilled] [T: ___] : T[unfilled] [N: ___] : N[unfilled] [M: ___] : M[unfilled] [AJCC Stage: ____] : AJCC Stage: [unfilled] [Research Protocol] : Research Protocol  [de-identified] : Mr Granda in 2021 at the age of 51 presented to the hospital with abdominal pain and underwent imaging that revealed a distal transverse colon lesion with evidence of microperforation.\par He is also underwent a colonoscopy which demonstrated an endoscopically obstructing distal transverse colon mass. Given the potential for impending obstruction as well as microperforation seen on imaging decision was made to take patient to the OR for a left hemicolectomy and anastomosis.\par Imaging done at that time also showed evidence of metastatic disease to the liver and potentially lung and a questionable lesion as well. Patient presents with his sister for initial visit and to discuss palliative chemotherapy.\par \par Enrolled in [employer] [facility] \par \par Started FOLFOX + Vit D July 7th, 2021 (bevacizumab added cycle #2)\par 8/23 scan shows PE started on Lovenox \par October 2021 oxaliplatin held. \par \par KRAS G12D , NABILA \par  [de-identified] : Microsatellite stable  [de-identified] : KRAS G12D , NABILA  [FreeTextEntry1] : FOLFOX + Bevacizumab +VitD ([employer] study) --> 5FU Yolis + Vit D [de-identified] : presents for follow up of recent imaging \par no new complaints\par denies fevers or chills \par

## 2022-12-07 NOTE — PATIENT PROFILE ADULT - NSPRESCRALCFREQ_GEN_A_NUR
Pt overdue for visit, please contact pt to schedule f/u with me soon with labs and US 1 week before    I refilled his Hep B med for 1 month until I can see him    Thanks !  
Spoke with patients daughter. Arranged labs, u.s and follow up visit. Mailed appointment reminder to patient.  
Never

## 2022-12-09 ENCOUNTER — APPOINTMENT (OUTPATIENT)
Dept: INFUSION THERAPY | Facility: HOSPITAL | Age: 52
End: 2022-12-09

## 2022-12-19 ENCOUNTER — RX RENEWAL (OUTPATIENT)
Age: 52
End: 2022-12-19

## 2022-12-20 ENCOUNTER — NON-APPOINTMENT (OUTPATIENT)
Age: 52
End: 2022-12-20

## 2022-12-20 ENCOUNTER — RESULT REVIEW (OUTPATIENT)
Age: 52
End: 2022-12-20

## 2022-12-20 ENCOUNTER — APPOINTMENT (OUTPATIENT)
Dept: INFUSION THERAPY | Facility: HOSPITAL | Age: 52
End: 2022-12-20

## 2022-12-20 LAB
ALBUMIN SERPL ELPH-MCNC: 4.2 G/DL — SIGNIFICANT CHANGE UP (ref 3.3–5)
ALP SERPL-CCNC: 72 U/L — SIGNIFICANT CHANGE UP (ref 40–120)
ALT FLD-CCNC: 12 U/L — SIGNIFICANT CHANGE UP (ref 10–45)
ANION GAP SERPL CALC-SCNC: 12 MMOL/L — SIGNIFICANT CHANGE UP (ref 5–17)
AST SERPL-CCNC: 16 U/L — SIGNIFICANT CHANGE UP (ref 10–40)
BASOPHILS # BLD AUTO: 0.04 K/UL — SIGNIFICANT CHANGE UP (ref 0–0.2)
BASOPHILS NFR BLD AUTO: 0.6 % — SIGNIFICANT CHANGE UP (ref 0–2)
BILIRUB SERPL-MCNC: 0.7 MG/DL — SIGNIFICANT CHANGE UP (ref 0.2–1.2)
BUN SERPL-MCNC: 15 MG/DL — SIGNIFICANT CHANGE UP (ref 7–23)
CALCIUM SERPL-MCNC: 9.3 MG/DL — SIGNIFICANT CHANGE UP (ref 8.4–10.5)
CHLORIDE SERPL-SCNC: 99 MMOL/L — SIGNIFICANT CHANGE UP (ref 96–108)
CO2 SERPL-SCNC: 23 MMOL/L — SIGNIFICANT CHANGE UP (ref 22–31)
CREAT SERPL-MCNC: 1.02 MG/DL — SIGNIFICANT CHANGE UP (ref 0.5–1.3)
EGFR: 88 ML/MIN/1.73M2 — SIGNIFICANT CHANGE UP
EOSINOPHIL # BLD AUTO: 0.16 K/UL — SIGNIFICANT CHANGE UP (ref 0–0.5)
EOSINOPHIL NFR BLD AUTO: 2.6 % — SIGNIFICANT CHANGE UP (ref 0–6)
GLUCOSE SERPL-MCNC: 256 MG/DL — HIGH (ref 70–99)
HCT VFR BLD CALC: 43.4 % — SIGNIFICANT CHANGE UP (ref 39–50)
HGB BLD-MCNC: 14.9 G/DL — SIGNIFICANT CHANGE UP (ref 13–17)
IMM GRANULOCYTES NFR BLD AUTO: 0.3 % — SIGNIFICANT CHANGE UP (ref 0–0.9)
LYMPHOCYTES # BLD AUTO: 2.01 K/UL — SIGNIFICANT CHANGE UP (ref 1–3.3)
LYMPHOCYTES # BLD AUTO: 32.1 % — SIGNIFICANT CHANGE UP (ref 13–44)
MCHC RBC-ENTMCNC: 25.5 PG — LOW (ref 27–34)
MCHC RBC-ENTMCNC: 34.3 G/DL — SIGNIFICANT CHANGE UP (ref 32–36)
MCV RBC AUTO: 74.3 FL — LOW (ref 80–100)
MONOCYTES # BLD AUTO: 0.68 K/UL — SIGNIFICANT CHANGE UP (ref 0–0.9)
MONOCYTES NFR BLD AUTO: 10.8 % — SIGNIFICANT CHANGE UP (ref 2–14)
NEUTROPHILS # BLD AUTO: 3.36 K/UL — SIGNIFICANT CHANGE UP (ref 1.8–7.4)
NEUTROPHILS NFR BLD AUTO: 53.6 % — SIGNIFICANT CHANGE UP (ref 43–77)
NRBC # BLD: 0 /100 WBCS — SIGNIFICANT CHANGE UP (ref 0–0)
PLATELET # BLD AUTO: 190 K/UL — SIGNIFICANT CHANGE UP (ref 150–400)
POTASSIUM SERPL-MCNC: 4.5 MMOL/L — SIGNIFICANT CHANGE UP (ref 3.5–5.3)
POTASSIUM SERPL-SCNC: 4.5 MMOL/L — SIGNIFICANT CHANGE UP (ref 3.5–5.3)
PROT SERPL-MCNC: 7.3 G/DL — SIGNIFICANT CHANGE UP (ref 6–8.3)
RBC # BLD: 5.84 M/UL — HIGH (ref 4.2–5.8)
RBC # FLD: 21.2 % — HIGH (ref 10.3–14.5)
SODIUM SERPL-SCNC: 135 MMOL/L — SIGNIFICANT CHANGE UP (ref 135–145)
WBC # BLD: 6.27 K/UL — SIGNIFICANT CHANGE UP (ref 3.8–10.5)
WBC # FLD AUTO: 6.27 K/UL — SIGNIFICANT CHANGE UP (ref 3.8–10.5)

## 2022-12-22 ENCOUNTER — APPOINTMENT (OUTPATIENT)
Dept: INFUSION THERAPY | Facility: HOSPITAL | Age: 52
End: 2022-12-22

## 2023-01-01 NOTE — ASU DISCHARGE PLAN (ADULT/PEDIATRIC) - ASU DISCHARGE DATE/TIME
This note was copied from the mother's chart.     09/23/23 1232   Maternal Information   Date of Referral 09/23/23   Person Making Referral lactation consultant  (newly postpartum - had breastfeeding listed as delivery preference)   Maternal Reason for Referral no prior breastfeeding experience  (Patient stated that she doesn't want to breastfeed, and wants to use formula only.  Went over breast care during engorgement, and to use ice packs to help.  Discussed wearing a supportive bra without underwire.  Went over s/s of mastititis.)        29-Jun-2021 12:40

## 2023-01-04 ENCOUNTER — APPOINTMENT (OUTPATIENT)
Dept: INFUSION THERAPY | Facility: HOSPITAL | Age: 53
End: 2023-01-04

## 2023-01-04 ENCOUNTER — APPOINTMENT (OUTPATIENT)
Dept: HEMATOLOGY ONCOLOGY | Facility: CLINIC | Age: 53
End: 2023-01-04
Payer: COMMERCIAL

## 2023-01-04 ENCOUNTER — RESULT REVIEW (OUTPATIENT)
Age: 53
End: 2023-01-04

## 2023-01-04 VITALS
DIASTOLIC BLOOD PRESSURE: 91 MMHG | HEART RATE: 84 BPM | RESPIRATION RATE: 16 BRPM | WEIGHT: 226.41 LBS | TEMPERATURE: 97.2 F | HEIGHT: 65.75 IN | BODY MASS INDEX: 36.82 KG/M2 | SYSTOLIC BLOOD PRESSURE: 139 MMHG | OXYGEN SATURATION: 99 %

## 2023-01-04 LAB
ALBUMIN SERPL ELPH-MCNC: 4.1 G/DL — SIGNIFICANT CHANGE UP (ref 3.3–5)
ALP SERPL-CCNC: 74 U/L — SIGNIFICANT CHANGE UP (ref 40–120)
ALT FLD-CCNC: 16 U/L — SIGNIFICANT CHANGE UP (ref 10–45)
ANION GAP SERPL CALC-SCNC: 11 MMOL/L — SIGNIFICANT CHANGE UP (ref 5–17)
AST SERPL-CCNC: 17 U/L — SIGNIFICANT CHANGE UP (ref 10–40)
BASOPHILS # BLD AUTO: 0.03 K/UL — SIGNIFICANT CHANGE UP (ref 0–0.2)
BASOPHILS NFR BLD AUTO: 0.6 % — SIGNIFICANT CHANGE UP (ref 0–2)
BILIRUB SERPL-MCNC: 0.8 MG/DL — SIGNIFICANT CHANGE UP (ref 0.2–1.2)
BUN SERPL-MCNC: 14 MG/DL — SIGNIFICANT CHANGE UP (ref 7–23)
CALCIUM SERPL-MCNC: 9.4 MG/DL — SIGNIFICANT CHANGE UP (ref 8.4–10.5)
CHLORIDE SERPL-SCNC: 97 MMOL/L — SIGNIFICANT CHANGE UP (ref 96–108)
CO2 SERPL-SCNC: 25 MMOL/L — SIGNIFICANT CHANGE UP (ref 22–31)
CREAT SERPL-MCNC: 1 MG/DL — SIGNIFICANT CHANGE UP (ref 0.5–1.3)
EGFR: 91 ML/MIN/1.73M2 — SIGNIFICANT CHANGE UP
EOSINOPHIL # BLD AUTO: 0.19 K/UL — SIGNIFICANT CHANGE UP (ref 0–0.5)
EOSINOPHIL NFR BLD AUTO: 3.9 % — SIGNIFICANT CHANGE UP (ref 0–6)
GLUCOSE SERPL-MCNC: 296 MG/DL — HIGH (ref 70–99)
HCT VFR BLD CALC: 41 % — SIGNIFICANT CHANGE UP (ref 39–50)
HGB BLD-MCNC: 14.2 G/DL — SIGNIFICANT CHANGE UP (ref 13–17)
IMM GRANULOCYTES NFR BLD AUTO: 0.4 % — SIGNIFICANT CHANGE UP (ref 0–0.9)
LYMPHOCYTES # BLD AUTO: 1.68 K/UL — SIGNIFICANT CHANGE UP (ref 1–3.3)
LYMPHOCYTES # BLD AUTO: 34.3 % — SIGNIFICANT CHANGE UP (ref 13–44)
MCHC RBC-ENTMCNC: 25.6 PG — LOW (ref 27–34)
MCHC RBC-ENTMCNC: 34.6 G/DL — SIGNIFICANT CHANGE UP (ref 32–36)
MCV RBC AUTO: 74 FL — LOW (ref 80–100)
MONOCYTES # BLD AUTO: 0.52 K/UL — SIGNIFICANT CHANGE UP (ref 0–0.9)
MONOCYTES NFR BLD AUTO: 10.6 % — SIGNIFICANT CHANGE UP (ref 2–14)
NEUTROPHILS # BLD AUTO: 2.46 K/UL — SIGNIFICANT CHANGE UP (ref 1.8–7.4)
NEUTROPHILS NFR BLD AUTO: 50.2 % — SIGNIFICANT CHANGE UP (ref 43–77)
NRBC # BLD: 0 /100 WBCS — SIGNIFICANT CHANGE UP (ref 0–0)
PLATELET # BLD AUTO: 195 K/UL — SIGNIFICANT CHANGE UP (ref 150–400)
POTASSIUM SERPL-MCNC: 4.8 MMOL/L — SIGNIFICANT CHANGE UP (ref 3.5–5.3)
POTASSIUM SERPL-SCNC: 4.8 MMOL/L — SIGNIFICANT CHANGE UP (ref 3.5–5.3)
PROT SERPL-MCNC: 6.6 G/DL — SIGNIFICANT CHANGE UP (ref 6–8.3)
RBC # BLD: 5.54 M/UL — SIGNIFICANT CHANGE UP (ref 4.2–5.8)
RBC # FLD: 21.5 % — HIGH (ref 10.3–14.5)
SODIUM SERPL-SCNC: 134 MMOL/L — LOW (ref 135–145)
WBC # BLD: 4.9 K/UL — SIGNIFICANT CHANGE UP (ref 3.8–10.5)
WBC # FLD AUTO: 4.9 K/UL — SIGNIFICANT CHANGE UP (ref 3.8–10.5)

## 2023-01-04 PROCEDURE — 99214 OFFICE O/P EST MOD 30 MIN: CPT

## 2023-01-05 NOTE — REVIEW OF SYSTEMS
[Negative] : Allergic/Immunologic [Constipation] : constipation [Fatigue] : no fatigue [Abdominal Pain] : no abdominal pain [Vomiting] : no vomiting [Diarrhea] : no diarrhea [Confused] : no confusion [Dizziness] : no dizziness [Fainting] : no fainting [Difficulty Walking] : no difficulty walking [FreeTextEntry7] : abdominal cramping with constipation  [de-identified] : + mild neuropathy in hands/feet

## 2023-01-05 NOTE — PHYSICAL EXAM
[Fully active, able to carry on all pre-disease performance without restriction] : Status 0 - Fully active, able to carry on all pre-disease performance without restriction [Obese] : obese [Normal] : affect appropriate [de-identified] : anicteric  [de-identified] : no JVD [de-identified] : normal respiratory effort, no audible wheeze [de-identified] : reg rate  [de-identified] : no LE edema B/L [de-identified] : soft, non-distended, non-tender

## 2023-01-05 NOTE — HISTORY OF PRESENT ILLNESS
[Disease: _____________________] : Disease: [unfilled] [T: ___] : T[unfilled] [N: ___] : N[unfilled] [M: ___] : M[unfilled] [AJCC Stage: ____] : AJCC Stage: [unfilled] [Research Protocol] : Research Protocol  [de-identified] : Mr Granda in 2021 at the age of 51 presented to the hospital with abdominal pain and underwent imaging that revealed a distal transverse colon lesion with evidence of microperforation.\par He is also underwent a colonoscopy which demonstrated an endoscopically obstructing distal transverse colon mass. Given the potential for impending obstruction as well as microperforation seen on imaging decision was made to take patient to the OR for a left hemicolectomy and anastomosis.\par Imaging done at that time also showed evidence of metastatic disease to the liver and potentially lung and a questionable lesion as well. Patient presents with his sister for initial visit and to discuss palliative chemotherapy.\par \par Enrolled in [employer] [facility] \par \par Started FOLFOX + Vit D July 7th, 2021 (bevacizumab added cycle #2)\par 8/23 scan shows PE started on Lovenox \par October 2021 oxaliplatin held. \par \par KRAS G12D , NABIAL \par  [de-identified] : Microsatellite stable  [de-identified] : KRAS G12D , NABILA  [FreeTextEntry1] : FOLFOX + Bevacizumab +VitD ([employer] study) --> 5FU Yolis + Vit D --> POD 12/2022 ---> off trial ---> FOLFIRI/Yolis started 12/20/22 [de-identified] : Patient presents for follow up s/p first treatment with FOLFIRI/Yolis two weeks ago.  He reports abdominal cramping that started in the treatment room and persisted for 3-4 days with constipation.  He thought that the constipation was the beginnings of diarrhea so he took 2 Imodium the first day after treatment.  He states that after those first few days, the cramping and constipation resolved and he had no other symptoms.  Denies anorexia, weight loss, N/V, diarrhea, melena or blood in the stool.

## 2023-01-06 ENCOUNTER — APPOINTMENT (OUTPATIENT)
Dept: INFUSION THERAPY | Facility: HOSPITAL | Age: 53
End: 2023-01-06

## 2023-01-19 ENCOUNTER — APPOINTMENT (OUTPATIENT)
Dept: HEMATOLOGY ONCOLOGY | Facility: CLINIC | Age: 53
End: 2023-01-19

## 2023-01-19 ENCOUNTER — APPOINTMENT (OUTPATIENT)
Dept: INFUSION THERAPY | Facility: HOSPITAL | Age: 53
End: 2023-01-19

## 2023-01-21 ENCOUNTER — APPOINTMENT (OUTPATIENT)
Dept: INFUSION THERAPY | Facility: HOSPITAL | Age: 53
End: 2023-01-21

## 2023-01-29 ENCOUNTER — OUTPATIENT (OUTPATIENT)
Dept: OUTPATIENT SERVICES | Facility: HOSPITAL | Age: 53
LOS: 1 days | Discharge: HOME | End: 2023-01-29

## 2023-01-29 DIAGNOSIS — C18.9 MALIGNANT NEOPLASM OF COLON, UNSPECIFIED: ICD-10-CM

## 2023-02-01 ENCOUNTER — APPOINTMENT (OUTPATIENT)
Dept: INFUSION THERAPY | Facility: HOSPITAL | Age: 53
End: 2023-02-01

## 2023-02-01 ENCOUNTER — RESULT REVIEW (OUTPATIENT)
Age: 53
End: 2023-02-01

## 2023-02-01 ENCOUNTER — APPOINTMENT (OUTPATIENT)
Dept: HEMATOLOGY ONCOLOGY | Facility: CLINIC | Age: 53
End: 2023-02-01
Payer: COMMERCIAL

## 2023-02-01 ENCOUNTER — NON-APPOINTMENT (OUTPATIENT)
Age: 53
End: 2023-02-01

## 2023-02-01 VITALS
WEIGHT: 222.67 LBS | HEIGHT: 65.75 IN | TEMPERATURE: 97.2 F | SYSTOLIC BLOOD PRESSURE: 135 MMHG | OXYGEN SATURATION: 96 % | HEART RATE: 93 BPM | BODY MASS INDEX: 36.21 KG/M2 | DIASTOLIC BLOOD PRESSURE: 81 MMHG | RESPIRATION RATE: 16 BRPM

## 2023-02-01 DIAGNOSIS — E86.0 DEHYDRATION: ICD-10-CM

## 2023-02-01 DIAGNOSIS — Z51.11 ENCOUNTER FOR ANTINEOPLASTIC CHEMOTHERAPY: ICD-10-CM

## 2023-02-01 DIAGNOSIS — R11.2 NAUSEA WITH VOMITING, UNSPECIFIED: ICD-10-CM

## 2023-02-01 LAB
ALBUMIN SERPL ELPH-MCNC: 4 G/DL — SIGNIFICANT CHANGE UP (ref 3.3–5)
ALP SERPL-CCNC: 81 U/L — SIGNIFICANT CHANGE UP (ref 40–120)
ALT FLD-CCNC: 17 U/L — SIGNIFICANT CHANGE UP (ref 10–45)
ANION GAP SERPL CALC-SCNC: 12 MMOL/L — SIGNIFICANT CHANGE UP (ref 5–17)
AST SERPL-CCNC: 18 U/L — SIGNIFICANT CHANGE UP (ref 10–40)
BASOPHILS # BLD AUTO: 0.02 K/UL — SIGNIFICANT CHANGE UP (ref 0–0.2)
BASOPHILS NFR BLD AUTO: 0.3 % — SIGNIFICANT CHANGE UP (ref 0–2)
BILIRUB SERPL-MCNC: 0.9 MG/DL — SIGNIFICANT CHANGE UP (ref 0.2–1.2)
BUN SERPL-MCNC: 9 MG/DL — SIGNIFICANT CHANGE UP (ref 7–23)
CALCIUM SERPL-MCNC: 8.7 MG/DL — SIGNIFICANT CHANGE UP (ref 8.4–10.5)
CEA SERPL-MCNC: 336 NG/ML — HIGH (ref 0–3.8)
CHLORIDE SERPL-SCNC: 99 MMOL/L — SIGNIFICANT CHANGE UP (ref 96–108)
CO2 SERPL-SCNC: 22 MMOL/L — SIGNIFICANT CHANGE UP (ref 22–31)
CREAT SERPL-MCNC: 0.94 MG/DL — SIGNIFICANT CHANGE UP (ref 0.5–1.3)
EGFR: 98 ML/MIN/1.73M2 — SIGNIFICANT CHANGE UP
EOSINOPHIL # BLD AUTO: 0.14 K/UL — SIGNIFICANT CHANGE UP (ref 0–0.5)
EOSINOPHIL NFR BLD AUTO: 2.1 % — SIGNIFICANT CHANGE UP (ref 0–6)
GLUCOSE SERPL-MCNC: 315 MG/DL — HIGH (ref 70–99)
HCT VFR BLD CALC: 45 % — SIGNIFICANT CHANGE UP (ref 39–50)
HGB BLD-MCNC: 15.2 G/DL — SIGNIFICANT CHANGE UP (ref 13–17)
IMM GRANULOCYTES NFR BLD AUTO: 0.5 % — SIGNIFICANT CHANGE UP (ref 0–0.9)
LYMPHOCYTES # BLD AUTO: 1.7 K/UL — SIGNIFICANT CHANGE UP (ref 1–3.3)
LYMPHOCYTES # BLD AUTO: 25.5 % — SIGNIFICANT CHANGE UP (ref 13–44)
MCHC RBC-ENTMCNC: 25.1 PG — LOW (ref 27–34)
MCHC RBC-ENTMCNC: 33.8 G/DL — SIGNIFICANT CHANGE UP (ref 32–36)
MCV RBC AUTO: 74.3 FL — LOW (ref 80–100)
MONOCYTES # BLD AUTO: 1.11 K/UL — HIGH (ref 0–0.9)
MONOCYTES NFR BLD AUTO: 16.7 % — HIGH (ref 2–14)
NEUTROPHILS # BLD AUTO: 3.66 K/UL — SIGNIFICANT CHANGE UP (ref 1.8–7.4)
NEUTROPHILS NFR BLD AUTO: 54.9 % — SIGNIFICANT CHANGE UP (ref 43–77)
NRBC # BLD: 0 /100 WBCS — SIGNIFICANT CHANGE UP (ref 0–0)
PLATELET # BLD AUTO: 198 K/UL — SIGNIFICANT CHANGE UP (ref 150–400)
POTASSIUM SERPL-MCNC: 4.2 MMOL/L — SIGNIFICANT CHANGE UP (ref 3.5–5.3)
POTASSIUM SERPL-SCNC: 4.2 MMOL/L — SIGNIFICANT CHANGE UP (ref 3.5–5.3)
PROT SERPL-MCNC: 6.9 G/DL — SIGNIFICANT CHANGE UP (ref 6–8.3)
RBC # BLD: 6.06 M/UL — HIGH (ref 4.2–5.8)
RBC # FLD: 21.1 % — HIGH (ref 10.3–14.5)
SODIUM SERPL-SCNC: 133 MMOL/L — LOW (ref 135–145)
WBC # BLD: 6.66 K/UL — SIGNIFICANT CHANGE UP (ref 3.8–10.5)
WBC # FLD AUTO: 6.66 K/UL — SIGNIFICANT CHANGE UP (ref 3.8–10.5)

## 2023-02-01 PROCEDURE — 99214 OFFICE O/P EST MOD 30 MIN: CPT

## 2023-02-01 NOTE — HISTORY OF PRESENT ILLNESS
[Disease: _____________________] : Disease: [unfilled] [T: ___] : T[unfilled] [N: ___] : N[unfilled] [M: ___] : M[unfilled] [AJCC Stage: ____] : AJCC Stage: [unfilled] [Research Protocol] : Research Protocol  [de-identified] : Mr Granda in 2021 at the age of 51 presented to the hospital with abdominal pain and underwent imaging that revealed a distal transverse colon lesion with evidence of microperforation.\par He is also underwent a colonoscopy which demonstrated an endoscopically obstructing distal transverse colon mass. Given the potential for impending obstruction as well as microperforation seen on imaging decision was made to take patient to the OR for a left hemicolectomy and anastomosis.\par Imaging done at that time also showed evidence of metastatic disease to the liver and potentially lung and a questionable lesion as well. Patient presents with his sister for initial visit and to discuss palliative chemotherapy.\par \par Enrolled in [employer] [facility] \par \par Started FOLFOX + Vit D July 7th, 2021 (bevacizumab added cycle #2)\par 8/23 scan shows PE started on Lovenox \par October 2021 oxaliplatin held. \par \par KRAS G12D , NABILA \par  [de-identified] : Microsatellite stable  [de-identified] : KRAS G12D , NABILA  [FreeTextEntry1] : FOLFOX + Bevacizumab +VitD ([employer] study) --> 5FU Yolis + Vit D --> POD 12/2022 ---> off trial ---> FOLFIRI/Yolis started 12/20/22 [de-identified] : tolerating therapy\par cough for couple weeks, no fever or chills \par no abdominal pain other than cramping with treatment

## 2023-02-01 NOTE — REVIEW OF SYSTEMS
[Constipation] : constipation [Negative] : Allergic/Immunologic [Fatigue] : no fatigue [Abdominal Pain] : no abdominal pain [Vomiting] : no vomiting [Diarrhea] : no diarrhea [Confused] : no confusion [Dizziness] : no dizziness [Fainting] : no fainting [Difficulty Walking] : no difficulty walking [FreeTextEntry7] : abdominal cramping with constipation  [de-identified] : + mild neuropathy in hands/feet

## 2023-02-01 NOTE — PHYSICAL EXAM
[Fully active, able to carry on all pre-disease performance without restriction] : Status 0 - Fully active, able to carry on all pre-disease performance without restriction [Obese] : obese [Normal] : affect appropriate [de-identified] : anicteric  [de-identified] : no JVD [de-identified] : normal respiratory effort, no audible wheeze [de-identified] : reg rate  [de-identified] : no LE edema B/L [de-identified] : soft, non-distended, non-tender

## 2023-02-02 LAB
RAPID RVP RESULT: DETECTED
SARS-COV-2 RNA PNL RESP NAA+PROBE: DETECTED

## 2023-02-03 ENCOUNTER — RESULT REVIEW (OUTPATIENT)
Age: 53
End: 2023-02-03

## 2023-02-03 ENCOUNTER — APPOINTMENT (OUTPATIENT)
Dept: INFUSION THERAPY | Facility: HOSPITAL | Age: 53
End: 2023-02-03

## 2023-02-04 LAB
CREAT ?TM UR-MCNC: 151 MG/DL — SIGNIFICANT CHANGE UP
PROT ?TM UR-MCNC: 28 MG/DL — HIGH (ref 0–12)
PROT/CREAT UR-RTO: 0.2 RATIO — SIGNIFICANT CHANGE UP (ref 0–0.2)

## 2023-02-06 ENCOUNTER — RX RENEWAL (OUTPATIENT)
Age: 53
End: 2023-02-06

## 2023-02-15 ENCOUNTER — RESULT REVIEW (OUTPATIENT)
Age: 53
End: 2023-02-15

## 2023-02-15 ENCOUNTER — APPOINTMENT (OUTPATIENT)
Dept: INFUSION THERAPY | Facility: HOSPITAL | Age: 53
End: 2023-02-15

## 2023-02-15 LAB
BASOPHILS # BLD AUTO: 0.03 K/UL — SIGNIFICANT CHANGE UP (ref 0–0.2)
BASOPHILS NFR BLD AUTO: 0.5 % — SIGNIFICANT CHANGE UP (ref 0–2)
EOSINOPHIL # BLD AUTO: 0.26 K/UL — SIGNIFICANT CHANGE UP (ref 0–0.5)
EOSINOPHIL NFR BLD AUTO: 4.6 % — SIGNIFICANT CHANGE UP (ref 0–6)
HCT VFR BLD CALC: 43.4 % — SIGNIFICANT CHANGE UP (ref 39–50)
HGB BLD-MCNC: 14.9 G/DL — SIGNIFICANT CHANGE UP (ref 13–17)
IMM GRANULOCYTES NFR BLD AUTO: 0.4 % — SIGNIFICANT CHANGE UP (ref 0–0.9)
LYMPHOCYTES # BLD AUTO: 2.06 K/UL — SIGNIFICANT CHANGE UP (ref 1–3.3)
LYMPHOCYTES # BLD AUTO: 36.8 % — SIGNIFICANT CHANGE UP (ref 13–44)
MCHC RBC-ENTMCNC: 25 PG — LOW (ref 27–34)
MCHC RBC-ENTMCNC: 34.3 G/DL — SIGNIFICANT CHANGE UP (ref 32–36)
MCV RBC AUTO: 72.9 FL — LOW (ref 80–100)
MONOCYTES # BLD AUTO: 0.43 K/UL — SIGNIFICANT CHANGE UP (ref 0–0.9)
MONOCYTES NFR BLD AUTO: 7.7 % — SIGNIFICANT CHANGE UP (ref 2–14)
NEUTROPHILS # BLD AUTO: 2.8 K/UL — SIGNIFICANT CHANGE UP (ref 1.8–7.4)
NEUTROPHILS NFR BLD AUTO: 50 % — SIGNIFICANT CHANGE UP (ref 43–77)
NRBC # BLD: 0 /100 WBCS — SIGNIFICANT CHANGE UP (ref 0–0)
PLATELET # BLD AUTO: 221 K/UL — SIGNIFICANT CHANGE UP (ref 150–400)
RBC # BLD: 5.95 M/UL — HIGH (ref 4.2–5.8)
RBC # FLD: 20.1 % — HIGH (ref 10.3–14.5)
WBC # BLD: 5.6 K/UL — SIGNIFICANT CHANGE UP (ref 3.8–10.5)
WBC # FLD AUTO: 5.6 K/UL — SIGNIFICANT CHANGE UP (ref 3.8–10.5)

## 2023-02-16 LAB
ALBUMIN SERPL ELPH-MCNC: 4 G/DL — SIGNIFICANT CHANGE UP (ref 3.3–5)
ALP SERPL-CCNC: 85 U/L — SIGNIFICANT CHANGE UP (ref 40–120)
ALT FLD-CCNC: 19 U/L — SIGNIFICANT CHANGE UP (ref 10–45)
ANION GAP SERPL CALC-SCNC: 15 MMOL/L — SIGNIFICANT CHANGE UP (ref 5–17)
AST SERPL-CCNC: 18 U/L — SIGNIFICANT CHANGE UP (ref 10–40)
BILIRUB SERPL-MCNC: 0.7 MG/DL — SIGNIFICANT CHANGE UP (ref 0.2–1.2)
BUN SERPL-MCNC: 10 MG/DL — SIGNIFICANT CHANGE UP (ref 7–23)
CALCIUM SERPL-MCNC: 9.1 MG/DL — SIGNIFICANT CHANGE UP (ref 8.4–10.5)
CEA SERPL-MCNC: 308 NG/ML — HIGH (ref 0–3.8)
CHLORIDE SERPL-SCNC: 97 MMOL/L — SIGNIFICANT CHANGE UP (ref 96–108)
CO2 SERPL-SCNC: 23 MMOL/L — SIGNIFICANT CHANGE UP (ref 22–31)
CREAT ?TM UR-MCNC: 29 MG/DL — SIGNIFICANT CHANGE UP
CREAT SERPL-MCNC: 0.97 MG/DL — SIGNIFICANT CHANGE UP (ref 0.5–1.3)
EGFR: 94 ML/MIN/1.73M2 — SIGNIFICANT CHANGE UP
GLUCOSE SERPL-MCNC: 376 MG/DL — HIGH (ref 70–99)
POTASSIUM SERPL-MCNC: 4.6 MMOL/L — SIGNIFICANT CHANGE UP (ref 3.5–5.3)
POTASSIUM SERPL-SCNC: 4.6 MMOL/L — SIGNIFICANT CHANGE UP (ref 3.5–5.3)
PROT ?TM UR-MCNC: 5 MG/DL — SIGNIFICANT CHANGE UP (ref 0–12)
PROT SERPL-MCNC: 6.7 G/DL — SIGNIFICANT CHANGE UP (ref 6–8.3)
PROT/CREAT UR-RTO: 0.2 RATIO — SIGNIFICANT CHANGE UP (ref 0–0.2)
SODIUM SERPL-SCNC: 135 MMOL/L — SIGNIFICANT CHANGE UP (ref 135–145)

## 2023-02-17 ENCOUNTER — APPOINTMENT (OUTPATIENT)
Dept: INFUSION THERAPY | Facility: HOSPITAL | Age: 53
End: 2023-02-17

## 2023-02-22 ENCOUNTER — OUTPATIENT (OUTPATIENT)
Dept: OUTPATIENT SERVICES | Facility: HOSPITAL | Age: 53
LOS: 1 days | End: 2023-02-22
Payer: COMMERCIAL

## 2023-02-22 ENCOUNTER — APPOINTMENT (OUTPATIENT)
Dept: CT IMAGING | Facility: IMAGING CENTER | Age: 53
End: 2023-02-22
Payer: COMMERCIAL

## 2023-02-22 DIAGNOSIS — C18.9 MALIGNANT NEOPLASM OF COLON, UNSPECIFIED: ICD-10-CM

## 2023-02-22 DIAGNOSIS — Z00.8 ENCOUNTER FOR OTHER GENERAL EXAMINATION: ICD-10-CM

## 2023-02-22 PROCEDURE — 71260 CT THORAX DX C+: CPT

## 2023-02-22 PROCEDURE — 71260 CT THORAX DX C+: CPT | Mod: 26

## 2023-02-22 PROCEDURE — 74177 CT ABD & PELVIS W/CONTRAST: CPT | Mod: 26

## 2023-02-22 PROCEDURE — 74177 CT ABD & PELVIS W/CONTRAST: CPT

## 2023-03-01 ENCOUNTER — APPOINTMENT (OUTPATIENT)
Dept: HEMATOLOGY ONCOLOGY | Facility: CLINIC | Age: 53
End: 2023-03-01
Payer: COMMERCIAL

## 2023-03-01 ENCOUNTER — APPOINTMENT (OUTPATIENT)
Dept: INFUSION THERAPY | Facility: HOSPITAL | Age: 53
End: 2023-03-01

## 2023-03-01 ENCOUNTER — RESULT REVIEW (OUTPATIENT)
Age: 53
End: 2023-03-01

## 2023-03-01 VITALS
DIASTOLIC BLOOD PRESSURE: 90 MMHG | WEIGHT: 220.02 LBS | HEIGHT: 65.75 IN | OXYGEN SATURATION: 98 % | RESPIRATION RATE: 16 BRPM | HEART RATE: 81 BPM | BODY MASS INDEX: 35.78 KG/M2 | SYSTOLIC BLOOD PRESSURE: 135 MMHG | TEMPERATURE: 97.4 F

## 2023-03-01 LAB
ALBUMIN SERPL ELPH-MCNC: 4.2 G/DL — SIGNIFICANT CHANGE UP (ref 3.3–5)
ALP SERPL-CCNC: 82 U/L — SIGNIFICANT CHANGE UP (ref 40–120)
ALT FLD-CCNC: 18 U/L — SIGNIFICANT CHANGE UP (ref 10–45)
ANION GAP SERPL CALC-SCNC: 14 MMOL/L — SIGNIFICANT CHANGE UP (ref 5–17)
AST SERPL-CCNC: 16 U/L — SIGNIFICANT CHANGE UP (ref 10–40)
BASOPHILS # BLD AUTO: 0.03 K/UL — SIGNIFICANT CHANGE UP (ref 0–0.2)
BASOPHILS NFR BLD AUTO: 0.6 % — SIGNIFICANT CHANGE UP (ref 0–2)
BILIRUB SERPL-MCNC: 0.7 MG/DL — SIGNIFICANT CHANGE UP (ref 0.2–1.2)
BUN SERPL-MCNC: 10 MG/DL — SIGNIFICANT CHANGE UP (ref 7–23)
CALCIUM SERPL-MCNC: 9.3 MG/DL — SIGNIFICANT CHANGE UP (ref 8.4–10.5)
CHLORIDE SERPL-SCNC: 101 MMOL/L — SIGNIFICANT CHANGE UP (ref 96–108)
CO2 SERPL-SCNC: 22 MMOL/L — SIGNIFICANT CHANGE UP (ref 22–31)
CREAT SERPL-MCNC: 0.92 MG/DL — SIGNIFICANT CHANGE UP (ref 0.5–1.3)
EGFR: 100 ML/MIN/1.73M2 — SIGNIFICANT CHANGE UP
EOSINOPHIL # BLD AUTO: 0.13 K/UL — SIGNIFICANT CHANGE UP (ref 0–0.5)
EOSINOPHIL NFR BLD AUTO: 2.4 % — SIGNIFICANT CHANGE UP (ref 0–6)
GLUCOSE SERPL-MCNC: 351 MG/DL — HIGH (ref 70–99)
HCT VFR BLD CALC: 41.4 % — SIGNIFICANT CHANGE UP (ref 39–50)
HGB BLD-MCNC: 14.2 G/DL — SIGNIFICANT CHANGE UP (ref 13–17)
IMM GRANULOCYTES NFR BLD AUTO: 0.4 % — SIGNIFICANT CHANGE UP (ref 0–0.9)
LYMPHOCYTES # BLD AUTO: 1.92 K/UL — SIGNIFICANT CHANGE UP (ref 1–3.3)
LYMPHOCYTES # BLD AUTO: 36 % — SIGNIFICANT CHANGE UP (ref 13–44)
MCHC RBC-ENTMCNC: 24.9 PG — LOW (ref 27–34)
MCHC RBC-ENTMCNC: 34.3 G/DL — SIGNIFICANT CHANGE UP (ref 32–36)
MCV RBC AUTO: 72.6 FL — LOW (ref 80–100)
MONOCYTES # BLD AUTO: 0.57 K/UL — SIGNIFICANT CHANGE UP (ref 0–0.9)
MONOCYTES NFR BLD AUTO: 10.7 % — SIGNIFICANT CHANGE UP (ref 2–14)
NEUTROPHILS # BLD AUTO: 2.66 K/UL — SIGNIFICANT CHANGE UP (ref 1.8–7.4)
NEUTROPHILS NFR BLD AUTO: 49.9 % — SIGNIFICANT CHANGE UP (ref 43–77)
NRBC # BLD: 0 /100 WBCS — SIGNIFICANT CHANGE UP (ref 0–0)
PLATELET # BLD AUTO: 203 K/UL — SIGNIFICANT CHANGE UP (ref 150–400)
POTASSIUM SERPL-MCNC: 4.3 MMOL/L — SIGNIFICANT CHANGE UP (ref 3.5–5.3)
POTASSIUM SERPL-SCNC: 4.3 MMOL/L — SIGNIFICANT CHANGE UP (ref 3.5–5.3)
PROT SERPL-MCNC: 6.8 G/DL — SIGNIFICANT CHANGE UP (ref 6–8.3)
RBC # BLD: 5.7 M/UL — SIGNIFICANT CHANGE UP (ref 4.2–5.8)
RBC # FLD: 19.9 % — HIGH (ref 10.3–14.5)
SODIUM SERPL-SCNC: 137 MMOL/L — SIGNIFICANT CHANGE UP (ref 135–145)
WBC # BLD: 5.33 K/UL — SIGNIFICANT CHANGE UP (ref 3.8–10.5)
WBC # FLD AUTO: 5.33 K/UL — SIGNIFICANT CHANGE UP (ref 3.8–10.5)

## 2023-03-01 PROCEDURE — 99215 OFFICE O/P EST HI 40 MIN: CPT

## 2023-03-01 RX ORDER — METOCLOPRAMIDE 10 MG/1
10 TABLET ORAL EVERY 6 HOURS
Qty: 30 | Refills: 2 | Status: DISCONTINUED | COMMUNITY
Start: 2021-07-07 | End: 2023-03-01

## 2023-03-02 LAB
CREAT ?TM UR-MCNC: 41 MG/DL — SIGNIFICANT CHANGE UP
PROT ?TM UR-MCNC: 5 MG/DL — SIGNIFICANT CHANGE UP (ref 0–12)
PROT/CREAT UR-RTO: 0.1 RATIO — SIGNIFICANT CHANGE UP (ref 0–0.2)

## 2023-03-02 RX ORDER — BENZONATATE 100 MG/1
100 CAPSULE ORAL EVERY 8 HOURS
Qty: 45 | Refills: 0 | Status: DISCONTINUED | COMMUNITY
Start: 2023-02-01 | End: 2023-03-02

## 2023-03-02 NOTE — HISTORY OF PRESENT ILLNESS
[Disease: _____________________] : Disease: [unfilled] [T: ___] : T[unfilled] [N: ___] : N[unfilled] [M: ___] : M[unfilled] [AJCC Stage: ____] : AJCC Stage: [unfilled] [de-identified] : Mr Granda in 2021 at the age of 51 presented to the hospital with abdominal pain and underwent imaging that revealed a distal transverse colon lesion with evidence of microperforation.\par He is also underwent a colonoscopy which demonstrated an endoscopically obstructing distal transverse colon mass. Given the potential for impending obstruction as well as microperforation seen on imaging decision was made to take patient to the OR for a left hemicolectomy and anastomosis.\par Imaging done at that time also showed evidence of metastatic disease to the liver and potentially lung and a questionable lesion as well. Patient presents with his sister for initial visit and to discuss palliative chemotherapy.\par \par Enrolled in [employer] [facility] \par \par Started FOLFOX + Vit D July 7th, 2021 (bevacizumab added cycle #2)\par 8/23 scan shows PE started on Lovenox \par October 2021 oxaliplatin held. \par \par KRAS G12D , NABILA \par FOLFOX + Bevacizumab +VitD ([employer] study) --> 5FU Yolis + Vit D --> POD 12/2022 ---> off trial  [de-identified] : Microsatellite stable  [de-identified] : KRAS G12D , NABILA  [Research Protocol] : Research Protocol  [FreeTextEntry1] : FOLFIRI/Yolis started 12/20/22 [de-identified] : here for follow up to review recent imaging , no acute complaints\par +nausea improved with anti-emetics\par hasn't followed up with PMD for DM and htn management \par

## 2023-03-02 NOTE — REVIEW OF SYSTEMS
[Fatigue] : no fatigue [Abdominal Pain] : no abdominal pain [Vomiting] : no vomiting [Diarrhea] : no diarrhea [Confused] : no confusion [Dizziness] : no dizziness [Fainting] : no fainting [Difficulty Walking] : no difficulty walking [Negative] : Allergic/Immunologic [de-identified] : + mild neuropathy in hands/feet

## 2023-03-02 NOTE — PHYSICAL EXAM
[Fully active, able to carry on all pre-disease performance without restriction] : Status 0 - Fully active, able to carry on all pre-disease performance without restriction [Obese] : obese [Normal] : affect appropriate [de-identified] : anicteric  [de-identified] : no JVD [de-identified] : normal respiratory effort, no audible wheeze [de-identified] : reg rate  [de-identified] : no LE edema B/L [de-identified] : soft, non-distended, non-tender

## 2023-03-03 ENCOUNTER — APPOINTMENT (OUTPATIENT)
Dept: INFUSION THERAPY | Facility: HOSPITAL | Age: 53
End: 2023-03-03

## 2023-03-15 ENCOUNTER — NON-APPOINTMENT (OUTPATIENT)
Age: 53
End: 2023-03-15

## 2023-03-15 ENCOUNTER — RESULT REVIEW (OUTPATIENT)
Age: 53
End: 2023-03-15

## 2023-03-15 ENCOUNTER — APPOINTMENT (OUTPATIENT)
Dept: INFUSION THERAPY | Facility: HOSPITAL | Age: 53
End: 2023-03-15

## 2023-03-15 ENCOUNTER — APPOINTMENT (OUTPATIENT)
Dept: HEMATOLOGY ONCOLOGY | Facility: CLINIC | Age: 53
End: 2023-03-15

## 2023-03-15 LAB
BASOPHILS # BLD AUTO: 0.03 K/UL — SIGNIFICANT CHANGE UP (ref 0–0.2)
BASOPHILS NFR BLD AUTO: 0.4 % — SIGNIFICANT CHANGE UP (ref 0–2)
CREAT ?TM UR-MCNC: 42 MG/DL — SIGNIFICANT CHANGE UP
EOSINOPHIL # BLD AUTO: 0.18 K/UL — SIGNIFICANT CHANGE UP (ref 0–0.5)
EOSINOPHIL NFR BLD AUTO: 2.6 % — SIGNIFICANT CHANGE UP (ref 0–6)
HCT VFR BLD CALC: 44.4 % — SIGNIFICANT CHANGE UP (ref 39–50)
HGB BLD-MCNC: 15.1 G/DL — SIGNIFICANT CHANGE UP (ref 13–17)
IMM GRANULOCYTES NFR BLD AUTO: 0.6 % — SIGNIFICANT CHANGE UP (ref 0–0.9)
LYMPHOCYTES # BLD AUTO: 2.2 K/UL — SIGNIFICANT CHANGE UP (ref 1–3.3)
LYMPHOCYTES # BLD AUTO: 32 % — SIGNIFICANT CHANGE UP (ref 13–44)
MCHC RBC-ENTMCNC: 24.8 PG — LOW (ref 27–34)
MCHC RBC-ENTMCNC: 34 G/DL — SIGNIFICANT CHANGE UP (ref 32–36)
MCV RBC AUTO: 72.9 FL — LOW (ref 80–100)
MONOCYTES # BLD AUTO: 0.73 K/UL — SIGNIFICANT CHANGE UP (ref 0–0.9)
MONOCYTES NFR BLD AUTO: 10.6 % — SIGNIFICANT CHANGE UP (ref 2–14)
NEUTROPHILS # BLD AUTO: 3.69 K/UL — SIGNIFICANT CHANGE UP (ref 1.8–7.4)
NEUTROPHILS NFR BLD AUTO: 53.8 % — SIGNIFICANT CHANGE UP (ref 43–77)
NRBC # BLD: 0 /100 WBCS — SIGNIFICANT CHANGE UP (ref 0–0)
PLATELET # BLD AUTO: 207 K/UL — SIGNIFICANT CHANGE UP (ref 150–400)
PROT ?TM UR-MCNC: 10 MG/DL — SIGNIFICANT CHANGE UP (ref 0–12)
PROT/CREAT UR-RTO: 0.2 RATIO — SIGNIFICANT CHANGE UP (ref 0–0.2)
RBC # BLD: 6.09 M/UL — HIGH (ref 4.2–5.8)
RBC # FLD: 19.8 % — HIGH (ref 10.3–14.5)
WBC # BLD: 6.87 K/UL — SIGNIFICANT CHANGE UP (ref 3.8–10.5)
WBC # FLD AUTO: 6.87 K/UL — SIGNIFICANT CHANGE UP (ref 3.8–10.5)

## 2023-03-17 ENCOUNTER — RESULT REVIEW (OUTPATIENT)
Age: 53
End: 2023-03-17

## 2023-03-17 ENCOUNTER — APPOINTMENT (OUTPATIENT)
Dept: INFUSION THERAPY | Facility: HOSPITAL | Age: 53
End: 2023-03-17

## 2023-03-18 LAB
ALBUMIN SERPL ELPH-MCNC: 4.1 G/DL — SIGNIFICANT CHANGE UP (ref 3.3–5)
ALP SERPL-CCNC: 80 U/L — SIGNIFICANT CHANGE UP (ref 40–120)
ALT FLD-CCNC: 17 U/L — SIGNIFICANT CHANGE UP (ref 10–45)
ANION GAP SERPL CALC-SCNC: 15 MMOL/L — SIGNIFICANT CHANGE UP (ref 5–17)
AST SERPL-CCNC: 15 U/L — SIGNIFICANT CHANGE UP (ref 10–40)
BILIRUB SERPL-MCNC: 1.1 MG/DL — SIGNIFICANT CHANGE UP (ref 0.2–1.2)
BUN SERPL-MCNC: 18 MG/DL — SIGNIFICANT CHANGE UP (ref 7–23)
CALCIUM SERPL-MCNC: 9.3 MG/DL — SIGNIFICANT CHANGE UP (ref 8.4–10.5)
CHLORIDE SERPL-SCNC: 96 MMOL/L — SIGNIFICANT CHANGE UP (ref 96–108)
CO2 SERPL-SCNC: 23 MMOL/L — SIGNIFICANT CHANGE UP (ref 22–31)
CREAT SERPL-MCNC: 1.03 MG/DL — SIGNIFICANT CHANGE UP (ref 0.5–1.3)
EGFR: 87 ML/MIN/1.73M2 — SIGNIFICANT CHANGE UP
GLUCOSE SERPL-MCNC: 355 MG/DL — HIGH (ref 70–99)
POTASSIUM SERPL-MCNC: 4.8 MMOL/L — SIGNIFICANT CHANGE UP (ref 3.5–5.3)
POTASSIUM SERPL-SCNC: 4.8 MMOL/L — SIGNIFICANT CHANGE UP (ref 3.5–5.3)
PROT SERPL-MCNC: 6.5 G/DL — SIGNIFICANT CHANGE UP (ref 6–8.3)
SODIUM SERPL-SCNC: 134 MMOL/L — LOW (ref 135–145)

## 2023-03-22 ENCOUNTER — APPOINTMENT (OUTPATIENT)
Dept: DERMATOLOGY | Facility: CLINIC | Age: 53
End: 2023-03-22
Payer: COMMERCIAL

## 2023-03-22 DIAGNOSIS — L53.0 TOXIC ERYTHEMA: ICD-10-CM

## 2023-03-22 DIAGNOSIS — L98.499 NON-PRESSURE CHRONIC ULCER OF SKIN OF OTHER SITES WITH UNSPECIFIED SEVERITY: ICD-10-CM

## 2023-03-22 PROCEDURE — 99204 OFFICE O/P NEW MOD 45 MIN: CPT

## 2023-03-22 RX ORDER — KETOCONAZOLE 20 MG/G
2 CREAM TOPICAL TWICE DAILY
Qty: 1 | Refills: 1 | Status: ACTIVE | COMMUNITY
Start: 2023-03-22 | End: 1900-01-01

## 2023-03-22 RX ORDER — HYDROCORTISONE 25 MG/G
2.5 OINTMENT TOPICAL
Qty: 1 | Refills: 2 | Status: ACTIVE | COMMUNITY
Start: 2023-03-22 | End: 1900-01-01

## 2023-03-29 ENCOUNTER — APPOINTMENT (OUTPATIENT)
Dept: HEMATOLOGY ONCOLOGY | Facility: CLINIC | Age: 53
End: 2023-03-29

## 2023-03-29 ENCOUNTER — APPOINTMENT (OUTPATIENT)
Dept: HEMATOLOGY ONCOLOGY | Facility: CLINIC | Age: 53
End: 2023-03-29
Payer: COMMERCIAL

## 2023-03-29 ENCOUNTER — APPOINTMENT (OUTPATIENT)
Dept: INFUSION THERAPY | Facility: HOSPITAL | Age: 53
End: 2023-03-29

## 2023-03-29 ENCOUNTER — RESULT REVIEW (OUTPATIENT)
Age: 53
End: 2023-03-29

## 2023-03-29 VITALS
RESPIRATION RATE: 16 BRPM | HEART RATE: 87 BPM | OXYGEN SATURATION: 97 % | BODY MASS INDEX: 35.86 KG/M2 | TEMPERATURE: 98.1 F | SYSTOLIC BLOOD PRESSURE: 134 MMHG | HEIGHT: 65.75 IN | WEIGHT: 220.46 LBS | DIASTOLIC BLOOD PRESSURE: 85 MMHG

## 2023-03-29 LAB
ALBUMIN SERPL ELPH-MCNC: 4.1 G/DL — SIGNIFICANT CHANGE UP (ref 3.3–5)
ALP SERPL-CCNC: 78 U/L — SIGNIFICANT CHANGE UP (ref 40–120)
ALT FLD-CCNC: 14 U/L — SIGNIFICANT CHANGE UP (ref 10–45)
ANION GAP SERPL CALC-SCNC: 13 MMOL/L — SIGNIFICANT CHANGE UP (ref 5–17)
AST SERPL-CCNC: 15 U/L — SIGNIFICANT CHANGE UP (ref 10–40)
BASOPHILS # BLD AUTO: 0.03 K/UL — SIGNIFICANT CHANGE UP (ref 0–0.2)
BASOPHILS NFR BLD AUTO: 0.5 % — SIGNIFICANT CHANGE UP (ref 0–2)
BILIRUB SERPL-MCNC: 0.5 MG/DL — SIGNIFICANT CHANGE UP (ref 0.2–1.2)
BUN SERPL-MCNC: 12 MG/DL — SIGNIFICANT CHANGE UP (ref 7–23)
CALCIUM SERPL-MCNC: 9.3 MG/DL — SIGNIFICANT CHANGE UP (ref 8.4–10.5)
CHLORIDE SERPL-SCNC: 98 MMOL/L — SIGNIFICANT CHANGE UP (ref 96–108)
CO2 SERPL-SCNC: 22 MMOL/L — SIGNIFICANT CHANGE UP (ref 22–31)
CREAT SERPL-MCNC: 0.92 MG/DL — SIGNIFICANT CHANGE UP (ref 0.5–1.3)
EGFR: 100 ML/MIN/1.73M2 — SIGNIFICANT CHANGE UP
EOSINOPHIL # BLD AUTO: 0.16 K/UL — SIGNIFICANT CHANGE UP (ref 0–0.5)
EOSINOPHIL NFR BLD AUTO: 2.9 % — SIGNIFICANT CHANGE UP (ref 0–6)
GLUCOSE SERPL-MCNC: 454 MG/DL — CRITICAL HIGH (ref 70–99)
HCT VFR BLD CALC: 41.6 % — SIGNIFICANT CHANGE UP (ref 39–50)
HGB BLD-MCNC: 14 G/DL — SIGNIFICANT CHANGE UP (ref 13–17)
IMM GRANULOCYTES NFR BLD AUTO: 1.3 % — HIGH (ref 0–0.9)
LYMPHOCYTES # BLD AUTO: 2.02 K/UL — SIGNIFICANT CHANGE UP (ref 1–3.3)
LYMPHOCYTES # BLD AUTO: 36.4 % — SIGNIFICANT CHANGE UP (ref 13–44)
MCHC RBC-ENTMCNC: 24.4 PG — LOW (ref 27–34)
MCHC RBC-ENTMCNC: 33.7 G/DL — SIGNIFICANT CHANGE UP (ref 32–36)
MCV RBC AUTO: 72.6 FL — LOW (ref 80–100)
MONOCYTES # BLD AUTO: 0.55 K/UL — SIGNIFICANT CHANGE UP (ref 0–0.9)
MONOCYTES NFR BLD AUTO: 9.9 % — SIGNIFICANT CHANGE UP (ref 2–14)
NEUTROPHILS # BLD AUTO: 2.72 K/UL — SIGNIFICANT CHANGE UP (ref 1.8–7.4)
NEUTROPHILS NFR BLD AUTO: 49 % — SIGNIFICANT CHANGE UP (ref 43–77)
NRBC # BLD: 0 /100 WBCS — SIGNIFICANT CHANGE UP (ref 0–0)
PLATELET # BLD AUTO: 183 K/UL — SIGNIFICANT CHANGE UP (ref 150–400)
POTASSIUM SERPL-MCNC: 4.5 MMOL/L — SIGNIFICANT CHANGE UP (ref 3.5–5.3)
POTASSIUM SERPL-SCNC: 4.5 MMOL/L — SIGNIFICANT CHANGE UP (ref 3.5–5.3)
PROT SERPL-MCNC: 6.5 G/DL — SIGNIFICANT CHANGE UP (ref 6–8.3)
RBC # BLD: 5.73 M/UL — SIGNIFICANT CHANGE UP (ref 4.2–5.8)
RBC # FLD: 19.8 % — HIGH (ref 10.3–14.5)
SODIUM SERPL-SCNC: 133 MMOL/L — LOW (ref 135–145)
WBC # BLD: 5.55 K/UL — SIGNIFICANT CHANGE UP (ref 3.8–10.5)
WBC # FLD AUTO: 5.55 K/UL — SIGNIFICANT CHANGE UP (ref 3.8–10.5)

## 2023-03-29 PROCEDURE — 99214 OFFICE O/P EST MOD 30 MIN: CPT

## 2023-03-29 NOTE — PHYSICAL EXAM
[Fully active, able to carry on all pre-disease performance without restriction] : Status 0 - Fully active, able to carry on all pre-disease performance without restriction [Obese] : obese [Normal] : affect appropriate [de-identified] : anicteric  [de-identified] : no JVD [de-identified] : normal respiratory effort, no audible wheeze [de-identified] : reg rate  [de-identified] : no LE edema B/L [de-identified] : soft, non-distended, non-tender

## 2023-03-29 NOTE — HISTORY OF PRESENT ILLNESS
[Disease: _____________________] : Disease: [unfilled] [T: ___] : T[unfilled] [N: ___] : N[unfilled] [M: ___] : M[unfilled] [AJCC Stage: ____] : AJCC Stage: [unfilled] [Research Protocol] : Research Protocol  [de-identified] : Mr Granda in 2021 at the age of 51 presented to the hospital with abdominal pain and underwent imaging that revealed a distal transverse colon lesion with evidence of microperforation.\par He is also underwent a colonoscopy which demonstrated an endoscopically obstructing distal transverse colon mass. Given the potential for impending obstruction as well as microperforation seen on imaging decision was made to take patient to the OR for a left hemicolectomy and anastomosis.\par Imaging done at that time also showed evidence of metastatic disease to the liver and potentially lung and a questionable lesion as well. Patient presents with his sister for initial visit and to discuss palliative chemotherapy.\par \par Enrolled in [employer] [facility] \par \par Started FOLFOX + Vit D July 7th, 2021 (bevacizumab added cycle #2)\par 8/23 scan shows PE started on Lovenox \par October 2021 oxaliplatin held. \par \par KRAS G12D , NABILA \par FOLFOX + Bevacizumab +VitD ([employer] study) --> 5FU Yolis + Vit D --> POD 12/2022 ---> off trial  [de-identified] : Microsatellite stable  [de-identified] : KRAS G12D , NABILA  [FreeTextEntry1] : FOLFIRI/Yolis started 12/20/22 [de-identified] : , no acute complaints\par \par hasn't followed up with PMD for DM and htn management despite multiple conversations \par

## 2023-03-29 NOTE — REVIEW OF SYSTEMS
[Negative] : Allergic/Immunologic [Fatigue] : no fatigue [Abdominal Pain] : no abdominal pain [Vomiting] : no vomiting [Diarrhea] : no diarrhea [Confused] : no confusion [Dizziness] : no dizziness [Fainting] : no fainting [Difficulty Walking] : no difficulty walking [de-identified] : + mild neuropathy in hands/feet

## 2023-03-30 ENCOUNTER — OUTPATIENT (OUTPATIENT)
Dept: OUTPATIENT SERVICES | Facility: HOSPITAL | Age: 53
LOS: 1 days | Discharge: HOME | End: 2023-03-30

## 2023-03-30 ENCOUNTER — NON-APPOINTMENT (OUTPATIENT)
Age: 53
End: 2023-03-30

## 2023-03-30 DIAGNOSIS — C18.9 MALIGNANT NEOPLASM OF COLON, UNSPECIFIED: ICD-10-CM

## 2023-03-31 ENCOUNTER — APPOINTMENT (OUTPATIENT)
Dept: INFUSION THERAPY | Facility: HOSPITAL | Age: 53
End: 2023-03-31

## 2023-04-03 ENCOUNTER — LABORATORY RESULT (OUTPATIENT)
Age: 53
End: 2023-04-03

## 2023-04-03 ENCOUNTER — APPOINTMENT (OUTPATIENT)
Dept: INTERNAL MEDICINE | Facility: CLINIC | Age: 53
End: 2023-04-03
Payer: COMMERCIAL

## 2023-04-03 VITALS
HEIGHT: 65 IN | TEMPERATURE: 98.1 F | RESPIRATION RATE: 14 BRPM | DIASTOLIC BLOOD PRESSURE: 90 MMHG | BODY MASS INDEX: 35.65 KG/M2 | HEART RATE: 83 BPM | WEIGHT: 214 LBS | SYSTOLIC BLOOD PRESSURE: 124 MMHG | OXYGEN SATURATION: 98 %

## 2023-04-03 DIAGNOSIS — E78.5 HYPERLIPIDEMIA, UNSPECIFIED: ICD-10-CM

## 2023-04-03 DIAGNOSIS — Z86.39 PERSONAL HISTORY OF OTHER ENDOCRINE, NUTRITIONAL AND METABOLIC DISEASE: ICD-10-CM

## 2023-04-03 DIAGNOSIS — Z86.79 PERSONAL HISTORY OF OTHER DISEASES OF THE CIRCULATORY SYSTEM: ICD-10-CM

## 2023-04-03 DIAGNOSIS — I10 ESSENTIAL (PRIMARY) HYPERTENSION: ICD-10-CM

## 2023-04-03 PROCEDURE — 99214 OFFICE O/P EST MOD 30 MIN: CPT

## 2023-04-03 RX ORDER — AMLODIPINE BESYLATE 10 MG/1
10 TABLET ORAL
Qty: 30 | Refills: 5 | Status: ACTIVE | COMMUNITY
Start: 2021-09-29 | End: 1900-01-01

## 2023-04-03 NOTE — HEALTH RISK ASSESSMENT
[Yes] : Yes [Monthly or less (1 pt)] : Monthly or less (1 point) [1 or 2 (0 pts)] : 1 or 2 (0 points) [Never] : Never

## 2023-04-03 NOTE — PLAN
[FreeTextEntry1] : #DM2\par Sugars have been uncontrolled on recent bloodwork 300-400s, does not check fingersticks at home\par Check A1C, Urine microalbumin\par Currently on metformin and glipizide, may need to titrate up glipizide dosing\par \par #HTN\par Controlled today\par Continue lisinopril and amlodipine\par \par #HLD\par Check Lipid panel\par Continue statin\par \par #Hx PE\par Continue Xarelto\par \par

## 2023-04-03 NOTE — HISTORY OF PRESENT ILLNESS
[FreeTextEntry1] : DM, HTN follow up [de-identified] : 52y M with PMHx colon cancer with mets to liver and lungs on chemo, chemo-induced neuropathy, HTN, T2DM, HLD, [place], hx PE presenting for follow up. Per pt, his oncologist is concerned about uncontrolled sugars and wanted him to see his PCP prior to proceeding with further chemo treatments. Pt states he does not regularly check his sugars at home, is currently on metformin and glipizide. Does endorse increase in thirst and urination. Notes no changes in vision recently, no increase in fatigue. With better weather coming up patient plans to exercise more, but currently not very active. Reports he eats small portions of vegetables and proteins, has cut down on carbs.

## 2023-04-04 LAB
ALBUMIN SERPL ELPH-MCNC: 4.6 G/DL
ALP BLD-CCNC: 82 U/L
ALT SERPL-CCNC: 24 U/L
ANION GAP SERPL CALC-SCNC: 13 MMOL/L
AST SERPL-CCNC: 17 U/L
BASOPHILS # BLD AUTO: 0.02 K/UL
BASOPHILS NFR BLD AUTO: 0.4 %
BILIRUB SERPL-MCNC: 0.6 MG/DL
BUN SERPL-MCNC: 17 MG/DL
CALCIUM SERPL-MCNC: 9.6 MG/DL
CHLORIDE SERPL-SCNC: 98 MMOL/L
CHOLEST SERPL-MCNC: 217 MG/DL
CO2 SERPL-SCNC: 24 MMOL/L
CREAT SERPL-MCNC: 0.99 MG/DL
CREAT SPEC-SCNC: 76 MG/DL
EGFR: 92 ML/MIN/1.73M2
EOSINOPHIL # BLD AUTO: 0.05 K/UL
EOSINOPHIL NFR BLD AUTO: 1.1 %
ESTIMATED AVERAGE GLUCOSE: 332 MG/DL
GLUCOSE SERPL-MCNC: 364 MG/DL
HBA1C MFR BLD HPLC: 13.2 %
HCT VFR BLD CALC: 44.8 %
HDLC SERPL-MCNC: 79 MG/DL
HGB BLD-MCNC: 14.7 G/DL
IMM GRANULOCYTES NFR BLD AUTO: 0.2 %
LDLC SERPL CALC-MCNC: 122 MG/DL
LYMPHOCYTES # BLD AUTO: 2.11 K/UL
LYMPHOCYTES NFR BLD AUTO: 45.8 %
MAN DIFF?: NORMAL
MCHC RBC-ENTMCNC: 24.4 PG
MCHC RBC-ENTMCNC: 32.8 GM/DL
MCV RBC AUTO: 74.4 FL
MICROALBUMIN 24H UR DL<=1MG/L-MCNC: 4.5 MG/DL
MICROALBUMIN/CREAT 24H UR-RTO: 59 MG/G
MONOCYTES # BLD AUTO: 0.3 K/UL
MONOCYTES NFR BLD AUTO: 6.5 %
NEUTROPHILS # BLD AUTO: 2.12 K/UL
NEUTROPHILS NFR BLD AUTO: 46 %
NONHDLC SERPL-MCNC: 139 MG/DL
PLATELET # BLD AUTO: 239 K/UL
POTASSIUM SERPL-SCNC: 4.9 MMOL/L
PROT SERPL-MCNC: 7.4 G/DL
PSA SERPL-MCNC: 4.46 NG/ML
RBC # BLD: 6.02 M/UL
RBC # FLD: 20.2 %
SODIUM SERPL-SCNC: 135 MMOL/L
TRIGL SERPL-MCNC: 81 MG/DL
TSH SERPL-ACNC: 0.98 UIU/ML
WBC # FLD AUTO: 4.61 K/UL

## 2023-04-08 ENCOUNTER — EMERGENCY (EMERGENCY)
Facility: HOSPITAL | Age: 53
LOS: 1 days | Discharge: HOME | End: 2023-04-08
Attending: EMERGENCY MEDICINE | Admitting: EMERGENCY MEDICINE
Payer: COMMERCIAL

## 2023-04-08 VITALS
SYSTOLIC BLOOD PRESSURE: 107 MMHG | RESPIRATION RATE: 15 BRPM | HEART RATE: 82 BPM | OXYGEN SATURATION: 98 % | TEMPERATURE: 98 F | DIASTOLIC BLOOD PRESSURE: 68 MMHG

## 2023-04-08 VITALS
WEIGHT: 213.85 LBS | HEART RATE: 89 BPM | HEIGHT: 66 IN | DIASTOLIC BLOOD PRESSURE: 67 MMHG | SYSTOLIC BLOOD PRESSURE: 157 MMHG | TEMPERATURE: 98 F | OXYGEN SATURATION: 98 % | RESPIRATION RATE: 16 BRPM

## 2023-04-08 LAB
ALBUMIN SERPL ELPH-MCNC: 3.3 G/DL — SIGNIFICANT CHANGE UP (ref 3.3–5)
ALP SERPL-CCNC: 77 U/L — SIGNIFICANT CHANGE UP (ref 30–120)
ALT FLD-CCNC: 18 U/L DA — SIGNIFICANT CHANGE UP (ref 10–60)
ANION GAP SERPL CALC-SCNC: 9 MMOL/L — SIGNIFICANT CHANGE UP (ref 5–17)
APPEARANCE UR: CLEAR — SIGNIFICANT CHANGE UP
AST SERPL-CCNC: 12 U/L — SIGNIFICANT CHANGE UP (ref 10–40)
BASOPHILS # BLD AUTO: 0.02 K/UL — SIGNIFICANT CHANGE UP (ref 0–0.2)
BASOPHILS NFR BLD AUTO: 0.3 % — SIGNIFICANT CHANGE UP (ref 0–2)
BILIRUB SERPL-MCNC: 0.5 MG/DL — SIGNIFICANT CHANGE UP (ref 0.2–1.2)
BILIRUB UR-MCNC: NEGATIVE — SIGNIFICANT CHANGE UP
BUN SERPL-MCNC: 12 MG/DL — SIGNIFICANT CHANGE UP (ref 7–23)
CALCIUM SERPL-MCNC: 9 MG/DL — SIGNIFICANT CHANGE UP (ref 8.4–10.5)
CHLORIDE SERPL-SCNC: 98 MMOL/L — SIGNIFICANT CHANGE UP (ref 96–108)
CO2 SERPL-SCNC: 24 MMOL/L — SIGNIFICANT CHANGE UP (ref 22–31)
COLOR SPEC: YELLOW — SIGNIFICANT CHANGE UP
CREAT SERPL-MCNC: 1.07 MG/DL — SIGNIFICANT CHANGE UP (ref 0.5–1.3)
DIFF PNL FLD: ABNORMAL
EGFR: 84 ML/MIN/1.73M2 — SIGNIFICANT CHANGE UP
EOSINOPHIL # BLD AUTO: 0.05 K/UL — SIGNIFICANT CHANGE UP (ref 0–0.5)
EOSINOPHIL NFR BLD AUTO: 0.7 % — SIGNIFICANT CHANGE UP (ref 0–6)
GLUCOSE SERPL-MCNC: 304 MG/DL — HIGH (ref 70–99)
GLUCOSE UR QL: 1000 MG/DL
HCT VFR BLD CALC: 41.2 % — SIGNIFICANT CHANGE UP (ref 39–50)
HGB BLD-MCNC: 14 G/DL — SIGNIFICANT CHANGE UP (ref 13–17)
IMM GRANULOCYTES NFR BLD AUTO: 0.4 % — SIGNIFICANT CHANGE UP (ref 0–0.9)
KETONES UR-MCNC: NEGATIVE — SIGNIFICANT CHANGE UP
LEUKOCYTE ESTERASE UR-ACNC: ABNORMAL
LYMPHOCYTES # BLD AUTO: 1.96 K/UL — SIGNIFICANT CHANGE UP (ref 1–3.3)
LYMPHOCYTES # BLD AUTO: 29.1 % — SIGNIFICANT CHANGE UP (ref 13–44)
MCHC RBC-ENTMCNC: 24.9 PG — LOW (ref 27–34)
MCHC RBC-ENTMCNC: 34 GM/DL — SIGNIFICANT CHANGE UP (ref 32–36)
MCV RBC AUTO: 73.3 FL — LOW (ref 80–100)
MONOCYTES # BLD AUTO: 1.04 K/UL — HIGH (ref 0–0.9)
MONOCYTES NFR BLD AUTO: 15.4 % — HIGH (ref 2–14)
NEUTROPHILS # BLD AUTO: 3.64 K/UL — SIGNIFICANT CHANGE UP (ref 1.8–7.4)
NEUTROPHILS NFR BLD AUTO: 54.1 % — SIGNIFICANT CHANGE UP (ref 43–77)
NITRITE UR-MCNC: POSITIVE
NRBC # BLD: 0 /100 WBCS — SIGNIFICANT CHANGE UP (ref 0–0)
PH UR: 6 — SIGNIFICANT CHANGE UP (ref 5–8)
PLATELET # BLD AUTO: 249 K/UL — SIGNIFICANT CHANGE UP (ref 150–400)
POTASSIUM SERPL-MCNC: 4.2 MMOL/L — SIGNIFICANT CHANGE UP (ref 3.5–5.3)
POTASSIUM SERPL-SCNC: 4.2 MMOL/L — SIGNIFICANT CHANGE UP (ref 3.5–5.3)
PROT SERPL-MCNC: 7.4 G/DL — SIGNIFICANT CHANGE UP (ref 6–8.3)
PROT UR-MCNC: 100 MG/DL
RBC # BLD: 5.62 M/UL — SIGNIFICANT CHANGE UP (ref 4.2–5.8)
RBC # FLD: 19.7 % — HIGH (ref 10.3–14.5)
SODIUM SERPL-SCNC: 131 MMOL/L — LOW (ref 135–145)
SP GR SPEC: 1.01 — SIGNIFICANT CHANGE UP (ref 1.01–1.02)
UROBILINOGEN FLD QL: NEGATIVE MG/DL — SIGNIFICANT CHANGE UP
WBC # BLD: 6.74 K/UL — SIGNIFICANT CHANGE UP (ref 3.8–10.5)
WBC # FLD AUTO: 6.74 K/UL — SIGNIFICANT CHANGE UP (ref 3.8–10.5)

## 2023-04-08 PROCEDURE — 80053 COMPREHEN METABOLIC PANEL: CPT

## 2023-04-08 PROCEDURE — 36415 COLL VENOUS BLD VENIPUNCTURE: CPT

## 2023-04-08 PROCEDURE — 81001 URINALYSIS AUTO W/SCOPE: CPT

## 2023-04-08 PROCEDURE — 87086 URINE CULTURE/COLONY COUNT: CPT

## 2023-04-08 PROCEDURE — 96374 THER/PROPH/DIAG INJ IV PUSH: CPT | Mod: XU

## 2023-04-08 PROCEDURE — 87186 SC STD MICRODIL/AGAR DIL: CPT

## 2023-04-08 PROCEDURE — 99284 EMERGENCY DEPT VISIT MOD MDM: CPT | Mod: 25

## 2023-04-08 PROCEDURE — 99284 EMERGENCY DEPT VISIT MOD MDM: CPT

## 2023-04-08 PROCEDURE — 96375 TX/PRO/DX INJ NEW DRUG ADDON: CPT

## 2023-04-08 PROCEDURE — 74177 CT ABD & PELVIS W/CONTRAST: CPT | Mod: 26,MA

## 2023-04-08 PROCEDURE — 96361 HYDRATE IV INFUSION ADD-ON: CPT

## 2023-04-08 PROCEDURE — 74177 CT ABD & PELVIS W/CONTRAST: CPT | Mod: MA

## 2023-04-08 PROCEDURE — 85025 COMPLETE CBC W/AUTO DIFF WBC: CPT

## 2023-04-08 RX ORDER — PHENAZOPYRIDINE HCL 100 MG
100 TABLET ORAL ONCE
Refills: 0 | Status: COMPLETED | OUTPATIENT
Start: 2023-04-08 | End: 2023-04-08

## 2023-04-08 RX ORDER — SODIUM CHLORIDE 9 MG/ML
1000 INJECTION INTRAMUSCULAR; INTRAVENOUS; SUBCUTANEOUS ONCE
Refills: 0 | Status: COMPLETED | OUTPATIENT
Start: 2023-04-08 | End: 2023-04-08

## 2023-04-08 RX ORDER — PHENAZOPYRIDINE HCL 100 MG
2 TABLET ORAL
Qty: 12 | Refills: 0
Start: 2023-04-08 | End: 2023-04-09

## 2023-04-08 RX ORDER — CEFUROXIME AXETIL 250 MG
1 TABLET ORAL
Qty: 20 | Refills: 0
Start: 2023-04-08 | End: 2023-04-17

## 2023-04-08 RX ORDER — KETOROLAC TROMETHAMINE 30 MG/ML
15 SYRINGE (ML) INJECTION ONCE
Refills: 0 | Status: DISCONTINUED | OUTPATIENT
Start: 2023-04-08 | End: 2023-04-08

## 2023-04-08 RX ORDER — CEFTRIAXONE 500 MG/1
1000 INJECTION, POWDER, FOR SOLUTION INTRAMUSCULAR; INTRAVENOUS ONCE
Refills: 0 | Status: COMPLETED | OUTPATIENT
Start: 2023-04-08 | End: 2023-04-08

## 2023-04-08 RX ADMIN — SODIUM CHLORIDE 1000 MILLILITER(S): 9 INJECTION INTRAMUSCULAR; INTRAVENOUS; SUBCUTANEOUS at 09:39

## 2023-04-08 RX ADMIN — Medication 15 MILLIGRAM(S): at 09:09

## 2023-04-08 RX ADMIN — CEFTRIAXONE 1000 MILLIGRAM(S): 500 INJECTION, POWDER, FOR SOLUTION INTRAMUSCULAR; INTRAVENOUS at 01:00

## 2023-04-08 RX ADMIN — Medication 100 MILLIGRAM(S): at 09:09

## 2023-04-08 RX ADMIN — SODIUM CHLORIDE 1000 MILLILITER(S): 9 INJECTION INTRAMUSCULAR; INTRAVENOUS; SUBCUTANEOUS at 08:28

## 2023-04-08 RX ADMIN — CEFTRIAXONE 100 MILLIGRAM(S): 500 INJECTION, POWDER, FOR SOLUTION INTRAMUSCULAR; INTRAVENOUS at 09:09

## 2023-04-08 RX ADMIN — Medication 15 MILLIGRAM(S): at 09:39

## 2023-04-08 NOTE — ED ADULT NURSE NOTE - CHIEF COMPLAINT QUOTE
" I have pain on my lower abdominal area for 2 days now , It hurts too much to pee " PMH DM 2  Colon Ca  - currently chemotherapy [facility] No fever, vomiting,  diarrhea or hematuria  Pt took Tylenol yesterday

## 2023-04-08 NOTE — ED ADULT NURSE NOTE - NSIMPLEMENTINTERV_GEN_ALL_ED
"Subjective:       Patient ID: Flower Perez is a 73 y.o. female.    Vitals:  height is 5' 3" (1.6 m) and weight is 119.7 kg (264 lb). Her temperature is 98.7 °F (37.1 °C). Her blood pressure is 146/85 (abnormal) and her pulse is 103. Her respiration is 18 and oxygen saturation is 95%.     Chief Complaint: Nasal Congestion (yesterday)    Pt is having nasal congestion and headaches from sinus pressure since yesterday. Pt says the pressure is located frontal sinus area. Pt tried taking mucinex but no improvement. States she feels mucous in the throat causing sore throat. Reports nasal congestion, post-nasal drip, mild cough, no fevers, no chills, no other complaints.      Other   This is a new problem. The current episode started yesterday. The problem occurs constantly. The problem has been unchanged. Associated symptoms include congestion (green mucus), coughing (dry), headaches (top of head) and a sore throat. Pertinent negatives include no abdominal pain, chest pain, myalgias or nausea. Nothing aggravates the symptoms. The treatment provided mild relief.     Review of Systems   Constitution: Negative for malaise/fatigue.   HENT: Positive for congestion (green mucus) and sore throat. Negative for ear pain and hoarse voice.    Eyes: Negative for discharge and redness.   Cardiovascular: Negative for chest pain, dyspnea on exertion and leg swelling.   Respiratory: Positive for cough (dry). Negative for shortness of breath and sputum production.    Musculoskeletal: Negative for myalgias.   Gastrointestinal: Negative for abdominal pain and nausea.   Neurological: Positive for headaches (top of head).       Objective:      Physical Exam   Constitutional: She is oriented to person, place, and time. She appears well-developed and well-nourished. She is cooperative.  Non-toxic appearance. She does not appear ill. No distress.   HENT:   Head: Normocephalic and atraumatic.   Right Ear: Hearing, tympanic membrane, external " ear and ear canal normal.   Left Ear: Hearing, tympanic membrane, external ear and ear canal normal.   Nose: No mucosal edema, rhinorrhea or nasal deformity. No epistaxis. Right sinus exhibits no maxillary sinus tenderness and no frontal sinus tenderness. Left sinus exhibits no maxillary sinus tenderness and no frontal sinus tenderness.   Mouth/Throat: Uvula is midline, oropharynx is clear and moist and mucous membranes are normal. No trismus in the jaw. Normal dentition. No uvula swelling. No posterior oropharyngeal erythema.   Able to see posterior op mucosal drainage  Sinus pressure and ttp of the maxillary sinuses and to a lesser degree the frontal sinuses   Eyes: Conjunctivae and lids are normal. No scleral icterus.   Sclera clear bilat   Neck: Trachea normal, full passive range of motion without pain and phonation normal. Neck supple.   Cardiovascular: Normal rate, regular rhythm, normal heart sounds, intact distal pulses and normal pulses.    Pulmonary/Chest: Effort normal and breath sounds normal. No respiratory distress.   Abdominal: Soft. Normal appearance and bowel sounds are normal. There is no tenderness.   Musculoskeletal: Normal range of motion. She exhibits no edema or deformity.   Neurological: She is alert and oriented to person, place, and time. She exhibits normal muscle tone.   Skin: Skin is warm, dry and intact. She is not diaphoretic. No pallor.   Psychiatric: She has a normal mood and affect. Her speech is normal and behavior is normal. Cognition and memory are normal.   Nursing note and vitals reviewed.      Assessment:       1. Acute non-recurrent sinusitis, unspecified location        Plan:         Acute non-recurrent sinusitis, unspecified location    Other orders  -     betamethasone acetate-betamethasone sodium phosphate injection 6 mg; Inject 1 mL (6 mg total) into the muscle one time.  -     azelastine-fluticasone 137-50 mcg/spray Spry nassal spray; 1 spray by Each Nare route 2 (two)  times daily.  Dispense: 1 Bottle; Refill: 0  -     cefdinir (OMNICEF) 300 MG capsule; Take 1 capsule (300 mg total) by mouth every 12 (twelve) hours.  Dispense: 14 capsule; Refill: 0      Patient Instructions   CELESTONE 1 CC GIVEN IN CLINIC-DECREASE CARBOHYDRATE FOR 3-4 DAYS AFTER HAVING BEEN GIVEN THIS SHOT AS IT MAY TEMPORARILY MILDLY INCREASE YOUR BLOOD SUGAR.    OVER THE COUNTER CLARITIN OR ALLEGRA OR ZYRTEC AND GENERIC IS FINE  DYMISTA NASAL SPRAY RX  CEFDINIR RX  1 CC CELESTONE GIVEN IN CLINIC  TYLENOL OR IBUPROFEN FOR SINUS HEADACHE OR SORE THROAT    SEE SINUSITIS SHEET  FOLLOW UP WITH YOUR PRIMARY CARE MD      Acute Sinusitis    Acute sinusitis is irritation and swelling of the sinuses. It is usually caused by a viral infection after a common cold. Your doctor can help you find relief.  What is acute sinusitis?  Sinuses are air-filled spaces in the skull behind the face. They are kept moist and clean by a lining of mucosa. Things such as pollen, smoke, and chemical fumes can irritate the mucosa. It can then swell up. As a response to irritation, the mucosa makes more mucus and other fluids. Tiny hairlike cilia cover the mucosa. Cilia help carry mucus toward the opening of the sinus. Too much mucus may cause the cilia to stop working. This blocks the sinus opening. A buildup of fluid in the sinuses then causes pain and pressure. It can also encourage bacteria to grow in the sinuses.  Common symptoms of acute sinusitis  You may have:  · Facial soreness pain  · Headache  · Fever  · Fluid draining in the back of the throat (postnasal drip)  · Congestion  · Drainage that is thick and colored, instead of clear  · Cough  Diagnosing acute sinusitis  Your doctor will ask about your symptoms and health history. He or she will look at your ear, nose, and throat. You usually won't need to have X-rays taken.    The doctor may take a sample of mucus to check for bacteria. If you have sinusitis that keeps coming back, you  may need imaging tests such as X-rays or CAT scans. This will help your doctor check for a structural problem that may be causing the infection.  Treating acute sinusitis  Treatment is aimed at unblocking the sinus opening and helping the cilia work again. You may need to take antihistamine and decongestant medicine. These can reduce inflammation and decrease the amount of fluid your sinuses make. If you have a bacterial infection, you will need to take antibiotic medicine for 10 to 14 days. Take this medicine until it is gone, even if you feel better.  Date Last Reviewed: 10/1/2016  © 5162-3472 [employer] [address] [place] [address], [place] [address]. All rights reserved. This information is not intended as a substitute for professional medical care. Always follow your healthcare professional's instructions.                Implemented All Universal Safety Interventions:  [place] to call system. Call bell, personal items and telephone within reach. Instruct patient to call for assistance. Room bathroom lighting operational. Non-slip footwear when patient is off stretcher. Physically safe environment: no spills, clutter or unnecessary equipment. Stretcher in lowest position, wheels locked, appropriate side rails in place.

## 2023-04-08 NOTE — ED PROVIDER NOTE - NSFOLLOWUPINSTRUCTIONS_ED_ALL_ED_FT
Return to the ED for any new or worsening symptoms including but not limited to fever T max of 100.4 or greater, worsening pain, increased difficulty urinating or any other concerns.   Follow up with your PMD for further management of your blood sugars   Take your medication as prescribed  Ceftin 1 tab 2 times a day   Pyridium per label instructions as needed for pain this will make our urine turn orange  Follow up with your PMD in 1-2 days for recheck   Follow up with your urologist at [facility] in 1-2 days for a recheck         Urinary Tract Infection, Adult    A urinary tract infection (UTI) is an infection of any part of the urinary tract. The urinary tract includes:  The kidneys.  The ureters.  The bladder.  The urethra.  These organs make, store, and get rid of pee (urine) in the body.    What are the causes?  This infection is caused by germs (bacteria) in your genital area. These germs grow and cause swelling (inflammation) of your urinary tract.    What increases the risk?  The following factors may make you more likely to develop this condition:  Using a small, thin tube (catheter) to drain pee.  Not being able to control when you pee or poop (incontinence).  Being female. If you are female, these things can increase the risk:  Using these methods to prevent pregnancy:  A medicine that kills sperm (spermicide).  A device that blocks sperm (diaphragm).  Having low levels of a female hormone (estrogen).  Being pregnant.  You are more likely to develop this condition if:  You have genes that add to your risk.  You are sexually active.  You take antibiotic medicines.  You have trouble peeing because of:  A prostate that is bigger than normal, if you are male.  A blockage in the part of your body that drains pee from the bladder.  A kidney stone.  A nerve condition that affects your bladder.  Not getting enough to drink.  Not peeing often enough.  You have other conditions, such as:  Diabetes.  A weak disease-fighting system (immune system).  Sickle cell disease.  Gout.  Injury of the spine.  What are the signs or symptoms?  Symptoms of this condition include:  Needing to pee right away.  Peeing small amounts often.  Pain or burning when peeing.  Blood in the pee.  Pee that smells bad or not like normal.  Trouble peeing.  Pee that is cloudy.  Fluid coming from the vagina, if you are female.  Pain in the belly or lower back.  Other symptoms include:  Vomiting.  Not feeling hungry.  Feeling mixed up (confused). This may be the first symptom in older adults.  Being tired and grouchy (irritable).  A fever.  Watery poop (diarrhea).  How is this treated?  Taking antibiotic medicine.  Taking other medicines.  Drinking enough water.  In some cases, you may need to see a specialist.    Follow these instructions at home:    Medicines    Take over-the-counter and prescription medicines only as told by your doctor.  If you were prescribed an antibiotic medicine, take it as told by your doctor. Do not stop taking it even if you start to feel better.  General instructions    Make sure you:  Pee until your bladder is empty.  Do not hold pee for a long time.  Empty your bladder after sex.  Wipe from front to back after peeing or pooping if you are a female. Use each tissue one time when you wipe.  Drink enough fluid to keep your pee pale yellow.  Keep all follow-up visits.  Contact a doctor if:  You do not get better after 1–2 days.  Your symptoms go away and then come back.  Get help right away if:  You have very bad back pain.  You have very bad pain in your lower belly.  You have a fever.  You have chills.  You feeling like you will vomit or you vomit.  Summary  A urinary tract infection (UTI) is an infection of any part of the urinary tract.  This condition is caused by germs in your genital area.  There are many risk factors for a UTI.  Treatment includes antibiotic medicines.  Drink enough fluid to keep your pee pale yellow.  This information is not intended to replace advice given to you by your health care provider. Make sure you discuss any questions you have with your health care provider.

## 2023-04-08 NOTE — ED PROVIDER NOTE - DIFFERENTIAL DIAGNOSIS
Differential Diagnosis Differential includes but not limited to UTI, vs renal stone, vs additional abdominal pathology especially in light of pt known colon cancer

## 2023-04-08 NOTE — ED ADULT TRIAGE NOTE - HISTORY OF COVID-19 VACCINATION
Patient was last seen 7/14/21 and has an appointment 11/17/21.   Script was eprescribed to pharmacy per Dr. Catalan for 6 months.      
Yes

## 2023-04-08 NOTE — ED PROVIDER NOTE - PHYSICAL EXAMINATION
exam: chaperoned by Nagi Romano RN pt uncircumcised no vesicular lesions noted no penile discharge no inguinal TTP no testicular TTP

## 2023-04-08 NOTE — ED ADULT NURSE NOTE - OBJECTIVE STATEMENT
patient is from home, c/o lower abdominal pain x 2 days, hx of DM, pain got worse with urination, Patient denies sick contacts/ no fever/chills/cough at this time, denies SOB and no acute distress. denies n/v or chest pain, also c/o urgency and incontinent, IV accessed and tolerating. Labs drawn & sent results pending. will continue to monitor.

## 2023-04-08 NOTE — ED PROVIDER NOTE - PATIENT PORTAL LINK FT
You can access the FollowMyHealth Patient Portal offered by [facility] by registering at the following website: [email or web address]. By joining [facility]’s FollowMyHealth portal, you will also be able to view your health information using other applications (apps) compatible with our system.

## 2023-04-08 NOTE — ED PROVIDER NOTE - CLINICAL SUMMARY MEDICAL DECISION MAKING FREE TEXT BOX
Pt is a 52-year-old gentleman who presents to the emergency room with a chief complaint of concern for possible UTI.  Past medical history adenocarcinoma of the colon with metastasis to the liver status post a left hemicolectomy with anastomosis currently on chemotherapy, iron deficiency anemia, hypertension, diabetes, chemotherapy induced nephropathy, history of recurrent UTIs, hyperlipidemia, history of a PE on Xarelto.  Patient last received chemotherapy March 29.  For the last 2 days he has been experiencing dysuria with increased urinary frequency and decreased urine output.  He also reports lower abdominal discomfort.  Denies fevers chills nausea vomiting chest pain or shortness of breath.  Has been taking Tylenol with no real improvement in symptoms. Differential includes but not limited to UTI, vs renal stone, vs additional abdominal pathology especially in light of pt known colon cancer. Pt is a 52-year-old gentleman who presents to the emergency room with a chief complaint of concern for possible UTI.  Past medical history adenocarcinoma of the colon with metastasis to the liver status post a left hemicolectomy with anastomosis currently on chemotherapy, iron deficiency anemia, hypertension, diabetes, chemotherapy induced nephropathy, history of recurrent UTIs, hyperlipidemia, history of a PE on Xarelto.  Patient last received chemotherapy March 29.  For the last 2 days he has been experiencing dysuria with increased urinary frequency and decreased urine output.  He also reports lower abdominal discomfort.  Denies fevers chills nausea vomiting chest pain or shortness of breath.  Has been taking Tylenol with no real improvement in symptoms. Differential includes but not limited to UTI, vs renal stone, vs additional abdominal pathology especially in light of pt known colon cancer. Results of labs reviewed patient with a normal white count, serum sodium corrects to 136.  Elevated glucose but Normal.  UA does appear to be infected.  CT imaging shows mild fullness of the right renal collecting system although the kidney itself looks fine.  No obstructing stones.  Non obstructing right renal calculi, redemonstration of hepatic and pulmonary metastatic disease.  Copy of results provided patient resting comfortably appears nontoxic at this time.  Offered admission but will trial outpatient p.o. antibiotics.  Strict return precautions reviewed.

## 2023-04-08 NOTE — ED ADULT TRIAGE NOTE - CHIEF COMPLAINT QUOTE
" I have pain on my lower abdominal area for 2 days now , It hurts too much to pee " PMH DM 2  No fever, vomiting,  diarrhea or hematuria  Pt took Tylenol yesterday " I have pain on my lower abdominal area for 2 days now , It hurts too much to pee " PMH DM 2  Colon Ca  - currently chemotherapy [facility] No fever, vomiting,  diarrhea or hematuria  Pt took Tylenol yesterday

## 2023-04-08 NOTE — ED PROVIDER NOTE - OBJECTIVE STATEMENT
Pt is a 52-year-old gentleman who presents to the emergency room with a chief complaint of concern for possible UTI.  Past medical history adenocarcinoma of the colon with metastasis to the liver status post a left hemicolectomy with anastomosis currently on chemotherapy, iron deficiency anemia, hypertension, diabetes, chemotherapy induced nephropathy, history of recurrent UTIs, hyperlipidemia, history of a PE on Xarelto.  Patient last received chemotherapy March 29.  For the last 2 days he has been experiencing dysuria with increased urinary frequency and decreased urine output.  He also reports lower abdominal discomfort.  Denies fevers chills nausea vomiting chest pain or shortness of breath.  Has been taking Tylenol with no real improvement in symptoms.

## 2023-04-12 ENCOUNTER — RESULT REVIEW (OUTPATIENT)
Age: 53
End: 2023-04-12

## 2023-04-12 ENCOUNTER — APPOINTMENT (OUTPATIENT)
Dept: INFUSION THERAPY | Facility: HOSPITAL | Age: 53
End: 2023-04-12

## 2023-04-12 ENCOUNTER — NON-APPOINTMENT (OUTPATIENT)
Age: 53
End: 2023-04-12

## 2023-04-12 ENCOUNTER — APPOINTMENT (OUTPATIENT)
Dept: HEMATOLOGY ONCOLOGY | Facility: CLINIC | Age: 53
End: 2023-04-12

## 2023-04-12 LAB
24R-OH-CALCIDIOL SERPL-MCNC: 24.5 NG/ML — LOW (ref 30–80)
ALBUMIN SERPL ELPH-MCNC: 3.9 G/DL — SIGNIFICANT CHANGE UP (ref 3.3–5)
ALP SERPL-CCNC: 75 U/L — SIGNIFICANT CHANGE UP (ref 40–120)
ALT FLD-CCNC: 20 U/L — SIGNIFICANT CHANGE UP (ref 10–45)
ANION GAP SERPL CALC-SCNC: 13 MMOL/L — SIGNIFICANT CHANGE UP (ref 5–17)
AST SERPL-CCNC: 18 U/L — SIGNIFICANT CHANGE UP (ref 10–40)
BASOPHILS # BLD AUTO: 0.02 K/UL — SIGNIFICANT CHANGE UP (ref 0–0.2)
BASOPHILS NFR BLD AUTO: 0.3 % — SIGNIFICANT CHANGE UP (ref 0–2)
BILIRUB SERPL-MCNC: 0.4 MG/DL — SIGNIFICANT CHANGE UP (ref 0.2–1.2)
BUN SERPL-MCNC: 11 MG/DL — SIGNIFICANT CHANGE UP (ref 7–23)
CALCIUM SERPL-MCNC: 9 MG/DL — SIGNIFICANT CHANGE UP (ref 8.4–10.5)
CEA SERPL-MCNC: 513 NG/ML — HIGH (ref 0–3.8)
CHLORIDE SERPL-SCNC: 101 MMOL/L — SIGNIFICANT CHANGE UP (ref 96–108)
CO2 SERPL-SCNC: 22 MMOL/L — SIGNIFICANT CHANGE UP (ref 22–31)
CREAT ?TM UR-MCNC: 193 MG/DL — SIGNIFICANT CHANGE UP
CREAT SERPL-MCNC: 0.88 MG/DL — SIGNIFICANT CHANGE UP (ref 0.5–1.3)
EGFR: 103 ML/MIN/1.73M2 — SIGNIFICANT CHANGE UP
EOSINOPHIL # BLD AUTO: 0.13 K/UL — SIGNIFICANT CHANGE UP (ref 0–0.5)
EOSINOPHIL NFR BLD AUTO: 2.2 % — SIGNIFICANT CHANGE UP (ref 0–6)
GLUCOSE SERPL-MCNC: 237 MG/DL — HIGH (ref 70–99)
HCT VFR BLD CALC: 40.4 % — SIGNIFICANT CHANGE UP (ref 39–50)
HGB BLD-MCNC: 13.7 G/DL — SIGNIFICANT CHANGE UP (ref 13–17)
IMM GRANULOCYTES NFR BLD AUTO: 0.2 % — SIGNIFICANT CHANGE UP (ref 0–0.9)
LYMPHOCYTES # BLD AUTO: 2.03 K/UL — SIGNIFICANT CHANGE UP (ref 1–3.3)
LYMPHOCYTES # BLD AUTO: 34.9 % — SIGNIFICANT CHANGE UP (ref 13–44)
MCHC RBC-ENTMCNC: 24.6 PG — LOW (ref 27–34)
MCHC RBC-ENTMCNC: 33.9 G/DL — SIGNIFICANT CHANGE UP (ref 32–36)
MCV RBC AUTO: 72.7 FL — LOW (ref 80–100)
MONOCYTES # BLD AUTO: 0.69 K/UL — SIGNIFICANT CHANGE UP (ref 0–0.9)
MONOCYTES NFR BLD AUTO: 11.9 % — SIGNIFICANT CHANGE UP (ref 2–14)
NEUTROPHILS # BLD AUTO: 2.94 K/UL — SIGNIFICANT CHANGE UP (ref 1.8–7.4)
NEUTROPHILS NFR BLD AUTO: 50.5 % — SIGNIFICANT CHANGE UP (ref 43–77)
NRBC # BLD: 0 /100 WBCS — SIGNIFICANT CHANGE UP (ref 0–0)
PLATELET # BLD AUTO: 213 K/UL — SIGNIFICANT CHANGE UP (ref 150–400)
POTASSIUM SERPL-MCNC: 4.2 MMOL/L — SIGNIFICANT CHANGE UP (ref 3.5–5.3)
POTASSIUM SERPL-SCNC: 4.2 MMOL/L — SIGNIFICANT CHANGE UP (ref 3.5–5.3)
PROT ?TM UR-MCNC: 21 MG/DL — HIGH (ref 0–12)
PROT SERPL-MCNC: 6.2 G/DL — SIGNIFICANT CHANGE UP (ref 6–8.3)
PROT/CREAT UR-RTO: 0.1 RATIO — SIGNIFICANT CHANGE UP (ref 0–0.2)
RBC # BLD: 5.56 M/UL — SIGNIFICANT CHANGE UP (ref 4.2–5.8)
RBC # FLD: 19.4 % — HIGH (ref 10.3–14.5)
SODIUM SERPL-SCNC: 137 MMOL/L — SIGNIFICANT CHANGE UP (ref 135–145)
WBC # BLD: 5.82 K/UL — SIGNIFICANT CHANGE UP (ref 3.8–10.5)
WBC # FLD AUTO: 5.82 K/UL — SIGNIFICANT CHANGE UP (ref 3.8–10.5)

## 2023-04-13 DIAGNOSIS — Z51.11 ENCOUNTER FOR ANTINEOPLASTIC CHEMOTHERAPY: ICD-10-CM

## 2023-04-13 DIAGNOSIS — R11.2 NAUSEA WITH VOMITING, UNSPECIFIED: ICD-10-CM

## 2023-04-13 DIAGNOSIS — E86.0 DEHYDRATION: ICD-10-CM

## 2023-04-14 ENCOUNTER — APPOINTMENT (OUTPATIENT)
Dept: INFUSION THERAPY | Facility: HOSPITAL | Age: 53
End: 2023-04-14

## 2023-04-18 ENCOUNTER — RESULT REVIEW (OUTPATIENT)
Age: 53
End: 2023-04-18

## 2023-04-26 ENCOUNTER — APPOINTMENT (OUTPATIENT)
Dept: HEMATOLOGY ONCOLOGY | Facility: CLINIC | Age: 53
End: 2023-04-26

## 2023-04-26 ENCOUNTER — RESULT REVIEW (OUTPATIENT)
Age: 53
End: 2023-04-26

## 2023-04-26 ENCOUNTER — APPOINTMENT (OUTPATIENT)
Dept: DERMATOLOGY | Facility: CLINIC | Age: 53
End: 2023-04-26

## 2023-04-26 ENCOUNTER — APPOINTMENT (OUTPATIENT)
Dept: INFUSION THERAPY | Facility: HOSPITAL | Age: 53
End: 2023-04-26

## 2023-04-26 LAB
ALBUMIN SERPL ELPH-MCNC: 4.1 G/DL — SIGNIFICANT CHANGE UP (ref 3.3–5)
ALP SERPL-CCNC: 82 U/L — SIGNIFICANT CHANGE UP (ref 40–120)
ALT FLD-CCNC: 14 U/L — SIGNIFICANT CHANGE UP (ref 10–45)
ANION GAP SERPL CALC-SCNC: 14 MMOL/L — SIGNIFICANT CHANGE UP (ref 5–17)
AST SERPL-CCNC: 16 U/L — SIGNIFICANT CHANGE UP (ref 10–40)
BASOPHILS # BLD AUTO: 0.02 K/UL — SIGNIFICANT CHANGE UP (ref 0–0.2)
BASOPHILS NFR BLD AUTO: 0.3 % — SIGNIFICANT CHANGE UP (ref 0–2)
BILIRUB SERPL-MCNC: 0.6 MG/DL — SIGNIFICANT CHANGE UP (ref 0.2–1.2)
BUN SERPL-MCNC: 10 MG/DL — SIGNIFICANT CHANGE UP (ref 7–23)
CALCIUM SERPL-MCNC: 9.3 MG/DL — SIGNIFICANT CHANGE UP (ref 8.4–10.5)
CHLORIDE SERPL-SCNC: 99 MMOL/L — SIGNIFICANT CHANGE UP (ref 96–108)
CO2 SERPL-SCNC: 22 MMOL/L — SIGNIFICANT CHANGE UP (ref 22–31)
CREAT SERPL-MCNC: 0.85 MG/DL — SIGNIFICANT CHANGE UP (ref 0.5–1.3)
EGFR: 104 ML/MIN/1.73M2 — SIGNIFICANT CHANGE UP
EOSINOPHIL # BLD AUTO: 0.21 K/UL — SIGNIFICANT CHANGE UP (ref 0–0.5)
EOSINOPHIL NFR BLD AUTO: 3.4 % — SIGNIFICANT CHANGE UP (ref 0–6)
GLUCOSE SERPL-MCNC: 273 MG/DL — HIGH (ref 70–99)
HCT VFR BLD CALC: 42.2 % — SIGNIFICANT CHANGE UP (ref 39–50)
HGB BLD-MCNC: 14.2 G/DL — SIGNIFICANT CHANGE UP (ref 13–17)
IMM GRANULOCYTES NFR BLD AUTO: 0.2 % — SIGNIFICANT CHANGE UP (ref 0–0.9)
LYMPHOCYTES # BLD AUTO: 1.99 K/UL — SIGNIFICANT CHANGE UP (ref 1–3.3)
LYMPHOCYTES # BLD AUTO: 32.6 % — SIGNIFICANT CHANGE UP (ref 13–44)
MCHC RBC-ENTMCNC: 24.3 PG — LOW (ref 27–34)
MCHC RBC-ENTMCNC: 33.6 G/DL — SIGNIFICANT CHANGE UP (ref 32–36)
MCV RBC AUTO: 72.3 FL — LOW (ref 80–100)
MONOCYTES # BLD AUTO: 0.63 K/UL — SIGNIFICANT CHANGE UP (ref 0–0.9)
MONOCYTES NFR BLD AUTO: 10.3 % — SIGNIFICANT CHANGE UP (ref 2–14)
NEUTROPHILS # BLD AUTO: 3.25 K/UL — SIGNIFICANT CHANGE UP (ref 1.8–7.4)
NEUTROPHILS NFR BLD AUTO: 53.2 % — SIGNIFICANT CHANGE UP (ref 43–77)
NRBC # BLD: 0 /100 WBCS — SIGNIFICANT CHANGE UP (ref 0–0)
PLATELET # BLD AUTO: 252 K/UL — SIGNIFICANT CHANGE UP (ref 150–400)
POTASSIUM SERPL-MCNC: 4.6 MMOL/L — SIGNIFICANT CHANGE UP (ref 3.5–5.3)
POTASSIUM SERPL-SCNC: 4.6 MMOL/L — SIGNIFICANT CHANGE UP (ref 3.5–5.3)
PROT SERPL-MCNC: 6.8 G/DL — SIGNIFICANT CHANGE UP (ref 6–8.3)
RBC # BLD: 5.84 M/UL — HIGH (ref 4.2–5.8)
RBC # FLD: 20.4 % — HIGH (ref 10.3–14.5)
SODIUM SERPL-SCNC: 135 MMOL/L — SIGNIFICANT CHANGE UP (ref 135–145)
WBC # BLD: 6.11 K/UL — SIGNIFICANT CHANGE UP (ref 3.8–10.5)
WBC # FLD AUTO: 6.11 K/UL — SIGNIFICANT CHANGE UP (ref 3.8–10.5)

## 2023-04-28 ENCOUNTER — APPOINTMENT (OUTPATIENT)
Dept: INFUSION THERAPY | Facility: HOSPITAL | Age: 53
End: 2023-04-28

## 2023-05-07 ENCOUNTER — OUTPATIENT (OUTPATIENT)
Dept: OUTPATIENT SERVICES | Facility: HOSPITAL | Age: 53
LOS: 1 days | End: 2023-05-07
Payer: COMMERCIAL

## 2023-05-07 ENCOUNTER — APPOINTMENT (OUTPATIENT)
Dept: CT IMAGING | Facility: IMAGING CENTER | Age: 53
End: 2023-05-07
Payer: COMMERCIAL

## 2023-05-07 DIAGNOSIS — C18.9 MALIGNANT NEOPLASM OF COLON, UNSPECIFIED: ICD-10-CM

## 2023-05-07 DIAGNOSIS — Z00.8 ENCOUNTER FOR OTHER GENERAL EXAMINATION: ICD-10-CM

## 2023-05-07 PROCEDURE — 71260 CT THORAX DX C+: CPT

## 2023-05-07 PROCEDURE — 74177 CT ABD & PELVIS W/CONTRAST: CPT | Mod: 26

## 2023-05-07 PROCEDURE — 71260 CT THORAX DX C+: CPT | Mod: 26

## 2023-05-07 PROCEDURE — 74177 CT ABD & PELVIS W/CONTRAST: CPT

## 2023-05-08 ENCOUNTER — APPOINTMENT (OUTPATIENT)
Dept: HEMATOLOGY ONCOLOGY | Facility: CLINIC | Age: 53
End: 2023-05-08

## 2023-05-08 ENCOUNTER — APPOINTMENT (OUTPATIENT)
Dept: ENDOCRINOLOGY | Facility: CLINIC | Age: 53
End: 2023-05-08

## 2023-05-10 ENCOUNTER — RESULT REVIEW (OUTPATIENT)
Age: 53
End: 2023-05-10

## 2023-05-10 ENCOUNTER — APPOINTMENT (OUTPATIENT)
Dept: HEMATOLOGY ONCOLOGY | Facility: CLINIC | Age: 53
End: 2023-05-10

## 2023-05-10 ENCOUNTER — APPOINTMENT (OUTPATIENT)
Dept: HEMATOLOGY ONCOLOGY | Facility: CLINIC | Age: 53
End: 2023-05-10
Payer: COMMERCIAL

## 2023-05-10 ENCOUNTER — APPOINTMENT (OUTPATIENT)
Dept: INFUSION THERAPY | Facility: HOSPITAL | Age: 53
End: 2023-05-10

## 2023-05-10 VITALS
TEMPERATURE: 97 F | DIASTOLIC BLOOD PRESSURE: 84 MMHG | WEIGHT: 214.73 LBS | HEART RATE: 102 BPM | RESPIRATION RATE: 16 BRPM | OXYGEN SATURATION: 98 % | SYSTOLIC BLOOD PRESSURE: 122 MMHG | BODY MASS INDEX: 35.73 KG/M2

## 2023-05-10 LAB
ALBUMIN SERPL ELPH-MCNC: 4.2 G/DL — SIGNIFICANT CHANGE UP (ref 3.3–5)
ALP SERPL-CCNC: 84 U/L — SIGNIFICANT CHANGE UP (ref 40–120)
ALT FLD-CCNC: 19 U/L — SIGNIFICANT CHANGE UP (ref 10–45)
ANION GAP SERPL CALC-SCNC: 16 MMOL/L — SIGNIFICANT CHANGE UP (ref 5–17)
AST SERPL-CCNC: 16 U/L — SIGNIFICANT CHANGE UP (ref 10–40)
BASOPHILS # BLD AUTO: 0.03 K/UL — SIGNIFICANT CHANGE UP (ref 0–0.2)
BASOPHILS NFR BLD AUTO: 0.6 % — SIGNIFICANT CHANGE UP (ref 0–2)
BILIRUB SERPL-MCNC: 0.9 MG/DL — SIGNIFICANT CHANGE UP (ref 0.2–1.2)
BUN SERPL-MCNC: 10 MG/DL — SIGNIFICANT CHANGE UP (ref 7–23)
CALCIUM SERPL-MCNC: 9.1 MG/DL — SIGNIFICANT CHANGE UP (ref 8.4–10.5)
CEA SERPL-MCNC: 520 NG/ML — HIGH (ref 0–3.8)
CHLORIDE SERPL-SCNC: 101 MMOL/L — SIGNIFICANT CHANGE UP (ref 96–108)
CO2 SERPL-SCNC: 20 MMOL/L — LOW (ref 22–31)
CREAT SERPL-MCNC: 1 MG/DL — SIGNIFICANT CHANGE UP (ref 0.5–1.3)
EGFR: 90 ML/MIN/1.73M2 — SIGNIFICANT CHANGE UP
EOSINOPHIL # BLD AUTO: 0.18 K/UL — SIGNIFICANT CHANGE UP (ref 0–0.5)
EOSINOPHIL NFR BLD AUTO: 3.5 % — SIGNIFICANT CHANGE UP (ref 0–6)
GLUCOSE SERPL-MCNC: 267 MG/DL — HIGH (ref 70–99)
HCT VFR BLD CALC: 42.7 % — SIGNIFICANT CHANGE UP (ref 39–50)
HGB BLD-MCNC: 14.6 G/DL — SIGNIFICANT CHANGE UP (ref 13–17)
IMM GRANULOCYTES NFR BLD AUTO: 0.4 % — SIGNIFICANT CHANGE UP (ref 0–0.9)
LYMPHOCYTES # BLD AUTO: 1.78 K/UL — SIGNIFICANT CHANGE UP (ref 1–3.3)
LYMPHOCYTES # BLD AUTO: 34.5 % — SIGNIFICANT CHANGE UP (ref 13–44)
MCHC RBC-ENTMCNC: 24.6 PG — LOW (ref 27–34)
MCHC RBC-ENTMCNC: 34.2 G/DL — SIGNIFICANT CHANGE UP (ref 32–36)
MCV RBC AUTO: 72 FL — LOW (ref 80–100)
MONOCYTES # BLD AUTO: 0.55 K/UL — SIGNIFICANT CHANGE UP (ref 0–0.9)
MONOCYTES NFR BLD AUTO: 10.7 % — SIGNIFICANT CHANGE UP (ref 2–14)
NEUTROPHILS # BLD AUTO: 2.6 K/UL — SIGNIFICANT CHANGE UP (ref 1.8–7.4)
NEUTROPHILS NFR BLD AUTO: 50.3 % — SIGNIFICANT CHANGE UP (ref 43–77)
NRBC # BLD: 0 /100 WBCS — SIGNIFICANT CHANGE UP (ref 0–0)
PLATELET # BLD AUTO: 198 K/UL — SIGNIFICANT CHANGE UP (ref 150–400)
POTASSIUM SERPL-MCNC: 4.4 MMOL/L — SIGNIFICANT CHANGE UP (ref 3.5–5.3)
POTASSIUM SERPL-SCNC: 4.4 MMOL/L — SIGNIFICANT CHANGE UP (ref 3.5–5.3)
PROT SERPL-MCNC: 6.9 G/DL — SIGNIFICANT CHANGE UP (ref 6–8.3)
RBC # BLD: 5.93 M/UL — HIGH (ref 4.2–5.8)
RBC # FLD: 20.1 % — HIGH (ref 10.3–14.5)
SODIUM SERPL-SCNC: 137 MMOL/L — SIGNIFICANT CHANGE UP (ref 135–145)
WBC # BLD: 5.16 K/UL — SIGNIFICANT CHANGE UP (ref 3.8–10.5)
WBC # FLD AUTO: 5.16 K/UL — SIGNIFICANT CHANGE UP (ref 3.8–10.5)

## 2023-05-10 PROCEDURE — 99214 OFFICE O/P EST MOD 30 MIN: CPT

## 2023-05-10 NOTE — PHYSICAL EXAM
[Fully active, able to carry on all pre-disease performance without restriction] : Status 0 - Fully active, able to carry on all pre-disease performance without restriction [Obese] : obese [Normal] : affect appropriate [de-identified] : anicteric  [de-identified] : no JVD [de-identified] : normal respiratory effort, no audible wheeze [de-identified] : reg rate  [de-identified] : no LE edema B/L [de-identified] : soft, non-distended, non-tender

## 2023-05-10 NOTE — HISTORY OF PRESENT ILLNESS
[Disease: _____________________] : Disease: [unfilled] [T: ___] : T[unfilled] [N: ___] : N[unfilled] [M: ___] : M[unfilled] [AJCC Stage: ____] : AJCC Stage: [unfilled] [Research Protocol] : Research Protocol  [de-identified] : Mr Granda in 2021 at the age of 51 presented to the hospital with abdominal pain and underwent imaging that revealed a distal transverse colon lesion with evidence of microperforation.\par He is also underwent a colonoscopy which demonstrated an endoscopically obstructing distal transverse colon mass. Given the potential for impending obstruction as well as microperforation seen on imaging decision was made to take patient to the OR for a left hemicolectomy and anastomosis.\par Imaging done at that time also showed evidence of metastatic disease to the liver and potentially lung and a questionable lesion as well. Patient presents with his sister for initial visit and to discuss palliative chemotherapy.\par \par Enrolled in [employer] Study \par \par Started FOLFOX + Vit D July 7th, 2021 (bevacizumab added cycle #2)\par 8/23 scan shows PE started on Lovenox \par October 2021 oxaliplatin held. \par \par KRAS G12D , NABILA \par FOLFOX + Bevacizumab +VitD ([employer] study) --> 5FU Yolis + Vit D --> POD 12/2022 ---> off trial  [de-identified] : Microsatellite stable  [de-identified] : KRAS G12D , NABILA  [FreeTextEntry1] : FOLFIRI/Yolis started 12/20/22 [de-identified] : no acute complaints\par here to follow up imaging

## 2023-05-10 NOTE — REVIEW OF SYSTEMS
[Negative] : Allergic/Immunologic [Fatigue] : no fatigue [Abdominal Pain] : no abdominal pain [Vomiting] : no vomiting [Confused] : no confusion [Dizziness] : no dizziness [Fainting] : no fainting [Difficulty Walking] : no difficulty walking [de-identified] : + mild neuropathy in hands/feet

## 2023-05-11 LAB
CREAT ?TM UR-MCNC: 115 MG/DL — SIGNIFICANT CHANGE UP
PROT ?TM UR-MCNC: 22 MG/DL — HIGH (ref 0–12)
PROT/CREAT UR-RTO: 0.2 RATIO — SIGNIFICANT CHANGE UP (ref 0–0.2)

## 2023-05-12 ENCOUNTER — APPOINTMENT (OUTPATIENT)
Dept: INFUSION THERAPY | Facility: HOSPITAL | Age: 53
End: 2023-05-12

## 2023-05-24 ENCOUNTER — RESULT REVIEW (OUTPATIENT)
Age: 53
End: 2023-05-24

## 2023-05-24 ENCOUNTER — APPOINTMENT (OUTPATIENT)
Dept: INFUSION THERAPY | Facility: HOSPITAL | Age: 53
End: 2023-05-24

## 2023-05-24 ENCOUNTER — APPOINTMENT (OUTPATIENT)
Dept: HEMATOLOGY ONCOLOGY | Facility: CLINIC | Age: 53
End: 2023-05-24

## 2023-05-24 LAB
ALBUMIN SERPL ELPH-MCNC: 4.1 G/DL — SIGNIFICANT CHANGE UP (ref 3.3–5)
ALP SERPL-CCNC: 76 U/L — SIGNIFICANT CHANGE UP (ref 40–120)
ALT FLD-CCNC: 12 U/L — SIGNIFICANT CHANGE UP (ref 10–45)
ANION GAP SERPL CALC-SCNC: 14 MMOL/L — SIGNIFICANT CHANGE UP (ref 5–17)
AST SERPL-CCNC: 16 U/L — SIGNIFICANT CHANGE UP (ref 10–40)
BASOPHILS # BLD AUTO: 0.03 K/UL — SIGNIFICANT CHANGE UP (ref 0–0.2)
BASOPHILS NFR BLD AUTO: 0.6 % — SIGNIFICANT CHANGE UP (ref 0–2)
BILIRUB SERPL-MCNC: 0.8 MG/DL — SIGNIFICANT CHANGE UP (ref 0.2–1.2)
BUN SERPL-MCNC: 10 MG/DL — SIGNIFICANT CHANGE UP (ref 7–23)
CALCIUM SERPL-MCNC: 9.3 MG/DL — SIGNIFICANT CHANGE UP (ref 8.4–10.5)
CEA SERPL-MCNC: 473 NG/ML — HIGH (ref 0–3.8)
CHLORIDE SERPL-SCNC: 103 MMOL/L — SIGNIFICANT CHANGE UP (ref 96–108)
CO2 SERPL-SCNC: 21 MMOL/L — LOW (ref 22–31)
CREAT SERPL-MCNC: 0.9 MG/DL — SIGNIFICANT CHANGE UP (ref 0.5–1.3)
EGFR: 102 ML/MIN/1.73M2 — SIGNIFICANT CHANGE UP
EOSINOPHIL # BLD AUTO: 0.15 K/UL — SIGNIFICANT CHANGE UP (ref 0–0.5)
EOSINOPHIL NFR BLD AUTO: 2.9 % — SIGNIFICANT CHANGE UP (ref 0–6)
GLUCOSE SERPL-MCNC: 230 MG/DL — HIGH (ref 70–99)
HCT VFR BLD CALC: 41.4 % — SIGNIFICANT CHANGE UP (ref 39–50)
HGB BLD-MCNC: 14.1 G/DL — SIGNIFICANT CHANGE UP (ref 13–17)
IMM GRANULOCYTES NFR BLD AUTO: 0.4 % — SIGNIFICANT CHANGE UP (ref 0–0.9)
LYMPHOCYTES # BLD AUTO: 1.86 K/UL — SIGNIFICANT CHANGE UP (ref 1–3.3)
LYMPHOCYTES # BLD AUTO: 36 % — SIGNIFICANT CHANGE UP (ref 13–44)
MCHC RBC-ENTMCNC: 24.8 PG — LOW (ref 27–34)
MCHC RBC-ENTMCNC: 34.1 G/DL — SIGNIFICANT CHANGE UP (ref 32–36)
MCV RBC AUTO: 72.9 FL — LOW (ref 80–100)
MONOCYTES # BLD AUTO: 0.66 K/UL — SIGNIFICANT CHANGE UP (ref 0–0.9)
MONOCYTES NFR BLD AUTO: 12.8 % — SIGNIFICANT CHANGE UP (ref 2–14)
NEUTROPHILS # BLD AUTO: 2.45 K/UL — SIGNIFICANT CHANGE UP (ref 1.8–7.4)
NEUTROPHILS NFR BLD AUTO: 47.3 % — SIGNIFICANT CHANGE UP (ref 43–77)
NRBC # BLD: 0 /100 WBCS — SIGNIFICANT CHANGE UP (ref 0–0)
PLATELET # BLD AUTO: 204 K/UL — SIGNIFICANT CHANGE UP (ref 150–400)
POTASSIUM SERPL-MCNC: 4.7 MMOL/L — SIGNIFICANT CHANGE UP (ref 3.5–5.3)
POTASSIUM SERPL-SCNC: 4.7 MMOL/L — SIGNIFICANT CHANGE UP (ref 3.5–5.3)
PROT SERPL-MCNC: 6.6 G/DL — SIGNIFICANT CHANGE UP (ref 6–8.3)
RBC # BLD: 5.68 M/UL — SIGNIFICANT CHANGE UP (ref 4.2–5.8)
RBC # FLD: 20.6 % — HIGH (ref 10.3–14.5)
SODIUM SERPL-SCNC: 137 MMOL/L — SIGNIFICANT CHANGE UP (ref 135–145)
WBC # BLD: 5.17 K/UL — SIGNIFICANT CHANGE UP (ref 3.8–10.5)
WBC # FLD AUTO: 5.17 K/UL — SIGNIFICANT CHANGE UP (ref 3.8–10.5)

## 2023-05-26 ENCOUNTER — APPOINTMENT (OUTPATIENT)
Dept: INFUSION THERAPY | Facility: HOSPITAL | Age: 53
End: 2023-05-26

## 2023-05-26 ENCOUNTER — RX RENEWAL (OUTPATIENT)
Age: 53
End: 2023-05-26

## 2023-05-30 ENCOUNTER — OUTPATIENT (OUTPATIENT)
Dept: OUTPATIENT SERVICES | Facility: HOSPITAL | Age: 53
LOS: 1 days | Discharge: HOME | End: 2023-05-30

## 2023-05-30 DIAGNOSIS — C18.9 MALIGNANT NEOPLASM OF COLON, UNSPECIFIED: ICD-10-CM

## 2023-05-31 ENCOUNTER — RX RENEWAL (OUTPATIENT)
Age: 53
End: 2023-05-31

## 2023-05-31 RX ORDER — ROSUVASTATIN CALCIUM 10 MG/1
10 TABLET, FILM COATED ORAL
Qty: 30 | Refills: 5 | Status: ACTIVE | COMMUNITY
Start: 2021-10-26 | End: 1900-01-01

## 2023-06-07 ENCOUNTER — RESULT REVIEW (OUTPATIENT)
Age: 53
End: 2023-06-07

## 2023-06-07 ENCOUNTER — APPOINTMENT (OUTPATIENT)
Dept: INFUSION THERAPY | Facility: HOSPITAL | Age: 53
End: 2023-06-07

## 2023-06-07 ENCOUNTER — APPOINTMENT (OUTPATIENT)
Dept: HEMATOLOGY ONCOLOGY | Facility: CLINIC | Age: 53
End: 2023-06-07
Payer: COMMERCIAL

## 2023-06-07 ENCOUNTER — NON-APPOINTMENT (OUTPATIENT)
Age: 53
End: 2023-06-07

## 2023-06-07 VITALS
DIASTOLIC BLOOD PRESSURE: 89 MMHG | RESPIRATION RATE: 16 BRPM | OXYGEN SATURATION: 99 % | WEIGHT: 218.24 LBS | TEMPERATURE: 97.1 F | BODY MASS INDEX: 36.36 KG/M2 | HEART RATE: 88 BPM | HEIGHT: 65 IN | SYSTOLIC BLOOD PRESSURE: 130 MMHG

## 2023-06-07 DIAGNOSIS — Z51.11 ENCOUNTER FOR ANTINEOPLASTIC CHEMOTHERAPY: ICD-10-CM

## 2023-06-07 DIAGNOSIS — R11.2 NAUSEA WITH VOMITING, UNSPECIFIED: ICD-10-CM

## 2023-06-07 LAB
BASOPHILS # BLD AUTO: 0.03 K/UL — SIGNIFICANT CHANGE UP (ref 0–0.2)
BASOPHILS NFR BLD AUTO: 0.5 % — SIGNIFICANT CHANGE UP (ref 0–2)
EOSINOPHIL # BLD AUTO: 0.15 K/UL — SIGNIFICANT CHANGE UP (ref 0–0.5)
EOSINOPHIL NFR BLD AUTO: 2.7 % — SIGNIFICANT CHANGE UP (ref 0–6)
HCT VFR BLD CALC: 41.3 % — SIGNIFICANT CHANGE UP (ref 39–50)
HGB BLD-MCNC: 14 G/DL — SIGNIFICANT CHANGE UP (ref 13–17)
IMM GRANULOCYTES NFR BLD AUTO: 0.5 % — SIGNIFICANT CHANGE UP (ref 0–0.9)
LYMPHOCYTES # BLD AUTO: 1.75 K/UL — SIGNIFICANT CHANGE UP (ref 1–3.3)
LYMPHOCYTES # BLD AUTO: 31.1 % — SIGNIFICANT CHANGE UP (ref 13–44)
MCHC RBC-ENTMCNC: 24.7 PG — LOW (ref 27–34)
MCHC RBC-ENTMCNC: 33.9 G/DL — SIGNIFICANT CHANGE UP (ref 32–36)
MCV RBC AUTO: 72.8 FL — LOW (ref 80–100)
MONOCYTES # BLD AUTO: 0.7 K/UL — SIGNIFICANT CHANGE UP (ref 0–0.9)
MONOCYTES NFR BLD AUTO: 12.4 % — SIGNIFICANT CHANGE UP (ref 2–14)
NEUTROPHILS # BLD AUTO: 2.97 K/UL — SIGNIFICANT CHANGE UP (ref 1.8–7.4)
NEUTROPHILS NFR BLD AUTO: 52.8 % — SIGNIFICANT CHANGE UP (ref 43–77)
NRBC # BLD: 0 /100 WBCS — SIGNIFICANT CHANGE UP (ref 0–0)
PLATELET # BLD AUTO: 193 K/UL — SIGNIFICANT CHANGE UP (ref 150–400)
RBC # BLD: 5.67 M/UL — SIGNIFICANT CHANGE UP (ref 4.2–5.8)
RBC # FLD: 20.2 % — HIGH (ref 10.3–14.5)
WBC # BLD: 5.63 K/UL — SIGNIFICANT CHANGE UP (ref 3.8–10.5)
WBC # FLD AUTO: 5.63 K/UL — SIGNIFICANT CHANGE UP (ref 3.8–10.5)

## 2023-06-07 PROCEDURE — 99214 OFFICE O/P EST MOD 30 MIN: CPT

## 2023-06-07 NOTE — REVIEW OF SYSTEMS
[Negative] : Allergic/Immunologic [Fatigue] : fatigue [Abdominal Pain] : no abdominal pain [Vomiting] : no vomiting [Confused] : no confusion [Dizziness] : no dizziness [Fainting] : no fainting [Difficulty Walking] : no difficulty walking [de-identified] : + mild neuropathy in hands/feet

## 2023-06-07 NOTE — HISTORY OF PRESENT ILLNESS
[Disease: _____________________] : Disease: [unfilled] [T: ___] : T[unfilled] [N: ___] : N[unfilled] [M: ___] : M[unfilled] [AJCC Stage: ____] : AJCC Stage: [unfilled] [Research Protocol] : Research Protocol  [de-identified] : Mr Granda in 2021 at the age of 51 presented to the hospital with abdominal pain and underwent imaging that revealed a distal transverse colon lesion with evidence of microperforation.\par He is also underwent a colonoscopy which demonstrated an endoscopically obstructing distal transverse colon mass. Given the potential for impending obstruction as well as microperforation seen on imaging decision was made to take patient to the OR for a left hemicolectomy and anastomosis.\par Imaging done at that time also showed evidence of metastatic disease to the liver and potentially lung and a questionable lesion as well. Patient presents with his sister for initial visit and to discuss palliative chemotherapy.\par \par Enrolled in [employer] Study \par \par Started FOLFOX + Vit D July 7th, 2021 (bevacizumab added cycle #2)\par 8/23 scan shows PE started on Lovenox \par October 2021 oxaliplatin held. \par \par KRAS G12D , NABILA \par FOLFOX + Bevacizumab +VitD ([employer] study) --> 5FU Yolis + Vit D --> POD 12/2022 ---> off trial  [de-identified] : Microsatellite stable  [de-identified] : KRAS G12D , NABILA  [FreeTextEntry1] : FOLFIRI/Yolis started 12/20/22 [de-identified] : Patient presents for follow up today and is scheduled for treatment.  He reports overall tolerating treatment well.  He admits to some fatigue and nausea in the first few days after treatment but unchanged from previous and antiemetics helped.  He states that his neuropathy in his hands is improved but unchanged in his feet.  He reports episodes of tingling and burning sensation in the feet intermittently but denies any trips or falls.  He denies anorexia, weight loss, abdominal pain, diarrhea, constipation or blood in the stool.  He continues to work full time. \par

## 2023-06-07 NOTE — PHYSICAL EXAM
[Fully active, able to carry on all pre-disease performance without restriction] : Status 0 - Fully active, able to carry on all pre-disease performance without restriction [Obese] : obese [Normal] : affect appropriate [de-identified] : anicteric  [de-identified] : no JVD [de-identified] : normal respiratory effort, no audible wheeze [de-identified] : reg rate  [de-identified] : no LE edema B/L [de-identified] : soft, non-distended, non-tender

## 2023-06-08 LAB
ALBUMIN SERPL ELPH-MCNC: 4.3 G/DL — SIGNIFICANT CHANGE UP (ref 3.3–5)
ALP SERPL-CCNC: 88 U/L — SIGNIFICANT CHANGE UP (ref 40–120)
ALT FLD-CCNC: 20 U/L — SIGNIFICANT CHANGE UP (ref 10–45)
ANION GAP SERPL CALC-SCNC: 17 MMOL/L — SIGNIFICANT CHANGE UP (ref 5–17)
AST SERPL-CCNC: 20 U/L — SIGNIFICANT CHANGE UP (ref 10–40)
BILIRUB SERPL-MCNC: 0.7 MG/DL — SIGNIFICANT CHANGE UP (ref 0.2–1.2)
BUN SERPL-MCNC: 14 MG/DL — SIGNIFICANT CHANGE UP (ref 7–23)
CALCIUM SERPL-MCNC: 9.2 MG/DL — SIGNIFICANT CHANGE UP (ref 8.4–10.5)
CHLORIDE SERPL-SCNC: 102 MMOL/L — SIGNIFICANT CHANGE UP (ref 96–108)
CO2 SERPL-SCNC: 19 MMOL/L — LOW (ref 22–31)
CREAT ?TM UR-MCNC: 61 MG/DL — SIGNIFICANT CHANGE UP
CREAT SERPL-MCNC: 0.92 MG/DL — SIGNIFICANT CHANGE UP (ref 0.5–1.3)
EGFR: 99 ML/MIN/1.73M2 — SIGNIFICANT CHANGE UP
GLUCOSE SERPL-MCNC: 266 MG/DL — HIGH (ref 70–99)
POTASSIUM SERPL-MCNC: 4.7 MMOL/L — SIGNIFICANT CHANGE UP (ref 3.5–5.3)
POTASSIUM SERPL-SCNC: 4.7 MMOL/L — SIGNIFICANT CHANGE UP (ref 3.5–5.3)
PROT ?TM UR-MCNC: 11 MG/DL — SIGNIFICANT CHANGE UP (ref 0–12)
PROT SERPL-MCNC: 6.8 G/DL — SIGNIFICANT CHANGE UP (ref 6–8.3)
PROT/CREAT UR-RTO: 0.2 RATIO — SIGNIFICANT CHANGE UP (ref 0–0.2)
SODIUM SERPL-SCNC: 138 MMOL/L — SIGNIFICANT CHANGE UP (ref 135–145)

## 2023-06-09 ENCOUNTER — APPOINTMENT (OUTPATIENT)
Dept: INFUSION THERAPY | Facility: HOSPITAL | Age: 53
End: 2023-06-09

## 2023-06-21 ENCOUNTER — RESULT REVIEW (OUTPATIENT)
Age: 53
End: 2023-06-21

## 2023-06-21 ENCOUNTER — APPOINTMENT (OUTPATIENT)
Dept: HEMATOLOGY ONCOLOGY | Facility: CLINIC | Age: 53
End: 2023-06-21

## 2023-06-21 ENCOUNTER — APPOINTMENT (OUTPATIENT)
Dept: INFUSION THERAPY | Facility: HOSPITAL | Age: 53
End: 2023-06-21

## 2023-06-21 LAB
ALBUMIN SERPL ELPH-MCNC: 4 G/DL — SIGNIFICANT CHANGE UP (ref 3.3–5)
ALP SERPL-CCNC: 76 U/L — SIGNIFICANT CHANGE UP (ref 40–120)
ALT FLD-CCNC: 14 U/L — SIGNIFICANT CHANGE UP (ref 10–45)
ANION GAP SERPL CALC-SCNC: 13 MMOL/L — SIGNIFICANT CHANGE UP (ref 5–17)
AST SERPL-CCNC: 16 U/L — SIGNIFICANT CHANGE UP (ref 10–40)
BASOPHILS # BLD AUTO: 0.03 K/UL — SIGNIFICANT CHANGE UP (ref 0–0.2)
BASOPHILS NFR BLD AUTO: 0.5 % — SIGNIFICANT CHANGE UP (ref 0–2)
BILIRUB SERPL-MCNC: 0.5 MG/DL — SIGNIFICANT CHANGE UP (ref 0.2–1.2)
BUN SERPL-MCNC: 10 MG/DL — SIGNIFICANT CHANGE UP (ref 7–23)
CALCIUM SERPL-MCNC: 9 MG/DL — SIGNIFICANT CHANGE UP (ref 8.4–10.5)
CEA SERPL-MCNC: 368 NG/ML — HIGH (ref 0–3.8)
CHLORIDE SERPL-SCNC: 101 MMOL/L — SIGNIFICANT CHANGE UP (ref 96–108)
CO2 SERPL-SCNC: 23 MMOL/L — SIGNIFICANT CHANGE UP (ref 22–31)
CREAT ?TM UR-MCNC: 27 MG/DL — SIGNIFICANT CHANGE UP
CREAT SERPL-MCNC: 0.94 MG/DL — SIGNIFICANT CHANGE UP (ref 0.5–1.3)
EGFR: 97 ML/MIN/1.73M2 — SIGNIFICANT CHANGE UP
EOSINOPHIL # BLD AUTO: 0.15 K/UL — SIGNIFICANT CHANGE UP (ref 0–0.5)
EOSINOPHIL NFR BLD AUTO: 2.5 % — SIGNIFICANT CHANGE UP (ref 0–6)
GLUCOSE SERPL-MCNC: 344 MG/DL — HIGH (ref 70–99)
HCT VFR BLD CALC: 41.1 % — SIGNIFICANT CHANGE UP (ref 39–50)
HGB BLD-MCNC: 13.9 G/DL — SIGNIFICANT CHANGE UP (ref 13–17)
IMM GRANULOCYTES NFR BLD AUTO: 0.3 % — SIGNIFICANT CHANGE UP (ref 0–0.9)
LYMPHOCYTES # BLD AUTO: 1.68 K/UL — SIGNIFICANT CHANGE UP (ref 1–3.3)
LYMPHOCYTES # BLD AUTO: 28.4 % — SIGNIFICANT CHANGE UP (ref 13–44)
MCHC RBC-ENTMCNC: 24.9 PG — LOW (ref 27–34)
MCHC RBC-ENTMCNC: 33.8 G/DL — SIGNIFICANT CHANGE UP (ref 32–36)
MCV RBC AUTO: 73.5 FL — LOW (ref 80–100)
MONOCYTES # BLD AUTO: 0.64 K/UL — SIGNIFICANT CHANGE UP (ref 0–0.9)
MONOCYTES NFR BLD AUTO: 10.8 % — SIGNIFICANT CHANGE UP (ref 2–14)
NEUTROPHILS # BLD AUTO: 3.4 K/UL — SIGNIFICANT CHANGE UP (ref 1.8–7.4)
NEUTROPHILS NFR BLD AUTO: 57.5 % — SIGNIFICANT CHANGE UP (ref 43–77)
NRBC # BLD: 0 /100 WBCS — SIGNIFICANT CHANGE UP (ref 0–0)
PLATELET # BLD AUTO: 193 K/UL — SIGNIFICANT CHANGE UP (ref 150–400)
POTASSIUM SERPL-MCNC: 4.4 MMOL/L — SIGNIFICANT CHANGE UP (ref 3.5–5.3)
POTASSIUM SERPL-SCNC: 4.4 MMOL/L — SIGNIFICANT CHANGE UP (ref 3.5–5.3)
PROT ?TM UR-MCNC: 6 MG/DL — SIGNIFICANT CHANGE UP (ref 0–12)
PROT SERPL-MCNC: 6.5 G/DL — SIGNIFICANT CHANGE UP (ref 6–8.3)
PROT/CREAT UR-RTO: 0.2 RATIO — SIGNIFICANT CHANGE UP (ref 0–0.2)
RBC # BLD: 5.59 M/UL — SIGNIFICANT CHANGE UP (ref 4.2–5.8)
RBC # FLD: 20.4 % — HIGH (ref 10.3–14.5)
SODIUM SERPL-SCNC: 137 MMOL/L — SIGNIFICANT CHANGE UP (ref 135–145)
WBC # BLD: 5.92 K/UL — SIGNIFICANT CHANGE UP (ref 3.8–10.5)
WBC # FLD AUTO: 5.92 K/UL — SIGNIFICANT CHANGE UP (ref 3.8–10.5)

## 2023-06-23 ENCOUNTER — APPOINTMENT (OUTPATIENT)
Dept: INFUSION THERAPY | Facility: HOSPITAL | Age: 53
End: 2023-06-23

## 2023-07-05 ENCOUNTER — RESULT REVIEW (OUTPATIENT)
Age: 53
End: 2023-07-05

## 2023-07-05 ENCOUNTER — APPOINTMENT (OUTPATIENT)
Dept: INFUSION THERAPY | Facility: HOSPITAL | Age: 53
End: 2023-07-05

## 2023-07-05 ENCOUNTER — APPOINTMENT (OUTPATIENT)
Dept: HEMATOLOGY ONCOLOGY | Facility: CLINIC | Age: 53
End: 2023-07-05
Payer: COMMERCIAL

## 2023-07-05 VITALS
DIASTOLIC BLOOD PRESSURE: 87 MMHG | WEIGHT: 220 LBS | HEIGHT: 65 IN | RESPIRATION RATE: 16 BRPM | BODY MASS INDEX: 36.65 KG/M2 | OXYGEN SATURATION: 100 % | SYSTOLIC BLOOD PRESSURE: 129 MMHG | HEART RATE: 85 BPM | TEMPERATURE: 97.9 F

## 2023-07-05 DIAGNOSIS — E86.0 DEHYDRATION: ICD-10-CM

## 2023-07-05 LAB
ALBUMIN SERPL ELPH-MCNC: 4.1 G/DL — SIGNIFICANT CHANGE UP (ref 3.3–5)
ALP SERPL-CCNC: 80 U/L — SIGNIFICANT CHANGE UP (ref 40–120)
ALT FLD-CCNC: 21 U/L — SIGNIFICANT CHANGE UP (ref 10–45)
ANION GAP SERPL CALC-SCNC: 12 MMOL/L — SIGNIFICANT CHANGE UP (ref 5–17)
AST SERPL-CCNC: 25 U/L — SIGNIFICANT CHANGE UP (ref 10–40)
BASOPHILS # BLD AUTO: 0.04 K/UL — SIGNIFICANT CHANGE UP (ref 0–0.2)
BASOPHILS NFR BLD AUTO: 0.6 % — SIGNIFICANT CHANGE UP (ref 0–2)
BILIRUB SERPL-MCNC: 0.6 MG/DL — SIGNIFICANT CHANGE UP (ref 0.2–1.2)
BUN SERPL-MCNC: 12 MG/DL — SIGNIFICANT CHANGE UP (ref 7–23)
CALCIUM SERPL-MCNC: 9.1 MG/DL — SIGNIFICANT CHANGE UP (ref 8.4–10.5)
CHLORIDE SERPL-SCNC: 101 MMOL/L — SIGNIFICANT CHANGE UP (ref 96–108)
CO2 SERPL-SCNC: 23 MMOL/L — SIGNIFICANT CHANGE UP (ref 22–31)
CREAT SERPL-MCNC: 0.92 MG/DL — SIGNIFICANT CHANGE UP (ref 0.5–1.3)
EGFR: 99 ML/MIN/1.73M2 — SIGNIFICANT CHANGE UP
EOSINOPHIL # BLD AUTO: 0.2 K/UL — SIGNIFICANT CHANGE UP (ref 0–0.5)
EOSINOPHIL NFR BLD AUTO: 3.2 % — SIGNIFICANT CHANGE UP (ref 0–6)
GLUCOSE SERPL-MCNC: 250 MG/DL — HIGH (ref 70–99)
HCT VFR BLD CALC: 40.7 % — SIGNIFICANT CHANGE UP (ref 39–50)
HGB BLD-MCNC: 13.8 G/DL — SIGNIFICANT CHANGE UP (ref 13–17)
IMM GRANULOCYTES NFR BLD AUTO: 0.6 % — SIGNIFICANT CHANGE UP (ref 0–0.9)
LYMPHOCYTES # BLD AUTO: 2.01 K/UL — SIGNIFICANT CHANGE UP (ref 1–3.3)
LYMPHOCYTES # BLD AUTO: 32.3 % — SIGNIFICANT CHANGE UP (ref 13–44)
MCHC RBC-ENTMCNC: 24.9 PG — LOW (ref 27–34)
MCHC RBC-ENTMCNC: 33.9 G/DL — SIGNIFICANT CHANGE UP (ref 32–36)
MCV RBC AUTO: 73.5 FL — LOW (ref 80–100)
MONOCYTES # BLD AUTO: 0.78 K/UL — SIGNIFICANT CHANGE UP (ref 0–0.9)
MONOCYTES NFR BLD AUTO: 12.5 % — SIGNIFICANT CHANGE UP (ref 2–14)
NEUTROPHILS # BLD AUTO: 3.16 K/UL — SIGNIFICANT CHANGE UP (ref 1.8–7.4)
NEUTROPHILS NFR BLD AUTO: 50.8 % — SIGNIFICANT CHANGE UP (ref 43–77)
NRBC # BLD: 0 /100 WBCS — SIGNIFICANT CHANGE UP (ref 0–0)
PLATELET # BLD AUTO: 203 K/UL — SIGNIFICANT CHANGE UP (ref 150–400)
POTASSIUM SERPL-MCNC: 4.5 MMOL/L — SIGNIFICANT CHANGE UP (ref 3.5–5.3)
POTASSIUM SERPL-SCNC: 4.5 MMOL/L — SIGNIFICANT CHANGE UP (ref 3.5–5.3)
PROT SERPL-MCNC: 6.3 G/DL — SIGNIFICANT CHANGE UP (ref 6–8.3)
RBC # BLD: 5.54 M/UL — SIGNIFICANT CHANGE UP (ref 4.2–5.8)
RBC # FLD: 20.1 % — HIGH (ref 10.3–14.5)
SODIUM SERPL-SCNC: 136 MMOL/L — SIGNIFICANT CHANGE UP (ref 135–145)
WBC # BLD: 6.23 K/UL — SIGNIFICANT CHANGE UP (ref 3.8–10.5)
WBC # FLD AUTO: 6.23 K/UL — SIGNIFICANT CHANGE UP (ref 3.8–10.5)

## 2023-07-05 PROCEDURE — 99214 OFFICE O/P EST MOD 30 MIN: CPT

## 2023-07-05 NOTE — PHYSICAL EXAM
[Fully active, able to carry on all pre-disease performance without restriction] : Status 0 - Fully active, able to carry on all pre-disease performance without restriction [Obese] : obese [Normal] : affect appropriate [de-identified] : anicteric  [de-identified] : no JVD [de-identified] : normal respiratory effort, no audible wheeze [de-identified] : reg rate  [de-identified] : no LE edema B/L [de-identified] : soft, non-distended, non-tender

## 2023-07-05 NOTE — REVIEW OF SYSTEMS
[Fatigue] : fatigue [Negative] : Allergic/Immunologic [Abdominal Pain] : no abdominal pain [Vomiting] : no vomiting [Confused] : no confusion [Dizziness] : no dizziness [Fainting] : no fainting [Difficulty Walking] : no difficulty walking [de-identified] : + neuropathy feet > hands

## 2023-07-05 NOTE — HISTORY OF PRESENT ILLNESS
[Disease: _____________________] : Disease: [unfilled] [T: ___] : T[unfilled] [N: ___] : N[unfilled] [M: ___] : M[unfilled] [AJCC Stage: ____] : AJCC Stage: [unfilled] [Research Protocol] : Research Protocol  [de-identified] : Mr Granda in 2021 at the age of 51 presented to the hospital with abdominal pain and underwent imaging that revealed a distal transverse colon lesion with evidence of microperforation.\par He is also underwent a colonoscopy which demonstrated an endoscopically obstructing distal transverse colon mass. Given the potential for impending obstruction as well as microperforation seen on imaging decision was made to take patient to the OR for a left hemicolectomy and anastomosis.\par Imaging done at that time also showed evidence of metastatic disease to the liver and potentially lung and a questionable lesion as well. Patient presents with his sister for initial visit and to discuss palliative chemotherapy.\par \par Enrolled in [employer] Study \par \par Started FOLFOX + Vit D July 7th, 2021 (bevacizumab added cycle #2)\par 8/23 scan shows PE started on Lovenox \par October 2021 oxaliplatin held. \par \par KRAS G12D , NABILA \par FOLFOX + Bevacizumab +VitD ([employer] study) --> 5FU Yolis + Vit D --> POD 12/2022 ---> off trial  [de-identified] : Microsatellite stable  [de-identified] : KRAS G12D , NABILA  [FreeTextEntry1] : FOLFIRI/Yolis started 12/20/22 [de-identified] : Patient presents for follow up and is scheduled for treatment today.  He reports worsening neuropathy in the soles of his feet but denies any falls or pain.  He admits to nausea and fatigue in the days after treatment but states that this is unchanged.  He denies anorexia, weight loss, abdominal pain, diarrhea or constipation.

## 2023-07-07 ENCOUNTER — APPOINTMENT (OUTPATIENT)
Dept: INFUSION THERAPY | Facility: HOSPITAL | Age: 53
End: 2023-07-07

## 2023-07-19 ENCOUNTER — RESULT REVIEW (OUTPATIENT)
Age: 53
End: 2023-07-19

## 2023-07-19 ENCOUNTER — NON-APPOINTMENT (OUTPATIENT)
Age: 53
End: 2023-07-19

## 2023-07-19 ENCOUNTER — APPOINTMENT (OUTPATIENT)
Dept: HEMATOLOGY ONCOLOGY | Facility: CLINIC | Age: 53
End: 2023-07-19

## 2023-07-19 ENCOUNTER — APPOINTMENT (OUTPATIENT)
Dept: INFUSION THERAPY | Facility: HOSPITAL | Age: 53
End: 2023-07-19

## 2023-07-19 LAB
ALBUMIN SERPL ELPH-MCNC: 4 G/DL — SIGNIFICANT CHANGE UP (ref 3.3–5)
ALP SERPL-CCNC: 80 U/L — SIGNIFICANT CHANGE UP (ref 40–120)
ALT FLD-CCNC: 19 U/L — SIGNIFICANT CHANGE UP (ref 10–45)
ANION GAP SERPL CALC-SCNC: 16 MMOL/L — SIGNIFICANT CHANGE UP (ref 5–17)
AST SERPL-CCNC: 27 U/L — SIGNIFICANT CHANGE UP (ref 10–40)
BASOPHILS # BLD AUTO: 0.03 K/UL — SIGNIFICANT CHANGE UP (ref 0–0.2)
BASOPHILS NFR BLD AUTO: 0.5 % — SIGNIFICANT CHANGE UP (ref 0–2)
BILIRUB SERPL-MCNC: 0.5 MG/DL — SIGNIFICANT CHANGE UP (ref 0.2–1.2)
BUN SERPL-MCNC: 13 MG/DL — SIGNIFICANT CHANGE UP (ref 7–23)
CALCIUM SERPL-MCNC: 8.7 MG/DL — SIGNIFICANT CHANGE UP (ref 8.4–10.5)
CEA SERPL-MCNC: 301 NG/ML — HIGH (ref 0–3.8)
CHLORIDE SERPL-SCNC: 98 MMOL/L — SIGNIFICANT CHANGE UP (ref 96–108)
CO2 SERPL-SCNC: 20 MMOL/L — LOW (ref 22–31)
CREAT SERPL-MCNC: 0.95 MG/DL — SIGNIFICANT CHANGE UP (ref 0.5–1.3)
EGFR: 96 ML/MIN/1.73M2 — SIGNIFICANT CHANGE UP
EOSINOPHIL # BLD AUTO: 0.17 K/UL — SIGNIFICANT CHANGE UP (ref 0–0.5)
EOSINOPHIL NFR BLD AUTO: 2.7 % — SIGNIFICANT CHANGE UP (ref 0–6)
GLUCOSE SERPL-MCNC: 417 MG/DL — HIGH (ref 70–99)
HCT VFR BLD CALC: 41.4 % — SIGNIFICANT CHANGE UP (ref 39–50)
HGB BLD-MCNC: 14.2 G/DL — SIGNIFICANT CHANGE UP (ref 13–17)
IMM GRANULOCYTES NFR BLD AUTO: 0.5 % — SIGNIFICANT CHANGE UP (ref 0–0.9)
LYMPHOCYTES # BLD AUTO: 1.65 K/UL — SIGNIFICANT CHANGE UP (ref 1–3.3)
LYMPHOCYTES # BLD AUTO: 26.3 % — SIGNIFICANT CHANGE UP (ref 13–44)
MCHC RBC-ENTMCNC: 25.2 PG — LOW (ref 27–34)
MCHC RBC-ENTMCNC: 34.3 G/DL — SIGNIFICANT CHANGE UP (ref 32–36)
MCV RBC AUTO: 73.5 FL — LOW (ref 80–100)
MONOCYTES # BLD AUTO: 0.74 K/UL — SIGNIFICANT CHANGE UP (ref 0–0.9)
MONOCYTES NFR BLD AUTO: 11.8 % — SIGNIFICANT CHANGE UP (ref 2–14)
NEUTROPHILS # BLD AUTO: 3.65 K/UL — SIGNIFICANT CHANGE UP (ref 1.8–7.4)
NEUTROPHILS NFR BLD AUTO: 58.2 % — SIGNIFICANT CHANGE UP (ref 43–77)
NRBC # BLD: 0 /100 WBCS — SIGNIFICANT CHANGE UP (ref 0–0)
PLATELET # BLD AUTO: 204 K/UL — SIGNIFICANT CHANGE UP (ref 150–400)
POTASSIUM SERPL-MCNC: 4.6 MMOL/L — SIGNIFICANT CHANGE UP (ref 3.5–5.3)
POTASSIUM SERPL-SCNC: 4.6 MMOL/L — SIGNIFICANT CHANGE UP (ref 3.5–5.3)
PROT SERPL-MCNC: 6.5 G/DL — SIGNIFICANT CHANGE UP (ref 6–8.3)
RBC # BLD: 5.63 M/UL — SIGNIFICANT CHANGE UP (ref 4.2–5.8)
RBC # FLD: 19.9 % — HIGH (ref 10.3–14.5)
SODIUM SERPL-SCNC: 134 MMOL/L — LOW (ref 135–145)
WBC # BLD: 6.27 K/UL — SIGNIFICANT CHANGE UP (ref 3.8–10.5)
WBC # FLD AUTO: 6.27 K/UL — SIGNIFICANT CHANGE UP (ref 3.8–10.5)

## 2023-07-20 ENCOUNTER — RESULT REVIEW (OUTPATIENT)
Age: 53
End: 2023-07-20

## 2023-07-20 LAB
CREAT ?TM UR-MCNC: 48 MG/DL — SIGNIFICANT CHANGE UP
PROT ?TM UR-MCNC: 6 MG/DL — SIGNIFICANT CHANGE UP (ref 0–12)
PROT/CREAT UR-RTO: 0.1 RATIO — SIGNIFICANT CHANGE UP (ref 0–0.2)

## 2023-07-21 ENCOUNTER — APPOINTMENT (OUTPATIENT)
Dept: INFUSION THERAPY | Facility: HOSPITAL | Age: 53
End: 2023-07-21

## 2023-07-24 ENCOUNTER — RX RENEWAL (OUTPATIENT)
Age: 53
End: 2023-07-24

## 2023-07-24 ENCOUNTER — OUTPATIENT (OUTPATIENT)
Dept: OUTPATIENT SERVICES | Facility: HOSPITAL | Age: 53
LOS: 1 days | Discharge: HOME | End: 2023-07-24

## 2023-07-24 DIAGNOSIS — C18.9 MALIGNANT NEOPLASM OF COLON, UNSPECIFIED: ICD-10-CM

## 2023-08-02 ENCOUNTER — RESULT REVIEW (OUTPATIENT)
Age: 53
End: 2023-08-02

## 2023-08-02 ENCOUNTER — APPOINTMENT (OUTPATIENT)
Dept: INFUSION THERAPY | Facility: HOSPITAL | Age: 53
End: 2023-08-02

## 2023-08-02 ENCOUNTER — APPOINTMENT (OUTPATIENT)
Dept: HEMATOLOGY ONCOLOGY | Facility: CLINIC | Age: 53
End: 2023-08-02

## 2023-08-02 LAB
ALBUMIN SERPL ELPH-MCNC: 3.8 G/DL — SIGNIFICANT CHANGE UP (ref 3.3–5)
ALP SERPL-CCNC: 69 U/L — SIGNIFICANT CHANGE UP (ref 40–120)
ALT FLD-CCNC: 12 U/L — SIGNIFICANT CHANGE UP (ref 10–45)
ANION GAP SERPL CALC-SCNC: 11 MMOL/L — SIGNIFICANT CHANGE UP (ref 5–17)
AST SERPL-CCNC: 29 U/L — SIGNIFICANT CHANGE UP (ref 10–40)
BASOPHILS # BLD AUTO: 0.03 K/UL — SIGNIFICANT CHANGE UP (ref 0–0.2)
BASOPHILS NFR BLD AUTO: 0.6 % — SIGNIFICANT CHANGE UP (ref 0–2)
BILIRUB SERPL-MCNC: 0.5 MG/DL — SIGNIFICANT CHANGE UP (ref 0.2–1.2)
BUN SERPL-MCNC: 11 MG/DL — SIGNIFICANT CHANGE UP (ref 7–23)
CALCIUM SERPL-MCNC: 8.9 MG/DL — SIGNIFICANT CHANGE UP (ref 8.4–10.5)
CHLORIDE SERPL-SCNC: 103 MMOL/L — SIGNIFICANT CHANGE UP (ref 96–108)
CO2 SERPL-SCNC: 24 MMOL/L — SIGNIFICANT CHANGE UP (ref 22–31)
CREAT SERPL-MCNC: 0.88 MG/DL — SIGNIFICANT CHANGE UP (ref 0.5–1.3)
EGFR: 103 ML/MIN/1.73M2 — SIGNIFICANT CHANGE UP
EOSINOPHIL # BLD AUTO: 0.19 K/UL — SIGNIFICANT CHANGE UP (ref 0–0.5)
EOSINOPHIL NFR BLD AUTO: 3.5 % — SIGNIFICANT CHANGE UP (ref 0–6)
GLUCOSE SERPL-MCNC: 206 MG/DL — HIGH (ref 70–99)
HCT VFR BLD CALC: 41 % — SIGNIFICANT CHANGE UP (ref 39–50)
HGB BLD-MCNC: 13.9 G/DL — SIGNIFICANT CHANGE UP (ref 13–17)
IMM GRANULOCYTES NFR BLD AUTO: 0.4 % — SIGNIFICANT CHANGE UP (ref 0–0.9)
LYMPHOCYTES # BLD AUTO: 1.89 K/UL — SIGNIFICANT CHANGE UP (ref 1–3.3)
LYMPHOCYTES # BLD AUTO: 34.8 % — SIGNIFICANT CHANGE UP (ref 13–44)
MCHC RBC-ENTMCNC: 24.9 PG — LOW (ref 27–34)
MCHC RBC-ENTMCNC: 33.9 G/DL — SIGNIFICANT CHANGE UP (ref 32–36)
MCV RBC AUTO: 73.3 FL — LOW (ref 80–100)
MONOCYTES # BLD AUTO: 0.58 K/UL — SIGNIFICANT CHANGE UP (ref 0–0.9)
MONOCYTES NFR BLD AUTO: 10.7 % — SIGNIFICANT CHANGE UP (ref 2–14)
NEUTROPHILS # BLD AUTO: 2.72 K/UL — SIGNIFICANT CHANGE UP (ref 1.8–7.4)
NEUTROPHILS NFR BLD AUTO: 50 % — SIGNIFICANT CHANGE UP (ref 43–77)
NRBC # BLD: 0 /100 WBCS — SIGNIFICANT CHANGE UP (ref 0–0)
PLATELET # BLD AUTO: 217 K/UL — SIGNIFICANT CHANGE UP (ref 150–400)
POTASSIUM SERPL-MCNC: 4.3 MMOL/L — SIGNIFICANT CHANGE UP (ref 3.5–5.3)
POTASSIUM SERPL-SCNC: 4.3 MMOL/L — SIGNIFICANT CHANGE UP (ref 3.5–5.3)
PROT SERPL-MCNC: 6.7 G/DL — SIGNIFICANT CHANGE UP (ref 6–8.3)
RBC # BLD: 5.59 M/UL — SIGNIFICANT CHANGE UP (ref 4.2–5.8)
RBC # FLD: 19.3 % — HIGH (ref 10.3–14.5)
SODIUM SERPL-SCNC: 138 MMOL/L — SIGNIFICANT CHANGE UP (ref 135–145)
WBC # BLD: 5.43 K/UL — SIGNIFICANT CHANGE UP (ref 3.8–10.5)
WBC # FLD AUTO: 5.43 K/UL — SIGNIFICANT CHANGE UP (ref 3.8–10.5)

## 2023-08-03 DIAGNOSIS — E86.0 DEHYDRATION: ICD-10-CM

## 2023-08-03 DIAGNOSIS — R11.2 NAUSEA WITH VOMITING, UNSPECIFIED: ICD-10-CM

## 2023-08-03 DIAGNOSIS — Z51.11 ENCOUNTER FOR ANTINEOPLASTIC CHEMOTHERAPY: ICD-10-CM

## 2023-08-04 ENCOUNTER — APPOINTMENT (OUTPATIENT)
Dept: INFUSION THERAPY | Facility: HOSPITAL | Age: 53
End: 2023-08-04

## 2023-08-13 ENCOUNTER — OUTPATIENT (OUTPATIENT)
Dept: OUTPATIENT SERVICES | Facility: HOSPITAL | Age: 53
LOS: 1 days | End: 2023-08-13
Payer: COMMERCIAL

## 2023-08-13 ENCOUNTER — APPOINTMENT (OUTPATIENT)
Dept: CT IMAGING | Facility: IMAGING CENTER | Age: 53
End: 2023-08-13
Payer: COMMERCIAL

## 2023-08-13 DIAGNOSIS — Z00.8 ENCOUNTER FOR OTHER GENERAL EXAMINATION: ICD-10-CM

## 2023-08-13 PROCEDURE — 74177 CT ABD & PELVIS W/CONTRAST: CPT

## 2023-08-13 PROCEDURE — 71260 CT THORAX DX C+: CPT | Mod: 26

## 2023-08-13 PROCEDURE — 71260 CT THORAX DX C+: CPT

## 2023-08-13 PROCEDURE — 74177 CT ABD & PELVIS W/CONTRAST: CPT | Mod: 26

## 2023-08-16 ENCOUNTER — RESULT REVIEW (OUTPATIENT)
Age: 53
End: 2023-08-16

## 2023-08-16 ENCOUNTER — APPOINTMENT (OUTPATIENT)
Dept: INFUSION THERAPY | Facility: HOSPITAL | Age: 53
End: 2023-08-16

## 2023-08-16 ENCOUNTER — APPOINTMENT (OUTPATIENT)
Dept: HEMATOLOGY ONCOLOGY | Facility: CLINIC | Age: 53
End: 2023-08-16
Payer: COMMERCIAL

## 2023-08-16 ENCOUNTER — NON-APPOINTMENT (OUTPATIENT)
Age: 53
End: 2023-08-16

## 2023-08-16 VITALS
HEART RATE: 91 BPM | DIASTOLIC BLOOD PRESSURE: 89 MMHG | OXYGEN SATURATION: 99 % | WEIGHT: 222.66 LBS | RESPIRATION RATE: 16 BRPM | BODY MASS INDEX: 37.05 KG/M2 | TEMPERATURE: 97.7 F | SYSTOLIC BLOOD PRESSURE: 127 MMHG

## 2023-08-16 LAB
ALBUMIN SERPL ELPH-MCNC: 4.3 G/DL — SIGNIFICANT CHANGE UP (ref 3.3–5)
ALP SERPL-CCNC: 84 U/L — SIGNIFICANT CHANGE UP (ref 40–120)
ALT FLD-CCNC: 18 U/L — SIGNIFICANT CHANGE UP (ref 10–45)
ANION GAP SERPL CALC-SCNC: 12 MMOL/L — SIGNIFICANT CHANGE UP (ref 5–17)
AST SERPL-CCNC: 30 U/L — SIGNIFICANT CHANGE UP (ref 10–40)
BASOPHILS # BLD AUTO: 0.03 K/UL — SIGNIFICANT CHANGE UP (ref 0–0.2)
BASOPHILS NFR BLD AUTO: 0.5 % — SIGNIFICANT CHANGE UP (ref 0–2)
BILIRUB SERPL-MCNC: 0.6 MG/DL — SIGNIFICANT CHANGE UP (ref 0.2–1.2)
BUN SERPL-MCNC: 12 MG/DL — SIGNIFICANT CHANGE UP (ref 7–23)
CALCIUM SERPL-MCNC: 9.3 MG/DL — SIGNIFICANT CHANGE UP (ref 8.4–10.5)
CEA SERPL-MCNC: 307 NG/ML — HIGH (ref 0–3.8)
CHLORIDE SERPL-SCNC: 98 MMOL/L — SIGNIFICANT CHANGE UP (ref 96–108)
CO2 SERPL-SCNC: 24 MMOL/L — SIGNIFICANT CHANGE UP (ref 22–31)
CREAT SERPL-MCNC: 0.86 MG/DL — SIGNIFICANT CHANGE UP (ref 0.5–1.3)
EGFR: 104 ML/MIN/1.73M2 — SIGNIFICANT CHANGE UP
EOSINOPHIL # BLD AUTO: 0.2 K/UL — SIGNIFICANT CHANGE UP (ref 0–0.5)
EOSINOPHIL NFR BLD AUTO: 3.4 % — SIGNIFICANT CHANGE UP (ref 0–6)
GLUCOSE SERPL-MCNC: 305 MG/DL — HIGH (ref 70–99)
HCT VFR BLD CALC: 42.2 % — SIGNIFICANT CHANGE UP (ref 39–50)
HGB BLD-MCNC: 14.5 G/DL — SIGNIFICANT CHANGE UP (ref 13–17)
IMM GRANULOCYTES NFR BLD AUTO: 0.3 % — SIGNIFICANT CHANGE UP (ref 0–0.9)
LYMPHOCYTES # BLD AUTO: 1.89 K/UL — SIGNIFICANT CHANGE UP (ref 1–3.3)
LYMPHOCYTES # BLD AUTO: 31.7 % — SIGNIFICANT CHANGE UP (ref 13–44)
MCHC RBC-ENTMCNC: 25 PG — LOW (ref 27–34)
MCHC RBC-ENTMCNC: 34.4 G/DL — SIGNIFICANT CHANGE UP (ref 32–36)
MCV RBC AUTO: 72.8 FL — LOW (ref 80–100)
MONOCYTES # BLD AUTO: 0.77 K/UL — SIGNIFICANT CHANGE UP (ref 0–0.9)
MONOCYTES NFR BLD AUTO: 12.9 % — SIGNIFICANT CHANGE UP (ref 2–14)
NEUTROPHILS # BLD AUTO: 3.05 K/UL — SIGNIFICANT CHANGE UP (ref 1.8–7.4)
NEUTROPHILS NFR BLD AUTO: 51.2 % — SIGNIFICANT CHANGE UP (ref 43–77)
NRBC # BLD: 0 /100 WBCS — SIGNIFICANT CHANGE UP (ref 0–0)
PLATELET # BLD AUTO: 199 K/UL — SIGNIFICANT CHANGE UP (ref 150–400)
POTASSIUM SERPL-MCNC: 4.4 MMOL/L — SIGNIFICANT CHANGE UP (ref 3.5–5.3)
POTASSIUM SERPL-SCNC: 4.4 MMOL/L — SIGNIFICANT CHANGE UP (ref 3.5–5.3)
PROT SERPL-MCNC: 7.4 G/DL — SIGNIFICANT CHANGE UP (ref 6–8.3)
RBC # BLD: 5.8 M/UL — SIGNIFICANT CHANGE UP (ref 4.2–5.8)
RBC # FLD: 19.5 % — HIGH (ref 10.3–14.5)
SODIUM SERPL-SCNC: 134 MMOL/L — LOW (ref 135–145)
WBC # BLD: 5.96 K/UL — SIGNIFICANT CHANGE UP (ref 3.8–10.5)
WBC # FLD AUTO: 5.96 K/UL — SIGNIFICANT CHANGE UP (ref 3.8–10.5)

## 2023-08-16 PROCEDURE — 99214 OFFICE O/P EST MOD 30 MIN: CPT

## 2023-08-16 NOTE — REVIEW OF SYSTEMS
[Fatigue] : fatigue [Negative] : Allergic/Immunologic [Vomiting] : no vomiting [Abdominal Pain] : no abdominal pain [Confused] : no confusion [Dizziness] : no dizziness [Fainting] : no fainting [Difficulty Walking] : no difficulty walking [de-identified] : + neuropathy feet > hands

## 2023-08-16 NOTE — PHYSICAL EXAM
[Fully active, able to carry on all pre-disease performance without restriction] : Status 0 - Fully active, able to carry on all pre-disease performance without restriction [Obese] : obese [Normal] : affect appropriate [de-identified] : anicteric  [de-identified] : no JVD [de-identified] : normal respiratory effort, no audible wheeze [de-identified] : reg rate  [de-identified] : no LE edema B/L [de-identified] : soft, non-distended, non-tender

## 2023-08-16 NOTE — HISTORY OF PRESENT ILLNESS
[Disease: _____________________] : Disease: [unfilled] [T: ___] : T[unfilled] [N: ___] : N[unfilled] [M: ___] : M[unfilled] [AJCC Stage: ____] : AJCC Stage: [unfilled] [Research Protocol] : Research Protocol  [de-identified] : Mr Granda in 2021 at the age of 51 presented to the hospital with abdominal pain and underwent imaging that revealed a distal transverse colon lesion with evidence of microperforation. He is also underwent a colonoscopy which demonstrated an endoscopically obstructing distal transverse colon mass. Given the potential for impending obstruction as well as microperforation seen on imaging decision was made to take patient to the OR for a left hemicolectomy and anastomosis. Imaging done at that time also showed evidence of metastatic disease to the liver and potentially lung and a questionable lesion as well. Patient presents with his sister for initial visit and to discuss palliative chemotherapy.  Enrolled in [employer] Study   Started FOLFOX + Vit D July 7th, 2021 (bevacizumab added cycle #2) 8/23 scan shows PE started on Lovenox  October 2021 oxaliplatin held.   KRAS G12D , NABILA  FOLFOX + Bevacizumab +VitD ([employer] study) --> 5FU Yolis + Vit D --> POD 12/2022 ---> off trial  FOLFIRI/Yolis started 12/20/22 [de-identified] : Microsatellite stable  [de-identified] : KRAS G12D , NABILA  [FreeTextEntry1] : FOLFIRI/Yolis started 12/20/22 [de-identified] : Presents in follow-up and has no acute complaints at this time. Is here to review recent imaging done to assess response to treatment in view of decrease in CEA over the last 2 months. Patient continues to have neuropathy but attributes this to his diabetes.

## 2023-08-17 LAB
CREAT ?TM UR-MCNC: 88 MG/DL — SIGNIFICANT CHANGE UP
PROT ?TM UR-MCNC: 22 MG/DL — HIGH (ref 0–12)
PROT/CREAT UR-RTO: 0.2 RATIO — SIGNIFICANT CHANGE UP (ref 0–0.2)

## 2023-08-18 ENCOUNTER — APPOINTMENT (OUTPATIENT)
Dept: INFUSION THERAPY | Facility: HOSPITAL | Age: 53
End: 2023-08-18

## 2023-08-20 NOTE — ED ADULT NURSE NOTE - NSSEPSISSUSPECTED_ED_A_ED
-You were seen and evaluated by emergency medicine physicians at [facility].    -Please follow-up with your primary care physician and/or with the referrals to specialist.    -You were diagnosed with: Shortness of breath, hyponatremia    -Your D-dimer was borderline elevated and lower than prior elevated D-dimer with a negative CT PE.  -Your sodium was 127, which was lower than your previous lab results. You were given a sodium tablet in the ED to help with your low sodium.  -You declined admission to the hospital for your low sodium preferred to follow-up with your primary care doctor. The risks and benefits were explained to you and you made an informed decision to discharge from the ED and follow-up under your own recognizance. -You are being discharged with a prescription for sodium tablets. Please take as prescribed. You are also being prescribed a steroid for your COPD. You have also been given a referral to a pulmonologist.  Please call and establish care. -Please return to the Emergency Department if you are experiencing the following symptoms acutely: Altered mental status, weakness, seizures, Headache, fever, chills, nausea, vomiting, chest pain, worsening shortness of breath, sputum production, abdominal pain, change with urination, change with bowel movements, change in your skin/hair/nail, weakness, fatigue, altered mental status and/or any change from baseline health.     -Thank you for coming to [facility]. No

## 2023-08-30 ENCOUNTER — APPOINTMENT (OUTPATIENT)
Dept: INFUSION THERAPY | Facility: HOSPITAL | Age: 53
End: 2023-08-30

## 2023-08-30 ENCOUNTER — RESULT REVIEW (OUTPATIENT)
Age: 53
End: 2023-08-30

## 2023-08-30 ENCOUNTER — APPOINTMENT (OUTPATIENT)
Dept: HEMATOLOGY ONCOLOGY | Facility: CLINIC | Age: 53
End: 2023-08-30

## 2023-08-30 LAB
ALBUMIN SERPL ELPH-MCNC: 3.8 G/DL — SIGNIFICANT CHANGE UP (ref 3.3–5)
ALP SERPL-CCNC: 82 U/L — SIGNIFICANT CHANGE UP (ref 40–120)
ALT FLD-CCNC: 9 U/L — LOW (ref 10–45)
ANION GAP SERPL CALC-SCNC: 10 MMOL/L — SIGNIFICANT CHANGE UP (ref 5–17)
AST SERPL-CCNC: 23 U/L — SIGNIFICANT CHANGE UP (ref 10–40)
BASOPHILS # BLD AUTO: 0.02 K/UL — SIGNIFICANT CHANGE UP (ref 0–0.2)
BASOPHILS NFR BLD AUTO: 0.3 % — SIGNIFICANT CHANGE UP (ref 0–2)
BILIRUB SERPL-MCNC: 0.8 MG/DL — SIGNIFICANT CHANGE UP (ref 0.2–1.2)
BUN SERPL-MCNC: 10 MG/DL — SIGNIFICANT CHANGE UP (ref 7–23)
CALCIUM SERPL-MCNC: 9 MG/DL — SIGNIFICANT CHANGE UP (ref 8.4–10.5)
CHLORIDE SERPL-SCNC: 100 MMOL/L — SIGNIFICANT CHANGE UP (ref 96–108)
CO2 SERPL-SCNC: 25 MMOL/L — SIGNIFICANT CHANGE UP (ref 22–31)
CREAT ?TM UR-MCNC: 68 MG/DL — SIGNIFICANT CHANGE UP
CREAT SERPL-MCNC: 0.95 MG/DL — SIGNIFICANT CHANGE UP (ref 0.5–1.3)
EGFR: 96 ML/MIN/1.73M2 — SIGNIFICANT CHANGE UP
EOSINOPHIL # BLD AUTO: 0.17 K/UL — SIGNIFICANT CHANGE UP (ref 0–0.5)
EOSINOPHIL NFR BLD AUTO: 2.9 % — SIGNIFICANT CHANGE UP (ref 0–6)
GLUCOSE SERPL-MCNC: 294 MG/DL — HIGH (ref 70–99)
HCT VFR BLD CALC: 42.8 % — SIGNIFICANT CHANGE UP (ref 39–50)
HGB BLD-MCNC: 14.5 G/DL — SIGNIFICANT CHANGE UP (ref 13–17)
IMM GRANULOCYTES NFR BLD AUTO: 0.3 % — SIGNIFICANT CHANGE UP (ref 0–0.9)
LYMPHOCYTES # BLD AUTO: 1.89 K/UL — SIGNIFICANT CHANGE UP (ref 1–3.3)
LYMPHOCYTES # BLD AUTO: 31.8 % — SIGNIFICANT CHANGE UP (ref 13–44)
MCHC RBC-ENTMCNC: 24.7 PG — LOW (ref 27–34)
MCHC RBC-ENTMCNC: 33.9 G/DL — SIGNIFICANT CHANGE UP (ref 32–36)
MCV RBC AUTO: 73 FL — LOW (ref 80–100)
MONOCYTES # BLD AUTO: 0.8 K/UL — SIGNIFICANT CHANGE UP (ref 0–0.9)
MONOCYTES NFR BLD AUTO: 13.5 % — SIGNIFICANT CHANGE UP (ref 2–14)
NEUTROPHILS # BLD AUTO: 3.04 K/UL — SIGNIFICANT CHANGE UP (ref 1.8–7.4)
NEUTROPHILS NFR BLD AUTO: 51.2 % — SIGNIFICANT CHANGE UP (ref 43–77)
NRBC # BLD: 0 /100 WBCS — SIGNIFICANT CHANGE UP (ref 0–0)
PLATELET # BLD AUTO: 173 K/UL — SIGNIFICANT CHANGE UP (ref 150–400)
POTASSIUM SERPL-MCNC: 4.4 MMOL/L — SIGNIFICANT CHANGE UP (ref 3.5–5.3)
POTASSIUM SERPL-SCNC: 4.4 MMOL/L — SIGNIFICANT CHANGE UP (ref 3.5–5.3)
PROT ?TM UR-MCNC: 11 MG/DL — SIGNIFICANT CHANGE UP (ref 0–12)
PROT SERPL-MCNC: 6.6 G/DL — SIGNIFICANT CHANGE UP (ref 6–8.3)
PROT/CREAT UR-RTO: 0.2 RATIO — SIGNIFICANT CHANGE UP (ref 0–0.2)
RBC # BLD: 5.86 M/UL — HIGH (ref 4.2–5.8)
RBC # FLD: 19.4 % — HIGH (ref 10.3–14.5)
SODIUM SERPL-SCNC: 135 MMOL/L — SIGNIFICANT CHANGE UP (ref 135–145)
WBC # BLD: 5.94 K/UL — SIGNIFICANT CHANGE UP (ref 3.8–10.5)
WBC # FLD AUTO: 5.94 K/UL — SIGNIFICANT CHANGE UP (ref 3.8–10.5)

## 2023-09-01 ENCOUNTER — APPOINTMENT (OUTPATIENT)
Dept: INFUSION THERAPY | Facility: HOSPITAL | Age: 53
End: 2023-09-01

## 2023-09-06 ENCOUNTER — RX RENEWAL (OUTPATIENT)
Age: 53
End: 2023-09-06

## 2023-09-11 ENCOUNTER — APPOINTMENT (OUTPATIENT)
Dept: HEMATOLOGY ONCOLOGY | Facility: CLINIC | Age: 53
End: 2023-09-11

## 2023-09-13 ENCOUNTER — RESULT REVIEW (OUTPATIENT)
Age: 53
End: 2023-09-13

## 2023-09-13 ENCOUNTER — APPOINTMENT (OUTPATIENT)
Dept: INFUSION THERAPY | Facility: HOSPITAL | Age: 53
End: 2023-09-13

## 2023-09-13 ENCOUNTER — APPOINTMENT (OUTPATIENT)
Dept: HEMATOLOGY ONCOLOGY | Facility: CLINIC | Age: 53
End: 2023-09-13
Payer: COMMERCIAL

## 2023-09-13 LAB
ALBUMIN SERPL ELPH-MCNC: 4.2 G/DL — SIGNIFICANT CHANGE UP (ref 3.3–5)
ALP SERPL-CCNC: 93 U/L — SIGNIFICANT CHANGE UP (ref 40–120)
ALT FLD-CCNC: 17 U/L — SIGNIFICANT CHANGE UP (ref 10–45)
ANION GAP SERPL CALC-SCNC: 12 MMOL/L — SIGNIFICANT CHANGE UP (ref 5–17)
AST SERPL-CCNC: 27 U/L — SIGNIFICANT CHANGE UP (ref 10–40)
BASOPHILS # BLD AUTO: 0.03 K/UL — SIGNIFICANT CHANGE UP (ref 0–0.2)
BASOPHILS NFR BLD AUTO: 0.4 % — SIGNIFICANT CHANGE UP (ref 0–2)
BILIRUB SERPL-MCNC: 0.8 MG/DL — SIGNIFICANT CHANGE UP (ref 0.2–1.2)
BUN SERPL-MCNC: 10 MG/DL — SIGNIFICANT CHANGE UP (ref 7–23)
CALCIUM SERPL-MCNC: 9.2 MG/DL — SIGNIFICANT CHANGE UP (ref 8.4–10.5)
CHLORIDE SERPL-SCNC: 98 MMOL/L — SIGNIFICANT CHANGE UP (ref 96–108)
CO2 SERPL-SCNC: 25 MMOL/L — SIGNIFICANT CHANGE UP (ref 22–31)
CREAT SERPL-MCNC: 0.88 MG/DL — SIGNIFICANT CHANGE UP (ref 0.5–1.3)
EGFR: 103 ML/MIN/1.73M2 — SIGNIFICANT CHANGE UP
EOSINOPHIL # BLD AUTO: 0.21 K/UL — SIGNIFICANT CHANGE UP (ref 0–0.5)
EOSINOPHIL NFR BLD AUTO: 3.1 % — SIGNIFICANT CHANGE UP (ref 0–6)
GLUCOSE SERPL-MCNC: 290 MG/DL — HIGH (ref 70–99)
HCT VFR BLD CALC: 42.3 % — SIGNIFICANT CHANGE UP (ref 39–50)
HGB BLD-MCNC: 14.6 G/DL — SIGNIFICANT CHANGE UP (ref 13–17)
IMM GRANULOCYTES NFR BLD AUTO: 0.3 % — SIGNIFICANT CHANGE UP (ref 0–0.9)
LYMPHOCYTES # BLD AUTO: 2.34 K/UL — SIGNIFICANT CHANGE UP (ref 1–3.3)
LYMPHOCYTES # BLD AUTO: 34.4 % — SIGNIFICANT CHANGE UP (ref 13–44)
MCHC RBC-ENTMCNC: 25 PG — LOW (ref 27–34)
MCHC RBC-ENTMCNC: 34.5 G/DL — SIGNIFICANT CHANGE UP (ref 32–36)
MCV RBC AUTO: 72.4 FL — LOW (ref 80–100)
MONOCYTES # BLD AUTO: 0.96 K/UL — HIGH (ref 0–0.9)
MONOCYTES NFR BLD AUTO: 14.1 % — HIGH (ref 2–14)
NEUTROPHILS # BLD AUTO: 3.25 K/UL — SIGNIFICANT CHANGE UP (ref 1.8–7.4)
NEUTROPHILS NFR BLD AUTO: 47.7 % — SIGNIFICANT CHANGE UP (ref 43–77)
NRBC # BLD: 0 /100 WBCS — SIGNIFICANT CHANGE UP (ref 0–0)
PLATELET # BLD AUTO: 196 K/UL — SIGNIFICANT CHANGE UP (ref 150–400)
POTASSIUM SERPL-MCNC: 4.3 MMOL/L — SIGNIFICANT CHANGE UP (ref 3.5–5.3)
POTASSIUM SERPL-SCNC: 4.3 MMOL/L — SIGNIFICANT CHANGE UP (ref 3.5–5.3)
PROT SERPL-MCNC: 7.2 G/DL — SIGNIFICANT CHANGE UP (ref 6–8.3)
RBC # BLD: 5.84 M/UL — HIGH (ref 4.2–5.8)
RBC # FLD: 20.1 % — HIGH (ref 10.3–14.5)
SODIUM SERPL-SCNC: 135 MMOL/L — SIGNIFICANT CHANGE UP (ref 135–145)
WBC # BLD: 6.81 K/UL — SIGNIFICANT CHANGE UP (ref 3.8–10.5)
WBC # FLD AUTO: 6.81 K/UL — SIGNIFICANT CHANGE UP (ref 3.8–10.5)

## 2023-09-13 PROCEDURE — 99214 OFFICE O/P EST MOD 30 MIN: CPT

## 2023-09-14 LAB
CEA SERPL-MCNC: 366 NG/ML — HIGH (ref 0–3.8)
CREAT ?TM UR-MCNC: 117 MG/DL — SIGNIFICANT CHANGE UP
PROT ?TM UR-MCNC: 20 MG/DL — HIGH (ref 0–12)
PROT/CREAT UR-RTO: 0.2 RATIO — SIGNIFICANT CHANGE UP (ref 0–0.2)

## 2023-09-15 ENCOUNTER — APPOINTMENT (OUTPATIENT)
Dept: INFUSION THERAPY | Facility: HOSPITAL | Age: 53
End: 2023-09-15

## 2023-09-19 ENCOUNTER — OUTPATIENT (OUTPATIENT)
Dept: OUTPATIENT SERVICES | Facility: HOSPITAL | Age: 53
LOS: 1 days | Discharge: HOME | End: 2023-09-19

## 2023-09-19 DIAGNOSIS — C18.9 MALIGNANT NEOPLASM OF COLON, UNSPECIFIED: ICD-10-CM

## 2023-09-27 ENCOUNTER — RESULT REVIEW (OUTPATIENT)
Age: 53
End: 2023-09-27

## 2023-09-27 ENCOUNTER — APPOINTMENT (OUTPATIENT)
Dept: INFUSION THERAPY | Facility: HOSPITAL | Age: 53
End: 2023-09-27

## 2023-09-27 ENCOUNTER — NON-APPOINTMENT (OUTPATIENT)
Age: 53
End: 2023-09-27

## 2023-09-27 ENCOUNTER — APPOINTMENT (OUTPATIENT)
Dept: HEMATOLOGY ONCOLOGY | Facility: CLINIC | Age: 53
End: 2023-09-27

## 2023-09-27 DIAGNOSIS — Z51.11 ENCOUNTER FOR ANTINEOPLASTIC CHEMOTHERAPY: ICD-10-CM

## 2023-09-27 DIAGNOSIS — R11.2 NAUSEA WITH VOMITING, UNSPECIFIED: ICD-10-CM

## 2023-09-27 LAB
ALBUMIN SERPL ELPH-MCNC: 3.7 G/DL — SIGNIFICANT CHANGE UP (ref 3.3–5)
ALP SERPL-CCNC: 86 U/L — SIGNIFICANT CHANGE UP (ref 40–120)
ALT FLD-CCNC: 17 U/L — SIGNIFICANT CHANGE UP (ref 10–45)
ANION GAP SERPL CALC-SCNC: 11 MMOL/L — SIGNIFICANT CHANGE UP (ref 5–17)
AST SERPL-CCNC: 21 U/L — SIGNIFICANT CHANGE UP (ref 10–40)
BASOPHILS # BLD AUTO: 0.03 K/UL — SIGNIFICANT CHANGE UP (ref 0–0.2)
BASOPHILS NFR BLD AUTO: 0.5 % — SIGNIFICANT CHANGE UP (ref 0–2)
BILIRUB SERPL-MCNC: 0.5 MG/DL — SIGNIFICANT CHANGE UP (ref 0.2–1.2)
BUN SERPL-MCNC: 11 MG/DL — SIGNIFICANT CHANGE UP (ref 7–23)
CALCIUM SERPL-MCNC: 9 MG/DL — SIGNIFICANT CHANGE UP (ref 8.4–10.5)
CHLORIDE SERPL-SCNC: 102 MMOL/L — SIGNIFICANT CHANGE UP (ref 96–108)
CO2 SERPL-SCNC: 22 MMOL/L — SIGNIFICANT CHANGE UP (ref 22–31)
CREAT SERPL-MCNC: 0.79 MG/DL — SIGNIFICANT CHANGE UP (ref 0.5–1.3)
EGFR: 106 ML/MIN/1.73M2 — SIGNIFICANT CHANGE UP
EOSINOPHIL # BLD AUTO: 0.14 K/UL — SIGNIFICANT CHANGE UP (ref 0–0.5)
EOSINOPHIL NFR BLD AUTO: 2.2 % — SIGNIFICANT CHANGE UP (ref 0–6)
GLUCOSE SERPL-MCNC: 261 MG/DL — HIGH (ref 70–99)
HCT VFR BLD CALC: 40 % — SIGNIFICANT CHANGE UP (ref 39–50)
HGB BLD-MCNC: 13.7 G/DL — SIGNIFICANT CHANGE UP (ref 13–17)
IMM GRANULOCYTES NFR BLD AUTO: 0.3 % — SIGNIFICANT CHANGE UP (ref 0–0.9)
LYMPHOCYTES # BLD AUTO: 1.93 K/UL — SIGNIFICANT CHANGE UP (ref 1–3.3)
LYMPHOCYTES # BLD AUTO: 29.7 % — SIGNIFICANT CHANGE UP (ref 13–44)
MCHC RBC-ENTMCNC: 24.8 PG — LOW (ref 27–34)
MCHC RBC-ENTMCNC: 34.3 G/DL — SIGNIFICANT CHANGE UP (ref 32–36)
MCV RBC AUTO: 72.5 FL — LOW (ref 80–100)
MONOCYTES # BLD AUTO: 0.87 K/UL — SIGNIFICANT CHANGE UP (ref 0–0.9)
MONOCYTES NFR BLD AUTO: 13.4 % — SIGNIFICANT CHANGE UP (ref 2–14)
NEUTROPHILS # BLD AUTO: 3.5 K/UL — SIGNIFICANT CHANGE UP (ref 1.8–7.4)
NEUTROPHILS NFR BLD AUTO: 53.9 % — SIGNIFICANT CHANGE UP (ref 43–77)
NRBC # BLD: 0 /100 WBCS — SIGNIFICANT CHANGE UP (ref 0–0)
PLATELET # BLD AUTO: 172 K/UL — SIGNIFICANT CHANGE UP (ref 150–400)
POTASSIUM SERPL-MCNC: 4.6 MMOL/L — SIGNIFICANT CHANGE UP (ref 3.5–5.3)
POTASSIUM SERPL-SCNC: 4.6 MMOL/L — SIGNIFICANT CHANGE UP (ref 3.5–5.3)
PROT SERPL-MCNC: 7 G/DL — SIGNIFICANT CHANGE UP (ref 6–8.3)
RBC # BLD: 5.52 M/UL — SIGNIFICANT CHANGE UP (ref 4.2–5.8)
RBC # FLD: 20 % — HIGH (ref 10.3–14.5)
SODIUM SERPL-SCNC: 136 MMOL/L — SIGNIFICANT CHANGE UP (ref 135–145)
WBC # BLD: 6.49 K/UL — SIGNIFICANT CHANGE UP (ref 3.8–10.5)
WBC # FLD AUTO: 6.49 K/UL — SIGNIFICANT CHANGE UP (ref 3.8–10.5)

## 2023-09-29 ENCOUNTER — APPOINTMENT (OUTPATIENT)
Dept: INFUSION THERAPY | Facility: HOSPITAL | Age: 53
End: 2023-09-29

## 2023-10-05 ENCOUNTER — RX RENEWAL (OUTPATIENT)
Age: 53
End: 2023-10-05

## 2023-10-10 ENCOUNTER — APPOINTMENT (OUTPATIENT)
Dept: INTERNAL MEDICINE | Facility: CLINIC | Age: 53
End: 2023-10-10

## 2023-10-10 ENCOUNTER — RX RENEWAL (OUTPATIENT)
Age: 53
End: 2023-10-10

## 2023-10-11 ENCOUNTER — RESULT REVIEW (OUTPATIENT)
Age: 53
End: 2023-10-11

## 2023-10-11 ENCOUNTER — APPOINTMENT (OUTPATIENT)
Dept: HEMATOLOGY ONCOLOGY | Facility: CLINIC | Age: 53
End: 2023-10-11
Payer: COMMERCIAL

## 2023-10-11 ENCOUNTER — APPOINTMENT (OUTPATIENT)
Dept: INFUSION THERAPY | Facility: HOSPITAL | Age: 53
End: 2023-10-11

## 2023-10-11 LAB
ALBUMIN SERPL ELPH-MCNC: 4.1 G/DL — SIGNIFICANT CHANGE UP (ref 3.3–5)
ALP SERPL-CCNC: 83 U/L — SIGNIFICANT CHANGE UP (ref 40–120)
ALT FLD-CCNC: 14 U/L — SIGNIFICANT CHANGE UP (ref 10–45)
ANION GAP SERPL CALC-SCNC: 11 MMOL/L — SIGNIFICANT CHANGE UP (ref 5–17)
ANISOCYTOSIS BLD QL: SLIGHT — SIGNIFICANT CHANGE UP
AST SERPL-CCNC: 24 U/L — SIGNIFICANT CHANGE UP (ref 10–40)
BASOPHILS # BLD AUTO: 0 K/UL — SIGNIFICANT CHANGE UP (ref 0–0.2)
BASOPHILS NFR BLD AUTO: 0 % — SIGNIFICANT CHANGE UP (ref 0–2)
BILIRUB SERPL-MCNC: 0.5 MG/DL — SIGNIFICANT CHANGE UP (ref 0.2–1.2)
BUN SERPL-MCNC: 11 MG/DL — SIGNIFICANT CHANGE UP (ref 7–23)
CALCIUM SERPL-MCNC: 9.4 MG/DL — SIGNIFICANT CHANGE UP (ref 8.4–10.5)
CEA SERPL-MCNC: 386 NG/ML — HIGH (ref 0–3.8)
CHLORIDE SERPL-SCNC: 99 MMOL/L — SIGNIFICANT CHANGE UP (ref 96–108)
CO2 SERPL-SCNC: 24 MMOL/L — SIGNIFICANT CHANGE UP (ref 22–31)
CREAT ?TM UR-MCNC: 35 MG/DL — SIGNIFICANT CHANGE UP
CREAT SERPL-MCNC: 0.83 MG/DL — SIGNIFICANT CHANGE UP (ref 0.5–1.3)
DACRYOCYTES BLD QL SMEAR: SLIGHT — SIGNIFICANT CHANGE UP
EGFR: 105 ML/MIN/1.73M2 — SIGNIFICANT CHANGE UP
EOSINOPHIL # BLD AUTO: 0.13 K/UL — SIGNIFICANT CHANGE UP (ref 0–0.5)
EOSINOPHIL NFR BLD AUTO: 2 % — SIGNIFICANT CHANGE UP (ref 0–6)
GLUCOSE SERPL-MCNC: 330 MG/DL — HIGH (ref 70–99)
HCT VFR BLD CALC: 42.9 % — SIGNIFICANT CHANGE UP (ref 39–50)
HGB BLD-MCNC: 14.6 G/DL — SIGNIFICANT CHANGE UP (ref 13–17)
LYMPHOCYTES # BLD AUTO: 2.06 K/UL — SIGNIFICANT CHANGE UP (ref 1–3.3)
LYMPHOCYTES # BLD AUTO: 32 % — SIGNIFICANT CHANGE UP (ref 13–44)
MCHC RBC-ENTMCNC: 24.4 PG — LOW (ref 27–34)
MCHC RBC-ENTMCNC: 34 G/DL — SIGNIFICANT CHANGE UP (ref 32–36)
MCV RBC AUTO: 71.6 FL — LOW (ref 80–100)
MICROCYTES BLD QL: SLIGHT — SIGNIFICANT CHANGE UP
MONOCYTES # BLD AUTO: 0.77 K/UL — SIGNIFICANT CHANGE UP (ref 0–0.9)
MONOCYTES NFR BLD AUTO: 12 % — SIGNIFICANT CHANGE UP (ref 2–14)
NEUTROPHILS # BLD AUTO: 3.47 K/UL — SIGNIFICANT CHANGE UP (ref 1.8–7.4)
NEUTROPHILS NFR BLD AUTO: 54 % — SIGNIFICANT CHANGE UP (ref 43–77)
NRBC # BLD: 0 /100 — SIGNIFICANT CHANGE UP (ref 0–0)
NRBC # BLD: SIGNIFICANT CHANGE UP /100 WBCS (ref 0–0)
PLAT MORPH BLD: NORMAL — SIGNIFICANT CHANGE UP
PLATELET # BLD AUTO: 202 K/UL — SIGNIFICANT CHANGE UP (ref 150–400)
POIKILOCYTOSIS BLD QL AUTO: SLIGHT — SIGNIFICANT CHANGE UP
POTASSIUM SERPL-MCNC: 4.4 MMOL/L — SIGNIFICANT CHANGE UP (ref 3.5–5.3)
POTASSIUM SERPL-SCNC: 4.4 MMOL/L — SIGNIFICANT CHANGE UP (ref 3.5–5.3)
PROT ?TM UR-MCNC: 7 MG/DL — SIGNIFICANT CHANGE UP (ref 0–12)
PROT SERPL-MCNC: 6.9 G/DL — SIGNIFICANT CHANGE UP (ref 6–8.3)
PROT/CREAT UR-RTO: 0.2 RATIO — SIGNIFICANT CHANGE UP (ref 0–0.2)
RBC # BLD: 5.99 M/UL — HIGH (ref 4.2–5.8)
RBC # FLD: 20.5 % — HIGH (ref 10.3–14.5)
RBC BLD AUTO: ABNORMAL
SODIUM SERPL-SCNC: 133 MMOL/L — LOW (ref 135–145)
WBC # BLD: 6.43 K/UL — SIGNIFICANT CHANGE UP (ref 3.8–10.5)
WBC # FLD AUTO: 6.43 K/UL — SIGNIFICANT CHANGE UP (ref 3.8–10.5)

## 2023-10-11 PROCEDURE — 99214 OFFICE O/P EST MOD 30 MIN: CPT

## 2023-10-13 ENCOUNTER — APPOINTMENT (OUTPATIENT)
Dept: INFUSION THERAPY | Facility: HOSPITAL | Age: 53
End: 2023-10-13

## 2023-10-19 ENCOUNTER — APPOINTMENT (OUTPATIENT)
Dept: INTERNAL MEDICINE | Facility: CLINIC | Age: 53
End: 2023-10-19
Payer: COMMERCIAL

## 2023-10-19 VITALS
HEIGHT: 65 IN | OXYGEN SATURATION: 99 % | RESPIRATION RATE: 14 BRPM | SYSTOLIC BLOOD PRESSURE: 112 MMHG | DIASTOLIC BLOOD PRESSURE: 82 MMHG | BODY MASS INDEX: 35.65 KG/M2 | HEART RATE: 87 BPM | WEIGHT: 214 LBS

## 2023-10-19 PROCEDURE — 99213 OFFICE O/P EST LOW 20 MIN: CPT

## 2023-10-20 ENCOUNTER — LABORATORY RESULT (OUTPATIENT)
Age: 53
End: 2023-10-20

## 2023-10-25 ENCOUNTER — RESULT REVIEW (OUTPATIENT)
Age: 53
End: 2023-10-25

## 2023-10-25 ENCOUNTER — NON-APPOINTMENT (OUTPATIENT)
Age: 53
End: 2023-10-25

## 2023-10-25 ENCOUNTER — APPOINTMENT (OUTPATIENT)
Dept: INFUSION THERAPY | Facility: HOSPITAL | Age: 53
End: 2023-10-25

## 2023-10-25 DIAGNOSIS — E86.0 DEHYDRATION: ICD-10-CM

## 2023-10-25 LAB
ALBUMIN SERPL ELPH-MCNC: 3.9 G/DL — SIGNIFICANT CHANGE UP (ref 3.3–5)
ALBUMIN SERPL ELPH-MCNC: 3.9 G/DL — SIGNIFICANT CHANGE UP (ref 3.3–5)
ALP SERPL-CCNC: 100 U/L — SIGNIFICANT CHANGE UP (ref 40–120)
ALP SERPL-CCNC: 100 U/L — SIGNIFICANT CHANGE UP (ref 40–120)
ALT FLD-CCNC: 18 U/L — SIGNIFICANT CHANGE UP (ref 10–45)
ALT FLD-CCNC: 18 U/L — SIGNIFICANT CHANGE UP (ref 10–45)
ANION GAP SERPL CALC-SCNC: 14 MMOL/L — SIGNIFICANT CHANGE UP (ref 5–17)
ANION GAP SERPL CALC-SCNC: 14 MMOL/L — SIGNIFICANT CHANGE UP (ref 5–17)
AST SERPL-CCNC: 32 U/L — SIGNIFICANT CHANGE UP (ref 10–40)
AST SERPL-CCNC: 32 U/L — SIGNIFICANT CHANGE UP (ref 10–40)
BASOPHILS # BLD AUTO: 0.04 K/UL — SIGNIFICANT CHANGE UP (ref 0–0.2)
BASOPHILS # BLD AUTO: 0.04 K/UL — SIGNIFICANT CHANGE UP (ref 0–0.2)
BASOPHILS NFR BLD AUTO: 0.7 % — SIGNIFICANT CHANGE UP (ref 0–2)
BASOPHILS NFR BLD AUTO: 0.7 % — SIGNIFICANT CHANGE UP (ref 0–2)
BILIRUB SERPL-MCNC: 0.6 MG/DL — SIGNIFICANT CHANGE UP (ref 0.2–1.2)
BILIRUB SERPL-MCNC: 0.6 MG/DL — SIGNIFICANT CHANGE UP (ref 0.2–1.2)
BUN SERPL-MCNC: 12 MG/DL — SIGNIFICANT CHANGE UP (ref 7–23)
BUN SERPL-MCNC: 12 MG/DL — SIGNIFICANT CHANGE UP (ref 7–23)
CALCIUM SERPL-MCNC: 9.4 MG/DL — SIGNIFICANT CHANGE UP (ref 8.4–10.5)
CALCIUM SERPL-MCNC: 9.4 MG/DL — SIGNIFICANT CHANGE UP (ref 8.4–10.5)
CHLORIDE SERPL-SCNC: 97 MMOL/L — SIGNIFICANT CHANGE UP (ref 96–108)
CHLORIDE SERPL-SCNC: 97 MMOL/L — SIGNIFICANT CHANGE UP (ref 96–108)
CO2 SERPL-SCNC: 23 MMOL/L — SIGNIFICANT CHANGE UP (ref 22–31)
CO2 SERPL-SCNC: 23 MMOL/L — SIGNIFICANT CHANGE UP (ref 22–31)
CREAT SERPL-MCNC: 0.9 MG/DL — SIGNIFICANT CHANGE UP (ref 0.5–1.3)
CREAT SERPL-MCNC: 0.9 MG/DL — SIGNIFICANT CHANGE UP (ref 0.5–1.3)
EGFR: 102 ML/MIN/1.73M2 — SIGNIFICANT CHANGE UP
EGFR: 102 ML/MIN/1.73M2 — SIGNIFICANT CHANGE UP
EOSINOPHIL # BLD AUTO: 0.17 K/UL — SIGNIFICANT CHANGE UP (ref 0–0.5)
EOSINOPHIL # BLD AUTO: 0.17 K/UL — SIGNIFICANT CHANGE UP (ref 0–0.5)
EOSINOPHIL NFR BLD AUTO: 2.8 % — SIGNIFICANT CHANGE UP (ref 0–6)
EOSINOPHIL NFR BLD AUTO: 2.8 % — SIGNIFICANT CHANGE UP (ref 0–6)
GLUCOSE SERPL-MCNC: 286 MG/DL — HIGH (ref 70–99)
GLUCOSE SERPL-MCNC: 286 MG/DL — HIGH (ref 70–99)
HCT VFR BLD CALC: 40.4 % — SIGNIFICANT CHANGE UP (ref 39–50)
HCT VFR BLD CALC: 40.4 % — SIGNIFICANT CHANGE UP (ref 39–50)
HGB BLD-MCNC: 14 G/DL — SIGNIFICANT CHANGE UP (ref 13–17)
HGB BLD-MCNC: 14 G/DL — SIGNIFICANT CHANGE UP (ref 13–17)
IMM GRANULOCYTES NFR BLD AUTO: 0.7 % — SIGNIFICANT CHANGE UP (ref 0–0.9)
IMM GRANULOCYTES NFR BLD AUTO: 0.7 % — SIGNIFICANT CHANGE UP (ref 0–0.9)
LYMPHOCYTES # BLD AUTO: 1.97 K/UL — SIGNIFICANT CHANGE UP (ref 1–3.3)
LYMPHOCYTES # BLD AUTO: 1.97 K/UL — SIGNIFICANT CHANGE UP (ref 1–3.3)
LYMPHOCYTES # BLD AUTO: 32.2 % — SIGNIFICANT CHANGE UP (ref 13–44)
LYMPHOCYTES # BLD AUTO: 32.2 % — SIGNIFICANT CHANGE UP (ref 13–44)
MCHC RBC-ENTMCNC: 24.9 PG — LOW (ref 27–34)
MCHC RBC-ENTMCNC: 24.9 PG — LOW (ref 27–34)
MCHC RBC-ENTMCNC: 34.7 G/DL — SIGNIFICANT CHANGE UP (ref 32–36)
MCHC RBC-ENTMCNC: 34.7 G/DL — SIGNIFICANT CHANGE UP (ref 32–36)
MCV RBC AUTO: 71.9 FL — LOW (ref 80–100)
MCV RBC AUTO: 71.9 FL — LOW (ref 80–100)
MONOCYTES # BLD AUTO: 0.78 K/UL — SIGNIFICANT CHANGE UP (ref 0–0.9)
MONOCYTES # BLD AUTO: 0.78 K/UL — SIGNIFICANT CHANGE UP (ref 0–0.9)
MONOCYTES NFR BLD AUTO: 12.8 % — SIGNIFICANT CHANGE UP (ref 2–14)
MONOCYTES NFR BLD AUTO: 12.8 % — SIGNIFICANT CHANGE UP (ref 2–14)
NEUTROPHILS # BLD AUTO: 3.11 K/UL — SIGNIFICANT CHANGE UP (ref 1.8–7.4)
NEUTROPHILS # BLD AUTO: 3.11 K/UL — SIGNIFICANT CHANGE UP (ref 1.8–7.4)
NEUTROPHILS NFR BLD AUTO: 50.8 % — SIGNIFICANT CHANGE UP (ref 43–77)
NEUTROPHILS NFR BLD AUTO: 50.8 % — SIGNIFICANT CHANGE UP (ref 43–77)
NRBC # BLD: 0 /100 WBCS — SIGNIFICANT CHANGE UP (ref 0–0)
NRBC # BLD: 0 /100 WBCS — SIGNIFICANT CHANGE UP (ref 0–0)
PLATELET # BLD AUTO: 202 K/UL — SIGNIFICANT CHANGE UP (ref 150–400)
PLATELET # BLD AUTO: 202 K/UL — SIGNIFICANT CHANGE UP (ref 150–400)
POTASSIUM SERPL-MCNC: 4.2 MMOL/L — SIGNIFICANT CHANGE UP (ref 3.5–5.3)
POTASSIUM SERPL-MCNC: 4.2 MMOL/L — SIGNIFICANT CHANGE UP (ref 3.5–5.3)
POTASSIUM SERPL-SCNC: 4.2 MMOL/L — SIGNIFICANT CHANGE UP (ref 3.5–5.3)
POTASSIUM SERPL-SCNC: 4.2 MMOL/L — SIGNIFICANT CHANGE UP (ref 3.5–5.3)
PROT SERPL-MCNC: 7.2 G/DL — SIGNIFICANT CHANGE UP (ref 6–8.3)
PROT SERPL-MCNC: 7.2 G/DL — SIGNIFICANT CHANGE UP (ref 6–8.3)
RBC # BLD: 5.62 M/UL — SIGNIFICANT CHANGE UP (ref 4.2–5.8)
RBC # BLD: 5.62 M/UL — SIGNIFICANT CHANGE UP (ref 4.2–5.8)
RBC # FLD: 20.2 % — HIGH (ref 10.3–14.5)
RBC # FLD: 20.2 % — HIGH (ref 10.3–14.5)
SODIUM SERPL-SCNC: 134 MMOL/L — LOW (ref 135–145)
SODIUM SERPL-SCNC: 134 MMOL/L — LOW (ref 135–145)
WBC # BLD: 6.11 K/UL — SIGNIFICANT CHANGE UP (ref 3.8–10.5)
WBC # BLD: 6.11 K/UL — SIGNIFICANT CHANGE UP (ref 3.8–10.5)
WBC # FLD AUTO: 6.11 K/UL — SIGNIFICANT CHANGE UP (ref 3.8–10.5)
WBC # FLD AUTO: 6.11 K/UL — SIGNIFICANT CHANGE UP (ref 3.8–10.5)

## 2023-10-27 ENCOUNTER — NON-APPOINTMENT (OUTPATIENT)
Age: 53
End: 2023-10-27

## 2023-10-27 ENCOUNTER — APPOINTMENT (OUTPATIENT)
Dept: INFUSION THERAPY | Facility: HOSPITAL | Age: 53
End: 2023-10-27

## 2023-10-27 LAB
ALBUMIN SERPL ELPH-MCNC: 4.1 G/DL
ALP BLD-CCNC: 95 U/L
ALT SERPL-CCNC: 18 U/L
ANION GAP SERPL CALC-SCNC: 13 MMOL/L
AST SERPL-CCNC: 17 U/L
BASOPHILS # BLD AUTO: 0 K/UL
BASOPHILS NFR BLD AUTO: 0 %
BILIRUB SERPL-MCNC: 0.4 MG/DL
BUN SERPL-MCNC: 14 MG/DL
CALCIUM SERPL-MCNC: 9.1 MG/DL
CHLORIDE SERPL-SCNC: 100 MMOL/L
CHOLEST SERPL-MCNC: 191 MG/DL
CO2 SERPL-SCNC: 22 MMOL/L
CREAT SERPL-MCNC: 0.99 MG/DL
EGFR: 91 ML/MIN/1.73M2
EOSINOPHIL # BLD AUTO: 0.09 K/UL
EOSINOPHIL NFR BLD AUTO: 1.8 %
ESTIMATED AVERAGE GLUCOSE: 324 MG/DL
GLUCOSE SERPL-MCNC: 285 MG/DL
HBA1C MFR BLD HPLC: 12.9 %
HCT VFR BLD CALC: 41.3 %
HDLC SERPL-MCNC: 65 MG/DL
HGB BLD-MCNC: 13.5 G/DL
LDLC SERPL CALC-MCNC: 110 MG/DL
LYMPHOCYTES # BLD AUTO: 2.42 K/UL
LYMPHOCYTES NFR BLD AUTO: 48.2 %
MAN DIFF?: NORMAL
MCHC RBC-ENTMCNC: 24.5 PG
MCHC RBC-ENTMCNC: 32.7 GM/DL
MCV RBC AUTO: 74.8 FL
MONOCYTES # BLD AUTO: 0.36 K/UL
MONOCYTES NFR BLD AUTO: 7.1 %
NEUTROPHILS # BLD AUTO: 2.16 K/UL
NEUTROPHILS NFR BLD AUTO: 42.9 %
NONHDLC SERPL-MCNC: 126 MG/DL
PLATELET # BLD AUTO: 271 K/UL
POTASSIUM SERPL-SCNC: 4.7 MMOL/L
PROT SERPL-MCNC: 6.6 G/DL
RBC # BLD: 5.52 M/UL
RBC # FLD: 21.3 %
SODIUM SERPL-SCNC: 135 MMOL/L
TRIGL SERPL-MCNC: 88 MG/DL
WBC # FLD AUTO: 5.03 K/UL

## 2023-11-08 ENCOUNTER — RESULT REVIEW (OUTPATIENT)
Age: 53
End: 2023-11-08

## 2023-11-08 ENCOUNTER — APPOINTMENT (OUTPATIENT)
Dept: HEMATOLOGY ONCOLOGY | Facility: CLINIC | Age: 53
End: 2023-11-08
Payer: COMMERCIAL

## 2023-11-08 ENCOUNTER — APPOINTMENT (OUTPATIENT)
Dept: INFUSION THERAPY | Facility: HOSPITAL | Age: 53
End: 2023-11-08

## 2023-11-08 VITALS
SYSTOLIC BLOOD PRESSURE: 134 MMHG | BODY MASS INDEX: 36.99 KG/M2 | OXYGEN SATURATION: 98 % | HEIGHT: 65.12 IN | WEIGHT: 222 LBS | TEMPERATURE: 98.4 F | HEART RATE: 83 BPM | RESPIRATION RATE: 16 BRPM | DIASTOLIC BLOOD PRESSURE: 88 MMHG

## 2023-11-08 LAB
ALBUMIN SERPL ELPH-MCNC: 3.8 G/DL — SIGNIFICANT CHANGE UP (ref 3.3–5)
ALBUMIN SERPL ELPH-MCNC: 3.8 G/DL — SIGNIFICANT CHANGE UP (ref 3.3–5)
ALP SERPL-CCNC: 92 U/L — SIGNIFICANT CHANGE UP (ref 40–120)
ALP SERPL-CCNC: 92 U/L — SIGNIFICANT CHANGE UP (ref 40–120)
ALT FLD-CCNC: 14 U/L — SIGNIFICANT CHANGE UP (ref 10–45)
ALT FLD-CCNC: 14 U/L — SIGNIFICANT CHANGE UP (ref 10–45)
ANION GAP SERPL CALC-SCNC: 12 MMOL/L — SIGNIFICANT CHANGE UP (ref 5–17)
ANION GAP SERPL CALC-SCNC: 12 MMOL/L — SIGNIFICANT CHANGE UP (ref 5–17)
AST SERPL-CCNC: 30 U/L — SIGNIFICANT CHANGE UP (ref 10–40)
AST SERPL-CCNC: 30 U/L — SIGNIFICANT CHANGE UP (ref 10–40)
BASOPHILS # BLD AUTO: 0.05 K/UL — SIGNIFICANT CHANGE UP (ref 0–0.2)
BASOPHILS # BLD AUTO: 0.05 K/UL — SIGNIFICANT CHANGE UP (ref 0–0.2)
BASOPHILS NFR BLD AUTO: 0.8 % — SIGNIFICANT CHANGE UP (ref 0–2)
BASOPHILS NFR BLD AUTO: 0.8 % — SIGNIFICANT CHANGE UP (ref 0–2)
BILIRUB SERPL-MCNC: 0.7 MG/DL — SIGNIFICANT CHANGE UP (ref 0.2–1.2)
BILIRUB SERPL-MCNC: 0.7 MG/DL — SIGNIFICANT CHANGE UP (ref 0.2–1.2)
BUN SERPL-MCNC: 10 MG/DL — SIGNIFICANT CHANGE UP (ref 7–23)
BUN SERPL-MCNC: 10 MG/DL — SIGNIFICANT CHANGE UP (ref 7–23)
CALCIUM SERPL-MCNC: 9.3 MG/DL — SIGNIFICANT CHANGE UP (ref 8.4–10.5)
CALCIUM SERPL-MCNC: 9.3 MG/DL — SIGNIFICANT CHANGE UP (ref 8.4–10.5)
CEA SERPL-MCNC: 413 NG/ML — HIGH (ref 0–3.8)
CEA SERPL-MCNC: 413 NG/ML — HIGH (ref 0–3.8)
CHLORIDE SERPL-SCNC: 98 MMOL/L — SIGNIFICANT CHANGE UP (ref 96–108)
CHLORIDE SERPL-SCNC: 98 MMOL/L — SIGNIFICANT CHANGE UP (ref 96–108)
CO2 SERPL-SCNC: 24 MMOL/L — SIGNIFICANT CHANGE UP (ref 22–31)
CO2 SERPL-SCNC: 24 MMOL/L — SIGNIFICANT CHANGE UP (ref 22–31)
CREAT ?TM UR-MCNC: 62 MG/DL — SIGNIFICANT CHANGE UP
CREAT ?TM UR-MCNC: 62 MG/DL — SIGNIFICANT CHANGE UP
CREAT SERPL-MCNC: 0.89 MG/DL — SIGNIFICANT CHANGE UP (ref 0.5–1.3)
CREAT SERPL-MCNC: 0.89 MG/DL — SIGNIFICANT CHANGE UP (ref 0.5–1.3)
EGFR: 102 ML/MIN/1.73M2 — SIGNIFICANT CHANGE UP
EGFR: 102 ML/MIN/1.73M2 — SIGNIFICANT CHANGE UP
EOSINOPHIL # BLD AUTO: 0.22 K/UL — SIGNIFICANT CHANGE UP (ref 0–0.5)
EOSINOPHIL # BLD AUTO: 0.22 K/UL — SIGNIFICANT CHANGE UP (ref 0–0.5)
EOSINOPHIL NFR BLD AUTO: 3.7 % — SIGNIFICANT CHANGE UP (ref 0–6)
EOSINOPHIL NFR BLD AUTO: 3.7 % — SIGNIFICANT CHANGE UP (ref 0–6)
GLUCOSE SERPL-MCNC: 311 MG/DL — HIGH (ref 70–99)
GLUCOSE SERPL-MCNC: 311 MG/DL — HIGH (ref 70–99)
HCT VFR BLD CALC: 41.9 % — SIGNIFICANT CHANGE UP (ref 39–50)
HCT VFR BLD CALC: 41.9 % — SIGNIFICANT CHANGE UP (ref 39–50)
HGB BLD-MCNC: 14.4 G/DL — SIGNIFICANT CHANGE UP (ref 13–17)
HGB BLD-MCNC: 14.4 G/DL — SIGNIFICANT CHANGE UP (ref 13–17)
IMM GRANULOCYTES NFR BLD AUTO: 1.5 % — HIGH (ref 0–0.9)
IMM GRANULOCYTES NFR BLD AUTO: 1.5 % — HIGH (ref 0–0.9)
LYMPHOCYTES # BLD AUTO: 1.87 K/UL — SIGNIFICANT CHANGE UP (ref 1–3.3)
LYMPHOCYTES # BLD AUTO: 1.87 K/UL — SIGNIFICANT CHANGE UP (ref 1–3.3)
LYMPHOCYTES # BLD AUTO: 31.2 % — SIGNIFICANT CHANGE UP (ref 13–44)
LYMPHOCYTES # BLD AUTO: 31.2 % — SIGNIFICANT CHANGE UP (ref 13–44)
MCHC RBC-ENTMCNC: 24.8 PG — LOW (ref 27–34)
MCHC RBC-ENTMCNC: 24.8 PG — LOW (ref 27–34)
MCHC RBC-ENTMCNC: 34.4 G/DL — SIGNIFICANT CHANGE UP (ref 32–36)
MCHC RBC-ENTMCNC: 34.4 G/DL — SIGNIFICANT CHANGE UP (ref 32–36)
MCV RBC AUTO: 72.2 FL — LOW (ref 80–100)
MCV RBC AUTO: 72.2 FL — LOW (ref 80–100)
MONOCYTES # BLD AUTO: 0.93 K/UL — HIGH (ref 0–0.9)
MONOCYTES # BLD AUTO: 0.93 K/UL — HIGH (ref 0–0.9)
MONOCYTES NFR BLD AUTO: 15.5 % — HIGH (ref 2–14)
MONOCYTES NFR BLD AUTO: 15.5 % — HIGH (ref 2–14)
NEUTROPHILS # BLD AUTO: 2.83 K/UL — SIGNIFICANT CHANGE UP (ref 1.8–7.4)
NEUTROPHILS # BLD AUTO: 2.83 K/UL — SIGNIFICANT CHANGE UP (ref 1.8–7.4)
NEUTROPHILS NFR BLD AUTO: 47.3 % — SIGNIFICANT CHANGE UP (ref 43–77)
NEUTROPHILS NFR BLD AUTO: 47.3 % — SIGNIFICANT CHANGE UP (ref 43–77)
NRBC # BLD: 0 /100 WBCS — SIGNIFICANT CHANGE UP (ref 0–0)
NRBC # BLD: 0 /100 WBCS — SIGNIFICANT CHANGE UP (ref 0–0)
PLATELET # BLD AUTO: 220 K/UL — SIGNIFICANT CHANGE UP (ref 150–400)
PLATELET # BLD AUTO: 220 K/UL — SIGNIFICANT CHANGE UP (ref 150–400)
POTASSIUM SERPL-MCNC: 4.3 MMOL/L — SIGNIFICANT CHANGE UP (ref 3.5–5.3)
POTASSIUM SERPL-MCNC: 4.3 MMOL/L — SIGNIFICANT CHANGE UP (ref 3.5–5.3)
POTASSIUM SERPL-SCNC: 4.3 MMOL/L — SIGNIFICANT CHANGE UP (ref 3.5–5.3)
POTASSIUM SERPL-SCNC: 4.3 MMOL/L — SIGNIFICANT CHANGE UP (ref 3.5–5.3)
PROT ?TM UR-MCNC: 13 MG/DL — HIGH (ref 0–12)
PROT ?TM UR-MCNC: 13 MG/DL — HIGH (ref 0–12)
PROT SERPL-MCNC: 6.9 G/DL — SIGNIFICANT CHANGE UP (ref 6–8.3)
PROT SERPL-MCNC: 6.9 G/DL — SIGNIFICANT CHANGE UP (ref 6–8.3)
PROT/CREAT UR-RTO: 0.2 RATIO — SIGNIFICANT CHANGE UP (ref 0–0.2)
PROT/CREAT UR-RTO: 0.2 RATIO — SIGNIFICANT CHANGE UP (ref 0–0.2)
RBC # BLD: 5.8 M/UL — SIGNIFICANT CHANGE UP (ref 4.2–5.8)
RBC # BLD: 5.8 M/UL — SIGNIFICANT CHANGE UP (ref 4.2–5.8)
RBC # FLD: 20.6 % — HIGH (ref 10.3–14.5)
RBC # FLD: 20.6 % — HIGH (ref 10.3–14.5)
SODIUM SERPL-SCNC: 133 MMOL/L — LOW (ref 135–145)
SODIUM SERPL-SCNC: 133 MMOL/L — LOW (ref 135–145)
WBC # BLD: 5.99 K/UL — SIGNIFICANT CHANGE UP (ref 3.8–10.5)
WBC # BLD: 5.99 K/UL — SIGNIFICANT CHANGE UP (ref 3.8–10.5)
WBC # FLD AUTO: 5.99 K/UL — SIGNIFICANT CHANGE UP (ref 3.8–10.5)
WBC # FLD AUTO: 5.99 K/UL — SIGNIFICANT CHANGE UP (ref 3.8–10.5)

## 2023-11-08 PROCEDURE — 99214 OFFICE O/P EST MOD 30 MIN: CPT

## 2023-11-09 ENCOUNTER — OUTPATIENT (OUTPATIENT)
Dept: OUTPATIENT SERVICES | Facility: HOSPITAL | Age: 53
LOS: 1 days | Discharge: HOME | End: 2023-11-09

## 2023-11-09 DIAGNOSIS — C18.9 MALIGNANT NEOPLASM OF COLON, UNSPECIFIED: ICD-10-CM

## 2023-11-10 ENCOUNTER — APPOINTMENT (OUTPATIENT)
Dept: INFUSION THERAPY | Facility: HOSPITAL | Age: 53
End: 2023-11-10

## 2023-11-13 ENCOUNTER — RESULT REVIEW (OUTPATIENT)
Age: 53
End: 2023-11-13

## 2023-11-17 NOTE — ED PROVIDER NOTE - NS ED ATTENDING STATEMENT MOD
[facility] OCCUPATIONAL THERAPY EVALUATION - ACUTE     NAME: Helen Washburn  : 1951 (67 y.o.)  MRN: [id number]  CODE STATUS: Full Code  Room: [place] [address]    Date of Service: 2023    Patient Diagnosis(es): Arthritis of right shoulder region [M19.011]  Biceps tendinitis on right [M75.21]  Status post total shoulder replacement, right [Z96.611]   Patient Active Problem List    Diagnosis Date Noted    Status post total shoulder replacement, right 2023        Past Medical History:   Diagnosis Date    Hyperlipidemia      Past Surgical History:   Procedure Laterality Date    APPENDECTOMY      COLONOSCOPY      HERNIA REPAIR      [facility]    SHOULDER ARTHROSCOPY Right         Restrictions  Restrictions/Precautions: Fall Risk, Weight Bearing   Right Upper Extremity Weight Bearing: Non Weight Bearing          Safety Devices: Safety Devices  Type of Devices: All fall risk precautions in place; Left in chair;Nurse notified;Call light within reach     Patient's date of birth confirmed: Yes    General:  Patient assessed for rehabilitation services?: Yes    Subjective:\"I'm hoping to get out of here by 10:00. \"          Pain at start of treatment: No    Pain at end of treatment: No    Location: N/A  Description: N/A  Nursing notified: No  RN: N/A  Intervention: Repositioned    Prior Level of Function:  Social/Functional History  Lives With: Spouse  Type of Home: House  Home Layout: One level  Home Access: Stairs to enter with rails  Entrance Stairs - Number of Steps: 2  Entrance Stairs - Rails: Left  Bathroom Shower/Tub: Tub/Shower unit  Bathroom Toilet: Standard  Bathroom Equipment: Hand-held shower  Has the patient had two or more falls in the past year or any fall with injury in the past year?: No  ADL Assistance: Independent  Homemaking Assistance: Independent  Ambulation Assistance: Independent  Transfer Assistance: Independent  Active : Yes  Mode of Transportation: Car  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

## 2023-11-22 ENCOUNTER — RESULT REVIEW (OUTPATIENT)
Age: 53
End: 2023-11-22

## 2023-11-22 ENCOUNTER — APPOINTMENT (OUTPATIENT)
Dept: INFUSION THERAPY | Facility: HOSPITAL | Age: 53
End: 2023-11-22

## 2023-11-22 LAB
ALBUMIN SERPL ELPH-MCNC: 3.8 G/DL — SIGNIFICANT CHANGE UP (ref 3.3–5)
ALBUMIN SERPL ELPH-MCNC: 3.8 G/DL — SIGNIFICANT CHANGE UP (ref 3.3–5)
ALP SERPL-CCNC: 122 U/L — HIGH (ref 40–120)
ALP SERPL-CCNC: 122 U/L — HIGH (ref 40–120)
ALT FLD-CCNC: 19 U/L — SIGNIFICANT CHANGE UP (ref 10–45)
ALT FLD-CCNC: 19 U/L — SIGNIFICANT CHANGE UP (ref 10–45)
ANION GAP SERPL CALC-SCNC: 10 MMOL/L — SIGNIFICANT CHANGE UP (ref 5–17)
ANION GAP SERPL CALC-SCNC: 10 MMOL/L — SIGNIFICANT CHANGE UP (ref 5–17)
AST SERPL-CCNC: 30 U/L — SIGNIFICANT CHANGE UP (ref 10–40)
AST SERPL-CCNC: 30 U/L — SIGNIFICANT CHANGE UP (ref 10–40)
BASOPHILS # BLD AUTO: 0.04 K/UL — SIGNIFICANT CHANGE UP (ref 0–0.2)
BASOPHILS # BLD AUTO: 0.04 K/UL — SIGNIFICANT CHANGE UP (ref 0–0.2)
BASOPHILS NFR BLD AUTO: 0.6 % — SIGNIFICANT CHANGE UP (ref 0–2)
BASOPHILS NFR BLD AUTO: 0.6 % — SIGNIFICANT CHANGE UP (ref 0–2)
BILIRUB SERPL-MCNC: 0.6 MG/DL — SIGNIFICANT CHANGE UP (ref 0.2–1.2)
BILIRUB SERPL-MCNC: 0.6 MG/DL — SIGNIFICANT CHANGE UP (ref 0.2–1.2)
BUN SERPL-MCNC: 11 MG/DL — SIGNIFICANT CHANGE UP (ref 7–23)
BUN SERPL-MCNC: 11 MG/DL — SIGNIFICANT CHANGE UP (ref 7–23)
CALCIUM SERPL-MCNC: 9 MG/DL — SIGNIFICANT CHANGE UP (ref 8.4–10.5)
CALCIUM SERPL-MCNC: 9 MG/DL — SIGNIFICANT CHANGE UP (ref 8.4–10.5)
CHLORIDE SERPL-SCNC: 96 MMOL/L — SIGNIFICANT CHANGE UP (ref 96–108)
CHLORIDE SERPL-SCNC: 96 MMOL/L — SIGNIFICANT CHANGE UP (ref 96–108)
CO2 SERPL-SCNC: 25 MMOL/L — SIGNIFICANT CHANGE UP (ref 22–31)
CO2 SERPL-SCNC: 25 MMOL/L — SIGNIFICANT CHANGE UP (ref 22–31)
CREAT SERPL-MCNC: 0.91 MG/DL — SIGNIFICANT CHANGE UP (ref 0.5–1.3)
CREAT SERPL-MCNC: 0.91 MG/DL — SIGNIFICANT CHANGE UP (ref 0.5–1.3)
EGFR: 101 ML/MIN/1.73M2 — SIGNIFICANT CHANGE UP
EGFR: 101 ML/MIN/1.73M2 — SIGNIFICANT CHANGE UP
EOSINOPHIL # BLD AUTO: 0.34 K/UL — SIGNIFICANT CHANGE UP (ref 0–0.5)
EOSINOPHIL # BLD AUTO: 0.34 K/UL — SIGNIFICANT CHANGE UP (ref 0–0.5)
EOSINOPHIL NFR BLD AUTO: 5.2 % — SIGNIFICANT CHANGE UP (ref 0–6)
EOSINOPHIL NFR BLD AUTO: 5.2 % — SIGNIFICANT CHANGE UP (ref 0–6)
GLUCOSE SERPL-MCNC: 455 MG/DL — CRITICAL HIGH (ref 70–99)
GLUCOSE SERPL-MCNC: 455 MG/DL — CRITICAL HIGH (ref 70–99)
HCT VFR BLD CALC: 41.4 % — SIGNIFICANT CHANGE UP (ref 39–50)
HCT VFR BLD CALC: 41.4 % — SIGNIFICANT CHANGE UP (ref 39–50)
HGB BLD-MCNC: 14.5 G/DL — SIGNIFICANT CHANGE UP (ref 13–17)
HGB BLD-MCNC: 14.5 G/DL — SIGNIFICANT CHANGE UP (ref 13–17)
IMM GRANULOCYTES NFR BLD AUTO: 0.9 % — SIGNIFICANT CHANGE UP (ref 0–0.9)
IMM GRANULOCYTES NFR BLD AUTO: 0.9 % — SIGNIFICANT CHANGE UP (ref 0–0.9)
LYMPHOCYTES # BLD AUTO: 1.88 K/UL — SIGNIFICANT CHANGE UP (ref 1–3.3)
LYMPHOCYTES # BLD AUTO: 1.88 K/UL — SIGNIFICANT CHANGE UP (ref 1–3.3)
LYMPHOCYTES # BLD AUTO: 28.7 % — SIGNIFICANT CHANGE UP (ref 13–44)
LYMPHOCYTES # BLD AUTO: 28.7 % — SIGNIFICANT CHANGE UP (ref 13–44)
MCHC RBC-ENTMCNC: 25.2 PG — LOW (ref 27–34)
MCHC RBC-ENTMCNC: 25.2 PG — LOW (ref 27–34)
MCHC RBC-ENTMCNC: 35 G/DL — SIGNIFICANT CHANGE UP (ref 32–36)
MCHC RBC-ENTMCNC: 35 G/DL — SIGNIFICANT CHANGE UP (ref 32–36)
MCV RBC AUTO: 72 FL — LOW (ref 80–100)
MCV RBC AUTO: 72 FL — LOW (ref 80–100)
MONOCYTES # BLD AUTO: 0.85 K/UL — SIGNIFICANT CHANGE UP (ref 0–0.9)
MONOCYTES # BLD AUTO: 0.85 K/UL — SIGNIFICANT CHANGE UP (ref 0–0.9)
MONOCYTES NFR BLD AUTO: 13 % — SIGNIFICANT CHANGE UP (ref 2–14)
MONOCYTES NFR BLD AUTO: 13 % — SIGNIFICANT CHANGE UP (ref 2–14)
NEUTROPHILS # BLD AUTO: 3.38 K/UL — SIGNIFICANT CHANGE UP (ref 1.8–7.4)
NEUTROPHILS # BLD AUTO: 3.38 K/UL — SIGNIFICANT CHANGE UP (ref 1.8–7.4)
NEUTROPHILS NFR BLD AUTO: 51.6 % — SIGNIFICANT CHANGE UP (ref 43–77)
NEUTROPHILS NFR BLD AUTO: 51.6 % — SIGNIFICANT CHANGE UP (ref 43–77)
NRBC # BLD: 0 /100 WBCS — SIGNIFICANT CHANGE UP (ref 0–0)
NRBC # BLD: 0 /100 WBCS — SIGNIFICANT CHANGE UP (ref 0–0)
PLATELET # BLD AUTO: 219 K/UL — SIGNIFICANT CHANGE UP (ref 150–400)
PLATELET # BLD AUTO: 219 K/UL — SIGNIFICANT CHANGE UP (ref 150–400)
POTASSIUM SERPL-MCNC: 4.5 MMOL/L — SIGNIFICANT CHANGE UP (ref 3.5–5.3)
POTASSIUM SERPL-MCNC: 4.5 MMOL/L — SIGNIFICANT CHANGE UP (ref 3.5–5.3)
POTASSIUM SERPL-SCNC: 4.5 MMOL/L — SIGNIFICANT CHANGE UP (ref 3.5–5.3)
POTASSIUM SERPL-SCNC: 4.5 MMOL/L — SIGNIFICANT CHANGE UP (ref 3.5–5.3)
PROT SERPL-MCNC: 6.8 G/DL — SIGNIFICANT CHANGE UP (ref 6–8.3)
PROT SERPL-MCNC: 6.8 G/DL — SIGNIFICANT CHANGE UP (ref 6–8.3)
RBC # BLD: 5.75 M/UL — SIGNIFICANT CHANGE UP (ref 4.2–5.8)
RBC # BLD: 5.75 M/UL — SIGNIFICANT CHANGE UP (ref 4.2–5.8)
RBC # FLD: 20.5 % — HIGH (ref 10.3–14.5)
RBC # FLD: 20.5 % — HIGH (ref 10.3–14.5)
SODIUM SERPL-SCNC: 131 MMOL/L — LOW (ref 135–145)
SODIUM SERPL-SCNC: 131 MMOL/L — LOW (ref 135–145)
WBC # BLD: 6.55 K/UL — SIGNIFICANT CHANGE UP (ref 3.8–10.5)
WBC # BLD: 6.55 K/UL — SIGNIFICANT CHANGE UP (ref 3.8–10.5)
WBC # FLD AUTO: 6.55 K/UL — SIGNIFICANT CHANGE UP (ref 3.8–10.5)
WBC # FLD AUTO: 6.55 K/UL — SIGNIFICANT CHANGE UP (ref 3.8–10.5)

## 2023-11-24 ENCOUNTER — APPOINTMENT (OUTPATIENT)
Dept: INFUSION THERAPY | Facility: HOSPITAL | Age: 53
End: 2023-11-24

## 2023-11-24 DIAGNOSIS — Z51.11 ENCOUNTER FOR ANTINEOPLASTIC CHEMOTHERAPY: ICD-10-CM

## 2023-11-24 DIAGNOSIS — R11.2 NAUSEA WITH VOMITING, UNSPECIFIED: ICD-10-CM

## 2023-11-26 ENCOUNTER — OUTPATIENT (OUTPATIENT)
Dept: OUTPATIENT SERVICES | Facility: HOSPITAL | Age: 53
LOS: 1 days | End: 2023-11-26
Payer: COMMERCIAL

## 2023-11-26 ENCOUNTER — APPOINTMENT (OUTPATIENT)
Dept: CT IMAGING | Facility: IMAGING CENTER | Age: 53
End: 2023-11-26
Payer: COMMERCIAL

## 2023-11-26 DIAGNOSIS — Z00.8 ENCOUNTER FOR OTHER GENERAL EXAMINATION: ICD-10-CM

## 2023-11-26 DIAGNOSIS — C18.9 MALIGNANT NEOPLASM OF COLON, UNSPECIFIED: ICD-10-CM

## 2023-11-26 PROCEDURE — 71260 CT THORAX DX C+: CPT | Mod: 26

## 2023-11-26 PROCEDURE — 71260 CT THORAX DX C+: CPT

## 2023-11-26 PROCEDURE — 74177 CT ABD & PELVIS W/CONTRAST: CPT | Mod: 26

## 2023-11-26 PROCEDURE — 74177 CT ABD & PELVIS W/CONTRAST: CPT

## 2023-11-29 ENCOUNTER — APPOINTMENT (OUTPATIENT)
Dept: INTERNAL MEDICINE | Facility: CLINIC | Age: 53
End: 2023-11-29
Payer: COMMERCIAL

## 2023-11-29 VITALS
SYSTOLIC BLOOD PRESSURE: 128 MMHG | OXYGEN SATURATION: 98 % | TEMPERATURE: 98.4 F | WEIGHT: 218 LBS | BODY MASS INDEX: 34.21 KG/M2 | DIASTOLIC BLOOD PRESSURE: 88 MMHG | HEART RATE: 83 BPM | RESPIRATION RATE: 16 BRPM | HEIGHT: 67 IN

## 2023-11-29 LAB — GLUCOSE BLDC GLUCOMTR-MCNC: 289

## 2023-11-29 PROCEDURE — 82962 GLUCOSE BLOOD TEST: CPT

## 2023-11-29 PROCEDURE — 99213 OFFICE O/P EST LOW 20 MIN: CPT | Mod: 25

## 2023-12-04 ENCOUNTER — APPOINTMENT (OUTPATIENT)
Dept: HEMATOLOGY ONCOLOGY | Facility: CLINIC | Age: 53
End: 2023-12-04
Payer: COMMERCIAL

## 2023-12-04 ENCOUNTER — APPOINTMENT (OUTPATIENT)
Dept: INTERNAL MEDICINE | Facility: CLINIC | Age: 53
End: 2023-12-04
Payer: COMMERCIAL

## 2023-12-04 VITALS
TEMPERATURE: 97.7 F | HEIGHT: 67.01 IN | HEART RATE: 82 BPM | OXYGEN SATURATION: 97 % | BODY MASS INDEX: 34.21 KG/M2 | RESPIRATION RATE: 16 BRPM | SYSTOLIC BLOOD PRESSURE: 142 MMHG | WEIGHT: 218 LBS | DIASTOLIC BLOOD PRESSURE: 82 MMHG

## 2023-12-04 VITALS
DIASTOLIC BLOOD PRESSURE: 92 MMHG | SYSTOLIC BLOOD PRESSURE: 150 MMHG | HEIGHT: 60 IN | WEIGHT: 223 LBS | BODY MASS INDEX: 43.78 KG/M2 | RESPIRATION RATE: 14 BRPM | OXYGEN SATURATION: 98 % | TEMPERATURE: 98.3 F | HEART RATE: 101 BPM

## 2023-12-04 LAB — GLUCOSE BLDC GLUCOMTR-MCNC: 334

## 2023-12-04 PROCEDURE — 99214 OFFICE O/P EST MOD 30 MIN: CPT

## 2023-12-04 PROCEDURE — 99213 OFFICE O/P EST LOW 20 MIN: CPT | Mod: 25

## 2023-12-04 PROCEDURE — 82962 GLUCOSE BLOOD TEST: CPT

## 2023-12-04 RX ORDER — SITAGLIPTIN 100 MG/1
100 TABLET, FILM COATED ORAL DAILY
Qty: 90 | Refills: 1 | Status: DISCONTINUED | COMMUNITY
Start: 2023-11-29 | End: 2023-12-04

## 2023-12-06 ENCOUNTER — APPOINTMENT (OUTPATIENT)
Dept: HEMATOLOGY ONCOLOGY | Facility: CLINIC | Age: 53
End: 2023-12-06

## 2023-12-06 ENCOUNTER — APPOINTMENT (OUTPATIENT)
Dept: INFUSION THERAPY | Facility: HOSPITAL | Age: 53
End: 2023-12-06

## 2023-12-07 ENCOUNTER — APPOINTMENT (OUTPATIENT)
Dept: ENDOCRINOLOGY | Facility: CLINIC | Age: 53
End: 2023-12-07
Payer: COMMERCIAL

## 2023-12-07 ENCOUNTER — APPOINTMENT (OUTPATIENT)
Dept: ENDOCRINOLOGY | Facility: CLINIC | Age: 53
End: 2023-12-07

## 2023-12-07 VITALS
SYSTOLIC BLOOD PRESSURE: 134 MMHG | OXYGEN SATURATION: 98 % | DIASTOLIC BLOOD PRESSURE: 85 MMHG | HEART RATE: 95 BPM | WEIGHT: 223 LBS | HEIGHT: 67 IN | BODY MASS INDEX: 35 KG/M2

## 2023-12-07 PROCEDURE — 99205 OFFICE O/P NEW HI 60 MIN: CPT

## 2023-12-07 RX ORDER — EMPAGLIFLOZIN 25 MG/1
25 TABLET, FILM COATED ORAL DAILY
Qty: 1 | Refills: 1 | Status: DISCONTINUED | COMMUNITY
Start: 2023-12-04 | End: 2023-12-07

## 2023-12-08 ENCOUNTER — APPOINTMENT (OUTPATIENT)
Dept: INFUSION THERAPY | Facility: HOSPITAL | Age: 53
End: 2023-12-08

## 2023-12-20 ENCOUNTER — RESULT REVIEW (OUTPATIENT)
Age: 53
End: 2023-12-20

## 2023-12-20 ENCOUNTER — APPOINTMENT (OUTPATIENT)
Dept: INFUSION THERAPY | Facility: HOSPITAL | Age: 53
End: 2023-12-20

## 2023-12-20 ENCOUNTER — NON-APPOINTMENT (OUTPATIENT)
Age: 53
End: 2023-12-20

## 2023-12-20 ENCOUNTER — APPOINTMENT (OUTPATIENT)
Dept: HEMATOLOGY ONCOLOGY | Facility: CLINIC | Age: 53
End: 2023-12-20

## 2023-12-20 LAB
ALBUMIN SERPL ELPH-MCNC: 3.7 G/DL — SIGNIFICANT CHANGE UP (ref 3.3–5)
ALBUMIN SERPL ELPH-MCNC: 3.7 G/DL — SIGNIFICANT CHANGE UP (ref 3.3–5)
ALP SERPL-CCNC: 92 U/L — SIGNIFICANT CHANGE UP (ref 40–120)
ALP SERPL-CCNC: 92 U/L — SIGNIFICANT CHANGE UP (ref 40–120)
ALT FLD-CCNC: 9 U/L — LOW (ref 10–45)
ALT FLD-CCNC: 9 U/L — LOW (ref 10–45)
ANION GAP SERPL CALC-SCNC: 11 MMOL/L — SIGNIFICANT CHANGE UP (ref 5–17)
ANION GAP SERPL CALC-SCNC: 11 MMOL/L — SIGNIFICANT CHANGE UP (ref 5–17)
AST SERPL-CCNC: 27 U/L — SIGNIFICANT CHANGE UP (ref 10–40)
AST SERPL-CCNC: 27 U/L — SIGNIFICANT CHANGE UP (ref 10–40)
BASOPHILS # BLD AUTO: 0.07 K/UL — SIGNIFICANT CHANGE UP (ref 0–0.2)
BASOPHILS # BLD AUTO: 0.07 K/UL — SIGNIFICANT CHANGE UP (ref 0–0.2)
BASOPHILS NFR BLD AUTO: 0.8 % — SIGNIFICANT CHANGE UP (ref 0–2)
BASOPHILS NFR BLD AUTO: 0.8 % — SIGNIFICANT CHANGE UP (ref 0–2)
BILIRUB SERPL-MCNC: 0.7 MG/DL — SIGNIFICANT CHANGE UP (ref 0.2–1.2)
BILIRUB SERPL-MCNC: 0.7 MG/DL — SIGNIFICANT CHANGE UP (ref 0.2–1.2)
BUN SERPL-MCNC: 8 MG/DL — SIGNIFICANT CHANGE UP (ref 7–23)
BUN SERPL-MCNC: 8 MG/DL — SIGNIFICANT CHANGE UP (ref 7–23)
CALCIUM SERPL-MCNC: 9.3 MG/DL — SIGNIFICANT CHANGE UP (ref 8.4–10.5)
CALCIUM SERPL-MCNC: 9.3 MG/DL — SIGNIFICANT CHANGE UP (ref 8.4–10.5)
CEA SERPL-MCNC: 455 NG/ML — HIGH (ref 0–3.8)
CEA SERPL-MCNC: 455 NG/ML — HIGH (ref 0–3.8)
CHLORIDE SERPL-SCNC: 102 MMOL/L — SIGNIFICANT CHANGE UP (ref 96–108)
CHLORIDE SERPL-SCNC: 102 MMOL/L — SIGNIFICANT CHANGE UP (ref 96–108)
CO2 SERPL-SCNC: 25 MMOL/L — SIGNIFICANT CHANGE UP (ref 22–31)
CO2 SERPL-SCNC: 25 MMOL/L — SIGNIFICANT CHANGE UP (ref 22–31)
CREAT ?TM UR-MCNC: 25 MG/DL — SIGNIFICANT CHANGE UP
CREAT ?TM UR-MCNC: 25 MG/DL — SIGNIFICANT CHANGE UP
CREAT SERPL-MCNC: 0.91 MG/DL — SIGNIFICANT CHANGE UP (ref 0.5–1.3)
CREAT SERPL-MCNC: 0.91 MG/DL — SIGNIFICANT CHANGE UP (ref 0.5–1.3)
EGFR: 101 ML/MIN/1.73M2 — SIGNIFICANT CHANGE UP
EGFR: 101 ML/MIN/1.73M2 — SIGNIFICANT CHANGE UP
EOSINOPHIL # BLD AUTO: 0.77 K/UL — HIGH (ref 0–0.5)
EOSINOPHIL # BLD AUTO: 0.77 K/UL — HIGH (ref 0–0.5)
EOSINOPHIL NFR BLD AUTO: 8.7 % — HIGH (ref 0–6)
EOSINOPHIL NFR BLD AUTO: 8.7 % — HIGH (ref 0–6)
GLUCOSE SERPL-MCNC: 95 MG/DL — SIGNIFICANT CHANGE UP (ref 70–99)
GLUCOSE SERPL-MCNC: 95 MG/DL — SIGNIFICANT CHANGE UP (ref 70–99)
HCT VFR BLD CALC: 39.8 % — SIGNIFICANT CHANGE UP (ref 39–50)
HCT VFR BLD CALC: 39.8 % — SIGNIFICANT CHANGE UP (ref 39–50)
HGB BLD-MCNC: 13.6 G/DL — SIGNIFICANT CHANGE UP (ref 13–17)
HGB BLD-MCNC: 13.6 G/DL — SIGNIFICANT CHANGE UP (ref 13–17)
IMM GRANULOCYTES NFR BLD AUTO: 1.1 % — HIGH (ref 0–0.9)
IMM GRANULOCYTES NFR BLD AUTO: 1.1 % — HIGH (ref 0–0.9)
LYMPHOCYTES # BLD AUTO: 1.99 K/UL — SIGNIFICANT CHANGE UP (ref 1–3.3)
LYMPHOCYTES # BLD AUTO: 1.99 K/UL — SIGNIFICANT CHANGE UP (ref 1–3.3)
LYMPHOCYTES # BLD AUTO: 22.4 % — SIGNIFICANT CHANGE UP (ref 13–44)
LYMPHOCYTES # BLD AUTO: 22.4 % — SIGNIFICANT CHANGE UP (ref 13–44)
MCHC RBC-ENTMCNC: 24.8 PG — LOW (ref 27–34)
MCHC RBC-ENTMCNC: 24.8 PG — LOW (ref 27–34)
MCHC RBC-ENTMCNC: 34.2 G/DL — SIGNIFICANT CHANGE UP (ref 32–36)
MCHC RBC-ENTMCNC: 34.2 G/DL — SIGNIFICANT CHANGE UP (ref 32–36)
MCV RBC AUTO: 72.5 FL — LOW (ref 80–100)
MCV RBC AUTO: 72.5 FL — LOW (ref 80–100)
MONOCYTES # BLD AUTO: 1.31 K/UL — HIGH (ref 0–0.9)
MONOCYTES # BLD AUTO: 1.31 K/UL — HIGH (ref 0–0.9)
MONOCYTES NFR BLD AUTO: 14.7 % — HIGH (ref 2–14)
MONOCYTES NFR BLD AUTO: 14.7 % — HIGH (ref 2–14)
NEUTROPHILS # BLD AUTO: 4.65 K/UL — SIGNIFICANT CHANGE UP (ref 1.8–7.4)
NEUTROPHILS # BLD AUTO: 4.65 K/UL — SIGNIFICANT CHANGE UP (ref 1.8–7.4)
NEUTROPHILS NFR BLD AUTO: 52.3 % — SIGNIFICANT CHANGE UP (ref 43–77)
NEUTROPHILS NFR BLD AUTO: 52.3 % — SIGNIFICANT CHANGE UP (ref 43–77)
NRBC # BLD: 0 /100 WBCS — SIGNIFICANT CHANGE UP (ref 0–0)
NRBC # BLD: 0 /100 WBCS — SIGNIFICANT CHANGE UP (ref 0–0)
PLATELET # BLD AUTO: 228 K/UL — SIGNIFICANT CHANGE UP (ref 150–400)
PLATELET # BLD AUTO: 228 K/UL — SIGNIFICANT CHANGE UP (ref 150–400)
POTASSIUM SERPL-MCNC: 4.2 MMOL/L — SIGNIFICANT CHANGE UP (ref 3.5–5.3)
POTASSIUM SERPL-MCNC: 4.2 MMOL/L — SIGNIFICANT CHANGE UP (ref 3.5–5.3)
POTASSIUM SERPL-SCNC: 4.2 MMOL/L — SIGNIFICANT CHANGE UP (ref 3.5–5.3)
POTASSIUM SERPL-SCNC: 4.2 MMOL/L — SIGNIFICANT CHANGE UP (ref 3.5–5.3)
PROT ?TM UR-MCNC: 6 MG/DL — SIGNIFICANT CHANGE UP (ref 0–12)
PROT ?TM UR-MCNC: 6 MG/DL — SIGNIFICANT CHANGE UP (ref 0–12)
PROT SERPL-MCNC: 7.4 G/DL — SIGNIFICANT CHANGE UP (ref 6–8.3)
PROT SERPL-MCNC: 7.4 G/DL — SIGNIFICANT CHANGE UP (ref 6–8.3)
PROT/CREAT UR-RTO: 0.2 RATIO — SIGNIFICANT CHANGE UP (ref 0–0.2)
PROT/CREAT UR-RTO: 0.2 RATIO — SIGNIFICANT CHANGE UP (ref 0–0.2)
RBC # BLD: 5.49 M/UL — SIGNIFICANT CHANGE UP (ref 4.2–5.8)
RBC # BLD: 5.49 M/UL — SIGNIFICANT CHANGE UP (ref 4.2–5.8)
RBC # FLD: 19.4 % — HIGH (ref 10.3–14.5)
RBC # FLD: 19.4 % — HIGH (ref 10.3–14.5)
SODIUM SERPL-SCNC: 138 MMOL/L — SIGNIFICANT CHANGE UP (ref 135–145)
SODIUM SERPL-SCNC: 138 MMOL/L — SIGNIFICANT CHANGE UP (ref 135–145)
WBC # BLD: 8.89 K/UL — SIGNIFICANT CHANGE UP (ref 3.8–10.5)
WBC # BLD: 8.89 K/UL — SIGNIFICANT CHANGE UP (ref 3.8–10.5)
WBC # FLD AUTO: 8.89 K/UL — SIGNIFICANT CHANGE UP (ref 3.8–10.5)
WBC # FLD AUTO: 8.89 K/UL — SIGNIFICANT CHANGE UP (ref 3.8–10.5)

## 2023-12-21 ENCOUNTER — APPOINTMENT (OUTPATIENT)
Dept: ENDOCRINOLOGY | Facility: CLINIC | Age: 53
End: 2023-12-21
Payer: COMMERCIAL

## 2023-12-21 PROCEDURE — 99211 OFF/OP EST MAY X REQ PHY/QHP: CPT

## 2023-12-22 ENCOUNTER — APPOINTMENT (OUTPATIENT)
Dept: INFUSION THERAPY | Facility: HOSPITAL | Age: 53
End: 2023-12-22

## 2024-01-03 ENCOUNTER — APPOINTMENT (OUTPATIENT)
Dept: HEMATOLOGY ONCOLOGY | Facility: CLINIC | Age: 54
End: 2024-01-03
Payer: COMMERCIAL

## 2024-01-03 ENCOUNTER — RESULT REVIEW (OUTPATIENT)
Age: 54
End: 2024-01-03

## 2024-01-03 ENCOUNTER — APPOINTMENT (OUTPATIENT)
Dept: INFUSION THERAPY | Facility: HOSPITAL | Age: 54
End: 2024-01-03

## 2024-01-03 VITALS
RESPIRATION RATE: 16 BRPM | WEIGHT: 220 LBS | OXYGEN SATURATION: 98 % | HEART RATE: 105 BPM | BODY MASS INDEX: 34.53 KG/M2 | DIASTOLIC BLOOD PRESSURE: 92 MMHG | TEMPERATURE: 98.5 F | SYSTOLIC BLOOD PRESSURE: 137 MMHG | HEIGHT: 67.01 IN

## 2024-01-03 DIAGNOSIS — G62.0 DRUG-INDUCED POLYNEUROPATHY: ICD-10-CM

## 2024-01-03 DIAGNOSIS — T45.1X5A DRUG-INDUCED POLYNEUROPATHY: ICD-10-CM

## 2024-01-03 LAB
ALBUMIN SERPL ELPH-MCNC: 3.9 G/DL — SIGNIFICANT CHANGE UP (ref 3.3–5)
ALBUMIN SERPL ELPH-MCNC: 3.9 G/DL — SIGNIFICANT CHANGE UP (ref 3.3–5)
ALP SERPL-CCNC: 94 U/L — SIGNIFICANT CHANGE UP (ref 40–120)
ALP SERPL-CCNC: 94 U/L — SIGNIFICANT CHANGE UP (ref 40–120)
ALT FLD-CCNC: 19 U/L — SIGNIFICANT CHANGE UP (ref 10–45)
ALT FLD-CCNC: 19 U/L — SIGNIFICANT CHANGE UP (ref 10–45)
ANION GAP SERPL CALC-SCNC: 11 MMOL/L — SIGNIFICANT CHANGE UP (ref 5–17)
ANION GAP SERPL CALC-SCNC: 11 MMOL/L — SIGNIFICANT CHANGE UP (ref 5–17)
AST SERPL-CCNC: 36 U/L — SIGNIFICANT CHANGE UP (ref 10–40)
AST SERPL-CCNC: 36 U/L — SIGNIFICANT CHANGE UP (ref 10–40)
BASOPHILS # BLD AUTO: 0.04 K/UL — SIGNIFICANT CHANGE UP (ref 0–0.2)
BASOPHILS # BLD AUTO: 0.04 K/UL — SIGNIFICANT CHANGE UP (ref 0–0.2)
BASOPHILS NFR BLD AUTO: 0.4 % — SIGNIFICANT CHANGE UP (ref 0–2)
BASOPHILS NFR BLD AUTO: 0.4 % — SIGNIFICANT CHANGE UP (ref 0–2)
BILIRUB SERPL-MCNC: 0.8 MG/DL — SIGNIFICANT CHANGE UP (ref 0.2–1.2)
BILIRUB SERPL-MCNC: 0.8 MG/DL — SIGNIFICANT CHANGE UP (ref 0.2–1.2)
BUN SERPL-MCNC: 11 MG/DL — SIGNIFICANT CHANGE UP (ref 7–23)
BUN SERPL-MCNC: 11 MG/DL — SIGNIFICANT CHANGE UP (ref 7–23)
CALCIUM SERPL-MCNC: 9.4 MG/DL — SIGNIFICANT CHANGE UP (ref 8.4–10.5)
CALCIUM SERPL-MCNC: 9.4 MG/DL — SIGNIFICANT CHANGE UP (ref 8.4–10.5)
CEA SERPL-MCNC: 394 NG/ML — HIGH (ref 0–3.8)
CEA SERPL-MCNC: 394 NG/ML — HIGH (ref 0–3.8)
CHLORIDE SERPL-SCNC: 100 MMOL/L — SIGNIFICANT CHANGE UP (ref 96–108)
CHLORIDE SERPL-SCNC: 100 MMOL/L — SIGNIFICANT CHANGE UP (ref 96–108)
CO2 SERPL-SCNC: 25 MMOL/L — SIGNIFICANT CHANGE UP (ref 22–31)
CO2 SERPL-SCNC: 25 MMOL/L — SIGNIFICANT CHANGE UP (ref 22–31)
CREAT SERPL-MCNC: 0.9 MG/DL — SIGNIFICANT CHANGE UP (ref 0.5–1.3)
CREAT SERPL-MCNC: 0.9 MG/DL — SIGNIFICANT CHANGE UP (ref 0.5–1.3)
EGFR: 102 ML/MIN/1.73M2 — SIGNIFICANT CHANGE UP
EGFR: 102 ML/MIN/1.73M2 — SIGNIFICANT CHANGE UP
EOSINOPHIL # BLD AUTO: 1.1 K/UL — HIGH (ref 0–0.5)
EOSINOPHIL # BLD AUTO: 1.1 K/UL — HIGH (ref 0–0.5)
EOSINOPHIL NFR BLD AUTO: 12 % — HIGH (ref 0–6)
EOSINOPHIL NFR BLD AUTO: 12 % — HIGH (ref 0–6)
GLUCOSE SERPL-MCNC: 198 MG/DL — HIGH (ref 70–99)
GLUCOSE SERPL-MCNC: 198 MG/DL — HIGH (ref 70–99)
HCT VFR BLD CALC: 43.6 % — SIGNIFICANT CHANGE UP (ref 39–50)
HCT VFR BLD CALC: 43.6 % — SIGNIFICANT CHANGE UP (ref 39–50)
HGB BLD-MCNC: 14.5 G/DL — SIGNIFICANT CHANGE UP (ref 13–17)
HGB BLD-MCNC: 14.5 G/DL — SIGNIFICANT CHANGE UP (ref 13–17)
IMM GRANULOCYTES NFR BLD AUTO: 0.3 % — SIGNIFICANT CHANGE UP (ref 0–0.9)
IMM GRANULOCYTES NFR BLD AUTO: 0.3 % — SIGNIFICANT CHANGE UP (ref 0–0.9)
LYMPHOCYTES # BLD AUTO: 1.64 K/UL — SIGNIFICANT CHANGE UP (ref 1–3.3)
LYMPHOCYTES # BLD AUTO: 1.64 K/UL — SIGNIFICANT CHANGE UP (ref 1–3.3)
LYMPHOCYTES # BLD AUTO: 17.9 % — SIGNIFICANT CHANGE UP (ref 13–44)
LYMPHOCYTES # BLD AUTO: 17.9 % — SIGNIFICANT CHANGE UP (ref 13–44)
MCHC RBC-ENTMCNC: 24 PG — LOW (ref 27–34)
MCHC RBC-ENTMCNC: 24 PG — LOW (ref 27–34)
MCHC RBC-ENTMCNC: 33.3 G/DL — SIGNIFICANT CHANGE UP (ref 32–36)
MCHC RBC-ENTMCNC: 33.3 G/DL — SIGNIFICANT CHANGE UP (ref 32–36)
MCV RBC AUTO: 72.3 FL — LOW (ref 80–100)
MCV RBC AUTO: 72.3 FL — LOW (ref 80–100)
MONOCYTES # BLD AUTO: 1.3 K/UL — HIGH (ref 0–0.9)
MONOCYTES # BLD AUTO: 1.3 K/UL — HIGH (ref 0–0.9)
MONOCYTES NFR BLD AUTO: 14.2 % — HIGH (ref 2–14)
MONOCYTES NFR BLD AUTO: 14.2 % — HIGH (ref 2–14)
NEUTROPHILS # BLD AUTO: 5.06 K/UL — SIGNIFICANT CHANGE UP (ref 1.8–7.4)
NEUTROPHILS # BLD AUTO: 5.06 K/UL — SIGNIFICANT CHANGE UP (ref 1.8–7.4)
NEUTROPHILS NFR BLD AUTO: 55.2 % — SIGNIFICANT CHANGE UP (ref 43–77)
NEUTROPHILS NFR BLD AUTO: 55.2 % — SIGNIFICANT CHANGE UP (ref 43–77)
NRBC # BLD: 0 /100 WBCS — SIGNIFICANT CHANGE UP (ref 0–0)
NRBC # BLD: 0 /100 WBCS — SIGNIFICANT CHANGE UP (ref 0–0)
PLATELET # BLD AUTO: 237 K/UL — SIGNIFICANT CHANGE UP (ref 150–400)
PLATELET # BLD AUTO: 237 K/UL — SIGNIFICANT CHANGE UP (ref 150–400)
POTASSIUM SERPL-MCNC: 4.6 MMOL/L — SIGNIFICANT CHANGE UP (ref 3.5–5.3)
POTASSIUM SERPL-MCNC: 4.6 MMOL/L — SIGNIFICANT CHANGE UP (ref 3.5–5.3)
POTASSIUM SERPL-SCNC: 4.6 MMOL/L — SIGNIFICANT CHANGE UP (ref 3.5–5.3)
POTASSIUM SERPL-SCNC: 4.6 MMOL/L — SIGNIFICANT CHANGE UP (ref 3.5–5.3)
PROT SERPL-MCNC: 7.6 G/DL — SIGNIFICANT CHANGE UP (ref 6–8.3)
PROT SERPL-MCNC: 7.6 G/DL — SIGNIFICANT CHANGE UP (ref 6–8.3)
RBC # BLD: 6.03 M/UL — HIGH (ref 4.2–5.8)
RBC # BLD: 6.03 M/UL — HIGH (ref 4.2–5.8)
RBC # FLD: 20 % — HIGH (ref 10.3–14.5)
RBC # FLD: 20 % — HIGH (ref 10.3–14.5)
SODIUM SERPL-SCNC: 136 MMOL/L — SIGNIFICANT CHANGE UP (ref 135–145)
SODIUM SERPL-SCNC: 136 MMOL/L — SIGNIFICANT CHANGE UP (ref 135–145)
WBC # BLD: 9.17 K/UL — SIGNIFICANT CHANGE UP (ref 3.8–10.5)
WBC # BLD: 9.17 K/UL — SIGNIFICANT CHANGE UP (ref 3.8–10.5)
WBC # FLD AUTO: 9.17 K/UL — SIGNIFICANT CHANGE UP (ref 3.8–10.5)
WBC # FLD AUTO: 9.17 K/UL — SIGNIFICANT CHANGE UP (ref 3.8–10.5)

## 2024-01-03 PROCEDURE — 99214 OFFICE O/P EST MOD 30 MIN: CPT

## 2024-01-03 RX ORDER — PROCHLORPERAZINE MALEATE 10 MG/1
10 TABLET ORAL EVERY 6 HOURS
Qty: 30 | Refills: 2 | Status: ACTIVE | COMMUNITY
Start: 2023-03-01 | End: 1900-01-01

## 2024-01-04 PROBLEM — G62.0 CHEMOTHERAPY-INDUCED NEUROPATHY: Status: ACTIVE | Noted: 2022-03-16

## 2024-01-04 NOTE — PHYSICAL EXAM
[Fully active, able to carry on all pre-disease performance without restriction] : Status 0 - Fully active, able to carry on all pre-disease performance without restriction [Obese] : obese [Normal] : normal appearance, no rash, nodules, vesicles, ulcers, erythema [Ulcers] : no ulcers [Mucositis] : no mucositis [Thrush] : no thrush [Vesicles] : no vesicles [de-identified] : anicteric  [de-identified] : no JVD [de-identified] : normal respiratory effort, no audible wheeze [de-identified] : reg rate  [de-identified] : no LE edema B/L [de-identified] : soft, non-distended, non-tender

## 2024-01-04 NOTE — HISTORY OF PRESENT ILLNESS
[Disease: _____________________] : Disease: [unfilled] [T: ___] : T[unfilled] [N: ___] : N[unfilled] [M: ___] : M[unfilled] [AJCC Stage: ____] : AJCC Stage: [unfilled] [Research Protocol] : Research Protocol  [de-identified] : Mr Granda in 2021 at the age of 51 presented to the hospital with abdominal pain and underwent imaging that revealed a distal transverse colon lesion with evidence of microperforation. He is also underwent a colonoscopy which demonstrated an endoscopically obstructing distal transverse colon mass. Given the potential for impending obstruction as well as microperforation seen on imaging decision was made to take patient to the OR for a left hemicolectomy and anastomosis. Imaging done at that time also showed evidence of metastatic disease to the liver and potentially lung and a questionable lesion as well. Patient presents with his sister for initial visit and to discuss palliative chemotherapy.  Enrolled in [employer] Study   Started FOLFOX + Vit D July 7th, 2021 (bevacizumab added cycle #2) 8/23 scan shows PE started on Lovenox  October 2021 oxaliplatin held.   KRAS G12D , NABILA  FOLFOX + Bevacizumab +VitD ([employer] study) --> 5FU Yolis + Vit D --> POD 12/2022 ---> off trial  FOLFIRI/Yolis started 12/20/22 [de-identified] : Microsatellite stable  [de-identified] : KRAS G12D , NABILA  [FreeTextEntry1] : FOLFIRI/Yolis started 12/20/22 [de-identified] : Patient presents for follow up and is scheduled for treatment today.  His Irinotecan has been on hold due to elevated blood glucose levels.  He reports that he has been working with an endocrinologist and a nutritionist to modify his medication regimen and diet to better control his glucose levels.  He reports that his overall fatigue and mucositis symptoms previously reported were significantly better with the last treatments but understands that this is in part due to part of the chemo regimen being held.  He denied anorexia, N/V, abdominal pain, diarrhea or constipation.  His previously reported neuropathy in his feet has significantly improved now that his glucose is better controlled and he is no longer taking Cymbalta due to drowsiness felt while taking it.

## 2024-01-04 NOTE — REVIEW OF SYSTEMS
[Diarrhea: Grade 0] : Diarrhea: Grade 0 [FreeTextEntry4] : + mucositis during treatment, but not now [Negative] : ENT [Fatigue] : no fatigue [Abdominal Pain] : no abdominal pain [Vomiting] : no vomiting [Constipation] : no constipation [Confused] : no confusion [Dizziness] : no dizziness [Fainting] : no fainting [Difficulty Walking] : no difficulty walking [de-identified] : Gr1 neuropathy feet improved

## 2024-01-05 ENCOUNTER — APPOINTMENT (OUTPATIENT)
Dept: INFUSION THERAPY | Facility: HOSPITAL | Age: 54
End: 2024-01-05

## 2024-01-08 ENCOUNTER — EMERGENCY (EMERGENCY)
Facility: HOSPITAL | Age: 54
LOS: 1 days | Discharge: HOME | End: 2024-01-08
Attending: EMERGENCY MEDICINE | Admitting: EMERGENCY MEDICINE
Payer: COMMERCIAL

## 2024-01-08 VITALS
DIASTOLIC BLOOD PRESSURE: 106 MMHG | RESPIRATION RATE: 18 BRPM | TEMPERATURE: 100 F | WEIGHT: 223.99 LBS | SYSTOLIC BLOOD PRESSURE: 159 MMHG | HEIGHT: 68 IN | HEART RATE: 112 BPM | OXYGEN SATURATION: 96 %

## 2024-01-08 VITALS
HEART RATE: 99 BPM | SYSTOLIC BLOOD PRESSURE: 155 MMHG | RESPIRATION RATE: 18 BRPM | OXYGEN SATURATION: 98 % | TEMPERATURE: 101 F | DIASTOLIC BLOOD PRESSURE: 89 MMHG

## 2024-01-08 LAB
ALBUMIN SERPL ELPH-MCNC: 3.3 G/DL — SIGNIFICANT CHANGE UP (ref 3.3–5)
ALBUMIN SERPL ELPH-MCNC: 3.3 G/DL — SIGNIFICANT CHANGE UP (ref 3.3–5)
ALP SERPL-CCNC: 103 U/L — SIGNIFICANT CHANGE UP (ref 30–120)
ALP SERPL-CCNC: 103 U/L — SIGNIFICANT CHANGE UP (ref 30–120)
ALT FLD-CCNC: 24 U/L — SIGNIFICANT CHANGE UP (ref 10–60)
ALT FLD-CCNC: 24 U/L — SIGNIFICANT CHANGE UP (ref 10–60)
ANION GAP SERPL CALC-SCNC: 13 MMOL/L — SIGNIFICANT CHANGE UP (ref 5–17)
ANION GAP SERPL CALC-SCNC: 13 MMOL/L — SIGNIFICANT CHANGE UP (ref 5–17)
ANISOCYTOSIS BLD QL: SLIGHT — SIGNIFICANT CHANGE UP
ANISOCYTOSIS BLD QL: SLIGHT — SIGNIFICANT CHANGE UP
APPEARANCE UR: CLEAR — SIGNIFICANT CHANGE UP
APPEARANCE UR: CLEAR — SIGNIFICANT CHANGE UP
APTT BLD: 34 SEC — SIGNIFICANT CHANGE UP (ref 24.5–35.6)
APTT BLD: 34 SEC — SIGNIFICANT CHANGE UP (ref 24.5–35.6)
AST SERPL-CCNC: 22 U/L — SIGNIFICANT CHANGE UP (ref 10–40)
AST SERPL-CCNC: 22 U/L — SIGNIFICANT CHANGE UP (ref 10–40)
BACTERIA # UR AUTO: ABNORMAL /HPF
BACTERIA # UR AUTO: ABNORMAL /HPF
BASOPHILS # BLD AUTO: 0.04 K/UL — SIGNIFICANT CHANGE UP (ref 0–0.2)
BASOPHILS # BLD AUTO: 0.04 K/UL — SIGNIFICANT CHANGE UP (ref 0–0.2)
BASOPHILS NFR BLD AUTO: 0.6 % — SIGNIFICANT CHANGE UP (ref 0–2)
BASOPHILS NFR BLD AUTO: 0.6 % — SIGNIFICANT CHANGE UP (ref 0–2)
BILIRUB SERPL-MCNC: 0.7 MG/DL — SIGNIFICANT CHANGE UP (ref 0.2–1.2)
BILIRUB SERPL-MCNC: 0.7 MG/DL — SIGNIFICANT CHANGE UP (ref 0.2–1.2)
BILIRUB UR-MCNC: NEGATIVE — SIGNIFICANT CHANGE UP
BILIRUB UR-MCNC: NEGATIVE — SIGNIFICANT CHANGE UP
BUN SERPL-MCNC: 12 MG/DL — SIGNIFICANT CHANGE UP (ref 7–23)
BUN SERPL-MCNC: 12 MG/DL — SIGNIFICANT CHANGE UP (ref 7–23)
CALCIUM SERPL-MCNC: 9.2 MG/DL — SIGNIFICANT CHANGE UP (ref 8.4–10.5)
CALCIUM SERPL-MCNC: 9.2 MG/DL — SIGNIFICANT CHANGE UP (ref 8.4–10.5)
CHLORIDE SERPL-SCNC: 97 MMOL/L — SIGNIFICANT CHANGE UP (ref 96–108)
CHLORIDE SERPL-SCNC: 97 MMOL/L — SIGNIFICANT CHANGE UP (ref 96–108)
CO2 SERPL-SCNC: 25 MMOL/L — SIGNIFICANT CHANGE UP (ref 22–31)
CO2 SERPL-SCNC: 25 MMOL/L — SIGNIFICANT CHANGE UP (ref 22–31)
COLOR SPEC: YELLOW — SIGNIFICANT CHANGE UP
COLOR SPEC: YELLOW — SIGNIFICANT CHANGE UP
CREAT SERPL-MCNC: 1.18 MG/DL — SIGNIFICANT CHANGE UP (ref 0.5–1.3)
CREAT SERPL-MCNC: 1.18 MG/DL — SIGNIFICANT CHANGE UP (ref 0.5–1.3)
DIFF PNL FLD: NEGATIVE — SIGNIFICANT CHANGE UP
DIFF PNL FLD: NEGATIVE — SIGNIFICANT CHANGE UP
EGFR: 74 ML/MIN/1.73M2 — SIGNIFICANT CHANGE UP
EGFR: 74 ML/MIN/1.73M2 — SIGNIFICANT CHANGE UP
EOSINOPHIL # BLD AUTO: 0.18 K/UL — SIGNIFICANT CHANGE UP (ref 0–0.5)
EOSINOPHIL # BLD AUTO: 0.18 K/UL — SIGNIFICANT CHANGE UP (ref 0–0.5)
EOSINOPHIL NFR BLD AUTO: 2.7 % — SIGNIFICANT CHANGE UP (ref 0–6)
EOSINOPHIL NFR BLD AUTO: 2.7 % — SIGNIFICANT CHANGE UP (ref 0–6)
EPI CELLS # UR: PRESENT
EPI CELLS # UR: PRESENT
GLUCOSE SERPL-MCNC: 117 MG/DL — HIGH (ref 70–99)
GLUCOSE SERPL-MCNC: 117 MG/DL — HIGH (ref 70–99)
GLUCOSE UR QL: NEGATIVE MG/DL — SIGNIFICANT CHANGE UP
GLUCOSE UR QL: NEGATIVE MG/DL — SIGNIFICANT CHANGE UP
HCT VFR BLD CALC: 43.4 % — SIGNIFICANT CHANGE UP (ref 39–50)
HCT VFR BLD CALC: 43.4 % — SIGNIFICANT CHANGE UP (ref 39–50)
HGB BLD-MCNC: 14.3 G/DL — SIGNIFICANT CHANGE UP (ref 13–17)
HGB BLD-MCNC: 14.3 G/DL — SIGNIFICANT CHANGE UP (ref 13–17)
IMM GRANULOCYTES NFR BLD AUTO: 0.3 % — SIGNIFICANT CHANGE UP (ref 0–0.9)
IMM GRANULOCYTES NFR BLD AUTO: 0.3 % — SIGNIFICANT CHANGE UP (ref 0–0.9)
INR BLD: 1.14 RATIO — SIGNIFICANT CHANGE UP (ref 0.85–1.18)
INR BLD: 1.14 RATIO — SIGNIFICANT CHANGE UP (ref 0.85–1.18)
KETONES UR-MCNC: NEGATIVE MG/DL — SIGNIFICANT CHANGE UP
KETONES UR-MCNC: NEGATIVE MG/DL — SIGNIFICANT CHANGE UP
LACTATE SERPL-SCNC: 1.4 MMOL/L — SIGNIFICANT CHANGE UP (ref 0.7–2)
LACTATE SERPL-SCNC: 1.4 MMOL/L — SIGNIFICANT CHANGE UP (ref 0.7–2)
LACTATE SERPL-SCNC: 2.1 MMOL/L — HIGH (ref 0.7–2)
LACTATE SERPL-SCNC: 2.1 MMOL/L — HIGH (ref 0.7–2)
LEUKOCYTE ESTERASE UR-ACNC: NEGATIVE — SIGNIFICANT CHANGE UP
LEUKOCYTE ESTERASE UR-ACNC: NEGATIVE — SIGNIFICANT CHANGE UP
LYMPHOCYTES # BLD AUTO: 1.74 K/UL — SIGNIFICANT CHANGE UP (ref 1–3.3)
LYMPHOCYTES # BLD AUTO: 1.74 K/UL — SIGNIFICANT CHANGE UP (ref 1–3.3)
LYMPHOCYTES # BLD AUTO: 25.7 % — SIGNIFICANT CHANGE UP (ref 13–44)
LYMPHOCYTES # BLD AUTO: 25.7 % — SIGNIFICANT CHANGE UP (ref 13–44)
MANUAL SMEAR VERIFICATION: SIGNIFICANT CHANGE UP
MANUAL SMEAR VERIFICATION: SIGNIFICANT CHANGE UP
MCHC RBC-ENTMCNC: 24.1 PG — LOW (ref 27–34)
MCHC RBC-ENTMCNC: 24.1 PG — LOW (ref 27–34)
MCHC RBC-ENTMCNC: 32.9 GM/DL — SIGNIFICANT CHANGE UP (ref 32–36)
MCHC RBC-ENTMCNC: 32.9 GM/DL — SIGNIFICANT CHANGE UP (ref 32–36)
MCV RBC AUTO: 73.2 FL — LOW (ref 80–100)
MCV RBC AUTO: 73.2 FL — LOW (ref 80–100)
MICROCYTES BLD QL: SLIGHT — SIGNIFICANT CHANGE UP
MICROCYTES BLD QL: SLIGHT — SIGNIFICANT CHANGE UP
MONOCYTES # BLD AUTO: 0.65 K/UL — SIGNIFICANT CHANGE UP (ref 0–0.9)
MONOCYTES # BLD AUTO: 0.65 K/UL — SIGNIFICANT CHANGE UP (ref 0–0.9)
MONOCYTES NFR BLD AUTO: 9.6 % — SIGNIFICANT CHANGE UP (ref 2–14)
MONOCYTES NFR BLD AUTO: 9.6 % — SIGNIFICANT CHANGE UP (ref 2–14)
NEUTROPHILS # BLD AUTO: 4.15 K/UL — SIGNIFICANT CHANGE UP (ref 1.8–7.4)
NEUTROPHILS # BLD AUTO: 4.15 K/UL — SIGNIFICANT CHANGE UP (ref 1.8–7.4)
NEUTROPHILS NFR BLD AUTO: 61.1 % — SIGNIFICANT CHANGE UP (ref 43–77)
NEUTROPHILS NFR BLD AUTO: 61.1 % — SIGNIFICANT CHANGE UP (ref 43–77)
NITRITE UR-MCNC: NEGATIVE — SIGNIFICANT CHANGE UP
NITRITE UR-MCNC: NEGATIVE — SIGNIFICANT CHANGE UP
NRBC # BLD: 0 /100 WBCS — SIGNIFICANT CHANGE UP (ref 0–0)
NRBC # BLD: 0 /100 WBCS — SIGNIFICANT CHANGE UP (ref 0–0)
PH UR: 6.5 — SIGNIFICANT CHANGE UP (ref 5–8)
PH UR: 6.5 — SIGNIFICANT CHANGE UP (ref 5–8)
PLAT MORPH BLD: NORMAL — SIGNIFICANT CHANGE UP
PLAT MORPH BLD: NORMAL — SIGNIFICANT CHANGE UP
PLATELET # BLD AUTO: 261 K/UL — SIGNIFICANT CHANGE UP (ref 150–400)
PLATELET # BLD AUTO: 261 K/UL — SIGNIFICANT CHANGE UP (ref 150–400)
PLATELET COUNT - ESTIMATE: NORMAL — SIGNIFICANT CHANGE UP
PLATELET COUNT - ESTIMATE: NORMAL — SIGNIFICANT CHANGE UP
POTASSIUM SERPL-MCNC: 3.8 MMOL/L — SIGNIFICANT CHANGE UP (ref 3.5–5.3)
POTASSIUM SERPL-MCNC: 3.8 MMOL/L — SIGNIFICANT CHANGE UP (ref 3.5–5.3)
POTASSIUM SERPL-SCNC: 3.8 MMOL/L — SIGNIFICANT CHANGE UP (ref 3.5–5.3)
POTASSIUM SERPL-SCNC: 3.8 MMOL/L — SIGNIFICANT CHANGE UP (ref 3.5–5.3)
PROT SERPL-MCNC: 8.5 G/DL — HIGH (ref 6–8.3)
PROT SERPL-MCNC: 8.5 G/DL — HIGH (ref 6–8.3)
PROT UR-MCNC: 100 MG/DL
PROT UR-MCNC: 100 MG/DL
PROTHROM AB SERPL-ACNC: 12.7 SEC — SIGNIFICANT CHANGE UP (ref 9.5–13)
PROTHROM AB SERPL-ACNC: 12.7 SEC — SIGNIFICANT CHANGE UP (ref 9.5–13)
RAPID RVP RESULT: DETECTED
RAPID RVP RESULT: DETECTED
RBC # BLD: 5.93 M/UL — HIGH (ref 4.2–5.8)
RBC # BLD: 5.93 M/UL — HIGH (ref 4.2–5.8)
RBC # FLD: 19.9 % — HIGH (ref 10.3–14.5)
RBC # FLD: 19.9 % — HIGH (ref 10.3–14.5)
RBC BLD AUTO: ABNORMAL
RBC BLD AUTO: ABNORMAL
RBC CASTS # UR COMP ASSIST: 1 /HPF — SIGNIFICANT CHANGE UP (ref 0–4)
RBC CASTS # UR COMP ASSIST: 1 /HPF — SIGNIFICANT CHANGE UP (ref 0–4)
RSV RNA SPEC QL NAA+PROBE: DETECTED
RSV RNA SPEC QL NAA+PROBE: DETECTED
SARS-COV-2 RNA SPEC QL NAA+PROBE: SIGNIFICANT CHANGE UP
SARS-COV-2 RNA SPEC QL NAA+PROBE: SIGNIFICANT CHANGE UP
SODIUM SERPL-SCNC: 135 MMOL/L — SIGNIFICANT CHANGE UP (ref 135–145)
SODIUM SERPL-SCNC: 135 MMOL/L — SIGNIFICANT CHANGE UP (ref 135–145)
SP GR SPEC: 1.02 — SIGNIFICANT CHANGE UP (ref 1–1.03)
SP GR SPEC: 1.02 — SIGNIFICANT CHANGE UP (ref 1–1.03)
UROBILINOGEN FLD QL: 1 MG/DL — SIGNIFICANT CHANGE UP (ref 0.2–1)
UROBILINOGEN FLD QL: 1 MG/DL — SIGNIFICANT CHANGE UP (ref 0.2–1)
WBC # BLD: 6.78 K/UL — SIGNIFICANT CHANGE UP (ref 3.8–10.5)
WBC # BLD: 6.78 K/UL — SIGNIFICANT CHANGE UP (ref 3.8–10.5)
WBC # FLD AUTO: 6.78 K/UL — SIGNIFICANT CHANGE UP (ref 3.8–10.5)
WBC # FLD AUTO: 6.78 K/UL — SIGNIFICANT CHANGE UP (ref 3.8–10.5)
WBC UR QL: 1 /HPF — SIGNIFICANT CHANGE UP (ref 0–5)
WBC UR QL: 1 /HPF — SIGNIFICANT CHANGE UP (ref 0–5)

## 2024-01-08 PROCEDURE — 36415 COLL VENOUS BLD VENIPUNCTURE: CPT

## 2024-01-08 PROCEDURE — 87077 CULTURE AEROBIC IDENTIFY: CPT

## 2024-01-08 PROCEDURE — 85730 THROMBOPLASTIN TIME PARTIAL: CPT

## 2024-01-08 PROCEDURE — 71045 X-RAY EXAM CHEST 1 VIEW: CPT

## 2024-01-08 PROCEDURE — 99283 EMERGENCY DEPT VISIT LOW MDM: CPT | Mod: 25

## 2024-01-08 PROCEDURE — 87086 URINE CULTURE/COLONY COUNT: CPT

## 2024-01-08 PROCEDURE — 85025 COMPLETE CBC W/AUTO DIFF WBC: CPT

## 2024-01-08 PROCEDURE — 87040 BLOOD CULTURE FOR BACTERIA: CPT

## 2024-01-08 PROCEDURE — 80053 COMPREHEN METABOLIC PANEL: CPT

## 2024-01-08 PROCEDURE — 0225U NFCT DS DNA&RNA 21 SARSCOV2: CPT

## 2024-01-08 PROCEDURE — 96360 HYDRATION IV INFUSION INIT: CPT

## 2024-01-08 PROCEDURE — 71045 X-RAY EXAM CHEST 1 VIEW: CPT | Mod: 26

## 2024-01-08 PROCEDURE — 99284 EMERGENCY DEPT VISIT MOD MDM: CPT

## 2024-01-08 PROCEDURE — 81001 URINALYSIS AUTO W/SCOPE: CPT

## 2024-01-08 PROCEDURE — 85610 PROTHROMBIN TIME: CPT

## 2024-01-08 PROCEDURE — 83605 ASSAY OF LACTIC ACID: CPT

## 2024-01-08 RX ORDER — IBUPROFEN 200 MG
600 TABLET ORAL ONCE
Refills: 0 | Status: COMPLETED | OUTPATIENT
Start: 2024-01-08 | End: 2024-01-08

## 2024-01-08 RX ORDER — SODIUM CHLORIDE 9 MG/ML
2100 INJECTION INTRAMUSCULAR; INTRAVENOUS; SUBCUTANEOUS ONCE
Refills: 0 | Status: COMPLETED | OUTPATIENT
Start: 2024-01-08 | End: 2024-01-08

## 2024-01-08 RX ADMIN — Medication 600 MILLIGRAM(S): at 18:43

## 2024-01-08 RX ADMIN — SODIUM CHLORIDE 2100 MILLILITER(S): 9 INJECTION INTRAMUSCULAR; INTRAVENOUS; SUBCUTANEOUS at 19:44

## 2024-01-08 RX ADMIN — Medication 600 MILLIGRAM(S): at 19:10

## 2024-01-08 RX ADMIN — SODIUM CHLORIDE 2100 MILLILITER(S): 9 INJECTION INTRAMUSCULAR; INTRAVENOUS; SUBCUTANEOUS at 18:43

## 2024-01-08 NOTE — ED ADULT TRIAGE NOTE - STATUS:
Karla Gay  [facility] Physician [facility]  [facility] [address]  Scheduled Appointment: 02/09/2023    
Applied

## 2024-01-08 NOTE — ED PROVIDER NOTE - PATIENT PORTAL LINK FT
PAST SURGICAL HISTORY:  No significant past surgical history     No significant past surgical history      You can access the FollowMyHealth Patient Portal offered by [facility] by registering at the following website: [email or web address]. By joining [email or web address]’s FollowMyHealth portal, you will also be able to view your health information using other applications (apps) compatible with our system. You can access the FollowMyHealth Patient Portal offered by [facility] by registering at the following website: [email or web address]. By joining [employer]’s FollowMyHealth portal, you will also be able to view your health information using other applications (apps) compatible with our system.

## 2024-01-08 NOTE — ED PROVIDER NOTE - DIFFERENTIAL DIAGNOSIS
Differential including but not limited to pneumonia bronchitis flu COVID RSV other respiratory infection Differential Diagnosis

## 2024-01-08 NOTE — ED PROVIDER NOTE - OBJECTIVE STATEMENT
Patient complaining of 3 days of cough fevers body aches headache runny nose.  Patient denies chest pain shortness of breath abdominal pain nausea vomiting diarrhea urinary complaints rash neck stiffness photophobia.  Patient went to urgent care was found to be febrile, they did not have an x-ray technician so they sent to the ER.  Patient did not take anything for his fever today.

## 2024-01-08 NOTE — ED PROVIDER NOTE - CHIEF COMPLAINT
----- Message from Kanika Márquez sent at 10/25/2017  4:38 PM CDT -----  Contact: carlos/wife  Returning nurse call. Please call back at [phone]. thanks  
Wife states that it is time to re certify for oxygen, 6 minute walk added to appointment   
The patient is a 53y Male complaining of cough.

## 2024-01-08 NOTE — ED ADULT NURSE NOTE - OBJECTIVE STATEMENT
53yM presents to the ED with reports of cough. x3days of cough, HA, body aches, fevers, runny nose. went to urgent care today and had negative COVID swab. sent to ED due to inability to perform chest x-ray as per patient. on arrival, pt AAOx4, VS as documented. Pt gross neuro intact. Pt lungs clear BL. Pt has no increased WOB, labored respirations, or retractions. Pt abdomen soft and nontender to palpation. Pt has + pulses in UE and LE BL. Pt has no edema in LE BL. Pt able to ambulate with steady gait. Pt denies chest pain, SOB, N/V/D, abdominal pain, back pain, urinary symptoms, hematuria, weakness, dizziness, numbness, tinging.Pt placed in position of comfort. Pt educated on call bell system and provided call bell. Bed in lowest position, wheels locked, appropriate side rails raised. Pt denies needs at this time. Pt aware of plan to await lab/radiology results.

## 2024-01-08 NOTE — ED ADULT NURSE NOTE - NSFALLRISKASMT_ED_ALL_ED_DT
Render In Bullet Format When Appropriate: No
Number Of Freeze-Thaw Cycles: 3 freeze-thaw cycles
Post-Care Instructions: I reviewed with the patient in detail post-care instructions. Patient is to wear sunprotection, and avoid picking at any of the treated lesions. Pt may apply Vaseline to crusted or scabbing areas.
Consent: The patient's consent was obtained including but not limited to risks of crusting, scabbing, blistering, scarring, darker or lighter pigmentary change, recurrence, incomplete removal and infection.
Total Number Of Aks Treated: 6
Detail Level: Simple
Duration Of Freeze Thaw-Cycle (Seconds): 5
08-Jan-2024 18:35

## 2024-01-08 NOTE — ED ADULT NURSE NOTE - NSFALLUNIVINTERV_ED_ALL_ED
Bed/Stretcher in lowest position, wheels locked, appropriate side rails in place/Call bell, personal items and telephone in reach/Instruct patient to call for assistance before getting out of bed/chair/stretcher/Non-slip footwear applied when patient is off stretcher/[place] to call system/Physically safe environment - no spills, clutter or unnecessary equipment/Purposeful proactive rounding/Room/bathroom lighting operational, light cord in reach Bed/Stretcher in lowest position, wheels locked, appropriate side rails in place/Call bell, personal items and telephone in reach/Instruct patient to call for assistance before getting out of bed/chair/stretcher/Non-slip footwear applied when patient is off stretcher/[place] to call system/Physically safe environment - no spills, clutter or unnecessary equipment/Purposeful proactive rounding/Room/bathroom lighting operational, light cord in reach

## 2024-01-08 NOTE — ED PROVIDER NOTE - ENMT, MLM
Airway patent, Mouth with normal mucosa. Throat has no vesicles, no oropharyngeal exudates and uvula is midline. no nuchal rigidity

## 2024-01-08 NOTE — ED PROVIDER NOTE - CLINICAL SUMMARY MEDICAL DECISION MAKING FREE TEXT BOX
Patient complaining of 3 days of cough fevers body aches headache runny nose.  Patient denies chest pain shortness of breath abdominal pain nausea vomiting diarrhea urinary complaints rash neck stiffness photophobia.  Patient went to urgent care was found to be febrile, they did not have an x-ray technician so they sent to the ER.  Patient did not take anything for his fever today.    Plan sepsis workup including EKG chest x-ray labs IV fluids Motrin    Differential including but not limited to pneumonia bronchitis flu COVID RSV other respiratory infection

## 2024-01-09 ENCOUNTER — APPOINTMENT (OUTPATIENT)
Dept: ENDOCRINOLOGY | Facility: CLINIC | Age: 54
End: 2024-01-09
Payer: COMMERCIAL

## 2024-01-09 PROCEDURE — 99442: CPT

## 2024-01-10 LAB
CULTURE RESULTS: ABNORMAL
CULTURE RESULTS: ABNORMAL
SPECIMEN SOURCE: SIGNIFICANT CHANGE UP
SPECIMEN SOURCE: SIGNIFICANT CHANGE UP

## 2024-01-11 ENCOUNTER — OUTPATIENT (OUTPATIENT)
Dept: OUTPATIENT SERVICES | Facility: HOSPITAL | Age: 54
LOS: 1 days | Discharge: HOME | End: 2024-01-11

## 2024-01-11 ENCOUNTER — RESULT REVIEW (OUTPATIENT)
Age: 54
End: 2024-01-11

## 2024-01-11 DIAGNOSIS — C18.9 MALIGNANT NEOPLASM OF COLON, UNSPECIFIED: ICD-10-CM

## 2024-01-14 LAB
CULTURE RESULTS: SIGNIFICANT CHANGE UP
SPECIMEN SOURCE: SIGNIFICANT CHANGE UP

## 2024-01-17 ENCOUNTER — RESULT REVIEW (OUTPATIENT)
Age: 54
End: 2024-01-17

## 2024-01-17 ENCOUNTER — APPOINTMENT (OUTPATIENT)
Dept: INFUSION THERAPY | Facility: HOSPITAL | Age: 54
End: 2024-01-17

## 2024-01-17 ENCOUNTER — APPOINTMENT (OUTPATIENT)
Dept: HEMATOLOGY ONCOLOGY | Facility: CLINIC | Age: 54
End: 2024-01-17

## 2024-01-17 LAB
ALBUMIN SERPL ELPH-MCNC: 3.7 G/DL — SIGNIFICANT CHANGE UP (ref 3.3–5)
ALP SERPL-CCNC: 101 U/L — SIGNIFICANT CHANGE UP (ref 40–120)
ALT FLD-CCNC: 13 U/L — SIGNIFICANT CHANGE UP (ref 10–45)
ANION GAP SERPL CALC-SCNC: 10 MMOL/L — SIGNIFICANT CHANGE UP (ref 5–17)
AST SERPL-CCNC: 31 U/L — SIGNIFICANT CHANGE UP (ref 10–40)
BASOPHILS # BLD AUTO: 0.06 K/UL — SIGNIFICANT CHANGE UP (ref 0–0.2)
BASOPHILS NFR BLD AUTO: 0.7 % — SIGNIFICANT CHANGE UP (ref 0–2)
BILIRUB SERPL-MCNC: 0.6 MG/DL — SIGNIFICANT CHANGE UP (ref 0.2–1.2)
BUN SERPL-MCNC: 10 MG/DL — SIGNIFICANT CHANGE UP (ref 7–23)
CALCIUM SERPL-MCNC: 9.4 MG/DL — SIGNIFICANT CHANGE UP (ref 8.4–10.5)
CEA SERPL-MCNC: 300 NG/ML — HIGH (ref 0–3.8)
CHLORIDE SERPL-SCNC: 102 MMOL/L — SIGNIFICANT CHANGE UP (ref 96–108)
CO2 SERPL-SCNC: 25 MMOL/L — SIGNIFICANT CHANGE UP (ref 22–31)
CREAT ?TM UR-MCNC: 96 MG/DL — SIGNIFICANT CHANGE UP
CREAT SERPL-MCNC: 0.93 MG/DL — SIGNIFICANT CHANGE UP (ref 0.5–1.3)
EGFR: 98 ML/MIN/1.73M2 — SIGNIFICANT CHANGE UP
EOSINOPHIL # BLD AUTO: 0.72 K/UL — HIGH (ref 0–0.5)
EOSINOPHIL NFR BLD AUTO: 8.6 % — HIGH (ref 0–6)
GLUCOSE SERPL-MCNC: 109 MG/DL — HIGH (ref 70–99)
HCT VFR BLD CALC: 41.8 % — SIGNIFICANT CHANGE UP (ref 39–50)
HGB BLD-MCNC: 14.4 G/DL — SIGNIFICANT CHANGE UP (ref 13–17)
IMM GRANULOCYTES NFR BLD AUTO: 1 % — HIGH (ref 0–0.9)
LYMPHOCYTES # BLD AUTO: 2.05 K/UL — SIGNIFICANT CHANGE UP (ref 1–3.3)
LYMPHOCYTES # BLD AUTO: 24.5 % — SIGNIFICANT CHANGE UP (ref 13–44)
MCHC RBC-ENTMCNC: 24.7 PG — LOW (ref 27–34)
MCHC RBC-ENTMCNC: 34.4 G/DL — SIGNIFICANT CHANGE UP (ref 32–36)
MCV RBC AUTO: 71.7 FL — LOW (ref 80–100)
MONOCYTES # BLD AUTO: 1.12 K/UL — HIGH (ref 0–0.9)
MONOCYTES NFR BLD AUTO: 13.4 % — SIGNIFICANT CHANGE UP (ref 2–14)
NEUTROPHILS # BLD AUTO: 4.35 K/UL — SIGNIFICANT CHANGE UP (ref 1.8–7.4)
NEUTROPHILS NFR BLD AUTO: 51.8 % — SIGNIFICANT CHANGE UP (ref 43–77)
NRBC # BLD: 0 /100 WBCS — SIGNIFICANT CHANGE UP (ref 0–0)
PLATELET # BLD AUTO: 307 K/UL — SIGNIFICANT CHANGE UP (ref 150–400)
POTASSIUM SERPL-MCNC: 4.3 MMOL/L — SIGNIFICANT CHANGE UP (ref 3.5–5.3)
POTASSIUM SERPL-SCNC: 4.3 MMOL/L — SIGNIFICANT CHANGE UP (ref 3.5–5.3)
PROT ?TM UR-MCNC: 19 MG/DL — HIGH (ref 0–12)
PROT SERPL-MCNC: 7.6 G/DL — SIGNIFICANT CHANGE UP (ref 6–8.3)
PROT/CREAT UR-RTO: 0.2 RATIO — SIGNIFICANT CHANGE UP (ref 0–0.2)
RBC # BLD: 5.83 M/UL — HIGH (ref 4.2–5.8)
RBC # FLD: 19.9 % — HIGH (ref 10.3–14.5)
SODIUM SERPL-SCNC: 137 MMOL/L — SIGNIFICANT CHANGE UP (ref 135–145)
WBC # BLD: 8.38 K/UL — SIGNIFICANT CHANGE UP (ref 3.8–10.5)
WBC # FLD AUTO: 8.38 K/UL — SIGNIFICANT CHANGE UP (ref 3.8–10.5)

## 2024-01-18 DIAGNOSIS — R11.2 NAUSEA WITH VOMITING, UNSPECIFIED: ICD-10-CM

## 2024-01-18 DIAGNOSIS — Z51.11 ENCOUNTER FOR ANTINEOPLASTIC CHEMOTHERAPY: ICD-10-CM

## 2024-01-19 ENCOUNTER — APPOINTMENT (OUTPATIENT)
Dept: INFUSION THERAPY | Facility: HOSPITAL | Age: 54
End: 2024-01-19

## 2024-01-20 ENCOUNTER — OUTPATIENT (OUTPATIENT)
Dept: OUTPATIENT SERVICES | Facility: HOSPITAL | Age: 54
LOS: 1 days | End: 2024-01-20
Payer: COMMERCIAL

## 2024-01-20 ENCOUNTER — APPOINTMENT (OUTPATIENT)
Dept: CT IMAGING | Facility: IMAGING CENTER | Age: 54
End: 2024-01-20
Payer: COMMERCIAL

## 2024-01-20 DIAGNOSIS — C18.9 MALIGNANT NEOPLASM OF COLON, UNSPECIFIED: ICD-10-CM

## 2024-01-20 PROCEDURE — 71260 CT THORAX DX C+: CPT | Mod: 26

## 2024-01-20 PROCEDURE — 74177 CT ABD & PELVIS W/CONTRAST: CPT | Mod: 26

## 2024-01-20 PROCEDURE — 74177 CT ABD & PELVIS W/CONTRAST: CPT

## 2024-01-20 PROCEDURE — 71260 CT THORAX DX C+: CPT

## 2024-01-31 ENCOUNTER — RESULT REVIEW (OUTPATIENT)
Age: 54
End: 2024-01-31

## 2024-01-31 ENCOUNTER — APPOINTMENT (OUTPATIENT)
Dept: HEMATOLOGY ONCOLOGY | Facility: CLINIC | Age: 54
End: 2024-01-31

## 2024-01-31 ENCOUNTER — APPOINTMENT (OUTPATIENT)
Dept: HEMATOLOGY ONCOLOGY | Facility: CLINIC | Age: 54
End: 2024-01-31
Payer: COMMERCIAL

## 2024-01-31 ENCOUNTER — APPOINTMENT (OUTPATIENT)
Dept: INFUSION THERAPY | Facility: HOSPITAL | Age: 54
End: 2024-01-31

## 2024-01-31 VITALS
BODY MASS INDEX: 35.02 KG/M2 | HEART RATE: 88 BPM | HEIGHT: 67 IN | OXYGEN SATURATION: 99 % | RESPIRATION RATE: 16 BRPM | SYSTOLIC BLOOD PRESSURE: 141 MMHG | TEMPERATURE: 97.7 F | DIASTOLIC BLOOD PRESSURE: 87 MMHG | WEIGHT: 223.1 LBS

## 2024-01-31 LAB
BASOPHILS # BLD AUTO: 0.04 K/UL — SIGNIFICANT CHANGE UP (ref 0–0.2)
BASOPHILS NFR BLD AUTO: 0.5 % — SIGNIFICANT CHANGE UP (ref 0–2)
EOSINOPHIL # BLD AUTO: 0.69 K/UL — HIGH (ref 0–0.5)
EOSINOPHIL NFR BLD AUTO: 7.9 % — HIGH (ref 0–6)
HCT VFR BLD CALC: 41.2 % — SIGNIFICANT CHANGE UP (ref 39–50)
HGB BLD-MCNC: 14 G/DL — SIGNIFICANT CHANGE UP (ref 13–17)
IMM GRANULOCYTES NFR BLD AUTO: 0.7 % — SIGNIFICANT CHANGE UP (ref 0–0.9)
LYMPHOCYTES # BLD AUTO: 1.93 K/UL — SIGNIFICANT CHANGE UP (ref 1–3.3)
LYMPHOCYTES # BLD AUTO: 22 % — SIGNIFICANT CHANGE UP (ref 13–44)
MCHC RBC-ENTMCNC: 24.6 PG — LOW (ref 27–34)
MCHC RBC-ENTMCNC: 34 G/DL — SIGNIFICANT CHANGE UP (ref 32–36)
MCV RBC AUTO: 72.5 FL — LOW (ref 80–100)
MONOCYTES # BLD AUTO: 1.03 K/UL — HIGH (ref 0–0.9)
MONOCYTES NFR BLD AUTO: 11.8 % — SIGNIFICANT CHANGE UP (ref 2–14)
NEUTROPHILS # BLD AUTO: 5.01 K/UL — SIGNIFICANT CHANGE UP (ref 1.8–7.4)
NEUTROPHILS NFR BLD AUTO: 57.1 % — SIGNIFICANT CHANGE UP (ref 43–77)
NRBC # BLD: 0 /100 WBCS — SIGNIFICANT CHANGE UP (ref 0–0)
PLATELET # BLD AUTO: 203 K/UL — SIGNIFICANT CHANGE UP (ref 150–400)
RBC # BLD: 5.68 M/UL — SIGNIFICANT CHANGE UP (ref 4.2–5.8)
RBC # FLD: 19.7 % — HIGH (ref 10.3–14.5)
WBC # BLD: 8.76 K/UL — SIGNIFICANT CHANGE UP (ref 3.8–10.5)
WBC # FLD AUTO: 8.76 K/UL — SIGNIFICANT CHANGE UP (ref 3.8–10.5)

## 2024-01-31 PROCEDURE — 99215 OFFICE O/P EST HI 40 MIN: CPT

## 2024-01-31 NOTE — REVIEW OF SYSTEMS
[Diarrhea: Grade 0] : Diarrhea: Grade 0 [Negative] : Psychiatric [Fatigue] : no fatigue [Abdominal Pain] : no abdominal pain [Vomiting] : no vomiting [Constipation] : no constipation [Confused] : no confusion [Dizziness] : no dizziness [Fainting] : no fainting [Difficulty Walking] : no difficulty walking [de-identified] : Gr1 neuropathy feet improved

## 2024-01-31 NOTE — HISTORY OF PRESENT ILLNESS
[Disease: _____________________] : Disease: [unfilled] [T: ___] : T[unfilled] [N: ___] : N[unfilled] [M: ___] : M[unfilled] [AJCC Stage: ____] : AJCC Stage: [unfilled] [Research Protocol] : Research Protocol  [de-identified] : Mr Granda in 2021 at the age of 51 presented to the hospital with abdominal pain and underwent imaging that revealed a distal transverse colon lesion with evidence of microperforation. He is also underwent a colonoscopy which demonstrated an endoscopically obstructing distal transverse colon mass. Given the potential for impending obstruction as well as microperforation seen on imaging decision was made to take patient to the OR for a left hemicolectomy and anastomosis. Imaging done at that time also showed evidence of metastatic disease to the liver and potentially lung and a questionable lesion as well. Patient presents with his sister for initial visit and to discuss palliative chemotherapy.  Enrolled in [employer] Study   Started FOLFOX + Vit D July 7th, 2021 (bevacizumab added cycle #2) 8/23 scan shows PE started on Lovenox  October 2021 oxaliplatin held.   KRAS G12D , NABILA  FOLFOX + Bevacizumab +VitD ([employer] study) --> 5FU Yolis + Vit D --> POD 12/2022 ---> off trial  FOLFIRI/Yolis started 12/20/22 [de-identified] : Microsatellite stable  [de-identified] : KRAS G12D , NABILA  [FreeTextEntry1] : FOLFIRI/Yolis started 12/20/22 [de-identified] : Presents in follow-up and has no acute complaints at this time. here to review imaging

## 2024-01-31 NOTE — PHYSICAL EXAM
[Fully active, able to carry on all pre-disease performance without restriction] : Status 0 - Fully active, able to carry on all pre-disease performance without restriction [Obese] : obese [Normal] : affect appropriate [Ulcers] : no ulcers [Mucositis] : no mucositis [Thrush] : no thrush [Vesicles] : no vesicles [de-identified] : anicteric  [de-identified] : no JVD [de-identified] : normal respiratory effort, no audible wheeze [de-identified] : reg rate  [de-identified] : no LE edema B/L [de-identified] :   non-distended,

## 2024-02-02 ENCOUNTER — APPOINTMENT (OUTPATIENT)
Dept: INFUSION THERAPY | Facility: HOSPITAL | Age: 54
End: 2024-02-02

## 2024-02-08 ENCOUNTER — APPOINTMENT (OUTPATIENT)
Dept: ENDOCRINOLOGY | Facility: CLINIC | Age: 54
End: 2024-02-08

## 2024-02-12 ENCOUNTER — APPOINTMENT (OUTPATIENT)
Dept: HEMATOLOGY ONCOLOGY | Facility: CLINIC | Age: 54
End: 2024-02-12

## 2024-02-12 ENCOUNTER — RESULT REVIEW (OUTPATIENT)
Age: 54
End: 2024-02-12

## 2024-02-12 ENCOUNTER — APPOINTMENT (OUTPATIENT)
Dept: INFUSION THERAPY | Facility: HOSPITAL | Age: 54
End: 2024-02-12

## 2024-02-12 LAB
ALBUMIN SERPL ELPH-MCNC: 3.7 G/DL — SIGNIFICANT CHANGE UP (ref 3.3–5)
ALP SERPL-CCNC: 117 U/L — SIGNIFICANT CHANGE UP (ref 40–120)
ALT FLD-CCNC: 17 U/L — SIGNIFICANT CHANGE UP (ref 10–45)
ANION GAP SERPL CALC-SCNC: 13 MMOL/L — SIGNIFICANT CHANGE UP (ref 5–17)
AST SERPL-CCNC: 33 U/L — SIGNIFICANT CHANGE UP (ref 10–40)
BASOPHILS # BLD AUTO: 0.05 K/UL — SIGNIFICANT CHANGE UP (ref 0–0.2)
BASOPHILS NFR BLD AUTO: 0.7 % — SIGNIFICANT CHANGE UP (ref 0–2)
BILIRUB SERPL-MCNC: 0.5 MG/DL — SIGNIFICANT CHANGE UP (ref 0.2–1.2)
BUN SERPL-MCNC: 12 MG/DL — SIGNIFICANT CHANGE UP (ref 7–23)
CALCIUM SERPL-MCNC: 9.3 MG/DL — SIGNIFICANT CHANGE UP (ref 8.4–10.5)
CEA SERPL-MCNC: 338 NG/ML — HIGH (ref 0–3.8)
CHLORIDE SERPL-SCNC: 101 MMOL/L — SIGNIFICANT CHANGE UP (ref 96–108)
CO2 SERPL-SCNC: 24 MMOL/L — SIGNIFICANT CHANGE UP (ref 22–31)
CREAT ?TM UR-MCNC: 171 MG/DL — SIGNIFICANT CHANGE UP
CREAT SERPL-MCNC: 0.96 MG/DL — SIGNIFICANT CHANGE UP (ref 0.5–1.3)
EGFR: 95 ML/MIN/1.73M2 — SIGNIFICANT CHANGE UP
EOSINOPHIL # BLD AUTO: 0.81 K/UL — HIGH (ref 0–0.5)
EOSINOPHIL NFR BLD AUTO: 10.9 % — HIGH (ref 0–6)
GLUCOSE SERPL-MCNC: 159 MG/DL — HIGH (ref 70–99)
HCT VFR BLD CALC: 39.5 % — SIGNIFICANT CHANGE UP (ref 39–50)
HGB BLD-MCNC: 13.2 G/DL — SIGNIFICANT CHANGE UP (ref 13–17)
IMM GRANULOCYTES NFR BLD AUTO: 0.7 % — SIGNIFICANT CHANGE UP (ref 0–0.9)
LYMPHOCYTES # BLD AUTO: 1.91 K/UL — SIGNIFICANT CHANGE UP (ref 1–3.3)
LYMPHOCYTES # BLD AUTO: 25.6 % — SIGNIFICANT CHANGE UP (ref 13–44)
MCHC RBC-ENTMCNC: 23.8 PG — LOW (ref 27–34)
MCHC RBC-ENTMCNC: 33.4 G/DL — SIGNIFICANT CHANGE UP (ref 32–36)
MCV RBC AUTO: 71.3 FL — LOW (ref 80–100)
MONOCYTES # BLD AUTO: 0.98 K/UL — HIGH (ref 0–0.9)
MONOCYTES NFR BLD AUTO: 13.2 % — SIGNIFICANT CHANGE UP (ref 2–14)
NEUTROPHILS # BLD AUTO: 3.65 K/UL — SIGNIFICANT CHANGE UP (ref 1.8–7.4)
NEUTROPHILS NFR BLD AUTO: 48.9 % — SIGNIFICANT CHANGE UP (ref 43–77)
NRBC # BLD: 0 /100 WBCS — SIGNIFICANT CHANGE UP (ref 0–0)
PLATELET # BLD AUTO: 280 K/UL — SIGNIFICANT CHANGE UP (ref 150–400)
POTASSIUM SERPL-MCNC: 4.2 MMOL/L — SIGNIFICANT CHANGE UP (ref 3.5–5.3)
POTASSIUM SERPL-SCNC: 4.2 MMOL/L — SIGNIFICANT CHANGE UP (ref 3.5–5.3)
PROT ?TM UR-MCNC: 38 MG/DL — HIGH (ref 0–12)
PROT SERPL-MCNC: 7.3 G/DL — SIGNIFICANT CHANGE UP (ref 6–8.3)
PROT/CREAT UR-RTO: 0.2 RATIO — SIGNIFICANT CHANGE UP (ref 0–0.2)
RBC # BLD: 5.54 M/UL — SIGNIFICANT CHANGE UP (ref 4.2–5.8)
RBC # FLD: 19.4 % — HIGH (ref 10.3–14.5)
SODIUM SERPL-SCNC: 138 MMOL/L — SIGNIFICANT CHANGE UP (ref 135–145)
WBC # BLD: 7.45 K/UL — SIGNIFICANT CHANGE UP (ref 3.8–10.5)
WBC # FLD AUTO: 7.45 K/UL — SIGNIFICANT CHANGE UP (ref 3.8–10.5)

## 2024-02-16 ENCOUNTER — APPOINTMENT (OUTPATIENT)
Dept: INFUSION THERAPY | Facility: HOSPITAL | Age: 54
End: 2024-02-16

## 2024-02-26 ENCOUNTER — APPOINTMENT (OUTPATIENT)
Dept: INFUSION THERAPY | Facility: HOSPITAL | Age: 54
End: 2024-02-26

## 2024-02-26 ENCOUNTER — APPOINTMENT (OUTPATIENT)
Dept: HEMATOLOGY ONCOLOGY | Facility: CLINIC | Age: 54
End: 2024-02-26

## 2024-02-26 ENCOUNTER — RESULT REVIEW (OUTPATIENT)
Age: 54
End: 2024-02-26

## 2024-02-26 ENCOUNTER — APPOINTMENT (OUTPATIENT)
Dept: HEMATOLOGY ONCOLOGY | Facility: CLINIC | Age: 54
End: 2024-02-26
Payer: COMMERCIAL

## 2024-02-26 VITALS
HEIGHT: 67.01 IN | HEART RATE: 88 BPM | DIASTOLIC BLOOD PRESSURE: 83 MMHG | SYSTOLIC BLOOD PRESSURE: 132 MMHG | BODY MASS INDEX: 34.43 KG/M2 | OXYGEN SATURATION: 98 % | RESPIRATION RATE: 16 BRPM | TEMPERATURE: 97.7 F | WEIGHT: 219.38 LBS

## 2024-02-26 DIAGNOSIS — R39.9 UNSPECIFIED SYMPTOMS AND SIGNS INVOLVING THE GENITOURINARY SYSTEM: ICD-10-CM

## 2024-02-26 DIAGNOSIS — R73.9 HYPERGLYCEMIA, UNSPECIFIED: ICD-10-CM

## 2024-02-26 LAB
ALBUMIN SERPL ELPH-MCNC: 3.8 G/DL — SIGNIFICANT CHANGE UP (ref 3.3–5)
ALP SERPL-CCNC: 121 U/L — HIGH (ref 40–120)
ALT FLD-CCNC: 13 U/L — SIGNIFICANT CHANGE UP (ref 10–45)
ANION GAP SERPL CALC-SCNC: 14 MMOL/L — SIGNIFICANT CHANGE UP (ref 5–17)
AST SERPL-CCNC: 28 U/L — SIGNIFICANT CHANGE UP (ref 10–40)
BASOPHILS # BLD AUTO: 0.05 K/UL — SIGNIFICANT CHANGE UP (ref 0–0.2)
BASOPHILS NFR BLD AUTO: 0.7 % — SIGNIFICANT CHANGE UP (ref 0–2)
BILIRUB SERPL-MCNC: 0.6 MG/DL — SIGNIFICANT CHANGE UP (ref 0.2–1.2)
BUN SERPL-MCNC: 12 MG/DL — SIGNIFICANT CHANGE UP (ref 7–23)
CALCIUM SERPL-MCNC: 9.4 MG/DL — SIGNIFICANT CHANGE UP (ref 8.4–10.5)
CHLORIDE SERPL-SCNC: 98 MMOL/L — SIGNIFICANT CHANGE UP (ref 96–108)
CO2 SERPL-SCNC: 23 MMOL/L — SIGNIFICANT CHANGE UP (ref 22–31)
CREAT SERPL-MCNC: 0.76 MG/DL — SIGNIFICANT CHANGE UP (ref 0.5–1.3)
EGFR: 107 ML/MIN/1.73M2 — SIGNIFICANT CHANGE UP
EOSINOPHIL # BLD AUTO: 0.26 K/UL — SIGNIFICANT CHANGE UP (ref 0–0.5)
EOSINOPHIL NFR BLD AUTO: 3.6 % — SIGNIFICANT CHANGE UP (ref 0–6)
GLUCOSE SERPL-MCNC: 202 MG/DL — HIGH (ref 70–99)
HCT VFR BLD CALC: 40.3 % — SIGNIFICANT CHANGE UP (ref 39–50)
HGB BLD-MCNC: 14 G/DL — SIGNIFICANT CHANGE UP (ref 13–17)
IMM GRANULOCYTES NFR BLD AUTO: 2.8 % — HIGH (ref 0–0.9)
LYMPHOCYTES # BLD AUTO: 1.91 K/UL — SIGNIFICANT CHANGE UP (ref 1–3.3)
LYMPHOCYTES # BLD AUTO: 26.8 % — SIGNIFICANT CHANGE UP (ref 13–44)
MCHC RBC-ENTMCNC: 24.6 PG — LOW (ref 27–34)
MCHC RBC-ENTMCNC: 34.7 G/DL — SIGNIFICANT CHANGE UP (ref 32–36)
MCV RBC AUTO: 70.7 FL — LOW (ref 80–100)
MONOCYTES # BLD AUTO: 0.49 K/UL — SIGNIFICANT CHANGE UP (ref 0–0.9)
MONOCYTES NFR BLD AUTO: 6.9 % — SIGNIFICANT CHANGE UP (ref 2–14)
NEUTROPHILS # BLD AUTO: 4.23 K/UL — SIGNIFICANT CHANGE UP (ref 1.8–7.4)
NEUTROPHILS NFR BLD AUTO: 59.2 % — SIGNIFICANT CHANGE UP (ref 43–77)
NRBC # BLD: 0 /100 WBCS — SIGNIFICANT CHANGE UP (ref 0–0)
PLATELET # BLD AUTO: 258 K/UL — SIGNIFICANT CHANGE UP (ref 150–400)
POTASSIUM SERPL-MCNC: 4.2 MMOL/L — SIGNIFICANT CHANGE UP (ref 3.5–5.3)
POTASSIUM SERPL-SCNC: 4.2 MMOL/L — SIGNIFICANT CHANGE UP (ref 3.5–5.3)
PROT SERPL-MCNC: 7.6 G/DL — SIGNIFICANT CHANGE UP (ref 6–8.3)
RBC # BLD: 5.7 M/UL — SIGNIFICANT CHANGE UP (ref 4.2–5.8)
RBC # FLD: 20.6 % — HIGH (ref 10.3–14.5)
SODIUM SERPL-SCNC: 135 MMOL/L — SIGNIFICANT CHANGE UP (ref 135–145)
WBC # BLD: 7.14 K/UL — SIGNIFICANT CHANGE UP (ref 3.8–10.5)
WBC # FLD AUTO: 7.14 K/UL — SIGNIFICANT CHANGE UP (ref 3.8–10.5)

## 2024-02-26 PROCEDURE — 99214 OFFICE O/P EST MOD 30 MIN: CPT

## 2024-02-26 RX ORDER — PHENAZOPYRIDINE HYDROCHLORIDE 200 MG/1
200 TABLET ORAL
Qty: 6 | Refills: 0 | Status: COMPLETED | COMMUNITY
Start: 2023-12-11

## 2024-02-26 RX ORDER — GLIPIZIDE 5 MG/1
5 TABLET, FILM COATED, EXTENDED RELEASE ORAL
Qty: 30 | Refills: 0 | Status: COMPLETED | COMMUNITY
Start: 2023-09-06 | End: 2024-02-26

## 2024-02-26 RX ORDER — CEFDINIR 300 MG/1
300 CAPSULE ORAL
Qty: 28 | Refills: 0 | Status: COMPLETED | COMMUNITY
Start: 2023-12-11

## 2024-02-26 RX ORDER — BENZONATATE 200 MG/1
200 CAPSULE ORAL
Qty: 20 | Refills: 0 | Status: COMPLETED | COMMUNITY
Start: 2024-01-08

## 2024-02-26 NOTE — PHYSICAL EXAM
PT d/c Last seen 7/30/19Visits 8Post op no [Fully active, able to carry on all pre-disease performance without restriction] : Status 0 - Fully active, able to carry on all pre-disease performance without restriction [Obese] : obese [Normal] : affect appropriate [Ulcers] : no ulcers [Thrush] : no thrush [Mucositis] : no mucositis [Vesicles] : no vesicles [de-identified] : anicteric  [de-identified] : no JVD [de-identified] : normal respiratory effort, no audible wheeze [de-identified] : no LE edema B/L [de-identified] : reg rate  [de-identified] :   non-distended,

## 2024-02-26 NOTE — REVIEW OF SYSTEMS
[Diarrhea: Grade 0] : Diarrhea: Grade 0 [Negative] : Psychiatric [Abdominal Pain] : no abdominal pain [Fatigue] : no fatigue [Vomiting] : no vomiting [Constipation] : no constipation [Confused] : no confusion [Dizziness] : no dizziness [Difficulty Walking] : no difficulty walking [Fainting] : no fainting [de-identified] : Gr1 neuropathy feet improved

## 2024-02-26 NOTE — HISTORY OF PRESENT ILLNESS
[Disease: _____________________] : Disease: [unfilled] [T: ___] : T[unfilled] [N: ___] : N[unfilled] [M: ___] : M[unfilled] [AJCC Stage: ____] : AJCC Stage: [unfilled] [Research Protocol] : Research Protocol  [de-identified] : Microsatellite stable  [de-identified] : Mr Granda in 2021 at the age of 51 presented to the hospital with abdominal pain and underwent imaging that revealed a distal transverse colon lesion with evidence of microperforation. He is also underwent a colonoscopy which demonstrated an endoscopically obstructing distal transverse colon mass. Given the potential for impending obstruction as well as microperforation seen on imaging decision was made to take patient to the OR for a left hemicolectomy and anastomosis. Imaging done at that time also showed evidence of metastatic disease to the liver and potentially lung and a questionable lesion as well. Patient presents with his sister for initial visit and to discuss palliative chemotherapy.  Enrolled in [employer] Study   Started FOLFOX + Vit D July 7th, 2021 (bevacizumab added cycle #2) 8/23 scan shows PE started on Lovenox  October 2021 oxaliplatin held.   KRAS G12D , NABILA  FOLFOX + Bevacizumab +VitD ([employer] study) --> 5FU Yolis + Vit D --> POD 12/2022 ---> off trial  FOLFIRI/Yolis started 12/20/22 2/2024 = POD ---> Lonsurf/Yolis started 2/12/24 [de-identified] : KRAS G12D , NABILA  [FreeTextEntry1] : Lonsurf/Yolis [de-identified] : Patient presents for follow up and is scheduled for C1D15 today.  He reports overall tolerating his new treatment.  He denies significant fatigue, N/V, abdominal pain, diarrhea, constipation or swelling.  He reports new mild dysuria x 2 days.  He denies fever/chills, hematuria, urinary frequency/urgency or flank pain.

## 2024-02-27 LAB
APPEARANCE UR: CLEAR — SIGNIFICANT CHANGE UP
BACTERIA # UR AUTO: NEGATIVE /HPF — SIGNIFICANT CHANGE UP
BILIRUB UR-MCNC: NEGATIVE — SIGNIFICANT CHANGE UP
CAST: 0 /LPF — SIGNIFICANT CHANGE UP (ref 0–4)
COLOR SPEC: YELLOW — SIGNIFICANT CHANGE UP
DIFF PNL FLD: NEGATIVE — SIGNIFICANT CHANGE UP
GLUCOSE UR QL: NEGATIVE MG/DL — SIGNIFICANT CHANGE UP
KETONES UR-MCNC: NEGATIVE MG/DL — SIGNIFICANT CHANGE UP
LEUKOCYTE ESTERASE UR-ACNC: NEGATIVE — SIGNIFICANT CHANGE UP
NITRITE UR-MCNC: NEGATIVE — SIGNIFICANT CHANGE UP
PH UR: 8 — SIGNIFICANT CHANGE UP (ref 5–8)
PROT UR-MCNC: 100 MG/DL
RBC CASTS # UR COMP ASSIST: 3 /HPF — SIGNIFICANT CHANGE UP (ref 0–4)
SP GR SPEC: 1.03 — SIGNIFICANT CHANGE UP (ref 1–1.03)
SQUAMOUS # UR AUTO: 2 /HPF — SIGNIFICANT CHANGE UP (ref 0–5)
UROBILINOGEN FLD QL: 1 MG/DL — SIGNIFICANT CHANGE UP (ref 0.2–1)
WBC UR QL: 1 /HPF — SIGNIFICANT CHANGE UP (ref 0–5)

## 2024-02-29 LAB
-  AMPICILLIN: SIGNIFICANT CHANGE UP
-  CIPROFLOXACIN: SIGNIFICANT CHANGE UP
-  LEVOFLOXACIN: SIGNIFICANT CHANGE UP
-  NITROFURANTOIN: SIGNIFICANT CHANGE UP
-  TETRACYCLINE: SIGNIFICANT CHANGE UP
-  VANCOMYCIN: SIGNIFICANT CHANGE UP
CULTURE RESULTS: ABNORMAL
METHOD TYPE: SIGNIFICANT CHANGE UP
ORGANISM # SPEC MICROSCOPIC CNT: ABNORMAL
ORGANISM # SPEC MICROSCOPIC CNT: ABNORMAL
SPECIMEN SOURCE: SIGNIFICANT CHANGE UP

## 2024-03-01 ENCOUNTER — APPOINTMENT (OUTPATIENT)
Dept: INFUSION THERAPY | Facility: HOSPITAL | Age: 54
End: 2024-03-01

## 2024-03-11 ENCOUNTER — RESULT REVIEW (OUTPATIENT)
Age: 54
End: 2024-03-11

## 2024-03-11 ENCOUNTER — APPOINTMENT (OUTPATIENT)
Dept: INFUSION THERAPY | Facility: HOSPITAL | Age: 54
End: 2024-03-11

## 2024-03-11 ENCOUNTER — APPOINTMENT (OUTPATIENT)
Dept: HEMATOLOGY ONCOLOGY | Facility: CLINIC | Age: 54
End: 2024-03-11

## 2024-03-11 ENCOUNTER — APPOINTMENT (OUTPATIENT)
Dept: HEMATOLOGY ONCOLOGY | Facility: CLINIC | Age: 54
End: 2024-03-11
Payer: COMMERCIAL

## 2024-03-11 VITALS
OXYGEN SATURATION: 96 % | SYSTOLIC BLOOD PRESSURE: 120 MMHG | WEIGHT: 213.13 LBS | BODY MASS INDEX: 32.68 KG/M2 | RESPIRATION RATE: 17 BRPM | HEART RATE: 104 BPM | HEIGHT: 67.8 IN | TEMPERATURE: 97.3 F | DIASTOLIC BLOOD PRESSURE: 78 MMHG

## 2024-03-11 DIAGNOSIS — R05.9 COUGH, UNSPECIFIED: ICD-10-CM

## 2024-03-11 LAB
ALBUMIN SERPL ELPH-MCNC: 3.5 G/DL — SIGNIFICANT CHANGE UP (ref 3.3–5)
ALP SERPL-CCNC: 182 U/L — HIGH (ref 40–120)
ALT FLD-CCNC: 15 U/L — SIGNIFICANT CHANGE UP (ref 10–45)
ANION GAP SERPL CALC-SCNC: 11 MMOL/L — SIGNIFICANT CHANGE UP (ref 5–17)
ANISOCYTOSIS BLD QL: SLIGHT — SIGNIFICANT CHANGE UP
AST SERPL-CCNC: 44 U/L — HIGH (ref 10–40)
BASOPHILS # BLD AUTO: 0 K/UL — SIGNIFICANT CHANGE UP (ref 0–0.2)
BASOPHILS NFR BLD AUTO: 0 % — SIGNIFICANT CHANGE UP (ref 0–2)
BILIRUB SERPL-MCNC: 0.6 MG/DL — SIGNIFICANT CHANGE UP (ref 0.2–1.2)
BUN SERPL-MCNC: 9 MG/DL — SIGNIFICANT CHANGE UP (ref 7–23)
CALCIUM SERPL-MCNC: 9.2 MG/DL — SIGNIFICANT CHANGE UP (ref 8.4–10.5)
CEA SERPL-MCNC: 1491 NG/ML — HIGH (ref 0–3.8)
CHLORIDE SERPL-SCNC: 102 MMOL/L — SIGNIFICANT CHANGE UP (ref 96–108)
CO2 SERPL-SCNC: 24 MMOL/L — SIGNIFICANT CHANGE UP (ref 22–31)
CREAT ?TM UR-MCNC: 229 MG/DL — SIGNIFICANT CHANGE UP
CREAT SERPL-MCNC: 0.84 MG/DL — SIGNIFICANT CHANGE UP (ref 0.5–1.3)
EGFR: 104 ML/MIN/1.73M2 — SIGNIFICANT CHANGE UP
EOSINOPHIL # BLD AUTO: 0.11 K/UL — SIGNIFICANT CHANGE UP (ref 0–0.5)
EOSINOPHIL NFR BLD AUTO: 2 % — SIGNIFICANT CHANGE UP (ref 0–6)
GLUCOSE SERPL-MCNC: 116 MG/DL — HIGH (ref 70–99)
HCT VFR BLD CALC: 37 % — LOW (ref 39–50)
HGB BLD-MCNC: 12.5 G/DL — LOW (ref 13–17)
LYMPHOCYTES # BLD AUTO: 1.53 K/UL — SIGNIFICANT CHANGE UP (ref 1–3.3)
LYMPHOCYTES # BLD AUTO: 27 % — SIGNIFICANT CHANGE UP (ref 13–44)
MCHC RBC-ENTMCNC: 24 PG — LOW (ref 27–34)
MCHC RBC-ENTMCNC: 33.8 G/DL — SIGNIFICANT CHANGE UP (ref 32–36)
MCV RBC AUTO: 71 FL — LOW (ref 80–100)
MICROCYTES BLD QL: SLIGHT — SIGNIFICANT CHANGE UP
MONOCYTES # BLD AUTO: 1.02 K/UL — HIGH (ref 0–0.9)
MONOCYTES NFR BLD AUTO: 18 % — HIGH (ref 2–14)
NEUTROPHILS # BLD AUTO: 2.99 K/UL — SIGNIFICANT CHANGE UP (ref 1.8–7.4)
NEUTROPHILS NFR BLD AUTO: 53 % — SIGNIFICANT CHANGE UP (ref 43–77)
NRBC # BLD: 0 /100 WBCS — SIGNIFICANT CHANGE UP (ref 0–0)
NRBC # BLD: SIGNIFICANT CHANGE UP /100 WBCS (ref 0–0)
PLAT MORPH BLD: NORMAL — SIGNIFICANT CHANGE UP
PLATELET # BLD AUTO: 380 K/UL — SIGNIFICANT CHANGE UP (ref 150–400)
POTASSIUM SERPL-MCNC: 4.5 MMOL/L — SIGNIFICANT CHANGE UP (ref 3.5–5.3)
POTASSIUM SERPL-SCNC: 4.5 MMOL/L — SIGNIFICANT CHANGE UP (ref 3.5–5.3)
PROT ?TM UR-MCNC: 100 MG/DL — HIGH (ref 0–12)
PROT SERPL-MCNC: 7.9 G/DL — SIGNIFICANT CHANGE UP (ref 6–8.3)
PROT/CREAT UR-RTO: 0.4 RATIO — HIGH (ref 0–0.2)
RBC # BLD: 5.21 M/UL — SIGNIFICANT CHANGE UP (ref 4.2–5.8)
RBC # FLD: 20.8 % — HIGH (ref 10.3–14.5)
RBC BLD AUTO: ABNORMAL
SODIUM SERPL-SCNC: 137 MMOL/L — SIGNIFICANT CHANGE UP (ref 135–145)
TARGETS BLD QL SMEAR: SLIGHT — SIGNIFICANT CHANGE UP
WBC # BLD: 5.65 K/UL — SIGNIFICANT CHANGE UP (ref 3.8–10.5)
WBC # FLD AUTO: 5.65 K/UL — SIGNIFICANT CHANGE UP (ref 3.8–10.5)

## 2024-03-11 PROCEDURE — G2211 COMPLEX E/M VISIT ADD ON: CPT

## 2024-03-11 PROCEDURE — 99214 OFFICE O/P EST MOD 30 MIN: CPT

## 2024-03-11 RX ORDER — DIPHENHYDRAMINE HYDROCHLORIDE AND LIDOCAINE HYDROCHLORIDE AND ALUMINUM HYDROXIDE AND MAGNESIUM HYDRO
KIT
Qty: 1 | Refills: 1 | Status: COMPLETED | COMMUNITY
Start: 2022-07-11 | End: 2024-03-11

## 2024-03-11 RX ORDER — BENZONATATE 200 MG/1
200 CAPSULE ORAL 3 TIMES DAILY
Qty: 30 | Refills: 0 | Status: ACTIVE | COMMUNITY
Start: 2024-03-11 | End: 1900-01-01

## 2024-03-11 RX ORDER — DULOXETINE HYDROCHLORIDE 60 MG/1
60 CAPSULE, DELAYED RELEASE PELLETS ORAL
Qty: 30 | Refills: 1 | Status: COMPLETED | COMMUNITY
Start: 2022-03-16 | End: 2024-03-11

## 2024-03-11 NOTE — PHYSICAL EXAM
[Fully active, able to carry on all pre-disease performance without restriction] : Status 0 - Fully active, able to carry on all pre-disease performance without restriction [Obese] : obese [Ulcers] : no ulcers [Mucositis] : no mucositis [Thrush] : no thrush [Vesicles] : no vesicles [Normal] : clear to auscultation bilaterally, no dullness, no wheezing [de-identified] : normal respiratory effort, no audible wheeze [de-identified] : anicteric  [de-identified] : no JVD [de-identified] : no LE edema B/L [de-identified] : reg rate  [de-identified] :  non-distended,

## 2024-03-11 NOTE — REVIEW OF SYSTEMS
[Diarrhea: Grade 0] : Diarrhea: Grade 0 [Negative] : Psychiatric [Fatigue] : no fatigue [Shortness Of Breath] : no shortness of breath [Cough] : cough [Wheezing] : no wheezing [SOB on Exertion] : no shortness of breath during exertion [Abdominal Pain] : no abdominal pain [Vomiting] : no vomiting [Constipation] : no constipation [Confused] : no confusion [Dizziness] : no dizziness [Fainting] : no fainting [Difficulty Walking] : no difficulty walking [de-identified] : Gr1 neuropathy feet improved

## 2024-03-11 NOTE — HISTORY OF PRESENT ILLNESS
[Disease: _____________________] : Disease: [unfilled] [T: ___] : T[unfilled] [N: ___] : N[unfilled] [M: ___] : M[unfilled] [AJCC Stage: ____] : AJCC Stage: [unfilled] [Research Protocol] : Research Protocol  [de-identified] : Mr Granda in 2021 at the age of 51 presented to the hospital with abdominal pain and underwent imaging that revealed a distal transverse colon lesion with evidence of microperforation. He is also underwent a colonoscopy which demonstrated an endoscopically obstructing distal transverse colon mass. Given the potential for impending obstruction as well as microperforation seen on imaging decision was made to take patient to the OR for a left hemicolectomy and anastomosis. Imaging done at that time also showed evidence of metastatic disease to the liver and potentially lung and a questionable lesion as well. Patient presents with his sister for initial visit and to discuss palliative chemotherapy.  Enrolled in [employer] Study   Started FOLFOX + Vit D July 7th, 2021 (bevacizumab added cycle #2) 8/23 scan shows PE started on Lovenox  October 2021 oxaliplatin held.   KRAS G12D , NABILA  FOLFOX + Bevacizumab +VitD ([employer] study) --> 5FU Yolis + Vit D --> POD 12/2022 ---> off trial  FOLFIRI/Yolis started 12/20/22 2/2024 = POD ---> Lonsurf/Yolis started 2/12/24 [de-identified] : KRAS G12D , NABILA  [de-identified] : Microsatellite stable  [FreeTextEntry1] : Lonsurf/Yolis [de-identified] : Patient presents for follow up and is scheduled for C2D1 today.  He c/o dry cough for the past few weeks that is more at night with some hoarseness.  He denies fever/chills, nasal congestion, dysphagia, sputum production, hemoptysis, CP, SOB, palpitations, CASTILLO, leg swelling.  He has tried OTC cough syrups without relief.  He denies sick contacts.  He denies significant fatigue, N/V, abdominal pain, diarrhea or constipation with treatment.  The urinary symptoms he previously reported has resolved.

## 2024-03-13 ENCOUNTER — OUTPATIENT (OUTPATIENT)
Dept: OUTPATIENT SERVICES | Facility: HOSPITAL | Age: 54
LOS: 1 days | Discharge: HOME | End: 2024-03-13

## 2024-03-13 DIAGNOSIS — C18.9 MALIGNANT NEOPLASM OF COLON, UNSPECIFIED: ICD-10-CM

## 2024-03-15 ENCOUNTER — APPOINTMENT (OUTPATIENT)
Dept: INFUSION THERAPY | Facility: HOSPITAL | Age: 54
End: 2024-03-15

## 2024-03-25 ENCOUNTER — APPOINTMENT (OUTPATIENT)
Dept: HEMATOLOGY ONCOLOGY | Facility: CLINIC | Age: 54
End: 2024-03-25

## 2024-03-25 ENCOUNTER — RESULT REVIEW (OUTPATIENT)
Age: 54
End: 2024-03-25

## 2024-03-25 ENCOUNTER — APPOINTMENT (OUTPATIENT)
Dept: INFUSION THERAPY | Facility: HOSPITAL | Age: 54
End: 2024-03-25

## 2024-03-25 LAB
ALBUMIN SERPL ELPH-MCNC: 3.7 G/DL — SIGNIFICANT CHANGE UP (ref 3.3–5)
ALP SERPL-CCNC: 131 U/L — HIGH (ref 40–120)
ALT FLD-CCNC: 11 U/L — SIGNIFICANT CHANGE UP (ref 10–45)
ANION GAP SERPL CALC-SCNC: 12 MMOL/L — SIGNIFICANT CHANGE UP (ref 5–17)
ANISOCYTOSIS BLD QL: SLIGHT — SIGNIFICANT CHANGE UP
AST SERPL-CCNC: 32 U/L — SIGNIFICANT CHANGE UP (ref 10–40)
BASOPHILS # BLD AUTO: 0.06 K/UL — SIGNIFICANT CHANGE UP (ref 0–0.2)
BASOPHILS NFR BLD AUTO: 1 % — SIGNIFICANT CHANGE UP (ref 0–2)
BILIRUB SERPL-MCNC: 0.7 MG/DL — SIGNIFICANT CHANGE UP (ref 0.2–1.2)
BUN SERPL-MCNC: 11 MG/DL — SIGNIFICANT CHANGE UP (ref 7–23)
CALCIUM SERPL-MCNC: 9.5 MG/DL — SIGNIFICANT CHANGE UP (ref 8.4–10.5)
CEA SERPL-MCNC: 622 NG/ML — HIGH (ref 0–3.8)
CHLORIDE SERPL-SCNC: 101 MMOL/L — SIGNIFICANT CHANGE UP (ref 96–108)
CO2 SERPL-SCNC: 25 MMOL/L — SIGNIFICANT CHANGE UP (ref 22–31)
CREAT SERPL-MCNC: 0.95 MG/DL — SIGNIFICANT CHANGE UP (ref 0.5–1.3)
EGFR: 96 ML/MIN/1.73M2 — SIGNIFICANT CHANGE UP
EOSINOPHIL # BLD AUTO: 0.19 K/UL — SIGNIFICANT CHANGE UP (ref 0–0.5)
EOSINOPHIL NFR BLD AUTO: 3 % — SIGNIFICANT CHANGE UP (ref 0–6)
GLUCOSE SERPL-MCNC: 123 MG/DL — HIGH (ref 70–99)
HCT VFR BLD CALC: 37.4 % — LOW (ref 39–50)
HGB BLD-MCNC: 12.9 G/DL — LOW (ref 13–17)
LYMPHOCYTES # BLD AUTO: 1.78 K/UL — SIGNIFICANT CHANGE UP (ref 1–3.3)
LYMPHOCYTES # BLD AUTO: 28 % — SIGNIFICANT CHANGE UP (ref 13–44)
MCHC RBC-ENTMCNC: 24.5 PG — LOW (ref 27–34)
MCHC RBC-ENTMCNC: 34.5 G/DL — SIGNIFICANT CHANGE UP (ref 32–36)
MCV RBC AUTO: 71.1 FL — LOW (ref 80–100)
MICROCYTES BLD QL: SLIGHT — SIGNIFICANT CHANGE UP
MONOCYTES # BLD AUTO: 0.25 K/UL — SIGNIFICANT CHANGE UP (ref 0–0.9)
MONOCYTES NFR BLD AUTO: 4 % — SIGNIFICANT CHANGE UP (ref 2–14)
NEUTROPHILS # BLD AUTO: 4.07 K/UL — SIGNIFICANT CHANGE UP (ref 1.8–7.4)
NEUTROPHILS NFR BLD AUTO: 64 % — SIGNIFICANT CHANGE UP (ref 43–77)
NRBC # BLD: 2 /100 WBCS — HIGH (ref 0–0)
NRBC # BLD: SIGNIFICANT CHANGE UP /100 WBCS (ref 0–0)
PLAT MORPH BLD: NORMAL — SIGNIFICANT CHANGE UP
PLATELET # BLD AUTO: 242 K/UL — SIGNIFICANT CHANGE UP (ref 150–400)
POTASSIUM SERPL-MCNC: 4.2 MMOL/L — SIGNIFICANT CHANGE UP (ref 3.5–5.3)
POTASSIUM SERPL-SCNC: 4.2 MMOL/L — SIGNIFICANT CHANGE UP (ref 3.5–5.3)
PROT SERPL-MCNC: 7.7 G/DL — SIGNIFICANT CHANGE UP (ref 6–8.3)
RBC # BLD: 5.26 M/UL — SIGNIFICANT CHANGE UP (ref 4.2–5.8)
RBC # FLD: 21.8 % — HIGH (ref 10.3–14.5)
RBC BLD AUTO: ABNORMAL
SODIUM SERPL-SCNC: 138 MMOL/L — SIGNIFICANT CHANGE UP (ref 135–145)
TARGETS BLD QL SMEAR: SLIGHT — SIGNIFICANT CHANGE UP
WBC # BLD: 6.36 K/UL — SIGNIFICANT CHANGE UP (ref 3.8–10.5)
WBC # FLD AUTO: 6.36 K/UL — SIGNIFICANT CHANGE UP (ref 3.8–10.5)

## 2024-03-26 DIAGNOSIS — Z51.11 ENCOUNTER FOR ANTINEOPLASTIC CHEMOTHERAPY: ICD-10-CM

## 2024-03-26 LAB
CREAT ?TM UR-MCNC: 118 MG/DL — SIGNIFICANT CHANGE UP
PROT ?TM UR-MCNC: 27 MG/DL — HIGH (ref 0–12)
PROT/CREAT UR-RTO: 0.2 RATIO — SIGNIFICANT CHANGE UP (ref 0–0.2)

## 2024-03-29 ENCOUNTER — APPOINTMENT (OUTPATIENT)
Dept: INFUSION THERAPY | Facility: HOSPITAL | Age: 54
End: 2024-03-29

## 2024-04-08 ENCOUNTER — RESULT REVIEW (OUTPATIENT)
Age: 54
End: 2024-04-08

## 2024-04-08 ENCOUNTER — APPOINTMENT (OUTPATIENT)
Dept: HEMATOLOGY ONCOLOGY | Facility: CLINIC | Age: 54
End: 2024-04-08
Payer: COMMERCIAL

## 2024-04-08 ENCOUNTER — APPOINTMENT (OUTPATIENT)
Dept: HEMATOLOGY ONCOLOGY | Facility: CLINIC | Age: 54
End: 2024-04-08

## 2024-04-08 ENCOUNTER — APPOINTMENT (OUTPATIENT)
Dept: INFUSION THERAPY | Facility: HOSPITAL | Age: 54
End: 2024-04-08

## 2024-04-08 VITALS
OXYGEN SATURATION: 99 % | SYSTOLIC BLOOD PRESSURE: 134 MMHG | WEIGHT: 214.05 LBS | RESPIRATION RATE: 16 BRPM | HEART RATE: 92 BPM | TEMPERATURE: 98.8 F | BODY MASS INDEX: 32.82 KG/M2 | HEIGHT: 67.8 IN | DIASTOLIC BLOOD PRESSURE: 78 MMHG

## 2024-04-08 DIAGNOSIS — Z00.6 ENCOUNTER FOR EXAMINATION FOR NORMAL COMPARISON AND CONTROL IN CLINICAL RESEARCH PROGRAM: ICD-10-CM

## 2024-04-08 LAB
ALBUMIN SERPL ELPH-MCNC: 3.7 G/DL — SIGNIFICANT CHANGE UP (ref 3.3–5)
ALP SERPL-CCNC: 124 U/L — HIGH (ref 40–120)
ALT FLD-CCNC: 6 U/L — LOW (ref 10–45)
ANION GAP SERPL CALC-SCNC: 11 MMOL/L — SIGNIFICANT CHANGE UP (ref 5–17)
AST SERPL-CCNC: 35 U/L — SIGNIFICANT CHANGE UP (ref 10–40)
BASOPHILS # BLD AUTO: 0.02 K/UL — SIGNIFICANT CHANGE UP (ref 0–0.2)
BASOPHILS NFR BLD AUTO: 0.5 % — SIGNIFICANT CHANGE UP (ref 0–2)
BILIRUB SERPL-MCNC: 0.6 MG/DL — SIGNIFICANT CHANGE UP (ref 0.2–1.2)
BUN SERPL-MCNC: 10 MG/DL — SIGNIFICANT CHANGE UP (ref 7–23)
CALCIUM SERPL-MCNC: 9.1 MG/DL — SIGNIFICANT CHANGE UP (ref 8.4–10.5)
CHLORIDE SERPL-SCNC: 101 MMOL/L — SIGNIFICANT CHANGE UP (ref 96–108)
CO2 SERPL-SCNC: 25 MMOL/L — SIGNIFICANT CHANGE UP (ref 22–31)
CREAT SERPL-MCNC: 0.8 MG/DL — SIGNIFICANT CHANGE UP (ref 0.5–1.3)
EGFR: 106 ML/MIN/1.73M2 — SIGNIFICANT CHANGE UP
EOSINOPHIL # BLD AUTO: 0.1 K/UL — SIGNIFICANT CHANGE UP (ref 0–0.5)
EOSINOPHIL NFR BLD AUTO: 2.5 % — SIGNIFICANT CHANGE UP (ref 0–6)
GLUCOSE SERPL-MCNC: 112 MG/DL — HIGH (ref 70–99)
HCT VFR BLD CALC: 36.9 % — LOW (ref 39–50)
HGB BLD-MCNC: 12.4 G/DL — LOW (ref 13–17)
IMM GRANULOCYTES NFR BLD AUTO: 0.3 % — SIGNIFICANT CHANGE UP (ref 0–0.9)
LYMPHOCYTES # BLD AUTO: 1.31 K/UL — SIGNIFICANT CHANGE UP (ref 1–3.3)
LYMPHOCYTES # BLD AUTO: 33 % — SIGNIFICANT CHANGE UP (ref 13–44)
MCHC RBC-ENTMCNC: 25 PG — LOW (ref 27–34)
MCHC RBC-ENTMCNC: 33.6 G/DL — SIGNIFICANT CHANGE UP (ref 32–36)
MCV RBC AUTO: 74.4 FL — LOW (ref 80–100)
MONOCYTES # BLD AUTO: 0.72 K/UL — SIGNIFICANT CHANGE UP (ref 0–0.9)
MONOCYTES NFR BLD AUTO: 18.1 % — HIGH (ref 2–14)
NEUTROPHILS # BLD AUTO: 1.81 K/UL — SIGNIFICANT CHANGE UP (ref 1.8–7.4)
NEUTROPHILS NFR BLD AUTO: 45.6 % — SIGNIFICANT CHANGE UP (ref 43–77)
NRBC # BLD: 0 /100 WBCS — SIGNIFICANT CHANGE UP (ref 0–0)
PLATELET # BLD AUTO: 282 K/UL — SIGNIFICANT CHANGE UP (ref 150–400)
POTASSIUM SERPL-MCNC: 4.1 MMOL/L — SIGNIFICANT CHANGE UP (ref 3.5–5.3)
POTASSIUM SERPL-SCNC: 4.1 MMOL/L — SIGNIFICANT CHANGE UP (ref 3.5–5.3)
PROT SERPL-MCNC: 7.5 G/DL — SIGNIFICANT CHANGE UP (ref 6–8.3)
RBC # BLD: 4.96 M/UL — SIGNIFICANT CHANGE UP (ref 4.2–5.8)
RBC # FLD: 23.6 % — HIGH (ref 10.3–14.5)
SODIUM SERPL-SCNC: 137 MMOL/L — SIGNIFICANT CHANGE UP (ref 135–145)
WBC # BLD: 3.97 K/UL — SIGNIFICANT CHANGE UP (ref 3.8–10.5)
WBC # FLD AUTO: 3.97 K/UL — SIGNIFICANT CHANGE UP (ref 3.8–10.5)

## 2024-04-08 PROCEDURE — G2211 COMPLEX E/M VISIT ADD ON: CPT

## 2024-04-08 PROCEDURE — 99214 OFFICE O/P EST MOD 30 MIN: CPT

## 2024-04-08 RX ORDER — LIDOCAINE AND PRILOCAINE 25; 25 MG/G; MG/G
2.5-2.5 CREAM TOPICAL
Qty: 1 | Refills: 2 | Status: DISCONTINUED | COMMUNITY
Start: 2023-03-29 | End: 2024-04-08

## 2024-04-09 PROBLEM — Z00.6 CLINICAL TRIAL PARTICIPANT: Status: ACTIVE | Noted: 2022-08-08

## 2024-04-09 LAB
CEA SERPL-MCNC: 506 NG/ML — HIGH (ref 0–3.8)
CREAT ?TM UR-MCNC: 176 MG/DL — SIGNIFICANT CHANGE UP
PROT ?TM UR-MCNC: 60 MG/DL — HIGH (ref 0–12)
PROT/CREAT UR-RTO: 0.3 RATIO — HIGH (ref 0–0.2)

## 2024-04-09 NOTE — PHYSICAL EXAM
[Fully active, able to carry on all pre-disease performance without restriction] : Status 0 - Fully active, able to carry on all pre-disease performance without restriction [Obese] : obese [Normal] : affect appropriate [Ulcers] : no ulcers [Mucositis] : no mucositis [Thrush] : no thrush [Vesicles] : no vesicles [de-identified] : anicteric  [de-identified] : no JVD [de-identified] : normal respiratory effort, no audible wheeze [de-identified] : reg rate  [de-identified] : no LE edema B/L [de-identified] :  non-distended,

## 2024-04-09 NOTE — REVIEW OF SYSTEMS
[Cough] : cough [Diarrhea: Grade 0] : Diarrhea: Grade 0 [Negative] : Psychiatric [Fatigue] : no fatigue [Shortness Of Breath] : no shortness of breath [Wheezing] : no wheezing [SOB on Exertion] : no shortness of breath during exertion [Abdominal Pain] : no abdominal pain [Vomiting] : no vomiting [Constipation] : no constipation [Confused] : no confusion [Dizziness] : no dizziness [Fainting] : no fainting [Difficulty Walking] : no difficulty walking [de-identified] : Gr1 neuropathy

## 2024-04-09 NOTE — HISTORY OF PRESENT ILLNESS
[Disease: _____________________] : Disease: [unfilled] [T: ___] : T[unfilled] [N: ___] : N[unfilled] [M: ___] : M[unfilled] [AJCC Stage: ____] : AJCC Stage: [unfilled] [Research Protocol] : Research Protocol  [de-identified] : Mr Granda in 2021 at the age of 51 presented to the hospital with abdominal pain and underwent imaging that revealed a distal transverse colon lesion with evidence of microperforation. He is also underwent a colonoscopy which demonstrated an endoscopically obstructing distal transverse colon mass. Given the potential for impending obstruction as well as microperforation seen on imaging decision was made to take patient to the OR for a left hemicolectomy and anastomosis. Imaging done at that time also showed evidence of metastatic disease to the liver and potentially lung and a questionable lesion as well. Patient presents with his sister for initial visit and to discuss palliative chemotherapy.  Enrolled in [employer] Study   Started FOLFOX + Vit D July 7th, 2021 (bevacizumab added cycle #2) 8/23 scan shows PE started on Lovenox  October 2021 oxaliplatin held.   KRAS G12D , NABILA  FOLFOX + Bevacizumab +VitD ([employer] study) --> 5FU Yolis + Vit D --> POD 12/2022 ---> off trial  FOLFIRI/Yolis started 12/20/22 2/2024 = POD ---> Lonsurf/Yolis started 2/12/24 [de-identified] : Microsatellite stable  [de-identified] : KRAS G12D , NABILA  [FreeTextEntry1] : Lonsurf/Yolis [de-identified] : here for f/u  tolerating treatment  no acute complaints

## 2024-04-16 ENCOUNTER — RESULT REVIEW (OUTPATIENT)
Age: 54
End: 2024-04-16

## 2024-04-22 ENCOUNTER — RESULT REVIEW (OUTPATIENT)
Age: 54
End: 2024-04-22

## 2024-04-22 ENCOUNTER — APPOINTMENT (OUTPATIENT)
Dept: HEMATOLOGY ONCOLOGY | Facility: CLINIC | Age: 54
End: 2024-04-22

## 2024-04-22 ENCOUNTER — APPOINTMENT (OUTPATIENT)
Dept: INFUSION THERAPY | Facility: HOSPITAL | Age: 54
End: 2024-04-22

## 2024-04-22 LAB
ALBUMIN SERPL ELPH-MCNC: 3.6 G/DL — SIGNIFICANT CHANGE UP (ref 3.3–5)
ALP SERPL-CCNC: 134 U/L — HIGH (ref 40–120)
ALT FLD-CCNC: 10 U/L — SIGNIFICANT CHANGE UP (ref 10–45)
ANION GAP SERPL CALC-SCNC: 13 MMOL/L — SIGNIFICANT CHANGE UP (ref 5–17)
AST SERPL-CCNC: 35 U/L — SIGNIFICANT CHANGE UP (ref 10–40)
BILIRUB SERPL-MCNC: 0.8 MG/DL — SIGNIFICANT CHANGE UP (ref 0.2–1.2)
BUN SERPL-MCNC: 10 MG/DL — SIGNIFICANT CHANGE UP (ref 7–23)
CALCIUM SERPL-MCNC: 9.2 MG/DL — SIGNIFICANT CHANGE UP (ref 8.4–10.5)
CHLORIDE SERPL-SCNC: 99 MMOL/L — SIGNIFICANT CHANGE UP (ref 96–108)
CO2 SERPL-SCNC: 25 MMOL/L — SIGNIFICANT CHANGE UP (ref 22–31)
CREAT SERPL-MCNC: 0.82 MG/DL — SIGNIFICANT CHANGE UP (ref 0.5–1.3)
EGFR: 104 ML/MIN/1.73M2 — SIGNIFICANT CHANGE UP
GLUCOSE SERPL-MCNC: 158 MG/DL — HIGH (ref 70–99)
POTASSIUM SERPL-MCNC: 4.1 MMOL/L — SIGNIFICANT CHANGE UP (ref 3.5–5.3)
POTASSIUM SERPL-SCNC: 4.1 MMOL/L — SIGNIFICANT CHANGE UP (ref 3.5–5.3)
PROT SERPL-MCNC: 7.5 G/DL — SIGNIFICANT CHANGE UP (ref 6–8.3)
SODIUM SERPL-SCNC: 136 MMOL/L — SIGNIFICANT CHANGE UP (ref 135–145)

## 2024-04-23 LAB — CEA SERPL-MCNC: 546 NG/ML — HIGH (ref 0–3.8)

## 2024-04-27 ENCOUNTER — OUTPATIENT (OUTPATIENT)
Dept: OUTPATIENT SERVICES | Facility: HOSPITAL | Age: 54
LOS: 1 days | End: 2024-04-27
Payer: COMMERCIAL

## 2024-04-27 ENCOUNTER — APPOINTMENT (OUTPATIENT)
Dept: CT IMAGING | Facility: IMAGING CENTER | Age: 54
End: 2024-04-27
Payer: COMMERCIAL

## 2024-04-27 DIAGNOSIS — Z00.8 ENCOUNTER FOR OTHER GENERAL EXAMINATION: ICD-10-CM

## 2024-04-27 DIAGNOSIS — C18.9 MALIGNANT NEOPLASM OF COLON, UNSPECIFIED: ICD-10-CM

## 2024-04-27 PROCEDURE — 71260 CT THORAX DX C+: CPT

## 2024-04-27 PROCEDURE — 74177 CT ABD & PELVIS W/CONTRAST: CPT | Mod: 26

## 2024-04-27 PROCEDURE — 71260 CT THORAX DX C+: CPT | Mod: 26

## 2024-04-27 PROCEDURE — 74177 CT ABD & PELVIS W/CONTRAST: CPT

## 2024-05-03 ENCOUNTER — APPOINTMENT (OUTPATIENT)
Dept: HEMATOLOGY ONCOLOGY | Facility: CLINIC | Age: 54
End: 2024-05-03

## 2024-05-03 RX ORDER — TRIFLURIDINE AND TIPIRACIL 15; 6.14 MG/1; MG/1
15-6.14 TABLET, FILM COATED ORAL
Qty: 100 | Refills: 0 | Status: DISCONTINUED | COMMUNITY
Start: 2024-01-31 | End: 2024-05-03

## 2024-05-05 ENCOUNTER — NON-APPOINTMENT (OUTPATIENT)
Age: 54
End: 2024-05-05

## 2024-05-06 ENCOUNTER — APPOINTMENT (OUTPATIENT)
Dept: INFUSION THERAPY | Facility: HOSPITAL | Age: 54
End: 2024-05-06

## 2024-05-06 ENCOUNTER — APPOINTMENT (OUTPATIENT)
Dept: HEMATOLOGY ONCOLOGY | Facility: CLINIC | Age: 54
End: 2024-05-06
Payer: COMMERCIAL

## 2024-05-06 ENCOUNTER — RESULT REVIEW (OUTPATIENT)
Age: 54
End: 2024-05-06

## 2024-05-06 VITALS
WEIGHT: 212.95 LBS | TEMPERATURE: 98 F | BODY MASS INDEX: 32.65 KG/M2 | SYSTOLIC BLOOD PRESSURE: 148 MMHG | DIASTOLIC BLOOD PRESSURE: 81 MMHG | RESPIRATION RATE: 16 BRPM | HEART RATE: 94 BPM | OXYGEN SATURATION: 97 % | HEIGHT: 67.8 IN

## 2024-05-06 DIAGNOSIS — E11.9 TYPE 2 DIABETES MELLITUS W/OUT COMPLICATIONS: ICD-10-CM

## 2024-05-06 LAB
BASOPHILS # BLD AUTO: 0.03 K/UL — SIGNIFICANT CHANGE UP (ref 0–0.2)
BASOPHILS NFR BLD AUTO: 0.6 % — SIGNIFICANT CHANGE UP (ref 0–2)
EOSINOPHIL # BLD AUTO: 0.25 K/UL — SIGNIFICANT CHANGE UP (ref 0–0.5)
EOSINOPHIL NFR BLD AUTO: 5.3 % — SIGNIFICANT CHANGE UP (ref 0–6)
HCT VFR BLD CALC: 37 % — LOW (ref 39–50)
HGB BLD-MCNC: 12.5 G/DL — LOW (ref 13–17)
IMM GRANULOCYTES NFR BLD AUTO: 0 % — SIGNIFICANT CHANGE UP (ref 0–0.9)
LYMPHOCYTES # BLD AUTO: 1.39 K/UL — SIGNIFICANT CHANGE UP (ref 1–3.3)
LYMPHOCYTES # BLD AUTO: 29.6 % — SIGNIFICANT CHANGE UP (ref 13–44)
MCHC RBC-ENTMCNC: 26.6 PG — LOW (ref 27–34)
MCHC RBC-ENTMCNC: 33.8 G/DL — SIGNIFICANT CHANGE UP (ref 32–36)
MCV RBC AUTO: 78.7 FL — LOW (ref 80–100)
MONOCYTES # BLD AUTO: 0.67 K/UL — SIGNIFICANT CHANGE UP (ref 0–0.9)
MONOCYTES NFR BLD AUTO: 14.3 % — HIGH (ref 2–14)
NEUTROPHILS # BLD AUTO: 2.36 K/UL — SIGNIFICANT CHANGE UP (ref 1.8–7.4)
NEUTROPHILS NFR BLD AUTO: 50.2 % — SIGNIFICANT CHANGE UP (ref 43–77)
NRBC # BLD: 0 /100 WBCS — SIGNIFICANT CHANGE UP (ref 0–0)
PLATELET # BLD AUTO: 257 K/UL — SIGNIFICANT CHANGE UP (ref 150–400)
RBC # BLD: 4.7 M/UL — SIGNIFICANT CHANGE UP (ref 4.2–5.8)
RBC # FLD: 23.2 % — HIGH (ref 10.3–14.5)
WBC # BLD: 4.7 K/UL — SIGNIFICANT CHANGE UP (ref 3.8–10.5)
WBC # FLD AUTO: 4.7 K/UL — SIGNIFICANT CHANGE UP (ref 3.8–10.5)

## 2024-05-06 PROCEDURE — 99215 OFFICE O/P EST HI 40 MIN: CPT

## 2024-05-06 PROCEDURE — G2211 COMPLEX E/M VISIT ADD ON: CPT | Mod: NC,1L

## 2024-05-06 NOTE — PHYSICAL EXAM
[Fully active, able to carry on all pre-disease performance without restriction] : Status 0 - Fully active, able to carry on all pre-disease performance without restriction [Normal] : affect appropriate [de-identified] : no LE edema B/L [Ulcers] : no ulcers [Mucositis] : no mucositis [Thrush] : no thrush [Vesicles] : no vesicles [de-identified] : anicteric  [de-identified] : no JVD [de-identified] : normal respiratory effort, no audible wheeze [de-identified] : reg rate  [de-identified] :  non-distended,

## 2024-05-06 NOTE — HISTORY OF PRESENT ILLNESS
[Disease: _____________________] : Disease: [unfilled] [T: ___] : T[unfilled] [N: ___] : N[unfilled] [M: ___] : M[unfilled] [AJCC Stage: ____] : AJCC Stage: [unfilled] [Research Protocol] : Research Protocol  [de-identified] : Mr Granda in 2021 at the age of 51 presented to the hospital with abdominal pain and underwent imaging that revealed a distal transverse colon lesion with evidence of microperforation. He is also underwent a colonoscopy which demonstrated an endoscopically obstructing distal transverse colon mass. Given the potential for impending obstruction as well as microperforation seen on imaging decision was made to take patient to the OR for a left hemicolectomy and anastomosis. Imaging done at that time also showed evidence of metastatic disease to the liver and potentially lung and a questionable lesion as well. Patient presents with his sister for initial visit and to discuss palliative chemotherapy.  Enrolled in [employer] Study   Started FOLFOX + Vit D July 7th, 2021 (bevacizumab added cycle #2) 8/23 scan shows PE started on Lovenox  October 2021 oxaliplatin held.   KRAS G12D , NABILA  FOLFOX + Bevacizumab +VitD ([employer] study) --> 5FU Yolis + Vit D --> POD 12/2022 ---> off trial  FOLFIRI/Yolis started 12/20/22 2/2024 = POD ---> Lonsurf/Yolis started 2/12/24 [de-identified] : Microsatellite stable  [de-identified] : KRAS G12D , NABILA  [FreeTextEntry1] : Lonsurf/Yolis [de-identified] : POD on imaging cough persists no fever or chills  no abd pain

## 2024-05-06 NOTE — REVIEW OF SYSTEMS
[Cough] : cough [Diarrhea: Grade 0] : Diarrhea: Grade 0 [Negative] : Psychiatric [Fatigue] : no fatigue [Shortness Of Breath] : no shortness of breath [Wheezing] : no wheezing [SOB on Exertion] : no shortness of breath during exertion [Abdominal Pain] : no abdominal pain [Vomiting] : no vomiting [Constipation] : no constipation [Confused] : no confusion [Dizziness] : no dizziness [Fainting] : no fainting [Difficulty Walking] : no difficulty walking [de-identified] : Gr1 neuropathy

## 2024-05-07 ENCOUNTER — APPOINTMENT (OUTPATIENT)
Dept: ENDOCRINOLOGY | Facility: CLINIC | Age: 54
End: 2024-05-07

## 2024-05-07 LAB
ALBUMIN SERPL ELPH-MCNC: 3.6 G/DL
ALP BLD-CCNC: 184 U/L
ALT SERPL-CCNC: 18 U/L
ANION GAP SERPL CALC-SCNC: 14 MMOL/L
AST SERPL-CCNC: 19 U/L
BILIRUB SERPL-MCNC: 0.8 MG/DL
BUN SERPL-MCNC: 11 MG/DL
CALCIUM SERPL-MCNC: 9.2 MG/DL
CEA SERPL-MCNC: 569 NG/ML
CHLORIDE SERPL-SCNC: 101 MMOL/L
CO2 SERPL-SCNC: 26 MMOL/L
CREAT SERPL-MCNC: 1.02 MG/DL
EGFR: 87 ML/MIN/1.73M2
GLUCOSE SERPL-MCNC: 153 MG/DL
POTASSIUM SERPL-SCNC: 4.6 MMOL/L
PROT SERPL-MCNC: 7.4 G/DL
SODIUM SERPL-SCNC: 140 MMOL/L

## 2024-05-20 ENCOUNTER — APPOINTMENT (OUTPATIENT)
Dept: HEMATOLOGY ONCOLOGY | Facility: CLINIC | Age: 54
End: 2024-05-20

## 2024-05-20 ENCOUNTER — OUTPATIENT (OUTPATIENT)
Dept: OUTPATIENT SERVICES | Facility: HOSPITAL | Age: 54
LOS: 1 days | Discharge: HOME | End: 2024-05-20

## 2024-05-20 ENCOUNTER — APPOINTMENT (OUTPATIENT)
Dept: GERIATRICS | Facility: CLINIC | Age: 54
End: 2024-05-20

## 2024-05-20 ENCOUNTER — APPOINTMENT (OUTPATIENT)
Dept: INFUSION THERAPY | Facility: HOSPITAL | Age: 54
End: 2024-05-20

## 2024-05-20 DIAGNOSIS — C18.9 MALIGNANT NEOPLASM OF COLON, UNSPECIFIED: ICD-10-CM

## 2024-05-21 ENCOUNTER — EMERGENCY (EMERGENCY)
Facility: HOSPITAL | Age: 54
LOS: 1 days | Discharge: HOME | End: 2024-05-21
Attending: EMERGENCY MEDICINE | Admitting: EMERGENCY MEDICINE
Payer: COMMERCIAL

## 2024-05-21 VITALS
OXYGEN SATURATION: 99 % | HEART RATE: 99 BPM | SYSTOLIC BLOOD PRESSURE: 145 MMHG | HEIGHT: 67 IN | DIASTOLIC BLOOD PRESSURE: 93 MMHG | WEIGHT: 224.87 LBS | RESPIRATION RATE: 18 BRPM | TEMPERATURE: 99 F

## 2024-05-21 VITALS
DIASTOLIC BLOOD PRESSURE: 98 MMHG | RESPIRATION RATE: 18 BRPM | TEMPERATURE: 98 F | HEART RATE: 86 BPM | OXYGEN SATURATION: 96 % | SYSTOLIC BLOOD PRESSURE: 160 MMHG

## 2024-05-21 LAB
GLUCOSE BLDC GLUCOMTR-MCNC: 118 MG/DL — HIGH (ref 70–99)
S PYO DNA THROAT QL NAA+PROBE: SIGNIFICANT CHANGE UP

## 2024-05-21 PROCEDURE — 99284 EMERGENCY DEPT VISIT MOD MDM: CPT

## 2024-05-21 PROCEDURE — 72100 X-RAY EXAM L-S SPINE 2/3 VWS: CPT | Mod: 26

## 2024-05-21 PROCEDURE — 72100 X-RAY EXAM L-S SPINE 2/3 VWS: CPT

## 2024-05-21 PROCEDURE — 87651 STREP A DNA AMP PROBE: CPT

## 2024-05-21 PROCEDURE — 87798 DETECT AGENT NOS DNA AMP: CPT

## 2024-05-21 PROCEDURE — 73502 X-RAY EXAM HIP UNI 2-3 VIEWS: CPT

## 2024-05-21 PROCEDURE — 73502 X-RAY EXAM HIP UNI 2-3 VIEWS: CPT | Mod: 26,RT

## 2024-05-21 PROCEDURE — 82962 GLUCOSE BLOOD TEST: CPT

## 2024-05-21 RX ORDER — TRAMADOL HYDROCHLORIDE 50 MG/1
1 TABLET ORAL
Qty: 6 | Refills: 0
Start: 2024-05-21 | End: 2024-05-22

## 2024-05-21 RX ORDER — LIDOCAINE 4 G/100G
1 CREAM TOPICAL ONCE
Refills: 0 | Status: COMPLETED | OUTPATIENT
Start: 2024-05-21 | End: 2024-05-21

## 2024-05-21 RX ADMIN — LIDOCAINE 1 PATCH: 4 CREAM TOPICAL at 18:18

## 2024-05-21 NOTE — ED PROVIDER NOTE - NSFOLLOWUPINSTRUCTIONS_ED_ALL_ED_FT
Follow-up with your PCP and oncologist for reevaluation, ongoing care and treatment.  Rest, weightbearing as tolerated.  Avoid heavy lifting or other strenuous activities until symptoms resolve.  If having worsening of symptoms, bowel/bladder incontinence, fever, weakness, numbness or other related symptoms, return to the ER immediately.

## 2024-05-21 NOTE — ED PROVIDER NOTE - ENMT, MLM
Airway patent. Mouth with normal mucosa. Throat has no vesicles, no oropharyngeal exudates and uvula is midline. No drooling or stridor noted. swallowing secretions without difficulty.

## 2024-05-21 NOTE — ED ADULT TRIAGE NOTE - CHIEF COMPLAINT QUOTE
" I have pain on my right lower back going down to my right leg - on and off for 2 weeks now " Pt denies recent hx of fall or trauma PHM Liver Ca " I have pain on my right hip area  going down to my right leg - on and off for 2 weeks now " Pt denies recent hx of fall or trauma PHM Liver Ca

## 2024-05-21 NOTE — ED PROVIDER NOTE - CARE PROVIDER_API CALL
YOUR PCP,   Phone: (   )    -  Fax: (   )    -  Follow Up Time: 1-3 Days    YOUR ONCOLOGIST,   Phone: (   )    -  Fax: (   )    -  Follow Up Time: 1-3 Days    Jonathan Cochran  Orthopaedic Surgery  [address] [facility], [facility] [address]  [place] [address]  Phone: [phone]  Fax: [phone]  Follow Up Time: 1-3 Days

## 2024-05-21 NOTE — ED ADULT NURSE NOTE - OBJECTIVE STATEMENT
Pt came in ambulatory complains of frequent worsening right lower back pain radiating the right leg x several weeks now.  Pt denies any recent hx of trauma or fall Pt came in ambulatory complains of frequent worsening right hip pain radiating to right leg x several weeks now.  Pt denies any recent hx of trauma or fall

## 2024-05-21 NOTE — ED ADULT NURSE NOTE - CHIEF COMPLAINT QUOTE
" I have pain on my right lower back going down to my right leg - on and off for 2 weeks now " Pt denies recent hx of fall or trauma PHM Liver Ca

## 2024-05-21 NOTE — ED PROVIDER NOTE - CARE PROVIDERS DIRECT ADDRESSES
,DirectAddress_Unknown,DirectAddress_Unknown,[email or web address]

## 2024-05-21 NOTE — ED PROVIDER NOTE - PATIENT PORTAL LINK FT
You can access the FollowMyHealth Patient Portal offered by [facility] by registering at the following website: [email or web address]. By joining [facility]’s FollowMyHealth portal, you will also be able to view your health information using other applications (apps) compatible with our system.

## 2024-05-21 NOTE — ED PROVIDER NOTE - SKIN, MLM
Lot # For Kenalog (Optional): [id number] Include Z78.9 (Other Specified Conditions Influencing Health Status) As An Associated Diagnosis?: No Consent: The risks of atrophy were reviewed with the patient. X Size Of Lesion In Cm (Optional): 0 Concentration Of Kenalog Solution Injected (Mg/Ml): 10.0 Detail Level: Detailed Expiration Date For Kenalog (Optional): DEC 2017 Total Volume (Ccs): 0.3 Administered By (Optional): SHIKHA Medical Necessity Clause: This procedure was medically necessary because the lesions that were treated were: Kenalog Preparation: Kenalog Skin normal color for race, warm, dry and intact. No evidence of rash.

## 2024-05-21 NOTE — ED PROVIDER NOTE - CLINICAL SUMMARY MEDICAL DECISION MAKING FREE TEXT BOX
54-year-old male with history of colon cancer liver mets and lung mets complaining of right buttock pain radiating down right leg for the past few days.  Denies injury or precipitating event.  Has taken Tylenol and Motrin without improvement.  Denies bowel or bladder incontinence or difficulty with gait.  Also states his voice is hoarse and he thinks he has a throat infection.  VSS Afebrile, NAD  HEENT - clear, No pharyngeal exudate or erythema. No cervical adenopathy.  PERRL EOMI  Neck supple  lungs clear  Cor S1S2 RR - MGR  Abd soft nontender, no mass or HSM, no rebound  Ext There is no spinal tenderness or deformity.  Right hip full range of motion pulses and sensation intact.  No bony deformity.  Remainder of exam unremarkable.  Neuro Intact, no deficits. Gait is normal.  Skin Warm and dry no rash.  Impression right buttock pain likely sciatica.  Plan x-rays pain meds and Ortho follow-up.  Consider strep PCR.

## 2024-05-21 NOTE — ED PROVIDER NOTE - PROVIDER TOKENS
FREE:[LAST:[YOUR PCP],PHONE:[(   )    -],FAX:[(   )    -],FOLLOWUP:[1-3 Days]],FREE:[LAST:[YOUR ONCOLOGIST],PHONE:[(   )    -],FAX:[(   )    -],FOLLOWUP:[1-3 Days]],PROVIDER:[TOKEN:[[id number]],FOLLOWUP:[1-3 Days]]

## 2024-05-21 NOTE — ED PROVIDER NOTE - MUSCULOSKELETAL, MLM
C/T/L spine NT with FROM, no erythema noted, right hip with FROM, no bony deformity noted,  toes warm & mobile, distal pulses and sensation intact, no foot drop, NVI; Spine appears normal, range of motion is not limited, no muscle or joint tenderness

## 2024-05-21 NOTE — ED ADULT NURSE NOTE - NSFALLUNIVINTERV_ED_ALL_ED
Bed/Stretcher in lowest position, wheels locked, appropriate side rails in place/Call bell, personal items and telephone in reach/Instruct patient to call for assistance before getting out of bed/chair/stretcher/Non-slip footwear applied when patient is off stretcher/[place] to call system/Physically safe environment - no spills, clutter or unnecessary equipment/Purposeful proactive rounding/Room/bathroom lighting operational, light cord in reach

## 2024-05-21 NOTE — ED ADULT NURSE NOTE - NS PRO PASSIVE SMOKE EXP
Encounter addended by: Joaquin Skelton RN on: 4/18/2024 4:27 PM   Actions taken: Clinical Note Signed
No

## 2024-05-21 NOTE — ED PROVIDER NOTE - PROGRESS NOTE DETAILS
Reevaluated patient at bedside.  Patient feeling much improved.  Discussed the results of all diagnostic testing in ED and copies of all reports given. Advised to f/u with pcp/oncologist/ortho, will rx few tabs of tramadol. strict return precautions given. An opportunity to ask questions was given.  Discussed the importance of prompt, close medical follow-up.  Patient will return with any changes, concerns or persistent / worsening symptoms.  Understanding of all instructions verbalized.

## 2024-05-21 NOTE — ED PROVIDER NOTE - OBJECTIVE STATEMENT
54-year-old male with history of colon cancer with mets to liver and lungs on oral chemo, on Xarelto, hypertension, hyperlipidemia, diabetes presents with complaint of right buttock pain radiating down right leg x 2 weeks.  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  States that he has been taking Tylenol and Motrin for pain with temporary relief.  Denies numbness, vomiting, weakness, bowel/bladder incontinence, trauma/fall, abdominal pain, urinary symptoms, fever, chills or other symptoms. 54-year-old male with history of colon cancer with mets to liver and lungs on oral chemo, on Xarelto, hypertension, hyperlipidemia, diabetes presents with complaint of right buttock pain radiating down right leg x 2 weeks.  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  States that he has been taking Tylenol and Motrin for pain with temporary relief.  Denies numbness, vomiting, weakness, bowel/bladder incontinence, trauma/fall, abdominal pain, urinary symptoms, fever, chills or other symptoms.  Pt also added later that his voice is hoarse and he thinks he has a throat infection, denies difficulty swallowing or breathing, fever, drooling.

## 2024-05-24 ENCOUNTER — APPOINTMENT (OUTPATIENT)
Dept: HEMATOLOGY ONCOLOGY | Facility: CLINIC | Age: 54
End: 2024-05-24
Payer: COMMERCIAL

## 2024-05-24 ENCOUNTER — RESULT REVIEW (OUTPATIENT)
Age: 54
End: 2024-05-24

## 2024-05-24 VITALS
TEMPERATURE: 98.5 F | HEART RATE: 119 BPM | SYSTOLIC BLOOD PRESSURE: 133 MMHG | DIASTOLIC BLOOD PRESSURE: 91 MMHG | BODY MASS INDEX: 31.26 KG/M2 | WEIGHT: 204.35 LBS | OXYGEN SATURATION: 97 % | RESPIRATION RATE: 16 BRPM

## 2024-05-24 LAB
BASOPHILS # BLD AUTO: 0.03 K/UL — SIGNIFICANT CHANGE UP (ref 0–0.2)
BASOPHILS NFR BLD AUTO: 0.4 % — SIGNIFICANT CHANGE UP (ref 0–2)
EOSINOPHIL # BLD AUTO: 0.19 K/UL — SIGNIFICANT CHANGE UP (ref 0–0.5)
EOSINOPHIL NFR BLD AUTO: 2.4 % — SIGNIFICANT CHANGE UP (ref 0–6)
HCT VFR BLD CALC: 42.5 % — SIGNIFICANT CHANGE UP (ref 39–50)
HGB BLD-MCNC: 14.4 G/DL — SIGNIFICANT CHANGE UP (ref 13–17)
IMM GRANULOCYTES NFR BLD AUTO: 0.4 % — SIGNIFICANT CHANGE UP (ref 0–0.9)
LYMPHOCYTES # BLD AUTO: 1.32 K/UL — SIGNIFICANT CHANGE UP (ref 1–3.3)
LYMPHOCYTES # BLD AUTO: 17 % — SIGNIFICANT CHANGE UP (ref 13–44)
MCHC RBC-ENTMCNC: 26.1 PG — LOW (ref 27–34)
MCHC RBC-ENTMCNC: 33.9 G/DL — SIGNIFICANT CHANGE UP (ref 32–36)
MCV RBC AUTO: 77 FL — LOW (ref 80–100)
MONOCYTES # BLD AUTO: 1.12 K/UL — HIGH (ref 0–0.9)
MONOCYTES NFR BLD AUTO: 14.4 % — HIGH (ref 2–14)
NEUTROPHILS # BLD AUTO: 5.07 K/UL — SIGNIFICANT CHANGE UP (ref 1.8–7.4)
NEUTROPHILS NFR BLD AUTO: 65.4 % — SIGNIFICANT CHANGE UP (ref 43–77)
NRBC # BLD: 0 /100 WBCS — SIGNIFICANT CHANGE UP (ref 0–0)
PLATELET # BLD AUTO: 339 K/UL — SIGNIFICANT CHANGE UP (ref 150–400)
RBC # BLD: 5.52 M/UL — SIGNIFICANT CHANGE UP (ref 4.2–5.8)
RBC # FLD: 21 % — HIGH (ref 10.3–14.5)
WBC # BLD: 7.76 K/UL — SIGNIFICANT CHANGE UP (ref 3.8–10.5)
WBC # FLD AUTO: 7.76 K/UL — SIGNIFICANT CHANGE UP (ref 3.8–10.5)

## 2024-05-24 PROCEDURE — 99214 OFFICE O/P EST MOD 30 MIN: CPT

## 2024-05-24 RX ORDER — DICLOFENAC SODIUM 1% 10 MG/G
1 GEL TOPICAL
Qty: 1 | Refills: 0 | Status: ACTIVE | COMMUNITY
Start: 2024-05-24 | End: 1900-01-01

## 2024-05-24 RX ORDER — CYCLOBENZAPRINE HYDROCHLORIDE 5 MG/1
5 TABLET, FILM COATED ORAL
Qty: 40 | Refills: 0 | Status: ACTIVE | COMMUNITY
Start: 2024-05-24 | End: 1900-01-01

## 2024-05-24 NOTE — REVIEW OF SYSTEMS
[Cough] : cough [Diarrhea: Grade 0] : Diarrhea: Grade 0 [Negative] : Psychiatric [Recent Change In Weight] : ~T recent weight change [Fatigue] : no fatigue [Shortness Of Breath] : no shortness of breath [Wheezing] : no wheezing [SOB on Exertion] : no shortness of breath during exertion [Abdominal Pain] : no abdominal pain [Vomiting] : no vomiting [Constipation] : no constipation [Confused] : no confusion [Dizziness] : no dizziness [Fainting] : no fainting [Difficulty Walking] : no difficulty walking [FreeTextEntry2] : 8 lb weight loss  [FreeTextEntry9] : right hip pain radiating down right leg [de-identified] : Gr1 neuropathy to hands and feet

## 2024-05-24 NOTE — RESULTS/DATA
[FreeTextEntry1] : ACC: [id number] EXAM: XR HIP WITH PELV 2-3V RT ORDERED BY: YOON HARMON  PROCEDURE DATE: 05/21/2024    INTERPRETATION: Clinical history: 54-year-old male, right hip pain.  Frontal view of the pelvis and 2 views of the right hip are correlated with the abdominal CT of 4/27/2024.  FINDINGS: Mild degenerative change with no fracture, dislocation or radiographic soft tissue abnormality.  IMPRESSION: Mild OA, unchanged  --- End of Report ---  ACC: [id number] EXAM: XR LS SPINE AP LAT 2-3 VIEWS ORDERED BY: YOON HARMON  PROCEDURE DATE: 05/21/2024    INTERPRETATION: XR LUMBAR SPINE AP AND LATERAL 2 OR 3 VIEWS  Clinical Data: A  AP, lateral and coned-down lateral views  Findings:  There is no fracture, subluxation or paravertebral soft tissue swelling. The pedicles and sacroiliac joints are intact. There is straightening of lumbar lordosis may reflect muscle spasm. Mild degenerative changes of anterior osteophytes Mild disc space narrowing L5-S1. The visualized sacrum is intact.  Moderate stool burden. Surgical chain sutures left mid abdomen. No obstruction.   IMPRESSION:  No fracture or subluxation. There is straightening of lumbar lordosis may reflect muscle spasm. Mild degenerative changes of anterior osteophytes Mild disc space narrowing L5-S1.  --- End of Report ---

## 2024-05-24 NOTE — HISTORY OF PRESENT ILLNESS
[Disease: _____________________] : Disease: [unfilled] [T: ___] : T[unfilled] [N: ___] : N[unfilled] [M: ___] : M[unfilled] [AJCC Stage: ____] : AJCC Stage: [unfilled] [Research Protocol] : Research Protocol  [de-identified] : Mr Granda in 2021 at the age of 51 presented to the hospital with abdominal pain and underwent imaging that revealed a distal transverse colon lesion with evidence of microperforation. He is also underwent a colonoscopy which demonstrated an endoscopically obstructing distal transverse colon mass. Given the potential for impending obstruction as well as microperforation seen on imaging decision was made to take patient to the OR for a left hemicolectomy and anastomosis. Imaging done at that time also showed evidence of metastatic disease to the liver and potentially lung and a questionable lesion as well. Patient presents with his sister for initial visit and to discuss palliative chemotherapy.  Enrolled in [employer] Study   Started FOLFOX + Vit D July 7th, 2021 (bevacizumab added cycle #2) 8/23 scan shows PE started on Lovenox  October 2021 oxaliplatin held.   KRAS G12D , NABILA  FOLFOX + Bevacizumab +VitD ([employer] study) --> 5FU Yolis + Vit D --> POD 12/2022 ---> off trial  FOLFIRI/Yolis started 12/20/22 2/2024 = POD ---> Lonsurf/Yolis started 2/12/24 5/2024 POD ---> Stivarga [de-identified] : Microsatellite stable  [de-identified] : KRAS G12D , NABILA  [FreeTextEntry1] : Stivarga D1-D21 every 28 days started 5/13/24 [de-identified] : Patient presents for follow up today to reassess after starting Stivarga on 5/13/24.  He reports constipation that he is managing with increased water intake and decreased appetite.  He denies fatigue, mucositis, CP, SOB, abdominal pain, N/V, diarrhea or swelling.  He also reports new right hip pain radiating down his posterior thigh x 5 days.  Patient states that he stood up and had sudden onset pain at work that progressively became worse.  He denies back pain, leg weakness or numbness.  He was seen at [facility] in the ED and underwent x-rays of the low back and hip that were unremarkable.  He was discharged from the ED with a diagnosis of sciatica for which he was given Tramadol without relief.  He denies taking any other medication for pain.

## 2024-05-24 NOTE — PHYSICAL EXAM
[Fully active, able to carry on all pre-disease performance without restriction] : Status 0 - Fully active, able to carry on all pre-disease performance without restriction [Normal] : normal appearance, no rash, nodules, vesicles, ulcers, erythema [Ulcers] : no ulcers [Mucositis] : no mucositis [Thrush] : no thrush [Vesicles] : no vesicles [de-identified] : anicteric  [de-identified] : no JVD [de-identified] : normal respiratory effort, no audible wheeze [de-identified] : mild tachycardia on my exam (100's) [de-identified] : no midline or paraspinal tenderness; FROM intact without pain [de-identified] :  non-distended,  [de-identified] : + pain with ROM of right hip and with weight bearing

## 2024-05-28 LAB
ALBUMIN SERPL ELPH-MCNC: 3.5 G/DL
ALP BLD-CCNC: 250 U/L
ALT SERPL-CCNC: 16 U/L
ANION GAP SERPL CALC-SCNC: 16 MMOL/L
AST SERPL-CCNC: 29 U/L
BILIRUB SERPL-MCNC: 0.9 MG/DL
BUN SERPL-MCNC: 8 MG/DL
CALCIUM SERPL-MCNC: 8.8 MG/DL
CEA SERPL-MCNC: 982 NG/ML
CHLORIDE SERPL-SCNC: 99 MMOL/L
CO2 SERPL-SCNC: 22 MMOL/L
CREAT SERPL-MCNC: 0.9 MG/DL
EGFR: 101 ML/MIN/1.73M2
GLUCOSE SERPL-MCNC: 146 MG/DL
POTASSIUM SERPL-SCNC: 4.4 MMOL/L
PROT SERPL-MCNC: 7.7 G/DL
SODIUM SERPL-SCNC: 136 MMOL/L

## 2024-06-03 ENCOUNTER — APPOINTMENT (OUTPATIENT)
Dept: HEMATOLOGY ONCOLOGY | Facility: CLINIC | Age: 54
End: 2024-06-03

## 2024-06-03 ENCOUNTER — APPOINTMENT (OUTPATIENT)
Dept: INFUSION THERAPY | Facility: HOSPITAL | Age: 54
End: 2024-06-03

## 2024-06-05 ENCOUNTER — RESULT REVIEW (OUTPATIENT)
Age: 54
End: 2024-06-05

## 2024-06-05 ENCOUNTER — APPOINTMENT (OUTPATIENT)
Dept: HEMATOLOGY ONCOLOGY | Facility: CLINIC | Age: 54
End: 2024-06-05
Payer: COMMERCIAL

## 2024-06-05 VITALS
DIASTOLIC BLOOD PRESSURE: 93 MMHG | WEIGHT: 205.23 LBS | BODY MASS INDEX: 31.47 KG/M2 | RESPIRATION RATE: 16 BRPM | TEMPERATURE: 97.8 F | HEIGHT: 67.8 IN | HEART RATE: 97 BPM | OXYGEN SATURATION: 97 % | SYSTOLIC BLOOD PRESSURE: 157 MMHG

## 2024-06-05 DIAGNOSIS — Z12.5 ENCOUNTER FOR SCREENING FOR MALIGNANT NEOPLASM OF PROSTATE: ICD-10-CM

## 2024-06-05 DIAGNOSIS — K12.30 ORAL MUCOSITIS (ULCERATIVE), UNSPECIFIED: ICD-10-CM

## 2024-06-05 DIAGNOSIS — I15.9 SECONDARY HYPERTENSION, UNSPECIFIED: ICD-10-CM

## 2024-06-05 LAB
BASOPHILS # BLD AUTO: 0.04 K/UL — SIGNIFICANT CHANGE UP (ref 0–0.2)
BASOPHILS NFR BLD AUTO: 0.5 % — SIGNIFICANT CHANGE UP (ref 0–2)
EOSINOPHIL # BLD AUTO: 0.56 K/UL — HIGH (ref 0–0.5)
EOSINOPHIL NFR BLD AUTO: 6.8 % — HIGH (ref 0–6)
HCT VFR BLD CALC: 41.4 % — SIGNIFICANT CHANGE UP (ref 39–50)
HGB BLD-MCNC: 13.9 G/DL — SIGNIFICANT CHANGE UP (ref 13–17)
IMM GRANULOCYTES NFR BLD AUTO: 0.4 % — SIGNIFICANT CHANGE UP (ref 0–0.9)
LYMPHOCYTES # BLD AUTO: 1.9 K/UL — SIGNIFICANT CHANGE UP (ref 1–3.3)
LYMPHOCYTES # BLD AUTO: 23.1 % — SIGNIFICANT CHANGE UP (ref 13–44)
MCHC RBC-ENTMCNC: 25.9 PG — LOW (ref 27–34)
MCHC RBC-ENTMCNC: 33.6 G/DL — SIGNIFICANT CHANGE UP (ref 32–36)
MCV RBC AUTO: 77.1 FL — LOW (ref 80–100)
MONOCYTES # BLD AUTO: 0.94 K/UL — HIGH (ref 0–0.9)
MONOCYTES NFR BLD AUTO: 11.4 % — SIGNIFICANT CHANGE UP (ref 2–14)
NEUTROPHILS # BLD AUTO: 4.74 K/UL — SIGNIFICANT CHANGE UP (ref 1.8–7.4)
NEUTROPHILS NFR BLD AUTO: 57.8 % — SIGNIFICANT CHANGE UP (ref 43–77)
NRBC # BLD: 0 /100 WBCS — SIGNIFICANT CHANGE UP (ref 0–0)
PLATELET # BLD AUTO: 268 K/UL — SIGNIFICANT CHANGE UP (ref 150–400)
RBC # BLD: 5.37 M/UL — SIGNIFICANT CHANGE UP (ref 4.2–5.8)
RBC # FLD: 20.3 % — HIGH (ref 10.3–14.5)
WBC # BLD: 8.21 K/UL — SIGNIFICANT CHANGE UP (ref 3.8–10.5)
WBC # FLD AUTO: 8.21 K/UL — SIGNIFICANT CHANGE UP (ref 3.8–10.5)

## 2024-06-05 PROCEDURE — 99215 OFFICE O/P EST HI 40 MIN: CPT

## 2024-06-05 PROCEDURE — G2211 COMPLEX E/M VISIT ADD ON: CPT | Mod: NC,1L

## 2024-06-05 RX ORDER — LISINOPRIL 10 MG/1
10 TABLET ORAL DAILY
Qty: 30 | Refills: 0 | Status: ACTIVE | COMMUNITY
Start: 2024-06-05 | End: 1900-01-01

## 2024-06-05 RX ORDER — DIPHENHYDRAMINE HYDROCHLORIDE AND LIDOCAINE HYDROCHLORIDE AND ALUMINUM HYDROXIDE AND MAGNESIUM HYDRO
KIT
Qty: 1 | Refills: 0 | Status: ACTIVE | COMMUNITY
Start: 2024-06-05 | End: 1900-01-01

## 2024-06-06 PROBLEM — I15.9 SECONDARY HYPERTENSION: Status: ACTIVE | Noted: 2021-09-29

## 2024-06-06 PROBLEM — K12.30 MUCOSITIS: Status: ACTIVE | Noted: 2023-09-14

## 2024-06-06 LAB
ALBUMIN SERPL ELPH-MCNC: 3.6 G/DL
ALP BLD-CCNC: 273 U/L
ALT SERPL-CCNC: 33 U/L
ANION GAP SERPL CALC-SCNC: 13 MMOL/L
AST SERPL-CCNC: 43 U/L
BILIRUB SERPL-MCNC: 0.5 MG/DL
BUN SERPL-MCNC: 12 MG/DL
CALCIUM SERPL-MCNC: 9.2 MG/DL
CHLORIDE SERPL-SCNC: 102 MMOL/L
CO2 SERPL-SCNC: 25 MMOL/L
CREAT SERPL-MCNC: 0.78 MG/DL
EGFR: 106 ML/MIN/1.73M2
GLUCOSE SERPL-MCNC: 125 MG/DL
POTASSIUM SERPL-SCNC: 4.5 MMOL/L
PROT SERPL-MCNC: 7.6 G/DL
SODIUM SERPL-SCNC: 140 MMOL/L

## 2024-06-06 NOTE — RESULTS/DATA
[FreeTextEntry1] : ACC: [id number] EXAM: XR HIP WITH PELV 2-3V RT ORDERED BY: YOON HARMON  PROCEDURE DATE: 05/21/2024    INTERPRETATION: Clinical history: 54-year-old male, right hip pain.  Frontal view of the pelvis and 2 views of the right hip are correlated with the abdominal CT of 4/27/2024.  FINDINGS: Mild degenerative change with no fracture, dislocation or radiographic soft tissue abnormality.  IMPRESSION: Mild OA, unchanged  --- End of Report ---  ACC: [id number] EXAM: XR LS SPINE AP LAT 2-3 VIEWS ORDERED BY: YOON HARMON  PROCEDURE DATE: 05/21/2024    INTERPRETATION: XR LUMBAR SPINE AP AND LATERAL 2 OR 3 VIEWS  Clinical Data: A  AP, lateral and coned-down lateral views  Findings:  There is no fracture, subluxation or paravertebral soft tissue swelling. The pedicles and sacroiliac joints are intact. There is straightening of lumbar lordosis may reflect muscle spasm. Mild degenerative changes of anterior osteophytes Mild disc space narrowing L5-S1. The visualized sacrum is intact.  Moderate stool burden. Surgical chain sutures left mid abdomen. No obstruction.   IMPRESSION:  No fracture or subluxation. There is straightening of lumbar lordosis may reflect muscle spasm. Mild degenerative changes of anterior osteophytes Mild disc space narrowing L5-S1.  --- End of Report ---

## 2024-06-06 NOTE — REVIEW OF SYSTEMS
[Recent Change In Weight] : ~T recent weight change [Cough] : cough [Diarrhea: Grade 0] : Diarrhea: Grade 0 [Negative] : Psychiatric [Fatigue] : fatigue [Constipation] : constipation [Shortness Of Breath] : no shortness of breath [Wheezing] : no wheezing [SOB on Exertion] : no shortness of breath during exertion [Abdominal Pain] : no abdominal pain [Vomiting] : no vomiting [Confused] : no confusion [Dizziness] : no dizziness [Fainting] : no fainting [Difficulty Walking] : no difficulty walking [FreeTextEntry2] : 7lb weight loss  [FreeTextEntry4] : + mouth pain [FreeTextEntry9] : right hip pain radiating down right leg [de-identified] : Gr1 neuropathy to hands and feet

## 2024-06-06 NOTE — HISTORY OF PRESENT ILLNESS
[Disease: _____________________] : Disease: [unfilled] [T: ___] : T[unfilled] [N: ___] : N[unfilled] [M: ___] : M[unfilled] [AJCC Stage: ____] : AJCC Stage: [unfilled] [Research Protocol] : Research Protocol  [de-identified] : Mr Granda in 2021 at the age of 51 presented to the hospital with abdominal pain and underwent imaging that revealed a distal transverse colon lesion with evidence of microperforation. He is also underwent a colonoscopy which demonstrated an endoscopically obstructing distal transverse colon mass. Given the potential for impending obstruction as well as microperforation seen on imaging decision was made to take patient to the OR for a left hemicolectomy and anastomosis. Imaging done at that time also showed evidence of metastatic disease to the liver and potentially lung and a questionable lesion as well. Patient presents with his sister for initial visit and to discuss palliative chemotherapy.  Enrolled in [employer] Study   Started FOLFOX + Vit D July 7th, 2021 (bevacizumab added cycle #2) 8/23 scan shows PE started on Lovenox  October 2021 oxaliplatin held.   KRAS G12D , NABILA  FOLFOX + Bevacizumab +VitD ([employer] study) --> 5FU Yolis + Vit D --> POD 12/2022 ---> off trial  FOLFIRI/Yolis started 12/20/22 2/2024 = POD ---> Lonsurf/Yolis started 2/12/24 5/2024 POD ---> Stivarga [de-identified] : Microsatellite stable  [de-identified] : KRAS G12D , NABILA  [FreeTextEntry1] : Stivarga D1-D21 every 28 days, C1 5/13/24- 6/2/24 [de-identified] : 6/5/24: Patient complains of persistent dry cough. He denies shortness of breath.  He has some burning pain in his mouth and requests mouthwash.  He denies nausea, vomiting, diarrhea. He has constipation and strains to have bowel movement. He denies any black or bloody stool. He has decreased appetite, but tries to eat anyway. He has lost 7 lbs since May 2024.  He denies lightheadedness or dizziness. He feels fatigued.  Right sided sciatica is improved from the last visit. Sciatic pain is worse when he lies down. Taking flexeril has helped but not eliminated the pain completely.

## 2024-06-06 NOTE — PHYSICAL EXAM
[Fully active, able to carry on all pre-disease performance without restriction] : Status 0 - Fully active, able to carry on all pre-disease performance without restriction [Normal] : affect appropriate [Ulcers] : no ulcers [Mucositis] : no mucositis [Thrush] : no thrush [Vesicles] : no vesicles [de-identified] : anicteric  [de-identified] : no JVD [de-identified] : normal respiratory effort, no audible wheeze [de-identified] : mild tachycardia on my exam (100's) [de-identified] :  non-distended,  [de-identified] : no midline or paraspinal tenderness; FROM intact without pain [de-identified] : + pain with ROM of right hip and with weight bearing

## 2024-06-17 ENCOUNTER — APPOINTMENT (OUTPATIENT)
Dept: INFUSION THERAPY | Facility: HOSPITAL | Age: 54
End: 2024-06-17

## 2024-06-17 ENCOUNTER — APPOINTMENT (OUTPATIENT)
Dept: HEMATOLOGY ONCOLOGY | Facility: CLINIC | Age: 54
End: 2024-06-17

## 2024-06-28 ENCOUNTER — RESULT REVIEW (OUTPATIENT)
Age: 54
End: 2024-06-28

## 2024-06-28 ENCOUNTER — APPOINTMENT (OUTPATIENT)
Dept: INFUSION THERAPY | Facility: HOSPITAL | Age: 54
End: 2024-06-28

## 2024-06-28 ENCOUNTER — APPOINTMENT (OUTPATIENT)
Dept: HEMATOLOGY ONCOLOGY | Facility: CLINIC | Age: 54
End: 2024-06-28
Payer: COMMERCIAL

## 2024-06-28 VITALS
SYSTOLIC BLOOD PRESSURE: 156 MMHG | BODY MASS INDEX: 31.36 KG/M2 | HEART RATE: 116 BPM | WEIGHT: 205.03 LBS | RESPIRATION RATE: 17 BRPM | TEMPERATURE: 98.2 F | OXYGEN SATURATION: 98 % | DIASTOLIC BLOOD PRESSURE: 92 MMHG

## 2024-06-28 DIAGNOSIS — M54.31 SCIATICA, RIGHT SIDE: ICD-10-CM

## 2024-06-28 DIAGNOSIS — I26.99 OTHER PULMONARY EMBOLISM W/OUT ACUTE COR PULMONALE: ICD-10-CM

## 2024-06-28 DIAGNOSIS — C78.7 MALIGNANT NEOPLASM OF COLON, UNSPECIFIED: ICD-10-CM

## 2024-06-28 DIAGNOSIS — C18.9 MALIGNANT NEOPLASM OF COLON, UNSPECIFIED: ICD-10-CM

## 2024-06-28 LAB
ALBUMIN SERPL ELPH-MCNC: 3.7 G/DL — SIGNIFICANT CHANGE UP (ref 3.3–5)
ALP SERPL-CCNC: 256 U/L — HIGH (ref 40–120)
ALT FLD-CCNC: 24 U/L — SIGNIFICANT CHANGE UP (ref 10–45)
ANION GAP SERPL CALC-SCNC: 11 MMOL/L — SIGNIFICANT CHANGE UP (ref 5–17)
AST SERPL-CCNC: 64 U/L — HIGH (ref 10–40)
BASOPHILS # BLD AUTO: 0.08 K/UL — SIGNIFICANT CHANGE UP (ref 0–0.2)
BASOPHILS NFR BLD AUTO: 0.8 % — SIGNIFICANT CHANGE UP (ref 0–2)
BILIRUB SERPL-MCNC: 1.2 MG/DL — SIGNIFICANT CHANGE UP (ref 0.2–1.2)
BUN SERPL-MCNC: 9 MG/DL — SIGNIFICANT CHANGE UP (ref 7–23)
CALCIUM SERPL-MCNC: 9.2 MG/DL — SIGNIFICANT CHANGE UP (ref 8.4–10.5)
CEA SERPL-MCNC: 608 NG/ML — HIGH (ref 0–3.8)
CHLORIDE SERPL-SCNC: 100 MMOL/L — SIGNIFICANT CHANGE UP (ref 96–108)
CO2 SERPL-SCNC: 26 MMOL/L — SIGNIFICANT CHANGE UP (ref 22–31)
CREAT SERPL-MCNC: 0.73 MG/DL — SIGNIFICANT CHANGE UP (ref 0.5–1.3)
EGFR: 108 ML/MIN/1.73M2 — SIGNIFICANT CHANGE UP
EOSINOPHIL # BLD AUTO: 1.05 K/UL — HIGH (ref 0–0.5)
EOSINOPHIL NFR BLD AUTO: 10.5 % — HIGH (ref 0–6)
GLUCOSE SERPL-MCNC: 138 MG/DL — HIGH (ref 70–99)
HCT VFR BLD CALC: 40.6 % — SIGNIFICANT CHANGE UP (ref 39–50)
HGB BLD-MCNC: 13.6 G/DL — SIGNIFICANT CHANGE UP (ref 13–17)
IMM GRANULOCYTES NFR BLD AUTO: 0.3 % — SIGNIFICANT CHANGE UP (ref 0–0.9)
LYMPHOCYTES # BLD AUTO: 2.08 K/UL — SIGNIFICANT CHANGE UP (ref 1–3.3)
LYMPHOCYTES # BLD AUTO: 20.9 % — SIGNIFICANT CHANGE UP (ref 13–44)
MCHC RBC-ENTMCNC: 25.2 PG — LOW (ref 27–34)
MCHC RBC-ENTMCNC: 33.5 G/DL — SIGNIFICANT CHANGE UP (ref 32–36)
MCV RBC AUTO: 75.2 FL — LOW (ref 80–100)
MONOCYTES # BLD AUTO: 1.09 K/UL — HIGH (ref 0–0.9)
MONOCYTES NFR BLD AUTO: 10.9 % — SIGNIFICANT CHANGE UP (ref 2–14)
NEUTROPHILS # BLD AUTO: 5.63 K/UL — SIGNIFICANT CHANGE UP (ref 1.8–7.4)
NEUTROPHILS NFR BLD AUTO: 56.6 % — SIGNIFICANT CHANGE UP (ref 43–77)
NRBC # BLD: 0 /100 WBCS — SIGNIFICANT CHANGE UP (ref 0–0)
PLATELET # BLD AUTO: 334 K/UL — SIGNIFICANT CHANGE UP (ref 150–400)
POTASSIUM SERPL-MCNC: 3.9 MMOL/L — SIGNIFICANT CHANGE UP (ref 3.5–5.3)
POTASSIUM SERPL-SCNC: 3.9 MMOL/L — SIGNIFICANT CHANGE UP (ref 3.5–5.3)
PROT SERPL-MCNC: 8.5 G/DL — HIGH (ref 6–8.3)
RBC # BLD: 5.4 M/UL — SIGNIFICANT CHANGE UP (ref 4.2–5.8)
RBC # FLD: 18.9 % — HIGH (ref 10.3–14.5)
SODIUM SERPL-SCNC: 137 MMOL/L — SIGNIFICANT CHANGE UP (ref 135–145)
WBC # BLD: 9.96 K/UL — SIGNIFICANT CHANGE UP (ref 3.8–10.5)
WBC # FLD AUTO: 9.96 K/UL — SIGNIFICANT CHANGE UP (ref 3.8–10.5)

## 2024-06-28 PROCEDURE — 99215 OFFICE O/P EST HI 40 MIN: CPT

## 2024-06-28 PROCEDURE — G2211 COMPLEX E/M VISIT ADD ON: CPT | Mod: NC,1L

## 2024-07-12 ENCOUNTER — EMERGENCY (EMERGENCY)
Facility: HOSPITAL | Age: 54
LOS: 1 days | Discharge: HOME | End: 2024-07-12
Attending: EMERGENCY MEDICINE | Admitting: EMERGENCY MEDICINE
Payer: COMMERCIAL

## 2024-07-12 VITALS
TEMPERATURE: 99 F | WEIGHT: 198.42 LBS | OXYGEN SATURATION: 99 % | RESPIRATION RATE: 18 BRPM | SYSTOLIC BLOOD PRESSURE: 164 MMHG | HEART RATE: 106 BPM | DIASTOLIC BLOOD PRESSURE: 119 MMHG | HEIGHT: 67 IN

## 2024-07-12 VITALS
OXYGEN SATURATION: 99 % | RESPIRATION RATE: 18 BRPM | HEART RATE: 80 BPM | SYSTOLIC BLOOD PRESSURE: 172 MMHG | DIASTOLIC BLOOD PRESSURE: 112 MMHG

## 2024-07-12 PROCEDURE — 99284 EMERGENCY DEPT VISIT MOD MDM: CPT

## 2024-07-12 PROCEDURE — 99283 EMERGENCY DEPT VISIT LOW MDM: CPT

## 2024-07-12 PROCEDURE — 96372 THER/PROPH/DIAG INJ SC/IM: CPT

## 2024-07-12 RX ORDER — NAPROXEN SODIUM 550 MG
1 TABLET ORAL
Qty: 14 | Refills: 0
Start: 2024-07-12 | End: 2024-07-18

## 2024-07-12 RX ORDER — KETOROLAC TROMETHAMINE 30 MG/ML
30 INJECTION, SOLUTION INTRAMUSCULAR ONCE
Refills: 0 | Status: DISCONTINUED | OUTPATIENT
Start: 2024-07-12 | End: 2024-07-12

## 2024-07-12 RX ADMIN — KETOROLAC TROMETHAMINE 30 MILLIGRAM(S): 30 INJECTION, SOLUTION INTRAMUSCULAR at 19:30

## 2024-07-12 RX ADMIN — KETOROLAC TROMETHAMINE 30 MILLIGRAM(S): 30 INJECTION, SOLUTION INTRAMUSCULAR at 18:38

## 2024-07-20 ENCOUNTER — OUTPATIENT (OUTPATIENT)
Dept: OUTPATIENT SERVICES | Facility: HOSPITAL | Age: 54
LOS: 1 days | Discharge: HOME | End: 2024-07-20

## 2024-07-20 DIAGNOSIS — C18.9 MALIGNANT NEOPLASM OF COLON, UNSPECIFIED: ICD-10-CM

## 2024-07-26 ENCOUNTER — NON-APPOINTMENT (OUTPATIENT)
Age: 54
End: 2024-07-26

## 2024-07-26 ENCOUNTER — APPOINTMENT (OUTPATIENT)
Dept: HEMATOLOGY ONCOLOGY | Facility: CLINIC | Age: 54
End: 2024-07-26

## 2024-07-26 ENCOUNTER — RESULT REVIEW (OUTPATIENT)
Age: 54
End: 2024-07-26

## 2024-07-26 VITALS
HEIGHT: 67.8 IN | WEIGHT: 195.55 LBS | BODY MASS INDEX: 29.98 KG/M2 | SYSTOLIC BLOOD PRESSURE: 127 MMHG | DIASTOLIC BLOOD PRESSURE: 85 MMHG | RESPIRATION RATE: 16 BRPM | HEART RATE: 120 BPM | TEMPERATURE: 98.4 F | OXYGEN SATURATION: 98 %

## 2024-07-26 DIAGNOSIS — C18.9 MALIGNANT NEOPLASM OF COLON, UNSPECIFIED: ICD-10-CM

## 2024-07-26 DIAGNOSIS — C78.7 MALIGNANT NEOPLASM OF COLON, UNSPECIFIED: ICD-10-CM

## 2024-07-26 LAB
BASOPHILS # BLD AUTO: 0.07 K/UL — SIGNIFICANT CHANGE UP (ref 0–0.2)
BASOPHILS NFR BLD AUTO: 0.7 % — SIGNIFICANT CHANGE UP (ref 0–2)
EOSINOPHIL # BLD AUTO: 0.71 K/UL — HIGH (ref 0–0.5)
EOSINOPHIL NFR BLD AUTO: 7.3 % — HIGH (ref 0–6)
HCT VFR BLD CALC: 42.5 % — SIGNIFICANT CHANGE UP (ref 39–50)
HGB BLD-MCNC: 14.2 G/DL — SIGNIFICANT CHANGE UP (ref 13–17)
IMM GRANULOCYTES NFR BLD AUTO: 0.3 % — SIGNIFICANT CHANGE UP (ref 0–0.9)
LYMPHOCYTES # BLD AUTO: 1.86 K/UL — SIGNIFICANT CHANGE UP (ref 1–3.3)
LYMPHOCYTES # BLD AUTO: 19.1 % — SIGNIFICANT CHANGE UP (ref 13–44)
MCHC RBC-ENTMCNC: 23.7 PG — LOW (ref 27–34)
MCHC RBC-ENTMCNC: 33.4 G/DL — SIGNIFICANT CHANGE UP (ref 32–36)
MCV RBC AUTO: 71 FL — LOW (ref 80–100)
MONOCYTES # BLD AUTO: 1 K/UL — HIGH (ref 0–0.9)
MONOCYTES NFR BLD AUTO: 10.3 % — SIGNIFICANT CHANGE UP (ref 2–14)
NEUTROPHILS # BLD AUTO: 6.08 K/UL — SIGNIFICANT CHANGE UP (ref 1.8–7.4)
NEUTROPHILS NFR BLD AUTO: 62.3 % — SIGNIFICANT CHANGE UP (ref 43–77)
NRBC # BLD: 0 /100 WBCS — SIGNIFICANT CHANGE UP (ref 0–0)
PLATELET # BLD AUTO: 282 K/UL — SIGNIFICANT CHANGE UP (ref 150–400)
RBC # BLD: 5.99 M/UL — HIGH (ref 4.2–5.8)
RBC # FLD: 19.6 % — HIGH (ref 10.3–14.5)
WBC # BLD: 9.75 K/UL — SIGNIFICANT CHANGE UP (ref 3.8–10.5)
WBC # FLD AUTO: 9.75 K/UL — SIGNIFICANT CHANGE UP (ref 3.8–10.5)

## 2024-07-26 PROCEDURE — G2211 COMPLEX E/M VISIT ADD ON: CPT | Mod: NC,1L

## 2024-07-26 PROCEDURE — 99214 OFFICE O/P EST MOD 30 MIN: CPT

## 2024-07-26 NOTE — BEGINNING OF VISIT
[0] : 2) Feeling down, depressed, or hopeless: Not at all (0) [PHQ-2 Negative] : PHQ-2 Negative [PHQ-9 Deferred] : PHQ-9 Deferred [Pain Scale: ___] : On a scale of 1-10, today the patient's pain is a(n) [unfilled]. [Never] : Never [Patient/Caregiver not ready to engage] : Patient/Caregiver not ready to engage [Abdominal Pain] : no abdominal pain [Vomiting] : no vomiting [Constipation] : no constipation

## 2024-07-29 LAB
ALBUMIN SERPL ELPH-MCNC: 3.5 G/DL
ALP BLD-CCNC: 380 U/L
ALT SERPL-CCNC: 47 U/L
ANION GAP SERPL CALC-SCNC: 14 MMOL/L
AST SERPL-CCNC: 90 U/L
BILIRUB SERPL-MCNC: 1 MG/DL
BUN SERPL-MCNC: 13 MG/DL
CALCIUM SERPL-MCNC: 8.9 MG/DL
CEA SERPL-MCNC: 603 NG/ML
CHLORIDE SERPL-SCNC: 100 MMOL/L
CO2 SERPL-SCNC: 23 MMOL/L
CREAT SERPL-MCNC: 0.78 MG/DL
EGFR: 106 ML/MIN/1.73M2
GLUCOSE SERPL-MCNC: 135 MG/DL
POTASSIUM SERPL-SCNC: 4.2 MMOL/L
PROT SERPL-MCNC: 7.8 G/DL
SODIUM SERPL-SCNC: 137 MMOL/L

## 2024-07-29 NOTE — HISTORY OF PRESENT ILLNESS
[Disease: _____________________] : Disease: [unfilled] [T: ___] : T[unfilled] [N: ___] : N[unfilled] [M: ___] : M[unfilled] [AJCC Stage: ____] : AJCC Stage: [unfilled] [Research Protocol] : Research Protocol  [de-identified] : Mr Granda in 2021 at the age of 51 presented to the hospital with abdominal pain and underwent imaging that revealed a distal transverse colon lesion with evidence of microperforation. He is also underwent a colonoscopy which demonstrated an endoscopically obstructing distal transverse colon mass. Given the potential for impending obstruction as well as microperforation seen on imaging decision was made to take patient to the OR for a left hemicolectomy and anastomosis. Imaging done at that time also showed evidence of metastatic disease to the liver and potentially lung and a questionable lesion as well. Patient presents with his sister for initial visit and to discuss palliative chemotherapy.  Enrolled in [employer] Study   Started FOLFOX + Vit D July 7th, 2021 (bevacizumab added cycle #2) 8/23 scan shows PE started on Lovenox  October 2021 oxaliplatin held.   KRAS G12D , NABILA  FOLFOX + Bevacizumab +VitD ([employer] study) --> 5FU Yolis + Vit D --> POD 12/2022 ---> off trial  FOLFIRI/Yolis started 12/20/22 2/2024 = POD ---> Lonsurf/Yolis started 2/12/24 5/2024 POD ---> Stivarga [de-identified] : Microsatellite stable  [de-identified] : KRAS G12D , NABILA  [FreeTextEntry1] : Stivarga D1-D21 every 28 days, C1 5/13/24- 6/2/24 [de-identified] : hip pain  stopped DM meds + cough still  no fever or chills

## 2024-07-29 NOTE — REVIEW OF SYSTEMS
[Fatigue] : fatigue [Recent Change In Weight] : ~T recent weight change [Cough] : cough [Constipation] : constipation [Diarrhea: Grade 0] : Diarrhea: Grade 0 [Negative] : Psychiatric [Chest Pain] : no chest pain [Palpitations] : no palpitations [Wheezing] : no wheezing [SOB on Exertion] : no shortness of breath during exertion [Abdominal Pain] : no abdominal pain [Vomiting] : no vomiting [Confused] : no confusion [Dizziness] : no dizziness [Fainting] : no fainting [Difficulty Walking] : no difficulty walking [FreeTextEntry2] : weight stable from last visit [FreeTextEntry4] : no mouth pain or sores today [FreeTextEntry6] : rare dyspnea with exertion, mild cough [FreeTextEntry9] : right hip pain radiating down right leg [de-identified] : Gr1 neuropathy to hands and feet

## 2024-07-29 NOTE — PHYSICAL EXAM
[Fully active, able to carry on all pre-disease performance without restriction] : Status 0 - Fully active, able to carry on all pre-disease performance without restriction [Normal] : affect appropriate [de-identified] : no midline or paraspinal tenderness; FROM intact without pain [Ulcers] : no ulcers [Mucositis] : no mucositis [Thrush] : no thrush [Vesicles] : no vesicles [de-identified] : anicteric  [de-identified] : no JVD [de-identified] : normal respiratory effort, no audible wheeze [de-identified] :   tachycardic  [de-identified] :  non-distended, soft nontender [de-identified] : +R hip pain

## 2024-07-29 NOTE — PHYSICAL EXAM
[Fully active, able to carry on all pre-disease performance without restriction] : Status 0 - Fully active, able to carry on all pre-disease performance without restriction [Normal] : affect appropriate [de-identified] : no midline or paraspinal tenderness; FROM intact without pain [Ulcers] : no ulcers [Mucositis] : no mucositis [Thrush] : no thrush [Vesicles] : no vesicles [de-identified] : anicteric  [de-identified] : no JVD [de-identified] : normal respiratory effort, no audible wheeze [de-identified] :   tachycardic  [de-identified] :  non-distended, soft nontender [de-identified] : +R hip pain

## 2024-07-29 NOTE — HISTORY OF PRESENT ILLNESS
[Disease: _____________________] : Disease: [unfilled] [T: ___] : T[unfilled] [N: ___] : N[unfilled] [M: ___] : M[unfilled] [AJCC Stage: ____] : AJCC Stage: [unfilled] [Research Protocol] : Research Protocol  [de-identified] : Mr Granda in 2021 at the age of 51 presented to the hospital with abdominal pain and underwent imaging that revealed a distal transverse colon lesion with evidence of microperforation. He is also underwent a colonoscopy which demonstrated an endoscopically obstructing distal transverse colon mass. Given the potential for impending obstruction as well as microperforation seen on imaging decision was made to take patient to the OR for a left hemicolectomy and anastomosis. Imaging done at that time also showed evidence of metastatic disease to the liver and potentially lung and a questionable lesion as well. Patient presents with his sister for initial visit and to discuss palliative chemotherapy.  Enrolled in [employer] Study   Started FOLFOX + Vit D July 7th, 2021 (bevacizumab added cycle #2) 8/23 scan shows PE started on Lovenox  October 2021 oxaliplatin held.   KRAS G12D , NABILA  FOLFOX + Bevacizumab +VitD ([employer] study) --> 5FU Yolis + Vit D --> POD 12/2022 ---> off trial  FOLFIRI/Yolis started 12/20/22 2/2024 = POD ---> Lonsurf/Yolis started 2/12/24 5/2024 POD ---> Stivarga [de-identified] : Microsatellite stable  [de-identified] : KRAS G12D , NABILA  [FreeTextEntry1] : Stivarga D1-D21 every 28 days, C1 5/13/24- 6/2/24 [de-identified] : hip pain  stopped DM meds + cough still  no fever or chills

## 2024-07-29 NOTE — REVIEW OF SYSTEMS
[Fatigue] : fatigue [Recent Change In Weight] : ~T recent weight change [Cough] : cough [Constipation] : constipation [Diarrhea: Grade 0] : Diarrhea: Grade 0 [Negative] : Psychiatric [Chest Pain] : no chest pain [Palpitations] : no palpitations [Wheezing] : no wheezing [SOB on Exertion] : no shortness of breath during exertion [Abdominal Pain] : no abdominal pain [Vomiting] : no vomiting [Confused] : no confusion [Dizziness] : no dizziness [Fainting] : no fainting [Difficulty Walking] : no difficulty walking [FreeTextEntry2] : weight stable from last visit [FreeTextEntry4] : no mouth pain or sores today [FreeTextEntry6] : rare dyspnea with exertion, mild cough [FreeTextEntry9] : right hip pain radiating down right leg [de-identified] : Gr1 neuropathy to hands and feet

## 2024-07-30 ENCOUNTER — OUTPATIENT (OUTPATIENT)
Dept: OUTPATIENT SERVICES | Facility: HOSPITAL | Age: 54
LOS: 1 days | End: 2024-07-30
Payer: COMMERCIAL

## 2024-07-30 ENCOUNTER — APPOINTMENT (OUTPATIENT)
Dept: CT IMAGING | Facility: IMAGING CENTER | Age: 54
End: 2024-07-30
Payer: COMMERCIAL

## 2024-07-30 DIAGNOSIS — C18.9 MALIGNANT NEOPLASM OF COLON, UNSPECIFIED: ICD-10-CM

## 2024-07-30 PROCEDURE — 74177 CT ABD & PELVIS W/CONTRAST: CPT | Mod: 26

## 2024-07-30 PROCEDURE — 71260 CT THORAX DX C+: CPT | Mod: 26

## 2024-07-30 PROCEDURE — 74177 CT ABD & PELVIS W/CONTRAST: CPT

## 2024-07-30 PROCEDURE — 71260 CT THORAX DX C+: CPT

## 2024-08-04 ENCOUNTER — OUTPATIENT (OUTPATIENT)
Dept: OUTPATIENT SERVICES | Facility: HOSPITAL | Age: 54
LOS: 1 days | End: 2024-08-04
Payer: COMMERCIAL

## 2024-08-04 DIAGNOSIS — M54.31 SCIATICA, RIGHT SIDE: ICD-10-CM

## 2024-08-04 PROCEDURE — 72158 MRI LUMBAR SPINE W/O & W/DYE: CPT

## 2024-08-04 PROCEDURE — A9585: CPT

## 2024-08-04 PROCEDURE — 73723 MRI JOINT LWR EXTR W/O&W/DYE: CPT

## 2024-08-09 ENCOUNTER — APPOINTMENT (OUTPATIENT)
Dept: INFUSION THERAPY | Facility: HOSPITAL | Age: 54
End: 2024-08-09

## 2024-08-09 ENCOUNTER — APPOINTMENT (OUTPATIENT)
Dept: HEMATOLOGY ONCOLOGY | Facility: CLINIC | Age: 54
End: 2024-08-09

## 2024-08-09 PROBLEM — C79.51 METASTATIC ADENOCARCINOMA TO BONE: Status: ACTIVE | Noted: 2024-08-09

## 2024-08-09 PROCEDURE — 99215 OFFICE O/P EST HI 40 MIN: CPT

## 2024-08-09 PROCEDURE — G2211 COMPLEX E/M VISIT ADD ON: CPT | Mod: NC

## 2024-08-09 NOTE — REVIEW OF SYSTEMS
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

## 2024-08-09 NOTE — PHYSICAL EXAM
[Fully active, able to carry on all pre-disease performance without restriction] : Status 0 - Fully active, able to carry on all pre-disease performance without restriction [Normal] : affect appropriate [Ulcers] : no ulcers [Mucositis] : no mucositis [Thrush] : no thrush [Vesicles] : no vesicles [de-identified] : normal respiratory effort, no audible wheeze [de-identified] :   tachycardic  [de-identified] :  non-distended, soft nontender [de-identified] : +R hip pain

## 2024-08-09 NOTE — PHYSICAL EXAM
[Fully active, able to carry on all pre-disease performance without restriction] : Status 0 - Fully active, able to carry on all pre-disease performance without restriction [Normal] : affect appropriate [Ulcers] : no ulcers [Mucositis] : no mucositis [Thrush] : no thrush [Vesicles] : no vesicles [de-identified] : normal respiratory effort, no audible wheeze [de-identified] :   tachycardic  [de-identified] :  non-distended, soft nontender [de-identified] : +R hip pain

## 2024-08-09 NOTE — HISTORY OF PRESENT ILLNESS
[Disease: _____________________] : Disease: [unfilled] [T: ___] : T[unfilled] [N: ___] : N[unfilled] [M: ___] : M[unfilled] [AJCC Stage: ____] : AJCC Stage: [unfilled] [Research Protocol] : Research Protocol  [de-identified] : Mr Granda in 2021 at the age of 51 presented to the hospital with abdominal pain and underwent imaging that revealed a distal transverse colon lesion with evidence of microperforation. He is also underwent a colonoscopy which demonstrated an endoscopically obstructing distal transverse colon mass. Given the potential for impending obstruction as well as microperforation seen on imaging decision was made to take patient to the OR for a left hemicolectomy and anastomosis. Imaging done at that time also showed evidence of metastatic disease to the liver and potentially lung and a questionable lesion as well. Patient presents with his sister for initial visit and to discuss palliative chemotherapy.  Enrolled in [employer] Study   Started FOLFOX + Vit D July 7th, 2021 (bevacizumab added cycle #2) 8/23 scan shows PE started on Lovenox  October 2021 oxaliplatin held.   KRAS G12D , NABILA  FOLFOX + Bevacizumab +VitD ([employer] study) --> 5FU Yolis + Vit D --> POD 12/2022 ---> off trial  FOLFIRI/Yolis started 12/20/22 2/2024 = POD ---> Lonsurf/Yolis started 2/12/24 5/2024 POD ---> Stivarga [de-identified] : Microsatellite stable  [FreeTextEntry1] : Stivarga D1-D21 every 28 days, C1 5/13/24- 6/2/24 [de-identified] : KRAS G12D , NABILA  [de-identified] : presents in follow up to review imaging right hip pain  had injection with pain med doctor but hasn't helped

## 2024-08-09 NOTE — REVIEW OF SYSTEMS
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

## 2024-08-09 NOTE — HISTORY OF PRESENT ILLNESS
[Disease: _____________________] : Disease: [unfilled] [T: ___] : T[unfilled] [N: ___] : N[unfilled] [M: ___] : M[unfilled] [AJCC Stage: ____] : AJCC Stage: [unfilled] [Research Protocol] : Research Protocol  [de-identified] : Mr Granda in 2021 at the age of 51 presented to the hospital with abdominal pain and underwent imaging that revealed a distal transverse colon lesion with evidence of microperforation. He is also underwent a colonoscopy which demonstrated an endoscopically obstructing distal transverse colon mass. Given the potential for impending obstruction as well as microperforation seen on imaging decision was made to take patient to the OR for a left hemicolectomy and anastomosis. Imaging done at that time also showed evidence of metastatic disease to the liver and potentially lung and a questionable lesion as well. Patient presents with his sister for initial visit and to discuss palliative chemotherapy.  Enrolled in [employer] Study   Started FOLFOX + Vit D July 7th, 2021 (bevacizumab added cycle #2) 8/23 scan shows PE started on Lovenox  October 2021 oxaliplatin held.   KRAS G12D , NABILA  FOLFOX + Bevacizumab +VitD ([employer] study) --> 5FU Yolis + Vit D --> POD 12/2022 ---> off trial  FOLFIRI/Yolis started 12/20/22 2/2024 = POD ---> Lonsurf/Yolis started 2/12/24 5/2024 POD ---> Stivarga [de-identified] : Microsatellite stable  [de-identified] : KRAS G12D , NABILA  [FreeTextEntry1] : Stivarga D1-D21 every 28 days, C1 5/13/24- 6/2/24 [de-identified] : presents in follow up to review imaging right hip pain  had injection with pain med doctor but hasn't helped

## 2024-08-13 ENCOUNTER — NON-APPOINTMENT (OUTPATIENT)
Age: 54
End: 2024-08-13

## 2024-08-14 ENCOUNTER — APPOINTMENT (OUTPATIENT)
Dept: RADIATION ONCOLOGY | Facility: CLINIC | Age: 54
End: 2024-08-14
Payer: COMMERCIAL

## 2024-08-14 VITALS
TEMPERATURE: 96.98 F | HEART RATE: 127 BPM | HEIGHT: 67 IN | DIASTOLIC BLOOD PRESSURE: 82 MMHG | WEIGHT: 194 LBS | OXYGEN SATURATION: 97 % | BODY MASS INDEX: 30.45 KG/M2 | RESPIRATION RATE: 16 BRPM | SYSTOLIC BLOOD PRESSURE: 124 MMHG

## 2024-08-14 DIAGNOSIS — C79.51 SECONDARY MALIGNANT NEOPLASM OF BONE: ICD-10-CM

## 2024-08-14 PROCEDURE — 77334 RADIATION TREATMENT AID(S): CPT | Mod: 26

## 2024-08-14 PROCEDURE — 77290 THER RAD SIMULAJ FIELD CPLX: CPT | Mod: 26

## 2024-08-14 PROCEDURE — 77307 TELETHX ISODOSE PLAN CPLX: CPT | Mod: 26

## 2024-08-14 PROCEDURE — 77263 THER RADIOLOGY TX PLNG CPLX: CPT

## 2024-08-14 PROCEDURE — 77333 RADIATION TREATMENT AID(S): CPT | Mod: 26

## 2024-08-14 PROCEDURE — 99205 OFFICE O/P NEW HI 60 MIN: CPT | Mod: 25

## 2024-08-14 NOTE — HISTORY OF PRESENT ILLNESS
[FreeTextEntry1] : Mr Granda is a 54 year old male with Stage IV Colon cancer since 2021. Here for evaluation for bone metastases. Symptomatic for R hip pain.  Initial presentation with distal transverse colon lesion and microperforation s/p left hemicolectomy and anastomosis in June 2021 . Imaging showed mets to the liver and lungs. Has undergone multiple lines of systemic therapy. Recently on Research Protocol:  Stivarga D1-D21 every 28 days, C1 5/13/24- 6/2/24 until POD on imaging. Plan for FOLFOX + BEVACIZUMAB retreat  7/30/2024 CT C/A/P IMPRESSION: Extensive pulmonary and hepatic metastatic disease, some of which are new and/or enlarging, compatible with disease progression.  8/4/2024 MRI Lumbar Spine: IMPRESSION: No evidence for metastatic disease to the lumbar spine. Discogenic degenerative disease and facet arthropathy of the lumbar spine, most pronounced at L4-L5 where there is mild bilateral neural foraminal narrowing and mild central canal narrowing in addition to right lateral recess stenosis. No additional areas of significant central canal or neural foraminal narrowing within the lumbar spine.  Symptomatic for Right Hip pain. 8/4/2024 MRI Right Hip:   Impression: Findings compatible with metastatic disease involving the posterior acetabulum and inferior pubic ramus. No definite cortical break or soft tissue breakthrough. T2 signal hyperintensity within the adjacent obturator internus and externus muscle bellies as well as the adductor musculature could be reactive although infiltrative spread of tumor cannot be entirely excluded. Sequela of adductor strain can also not be entirely excluded.  8/14/2024 Patient presents for consideration of radiation therapy. Continues onc care with Dr Gradner. Scheduled start FOLFOX + BEVACIZUMAB retreat on 8/15/2024  Pt reports severe, sharp pain of right hip 10/10 with sitting, such that he needs to lay down. Has difficulty sleeping in setting of severe pain, and can only sleep after taking his pain medication, 1-2 hours at a time.

## 2024-08-14 NOTE — REVIEW OF SYSTEMS
[Fatigue] : fatigue [Recent Change In Weight] : ~T recent weight change [Shortness Of Breath] : shortness of breath [Cough] : cough [SOB on Exertion] : shortness of breath during exertion [Joint Pain] : joint pain [Insomnia] : insomnia [Anxiety] : anxiety [Negative] : Neurological [FreeTextEntry9] : R Hip [de-identified] : On Xarelto for PE

## 2024-08-14 NOTE — HISTORY OF PRESENT ILLNESS
--oxygen saturation 81% on non-rebreather mask  --put pt on BiPaP  --transfer to ICU       [FreeTextEntry1] : Mr Granda is a 54 year old male with Stage IV Colon cancer since 2021. Here for evaluation for bone metastases. Symptomatic for R hip pain.  Initial presentation with distal transverse colon lesion and microperforation s/p left hemicolectomy and anastomosis in June 2021 . Imaging showed mets to the liver and lungs. Has undergone multiple lines of systemic therapy. Recently on Research Protocol:  Stivarga D1-D21 every 28 days, C1 5/13/24- 6/2/24 until POD on imaging. Plan for FOLFOX + BEVACIZUMAB retreat  7/30/2024 CT C/A/P IMPRESSION: Extensive pulmonary and hepatic metastatic disease, some of which are new and/or enlarging, compatible with disease progression.  8/4/2024 MRI Lumbar Spine: IMPRESSION: No evidence for metastatic disease to the lumbar spine. Discogenic degenerative disease and facet arthropathy of the lumbar spine, most pronounced at L4-L5 where there is mild bilateral neural foraminal narrowing and mild central canal narrowing in addition to right lateral recess stenosis. No additional areas of significant central canal or neural foraminal narrowing within the lumbar spine.  Symptomatic for Right Hip pain. 8/4/2024 MRI Right Hip:   Impression: Findings compatible with metastatic disease involving the posterior acetabulum and inferior pubic ramus. No definite cortical break or soft tissue breakthrough. T2 signal hyperintensity within the adjacent obturator internus and externus muscle bellies as well as the adductor musculature could be reactive although infiltrative spread of tumor cannot be entirely excluded. Sequela of adductor strain can also not be entirely excluded.  8/14/2024 Patient presents for consideration of radiation therapy. Continues onc care with Dr Gardner. Scheduled start FOLFOX + BEVACIZUMAB retreat on 8/15/2024  Pt reports severe, sharp pain of right hip 10/10 with sitting, such that he needs to lay down. Has difficulty sleeping in setting of severe pain, and can only sleep after taking his pain medication, 1-2 hours at a time.

## 2024-08-14 NOTE — END OF VISIT
[] : Resident [FreeTextEntry3] : We had an extensive discussion of the patient's imaging, labs, and pathology, and reviewed them together; I independently evaluated these and discussed their significance with the patient and family. I have also been involved in the multidisciplinary discussion of his care with his other providers. We discussed the natural history of metastatic colorectal cancer and its management. [Time Spent: ___ minutes] : I have spent [unfilled] minutes of time on the encounter.

## 2024-08-14 NOTE — REVIEW OF SYSTEMS
[Fatigue] : fatigue [Recent Change In Weight] : ~T recent weight change [Shortness Of Breath] : shortness of breath [Cough] : cough [SOB on Exertion] : shortness of breath during exertion [Joint Pain] : joint pain [Insomnia] : insomnia [Anxiety] : anxiety [Negative] : Neurological [FreeTextEntry9] : R Hip [de-identified] : On Xarelto for PE

## 2024-08-14 NOTE — PHYSICAL EXAM
[] : no respiratory distress [Heart Rate And Rhythm] : heart rate and rhythm were normal [Exaggerated Use Of Accessory Muscles For Inspiration] : no accessory muscle use [Arterial Pulses Normal] : the arterial pulses were normal [Abdomen Soft] : soft [Abdomen Tenderness] : non-tender [Nail Clubbing] : no clubbing  or cyanosis of the fingernails [Range of Motion to Joints] : range of motion to joints [Motor Tone] : muscle strength and tone were normal [Normal] : oriented to person, place and time, the affect was normal, the mood was normal and not anxious [de-identified] : lying on side; tenderness to palpation of R iliac

## 2024-08-14 NOTE — REVIEW OF SYSTEMS
[Fatigue] : fatigue [Recent Change In Weight] : ~T recent weight change [Shortness Of Breath] : shortness of breath [SOB on Exertion] : shortness of breath during exertion [Cough] : cough [Joint Pain] : joint pain [Insomnia] : insomnia [Anxiety] : anxiety [Negative] : Neurological [FreeTextEntry9] : R Hip [de-identified] : On Xarelto for PE

## 2024-08-14 NOTE — VITALS
[60: Requires occasional assistance, but is able to care for most of his/her needs] : 60: Requires occasional assistance, but is able to care for most of his/her needs [ECOG Performance Status: 2 - Ambulatory and capable of all self care but unable to carry out any work activities] : Performance Status: 2 - Ambulatory and capable of all self care but unable to carry out any work activities. Up and about more than 50% of waking hours [Date: ____________] : Patient's last distress assessment performed on [unfilled]. [9 - Distress Level] : Distress Level: 9 [Patient given social work contact information and resource sheet] : Patient was given social work contact information and resource sheet [Maximal Pain Intensity: 10/10] : 10/10 [Least Pain Intensity: 6/10] : 6/10 [Pain Duration: ___] : Pain duration: [unfilled] [Pain Location: ___] : Pain Location: [unfilled] [Pain Interferes with ADLs] : Pain interferes with activities of daily living. [Opioid] : opioid

## 2024-08-14 NOTE — HISTORY OF PRESENT ILLNESS
[FreeTextEntry1] : Mr Granda is a 54 year old male with Stage IV Colon cancer since 2021. Here for evaluation for bone metastases. Symptomatic for R hip pain.  Initial presentation with distal transverse colon lesion and microperforation s/p left hemicolectomy and anastomosis in June 2021 . Imaging showed mets to the liver and lungs. Has undergone multiple lines of systemic therapy. Recently on Research Protocol:  Stivarga D1-D21 every 28 days, C1 5/13/24- 6/2/24 until POD on imaging. Plan for FOLFOX + BEVACIZUMAB retreat  7/30/2024 CT C/A/P IMPRESSION: Extensive pulmonary and hepatic metastatic disease, some of which are new and/or enlarging, compatible with disease progression.  8/4/2024 MRI Lumbar Spine: IMPRESSION: No evidence for metastatic disease to the lumbar spine. Discogenic degenerative disease and facet arthropathy of the lumbar spine, most pronounced at L4-L5 where there is mild bilateral neural foraminal narrowing and mild central canal narrowing in addition to right lateral recess stenosis. No additional areas of significant central canal or neural foraminal narrowing within the lumbar spine.  Symptomatic for Right Hip pain. 8/4/2024 MRI Right Hip:   Impression: Findings compatible with metastatic disease involving the posterior acetabulum and inferior pubic ramus. No definite cortical break or soft tissue breakthrough. T2 signal hyperintensity within the adjacent obturator internus and externus muscle bellies as well as the adductor musculature could be reactive although infiltrative spread of tumor cannot be entirely excluded. Sequela of adductor strain can also not be entirely excluded.  8/14/2024 Patient presents for consideration of radiation therapy. Continues onc care with Dr Gardner. Scheduled start FOLFOX + BEVACIZUMAB retreat on 8/15/2024  Pt reports severe, sharp pain of right hip 10/10 with sitting, such that he needs to lay down. Has difficulty sleeping in setting of severe pain, and can only sleep after taking his pain medication, 1-2 hours at a time.

## 2024-08-14 NOTE — PHYSICAL EXAM
[] : no respiratory distress [Exaggerated Use Of Accessory Muscles For Inspiration] : no accessory muscle use [Heart Rate And Rhythm] : heart rate and rhythm were normal [Arterial Pulses Normal] : the arterial pulses were normal [Abdomen Soft] : soft [Abdomen Tenderness] : non-tender [Nail Clubbing] : no clubbing  or cyanosis of the fingernails [Range of Motion to Joints] : range of motion to joints [Motor Tone] : muscle strength and tone were normal [Normal] : oriented to person, place and time, the affect was normal, the mood was normal and not anxious [de-identified] : lying on side; tenderness to palpation of R iliac

## 2024-08-14 NOTE — PHYSICAL EXAM
[] : no respiratory distress [Exaggerated Use Of Accessory Muscles For Inspiration] : no accessory muscle use [Heart Rate And Rhythm] : heart rate and rhythm were normal [Arterial Pulses Normal] : the arterial pulses were normal [Abdomen Soft] : soft [Abdomen Tenderness] : non-tender [Nail Clubbing] : no clubbing  or cyanosis of the fingernails [Range of Motion to Joints] : range of motion to joints [Motor Tone] : muscle strength and tone were normal [Normal] : oriented to person, place and time, the affect was normal, the mood was normal and not anxious [de-identified] : lying on side; tenderness to palpation of R iliac

## 2024-08-14 NOTE — REASON FOR VISIT
[Consideration of Curative Therapy] : consideration of curative therapy for [Bone Metastasis] : bone metastasis

## 2024-08-14 NOTE — PHYSICAL EXAM
[] : no respiratory distress [Heart Rate And Rhythm] : heart rate and rhythm were normal [Exaggerated Use Of Accessory Muscles For Inspiration] : no accessory muscle use [Arterial Pulses Normal] : the arterial pulses were normal [Abdomen Soft] : soft [Abdomen Tenderness] : non-tender [Nail Clubbing] : no clubbing  or cyanosis of the fingernails [Range of Motion to Joints] : range of motion to joints [Motor Tone] : muscle strength and tone were normal [Normal] : oriented to person, place and time, the affect was normal, the mood was normal and not anxious [de-identified] : lying on side; tenderness to palpation of R iliac

## 2024-08-14 NOTE — REVIEW OF SYSTEMS
[Fatigue] : fatigue [Recent Change In Weight] : ~T recent weight change [Shortness Of Breath] : shortness of breath [SOB on Exertion] : shortness of breath during exertion [Cough] : cough [Joint Pain] : joint pain [Insomnia] : insomnia [Anxiety] : anxiety [Negative] : Neurological [FreeTextEntry9] : R Hip [de-identified] : On Xarelto for PE

## 2024-08-14 NOTE — VITALS
Patient comes to clinic for follow up anticoagulation visit.   Last INR 8/27/19 was 2.4.  Dose maintained per protocol.   Today's INR is 2.4 and is within goal range.    Current warfarin dosing verified with patient. Patient was informed that their INR result is within therapeutic range and instructed to maintain current dose per protocol. Discussed dose and return date for next INR.    Today INR is 2.4, in range and down from previous INR 2.8 in 5 weeks while on 30mg.wk.  Continue same dosing and recheck INR in 6 weeks.    Dr. Blake is in the office today supervising the treatment.    Patient was instructed to contact the clinic with any unusual bleeding or bruising, any changes in medications, diet, health status, lifestyle, or any other changes, questions or concerns. Patient verbalized understanding of all discussed.      [60: Requires occasional assistance, but is able to care for most of his/her needs] : 60: Requires occasional assistance, but is able to care for most of his/her needs [ECOG Performance Status: 2 - Ambulatory and capable of all self care but unable to carry out any work activities] : Performance Status: 2 - Ambulatory and capable of all self care but unable to carry out any work activities. Up and about more than 50% of waking hours [Date: ____________] : Patient's last distress assessment performed on [unfilled]. [9 - Distress Level] : Distress Level: 9 [Patient given social work contact information and resource sheet] : Patient was given social work contact information and resource sheet [Maximal Pain Intensity: 10/10] : 10/10 [Least Pain Intensity: 6/10] : 6/10 [Pain Duration: ___] : Pain duration: [unfilled] [Pain Location: ___] : Pain Location: [unfilled] [Pain Interferes with ADLs] : Pain interferes with activities of daily living. [Opioid] : opioid

## 2024-08-15 ENCOUNTER — APPOINTMENT (OUTPATIENT)
Dept: INFUSION THERAPY | Facility: HOSPITAL | Age: 54
End: 2024-08-15

## 2024-08-15 ENCOUNTER — OUTPATIENT (OUTPATIENT)
Dept: OUTPATIENT SERVICES | Facility: HOSPITAL | Age: 54
LOS: 1 days | Discharge: HOME | End: 2024-08-15
Payer: COMMERCIAL

## 2024-08-15 ENCOUNTER — APPOINTMENT (OUTPATIENT)
Dept: HEMATOLOGY ONCOLOGY | Facility: CLINIC | Age: 54
End: 2024-08-15

## 2024-08-15 DIAGNOSIS — C79.51 SECONDARY MALIGNANT NEOPLASM OF BONE: ICD-10-CM

## 2024-08-15 RX ORDER — MORPHINE SULFATE 15 MG/1
15 TABLET, FILM COATED, EXTENDED RELEASE ORAL
Qty: 60 | Refills: 0 | Status: ACTIVE | COMMUNITY
Start: 2024-08-15 | End: 1900-01-01

## 2024-08-17 ENCOUNTER — APPOINTMENT (OUTPATIENT)
Dept: INFUSION THERAPY | Facility: HOSPITAL | Age: 54
End: 2024-08-17

## 2024-08-19 ENCOUNTER — NON-APPOINTMENT (OUTPATIENT)
Age: 54
End: 2024-08-19

## 2024-08-19 VITALS
OXYGEN SATURATION: 98 % | TEMPERATURE: 97.3 F | HEART RATE: 115 BPM | RESPIRATION RATE: 18 BRPM | DIASTOLIC BLOOD PRESSURE: 97 MMHG | BODY MASS INDEX: 30.45 KG/M2 | HEIGHT: 67 IN | WEIGHT: 194 LBS | SYSTOLIC BLOOD PRESSURE: 153 MMHG

## 2024-08-19 PROCEDURE — 77280 THER RAD SIMULAJ FIELD SMPL: CPT | Mod: 26

## 2024-08-20 ENCOUNTER — NON-APPOINTMENT (OUTPATIENT)
Age: 54
End: 2024-08-20

## 2024-08-20 NOTE — VITALS
[Least Pain Intensity: 0/10] : 0/10 [Maximal Pain Intensity: 10/10] : 10/10 [70: Cares for self; unalbe to carry on normal activity or do active work.] : 70: Cares for self; unable to carry on normal activity or do active work. [ECOG Performance Status: 2 - Ambulatory and capable of all self care but unable to carry out any work activities] : Performance Status: 2 - Ambulatory and capable of all self care but unable to carry out any work activities. Up and about more than 50% of waking hours

## 2024-08-20 NOTE — DISEASE MANAGEMENT
[Clinical] : TNM Stage: c [IV] : IV [TTNM] : X [NTNM] : X [MTNM] : 1 [de-identified] : 400 [de-identified] : 2000cGy [de-identified] : Right Hip

## 2024-08-20 NOTE — PHYSICAL EXAM
[] : no respiratory distress [Exaggerated Use Of Accessory Muscles For Inspiration] : no accessory muscle use [Heart Rate And Rhythm] : heart rate and rhythm were normal [Arterial Pulses Normal] : the arterial pulses were normal [Skin Color & Pigmentation] : normal skin color and pigmentation [de-identified] : scleral icterus [de-identified] : sitting uncomfortably in wheelchair

## 2024-08-20 NOTE — HISTORY OF PRESENT ILLNESS
[FreeTextEntry1] : Mr. Granda is a 54-year-old male with known history of stage IV [id number] colorectal adenocarcinoma (NABILA) status post multiple lines of systemic therapy with recent progression of disease presenting with significant right hip pain not controlled with pain meds. Plan for FOLFOX plus bevacizumab retreat.  Planned for palliative radiation therapy to symptomatic painful R hip lesion.  8/20/2024 He presents for on treatment visit. Completed 1/5 fractions. Patient reports shortness of breath on exertion, VS stable, HR continues to be slightly elevated. Patient will follow up with PCP. Patient reports hip pain 5/10

## 2024-08-23 ENCOUNTER — NON-APPOINTMENT (OUTPATIENT)
Age: 54
End: 2024-08-23

## 2024-08-23 ENCOUNTER — INPATIENT (INPATIENT)
Facility: HOSPITAL | Age: 54
LOS: 11 days | Discharge: HOSPICE | End: 2024-09-04
Attending: HOSPITALIST | Admitting: HOSPITALIST
Payer: COMMERCIAL

## 2024-08-23 VITALS
SYSTOLIC BLOOD PRESSURE: 140 MMHG | OXYGEN SATURATION: 100 % | WEIGHT: 195.11 LBS | HEIGHT: 67 IN | DIASTOLIC BLOOD PRESSURE: 94 MMHG | RESPIRATION RATE: 18 BRPM | TEMPERATURE: 98 F | HEART RATE: 109 BPM

## 2024-08-23 VITALS
HEART RATE: 115 BPM | DIASTOLIC BLOOD PRESSURE: 97 MMHG | RESPIRATION RATE: 18 BRPM | SYSTOLIC BLOOD PRESSURE: 151 MMHG | OXYGEN SATURATION: 97 % | TEMPERATURE: 98 F

## 2024-08-23 LAB
ALBUMIN SERPL ELPH-MCNC: 2.3 G/DL — LOW (ref 3.3–5)
ALP SERPL-CCNC: 1482 U/L — HIGH (ref 40–120)
ALT FLD-CCNC: 449 U/L — HIGH (ref 4–41)
AMMONIA BLD-MCNC: 31 UMOL/L — SIGNIFICANT CHANGE UP (ref 11–55)
ANION GAP SERPL CALC-SCNC: 14 MMOL/L — SIGNIFICANT CHANGE UP (ref 7–14)
ANION GAP SERPL CALC-SCNC: 14 MMOL/L — SIGNIFICANT CHANGE UP (ref 7–14)
ANISOCYTOSIS BLD QL: SLIGHT — SIGNIFICANT CHANGE UP
APPEARANCE UR: ABNORMAL
AST SERPL-CCNC: 420 U/L — HIGH (ref 4–40)
B-OH-BUTYR SERPL-SCNC: 0.8 MMOL/L — HIGH (ref 0–0.4)
BACTERIA # UR AUTO: ABNORMAL /HPF
BASOPHILS # BLD AUTO: 0 K/UL — SIGNIFICANT CHANGE UP (ref 0–0.2)
BASOPHILS NFR BLD AUTO: 0 % — SIGNIFICANT CHANGE UP (ref 0–2)
BILIRUB DIRECT SERPL-MCNC: 16.6 MG/DL — HIGH (ref 0–0.3)
BILIRUB INDIRECT FLD-MCNC: 3.8 MG/DL — HIGH (ref 0–1)
BILIRUB SERPL-MCNC: 20.4 MG/DL — HIGH (ref 0.2–1.2)
BILIRUB UR-MCNC: ABNORMAL
BLOOD GAS VENOUS COMPREHENSIVE RESULT: SIGNIFICANT CHANGE UP
BLOOD GAS VENOUS COMPREHENSIVE RESULT: SIGNIFICANT CHANGE UP
BUN SERPL-MCNC: 30 MG/DL — HIGH (ref 7–23)
BUN SERPL-MCNC: 30 MG/DL — HIGH (ref 7–23)
CALCIUM SERPL-MCNC: 7.9 MG/DL — LOW (ref 8.4–10.5)
CALCIUM SERPL-MCNC: 8.4 MG/DL — SIGNIFICANT CHANGE UP (ref 8.4–10.5)
CAST: 2 /LPF — SIGNIFICANT CHANGE UP (ref 0–4)
CHLORIDE SERPL-SCNC: 91 MMOL/L — LOW (ref 98–107)
CHLORIDE SERPL-SCNC: 91 MMOL/L — LOW (ref 98–107)
CO2 SERPL-SCNC: 19 MMOL/L — LOW (ref 22–31)
CO2 SERPL-SCNC: 19 MMOL/L — LOW (ref 22–31)
COLOR SPEC: SIGNIFICANT CHANGE UP
CREAT SERPL-MCNC: 0.85 MG/DL — SIGNIFICANT CHANGE UP (ref 0.5–1.3)
CREAT SERPL-MCNC: 0.87 MG/DL — SIGNIFICANT CHANGE UP (ref 0.5–1.3)
DIFF PNL FLD: ABNORMAL
EGFR: 103 ML/MIN/1.73M2 — SIGNIFICANT CHANGE UP
EGFR: 103 ML/MIN/1.73M2 — SIGNIFICANT CHANGE UP
EOSINOPHIL # BLD AUTO: 0 K/UL — SIGNIFICANT CHANGE UP (ref 0–0.5)
EOSINOPHIL NFR BLD AUTO: 0 % — SIGNIFICANT CHANGE UP (ref 0–6)
GLUCOSE SERPL-MCNC: 427 MG/DL — HIGH (ref 70–99)
GLUCOSE SERPL-MCNC: 445 MG/DL — HIGH (ref 70–99)
GLUCOSE UR QL: >=1000 MG/DL
HCT VFR BLD CALC: 43.7 % — SIGNIFICANT CHANGE UP (ref 39–50)
HGB BLD-MCNC: 14 G/DL — SIGNIFICANT CHANGE UP (ref 13–17)
IANC: 8.26 K/UL — HIGH (ref 1.8–7.4)
KETONES UR-MCNC: ABNORMAL MG/DL
LEUKOCYTE ESTERASE UR-ACNC: ABNORMAL
LYMPHOCYTES # BLD AUTO: 0.71 K/UL — LOW (ref 1–3.3)
LYMPHOCYTES # BLD AUTO: 4 % — LOW (ref 13–44)
MANUAL SMEAR VERIFICATION: SIGNIFICANT CHANGE UP
MCHC RBC-ENTMCNC: 24.5 PG — LOW (ref 27–34)
MCHC RBC-ENTMCNC: 32 GM/DL — SIGNIFICANT CHANGE UP (ref 32–36)
MCV RBC AUTO: 76.4 FL — LOW (ref 80–100)
MONOCYTES # BLD AUTO: 0.71 K/UL — SIGNIFICANT CHANGE UP (ref 0–0.9)
MONOCYTES NFR BLD AUTO: 4 % — SIGNIFICANT CHANGE UP (ref 2–14)
NEUTROPHILS # BLD AUTO: 16.26 K/UL — HIGH (ref 1.8–7.4)
NEUTROPHILS NFR BLD AUTO: 91 % — HIGH (ref 43–77)
NEUTS BAND # BLD: 1 % — SIGNIFICANT CHANGE UP (ref 0–6)
NITRITE UR-MCNC: POSITIVE
NRBC # BLD: 1 /100 WBCS — HIGH (ref 0–0)
NT-PROBNP SERPL-SCNC: 76 PG/ML — SIGNIFICANT CHANGE UP
PH UR: 6 — SIGNIFICANT CHANGE UP (ref 5–8)
PLAT MORPH BLD: NORMAL — SIGNIFICANT CHANGE UP
PLATELET # BLD AUTO: 238 K/UL — SIGNIFICANT CHANGE UP (ref 150–400)
PLATELET CLUMP BLD QL SMEAR: SLIGHT
PLATELET COUNT - ESTIMATE: NORMAL — SIGNIFICANT CHANGE UP
POIKILOCYTOSIS BLD QL AUTO: SLIGHT — SIGNIFICANT CHANGE UP
POLYCHROMASIA BLD QL SMEAR: SLIGHT — SIGNIFICANT CHANGE UP
POTASSIUM SERPL-MCNC: 5.3 MMOL/L — SIGNIFICANT CHANGE UP (ref 3.5–5.3)
POTASSIUM SERPL-MCNC: 5.6 MMOL/L — HIGH (ref 3.5–5.3)
POTASSIUM SERPL-SCNC: 5.3 MMOL/L — SIGNIFICANT CHANGE UP (ref 3.5–5.3)
POTASSIUM SERPL-SCNC: 5.6 MMOL/L — HIGH (ref 3.5–5.3)
PROT SERPL-MCNC: 7.3 G/DL — SIGNIFICANT CHANGE UP (ref 6–8.3)
PROT UR-MCNC: 30 MG/DL
RBC # BLD: 5.72 M/UL — SIGNIFICANT CHANGE UP (ref 4.2–5.8)
RBC # FLD: 22.7 % — HIGH (ref 10.3–14.5)
RBC BLD AUTO: ABNORMAL
RBC CASTS # UR COMP ASSIST: 21 /HPF — HIGH (ref 0–4)
SARS-COV-2 RNA SPEC QL NAA+PROBE: SIGNIFICANT CHANGE UP
SODIUM SERPL-SCNC: 124 MMOL/L — LOW (ref 135–145)
SODIUM SERPL-SCNC: 124 MMOL/L — LOW (ref 135–145)
SP GR SPEC: 1.03 — HIGH (ref 1–1.03)
SQUAMOUS # UR AUTO: 5 /HPF — SIGNIFICANT CHANGE UP (ref 0–5)
TROPONIN T, HIGH SENSITIVITY RESULT: <6 NG/L — SIGNIFICANT CHANGE UP
URATE SERPL-MCNC: 4.8 MG/DL — SIGNIFICANT CHANGE UP (ref 3.4–8.8)
UROBILINOGEN FLD QL: 1 MG/DL — SIGNIFICANT CHANGE UP (ref 0.2–1)
WBC # BLD: 17.67 K/UL — HIGH (ref 3.8–10.5)
WBC # FLD AUTO: 17.67 K/UL — HIGH (ref 3.8–10.5)
WBC UR QL: 29 /HPF — HIGH (ref 0–5)

## 2024-08-23 PROCEDURE — 71275 CT ANGIOGRAPHY CHEST: CPT | Mod: 26,MC

## 2024-08-23 PROCEDURE — 99285 EMERGENCY DEPT VISIT HI MDM: CPT

## 2024-08-23 RX ORDER — SODIUM CHLORIDE 9 MG/ML
1000 INJECTION INTRAMUSCULAR; INTRAVENOUS; SUBCUTANEOUS ONCE
Refills: 0 | Status: COMPLETED | OUTPATIENT
Start: 2024-08-23 | End: 2024-08-23

## 2024-08-23 RX ORDER — SODIUM CHLORIDE 9 MG/ML
500 INJECTION INTRAMUSCULAR; INTRAVENOUS; SUBCUTANEOUS ONCE
Refills: 0 | Status: COMPLETED | OUTPATIENT
Start: 2024-08-23 | End: 2024-08-23

## 2024-08-23 RX ADMIN — Medication 100 MILLIGRAM(S): at 22:22

## 2024-08-23 RX ADMIN — SODIUM CHLORIDE 500 MILLILITER(S): 9 INJECTION INTRAMUSCULAR; INTRAVENOUS; SUBCUTANEOUS at 22:22

## 2024-08-23 NOTE — ED PROVIDER NOTE - OBJECTIVE STATEMENT
54M, PMHx stage IV colorectal cancer, currently undergoing palliative radiation with plans to begin palliative FOLFOX, HTN, T2DM, HLD presents to ED from radiation oncology office complaining of shortness of breath and leg swelling.  Patient states that for the last 4 days, he has had progressive leg swelling with shortness of breath with any motion.  Patient is normally able to walk significant distances without shortness of breath, however, over the past 4 days he has been unable to sit up without shortness of breath.  Patient denies need to use pillows at night to sleep, endorses dry cough.  Patient denies recent travel, hemoptysis.  Patient denies fever, chills, chest pain, abdominal pain, N/V/D/C, dysuria/hematuria, urinary frequency/urgency, hematemesis, hematochezia, melena, BRBPR.  Denies trauma.

## 2024-08-23 NOTE — ED ADULT NURSE REASSESSMENT NOTE - NSFALLRISKINTERV_ED_ALL_ED

## 2024-08-23 NOTE — ED ADULT NURSE NOTE - CAS DISCH BELONGINGS RETURNED
all belongings transported with patient on stretcher. phone//blue personal bag and cream colored slides/Not applicable

## 2024-08-23 NOTE — ED PROVIDER NOTE - ATTENDING CONTRIBUTION TO CARE
I performed a face-to-face evaluation of the patient and performed a history and physical examination. I agree with the history and physical examination. If this was a PA visit, I personally saw the patient with the PA and performed a substantive portion of the visit including all aspects of the medical decision making.    Colon cancer, palliative care. C/o leg swelling and tachycardia and hypoxia. DDx: CHF, PE. Check BNP, trop, EKG, CT PA.

## 2024-08-23 NOTE — ED ADULT TRIAGE NOTE - CHIEF COMPLAINT QUOTE
Patient brought to ER by EMS from radiation oncology. Shortness of breath for 2 weeks/ bilateral lower extremity swelling for 3 days/weakness for 2 months. Stage 4 Colorectal cancer. has not taken his BP meds for 2 months. Type 2 DM: FS: 478 and patient is on blood thinners.

## 2024-08-23 NOTE — ED PROVIDER NOTE - CLINICAL SUMMARY MEDICAL DECISION MAKING FREE TEXT BOX
Sundar: Colon cancer, palliative care. C/o leg swelling and tachycardia and hypoxia. DDx: CHF, PE. Check BNP, trop, EKG, CT PA. Sundar: Colon cancer, palliative care. C/o leg swelling and tachycardia and hypoxia. DDx: CHF, PE. Check BNP, trop, EKG, CT PACarolee    Kim DO:  54M, PMHx stage IV colorectal cancer, currently undergoing palliative radiation with plans to begin palliative FOLFOX, HTN, T2DM, HLD presents to ED from radiation oncology office complaining of shortness of breath and leg swelling.  Patient states that for the last 4 days, he has had progressive leg swelling with shortness of breath with any motion.  Patient is normally able to walk significant distances without shortness of breath, however, over the past 4 days he has been unable to sit up without shortness of breath.  Patient denies need to use pillows at night to sleep, endorses dry cough.  Patient denies recent travel, hemoptysis.  Patient denies fever, chills, chest pain, abdominal pain, N/V/D/C, dysuria/hematuria, urinary frequency/urgency, hematemesis, hematochezia, melena, BRBPR.  Denies trauma.    Patient noted to be tachycardic, tachypneic, otherwise hemodynamically stable afebrile.  Bilateral lower extremity swelling is less suggestive of DVT, however in presence of current CA, high clinical suspicion for PE, even given anticoagulation.  Additional differential diagnoses include, but are not limited to: New onset heart failure, worsening metastatic burden to lungs, worsening metastatic burden within abdomen causing increased ascites, infectious etiology.  Will acquire labs, including hepatic function panel, troponins, proBNP.  CT angio chest PE protocol.  UA.  Given tachypnea in setting of hyperglycemia, will acquire beta hydroxybutyrate to assess for overlying DKA.  To be admitted.

## 2024-08-23 NOTE — ED PROVIDER NOTE - PROGRESS NOTE DETAILS
DO Kim:  Patient reassessed, reports worsening pain.  Your requests home dose of 5 mg oxycodone.  To be ordered.  Workup significant for hyperglycemia in 500s, mild hyponatremia, elevated alk phos, transaminitis, significant elevated from lab result approximately 1 month prior.  Patient is not acidotic, UA with evidence of UTI.  CT PE without evidence of PE, however notable worsening mets to lungs and liver.  Patient to be admitted for inpatient management.

## 2024-08-23 NOTE — ED PROCEDURE NOTE - ATTENDING CONTRIBUTION TO CARE
I performed a face-to-face evaluation of the patient and performed a history and physical examination. I agree with the history and physical examination. If this was a PA visit, I personally saw the patient with the PA and performed a substantive portion of the visit including all aspects of the medical decision making.    Sundar: I was present during the critical part of the procedure.
I performed a face-to-face evaluation of the patient and performed a history and physical examination. I agree with the history and physical examination. If this was a PA visit, I personally saw the patient with the PA and performed a substantive portion of the visit including all aspects of the medical decision making.    Sundar: I was present during the critical part of the procedure.

## 2024-08-23 NOTE — ED PROVIDER NOTE - PHYSICAL EXAMINATION
Gen: AAOx3, tachypneic   Head: NCAT  HEENT: EOMI, oral mucosa moist, normal conjunctiva  Lung: CTAB, no respiratory distress, no wheezes/rhonchi/rales B/L, speaking in full sentences  CV: tachycardic, no murmurs, rubs or gallops  Abd: soft, NT, distended to baseline per pt, no guarding, no CVA tenderness  MSK: no visible deformities, 1-2+ pitting edema (non-weeping) pretibial to knee.  Neuro: No focal sensory or motor deficits  Skin: Jaundice   Psych: normal affect.

## 2024-08-23 NOTE — ED PROCEDURE NOTE - PROCEDURE ADDITIONAL DETAILS
Vessel identified, compressible. Visualized needle enter vessel, catheter passed smoothly. Good venous blood return and line flushed.
Suboptimal A4C views. PSS/PSL with evidence of normal wall motion. No RV dilation or septal bowing.

## 2024-08-23 NOTE — ED PROVIDER NOTE - NSICDXPASTMEDICALHX_GEN_ALL_CORE_FT
PAST MEDICAL HISTORY:  Colon cancer     HLD (hyperlipidemia)     HTN (hypertension)     T2DM (type 2 diabetes mellitus)
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
## 2024-08-23 NOTE — ED ADULT NURSE NOTE - OBJECTIVE STATEMENT
Break RN: A&OX4, awake, and alert, ambulatory, h/o DM, HTN, colorectal ca. Patient is coming to ED from radiation oncology in regards to SOB for two weeks, weakness for 2 months and BL leg swelling. Patient placed on cardiac monitor sinus tach, 2L NC for comfort.. awaiting orders, will continue to monitor.

## 2024-08-23 NOTE — ED ADULT NURSE REASSESSMENT NOTE - NS ED NURSE REASSESS COMMENT FT1
Pt received from BREAK RN on stretcher, A/Ox4 answers all questions appropriately. Pt denies chest pain and SOB during reassessment, breathing is even and unlabored on 2LNC. Abdomen is soft and non-distended. Pt ambulates independently at baseline, moves all four extremities on command, skin is intact. Pt resting comfortably at the bedside, No apparent acute visible distress noted on reassessment. Vital signs stable, NKA to medications. 20g US guided Iv placed on right bicep by Ed Resident MD Sanabria, all labs obtained and sent at this time. Pending EKG and CTA

## 2024-08-24 DIAGNOSIS — I10 ESSENTIAL (PRIMARY) HYPERTENSION: ICD-10-CM

## 2024-08-24 DIAGNOSIS — Z79.899 OTHER LONG TERM (CURRENT) DRUG THERAPY: ICD-10-CM

## 2024-08-24 DIAGNOSIS — E87.1 HYPO-OSMOLALITY AND HYPONATREMIA: ICD-10-CM

## 2024-08-24 DIAGNOSIS — N39.0 URINARY TRACT INFECTION, SITE NOT SPECIFIED: ICD-10-CM

## 2024-08-24 DIAGNOSIS — E11.65 TYPE 2 DIABETES MELLITUS WITH HYPERGLYCEMIA: ICD-10-CM

## 2024-08-24 DIAGNOSIS — C18.9 MALIGNANT NEOPLASM OF COLON, UNSPECIFIED: ICD-10-CM

## 2024-08-24 DIAGNOSIS — R09.89 OTHER SPECIFIED SYMPTOMS AND SIGNS INVOLVING THE CIRCULATORY AND RESPIRATORY SYSTEMS: ICD-10-CM

## 2024-08-24 DIAGNOSIS — A41.9 SEPSIS, UNSPECIFIED ORGANISM: ICD-10-CM

## 2024-08-24 DIAGNOSIS — R06.02 SHORTNESS OF BREATH: ICD-10-CM

## 2024-08-24 DIAGNOSIS — E11.9 TYPE 2 DIABETES MELLITUS WITHOUT COMPLICATIONS: ICD-10-CM

## 2024-08-24 DIAGNOSIS — E78.5 HYPERLIPIDEMIA, UNSPECIFIED: ICD-10-CM

## 2024-08-24 DIAGNOSIS — Z29.9 ENCOUNTER FOR PROPHYLACTIC MEASURES, UNSPECIFIED: ICD-10-CM

## 2024-08-24 DIAGNOSIS — Z90.49 ACQUIRED ABSENCE OF OTHER SPECIFIED PARTS OF DIGESTIVE TRACT: Chronic | ICD-10-CM

## 2024-08-24 DIAGNOSIS — R74.01 ELEVATION OF LEVELS OF LIVER TRANSAMINASE LEVELS: ICD-10-CM

## 2024-08-24 DIAGNOSIS — J96.01 ACUTE RESPIRATORY FAILURE WITH HYPOXIA: ICD-10-CM

## 2024-08-24 DIAGNOSIS — R79.89 OTHER SPECIFIED ABNORMAL FINDINGS OF BLOOD CHEMISTRY: ICD-10-CM

## 2024-08-24 DIAGNOSIS — C19 MALIGNANT NEOPLASM OF RECTOSIGMOID JUNCTION: ICD-10-CM

## 2024-08-24 LAB
ADD ON TEST-SPECIMEN IN LAB: SIGNIFICANT CHANGE UP
ALBUMIN SERPL ELPH-MCNC: 2 G/DL — LOW (ref 3.3–5)
ALP SERPL-CCNC: 1287 U/L — HIGH (ref 40–120)
ALT FLD-CCNC: 356 U/L — HIGH (ref 4–41)
ANION GAP SERPL CALC-SCNC: 13 MMOL/L — SIGNIFICANT CHANGE UP (ref 7–14)
ANION GAP SERPL CALC-SCNC: 15 MMOL/L — HIGH (ref 7–14)
AST SERPL-CCNC: 326 U/L — HIGH (ref 4–40)
BILIRUB SERPL-MCNC: 17.6 MG/DL — HIGH (ref 0.2–1.2)
BLOOD GAS VENOUS COMPREHENSIVE RESULT: SIGNIFICANT CHANGE UP
BUN SERPL-MCNC: 25 MG/DL — HIGH (ref 7–23)
BUN SERPL-MCNC: 29 MG/DL — HIGH (ref 7–23)
CALCIUM SERPL-MCNC: 7.5 MG/DL — LOW (ref 8.4–10.5)
CALCIUM SERPL-MCNC: 7.8 MG/DL — LOW (ref 8.4–10.5)
CHLORIDE SERPL-SCNC: 95 MMOL/L — LOW (ref 98–107)
CHLORIDE SERPL-SCNC: 96 MMOL/L — LOW (ref 98–107)
CO2 SERPL-SCNC: 17 MMOL/L — LOW (ref 22–31)
CO2 SERPL-SCNC: 18 MMOL/L — LOW (ref 22–31)
CREAT SERPL-MCNC: 0.75 MG/DL — SIGNIFICANT CHANGE UP (ref 0.5–1.3)
CREAT SERPL-MCNC: 0.82 MG/DL — SIGNIFICANT CHANGE UP (ref 0.5–1.3)
EGFR: 104 ML/MIN/1.73M2 — SIGNIFICANT CHANGE UP
EGFR: 107 ML/MIN/1.73M2 — SIGNIFICANT CHANGE UP
GLUCOSE SERPL-MCNC: 317 MG/DL — HIGH (ref 70–99)
GLUCOSE SERPL-MCNC: 331 MG/DL — HIGH (ref 70–99)
GLUCOSE SERPL-MCNC: 331 MG/DL — HIGH (ref 70–99)
HCT VFR BLD CALC: 36.4 % — LOW (ref 39–50)
HGB BLD-MCNC: 11.8 G/DL — LOW (ref 13–17)
MAGNESIUM SERPL-MCNC: 2.1 MG/DL — SIGNIFICANT CHANGE UP (ref 1.6–2.6)
MCHC RBC-ENTMCNC: 24.9 PG — LOW (ref 27–34)
MCHC RBC-ENTMCNC: 32.4 GM/DL — SIGNIFICANT CHANGE UP (ref 32–36)
MCV RBC AUTO: 77 FL — LOW (ref 80–100)
NRBC # BLD: 0 /100 WBCS — SIGNIFICANT CHANGE UP (ref 0–0)
NRBC # FLD: 0.1 K/UL — HIGH (ref 0–0)
PHOSPHATE SERPL-MCNC: 2 MG/DL — LOW (ref 2.5–4.5)
PLATELET # BLD AUTO: 195 K/UL — SIGNIFICANT CHANGE UP (ref 150–400)
POTASSIUM SERPL-MCNC: 4.8 MMOL/L — SIGNIFICANT CHANGE UP (ref 3.5–5.3)
POTASSIUM SERPL-MCNC: 4.9 MMOL/L — SIGNIFICANT CHANGE UP (ref 3.5–5.3)
POTASSIUM SERPL-SCNC: 4.8 MMOL/L — SIGNIFICANT CHANGE UP (ref 3.5–5.3)
POTASSIUM SERPL-SCNC: 4.9 MMOL/L — SIGNIFICANT CHANGE UP (ref 3.5–5.3)
PROT SERPL-MCNC: 6 G/DL — SIGNIFICANT CHANGE UP (ref 6–8.3)
RBC # BLD: 4.73 M/UL — SIGNIFICANT CHANGE UP (ref 4.2–5.8)
RBC # FLD: 22 % — HIGH (ref 10.3–14.5)
SODIUM SERPL-SCNC: 126 MMOL/L — LOW (ref 135–145)
SODIUM SERPL-SCNC: 128 MMOL/L — LOW (ref 135–145)
WBC # BLD: 16.41 K/UL — HIGH (ref 3.8–10.5)
WBC # FLD AUTO: 16.41 K/UL — HIGH (ref 3.8–10.5)

## 2024-08-24 PROCEDURE — 99252 IP/OBS CONSLTJ NEW/EST SF 35: CPT | Mod: GC

## 2024-08-24 PROCEDURE — 70360 X-RAY EXAM OF NECK: CPT | Mod: 26

## 2024-08-24 PROCEDURE — 93975 VASCULAR STUDY: CPT | Mod: 26

## 2024-08-24 PROCEDURE — 99223 1ST HOSP IP/OBS HIGH 75: CPT | Mod: GC

## 2024-08-24 PROCEDURE — 74177 CT ABD & PELVIS W/CONTRAST: CPT | Mod: 26

## 2024-08-24 RX ORDER — DEXTROSE 15 G/33 G
15 GEL IN PACKET (GRAM) ORAL ONCE
Refills: 0 | Status: DISCONTINUED | OUTPATIENT
Start: 2024-08-24 | End: 2024-09-04

## 2024-08-24 RX ORDER — DEXTROSE 15 G/33 G
12.5 GEL IN PACKET (GRAM) ORAL ONCE
Refills: 0 | Status: DISCONTINUED | OUTPATIENT
Start: 2024-08-24 | End: 2024-09-04

## 2024-08-24 RX ORDER — ROSUVASTATIN CALCIUM 10 MG/1
10 TABLET ORAL AT BEDTIME
Refills: 0 | Status: DISCONTINUED | OUTPATIENT
Start: 2024-08-24 | End: 2024-08-24

## 2024-08-24 RX ORDER — ENOXAPARIN SODIUM 100 MG/ML
40 INJECTION SUBCUTANEOUS EVERY 24 HOURS
Refills: 0 | Status: DISCONTINUED | OUTPATIENT
Start: 2024-08-24 | End: 2024-08-26

## 2024-08-24 RX ORDER — AMLODIPINE BESYLATE 10 MG/1
10 TABLET ORAL DAILY
Refills: 0 | Status: DISCONTINUED | OUTPATIENT
Start: 2024-08-24 | End: 2024-08-26

## 2024-08-24 RX ORDER — LISINOPRIL 10 MG/1
5 TABLET ORAL DAILY
Refills: 0 | Status: DISCONTINUED | OUTPATIENT
Start: 2024-08-24 | End: 2024-08-26

## 2024-08-24 RX ORDER — IPRATROPIUM BROMIDE AND ALBUTEROL SULFATE .5; 3 MG/3ML; MG/3ML
3 SOLUTION RESPIRATORY (INHALATION) EVERY 6 HOURS
Refills: 0 | Status: DISCONTINUED | OUTPATIENT
Start: 2024-08-24 | End: 2024-09-04

## 2024-08-24 RX ORDER — GLUCAGON INJECTION, SOLUTION 1 MG/.2ML
1 INJECTION, SOLUTION SUBCUTANEOUS ONCE
Refills: 0 | Status: DISCONTINUED | OUTPATIENT
Start: 2024-08-24 | End: 2024-09-04

## 2024-08-24 RX ORDER — INSULIN GLARGINE 100 [IU]/ML
8 INJECTION, SOLUTION SUBCUTANEOUS AT BEDTIME
Refills: 0 | Status: DISCONTINUED | OUTPATIENT
Start: 2024-08-24 | End: 2024-08-25

## 2024-08-24 RX ORDER — GLIPIZIDE 10 MG
1 TABLET ORAL
Refills: 0 | DISCHARGE

## 2024-08-24 RX ORDER — DEXTROSE 15 G/33 G
25 GEL IN PACKET (GRAM) ORAL ONCE
Refills: 0 | Status: DISCONTINUED | OUTPATIENT
Start: 2024-08-24 | End: 2024-09-04

## 2024-08-24 RX ORDER — METOCLOPRAMIDE HCL 5 MG
1 TABLET ORAL
Refills: 0 | DISCHARGE

## 2024-08-24 RX ORDER — DULOXETINE HCL 30 MG
1 CAPSULE,DELAYED RELEASE (ENTERIC COATED) ORAL
Refills: 0 | DISCHARGE

## 2024-08-24 RX ORDER — OXYCODONE HYDROCHLORIDE 5 MG/1
5 TABLET ORAL ONCE
Refills: 0 | Status: DISCONTINUED | OUTPATIENT
Start: 2024-08-24 | End: 2024-08-24

## 2024-08-24 RX ORDER — BENZONATATE 100 MG
100 CAPSULE ORAL EVERY 8 HOURS
Refills: 0 | Status: DISCONTINUED | OUTPATIENT
Start: 2024-08-24 | End: 2024-08-26

## 2024-08-24 RX ADMIN — Medication 100 MILLIGRAM(S): at 22:32

## 2024-08-24 RX ADMIN — Medication 8: at 13:11

## 2024-08-24 RX ADMIN — Medication 4: at 09:21

## 2024-08-24 RX ADMIN — Medication 100 MILLIGRAM(S): at 06:17

## 2024-08-24 RX ADMIN — ENOXAPARIN SODIUM 40 MILLIGRAM(S): 100 INJECTION SUBCUTANEOUS at 06:17

## 2024-08-24 RX ADMIN — OXYCODONE HYDROCHLORIDE 5 MILLIGRAM(S): 5 TABLET ORAL at 00:22

## 2024-08-24 RX ADMIN — SODIUM CHLORIDE 1000 MILLILITER(S): 9 INJECTION INTRAMUSCULAR; INTRAVENOUS; SUBCUTANEOUS at 00:23

## 2024-08-24 RX ADMIN — Medication 100 MILLILITER(S): at 11:18

## 2024-08-24 RX ADMIN — Medication 6 UNIT(S): at 00:44

## 2024-08-24 RX ADMIN — AMLODIPINE BESYLATE 10 MILLIGRAM(S): 10 TABLET ORAL at 06:17

## 2024-08-24 RX ADMIN — Medication 100 MILLIGRAM(S): at 13:11

## 2024-08-24 RX ADMIN — Medication 6: at 17:31

## 2024-08-24 RX ADMIN — Medication 3 UNIT(S): at 07:17

## 2024-08-24 RX ADMIN — INSULIN GLARGINE 8 UNIT(S): 100 INJECTION, SOLUTION SUBCUTANEOUS at 22:32

## 2024-08-24 RX ADMIN — Medication 100 MILLIGRAM(S): at 22:45

## 2024-08-24 RX ADMIN — LISINOPRIL 5 MILLIGRAM(S): 10 TABLET ORAL at 06:17

## 2024-08-24 NOTE — CONSULT NOTE ADULT - SUBJECTIVE AND OBJECTIVE BOX
HPI:  54M PMH colorectal adenocarcinoma ([facility] [address], Stage IV, mets to liver, lung, hip) admitted for acute hypoxic respiratory failure and acute liver injury with jaundice. Oncology consulted for metastatic disease progression contributing to acute presentation.      PAST MEDICAL & SURGICAL HISTORY:  Colon cancer      HTN (hypertension)      HLD (hyperlipidemia)      T2DM (type 2 diabetes mellitus)      S/P partial colectomy          FAMILY HISTORY:  No pertinent family history in first degree relatives        Allergies    No Known Allergies    Intolerances        Home Medications:  amLODIPine 10 mg oral tablet: 1 tab(s) orally once a day (24 Aug 2024 02:44)  benzonatate 100 mg oral capsule: 1 cap(s) orally every 8 hours (24 Aug 2024 02:43)  DULoxetine 30 mg oral delayed release capsule: 1 cap(s) orally once a day (24 Aug 2024 08:42)  GlipiZIDE XL 10 mg oral tablet, extended release: 1 tab(s) orally once a day (24 Aug 2024 08:41)  lisinopril 5 mg oral tablet: 1 tab(s) orally once a day (24 Aug 2024 02:44)  metFORMIN 500 mg oral tablet: 1 tab(s) orally 2 times a day (24 Aug 2024 08:41)  metoclopramide 10 mg oral tablet: 1 tab(s) orally every 6 hours as needed for  nausea (24 Aug 2024 08:42)  oxyCODONE 5 mg oral tablet: 1 tab(s) orally every 6 hours as needed for  pain (24 Aug 2024 08:42)  rosuvastatin 10 mg oral tablet: 1 tab(s) orally once a day (24 Aug 2024 02:44)  Xarelto 20 mg oral tablet: 1 tab(s) orally once a day (24 Aug 2024 08:42)        REVIEW OF SYSTEMS:  CONSTITUTIONAL: No weakness, fevers, chills, sick contacts, or unintended weight loss  EYES: No visual changes or vertigo; +yellowing  ENT: No throat pain, rhinorrhea, or hearing loss   NECK: No pain or stiffness  RESPIRATORY: No cough, wheezing, hemoptysis; No shortness of breath  CARDIOVASCULAR: No chest pain or palpitations  GASTROINTESTINAL: No abdominal or epigastric pain. No nausea, vomiting, or hematemesis; No diarrhea or constipation. No melena or hematochezia.  GENITOURINARY: No dysuria, frequency or hematuria  NEUROLOGICAL: No numbness or weakness; +neuropathic pain b/l legs with edema  SKIN: No itching, rashes, or bruises;   Psych: Good mood, no substance use      OBJECTIVE:  ICU Vital Signs Last 24 Hrs  T(C): 37.1 (24 Aug 2024 10:16), Max: 37.1 (24 Aug 2024 10:16)  T(F): 98.8 (24 Aug 2024 10:16), Max: 98.8 (24 Aug 2024 10:16)  HR: 105 (24 Aug 2024 10:16) (88 - 110)  BP: 123/79 (24 Aug 2024 10:16) (123/79 - 150/102)  BP(mean): --  ABP: --  ABP(mean): --  RR: 18 (24 Aug 2024 10:16) (17 - 18)  SpO2: 96% (24 Aug 2024 10:16) (96% - 100%)    O2 Parameters below as of 24 Aug 2024 10:16  Patient On (Oxygen Delivery Method): nasal cannula              CAPILLARY BLOOD GLUCOSE      POCT Blood Glucose.: 314 mg/dL (24 Aug 2024 13:09)      GENERAL: NAD, lying in bed comfortably on RA  HEAD:  Atraumatic, Normocephalic  EYES: EOMI, +scleral icterus  ENT: Moist mucous membranes  NECK: Supple  CHEST/LUNG: Unlabored respirations; no wheezing  HEART: Regular rate and rhythm  ABDOMEN: Soft, nontender, nondistended  EXTREMITIES:  No cyanosis; +b/l pitting edema legs  NERVOUS SYSTEM:  A&Ox3, no focal deficits   SKIN: No rashes or lesions  Psych: Normal speech, normal behavior, normal affect      HOSPITAL MEDICATIONS:  MEDICATIONS  (STANDING):  amLODIPine   Tablet 10 milliGRAM(s) Oral daily  benzonatate 100 milliGRAM(s) Oral every 8 hours  cefTRIAXone   IVPB 1000 milliGRAM(s) IV Intermittent every 24 hours  dextrose 5%. 1000 milliLiter(s) (100 mL/Hr) IV Continuous <Continuous>  dextrose 5%. 1000 milliLiter(s) (50 mL/Hr) IV Continuous <Continuous>  dextrose 50% Injectable 25 Gram(s) IV Push once  dextrose 50% Injectable 12.5 Gram(s) IV Push once  dextrose 50% Injectable 25 Gram(s) IV Push once  enoxaparin Injectable 40 milliGRAM(s) SubCutaneous every 24 hours  glucagon  Injectable 1 milliGRAM(s) IntraMuscular once  insulin glargine Injectable (LANTUS) 8 Unit(s) SubCutaneous at bedtime  insulin lispro (ADMELOG) corrective regimen sliding scale   SubCutaneous three times a day before meals  lactated ringers. 1000 milliLiter(s) (100 mL/Hr) IV Continuous <Continuous>  lisinopril 5 milliGRAM(s) Oral daily    MEDICATIONS  (PRN):  albuterol/ipratropium for Nebulization 3 milliLiter(s) Nebulizer every 6 hours PRN Shortness of Breath and/or Wheezing  dextrose Oral Gel 15 Gram(s) Oral once PRN Blood Glucose LESS THAN 70 milliGRAM(s)/deciliter      LABS:                        11.8   16.41 )-----------( 195      ( 24 Aug 2024 06:00 )             36.4     08-24    126<L>  |  95<L>  |  29<H>  ----------------------------<  317<H>  4.8   |  18<L>  |  0.82    Ca    7.8<L>      24 Aug 2024 11:10  Phos  2.0     08-24  Mg     2.10     08-24    TPro  6.0  /  Alb  2.0<L>  /  TBili  17.6<H>  /  DBili  x   /  AST  326<H>  /  ALT  356<H>  /  AlkPhos  1287<H>  08-24      Urinalysis Basic - ( 24 Aug 2024 11:10 )    Color: x / Appearance: x / SG: x / pH: x  Gluc: 317 mg/dL / Ketone: x  / Bili: x / Urobili: x   Blood: x / Protein: x / Nitrite: x   Leuk Esterase: x / RBC: x / WBC x   Sq Epi: x / Non Sq Epi: x / Bacteria: x

## 2024-08-24 NOTE — H&P ADULT - NSHPLABSRESULTS_GEN_ALL_CORE
Labs:                        14.0   17.67 )-----------( 238      ( 23 Aug 2024 20:53 )             43.7     08-23    124<L>  |  91<L>  |  30<H>  ----------------------------<  427<H>  5.3   |  19<L>  |  0.85    Ca    7.9<L>      23 Aug 2024 21:45    TPro  7.3  /  Alb  2.3<L>  /  TBili  20.4<H>  /  DBili  16.6<H>  /  AST  420<H>  /  ALT  449<H>  /  AlkPhos  1482<H>  08-23    RADIOLOGY:  CT Angio Chest PE Protocol w/ IV Cont 08.23.24  IMPRESSION:  No pulmonary embolus.  Increased size of innumerable bilateral pulmonary metastases.  Diffuse hepatic metastases several of which demonstrate increase in size. Labs:                        14.0   17.67 )-----------( 238      ( 23 Aug 2024 20:53 )             43.7     08-23    124<L>  |  91<L>  |  30<H>  ----------------------------<  427<H>  5.3   |  19<L>  |  0.85    Ca    7.9<L>      23 Aug 2024 21:45    TPro  7.3  /  Alb  2.3<L>  /  TBili  20.4<H>  /  DBili  16.6<H>  /  AST  420<H>  /  ALT  449<H>  /  AlkPhos  1482<H>  08-23    RADIOLOGY:  < from: CT Angio Chest PE Protocol w/ IV Cont (08.23.24 @ 22:49) >  FINDINGS:    LUNGS AND LARGE AIRWAYS: Patent central airways. Increased size of bilateral innumerable pulmonary metastases. For reference: A left lower lobe lesion that was previously cavitary and measured 2.9 x 2.5 cm now measures 4.0 x 3.0 cm on the current exam without central cavitation.  PLEURA: No pleural effusion.  VESSELS: No pulmonary embolus..  HEART: Heart size is normal. No pericardial effusion.  MEDIASTINUM AND MARS: Right hilar node measures 1.9 x 1.3 cm, increased from prior study. Subcarinal node measures 1.5 cm in short axis, increased from prior study. Right cardiophrenic and anterior diaphragmatic lymph nodes are again noted, grossly stable..  CHEST WALL AND LOWER NECK: Right anterior chest wall Mediport with tip in the cava atrial junction.  VISUALIZED UPPER ABDOMEN: Diffuse metastatic infiltration of the liver. Several lesions demonstrate interval growth. For reference: A segment 2 lesion measures 1.7 x 1.6 cm, previously 1.1 x 0.4 cm.  BONES: Degenerative changes.    IMPRESSION:  No pulmonary embolus.    Increased size of innumerable bilateral pulmonary metastases.    Diffuse hepatic metastases several of which demonstrate increase in size.  --- End of Report ---  < end of copied text >    EKG - Sinus tachycardia, rate 108, QTc 434, 1mm J point elevation V2 (stable from prior), TWI III (stable from prior), no significant ST-T wave changes

## 2024-08-24 NOTE — H&P ADULT - PROBLEM SELECTOR PLAN 9
DVT ppx: Lovenox 40mg daily  Diet: Consistent carb/DASH  Dispo: pending clinical course  Code Status: FULL CODE

## 2024-08-24 NOTE — H&P ADULT - NSHPSOCIALHISTORY_GEN_ALL_CORE
Lives with sister and her family. Normally ambulates without issues, now restricted due to SOB. Lives with sister and her family. Normally ambulates without issues, now restricted due to SOB and needs assistance.  Denies tobacco, EtOH, or illicit substance use

## 2024-08-24 NOTE — ED ADULT NURSE REASSESSMENT NOTE - NS ED NURSE REASSESS COMMENT FT1
report received from NISSA Mejia. pt remains at baseline mental status A&OX4, RR even unlabored completing full clear sentences. pt endorsing increasing pain, medicated per EMAR. stretcher lowest position siderails up safety measures in place. report received from NISSA Mejia. pt remains at baseline mental status A&OX4, RR even unlabored completing full clear sentences. pt endorsing increasing pain, medicated per EMAR. stretcher lowest position siderails up safety measures in place. pt provided food per MD cruz report received from NISSA Mejia. pt remains at baseline mental status A&OX4, RR even unlabored completing full clear sentences. documentation noted that pt is on RA however pt received on 2LNC, titrated to RA and pt o2 saturation desaturated to 88%, pt placed back on 2LNC with improvement to 92% and above, and provider Monet made aware. pt endorsing increasing pain as well, medicated per EMAR. stretcher lowest position siderails up safety measures in place.

## 2024-08-24 NOTE — H&P ADULT - HISTORY OF PRESENT ILLNESS
BASSAM LOGAN is a 54y Male with a hx of Stage IV colorectal cancer w/ mets to liver and lung s/p chemo, now undergoing palliative radiation, HTN, HLD, and T2DM who presents with SOB    Patient reports developing SOB with exertion about a month ago, which have gradually worsened. Initially he had SOB while going up stairs, but now has SOB even with short distances. No SOB at rest. States ambulation is also limited by swelling in his feet. No chest pain, palpitations, lightheadedness, fever, chills, orthopnea, abdominal pain, diarrhea, or dysuria. No sick contacts or recent travel.    In the ED, patient was put on NC 2L due to desat to upper 80s%. Hemodynamically stable. Bedside POCUS with grossly normal cardiac function. Course complicated by hyperglycemia and UTI. Given Ceftriaxone and Lispro 6u in ED. BASSAM LOGAN is a 54y Male with a hx of Stage IV colorectal cancer w/ mets to liver and lung s/p chemo, now undergoing palliative radiation, HTN, HLD, and T2DM who presents with SOB    Patient reports developing SOB with exertion about a month ago, which has gradually worsened. Initially he had SOB while going up stairs, but now has SOB even with short distances. No SOB at rest. Reports a mild cough with his SOB, mostly non-productive, no hemoptysis. Also notes fast heart rate with exertion but denies chest pain, dizziness, or LOC. States ambulation is also limited by swelling in his feet, states the swelling is new over the past 5 days, no pain associated with the swelling. Reports mild orthopnea but no PND. Also reports dysuria and hematuria since last week but no urinary frequency. Denies fevers/chills, abd pain, nausea/emesis, diarrhea/constipation, hematemesis, hematochezia, or melena. States his BMs are grey in color for the past 1 week. No other acute  complaints. No sick contacts or recent travel.    In the ED, patient was put on NC 2L due to desat to upper 80s%. Hemodynamically stable. Bedside POCUS with grossly normal cardiac function. Course complicated by hyperglycemia and UTI. Given Ceftriaxone and Lispro 6u in ED.

## 2024-08-24 NOTE — H&P ADULT - PROBLEM SELECTOR PLAN 3
Na 124 on admission. Corrected Na 132 with hyperglycemia. - Significant new transaminitis with hyperbilirubinemia to 20.4, majority direct bilirubin  - Patient reports noticing yellowing of his eyes about 10 days ago, noticing grey colored BMs since last week  - Denies abd pain, nausea/emesis, current diarrhea/constipation  - On examination abdomen without TTP, no RUQ pain, good bowel sounds  - CTA Chest incidentally captured some of the liver, shows worsening liver mets, no comments on the biliary structure -> will check US Abdomen Duplex for further evaluation, GI consult emailed

## 2024-08-24 NOTE — H&P ADULT - PROBLEM SELECTOR PLAN 2
UA positive. Having urinary frequency.  - Ceftriaxone x3d  - f/u UCx Reports dysuria and hematuria x1 week, no urinary frequency though. Here noted to have leukocytosis to 17k with tachycardia and positive UA concerning for sepsis 2/2 UTI.  - Ceftriaxone 1g likely for 7 days (8/23-  - f/u UCx  - Check BCx x2  - Lactate elevated to 3.9 x2, likely has poor clearance 2/2 worsening liver mets, repeat in AM

## 2024-08-24 NOTE — H&P ADULT - ASSESSMENT
BASSAM LOGAN is a 54y Male with a hx of Stage IV colorectal cancer w/ mets to liver and lung s/p chemo, now undergoing palliative radiation, HTN, HLD, and T2DM who presents with SOB, found to be in acute hypoxic respiratory failure. BASSAM LOGAN is a 54y Male with a hx of Stage IV colorectal cancer w/ mets to liver and lung s/p chemo, now undergoing palliative radiation, HTN, HLD, and T2DM who presents with SOB, found to be in acute hypoxic respiratory failure with significant transaminitis.

## 2024-08-24 NOTE — H&P ADULT - NSHPPHYSICALEXAM_GEN_ALL_CORE
Vital Signs Last 24 Hrs  T(C): 36.7 (24 Aug 2024 00:36), Max: 36.7 (23 Aug 2024 23:20)  T(F): 98 (24 Aug 2024 00:36), Max: 98.1 (23 Aug 2024 23:20)  HR: 88 (24 Aug 2024 00:36) (88 - 110)  BP: 145/86 (24 Aug 2024 00:36) (140/94 - 145/86)  BP(mean): --  RR: 17 (24 Aug 2024 00:36) (17 - 18)  SpO2: 98% (24 Aug 2024 00:36) (98% - 100%)    Parameters below as of 24 Aug 2024 00:36  Patient On (Oxygen Delivery Method): nasal cannula  O2 Flow (L/min): 2    Physical Exam:  CONSTITUTIONAL: no apparent distress.  SKIN: No rashes or ecchymosis.  EYES: PERRLA. EOMI. No scleral icterus.  ENT: Supple. No discharge or glossitis. Oral mucosa with moist membranes. Hearing intact bilaterally.  CV: (+) BLE 2+ pitting edema up to mid-shins. RRR. Normal S1 and S2. No murmurs, rubs, or gallops. Pulses equal bilaterally.  PULM: (+) shallow breathes. (+) Inspiratory stridor. Respirations unlabored. No wheezing, rales, or rhonchi.  GI: Soft, non-tender, non-distended. No palpable masses.  MSK: No cyanosis or clubbing.  NEURO: CN grossly normal  PSYCH: A+O, appropriately communicating and responding to questions Vital Signs Last 24 Hrs  T(C): 36.7 (24 Aug 2024 00:36), Max: 36.7 (23 Aug 2024 23:20)  T(F): 98 (24 Aug 2024 00:36), Max: 98.1 (23 Aug 2024 23:20)  HR: 88 (24 Aug 2024 00:36) (88 - 110)  BP: 145/86 (24 Aug 2024 00:36) (140/94 - 145/86)  BP(mean): --  RR: 17 (24 Aug 2024 00:36) (17 - 18)  SpO2: 98% (24 Aug 2024 00:36) (98% - 100%)    Parameters below as of 24 Aug 2024 00:36  Patient On (Oxygen Delivery Method): nasal cannula  O2 Flow (L/min): 2    Physical Exam:  CONSTITUTIONAL: no apparent distress.  SKIN: No rashes or ecchymosis.  EYES: PERRL. EOMI. +scleral icterus.  ENT: Supple. No discharge or glossitis. Oral mucosa with moist membranes. Hearing intact bilaterally.  CV: (+) BLE 2+ pitting edema up to upper-shins. RRR. Normal S1 and S2. No murmurs, rubs, or gallops. Pulses equal bilaterally.  PULM: (+) shallow breathes. Respirations unlabored. No wheezing, rales, or rhonchi.  GI: Soft, non-tender, non-distended. No palpable masses.  MSK: No cyanosis or clubbing.  NEURO: STEWART x4, SILT b/l to light touch  PSYCH: A+O, appropriately communicating and responding to questions

## 2024-08-24 NOTE — PROGRESS NOTE ADULT - PROBLEM SELECTOR PLAN 7
Internal Medicine Progress Note          History Of Present Illness  Matteo is a 51 year old male presenting with .  xiomara is a 51 year old male History of hypertension, atrial fibrillation, CHF, PTSD, bipolar disorder, substance abuse presented to outside hospital for paranoia.  In the emergency department vital signs stable.  Urine drug screen positive for benzodiazepines and cannabis.  Evaluated by behavioral health, transferred to [facility] inpatient psychiatric unit, hospitalist service consulted for medical management and H&P. Reportedly was having paranoid delusions about the LOU.  Denies suicidal/homicidal ideation.  No hallucinations at this time.  Somewhat of a vague historian, states having recent echocardiogram showing worsening EF and LV function however no echocardiogram/stress test seen on chart review in the past year.  He stopped taking blood thinners 6 years ago due to bleeding from a skin while working in the garden, kids, and dogs, states being active.  He is now having some shortness of breath and knot sensation in his abdomen when he is anxious.  Additional triggers include exertion and humidity.  Also having some ankle swelling.  10 pound weight loss over the past 1 year.  States compliance with medications.     6/11 Notified by RNs last 2 days about orthostatic hypotension.  Blood pressure checks precipitated by episode of dizziness.  Seen in the hallways tonight with nursing.  No asymptomatic.  Has a history of congestive heart failure cardiomyopathy says.  Follows at [facility] lives in [place].  There was blood pressure difference between the arms according nursing.  Last echo in the chart from 221 shows ejection fraction 55 to 60%.  EKG from February sinus rhythm has a history of atrial fibrillation.  Recovered cardiomyopathy ejection fraction previously 35 to 40%.  Status post A-fib ablation 2015.Currently blood pressure 106/68 O2 sat 98% respiration 16 afebrile heart rate 71  walking the halls appears quite comfortable no distress.  BUN 23 creatinine 1 yesterday CBC MCV is 102 otherwise normal hemoglobin 14.  Past Medical History  Past Medical History:   Diagnosis Date    Alcohol abuse     Congestive heart failure  (CMD)     History of appendectomy     HTN (hypertension)         Surgical History  Past Surgical History:   Procedure Laterality Date    Arthrotomy/explore/treat knee joint      Intracard cath [facility] for afib after pvi               Allergies  ALLERGIES:  Patient has no known allergies.    Medications  Current Facility-Administered Medications   Medication Dose Route Frequency Provider Last Rate Last Admin    prazosin (MINIPRESS) capsule 4 mg  4 mg Oral Nightly Ellen Galeano MD        gabapentin (NEURONTIN) capsule 100 mg  100 mg Oral TID PC Ellen Galeano MD   100 mg at 06/11/24 1725    traZODone (DESYREL) tablet 100 mg  100 mg Oral Nightly Ellen Galeano MD   100 mg at 06/10/24 2112    divalproex (DEPAKOTE) delayed release EC tablet 500 mg  500 mg Oral 2 times per day Bhargav Arboleda, DO   500 mg at 06/11/24 0837    QUEtiapine (SEROquel) tablet 200 mg  200 mg Oral 2 times per day Bhargav Arboleda DO   200 mg at 06/11/24 0837    benztropine mesylate (COGENTIN) 1 MG/ML injection 1 mg  1 mg Intramuscular Q4H PRN Ellen Galeano MD        Or    benztropine (COGENTIN) tablet 1 mg  1 mg Oral Q4H PRN Ellen Galeano MD        hydrOXYzine (ATARAX) tablet 50 mg  50 mg Oral Q4H PRN Ellen Galeano MD   50 mg at 06/10/24 1243    haloperidol lactate (HALDOL) 5 MG/ML short-acting injection 5 mg  5 mg Intramuscular Q4H PRN Ellen Galeano MD        Or    haloperidol (HALDOL) tablet 5 mg  5 mg Oral Q4H PRN Ellen Galeano MD   5 mg at 06/09/24 2124    LORazepam (ATIVAN) injection 1 mg  1 mg Intramuscular Q4H PRN Ellen Galeano MD        Or    LORazepam (ATIVAN) tablet 1 mg  1 mg Oral Q4H PRN lElen Galeano, MD   1 mg at 06/07/24 2120    acetaminophen  (TYLENOL) tablet 500 mg  500 mg Oral Q4H PRN Ellen Galeano MD        Or    acetaminophen (TYLENOL) tablet 650 mg  650 mg Oral Q4H PRN Ellen Galeano MD   650 mg at 06/09/24 0838    Or    acetaminophen (TYLENOL) tablet 1,000 mg  1,000 mg Oral Q6H PRN Ellen Galeano MD   1,000 mg at 06/11/24 0837    polyethylene glycol (MIRALAX) packet 17 g  17 g Oral Daily PRN Ellen Galeano MD        docusate sodium-sennosides (SENOKOT S) 50-8.6 MG 2 tablet  2 tablet Oral BID PRN Ellen Galeano MD        magnesium hydroxide (MILK OF MAGNESIA) 400 MG/5ML suspension 30 mL  30 mL Oral Daily PRN Ellen Galeano MD        aluminum-magnesium hydroxide-simethicone (MAALOX) 200-200-20 MG/5ML suspension 30 mL  30 mL Oral Q4H PRN Ellen Galeano MD        atorvastatin (LIPITOR) tablet 80 mg  80 mg Oral Daily Maximo Clay MD   80 mg at 06/11/24 0837    carvedilol (COREG) tablet 25 mg  25 mg Oral BID  Maximo Clay MD   25 mg at 06/11/24 1725    enalapril (VASOTEC) tablet 5 mg  5 mg Oral Daily Maximo Clay MD   5 mg at 06/11/24 0837       Prior to Admission medications    Medication Sig Start Date End Date Taking? Authorizing Provider   carvedilol (COREG) 25 MG tablet TAKE 1 TABLET BY MOUTH IN THE MORNING AND IN THE EVENING WITH MEALS 5/6/24  Yes Jeri Mckoy PA-C   ferrous sulfate 325 (65 FE) MG tablet Take 1 tablet by mouth daily (with breakfast). 4/26/24  Yes Jeri Mckoy PA-C   vitamin B-12 (CYANOCOBALAMIN) 1000 MCG tablet Take 1 tablet by mouth daily. 3/28/24  Yes Provider, Outside   enalapril (VASOTEC) 5 MG tablet Take 1 tablet by mouth daily. 4/8/24  Yes Jeri Mckoy PA-C   triamcinolone (NASACORT) 55 MCG/ACT nasal inhaler Spray 2 sprays in each nostril daily. 7/5/23  Yes Jeri Mckoy PA-C   fluticasone (FLONASE) 50 MCG/ACT nasal spray Spray 2 sprays in each nostril daily. 6/26/23  Yes Jeri Mckoy PA-C   prazosin (MINIPRESS) 2 MG capsule TAKE 4 CAPSULES BY MOUTH AT BEDTIME  4/17/23  Yes Provider, Outside   B Complex Vitamins (B COMPLEX PO)    Yes Provider, Outside   Multiple Vitamins-Minerals (Multivitamin Adults) Tab    Yes Provider, Outside   gabapentin (NEURONTIN) 400 MG capsule TAKE 1 CAPSULE BY MOUTH IN THE MORNING AND AT NOON AND IN THE EVENING 4/10/23  Yes Jeri Mckoy PA-C   DIAZepam (VALIUM) 10 MG tablet Take 10 mg by mouth in the morning and 10 mg in the evening.   Yes Provider, Outside   divalproex (DEPAKOTE) 500 MG delayed release EC tablet Take 1 tablet by mouth at bedtime. 8/1/22  Yes Matteo Hernandez MD   escitalopram (LEXAPRO) 20 MG tablet Take 1 tablet by mouth daily. 8/1/22  Yes Matteo Hernandez MD   QUEtiapine (SEROquel) 100 MG tablet Take 1-2 tablets by mouth nightly as needed (for sleep).  Patient taking differently: Take 200 mg by mouth nightly as needed (for sleep). 8/1/22  Yes Matteo Hernandez MD   nalTREXone (REVIA) 50 MG tablet Take 1 tablet by mouth daily. 8/1/22  Yes Matteo Hernandez MD   atorvastatin (LIPITOR) 80 MG tablet Take 80 mg by mouth daily. 5/14/22  Yes Provider, Outside   sildenafil (VIAGRA) 50 MG tablet Take 1 tablet by mouth as needed for Erectile Dysfunction. 4/8/24   Jeri Mckoy PA-C       Review of Systems  Review of Systems   Constitutional: Negative.    HENT: Negative.     Eyes: Negative.    Respiratory: Negative.  Negative for shortness of breath.    Cardiovascular: Negative.  Negative for chest pain.   Gastrointestinal: Negative.    Endocrine: Negative.    Genitourinary: Negative.    Musculoskeletal: Negative.    Skin: Negative.    Allergic/Immunologic: Negative.    Neurological:  Positive for dizziness.   Hematological: Negative.    Psychiatric/Behavioral: Negative.           Physical Exam  Physical Exam  Vitals and nursing note reviewed.   Constitutional:       Appearance: He is well-developed.   HENT:      Head: Normocephalic and atraumatic.      Right Ear: External ear normal.      Left Ear: External ear normal.      Nose: Nose  normal.      Neck: Normal range of motion and neck supple.   Eyes:      General: No scleral icterus.        Right eye: No discharge.         Left eye: No discharge.      Conjunctiva/sclera: Conjunctivae normal.      Pupils: Pupils are equal, round, and reactive to light.   Neck:      Thyroid: No thyromegaly.      Vascular: No JVD.      Trachea: No tracheal deviation.   Cardiovascular:      Rate and Rhythm: Normal rate and regular rhythm.      Heart sounds: Normal heart sounds. No murmur heard.     No friction rub. No gallop.   Pulmonary:      Effort: Pulmonary effort is normal.      Breath sounds: Normal breath sounds. No stridor. No wheezing or rales.   Chest:      Chest wall: No tenderness.   Abdominal:      General: Bowel sounds are normal. There is no distension.      Palpations: Abdomen is soft. There is no mass.      Tenderness: There is no abdominal tenderness. There is no guarding or rebound.   Musculoskeletal:         General: No tenderness or deformity. Normal range of motion.   Lymphadenopathy:      Cervical: No cervical adenopathy.   Skin:     General: Skin is warm and dry.      Coloration: Skin is not pale.      Findings: No erythema or rash.   Neurological:      Mental Status: He is alert and oriented to person, place, and time.      Cranial Nerves: No cranial nerve deficit.      Motor: No abnormal muscle tone.      Coordination: Coordination normal.   Psychiatric:         Behavior: Behavior normal.         Thought Content: Thought content normal.         Judgment: Judgment normal.          Last Recorded Vitals  Blood pressure 106/68, pulse 71, temperature 98.4 °F (36.9 °C), resp. rate 16, height 6' 2\" (1.88 m), weight 83.5 kg (184 lb 1.4 oz), SpO2 98%.  Body mass index is 23.64 kg/m².     Relevant Results    Imaging    No orders to display        Labs   Admission on 06/07/2024   Component Date Value Ref Range Status    Hemoglobin A1C 06/08/2024 4.6  4.5 - 5.6 % Final      Diabetic Screening  Non  Diabetic:             <5.7%  Increased Risk:           5.7-6.4%  Diagnostic For Diabetes:  >6.4%    Diabetic Control  A1C%       eAG mg/dL  6.0            126  6.5            140  7.0            154  7.5            169  8.0            183  8.5            197  9.0            212  9.5            226  10.0           240    Cholesterol 06/08/2024 159  <=199 mg/dL Final      Desirable         <200  Borderline High   200 to 239  High              >=240    Triglycerides 06/08/2024 115  <=149 mg/dL Final      Normal            <150  Borderline High   150 to 199  High              200 to 499  Very High         >=500    HDL 06/08/2024 34 (L)  >=40 mg/dL Final      Low              <40  Borderline Low   40 to 49  Near Optimal     50 to 59  Optimal          >=60    LDL 06/08/2024 102  <=129 mg/dL Final      Optimal           <100  Near Optimal      100 to 129  Borderline High   130 to 159  High              160 to 189  Very High         >=190    Non-HDL Cholesterol 06/08/2024 125  mg/dL Final      Therapeutic Target:  CHD and risk equivalents  <130  Multiple risk factors     <160  0 to 1 risk factor        <190    Cholesterol/ HDL Ratio 06/08/2024 4.7 (H)  <=4.4 Final    TSH 06/08/2024 1.190  0.350 - 5.000 mcUnits/mL Final    Findings most consistent with euthyroid state, no additional testing suggested. TSH may be normal in patients with thyroid dysfunction and pituitary disease. Clinical correlation recommended.    (Reflex TSH algorithm is not recommended in hospitalized patients. A variety of drugs, as well as serious acute and chronic illnesses may alter thyroid function tests. Commonly implicated drugs include glucocorticoids, dopamine, carbamazepine, iodine, amiodarone, lithium and heparin.)    Valproate 06/08/2024 9 (L)  50 - 125 mcg/mL Final    Psychiatric Reference Range:  50 to 125 mcg/mL.    Sodium 06/10/2024 141  135 - 145 mmol/L Final    Potassium 06/10/2024 4.6  3.4 - 5.1 mmol/L Final    Chloride 06/10/2024 108   97 - 110 mmol/L Final    Carbon Dioxide 06/10/2024 32  21 - 32 mmol/L Final    Anion Gap 06/10/2024 6 (L)  7 - 19 mmol/L Final    Glucose 06/10/2024 64 (L)  70 - 99 mg/dL Final    BUN 06/10/2024 23 (H)  6 - 20 mg/dL Final    Creatinine 06/10/2024 1.01  0.67 - 1.17 mg/dL Final    Glomerular Filtration Rate 06/10/2024 90  >=60 Final    eGFR results = or >60 mL/min/1.73m2 = Normal kidney function. Estimated GFR calculated using the CKD-EPI-R (2021) equation that does not include race in the creatinine calculation.    BUN/Cr 06/10/2024 23  7 - 25 Final    Calcium 06/10/2024 9.3  8.4 - 10.2 mg/dL Final    Bilirubin, Total 06/10/2024 0.3  0.2 - 1.0 mg/dL Final    GOT/AST 06/10/2024 13  <=37 Units/L Final    GPT/ALT 06/10/2024 25  <64 Units/L Final    Alkaline Phosphatase 06/10/2024 96  45 - 117 Units/L Final    Albumin 06/10/2024 3.5 (L)  3.6 - 5.1 g/dL Final    Protein, Total 06/10/2024 7.0  6.4 - 8.2 g/dL Final    Globulin 06/10/2024 3.5  2.0 - 4.0 g/dL Final    A/G Ratio 06/10/2024 1.0  1.0 - 2.4 Final    WBC 06/10/2024 5.8  4.2 - 11.0 K/mcL Final    RBC 06/10/2024 4.21 (L)  4.50 - 5.90 mil/mcL Final    HGB 06/10/2024 14.2  13.0 - 17.0 g/dL Final    HCT 06/10/2024 43.2  39.0 - 51.0 % Final    MCV 06/10/2024 102.6 (H)  78.0 - 100.0 fl Final    MCH 06/10/2024 33.7  26.0 - 34.0 pg Final    MCHC 06/10/2024 32.9  32.0 - 36.5 g/dL Final    RDW-CV 06/10/2024 13.4  11.0 - 15.0 % Final    RDW-SD 06/10/2024 50.2 (H)  39.0 - 50.0 fL Final    PLT 06/10/2024 230  140 - 450 K/mcL Final    NRBC 06/10/2024 0  <=0 /100 WBC Final    Neutrophil, Percent 06/10/2024 46  % Final    Lymphocytes, Percent 06/10/2024 39  % Final    Mono, Percent 06/10/2024 10  % Final    Eosinophils, Percent 06/10/2024 3  % Final    Basophils, Percent 06/10/2024 1  % Final    Immature Granulocytes 06/10/2024 1  % Final    Absolute Neutrophils 06/10/2024 2.7  1.8 - 7.7 K/mcL Final    Absolute Lymphocytes 06/10/2024 2.3  1.0 - 4.0 K/mcL Final    Absolute  Monocytes 06/10/2024 0.6  0.3 - 0.9 K/mcL Final    Absolute Eosinophils  06/10/2024 0.2  0.0 - 0.5 K/mcL Final    Absolute Basophils 06/10/2024 0.0  0.0 - 0.3 K/mcL Final    Absolute Immature Granulocytes 06/10/2024 0.0  0.0 - 0.2 K/mcL Final       Assessment & Plan    History of congestive heart failure  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 Schmitt. Echocardiogram 11/24/2021 EF 55 to 60%, normal diastolic function.  Appears euvolemic.  Continue follow-up with cardiologist scheduled for July.    Essential hypertension  Blood pressure stable, continue PTA carvedilol and enalapril.    Delusion  (CMD)  In setting of PTSD, bipolar.  Management by psychiatrist.    Atrial fibrillation  (CMD)  Status post ablation 2015.  Rate controlled, continue carvedilol.  Not on anticoagulation prior to admission.    HLD (hyperlipidemia)  Continue atorvastatin.  Check A1c/lipid panel.    Orthostatic hypotension.  Continue to check.  Defer to his cardiologist.  If he has a persistent difference between his arms and blood pressure checks may check arterial Dopplers CTA of the chest etc.    COVID Labs  Recent Labs   Lab 06/10/24  1915   ALYMS 2.3       QT-Interval (MSEC) (no units)   Date Value   02/13/2024 450                      Code Status    Code Status: Full Resuscitation      Mynor Graham MD        - continue home Lisinopril 5mg qd and Amlodipine 10mg qd Pressure stable so far.     - Hold home Lisinopril 5mg qd and Amlodipine 10mg qd in setting of worsening liver function

## 2024-08-24 NOTE — PROGRESS NOTE ADULT - PROBLEM SELECTOR PLAN 4
Hyperglycemic on admission to >400. Given Lispro 6u in ED. BHB 0.8. No acidosis and AG was wnl.  - Only on oral metformin and glipizide at home -> hold while hospitalized  - LDSS, will likely need standing  - A1c, if significantly elevated would obtain endocrine eval    Addendum:  - This AM patient with slight AG elevation, BG is trending down but will recheck BHB, VBG, CMP Hyperglycemic on admission to >400. Given Lispro 6u in ED. BHB 0.8. No acidosis and AG was wnl.  - Only on oral metformin and glipizide at home  - LDSS, will likely need standing  - A1c 8.2 08/24  - BHB 08 --> 0.2  - Anion gap 15 --> 13    Plan:  - Hold metformin and glipizide  - Lantus 8u qhs. ISS pre meal and nightly  - LR 100ml/hr for 24 hours  - Trend BG

## 2024-08-24 NOTE — H&P ADULT - PROBLEM SELECTOR PLAN 7
Hyperglycemic on admission to >400. Given Lispro 6u in ED. BHB 0.8. No acidosis.   - hold home Glimepiride 2.5mg qd  - LDSS  - A1c - continue home Lisinopril 5mg qd and Amlodipine 10mg qd

## 2024-08-24 NOTE — PROGRESS NOTE ADULT - PROBLEM SELECTOR PLAN 10
- Patient reports taking Xarelto 10mg daily but not sure why, denies history of VTE, also reports taking metformin but last prescribed last year as per [employer], also states he is on duloxetine for his b/l peripheral neuropathy but not seen on [employer]  - [facility] pharmacist emailed to help verify his medications - Patient reports taking Xarelto 10mg daily but not sure why, denies history of VTE, also reports taking metformin but last prescribed last year as per [facility], also states he is on duloxetine for his b/l peripheral neuropathy but not seen on [facility]    - Per [facility] patient last filled Xarelto 05/24 for 30 days. His sister Ally was also unsure about the meds he takes  - [facility] pharmacist emailed to help verify his medications

## 2024-08-24 NOTE — PROGRESS NOTE ADULT - PROBLEM SELECTOR PLAN 1
Reports dysuria and hematuria x1 week, no urinary frequency though. Here noted to have leukocytosis to 17k with tachycardia and positive UA concerning for sepsis 2/2 UTI.  Lactate elevated to 3.9 x2 --> 3.3  UA smal LE and + nitrite    - Ceftriaxone 1g likely for 7 days (8/24-08/31)  - f/u UCx  - Check BCx x2  - Likely has poor clearance 2/2 worsening liver mets, repeat in AM Reports dysuria and hematuria x1 week, no urinary frequency though. Here noted to have leukocytosis to 17k with tachycardia and positive UA concerning for sepsis 2/2 UTI.  Lactate elevated to 3.9 x2 --> 3.3  UA smal LE and + nitrite    - Ceftriaxone 1g likely for 7 days (8/24-08/31)  - f/u UCx  - Check BCx x2  - Likely has poor clearance 2/2 worsening liver mets, repeat lactate in AM

## 2024-08-24 NOTE — H&P ADULT - NSHPREVIEWOFSYSTEMS_GEN_ALL_CORE
Review of Systems:  General: no fever or weight loss  Skin: no rashes or itch  HEENT: no trauma or HA. No drainage or bleeding. No changes in hearing. No lumps or stiffness.  CV: no chest pain or palpitations  Pulm: (+) SOB w/ exertion. no cough, or wheezing  GI: no nausea, vomiting, or abd pain  Urinary: no incontinence or dysuria  Neuro: no confusion, seizure, or numbness  MSK: (+) BLE swelling. no weakness, stiffness Review of Systems:  General: no fever or weight loss, +Generalized weakness x2 months  Skin: no rashes or itch  HEENT: no trauma or HA. No drainage or bleeding. No changes in hearing. No lumps or stiffness.  CV: no chest pain or palpitations, no dizziness or LOC. +LE edema, +Orthopnea, no PND  Pulm: (+) SOB w/ exertion. +Mild mostly dry cough, No wheezing, no hemoptysis  GI: no nausea, vomiting, or abd pain  Urinary: no incontinence, +dysuria/hematuria  Neuro: +Chronic neuropathy of b/l feet x2 years, no focal numbness or weakness, no confusion or seizure  MSK: (+) BLE swelling. no weakness, stiffness

## 2024-08-24 NOTE — H&P ADULT - CONVERSATION DETAILS
Discussed GOC, including compressions and intubation, with patient at bedside. Patient demonstrated understanding and all questions were answered. Patient expresses he is to be FULL CODE.

## 2024-08-24 NOTE — CONSULT NOTE ADULT - ASSESSMENT
Taryn Granda is a 54 year old male with a PMHx of Stage IV colorectal cancer w/ mets to liver and lung s/p chemo, now undergoing palliative radiation, HTN, HLD, and T2DM who presents with SOB found to have elevated LFTs. Hepatology consulted for further workup and management of the above.     #Stage 4 CRC with mets to liver/lung   #Elevated LFTs  Presenting with increased shortness of breath with workup notable for Tb 1.2->20, ->1482, AST 64->420 ALT 24->449 with CTAP showing no PE with increased size of innumerable bilateral pulmonary metastases and fiffuse hepatic metastases several of which demonstrate increase in size. Pt denies any ongoing ETOH new herbal or new mediaction use. Etiology likely 2/2 to underlying mets to the liver although can obtain MRCP to further evaluate the aboive.     Recommendations:  -Please obtain MRCP  -Check KATHE, AMA, Smooth, IgG and acute hepatitis panel   -Trend CBC, CMP, INR    All recommendations are tentative until note is attested by attending.

## 2024-08-24 NOTE — H&P ADULT - PROBLEM SELECTOR PLAN 8
DVT ppx: Lovenox 40mg daily  Diet: Consistent carb/DASH  Dispo: pending clinical course  Code Status: FULL CODE - Hold home rosuvastatin 10mg qd given elevated transaminases

## 2024-08-24 NOTE — PROGRESS NOTE ADULT - PROBLEM SELECTOR PLAN 2
- Significant new transaminitis with hyperbilirubinemia to 20.4, majority direct bilirubin  - Patient reports noticing yellowing of his eyes about 10 days ago, noticing grey colored BMs since last week  - Denies abd pain, nausea/emesis, current diarrhea/constipation  - On examination abdomen without TTP, no RUQ pain, good bowel sounds  - CTA Chest incidentally captured some of the liver, shows worsening liver mets, no comments on the biliary structure   - Bili elevated to 20.4 on admission with 16.6 direct. ASR    Plan:  - GI following, appreciate recs  - f/u on CT abd pelvis. US Abdomen, and MRCP  - F/u on KATHE, AMA, smooth muscle and hepatitis panel - Significant new transaminitis with hyperbilirubinemia to 20.4, majority direct bilirubin  - Patient reports noticing yellowing of his eyes about 10 days ago, noticing grey colored BMs since last week  - Denies abd pain, nausea/emesis, current diarrhea/constipation  - On examination abdomen without TTP, no RUQ pain, good bowel sounds. Distended with hepatomegaly  - CTA Chest incidentally captured some of the liver, shows worsening liver mets, no comments on the biliary structure   - Bili elevated to 20.4 on admission with 16.6 direct. ASR    Plan:  - GI following, appreciate recs  - f/u on CT abd pelvis. US Abdomen, and MRCP  - F/u on KATHE, AMA, smooth muscle and hepatitis panel  - Trend CMP, coags

## 2024-08-24 NOTE — ED ADULT NURSE REASSESSMENT NOTE - NS ED NURSE REASSESS COMMENT FT1
rpt fsg taken, note to be 397, provider aware and pt medicated with insulin lispro 6 units as ordered.

## 2024-08-24 NOTE — H&P ADULT - ATTENDING COMMENTS
Patient seen and examined on 8/24/24, case discussed with Dr. Brant Seaman. This is a 54M with history of metastatic colon cancer with mets to liver and lungs, DM2, HTN, and HLD who presents to the hospital with complaints of generalized weakness, worsening SOB x1 month, +cough with minimal sputum, +LE edema with mild orthopnea, and +dysuria/hematuria. Here work up significant for worsening lung/liver mets, sepsis 2/2 UTI, and significant transaminitis and hyperbilirubinemia. On examination, patient laying in bed in NAD, lungs CTAB, heart sounds regular without m/r/g, abdomen soft NTND with good bowel sounds, R chest wall mediport c/d/i, b/l LE pitting edema 1-2+ up to b/l upper shins, good pulses b/l, non-focal neurological exam. Patient's lab work, imaging, and EKG reviewed.     - Will c/w ceftriaxone for his sepsis 2/2 UTI, expect a 7 day course, UCx in lab, check BCx x2  - AHRF likely 2/2 worsening pulmonary mets, CTA Chest neg for PE, no infiltrates or effusions on imaging either, would c/w supplemental O2 and downtitrate as tolerated, if unable to be titrated off then might need home O2 therapy  - LE edema with mild orthopnea, low proBNP and no pulm edema on imaging so doubt ADHF but pending TTE, consider trial of diuresis if TTE shows signs of heart failure, c/w telemetry monitoring, EKG non-ischemic and trops neg x2  - Significant hyperglycemia, improving but this AM with slightly uptrend in his AG and downtrend in his bicarb -> check BHB, VBG, repeat CMP  - US abdominal duplex ordered for further eval of his significant transaminitis/hyperbilirubinemia, GI emailed for consult  - Other management as above

## 2024-08-24 NOTE — PROGRESS NOTE ADULT - ASSESSMENT
BASSAM LOGAN is a 54y Male with a hx of Stage IV colorectal cancer w/ mets to liver and lung s/p chemo, now undergoing palliative radiation, HTN, HLD, and T2DM who presents with SOB, found to be in acute hypoxic respiratory failure with significant transaminitis.  BASSAM LOGAN is a 54y Male with a hx of Stage IV colorectal cancer w/ mets to liver and lung s/p chemo, now undergoing palliative radiation, HTN, HLD, and T2DM who presents with SOB, found to be in acute hypoxic respiratory failure with significant transaminitis. Recent imaging shows progression of metastatic disease.

## 2024-08-24 NOTE — PROGRESS NOTE ADULT - PROBLEM SELECTOR PLAN 5
Na 124 on admission but 129 when corrected for hyperglycemia  - 132 when corrected for hyperglycemia in AM, c/w monitoring

## 2024-08-24 NOTE — CONSULT NOTE ADULT - SUBJECTIVE AND OBJECTIVE BOX
HPI:  Taryn Granda is a 54 year old male with a PMHx of Stage IV colorectal cancer w/ mets to liver and lung s/p chemo, now undergoing palliative radiation, HTN, HLD, and T2DM who presents with SOB found to have elevated LFTs. Hepatology consulted for further workup and management of the above.     Patient reports developing SOB with exertion about a month ago, which has gradually worsened. Initially he had SOB while going up stairs, but now has SOB even with short distances. No SOB at rest. Reports a mild cough with his SOB, mostly non-productive, no hemoptysis. Also notes fast heart rate with exertion but denies chest pain, dizziness, or LOC. States ambulation is also limited by swelling in his feet, states the swelling is new over the past 5 days, no pain associated with the swelling. Reports mild orthopnea but no PND. Also reports dysuria and hematuria since last week but no urinary frequency. Denies fevers/chills, abd pain, nausea/emesis, diarrhea/constipation, hematemesis, hematochezia, or melena. States his BMs are grey in color for the past 1 week. No other acute  complaints. No sick contacts or recent travel.In the ED, patient was put on NC 2L due to desat to upper 80s%. Hemodynamically stable. Bedside POCUS with grossly normal cardiac function. Labs notable for Tb 1.2->20, ->1482, AST 64->420 ALT 24->449 with CTAP showing no PE with increased size of innumerable bilateral pulmonary metastases and fiffuse hepatic metastases several of which demonstrate increase in size. Pt denies any ongoing ETOH new herbal or new mediaction use.       Allergies:  No Known Allergies      Home Medications:  amLODIPine 10 mg oral tablet: 1 tab(s) orally once a day (24 Aug 2024 02:44)  benzonatate 100 mg oral capsule: 1 cap(s) orally every 8 hours (24 Aug 2024 02:43)  DULoxetine 30 mg oral delayed release capsule: 1 cap(s) orally once a day (24 Aug 2024 08:42)  GlipiZIDE XL 10 mg oral tablet, extended release: 1 tab(s) orally once a day (24 Aug 2024 08:41)  lisinopril 5 mg oral tablet: 1 tab(s) orally once a day (24 Aug 2024 02:44)  metFORMIN 500 mg oral tablet: 1 tab(s) orally 2 times a day (24 Aug 2024 08:41)  metoclopramide 10 mg oral tablet: 1 tab(s) orally every 6 hours as needed for  nausea (24 Aug 2024 08:42)  oxyCODONE 5 mg oral tablet: 1 tab(s) orally every 6 hours as needed for  pain (24 Aug 2024 08:42)  rosuvastatin 10 mg oral tablet: 1 tab(s) orally once a day (24 Aug 2024 02:44)  Xarelto 20 mg oral tablet: 1 tab(s) orally once a day (24 Aug 2024 08:42)      Hospital Medications:  albuterol/ipratropium for Nebulization 3 milliLiter(s) Nebulizer every 6 hours PRN  amLODIPine   Tablet 10 milliGRAM(s) Oral daily  benzonatate 100 milliGRAM(s) Oral every 8 hours  cefTRIAXone   IVPB 1000 milliGRAM(s) IV Intermittent every 24 hours  dextrose 5%. 1000 milliLiter(s) IV Continuous <Continuous>  dextrose 5%. 1000 milliLiter(s) IV Continuous <Continuous>  dextrose 50% Injectable 25 Gram(s) IV Push once  dextrose 50% Injectable 12.5 Gram(s) IV Push once  dextrose 50% Injectable 25 Gram(s) IV Push once  dextrose Oral Gel 15 Gram(s) Oral once PRN  enoxaparin Injectable 40 milliGRAM(s) SubCutaneous every 24 hours  glucagon  Injectable 1 milliGRAM(s) IntraMuscular once  insulin lispro (ADMELOG) corrective regimen sliding scale   SubCutaneous every 6 hours  lactated ringers. 1000 milliLiter(s) IV Continuous <Continuous>  lisinopril 5 milliGRAM(s) Oral daily      PMHX/PSHX:    Colon cancer with mets liver/lung   HTN (hypertension)  HLD (hyperlipidemia)  T2DM (type 2 diabetes mellitus)  S/P partial colectomy        Family history:    FH: diabetes mellitus (Mother)  FH: hypertension (Mother)    Social History:   Tob: Denies  EtOH: Denies  Illicit Drugs: Denies    ROS: Complete and normal except as mentioned above    PHYSICAL EXAM:   GENERAL:  No acute distress  HEENT: +scleral icterus   CHEST:  no respiratory distress  HEART:  Regular rate and rhythm  ABDOMEN:  Soft, non-tender, non-distended, normoactive bowel sound  NEURO:  Alert and oriented x 3    Vital Signs:  Vital Signs Last 24 Hrs  T(C): 37.1 (24 Aug 2024 10:16), Max: 37.1 (24 Aug 2024 10:16)  T(F): 98.8 (24 Aug 2024 10:16), Max: 98.8 (24 Aug 2024 10:16)  HR: 105 (24 Aug 2024 10:16) (88 - 110)  BP: 123/79 (24 Aug 2024 10:16) (123/79 - 150/102)  BP(mean): --  RR: 18 (24 Aug 2024 10:16) (17 - 18)  SpO2: 96% (24 Aug 2024 10:16) (96% - 100%)    Parameters below as of 24 Aug 2024 10:16  Patient On (Oxygen Delivery Method): nasal cannula      Daily Height in cm: 170.18 (24 Aug 2024 03:50)    Daily     LABS:                        11.8   16.41 )-----------( 195      ( 24 Aug 2024 06:00 )             36.4     Mean Cell Volume: 77.0 fL (08-24-24 @ 06:00)    08-24    128<L>  |  96<L>  |  25<H>  ----------------------------<  331<H>  4.9   |  17<L>  |  0.75    Ca    7.5<L>      24 Aug 2024 06:00  Phos  2.0     08-24  Mg     2.10     08-24    TPro  7.3  /  Alb  2.3<L>  /  TBili  20.4<H>  /  DBili  16.6<H>  /  AST  420<H>  /  ALT  449<H>  /  AlkPhos  1482<H>  08-23    LIVER FUNCTIONS - ( 23 Aug 2024 20:53 )  Alb: 2.3 g/dL / Pro: 7.3 g/dL / ALK PHOS: 1482 U/L / ALT: 449 U/L / AST: 420 U/L / GGT: x             Urinalysis Basic - ( 24 Aug 2024 06:00 )    Color: x / Appearance: x / SG: x / pH: x  Gluc: 331 mg/dL / Ketone: x  / Bili: x / Urobili: x   Blood: x / Protein: x / Nitrite: x   Leuk Esterase: x / RBC: x / WBC x   Sq Epi: x / Non Sq Epi: x / Bacteria: x        [id number]                          11.8   16.41 )-----------( 195      ( 24 Aug 2024 06:00 )             36.4                         14.0   17.67 )-----------( 238      ( 23 Aug 2024 20:53 )             43.7       Imaging:  < from: CT Angio Chest PE Protocol w/ IV Cont (08.23.24 @ 22:49) >  IMPRESSION:  No pulmonary embolus.    Increased size of innumerable bilateral pulmonary metastases.    Diffuse hepatic metastases several of which demonstrate increase in size.      < end of copied text >

## 2024-08-24 NOTE — H&P ADULT - PROBLEM SELECTOR PLAN 1
Presenting with SOB w/ exertion and hypoxia. CTA without PE or pleural effusion, but with increased burden of pulmonary mets and liver mets. Trp <6. BNP 76. Bedside POCUS with normal cardiac contractility. Inspiratory stridor on physical exam. May be due to fluid overload vs increasing pulmonary met burden.   - Duonebs q6  - wean oxygen as tolerated  - TTE  - CXR #Dyspnea on exertion  #BLE edema  Presenting with SOB w/ exertion and hypoxia. CTA without PE or pleural effusion, but with increased burden of pulmonary mets and liver mets. Trp <6. BNP 76. Bedside POCUS with normal cardiac contractility. Inspiratory stridor on physical exam. Likely in setting of worsening metastatic disease.   - Duonebs q6 prn  - wean oxygen as tolerated  - TTE  - Neck XR  - BLE Duplex #Dyspnea on exertion  #BLE edema  Presenting with SOB w/ exertion and hypoxia. CTA without PE or pleural effusion, but with increased burden of pulmonary mets and liver mets. Trp <6. BNP 76. Bedside POCUS with normal cardiac contractility. Inspiratory stridor on physical exam (now improved). Suspect his AHRF is in setting of worsening metastatic disease, lower suspicion for PNA as no infiltrates noted, and lower suspicion for ADHF as patient with low proBNP, no pulm edema/effusions.  - Duonebs q6 prn  - wean oxygen as tolerated  - TTE, monitor on telemetry for now  - Neck XR for his stridor (though now improved)  - BLE Duplex  - Check report trop in AM for stability

## 2024-08-24 NOTE — CONSULT NOTE ADULT - ASSESSMENT
54M PMH colorectal adenocarcinoma ([facility] Dr. Gardner, Stage IV, mets to liver, lung, hip) admitted for acute hypoxic respiratory failure and acute liver injury with jaundice.    PMH: HTN, HLD, T2DM    Treatment History:  - FOLFOX + Vit D July 7th, 2021 (bevacizumab added cycle #2) >> 8/23 scan shows PE started on Lovenox >> October 2021 oxaliplatin held >> POD noted 12/2022, taken off trial  - FOLFIRI + Yolis started 12/20/22 >> POD noted 2/2024  - Lonsurf/Yolis started 2/12/24 - 4/2024 >> POD noted 5/2024  - Stivarga started 6/2024 >> POD noted 8/2024    #Colon Adenocarcinoma, Stage IV ([facility], Dr. Gardner; mets to liver, lung, R hip)  - Diagnosed 6/2021; KRAS G12D , NABILA  - ECOG = 1 (ambulates without assistance, started using cane occasionally 5 days prior due to neuropathic pain)  - Stage: IV   - Treatment (Current): 8/9/24 discussed FOLFOX + Yolis retreat, limited options at this point given POD on multiple regimens. Palliative care referral made. Palliative RT to R hip metastatic site started, 2000Gy over 5 fractions; 4/5 fraction completed 8/23/24  - Recommend palliative care consult re symptom management, support  - PT, Nutrition evaluation  - US b/l lower extremities (hx of PE, increased b/l edema)  - Recommend CT-A/P w IV contrast urgent + GI evaluation (Bilirubin 1.0 on 7/28/24) re MRCP/ERCP    No inpatient chemotherapy. Anticipate acute liver injury due to progression of metastatic disease burden with known metastasis to liver. Palliative care referral was discussed with patient and family by Dr. Gardner, referral was made - appropriate to involve inpatient for symptom control. Will discuss continuing treatment on follow up visit with Dr. Gardner.    Please message me on [employer] teams with any additional questions.    Gavin Baez, PGY4  Hematology & Oncology Fellow  [employer] Teams - Call or Text

## 2024-08-24 NOTE — H&P ADULT - NSICDXPASTSURGICALHX_GEN_ALL_CORE_FT
PAST SURGICAL HISTORY:  No significant past surgical history      PAST SURGICAL HISTORY:  S/P partial colectomy

## 2024-08-24 NOTE — H&P ADULT - PROBLEM SELECTOR PLAN 4
Stage IV colorectal cancer with mets to lung and liver s/p chemo, now undergoing palliative radiation. Follows with Dr. Dre Gardner.  - consult Onc in AM Hyperglycemic on admission to >400. Given Lispro 6u in ED. BHB 0.8. No acidosis and AG was wnl.  - Only on oral metformin and glipizide at home -> hold while hospitalized  - LDSS, will likely need standing  - A1c, if significantly elevated would obtain endocrine eval    Addendum:  - This AM patient with slight AG elevation, BG is trending down but will recheck BHB, VBG, CMP

## 2024-08-24 NOTE — PROGRESS NOTE ADULT - PROBLEM SELECTOR PLAN 6
Stage IV colorectal cancer with mets to lung and liver s/p chemo, Currently getting RT to his R hip, plans for possible retreatment with FOLFOX + maia as per outpatient notes. Follows with Dr. Dre Gardner.  - consult Onc in AM Stage IV colorectal cancer with mets to lung and liver s/p chemo, Currently getting RT to his R hip, plans for possible retreatment with FOLFOX + maia as per outpatient notes. Follows with Dr. Dre Gardner at [facility].    - F/u on Heme onc recs   - Palliative consulted, follow up recs  - F/u on CEA  - f/u on CT Abd/pelvis, MRCP, US abd  - Trend CBC. CMP, coags

## 2024-08-24 NOTE — PROGRESS NOTE ADULT - SUBJECTIVE AND OBJECTIVE BOX
Desmond Leung M.D  Resident  Department of Medicine  Available on [employer] Teams    Patient is a 54y old  Male who presents with a chief complaint of SOB (24 Aug 2024 02:59)      SUBJECTIVE / OVERNIGHT EVENTS: Patient seen and examined at bedside.     ADDITIONAL REVIEW OF SYSTEMS:    MEDICATIONS  (STANDING):  amLODIPine   Tablet 10 milliGRAM(s) Oral daily  benzonatate 100 milliGRAM(s) Oral every 8 hours  cefTRIAXone   IVPB 1000 milliGRAM(s) IV Intermittent every 24 hours  dextrose 5%. 1000 milliLiter(s) (50 mL/Hr) IV Continuous <Continuous>  dextrose 5%. 1000 milliLiter(s) (100 mL/Hr) IV Continuous <Continuous>  dextrose 50% Injectable 25 Gram(s) IV Push once  dextrose 50% Injectable 12.5 Gram(s) IV Push once  dextrose 50% Injectable 25 Gram(s) IV Push once  enoxaparin Injectable 40 milliGRAM(s) SubCutaneous every 24 hours  glucagon  Injectable 1 milliGRAM(s) IntraMuscular once  insulin lispro (ADMELOG) corrective regimen sliding scale   SubCutaneous three times a day before meals  insulin lispro (ADMELOG) corrective regimen sliding scale   SubCutaneous at bedtime  insulin lispro Injectable (ADMELOG). 3 Unit(s) SubCutaneous once  lisinopril 5 milliGRAM(s) Oral daily    MEDICATIONS  (PRN):  albuterol/ipratropium for Nebulization 3 milliLiter(s) Nebulizer every 6 hours PRN Shortness of Breath and/or Wheezing  dextrose Oral Gel 15 Gram(s) Oral once PRN Blood Glucose LESS THAN 70 milliGRAM(s)/deciliter      CAPILLARY BLOOD GLUCOSE      POCT Blood Glucose.: 352 mg/dL (24 Aug 2024 03:12)  POCT Blood Glucose.: 397 mg/dL (24 Aug 2024 00:43)  POCT Blood Glucose.: 478 mg/dL (23 Aug 2024 18:21)    I&O's Summary      PHYSICAL EXAM:    Vital Signs Last 24 Hrs  T(C): 36.6 (24 Aug 2024 00:40), Max: 36.7 (23 Aug 2024 23:20)  T(F): 97.9 (24 Aug 2024 00:40), Max: 98.1 (23 Aug 2024 23:20)  HR: 102 (24 Aug 2024 00:40) (88 - 110)  BP: 150/102 (24 Aug 2024 00:40) (140/94 - 150/102)  BP(mean): --  RR: 17 (24 Aug 2024 00:40) (17 - 18)  SpO2: 96% (24 Aug 2024 00:40) (96% - 100%)    Parameters below as of 24 Aug 2024 00:40  Patient On (Oxygen Delivery Method): nasal cannula        CONSTITUTIONAL: NAD, well-developed, well-groomed  EYES: Conjunctiva and sclera clear  ENMT: Moist oral mucosa, no pharyngeal injection or exudates; normal dentition  NECK: Supple, no palpable masses; no thyromegaly  RESPIRATORY: Normal respiratory effort; lungs are clear to auscultation bilaterally  CARDIOVASCULAR: Regular rate and rhythm, normal S1 and S2, no murmur/rub/gallop; No lower extremity edema; Peripheral pulses are 2+ bilaterally  ABDOMEN: Soft, nontender to palpation, normoactive bowel sounds, no rebound/guarding  MUSCULOSKELETAL: No clubbing or cyanosis of digits; no joint swelling or tenderness to palpation  PSYCH: A+O to person, place, and time; affect appropriate  NEUROLOGY: CN 2-12 are intact and symmetric; no gross sensory deficits   SKIN: No rashes; no palpable lesions    LABS:                        11.8   16.41 )-----------( 195      ( 24 Aug 2024 06:00 )             36.4     08-23    124<L>  |  91<L>  |  30<H>  ----------------------------<  427<H>  5.3   |  19<L>  |  0.85    Ca    7.9<L>      23 Aug 2024 21:45    TPro  7.3  /  Alb  2.3<L>  /  TBili  20.4<H>  /  DBili  16.6<H>  /  AST  420<H>  /  ALT  449<H>  /  AlkPhos  1482<H>  08-23          Urinalysis Basic - ( 23 Aug 2024 21:45 )    Color: x / Appearance: x / SG: x / pH: x  Gluc: 427 mg/dL / Ketone: x  / Bili: x / Urobili: x   Blood: x / Protein: x / Nitrite: x   Leuk Esterase: x / RBC: x / WBC x   Sq Epi: x / Non Sq Epi: x / Bacteria: x        Urinalysis with Rflx Culture (collected 23 Aug 2024 21:24)        RADIOLOGY & ADDITIONAL TESTS: No new imaging  Results Reviewed: Yes  Imaging Personally Reviewed:  Electrocardiogram Personally Reviewed:    COORDINATION OF CARE:  Care Discussed with Consultants/Other Providers [Y/N]:  Prior or Outpatient Records Reviewed [Y/N]:   Desmond Leung M.D  Resident  Department of Medicine  Available on [employer] Teams    Patient is a 54y old  Male who presents with a chief complaint of SOB (24 Aug 2024 02:59)      SUBJECTIVE / OVERNIGHT EVENTS: Patient seen and examined at bedside. Resting comfortably in bed. States he is feeling well and his breathing has improved with oxygen. He does not complain of CP, SOB, palpitations, abdominal pain, N/V, diarrhea or constipation. After discussion, patient is amenable to speaking with palliative care.     ADDITIONAL REVIEW OF SYSTEMS:    MEDICATIONS  (STANDING):  amLODIPine   Tablet 10 milliGRAM(s) Oral daily  benzonatate 100 milliGRAM(s) Oral every 8 hours  cefTRIAXone   IVPB 1000 milliGRAM(s) IV Intermittent every 24 hours  dextrose 5%. 1000 milliLiter(s) (50 mL/Hr) IV Continuous <Continuous>  dextrose 5%. 1000 milliLiter(s) (100 mL/Hr) IV Continuous <Continuous>  dextrose 50% Injectable 25 Gram(s) IV Push once  dextrose 50% Injectable 12.5 Gram(s) IV Push once  dextrose 50% Injectable 25 Gram(s) IV Push once  enoxaparin Injectable 40 milliGRAM(s) SubCutaneous every 24 hours  glucagon  Injectable 1 milliGRAM(s) IntraMuscular once  insulin lispro (ADMELOG) corrective regimen sliding scale   SubCutaneous three times a day before meals  insulin lispro (ADMELOG) corrective regimen sliding scale   SubCutaneous at bedtime  insulin lispro Injectable (ADMELOG). 3 Unit(s) SubCutaneous once  lisinopril 5 milliGRAM(s) Oral daily    MEDICATIONS  (PRN):  albuterol/ipratropium for Nebulization 3 milliLiter(s) Nebulizer every 6 hours PRN Shortness of Breath and/or Wheezing  dextrose Oral Gel 15 Gram(s) Oral once PRN Blood Glucose LESS THAN 70 milliGRAM(s)/deciliter      CAPILLARY BLOOD GLUCOSE      POCT Blood Glucose.: 352 mg/dL (24 Aug 2024 03:12)  POCT Blood Glucose.: 397 mg/dL (24 Aug 2024 00:43)  POCT Blood Glucose.: 478 mg/dL (23 Aug 2024 18:21)    I&O's Summary      PHYSICAL EXAM:    Vital Signs Last 24 Hrs  T(C): 36.6 (24 Aug 2024 00:40), Max: 36.7 (23 Aug 2024 23:20)  T(F): 97.9 (24 Aug 2024 00:40), Max: 98.1 (23 Aug 2024 23:20)  HR: 102 (24 Aug 2024 00:40) (88 - 110)  BP: 150/102 (24 Aug 2024 00:40) (140/94 - 150/102)  BP(mean): --  RR: 17 (24 Aug 2024 00:40) (17 - 18)  SpO2: 96% (24 Aug 2024 00:40) (96% - 100%)    Parameters below as of 24 Aug 2024 00:40  Patient On (Oxygen Delivery Method): nasal cannula        CONSTITUTIONAL: NAD, well-developed, well-groomed  EYES: sclera icterus  RESPIRATORY: Normal respiratory effort on NC; Decreased beath sounds bilaterally  CARDIOVASCULAR: Regular rate and rhythm, normal S1 and S2, no murmur/rub/gallop; B/l lower extremity edema 1+ to shin L>R; Peripheral pulses are 2+ bilaterally  ABDOMEN: Distended, nontender to palpation, normoactive bowel sounds, no rebound/guarding but hepatomegaly present  MUSCULOSKELETAL: No clubbing or cyanosis of digits; no joint swelling or tenderness to palpation  PSYCH: A+O to person, place, and time; affect appropriate  SKIN: No rashes; no palpable lesions. Palm looks mildly yellow    LABS:                        11.8   16.41 )-----------( 195      ( 24 Aug 2024 06:00 )             36.4     08-23    124<L>  |  91<L>  |  30<H>  ----------------------------<  427<H>  5.3   |  19<L>  |  0.85    Ca    7.9<L>      23 Aug 2024 21:45    TPro  7.3  /  Alb  2.3<L>  /  TBili  20.4<H>  /  DBili  16.6<H>  /  AST  420<H>  /  ALT  449<H>  /  AlkPhos  1482<H>  08-23          Urinalysis Basic - ( 23 Aug 2024 21:45 )    Color: x / Appearance: x / SG: x / pH: x  Gluc: 427 mg/dL / Ketone: x  / Bili: x / Urobili: x   Blood: x / Protein: x / Nitrite: x   Leuk Esterase: x / RBC: x / WBC x   Sq Epi: x / Non Sq Epi: x / Bacteria: x        Urinalysis with Rflx Culture (collected 23 Aug 2024 21:24)        RADIOLOGY & ADDITIONAL TESTS: No new imaging  Results Reviewed: Yes  Imaging Personally Reviewed:  Electrocardiogram Personally Reviewed:    COORDINATION OF CARE:  Care Discussed with Consultants/Other Providers [Y/N]:  Prior or Outpatient Records Reviewed [Y/N]:

## 2024-08-24 NOTE — PROGRESS NOTE ADULT - PROBLEM SELECTOR PLAN 3
#Dyspnea on exertion  #BLE edema  Presenting with SOB w/ exertion and hypoxia. CTA without PE or pleural effusion, but with increased burden of pulmonary mets and liver mets. Trp <6 x 2. BNP 76. Bedside POCUS with normal cardiac contractility. Inspiratory stridor on physical exam (now improved). Suspect his AHRF is in setting of worsening metastatic disease, lower suspicion for PNA as no infiltrates noted, and lower suspicion for ADHF as patient with low proBNP, no pulm edema/effusions.  - On 3L NC     Plan:  - Duonebs q6 prn  - wean oxygen as tolerated  - TTE, monitor on telemetry for now  - Neck XR for his stridor (though now improved)  - BLE Duplex

## 2024-08-24 NOTE — H&P ADULT - PROBLEM SELECTOR PLAN 5
- continue home Lisinopril 5mg qd and Amlodipine 10mg qd Na 124 on admission but 129 when corrected for hyperglycemia  - 132 when corrected for hyperglycemia in AM, c/w monitoring

## 2024-08-24 NOTE — H&P ADULT - PROBLEM SELECTOR PLAN 6
- Hold home rosuvastatin 10mg qd given elevated transaminases Stage IV colorectal cancer with mets to lung and liver s/p chemo, Currently getting RT to his R hip, plans for possible retreatment with FOLFOX + maia as per outpatient notes. Follows with Dr. Dre Gardner.  - consult Onc in AM

## 2024-08-25 LAB
ALBUMIN SERPL ELPH-MCNC: 2.2 G/DL — LOW (ref 3.3–5)
ALP SERPL-CCNC: 1671 U/L — HIGH (ref 40–120)
ALT FLD-CCNC: 396 U/L — HIGH (ref 4–41)
ANION GAP SERPL CALC-SCNC: 16 MMOL/L — HIGH (ref 7–14)
APTT BLD: 29.8 SEC — SIGNIFICANT CHANGE UP (ref 24.5–35.6)
AST SERPL-CCNC: 478 U/L — HIGH (ref 4–40)
BASOPHILS # BLD AUTO: 0.04 K/UL — SIGNIFICANT CHANGE UP (ref 0–0.2)
BASOPHILS NFR BLD AUTO: 0.2 % — SIGNIFICANT CHANGE UP (ref 0–2)
BILIRUB SERPL-MCNC: 20.8 MG/DL — HIGH (ref 0.2–1.2)
BLOOD GAS VENOUS COMPREHENSIVE RESULT: SIGNIFICANT CHANGE UP
BUN SERPL-MCNC: 31 MG/DL — HIGH (ref 7–23)
CALCIUM SERPL-MCNC: 8.5 MG/DL — SIGNIFICANT CHANGE UP (ref 8.4–10.5)
CEA SERPL-MCNC: 1142 NG/ML — HIGH (ref 0–3.8)
CHLORIDE SERPL-SCNC: 94 MMOL/L — LOW (ref 98–107)
CO2 SERPL-SCNC: 17 MMOL/L — LOW (ref 22–31)
CREAT SERPL-MCNC: 0.94 MG/DL — SIGNIFICANT CHANGE UP (ref 0.5–1.3)
EGFR: 96 ML/MIN/1.73M2 — SIGNIFICANT CHANGE UP
EOSINOPHIL # BLD AUTO: 0 K/UL — SIGNIFICANT CHANGE UP (ref 0–0.5)
EOSINOPHIL NFR BLD AUTO: 0 % — SIGNIFICANT CHANGE UP (ref 0–6)
GLUCOSE SERPL-MCNC: 224 MG/DL — HIGH (ref 70–99)
HAV IGG SER QL IA: REACTIVE
HAV IGM SER-ACNC: SIGNIFICANT CHANGE UP
HBV CORE IGM SER-ACNC: SIGNIFICANT CHANGE UP
HBV SURFACE AB SER-ACNC: REACTIVE
HBV SURFACE AG SER-ACNC: SIGNIFICANT CHANGE UP
HCT VFR BLD CALC: 42.5 % — SIGNIFICANT CHANGE UP (ref 39–50)
HCV AB S/CO SERPL IA: 1.54 S/CO — HIGH (ref 0–0.99)
HCV AB SERPL-IMP: REACTIVE
HGB BLD-MCNC: 13.9 G/DL — SIGNIFICANT CHANGE UP (ref 13–17)
IANC: 13.98 K/UL — HIGH (ref 1.8–7.4)
IMM GRANULOCYTES NFR BLD AUTO: 1 % — HIGH (ref 0–0.9)
INR BLD: 1.32 RATIO — HIGH (ref 0.85–1.18)
LACTATE SERPL-SCNC: 2.9 MMOL/L — HIGH (ref 0.5–2)
LYMPHOCYTES # BLD AUTO: 1.21 K/UL — SIGNIFICANT CHANGE UP (ref 1–3.3)
LYMPHOCYTES # BLD AUTO: 7.4 % — LOW (ref 13–44)
MAGNESIUM SERPL-MCNC: 2.4 MG/DL — SIGNIFICANT CHANGE UP (ref 1.6–2.6)
MCHC RBC-ENTMCNC: 24.7 PG — LOW (ref 27–34)
MCHC RBC-ENTMCNC: 32.7 GM/DL — SIGNIFICANT CHANGE UP (ref 32–36)
MCV RBC AUTO: 75.6 FL — LOW (ref 80–100)
MONOCYTES # BLD AUTO: 0.98 K/UL — HIGH (ref 0–0.9)
MONOCYTES NFR BLD AUTO: 6 % — SIGNIFICANT CHANGE UP (ref 2–14)
NEUTROPHILS # BLD AUTO: 13.98 K/UL — HIGH (ref 1.8–7.4)
NEUTROPHILS NFR BLD AUTO: 85.4 % — HIGH (ref 43–77)
NRBC # BLD: 0 /100 WBCS — SIGNIFICANT CHANGE UP (ref 0–0)
NRBC # FLD: 0.13 K/UL — HIGH (ref 0–0)
PHOSPHATE SERPL-MCNC: 2.6 MG/DL — SIGNIFICANT CHANGE UP (ref 2.5–4.5)
PLATELET # BLD AUTO: 222 K/UL — SIGNIFICANT CHANGE UP (ref 150–400)
POTASSIUM SERPL-MCNC: 5 MMOL/L — SIGNIFICANT CHANGE UP (ref 3.5–5.3)
POTASSIUM SERPL-SCNC: 5 MMOL/L — SIGNIFICANT CHANGE UP (ref 3.5–5.3)
PROT SERPL-MCNC: 7.1 G/DL — SIGNIFICANT CHANGE UP (ref 6–8.3)
PROTHROM AB SERPL-ACNC: 14.7 SEC — HIGH (ref 9.5–13)
RBC # BLD: 5.62 M/UL — SIGNIFICANT CHANGE UP (ref 4.2–5.8)
RBC # FLD: 22.3 % — HIGH (ref 10.3–14.5)
SODIUM SERPL-SCNC: 127 MMOL/L — LOW (ref 135–145)
WBC # BLD: 16.37 K/UL — HIGH (ref 3.8–10.5)
WBC # FLD AUTO: 16.37 K/UL — HIGH (ref 3.8–10.5)

## 2024-08-25 PROCEDURE — 99232 SBSQ HOSP IP/OBS MODERATE 35: CPT

## 2024-08-25 RX ORDER — SODIUM CHLORIDE 9 MG/ML
1 INJECTION INTRAMUSCULAR; INTRAVENOUS; SUBCUTANEOUS
Refills: 0 | Status: DISCONTINUED | OUTPATIENT
Start: 2024-08-25 | End: 2024-09-04

## 2024-08-25 RX ORDER — ACETAMINOPHEN 325 MG/1
650 TABLET ORAL ONCE
Refills: 0 | Status: COMPLETED | OUTPATIENT
Start: 2024-08-25 | End: 2024-08-25

## 2024-08-25 RX ORDER — INSULIN GLARGINE 100 [IU]/ML
13 INJECTION, SOLUTION SUBCUTANEOUS AT BEDTIME
Refills: 0 | Status: DISCONTINUED | OUTPATIENT
Start: 2024-08-25 | End: 2024-08-29

## 2024-08-25 RX ADMIN — Medication 4 UNIT(S): at 18:00

## 2024-08-25 RX ADMIN — Medication 4: at 18:00

## 2024-08-25 RX ADMIN — INSULIN GLARGINE 13 UNIT(S): 100 INJECTION, SOLUTION SUBCUTANEOUS at 21:31

## 2024-08-25 RX ADMIN — ENOXAPARIN SODIUM 40 MILLIGRAM(S): 100 INJECTION SUBCUTANEOUS at 05:45

## 2024-08-25 RX ADMIN — SODIUM CHLORIDE 1 GRAM(S): 9 INJECTION INTRAMUSCULAR; INTRAVENOUS; SUBCUTANEOUS at 18:03

## 2024-08-25 RX ADMIN — ACETAMINOPHEN 650 MILLIGRAM(S): 325 TABLET ORAL at 05:43

## 2024-08-25 RX ADMIN — Medication 4: at 09:04

## 2024-08-25 RX ADMIN — Medication 100 MILLIGRAM(S): at 21:32

## 2024-08-25 RX ADMIN — Medication 100 MILLIGRAM(S): at 05:44

## 2024-08-25 RX ADMIN — LISINOPRIL 5 MILLIGRAM(S): 10 TABLET ORAL at 05:45

## 2024-08-25 RX ADMIN — Medication 6: at 12:32

## 2024-08-25 RX ADMIN — Medication 100 MILLIGRAM(S): at 13:56

## 2024-08-25 RX ADMIN — AMLODIPINE BESYLATE 10 MILLIGRAM(S): 10 TABLET ORAL at 05:44

## 2024-08-25 NOTE — PHYSICAL THERAPY INITIAL EVALUATION ADULT - ACTIVE RANGE OF MOTION EXAMINATION, REHAB EVAL
bilateral upper extremity Active ROM was WFL (within functional limits)/bilateral  lower extremity Active ROM was WFL (within functional limits)
Quality 402: Tobacco Use And Help With Quitting Among Adolescents: Patient screened for tobacco and is an ex-smoker
Quality 130: Documentation Of Current Medications In The Medical Record: Current Medications Documented
Detail Level: Detailed

## 2024-08-25 NOTE — GOALS OF CARE CONVERSATION - ADVANCED CARE PLANNING - CONVERSATION DETAILS
Discussed code status and GOC regarding further cancer care. Patient states he does not want to continue to travel from his home to [facility] or [facility] to continue treatments as it takes him away from family/home, and would prefer to remain at home, receive treatments for comfort purposes, even if it means no longer pursuing disease modifying therapy. Patient would prefer this even if it means living a short life in terms of length of life. Discussed DNR/DNI as well, patient open to idea. Patient was not ready to make final decisions on any of this as he would like to discuss with family and discuss with palliative options for home hospice. However, he is leaning towards DNR/DNI/do not hospitalize with discharge with home hospice. Palliative consult pending. Discussed code status and GOC regarding further cancer care. Patient states he does not want to continue to travel from his home to [facility] or [facility] to continue treatments as it takes him away from family/home, and would prefer to remain at home, receive treatments for comfort purposes, even if it means no longer pursuing disease modifying therapy. Patient would prefer this even if it means living a shorter length of life. Discussed DNR/DNI as well, patient open to idea. Patient was not ready to make final decisions on any of this as he would like to discuss with family and discuss with palliative options for home hospice. However, he is leaning towards DNR/DNI/do not hospitalize with discharge with home hospice. Palliative consult pending.

## 2024-08-25 NOTE — PROGRESS NOTE ADULT - PROBLEM SELECTOR PLAN 10
- Patient reports taking Xarelto 10mg daily but not sure why, denies history of VTE, also reports taking metformin but last prescribed last year as per [employer], also states he is on duloxetine for his b/l peripheral neuropathy but not seen on [facility]    - Per [facility] patient last filled Xarelto 05/24 for 30 days. His sister Ally was also unsure about the meds he takes  - [facility] pharmacist emailed to help verify his medications

## 2024-08-25 NOTE — PROGRESS NOTE ADULT - PROBLEM SELECTOR PLAN 1
Reports dysuria and hematuria x1 week, no urinary frequency though. Here noted to have leukocytosis to 17k with tachycardia and positive UA concerning for sepsis 2/2 UTI.  Lactate elevated to 3.9 x2 --> 3.3  UA smal LE and + nitrite    - Ceftriaxone 1g likely for 7 days (8/24-08/31)  - f/u UCx  - Check BCx x2  - Likely has poor clearance 2/2 worsening liver mets, repeat lactate in AM

## 2024-08-25 NOTE — PROGRESS NOTE ADULT - PROBLEM SELECTOR PLAN 2
- Significant new transaminitis with hyperbilirubinemia to 20.4, majority direct bilirubin  - Patient reports noticing yellowing of his eyes about 10 days ago, noticing grey colored BMs since last week  - Denies abd pain, nausea/emesis, current diarrhea/constipation  - On examination abdomen without TTP, no RUQ pain, good bowel sounds. Distended with hepatomegaly  - CT abd pelvis shows worsening liver mets, intrahepatic ductal dilation  - Bili elevated to 20.4 on admission with 16.6 direct. ASR    Plan:  - GI following, appreciate recs  - f/u US Abdomen, and MRCP  - F/u on KATHE, AMA, smooth muscle and hepatitis panel  - Trend CMP, coags

## 2024-08-25 NOTE — PROGRESS NOTE ADULT - ASSESSMENT
BASSAM LOGAN is a 54y Male with a hx of Stage IV colorectal cancer w/ mets to liver and lung s/p chemo, now undergoing palliative radiation, HTN, HLD, and T2DM who presents with SOB, found to be in acute hypoxic respiratory failure with profound transaminitis. Recent imaging shows progression of metastatic liver & lung disease.

## 2024-08-25 NOTE — PHYSICAL THERAPY INITIAL EVALUATION ADULT - PERTINENT HX OF CURRENT PROBLEM, REHAB EVAL
Patient is  54 year old Male, history of Stage IV colorectal cancer with mets to liver and lung s/p chemo, now undergoing palliative radiation, HTN, HLD, and T2DM who presents with shortness of breathing,  found to be in acute hypoxic respiratory failure with profound transaminitis. Recent imaging shows progression of metastatic liver & lung disease.

## 2024-08-25 NOTE — PROGRESS NOTE ADULT - PROBLEM SELECTOR PLAN 6
Stage IV colorectal cancer with mets to lung and liver s/p chemo, Currently getting RT to his R hip, plans for possible retreatment with FOLFOX + maia as per outpatient notes. Follows with Dr. Dre Gardner at [facility].    - F/u on Heme onc recs   - Palliative consulted, follow up recs  - F/u on CEA  - f/u on MRCP, US abd  - Trend CBC. CMP, coags

## 2024-08-25 NOTE — PROGRESS NOTE ADULT - PROBLEM SELECTOR PLAN 4
Hyperglycemic on admission to >400. Given Lispro 6u in ED. BHB 0.8. No acidosis and AG was wnl.  - Only on oral metformin and glipizide at home  - LDSS, will likely need standing  - A1c 8.2 08/24  - BHB 08 --> 0.2  - Anion gap 15 --> 13    Plan:  - Hold metformin and glipizide  - Lantus 8u qhs. ISS pre meal and nightly  - LR 100ml/hr for 24 hours  - Trend BG

## 2024-08-25 NOTE — PHYSICAL THERAPY INITIAL EVALUATION ADULT - ADDITIONAL COMMENTS
Patient report lives with sister in apartment, 5 steps to enter, ambulated with out assistive device.

## 2024-08-25 NOTE — PROGRESS NOTE ADULT - PROBLEM SELECTOR PLAN 7
Pressure stable so far, resumed amlodipine 10    - Hold home Lisinopril 5mg qd in setting of worsening liver function

## 2024-08-25 NOTE — PROGRESS NOTE ADULT - SUBJECTIVE AND OBJECTIVE BOX
Trent Roberson, PGY-1  Please contact on Teams    LUDIVINA LOGANVALERIO  54y  Male    Reason for Admission:     SUBJECTIVE/INTERVAL EVENTS: No acute events. Pt seen and examined at bedside.    Physical Exam:   General: NAD  HEENT: PERRL, EOMI, normal sclera and conjunctiva, no oral lesions  Neck: Supple, no JVD  Lungs: CTA bilaterally  Heart: RRR, normal S1S2, no murmurs/rubs/gallops  Abdomen: Soft, ND/NT  Extremities: 2+ peripheral pulses, no cyanosis/clubbing/edema, full ROM  Skin: Warm, well-perfused  Neuro: A&O x3, no focal deficits      Vital Signs Last 24 Hrs  T(C): 36.3 (24 Aug 2024 22:26), Max: 37.1 (24 Aug 2024 10:16)  T(F): 97.3 (24 Aug 2024 22:26), Max: 98.8 (24 Aug 2024 10:16)  HR: 102 (24 Aug 2024 22:26) (96 - 105)  BP: 121/73 (24 Aug 2024 22:26) (100/57 - 123/79)  BP(mean): --  RR: 18 (24 Aug 2024 22:26) (18 - 18)  SpO2: 100% (24 Aug 2024 22:26) (96% - 100%)    Parameters below as of 24 Aug 2024 22:26  Patient On (Oxygen Delivery Method): nasal cannula  O2 Flow (L/min): 2        Consultant(s) Notes Reviewed:  [x] YES  [ ] NO  Care Discussed with Consultants/Other Providers [x] YES  [ ] NO    LABS:                        13.9   16.37 )-----------( 222      ( 25 Aug 2024 04:40 )             42.5                         11.8   16.41 )-----------( 195      ( 24 Aug 2024 06:00 )             36.4                         14.0   17.67 )-----------( 238      ( 23 Aug 2024 20:53 )             43.7                               126    |  95     |  29                  Calcium: 7.8   / iCa: x      (08-24 @ 11:10)    ----------------------------<  317       Magnesium: x                                4.8     |  18     |  0.82             Phosphorous: x        TPro  6.0    /  Alb  2.0    /  TBili  17.6   /  DBili  x      /  AST  326    /  ALT  356    /  AlkPhos  1287   24 Aug 2024 11:10    Urinalysis Basic - ( 24 Aug 2024 11:10 )    Color: x / Appearance: x / SG: x / pH: x  Gluc: 317 mg/dL / Ketone: x  / Bili: x / Urobili: x   Blood: x / Protein: x / Nitrite: x   Leuk Esterase: x / RBC: x / WBC x   Sq Epi: x / Non Sq Epi: x / Bacteria: x        RADIOLOGY & ADDITIONAL TESTS:    Imaging Personally Reviewed:  [x] YES  [ ] NO    MEDICATIONS  (STANDING):  amLODIPine   Tablet 10 milliGRAM(s) Oral daily  benzonatate 100 milliGRAM(s) Oral every 8 hours  cefTRIAXone   IVPB 1000 milliGRAM(s) IV Intermittent every 24 hours  dextrose 5%. 1000 milliLiter(s) (50 mL/Hr) IV Continuous <Continuous>  dextrose 5%. 1000 milliLiter(s) (100 mL/Hr) IV Continuous <Continuous>  dextrose 50% Injectable 12.5 Gram(s) IV Push once  dextrose 50% Injectable 25 Gram(s) IV Push once  dextrose 50% Injectable 25 Gram(s) IV Push once  enoxaparin Injectable 40 milliGRAM(s) SubCutaneous every 24 hours  glucagon  Injectable 1 milliGRAM(s) IntraMuscular once  insulin glargine Injectable (LANTUS) 8 Unit(s) SubCutaneous at bedtime  insulin lispro (ADMELOG) corrective regimen sliding scale   SubCutaneous three times a day before meals  lisinopril 5 milliGRAM(s) Oral daily    MEDICATIONS  (PRN):  albuterol/ipratropium for Nebulization 3 milliLiter(s) Nebulizer every 6 hours PRN Shortness of Breath and/or Wheezing  dextrose Oral Gel 15 Gram(s) Oral once PRN Blood Glucose LESS THAN 70 milliGRAM(s)/deciliter      HEALTH ISSUES - PROBLEM Dx:  Acute hypoxic respiratory failure    HTN (hypertension)    HLD (hyperlipidemia)    Prophylactic measure    Transaminitis    Sepsis due to urinary tract infection    Primary colon cancer with metastasis to other site    Type 2 diabetes mellitus with hyperglycemia    Pseudohyponatremia    Medication management    Suspected deep vein thrombosis (DVT)             Trent Roberson, PGY-1  Please contact on Teams    LUDIVINA LOGANVALERIO  54y  Male    Reason for Admission:     SUBJECTIVE/INTERVAL EVENTS: No acute events. Pt seen and examined at bedside.    CONSTITUTIONAL: NAD, well-developed, well-groomed  EYES: sclera icterus  RESPIRATORY: Normal respiratory effort on NC; Decreased beath sounds bilaterally  CARDIOVASCULAR: Regular rate and rhythm, normal S1 and S2, no murmur/rub/gallop; B/l lower extremity edema 1+ to shin L>R; Peripheral pulses are 2+ bilaterally  ABDOMEN: Distended, nontender to palpation, normoactive bowel sounds, no rebound/guarding but hepatomegaly present  MUSCULOSKELETAL: No clubbing or cyanosis of digits; no joint swelling or tenderness to palpation  PSYCH: A+O to person, place, and time; affect appropriate  SKIN: No rashes; no palpable lesions. Palm looks mildly yellow      Vital Signs Last 24 Hrs  T(C): 36.3 (24 Aug 2024 22:26), Max: 37.1 (24 Aug 2024 10:16)  T(F): 97.3 (24 Aug 2024 22:26), Max: 98.8 (24 Aug 2024 10:16)  HR: 102 (24 Aug 2024 22:26) (96 - 105)  BP: 121/73 (24 Aug 2024 22:26) (100/57 - 123/79)  BP(mean): --  RR: 18 (24 Aug 2024 22:26) (18 - 18)  SpO2: 100% (24 Aug 2024 22:26) (96% - 100%)    Parameters below as of 24 Aug 2024 22:26  Patient On (Oxygen Delivery Method): nasal cannula  O2 Flow (L/min): 2        Consultant(s) Notes Reviewed:  [x] YES  [ ] NO  Care Discussed with Consultants/Other Providers [x] YES  [ ] NO    LABS:                        13.9   16.37 )-----------( 222      ( 25 Aug 2024 04:40 )             42.5                         11.8   16.41 )-----------( 195      ( 24 Aug 2024 06:00 )             36.4                         14.0   17.67 )-----------( 238      ( 23 Aug 2024 20:53 )             43.7                               126    |  95     |  29                  Calcium: 7.8   / iCa: x      (08-24 @ 11:10)    ----------------------------<  317       Magnesium: x                                4.8     |  18     |  0.82             Phosphorous: x        TPro  6.0    /  Alb  2.0    /  TBili  17.6   /  DBili  x      /  AST  326    /  ALT  356    /  AlkPhos  1287   24 Aug 2024 11:10    Urinalysis Basic - ( 24 Aug 2024 11:10 )    Color: x / Appearance: x / SG: x / pH: x  Gluc: 317 mg/dL / Ketone: x  / Bili: x / Urobili: x   Blood: x / Protein: x / Nitrite: x   Leuk Esterase: x / RBC: x / WBC x   Sq Epi: x / Non Sq Epi: x / Bacteria: x        RADIOLOGY & ADDITIONAL TESTS:    Imaging Personally Reviewed:  [x] YES  [ ] NO    MEDICATIONS  (STANDING):  amLODIPine   Tablet 10 milliGRAM(s) Oral daily  benzonatate 100 milliGRAM(s) Oral every 8 hours  cefTRIAXone   IVPB 1000 milliGRAM(s) IV Intermittent every 24 hours  dextrose 5%. 1000 milliLiter(s) (50 mL/Hr) IV Continuous <Continuous>  dextrose 5%. 1000 milliLiter(s) (100 mL/Hr) IV Continuous <Continuous>  dextrose 50% Injectable 12.5 Gram(s) IV Push once  dextrose 50% Injectable 25 Gram(s) IV Push once  dextrose 50% Injectable 25 Gram(s) IV Push once  enoxaparin Injectable 40 milliGRAM(s) SubCutaneous every 24 hours  glucagon  Injectable 1 milliGRAM(s) IntraMuscular once  insulin glargine Injectable (LANTUS) 8 Unit(s) SubCutaneous at bedtime  insulin lispro (ADMELOG) corrective regimen sliding scale   SubCutaneous three times a day before meals  lisinopril 5 milliGRAM(s) Oral daily    MEDICATIONS  (PRN):  albuterol/ipratropium for Nebulization 3 milliLiter(s) Nebulizer every 6 hours PRN Shortness of Breath and/or Wheezing  dextrose Oral Gel 15 Gram(s) Oral once PRN Blood Glucose LESS THAN 70 milliGRAM(s)/deciliter      HEALTH ISSUES - PROBLEM Dx:  Acute hypoxic respiratory failure    HTN (hypertension)    HLD (hyperlipidemia)    Prophylactic measure    Transaminitis    Sepsis due to urinary tract infection    Primary colon cancer with metastasis to other site    Type 2 diabetes mellitus with hyperglycemia    Pseudohyponatremia    Medication management    Suspected deep vein thrombosis (DVT)

## 2024-08-26 ENCOUNTER — RESULT REVIEW (OUTPATIENT)
Age: 54
End: 2024-08-26

## 2024-08-26 DIAGNOSIS — N17.9 ACUTE KIDNEY FAILURE, UNSPECIFIED: ICD-10-CM

## 2024-08-26 DIAGNOSIS — Z71.89 OTHER SPECIFIED COUNSELING: ICD-10-CM

## 2024-08-26 DIAGNOSIS — G89.3 NEOPLASM RELATED PAIN (ACUTE) (CHRONIC): ICD-10-CM

## 2024-08-26 DIAGNOSIS — Z51.5 ENCOUNTER FOR PALLIATIVE CARE: ICD-10-CM

## 2024-08-26 LAB
ALBUMIN SERPL ELPH-MCNC: 1.9 G/DL — LOW (ref 3.3–5)
ALP SERPL-CCNC: 1419 U/L — HIGH (ref 40–120)
ALT FLD-CCNC: 276 U/L — HIGH (ref 4–41)
ANION GAP SERPL CALC-SCNC: 12 MMOL/L — SIGNIFICANT CHANGE UP (ref 7–14)
AST SERPL-CCNC: 312 U/L — HIGH (ref 4–40)
BASOPHILS # BLD AUTO: 0.02 K/UL — SIGNIFICANT CHANGE UP (ref 0–0.2)
BASOPHILS NFR BLD AUTO: 0.1 % — SIGNIFICANT CHANGE UP (ref 0–2)
BILIRUB SERPL-MCNC: 18.5 MG/DL — HIGH (ref 0.2–1.2)
BUN SERPL-MCNC: 42 MG/DL — HIGH (ref 7–23)
CALCIUM SERPL-MCNC: 8.1 MG/DL — LOW (ref 8.4–10.5)
CHLORIDE SERPL-SCNC: 97 MMOL/L — LOW (ref 98–107)
CO2 SERPL-SCNC: 18 MMOL/L — LOW (ref 22–31)
CREAT SERPL-MCNC: 1.36 MG/DL — HIGH (ref 0.5–1.3)
EGFR: 62 ML/MIN/1.73M2 — SIGNIFICANT CHANGE UP
EOSINOPHIL # BLD AUTO: 0.01 K/UL — SIGNIFICANT CHANGE UP (ref 0–0.5)
EOSINOPHIL NFR BLD AUTO: 0.1 % — SIGNIFICANT CHANGE UP (ref 0–6)
GLUCOSE BLDC GLUCOMTR-MCNC: 161 MG/DL — HIGH (ref 70–99)
GLUCOSE BLDC GLUCOMTR-MCNC: 169 MG/DL — HIGH (ref 70–99)
GLUCOSE BLDC GLUCOMTR-MCNC: 176 MG/DL — HIGH (ref 70–99)
GLUCOSE BLDC GLUCOMTR-MCNC: 181 MG/DL — HIGH (ref 70–99)
GLUCOSE BLDC GLUCOMTR-MCNC: 192 MG/DL — HIGH (ref 70–99)
GLUCOSE SERPL-MCNC: 178 MG/DL — HIGH (ref 70–99)
HCT VFR BLD CALC: 39.2 % — SIGNIFICANT CHANGE UP (ref 39–50)
HCV RNA FLD QL NAA+PROBE: SIGNIFICANT CHANGE UP
HCV RNA SPEC QL PROBE+SIG AMP: SIGNIFICANT CHANGE UP
HGB BLD-MCNC: 12.7 G/DL — LOW (ref 13–17)
IANC: 13.17 K/UL — HIGH (ref 1.8–7.4)
IMM GRANULOCYTES NFR BLD AUTO: 1 % — HIGH (ref 0–0.9)
LYMPHOCYTES # BLD AUTO: 0.94 K/UL — LOW (ref 1–3.3)
LYMPHOCYTES # BLD AUTO: 6 % — LOW (ref 13–44)
MAGNESIUM SERPL-MCNC: 2.5 MG/DL — SIGNIFICANT CHANGE UP (ref 1.6–2.6)
MCHC RBC-ENTMCNC: 24.7 PG — LOW (ref 27–34)
MCHC RBC-ENTMCNC: 32.4 GM/DL — SIGNIFICANT CHANGE UP (ref 32–36)
MCV RBC AUTO: 76.1 FL — LOW (ref 80–100)
MONOCYTES # BLD AUTO: 1.27 K/UL — HIGH (ref 0–0.9)
MONOCYTES NFR BLD AUTO: 8.2 % — SIGNIFICANT CHANGE UP (ref 2–14)
NEUTROPHILS # BLD AUTO: 13.17 K/UL — HIGH (ref 1.8–7.4)
NEUTROPHILS NFR BLD AUTO: 84.6 % — HIGH (ref 43–77)
NRBC # BLD: 0 /100 WBCS — SIGNIFICANT CHANGE UP (ref 0–0)
NRBC # FLD: 0.07 K/UL — HIGH (ref 0–0)
PHOSPHATE SERPL-MCNC: 2.9 MG/DL — SIGNIFICANT CHANGE UP (ref 2.5–4.5)
PLATELET # BLD AUTO: 205 K/UL — SIGNIFICANT CHANGE UP (ref 150–400)
POTASSIUM SERPL-MCNC: 5.4 MMOL/L — HIGH (ref 3.5–5.3)
POTASSIUM SERPL-SCNC: 5.4 MMOL/L — HIGH (ref 3.5–5.3)
PROT SERPL-MCNC: 5.8 G/DL — LOW (ref 6–8.3)
RBC # BLD: 5.15 M/UL — SIGNIFICANT CHANGE UP (ref 4.2–5.8)
RBC # FLD: 22 % — HIGH (ref 10.3–14.5)
SODIUM SERPL-SCNC: 127 MMOL/L — LOW (ref 135–145)
WBC # BLD: 15.57 K/UL — HIGH (ref 3.8–10.5)
WBC # FLD AUTO: 15.57 K/UL — HIGH (ref 3.8–10.5)

## 2024-08-26 PROCEDURE — 99232 SBSQ HOSP IP/OBS MODERATE 35: CPT | Mod: GC

## 2024-08-26 PROCEDURE — 99233 SBSQ HOSP IP/OBS HIGH 50: CPT | Mod: GC

## 2024-08-26 PROCEDURE — 99497 ADVNCD CARE PLAN 30 MIN: CPT | Mod: 25

## 2024-08-26 PROCEDURE — 93970 EXTREMITY STUDY: CPT | Mod: 26

## 2024-08-26 PROCEDURE — 99253 IP/OBS CNSLTJ NEW/EST LOW 45: CPT

## 2024-08-26 RX ORDER — ALBUMIN (HUMAN) 5 G/20ML
50 SOLUTION INTRAVENOUS EVERY 8 HOURS
Refills: 0 | Status: COMPLETED | OUTPATIENT
Start: 2024-08-26 | End: 2024-08-27

## 2024-08-26 RX ORDER — HEPARIN SODIUM,BOVINE 1000/ML
5000 VIAL (ML) INJECTION EVERY 8 HOURS
Refills: 0 | Status: DISCONTINUED | OUTPATIENT
Start: 2024-08-26 | End: 2024-09-03

## 2024-08-26 RX ORDER — SODIUM ZIRCONIUM CYCLOSILICATE 5 G/5G
10 POWDER, FOR SUSPENSION ORAL THREE TIMES A DAY
Refills: 0 | Status: COMPLETED | OUTPATIENT
Start: 2024-08-26 | End: 2024-08-26

## 2024-08-26 RX ORDER — SENNA 187 MG
2 TABLET ORAL AT BEDTIME
Refills: 0 | Status: DISCONTINUED | OUTPATIENT
Start: 2024-08-26 | End: 2024-09-04

## 2024-08-26 RX ORDER — HYDROMORPHONE HYDROCHLORIDE 2 MG/1
0.5 TABLET ORAL EVERY 6 HOURS
Refills: 0 | Status: DISCONTINUED | OUTPATIENT
Start: 2024-08-26 | End: 2024-08-27

## 2024-08-26 RX ORDER — BENZONATATE 100 MG
100 CAPSULE ORAL EVERY 8 HOURS
Refills: 0 | Status: DISCONTINUED | OUTPATIENT
Start: 2024-08-26 | End: 2024-09-04

## 2024-08-26 RX ORDER — POLYETHYLENE GLYCOL 3350 17 G/17G
17 POWDER, FOR SOLUTION ORAL DAILY
Refills: 0 | Status: DISCONTINUED | OUTPATIENT
Start: 2024-08-26 | End: 2024-08-28

## 2024-08-26 RX ADMIN — Medication 100 MILLIGRAM(S): at 22:18

## 2024-08-26 RX ADMIN — LISINOPRIL 5 MILLIGRAM(S): 10 TABLET ORAL at 05:26

## 2024-08-26 RX ADMIN — ALBUMIN (HUMAN) 50 MILLILITER(S): 5 SOLUTION INTRAVENOUS at 11:09

## 2024-08-26 RX ADMIN — SODIUM ZIRCONIUM CYCLOSILICATE 10 GRAM(S): 5 POWDER, FOR SUSPENSION ORAL at 14:10

## 2024-08-26 RX ADMIN — ALBUMIN (HUMAN) 50 MILLILITER(S): 5 SOLUTION INTRAVENOUS at 20:41

## 2024-08-26 RX ADMIN — Medication 4 UNIT(S): at 12:55

## 2024-08-26 RX ADMIN — Medication 4 UNIT(S): at 10:54

## 2024-08-26 RX ADMIN — SODIUM CHLORIDE 1 GRAM(S): 9 INJECTION INTRAMUSCULAR; INTRAVENOUS; SUBCUTANEOUS at 05:28

## 2024-08-26 RX ADMIN — Medication 2 TABLET(S): at 21:25

## 2024-08-26 RX ADMIN — Medication 2: at 10:53

## 2024-08-26 RX ADMIN — ENOXAPARIN SODIUM 40 MILLIGRAM(S): 100 INJECTION SUBCUTANEOUS at 05:25

## 2024-08-26 RX ADMIN — Medication 4 MILLIGRAM(S): at 08:10

## 2024-08-26 RX ADMIN — SODIUM ZIRCONIUM CYCLOSILICATE 10 GRAM(S): 5 POWDER, FOR SUSPENSION ORAL at 11:09

## 2024-08-26 RX ADMIN — Medication 4 UNIT(S): at 17:50

## 2024-08-26 RX ADMIN — Medication 4 MILLIGRAM(S): at 09:10

## 2024-08-26 RX ADMIN — HYDROMORPHONE HYDROCHLORIDE 0.5 MILLIGRAM(S): 2 TABLET ORAL at 18:53

## 2024-08-26 RX ADMIN — AMLODIPINE BESYLATE 10 MILLIGRAM(S): 10 TABLET ORAL at 05:27

## 2024-08-26 RX ADMIN — SODIUM ZIRCONIUM CYCLOSILICATE 10 GRAM(S): 5 POWDER, FOR SUSPENSION ORAL at 21:25

## 2024-08-26 RX ADMIN — Medication 5000 UNIT(S): at 14:10

## 2024-08-26 RX ADMIN — SODIUM CHLORIDE 1 GRAM(S): 9 INJECTION INTRAMUSCULAR; INTRAVENOUS; SUBCUTANEOUS at 17:48

## 2024-08-26 RX ADMIN — Medication 100 MILLIGRAM(S): at 05:26

## 2024-08-26 RX ADMIN — Medication 2: at 12:55

## 2024-08-26 RX ADMIN — POLYETHYLENE GLYCOL 3350 17 GRAM(S): 17 POWDER, FOR SOLUTION ORAL at 18:06

## 2024-08-26 RX ADMIN — INSULIN GLARGINE 13 UNIT(S): 100 INJECTION, SOLUTION SUBCUTANEOUS at 21:21

## 2024-08-26 RX ADMIN — Medication 5000 UNIT(S): at 21:25

## 2024-08-26 RX ADMIN — Medication 2: at 17:51

## 2024-08-26 NOTE — PROGRESS NOTE ADULT - PROBLEM SELECTOR PLAN 10
DVT ppx: Lovenox 40mg daily  Diet: Consistent carb/DASH  Dispo: pending clinical course.   Code Status: FULL CODE

## 2024-08-26 NOTE — PROGRESS NOTE ADULT - PROBLEM SELECTOR PLAN 7
Na 124 on admission but 129 when corrected for hyperglycemia  - Now likely 2/2 to hyperbilirubinemia  - CTM

## 2024-08-26 NOTE — PROGRESS NOTE ADULT - PROBLEM SELECTOR PLAN 1
Creat 0.94 -->1.36 (08/26/24)    Patient has anasarca as seen on CT imaging and in setting of acute liver injury, RIGO likely 2/2 to worsening hepatic function and hypovolemia.     Plan:  - IV albumin 25% x 3 doses  - Continue to monitor Creat 0.94 -->1.36 (08/26/24)    Patient has anasarca as seen on CT imaging and in setting of acute liver injury, RIGO likely 2/2 to worsening hepatic function and hypovolemia.   - Concerning for hepatorenal syndrome    Plan:  - IV albumin 25% x 3 doses  - Continue to monitor

## 2024-08-26 NOTE — PROGRESS NOTE ADULT - PROBLEM SELECTOR PLAN 3
Stage IV colorectal cancer with mets to lung and liver s/p chemo, Currently getting RT to his R hip, plans for possible retreatment with FOLFOX + maia as per outpatient notes. Follows with Dr. Dre Gardner at [facility].  CEA 1142      - F/u on Heme onc recs   - Palliative consulted, follow up recs.   - Trend CBC. CMP, coags

## 2024-08-26 NOTE — PROGRESS NOTE ADULT - SUBJECTIVE AND OBJECTIVE BOX
Desmond Leung M.D  Resident  Department of Medicine  Available on [employer] Teams    Patient is a 54y old  Male who presents with a chief complaint of SOB (25 Aug 2024 06:33)      SUBJECTIVE / OVERNIGHT EVENTS: Patient seen and examined at bedside.     ADDITIONAL REVIEW OF SYSTEMS:    MEDICATIONS  (STANDING):  amLODIPine   Tablet 10 milliGRAM(s) Oral daily  benzonatate 100 milliGRAM(s) Oral every 8 hours  cefTRIAXone   IVPB 1000 milliGRAM(s) IV Intermittent every 24 hours  dextrose 5%. 1000 milliLiter(s) (50 mL/Hr) IV Continuous <Continuous>  dextrose 5%. 1000 milliLiter(s) (100 mL/Hr) IV Continuous <Continuous>  dextrose 50% Injectable 12.5 Gram(s) IV Push once  dextrose 50% Injectable 25 Gram(s) IV Push once  dextrose 50% Injectable 25 Gram(s) IV Push once  enoxaparin Injectable 40 milliGRAM(s) SubCutaneous every 24 hours  glucagon  Injectable 1 milliGRAM(s) IntraMuscular once  insulin glargine Injectable (LANTUS) 13 Unit(s) SubCutaneous at bedtime  insulin lispro (ADMELOG) corrective regimen sliding scale   SubCutaneous three times a day before meals  insulin lispro (ADMELOG) corrective regimen sliding scale   SubCutaneous at bedtime  insulin lispro Injectable (ADMELOG) 4 Unit(s) SubCutaneous three times a day before meals  lisinopril 5 milliGRAM(s) Oral daily  sodium chloride 1 Gram(s) Oral two times a day    MEDICATIONS  (PRN):  albuterol/ipratropium for Nebulization 3 milliLiter(s) Nebulizer every 6 hours PRN Shortness of Breath and/or Wheezing  dextrose Oral Gel 15 Gram(s) Oral once PRN Blood Glucose LESS THAN 70 milliGRAM(s)/deciliter      CAPILLARY BLOOD GLUCOSE      POCT Blood Glucose.: 197 mg/dL (25 Aug 2024 21:29)  POCT Blood Glucose.: 225 mg/dL (25 Aug 2024 17:56)  POCT Blood Glucose.: 258 mg/dL (25 Aug 2024 12:31)  POCT Blood Glucose.: 240 mg/dL (25 Aug 2024 09:00)    I&O's Summary      PHYSICAL EXAM:    Vital Signs Last 24 Hrs  T(C): 36.6 (26 Aug 2024 05:10), Max: 37.1 (25 Aug 2024 13:40)  T(F): 97.8 (26 Aug 2024 05:10), Max: 98.7 (25 Aug 2024 13:40)  HR: 107 (26 Aug 2024 05:10) (105 - 113)  BP: 109/60 (26 Aug 2024 05:10) (97/64 - 122/75)  BP(mean): --  RR: 18 (26 Aug 2024 05:10) (18 - 18)  SpO2: 98% (26 Aug 2024 05:10) (97% - 100%)    Parameters below as of 26 Aug 2024 05:10  Patient On (Oxygen Delivery Method): nasal cannula  O2 Flow (L/min): 2      CONSTITUTIONAL: NAD, well-developed, well-groomed  EYES: Conjunctiva and sclera clear  ENMT: Moist oral mucosa, no pharyngeal injection or exudates; normal dentition  NECK: Supple, no palpable masses; no thyromegaly  RESPIRATORY: Normal respiratory effort; lungs are clear to auscultation bilaterally  CARDIOVASCULAR: Regular rate and rhythm, normal S1 and S2, no murmur/rub/gallop; No lower extremity edema; Peripheral pulses are 2+ bilaterally  ABDOMEN: Soft, nontender to palpation, normoactive bowel sounds, no rebound/guarding  MUSCULOSKELETAL: No clubbing or cyanosis of digits; no joint swelling or tenderness to palpation  PSYCH: A+O to person, place, and time; affect appropriate  NEUROLOGY: CN 2-12 are intact and symmetric; no gross sensory deficits   SKIN: No rashes; no palpable lesions    LABS:                        12.7   15.57 )-----------( 205      ( 26 Aug 2024 04:00 )             39.2     08-26    127<L>  |  97<L>  |  42<H>  ----------------------------<  178<H>  5.4<H>   |  18<L>  |  1.36<H>    Ca    8.1<L>      26 Aug 2024 04:00  Phos  2.9     08-26  Mg     2.50     08-26    TPro  5.8<L>  /  Alb  1.9<L>  /  TBili  18.5<H>  /  DBili  x   /  AST  312<H>  /  ALT  276<H>  /  AlkPhos  1419<H>  08-26    PT/INR - ( 25 Aug 2024 04:40 )   PT: 14.7 sec;   INR: 1.32 ratio         PTT - ( 25 Aug 2024 04:40 )  PTT:29.8 sec      Urinalysis Basic - ( 26 Aug 2024 04:00 )    Color: x / Appearance: x / SG: x / pH: x  Gluc: 178 mg/dL / Ketone: x  / Bili: x / Urobili: x   Blood: x / Protein: x / Nitrite: x   Leuk Esterase: x / RBC: x / WBC x   Sq Epi: x / Non Sq Epi: x / Bacteria: x        Culture - Blood (collected 24 Aug 2024 10:45)  Source: .Blood Blood-Peripheral  Preliminary Report (25 Aug 2024 13:01):    No growth at 24 hours    Culture - Blood (collected 24 Aug 2024 10:36)  Source: .Blood Blood-Peripheral  Preliminary Report (25 Aug 2024 13:01):    No growth at 24 hours    Urinalysis with Rflx Culture (collected 23 Aug 2024 21:24)        RADIOLOGY & ADDITIONAL TESTS: No new imaging  Results Reviewed: Yes  Imaging Personally Reviewed:  Electrocardiogram Personally Reviewed:    COORDINATION OF CARE:  Care Discussed with Consultants/Other Providers [Y/N]:  Prior or Outpatient Records Reviewed [Y/N]:   Desmond Leung M.D  Resident  Department of Medicine  Available on [employer] Teams    Patient is a 54y old  Male who presents with a chief complaint of SOB (25 Aug 2024 06:33)      SUBJECTIVE / OVERNIGHT EVENTS: Patient seen and examined at bedside. Spoke with patient's older sister Geo Granda over the phone. Answered questions regarding patient's care, progression of disease and prognosis. She wanted to know more about hospice care and length of hospital stay. Told her that we would follow up with this after palliative has seen with patient.     ADDITIONAL REVIEW OF SYSTEMS:    MEDICATIONS  (STANDING):  amLODIPine   Tablet 10 milliGRAM(s) Oral daily  benzonatate 100 milliGRAM(s) Oral every 8 hours  cefTRIAXone   IVPB 1000 milliGRAM(s) IV Intermittent every 24 hours  dextrose 5%. 1000 milliLiter(s) (50 mL/Hr) IV Continuous <Continuous>  dextrose 5%. 1000 milliLiter(s) (100 mL/Hr) IV Continuous <Continuous>  dextrose 50% Injectable 12.5 Gram(s) IV Push once  dextrose 50% Injectable 25 Gram(s) IV Push once  dextrose 50% Injectable 25 Gram(s) IV Push once  enoxaparin Injectable 40 milliGRAM(s) SubCutaneous every 24 hours  glucagon  Injectable 1 milliGRAM(s) IntraMuscular once  insulin glargine Injectable (LANTUS) 13 Unit(s) SubCutaneous at bedtime  insulin lispro (ADMELOG) corrective regimen sliding scale   SubCutaneous three times a day before meals  insulin lispro (ADMELOG) corrective regimen sliding scale   SubCutaneous at bedtime  insulin lispro Injectable (ADMELOG) 4 Unit(s) SubCutaneous three times a day before meals  lisinopril 5 milliGRAM(s) Oral daily  sodium chloride 1 Gram(s) Oral two times a day    MEDICATIONS  (PRN):  albuterol/ipratropium for Nebulization 3 milliLiter(s) Nebulizer every 6 hours PRN Shortness of Breath and/or Wheezing  dextrose Oral Gel 15 Gram(s) Oral once PRN Blood Glucose LESS THAN 70 milliGRAM(s)/deciliter      CAPILLARY BLOOD GLUCOSE      POCT Blood Glucose.: 197 mg/dL (25 Aug 2024 21:29)  POCT Blood Glucose.: 225 mg/dL (25 Aug 2024 17:56)  POCT Blood Glucose.: 258 mg/dL (25 Aug 2024 12:31)  POCT Blood Glucose.: 240 mg/dL (25 Aug 2024 09:00)    I&O's Summary      PHYSICAL EXAM:    Vital Signs Last 24 Hrs  T(C): 36.6 (26 Aug 2024 05:10), Max: 37.1 (25 Aug 2024 13:40)  T(F): 97.8 (26 Aug 2024 05:10), Max: 98.7 (25 Aug 2024 13:40)  HR: 107 (26 Aug 2024 05:10) (105 - 113)  BP: 109/60 (26 Aug 2024 05:10) (97/64 - 122/75)  BP(mean): --  RR: 18 (26 Aug 2024 05:10) (18 - 18)  SpO2: 98% (26 Aug 2024 05:10) (97% - 100%)    Parameters below as of 26 Aug 2024 05:10  Patient On (Oxygen Delivery Method): nasal cannula  O2 Flow (L/min): 2      CONSTITUTIONAL: NAD, well-developed, well-groomed  EYES: Conjunctiva and sclera clear  ENMT: Moist oral mucosa, no pharyngeal injection or exudates; normal dentition  NECK: Supple, no palpable masses; no thyromegaly  RESPIRATORY: Normal respiratory effort; lungs are clear to auscultation bilaterally  CARDIOVASCULAR: Regular rate and rhythm, normal S1 and S2, no murmur/rub/gallop; No lower extremity edema; Peripheral pulses are 2+ bilaterally  ABDOMEN: Soft, nontender to palpation, normoactive bowel sounds, no rebound/guarding  MUSCULOSKELETAL: No clubbing or cyanosis of digits; no joint swelling or tenderness to palpation  PSYCH: A+O to person, place, and time; affect appropriate  NEUROLOGY: CN 2-12 are intact and symmetric; no gross sensory deficits   SKIN: No rashes; no palpable lesions    LABS:                        12.7   15.57 )-----------( 205      ( 26 Aug 2024 04:00 )             39.2     08-26    127<L>  |  97<L>  |  42<H>  ----------------------------<  178<H>  5.4<H>   |  18<L>  |  1.36<H>    Ca    8.1<L>      26 Aug 2024 04:00  Phos  2.9     08-26  Mg     2.50     08-26    TPro  5.8<L>  /  Alb  1.9<L>  /  TBili  18.5<H>  /  DBili  x   /  AST  312<H>  /  ALT  276<H>  /  AlkPhos  1419<H>  08-26    PT/INR - ( 25 Aug 2024 04:40 )   PT: 14.7 sec;   INR: 1.32 ratio         PTT - ( 25 Aug 2024 04:40 )  PTT:29.8 sec      Urinalysis Basic - ( 26 Aug 2024 04:00 )    Color: x / Appearance: x / SG: x / pH: x  Gluc: 178 mg/dL / Ketone: x  / Bili: x / Urobili: x   Blood: x / Protein: x / Nitrite: x   Leuk Esterase: x / RBC: x / WBC x   Sq Epi: x / Non Sq Epi: x / Bacteria: x        Culture - Blood (collected 24 Aug 2024 10:45)  Source: .Blood Blood-Peripheral  Preliminary Report (25 Aug 2024 13:01):    No growth at 24 hours    Culture - Blood (collected 24 Aug 2024 10:36)  Source: .Blood Blood-Peripheral  Preliminary Report (25 Aug 2024 13:01):    No growth at 24 hours    Urinalysis with Rflx Culture (collected 23 Aug 2024 21:24)        RADIOLOGY & ADDITIONAL TESTS: No new imaging  Results Reviewed: Yes  Imaging Personally Reviewed:  Electrocardiogram Personally Reviewed:    COORDINATION OF CARE:  Care Discussed with Consultants/Other Providers [Y/N]:  Prior or Outpatient Records Reviewed [Y/N]:   Desmond Leung M.D  Resident  Department of Medicine  Available on [employer] Teams    Patient is a 54y old  Male who presents with a chief complaint of SOB (25 Aug 2024 06:33)      SUBJECTIVE / OVERNIGHT EVENTS: Patient seen and examined at bedside. Spoke with patient's older sister Geo Granda over the phone. Answered questions regarding patient's care, progression of disease and prognosis. She wanted to know more about hospice care and length of hospital stay. Told her that we would follow up with this after palliative has seen with patient. Spoke with other sister and patient in person and they were understanding of the severity of patient's disease. They wanted some time to talk with the family and said will discuss tomorrow.     ADDITIONAL REVIEW OF SYSTEMS:    MEDICATIONS  (STANDING):  amLODIPine   Tablet 10 milliGRAM(s) Oral daily  benzonatate 100 milliGRAM(s) Oral every 8 hours  cefTRIAXone   IVPB 1000 milliGRAM(s) IV Intermittent every 24 hours  dextrose 5%. 1000 milliLiter(s) (50 mL/Hr) IV Continuous <Continuous>  dextrose 5%. 1000 milliLiter(s) (100 mL/Hr) IV Continuous <Continuous>  dextrose 50% Injectable 12.5 Gram(s) IV Push once  dextrose 50% Injectable 25 Gram(s) IV Push once  dextrose 50% Injectable 25 Gram(s) IV Push once  enoxaparin Injectable 40 milliGRAM(s) SubCutaneous every 24 hours  glucagon  Injectable 1 milliGRAM(s) IntraMuscular once  insulin glargine Injectable (LANTUS) 13 Unit(s) SubCutaneous at bedtime  insulin lispro (ADMELOG) corrective regimen sliding scale   SubCutaneous three times a day before meals  insulin lispro (ADMELOG) corrective regimen sliding scale   SubCutaneous at bedtime  insulin lispro Injectable (ADMELOG) 4 Unit(s) SubCutaneous three times a day before meals  lisinopril 5 milliGRAM(s) Oral daily  sodium chloride 1 Gram(s) Oral two times a day    MEDICATIONS  (PRN):  albuterol/ipratropium for Nebulization 3 milliLiter(s) Nebulizer every 6 hours PRN Shortness of Breath and/or Wheezing  dextrose Oral Gel 15 Gram(s) Oral once PRN Blood Glucose LESS THAN 70 milliGRAM(s)/deciliter      CAPILLARY BLOOD GLUCOSE      POCT Blood Glucose.: 197 mg/dL (25 Aug 2024 21:29)  POCT Blood Glucose.: 225 mg/dL (25 Aug 2024 17:56)  POCT Blood Glucose.: 258 mg/dL (25 Aug 2024 12:31)  POCT Blood Glucose.: 240 mg/dL (25 Aug 2024 09:00)    I&O's Summary      PHYSICAL EXAM:    Vital Signs Last 24 Hrs  T(C): 36.6 (26 Aug 2024 05:10), Max: 37.1 (25 Aug 2024 13:40)  T(F): 97.8 (26 Aug 2024 05:10), Max: 98.7 (25 Aug 2024 13:40)  HR: 107 (26 Aug 2024 05:10) (105 - 113)  BP: 109/60 (26 Aug 2024 05:10) (97/64 - 122/75)  BP(mean): --  RR: 18 (26 Aug 2024 05:10) (18 - 18)  SpO2: 98% (26 Aug 2024 05:10) (97% - 100%)    Parameters below as of 26 Aug 2024 05:10  Patient On (Oxygen Delivery Method): nasal cannula  O2 Flow (L/min): 2      CONSTITUTIONAL: NAD, well-developed, well-groomed  EYES: Conjunctiva and sclera clear  ENMT: Moist oral mucosa, no pharyngeal injection or exudates; normal dentition  NECK: Supple, no palpable masses; no thyromegaly  RESPIRATORY: Normal respiratory effort; lungs are clear to auscultation bilaterally  CARDIOVASCULAR: Regular rate and rhythm, normal S1 and S2, no murmur/rub/gallop; No lower extremity edema; Peripheral pulses are 2+ bilaterally  ABDOMEN: Soft, nontender to palpation, normoactive bowel sounds, no rebound/guarding  MUSCULOSKELETAL: No clubbing or cyanosis of digits; no joint swelling or tenderness to palpation  PSYCH: A+O to person, place, and time; affect appropriate  NEUROLOGY: CN 2-12 are intact and symmetric; no gross sensory deficits   SKIN: No rashes; no palpable lesions    LABS:                        12.7   15.57 )-----------( 205      ( 26 Aug 2024 04:00 )             39.2     08-26    127<L>  |  97<L>  |  42<H>  ----------------------------<  178<H>  5.4<H>   |  18<L>  |  1.36<H>    Ca    8.1<L>      26 Aug 2024 04:00  Phos  2.9     08-26  Mg     2.50     08-26    TPro  5.8<L>  /  Alb  1.9<L>  /  TBili  18.5<H>  /  DBili  x   /  AST  312<H>  /  ALT  276<H>  /  AlkPhos  1419<H>  08-26    PT/INR - ( 25 Aug 2024 04:40 )   PT: 14.7 sec;   INR: 1.32 ratio         PTT - ( 25 Aug 2024 04:40 )  PTT:29.8 sec      Urinalysis Basic - ( 26 Aug 2024 04:00 )    Color: x / Appearance: x / SG: x / pH: x  Gluc: 178 mg/dL / Ketone: x  / Bili: x / Urobili: x   Blood: x / Protein: x / Nitrite: x   Leuk Esterase: x / RBC: x / WBC x   Sq Epi: x / Non Sq Epi: x / Bacteria: x        Culture - Blood (collected 24 Aug 2024 10:45)  Source: .Blood Blood-Peripheral  Preliminary Report (25 Aug 2024 13:01):    No growth at 24 hours    Culture - Blood (collected 24 Aug 2024 10:36)  Source: .Blood Blood-Peripheral  Preliminary Report (25 Aug 2024 13:01):    No growth at 24 hours    Urinalysis with Rflx Culture (collected 23 Aug 2024 21:24)        RADIOLOGY & ADDITIONAL TESTS: No new imaging  Results Reviewed: Yes  Imaging Personally Reviewed:  Electrocardiogram Personally Reviewed:    COORDINATION OF CARE:  Care Discussed with Consultants/Other Providers [Y/N]:  Prior or Outpatient Records Reviewed [Y/N]:   Desmond Leung M.D  Resident  Department of Medicine  Available on [employer] Teams    Patient is a 54y old  Male who presents with a chief complaint of SOB (25 Aug 2024 06:33)      SUBJECTIVE / OVERNIGHT EVENTS: Patient seen and examined at bedside. Spoke with patient's older sister Geo Granda over the phone. Answered questions regarding patient's care, progression of disease and prognosis. She wanted to know more about hospice care and length of hospital stay. Told her that we would follow up with this after palliative has seen with patient. Spoke with other sister and patient in person and they were understanding of the severity of patient's disease. They wanted some time to talk with the family and said will discuss tomorrow.     ADDITIONAL REVIEW OF SYSTEMS:    MEDICATIONS  (STANDING):  amLODIPine   Tablet 10 milliGRAM(s) Oral daily  benzonatate 100 milliGRAM(s) Oral every 8 hours  cefTRIAXone   IVPB 1000 milliGRAM(s) IV Intermittent every 24 hours  dextrose 5%. 1000 milliLiter(s) (50 mL/Hr) IV Continuous <Continuous>  dextrose 5%. 1000 milliLiter(s) (100 mL/Hr) IV Continuous <Continuous>  dextrose 50% Injectable 12.5 Gram(s) IV Push once  dextrose 50% Injectable 25 Gram(s) IV Push once  dextrose 50% Injectable 25 Gram(s) IV Push once  enoxaparin Injectable 40 milliGRAM(s) SubCutaneous every 24 hours  glucagon  Injectable 1 milliGRAM(s) IntraMuscular once  insulin glargine Injectable (LANTUS) 13 Unit(s) SubCutaneous at bedtime  insulin lispro (ADMELOG) corrective regimen sliding scale   SubCutaneous three times a day before meals  insulin lispro (ADMELOG) corrective regimen sliding scale   SubCutaneous at bedtime  insulin lispro Injectable (ADMELOG) 4 Unit(s) SubCutaneous three times a day before meals  lisinopril 5 milliGRAM(s) Oral daily  sodium chloride 1 Gram(s) Oral two times a day    MEDICATIONS  (PRN):  albuterol/ipratropium for Nebulization 3 milliLiter(s) Nebulizer every 6 hours PRN Shortness of Breath and/or Wheezing  dextrose Oral Gel 15 Gram(s) Oral once PRN Blood Glucose LESS THAN 70 milliGRAM(s)/deciliter      CAPILLARY BLOOD GLUCOSE      POCT Blood Glucose.: 197 mg/dL (25 Aug 2024 21:29)  POCT Blood Glucose.: 225 mg/dL (25 Aug 2024 17:56)  POCT Blood Glucose.: 258 mg/dL (25 Aug 2024 12:31)  POCT Blood Glucose.: 240 mg/dL (25 Aug 2024 09:00)    I&O's Summary      PHYSICAL EXAM:    Vital Signs Last 24 Hrs  T(C): 36.6 (26 Aug 2024 05:10), Max: 37.1 (25 Aug 2024 13:40)  T(F): 97.8 (26 Aug 2024 05:10), Max: 98.7 (25 Aug 2024 13:40)  HR: 107 (26 Aug 2024 05:10) (105 - 113)  BP: 109/60 (26 Aug 2024 05:10) (97/64 - 122/75)  BP(mean): --  RR: 18 (26 Aug 2024 05:10) (18 - 18)  SpO2: 98% (26 Aug 2024 05:10) (97% - 100%)    Parameters below as of 26 Aug 2024 05:10  Patient On (Oxygen Delivery Method): nasal cannula  O2 Flow (L/min): 2      CONSTITUTIONAL: NAD, well-developed, well-groomed  EYES: scleral icterus present  NECK: Supple, no palpable masses; no thyromegaly  RESPIRATORY: Normal respiratory effort;  CARDIOVASCULAR: Regular rate and rhythm, normal S1 and S2, no murmur/rub/gallop; No lower extremity edema; Peripheral pulses are 2+ bilaterally  ABDOMEN: Distended and TTP, normoactive bowel sounds, no rebound/guarding  MUSCULOSKELETAL: No clubbing or cyanosis of digits; no joint swelling or tenderness to palpation  PSYCH: A+O to person, place, and time; affect appropriate  SKIN: No rashes; no palpable lesions    LABS:                        12.7   15.57 )-----------( 205      ( 26 Aug 2024 04:00 )             39.2     08-26    127<L>  |  97<L>  |  42<H>  ----------------------------<  178<H>  5.4<H>   |  18<L>  |  1.36<H>    Ca    8.1<L>      26 Aug 2024 04:00  Phos  2.9     08-26  Mg     2.50     08-26    TPro  5.8<L>  /  Alb  1.9<L>  /  TBili  18.5<H>  /  DBili  x   /  AST  312<H>  /  ALT  276<H>  /  AlkPhos  1419<H>  08-26    PT/INR - ( 25 Aug 2024 04:40 )   PT: 14.7 sec;   INR: 1.32 ratio         PTT - ( 25 Aug 2024 04:40 )  PTT:29.8 sec      Urinalysis Basic - ( 26 Aug 2024 04:00 )    Color: x / Appearance: x / SG: x / pH: x  Gluc: 178 mg/dL / Ketone: x  / Bili: x / Urobili: x   Blood: x / Protein: x / Nitrite: x   Leuk Esterase: x / RBC: x / WBC x   Sq Epi: x / Non Sq Epi: x / Bacteria: x        Culture - Blood (collected 24 Aug 2024 10:45)  Source: .Blood Blood-Peripheral  Preliminary Report (25 Aug 2024 13:01):    No growth at 24 hours    Culture - Blood (collected 24 Aug 2024 10:36)  Source: .Blood Blood-Peripheral  Preliminary Report (25 Aug 2024 13:01):    No growth at 24 hours    Urinalysis with Rflx Culture (collected 23 Aug 2024 21:24)        RADIOLOGY & ADDITIONAL TESTS: No new imaging  Results Reviewed: Yes  Imaging Personally Reviewed:  Electrocardiogram Personally Reviewed:    COORDINATION OF CARE:  Care Discussed with Consultants/Other Providers [Y/N]:  Prior or Outpatient Records Reviewed [Y/N]:

## 2024-08-26 NOTE — CONSULT NOTE ADULT - PROBLEM SELECTOR RECOMMENDATION 2
- Diagnosed in 2021    - Diffuse mets- liver, lungs   - POD despite multiple lines of treatment   - Follows with Dr. Mathew at [facility]  - Poor prognosis, Hospice recommended - Diagnosed in 2021    - Diffuse mets- liver, lungs   - POD despite multiple lines of treatment   - Follows with Dr. Mathew at [facility]  - Poor prognosis, T bili elevated   - Hospice recommended

## 2024-08-26 NOTE — PROGRESS NOTE ADULT - PROBLEM SELECTOR PLAN 4
Reports dysuria and hematuria x1 week, no urinary frequency though. Here noted to have leukocytosis to 17k with tachycardia and positive UA concerning for sepsis 2/2 UTI.  Lactate elevated to 3.9 x2 --> 3.3  UA small LE and + nitrite  Ucx > 100,000 Gram negative rods    - Ceftriaxone 1g likely for 7 days (8/24-08/31)  - f/u UCx  - Check BCx x2  - Likely has poor clearance 2/2 worsening liver mets, repeat lactate in AM Reports dysuria and hematuria x1 week, no urinary frequency though. Here noted to have leukocytosis to 17k with tachycardia and positive UA concerning for sepsis 2/2 UTI.  Lactate elevated to 3.9 x2 --> 3.3  UA small LE and + nitrite  Ucx > 100,000 Gram negative rods  Bcx : NGTD    - Ceftriaxone 1g likely for 7 days (8/24-08/31)  - f/u UCx speciation   - Likely has poor clearance 2/2 worsening liver mets, repeat lactate in AM

## 2024-08-26 NOTE — PROGRESS NOTE ADULT - SUBJECTIVE AND OBJECTIVE BOX
Patient is a 54y old  Male who presents with a chief complaint of SOB (26 Aug 2024 08:14)      Interval Events:   The patient reports feeling ok. Spoke with the patient and family, who reported frustration that current treatment had not been successful. They were hoping for evaluation for transfer to [facility] where patient's sister knows a doctor for second opinion regarding treatment options.   He is understanding of his situation, and would be interested in palliative measures if second opinion does not have any significant changes in his management, however for now is agreeable to go with his family's request for a second opinion for treatment of metastatic colon cancer.     ROS:   A 12-point ROS was performed and negative except as noted in HPI.    Hospital Medications:  albumin human 25% IVPB 50 milliLiter(s) IV Intermittent every 8 hours  albuterol/ipratropium for Nebulization 3 milliLiter(s) Nebulizer every 6 hours PRN  benzonatate 100 milliGRAM(s) Oral every 8 hours PRN  cefTRIAXone   IVPB 1000 milliGRAM(s) IV Intermittent every 24 hours  dextrose 5%. 1000 milliLiter(s) IV Continuous <Continuous>  dextrose 5%. 1000 milliLiter(s) IV Continuous <Continuous>  dextrose 50% Injectable 25 Gram(s) IV Push once  dextrose 50% Injectable 12.5 Gram(s) IV Push once  dextrose 50% Injectable 25 Gram(s) IV Push once  dextrose Oral Gel 15 Gram(s) Oral once PRN  glucagon  Injectable 1 milliGRAM(s) IntraMuscular once  heparin   Injectable 5000 Unit(s) SubCutaneous every 8 hours  insulin glargine Injectable (LANTUS) 13 Unit(s) SubCutaneous at bedtime  insulin lispro (ADMELOG) corrective regimen sliding scale   SubCutaneous three times a day before meals  insulin lispro (ADMELOG) corrective regimen sliding scale   SubCutaneous at bedtime  insulin lispro Injectable (ADMELOG) 4 Unit(s) SubCutaneous three times a day before meals  sodium chloride 1 Gram(s) Oral two times a day  sodium zirconium cyclosilicate 10 Gram(s) Oral three times a day      PHYSICAL EXAM:   Vital Signs:  Vital Signs Last 24 Hrs  T(C): 36.6 (26 Aug 2024 05:10), Max: 37.1 (25 Aug 2024 13:40)  T(F): 97.8 (26 Aug 2024 05:10), Max: 98.7 (25 Aug 2024 13:40)  HR: 107 (26 Aug 2024 05:10) (106 - 113)  BP: 109/60 (26 Aug 2024 05:10) (97/64 - 122/75)  BP(mean): --  RR: 18 (26 Aug 2024 05:10) (18 - 18)  SpO2: 98% (26 Aug 2024 05:10) (98% - 100%)    Parameters below as of 26 Aug 2024 05:10  Patient On (Oxygen Delivery Method): nasal cannula  O2 Flow (L/min): 2    Daily     Daily     GENERAL: no acute distress  NEURO: alert  HEENT: jaundiced  CHEST: no respiratory distress, no accessory muscle use  CARDIAC: regular rate  ABDOMEN: soft, nondistended   EXTREMITIES: warm, well perfused, no edema  SKIN: no lesions noted    LABS: reviewed                        12.7   15.57 )-----------( 205      ( 26 Aug 2024 04:00 )             39.2     08-26    127<L>  |  97<L>  |  42<H>  ----------------------------<  178<H>  5.4<H>   |  18<L>  |  1.36<H>    Ca    8.1<L>      26 Aug 2024 04:00  Phos  2.9     08-26  Mg     2.50     08-26    TPro  5.8<L>  /  Alb  1.9<L>  /  TBili  18.5<H>  /  DBili  x   /  AST  312<H>  /  ALT  276<H>  /  AlkPhos  1419<H>  08-26    LIVER FUNCTIONS - ( 26 Aug 2024 04:00 )  Alb: 1.9 g/dL / Pro: 5.8 g/dL / ALK PHOS: 1419 U/L / ALT: 276 U/L / AST: 312 U/L / GGT: x             Interval Diagnostic Studies: see sunrise for full report

## 2024-08-26 NOTE — PROGRESS NOTE ADULT - ASSESSMENT
54M PMH colorectal adenocarcinoma ([facility] Dr. Gardner, Stage IV, mets to liver, lung, hip) admitted for acute hypoxic respiratory failure and acute liver injury with jaundice. Had an extensive GoC discussion bedside with multiple family members (sister, cousins) and patient. Patient wants to pursue comfort and confirmed that he does not want to be transported back and forth to [facility] and have the side effects of treatment. The eldest sister is going to come to visit from [place] later this week. It was agreed to have a referral to hospice and palliative care for symptom management.     PMH: HTN, HLD, T2DM    Treatment History:  - FOLFOX + Vit D July 7th, 2021 (bevacizumab added cycle #2) >> 8/23 scan shows PE started on Lovenox >> October 2021 oxaliplatin held >> POD noted 12/2022, taken off trial  - FOLFIRI + Yolis started 12/20/22 >> POD noted 2/2024  - Lonsurf/Yolis started 2/12/24 - 4/2024 >> POD noted 5/2024  - Stivarga started 6/2024 >> POD noted 8/2024    #Colon Adenocarcinoma, Stage IV ([facility], Dr. Gardner; mets to liver, lung, R hip)  - Diagnosed 6/2021; KRAS G12D , NABILA  - ECOG = 1 (ambulates without assistance, started using cane occasionally 5 days prior due to neuropathic pain)  - Stage: IV   - Treatment (Current): 8/9/24 discussed FOLFOX + Yolis retreat, limited options at this point given POD on multiple regimens. Palliative care referral made. Palliative RT to R hip metastatic site started, 2000Gy over 5 fractions; 4/5 fraction completed 8/23/24  - Please place referral for home hospice (lives with sister). Would appreciate palliative care consult for symptom management including abdominal pain control, help with sleep, and appetite.  - PT, Nutrition evaluation  - US b/l lower extremities (hx of PE, increased b/l edema)  - Recommend CT-A/P w IV contrast urgent + GI evaluation (Bilirubin 1.0 on 7/28/24) re MRCP/ERCP    Dr. Gardner is aware of patient's wishes for home hospice. If family decides later they would like to continue treatment, they can reach back out to Dr. Gardner for an appointment. No plans for inpatient treatment.    NOTE INCOMPLETE UNTIL ATTENDING SIGNS    ***************************************************************  Lulu Goodwin, PGY5  Fellow Hematology/Oncology  pager: [phone]   Available on Microsoft Teams  After 5pm or on weekends please contact  to page on-call fellow   ***************************************************************   55 yo M with metastatic colorectal adenocarcinoma ([facility] Dr. Gardner, Stage IV, mets to liver, lung, hip) admitted for acute hypoxic respiratory failure and acute liver injury with jaundice. Had an extensive GoC discussion bedside with multiple family members (sister, cousins) and patient. Patient wants to pursue comfort and confirmed that he does not want to be transported back and forth to [facility] and have the side effects of treatment. The eldest sister is going to come to visit from [place] later this week. It was agreed to have a referral to hospice and palliative care for symptom management.     PMH: HTN, HLD, T2DM    Treatment History:  - FOLFOX + Vit D July 7th, 2021 (bevacizumab added cycle #2) >> 8/23 scan shows PE started on Lovenox >> October 2021 oxaliplatin held >> POD noted 12/2022, taken off trial  - FOLFIRI + Yolis started 12/20/22 >> POD noted 2/2024  - Lonsurf/Yolis started 2/12/24 - 4/2024 >> POD noted 5/2024  - Stivarga started 6/2024 >> POD noted 8/2024    #Colon Adenocarcinoma, Stage IV ([facility], Dr. Gardner; mets to liver, lung, R hip)  - Diagnosed 6/2021; KRAS G12D , NABILA  - ECOG = 1 (ambulates without assistance, started using cane occasionally 5 days prior due to neuropathic pain)  - Stage: IV   - Treatment (Current): 8/9/24 discussed FOLFOX + Yolis retreat, limited options at this point given POD on multiple regimens. Palliative care referral made. Palliative RT to R hip metastatic site started, 2000Gy over 5 fractions; 4/5 fraction completed 8/23/24  - Please place referral for home hospice (lives with sister). Would appreciate palliative care consult for symptom management including abdominal pain control, help with sleep, and appetite.  - PT, Nutrition evaluation  - US b/l lower extremities (hx of PE, increased b/l edema)  - Recommend CT-A/P w IV contrast urgent + GI evaluation (Bilirubin 1.0 on 7/28/24) re MRCP/ERCP    Dr. Gardner is aware of patient's wishes for home hospice. If family decides later they would like to continue treatment, they can reach back out to Dr. Gardner for an appointment. No plans for inpatient treatment.      Lulu Goodwin, PGY5  Fellow Hematology/Oncology  pager: [phone]   Available on Microsoft Teams  After 5pm or on weekends please contact  to page on-call fellow   ***************************************************************

## 2024-08-26 NOTE — CONSULT NOTE ADULT - PROBLEM SELECTOR RECOMMENDATION 3
For GOC     In the event of worsening symptoms, please contact the Palliative Medicine team via pager (if the patient is at [facility] #[phone] or if the patient is at [facility] #[phone]) The Geriatric and Palliative Medicine service has coverage 24 hours a day/ 7 days a week to provide medical recommendations regarding symptom management needs via telephone - 8/26- See GOC. Discussed hospice, code status. Patient appointed sisters Zachary and Ally as HCP. Sisters interested in second opinion at [facility].

## 2024-08-26 NOTE — PROGRESS NOTE ADULT - PROBLEM SELECTOR PLAN 2
- Significant new transaminitis with hyperbilirubinemia to 20.4, majority direct bilirubin  - Patient reports noticing yellowing of his eyes about 10 days ago, noticing grey colored BMs since last week  - Denies abd pain, nausea/emesis, current diarrhea/constipation  - On examination abdomen without TTP, no RUQ pain, good bowel sounds. Distended with hepatomegaly  - CT abd pelvis shows worsening liver mets, intrahepatic ductal dilation  - Bili elevated to 20.4 on admission with 16.6 direct. ASR  - US Abdomen w/ doppler: Multiple liver metastases with compression on the hepatic vasculature. Small caliber right and main portal veins, without evidence of demonstrable flow. Thrombus is not excluded.  - Hep C Antibody positive    Plan:  - GI following, appreciate recs  - F/u on KATHE, AMA, smooth muscle and hep C viral load  - D/c MRCP as unnecessary in patient with known multiple liver mets  - Start IV albumin 25% for 3 doses  - Awaiting pain control recs per palliative  - Trend CMP, coags

## 2024-08-26 NOTE — CONSULT NOTE ADULT - CONVERSATION DETAILS
Palliative consulted for complex medical decision making in the setting of serious illness.     Elicited patient and family’s understanding of patient’s illness and prognosis. Patient understands cancer is worsening and he has limited time.     Introduced Advance Care Planning to discuss future medical decisions and treatment options in the event patient has further deterioration in condition and Advanced Directives such as MOLST to discuss specific preferences for Life Sustaining Treatments in the future. Discussed code status, recommended DNR/DNI as CPR/intubation have poor outcomes in a patient with such serious illness, would not reverse underlying diseases and can be burdensome at end of life. Discussed option of deescalation of hospitalizations and burdensome medical interventions and transitioning to comfort focused care. Such care can be ushered in with support services of hospice. Discussed hospice philosophy and holistic services.     Patient's family think he should continue medical interventions, and would like him to be transferred to [facility] for second opinion. In terms of code status, he is interested in DNR/DNI however would like to discuss with family. Sister Ally at bedside emotional and tearful, agrees she would want patient to die naturally.     Patient currently  from his wife. They have a 12 year old son. They are interested in child life referral.     Discussed with patient HCP form. Explained HCP was a document in which patient can appoint someone they trust to make medical decisions on patient's behalf in the event patient is incapacitated and unable to communicate treatment preferences. Explained HCP should be someone who patient discusses their medical issues and treatment preferences with so that proxy is aware and able to make medical decisions based on patient's wishes in the future. He agrees to appoint his sisters Zachary and Ally as HCP, patient signed form.

## 2024-08-26 NOTE — CONSULT NOTE ADULT - ASSESSMENT
Patient is a is a 54y Male with a hx of Stage IV colorectal cancer w/ mets to liver and lung s/p chemo, now undergoing palliative radiation, HTN, HLD, and T2DM who presents with SOB  Patient reports developing SOB with exertion about a month ago, which has gradually worsened. Initially he had SOB while going up stairs, but now has SOB even with short distances. No SOB at rest. Reports a mild cough with his SOB, mostly non-productive, no hemoptysis. Also notes fast heart rate with exertion but denies chest pain, dizziness, or LOC. States ambulation is also limited by swelling in his feet, states the swelling is new over the past 5 days, no pain associated with the swelling. Reports mild orthopnea but no PND. Also reports dysuria and hematuria since last week but no urinary frequency. Denies fevers/chills, abd pain, nausea/emesis, diarrhea/constipation, hematemesis, hematochezia, or melena. States his BMs are grey in color for the past 1 week. No other acute  complaints. No sick contacts or recent travel. In the ED, patient was put on NC 2L due to desat to upper 80s%. Hemodynamically stable. Bedside POCUS with grossly normal cardiac function. Course complicated by hyperglycemia and UTI. Given Ceftriaxone and Lispro 6u in ED.

## 2024-08-26 NOTE — CONSULT NOTE ADULT - TIME BILLING
Reviewing medical chart and results, symptom assessment and management, counseling patient/decision makers/caregivers, coordination of care, documentation. (separate from Advance Care Planning services)

## 2024-08-26 NOTE — PROGRESS NOTE ADULT - SUBJECTIVE AND OBJECTIVE BOX
INTERVAL HPI/OVERNIGHT EVENTS:  No overnight events.     MEDICATIONS  (STANDING):  albumin human 25% IVPB 50 milliLiter(s) IV Intermittent every 8 hours  cefTRIAXone   IVPB 1000 milliGRAM(s) IV Intermittent every 24 hours  dextrose 5%. 1000 milliLiter(s) (50 mL/Hr) IV Continuous <Continuous>  dextrose 5%. 1000 milliLiter(s) (100 mL/Hr) IV Continuous <Continuous>  dextrose 50% Injectable 12.5 Gram(s) IV Push once  dextrose 50% Injectable 25 Gram(s) IV Push once  dextrose 50% Injectable 25 Gram(s) IV Push once  glucagon  Injectable 1 milliGRAM(s) IntraMuscular once  heparin   Injectable 5000 Unit(s) SubCutaneous every 8 hours  insulin glargine Injectable (LANTUS) 13 Unit(s) SubCutaneous at bedtime  insulin lispro (ADMELOG) corrective regimen sliding scale   SubCutaneous three times a day before meals  insulin lispro (ADMELOG) corrective regimen sliding scale   SubCutaneous at bedtime  insulin lispro Injectable (ADMELOG) 4 Unit(s) SubCutaneous three times a day before meals  polyethylene glycol 3350 17 Gram(s) Oral daily  senna 2 Tablet(s) Oral at bedtime  sodium chloride 1 Gram(s) Oral two times a day  sodium zirconium cyclosilicate 10 Gram(s) Oral three times a day    MEDICATIONS  (PRN):  albuterol/ipratropium for Nebulization 3 milliLiter(s) Nebulizer every 6 hours PRN Shortness of Breath and/or Wheezing  benzonatate 100 milliGRAM(s) Oral every 8 hours PRN Cough  dextrose Oral Gel 15 Gram(s) Oral once PRN Blood Glucose LESS THAN 70 milliGRAM(s)/deciliter  HYDROmorphone  Injectable 0.5 milliGRAM(s) IV Push every 6 hours PRN Severe Pain (7 - 10)    Allergies    No Known Allergies    Intolerances          VITAL SIGNS:  T(F): 98.5 (08-26-24 @ 17:00)  HR: 108 (08-26-24 @ 17:00)  BP: 131/74 (08-26-24 @ 17:00)  RR: 18 (08-26-24 @ 17:00)  SpO2: 97% (08-26-24 @ 17:00)  Wt(kg): --    PHYSICAL EXAM:    Constitutional: NAD, lying comfortably in bed  Eyes: EOMI, + scleral icterus  Neck: supple, no masses, no JVD  Respiratory: CTAB; no r/r/w  Cardiovascular: RRR, no M/R/G  Gastrointestinal: +BS, soft, NTND, no hepatosplenomegaly  Extremities: no c/c/e  Neurological: AAOx3, nonfocal    LABS:                        12.7   15.57 )-----------( 205      ( 26 Aug 2024 04:00 )             39.2     08-26    127<L>  |  97<L>  |  42<H>  ----------------------------<  178<H>  5.4<H>   |  18<L>  |  1.36<H>    Ca    8.1<L>      26 Aug 2024 04:00  Phos  2.9     08-26  Mg     2.50     08-26    TPro  5.8<L>  /  Alb  1.9<L>  /  TBili  18.5<H>  /  DBili  x   /  AST  312<H>  /  ALT  276<H>  /  AlkPhos  1419<H>  08-26    PT/INR - ( 25 Aug 2024 04:40 )   PT: 14.7 sec;   INR: 1.32 ratio         PTT - ( 25 Aug 2024 04:40 )  PTT:29.8 sec  Urinalysis Basic - ( 26 Aug 2024 04:00 )    Color: x / Appearance: x / SG: x / pH: x  Gluc: 178 mg/dL / Ketone: x  / Bili: x / Urobili: x   Blood: x / Protein: x / Nitrite: x   Leuk Esterase: x / RBC: x / WBC x   Sq Epi: x / Non Sq Epi: x / Bacteria: x        RADIOLOGY & ADDITIONAL TESTS:  Studies reviewed.   INTERVAL HPI/OVERNIGHT EVENTS:  No overnight events.     MEDICATIONS  (STANDING):  albumin human 25% IVPB 50 milliLiter(s) IV Intermittent every 8 hours  cefTRIAXone   IVPB 1000 milliGRAM(s) IV Intermittent every 24 hours  dextrose 5%. 1000 milliLiter(s) (50 mL/Hr) IV Continuous <Continuous>  dextrose 5%. 1000 milliLiter(s) (100 mL/Hr) IV Continuous <Continuous>  dextrose 50% Injectable 12.5 Gram(s) IV Push once  dextrose 50% Injectable 25 Gram(s) IV Push once  dextrose 50% Injectable 25 Gram(s) IV Push once  glucagon  Injectable 1 milliGRAM(s) IntraMuscular once  heparin   Injectable 5000 Unit(s) SubCutaneous every 8 hours  insulin glargine Injectable (LANTUS) 13 Unit(s) SubCutaneous at bedtime  insulin lispro (ADMELOG) corrective regimen sliding scale   SubCutaneous three times a day before meals  insulin lispro (ADMELOG) corrective regimen sliding scale   SubCutaneous at bedtime  insulin lispro Injectable (ADMELOG) 4 Unit(s) SubCutaneous three times a day before meals  polyethylene glycol 3350 17 Gram(s) Oral daily  senna 2 Tablet(s) Oral at bedtime  sodium chloride 1 Gram(s) Oral two times a day  sodium zirconium cyclosilicate 10 Gram(s) Oral three times a day    MEDICATIONS  (PRN):  albuterol/ipratropium for Nebulization 3 milliLiter(s) Nebulizer every 6 hours PRN Shortness of Breath and/or Wheezing  benzonatate 100 milliGRAM(s) Oral every 8 hours PRN Cough  dextrose Oral Gel 15 Gram(s) Oral once PRN Blood Glucose LESS THAN 70 milliGRAM(s)/deciliter  HYDROmorphone  Injectable 0.5 milliGRAM(s) IV Push every 6 hours PRN Severe Pain (7 - 10)    Allergies    No Known Allergies    Intolerances          VITAL SIGNS:  T(F): 98.5 (08-26-24 @ 17:00)  HR: 108 (08-26-24 @ 17:00)  BP: 131/74 (08-26-24 @ 17:00)  RR: 18 (08-26-24 @ 17:00)  SpO2: 97% (08-26-24 @ 17:00)  Wt(kg): --    PHYSICAL EXAM:    Constitutional: NAD, lying comfortably in bed  Eyes: EOMI, + scleral icterus  Neck: supple, no masses, no JVD  Respiratory: CTAB; no r/r/w  Cardiovascular: RRR, no M/R/G  Gastrointestinal: +BS, soft, NTND, no hepatosplenomegaly  Extremities: no c/c/e  Neurological: AAOx3, nonfocal    LABS:                        12.7   15.57 )-----------( 205      ( 26 Aug 2024 04:00 )             39.2     08-26    127<L>  |  97<L>  |  42<H>  ----------------------------<  178<H>  5.4<H>   |  18<L>  |  1.36<H>    Ca    8.1<L>      26 Aug 2024 04:00  Phos  2.9     08-26  Mg     2.50     08-26    TPro  5.8<L>  /  Alb  1.9<L>  /  TBili  18.5<H>  /  DBili  x   /  AST  312<H>  /  ALT  276<H>  /  AlkPhos  1419<H>  08-26    PT/INR - ( 25 Aug 2024 04:40 )   PT: 14.7 sec;   INR: 1.32 ratio         PTT - ( 25 Aug 2024 04:40 )  PTT:29.8 sec  Urinalysis Basic - ( 26 Aug 2024 04:00 )    Color: x / Appearance: x / SG: x / pH: x  Gluc: 178 mg/dL / Ketone: x  / Bili: x / Urobili: x   Blood: x / Protein: x / Nitrite: x   Leuk Esterase: x / RBC: x / WBC x   Sq Epi: x / Non Sq Epi: x / Bacteria: x        RADIOLOGY & ADDITIONAL TESTS:  Studies reviewed.    CT scan Abdomen and Pelvis 8/24/24    LOWER CHEST: Innumerable pulmonary metastatic nodules in the bilateral   lung bases measuring up to 2.7 x 2.6 cm in the left lower lobe,grossly   unchanged in size but overall more solid in appearance. These were better   assessed on dedicated chest CTA performed the day prior. Tip of central   venous catheter in the region of the superior cavoatrial junction.    LIVER: Dominant heterogeneous partially calcified confluent mass   occupying the central liver is slightly increased in size measuring 16.5   x 14.9 cm, previously 14.8 x 14.8 cm. Multiple additional bilobar   metastatic lesions have also enlarged, for example two adjacent lesions   in the right hepatic lobe measuring 4.4 x 2.4 cm, previously 3.5 x 1.7   cm. Diffuse hypoenhancement of the right hepatic lobe, which is new.  BILE DUCTS: Mild to moderate intrahepatic bile duct dilatation, increased   from prior. The common bile duct is not discretely visualized.  GALLBLADDER: Underdistended with cholelithiasis. Nonspecific diffuse    gallbladder wall thickening/edema.    IMPRESSION:  *  Progression of pulmonary and hepatic metastatic disease, as detailed   above.  *  Mild to moderate intrahepatic bile duct dilatation, increased from   prior.  *  New diffuse hypoenhancement of the right hepatic lobe, which may   reflect perfusional alteration secondary to metastatic burden. There is   compressionof the intrahepatic IVC by the metastatic disease. The   hepatic veins are not visualized. Doppler ultrasound could be performed   for further evaluation as clinically warranted.  *  Multifocal ill-defined patchy foci of cortical  hypoenhancement left   kidney. Correlate with urinalysis to exclude pyelonephritis. Recommend   attention on close follow-up imaging. No hydronephrosis.  *  Nonspecific gallbladder and right-sided colonic wall thickening,   possibly reactive.  *  New small volume abdominopelvic ascites. Diffuse anasarca.

## 2024-08-26 NOTE — PROGRESS NOTE ADULT - PROBLEM SELECTOR PLAN 5
#Dyspnea on exertion  #BLE edema  Presenting with SOB w/ exertion and hypoxia. CTA without PE or pleural effusion, but with increased burden of pulmonary mets and liver mets. Trp <6 x 2. BNP 76. Bedside POCUS with normal cardiac contractility. Inspiratory stridor on physical exam (now improved). Suspect his AHRF is in setting of worsening metastatic disease, lower suspicion for PNA as no infiltrates noted, and lower suspicion for ADHF as patient with low proBNP, no pulm edema/effusions.  - On RA now  - TTE difficult study  - BLE Duplex neg for DVT    Plan:  - Duonebs q6 prn  - May require d/c on home O2

## 2024-08-26 NOTE — CONSULT NOTE ADULT - PROBLEM SELECTOR RECOMMENDATION 4
For GOC   Referred to caregiver support and child life for patient's 12 year old son     If patient interested in home hospice, notify CM to place referral     In the event of worsening symptoms, please contact the Palliative Medicine team via pager (if the patient is at [facility] #[phone] or if the patient is at [facility] #[phone]) The Geriatric and Palliative Medicine service has coverage 24 hours a day/ 7 days a week to provide medical recommendations regarding symptom management needs via telephone

## 2024-08-26 NOTE — PROGRESS NOTE ADULT - PROBLEM SELECTOR PLAN 6
Hyperglycemic on admission to >400. Given Lispro 6u in ED. BHB 0.8. No acidosis and AG was wnl.  - Only on oral metformin and glipizide at home  - LDSS, will likely need standing  - A1c 8.2 08/24  - BHB 08 --> 0.2  - Anion gap 15 --> 13    Plan:  - Hold metformin and glipizide  - Lantus 13u qhs and 4u premeal. ISS pre meal and nightly  - Trend BG

## 2024-08-26 NOTE — PROGRESS NOTE ADULT - ASSESSMENT
Taryn Granda is a 54 year old male with a PMHx of Stage IV colorectal cancer w/ mets to liver and lung s/p chemo, now undergoing palliative radiation, HTN, HLD, and T2DM who presents with SOB found to have elevated LFTs. Hepatology consulted for further workup and management of the above.     #Stage 4 CRC with mets to liver/lung   #Elevated LFTs  Presenting with increased shortness of breath with workup notable for Tb 1.2->20, ->1482, AST 64->420 ALT 24->449 with CTAP showing no PE with increased size of innumerable bilateral pulmonary metastases and fiffuse hepatic metastases several of which demonstrate increase in size. Pt denies any ongoing ETOH new herbal or new mediaction use. Etiology likely 2/2 to underlying mets to the liver although can obtain MRCP if within goals of care. It is unlikely that patient's presentation is due to alternate etiologies such as DILI vs choledocholithiasis.     Recommendations:  - Please obtain MRCP if within goals of care   - Follow up autoimmune w/u    Discussed w/ Dr. Santos    Note incomplete until finalized by attending signature/attestation.    Sander Nino  GI/Hepatology Fellow, PGY-4    MONDAY-FRIDAY 8AM-5PM:  Please message via [employer] or email [email or web address] OR [email or web address]     On Weekends/Holidays (All day) and Weekdays after 5 PM to 8 AM  For nonurgent consults please email:  Please email [email or web address] OR [email or web address]  For urgent consults:  Please contact on call GI team. See Amion schedule ([facility]), [employer] paging system ([facility]), or call hospital  ([facility])

## 2024-08-27 DIAGNOSIS — N17.9 ACUTE KIDNEY FAILURE, UNSPECIFIED: ICD-10-CM

## 2024-08-27 DIAGNOSIS — S36.119A UNSPECIFIED INJURY OF LIVER, INITIAL ENCOUNTER: ICD-10-CM

## 2024-08-27 LAB
ALBUMIN SERPL ELPH-MCNC: 2.4 G/DL — LOW (ref 3.3–5)
ALP SERPL-CCNC: 1356 U/L — HIGH (ref 40–120)
ALT FLD-CCNC: 215 U/L — HIGH (ref 4–41)
ANA TITR SER: NEGATIVE — SIGNIFICANT CHANGE UP
ANION GAP SERPL CALC-SCNC: 17 MMOL/L — HIGH (ref 7–14)
APTT BLD: 32.2 SEC — SIGNIFICANT CHANGE UP (ref 24.5–35.6)
AST SERPL-CCNC: 230 U/L — HIGH (ref 4–40)
BASOPHILS # BLD AUTO: 0.02 K/UL — SIGNIFICANT CHANGE UP (ref 0–0.2)
BASOPHILS NFR BLD AUTO: 0.1 % — SIGNIFICANT CHANGE UP (ref 0–2)
BILIRUB SERPL-MCNC: 21.5 MG/DL — HIGH (ref 0.2–1.2)
BUN SERPL-MCNC: 62 MG/DL — HIGH (ref 7–23)
CALCIUM SERPL-MCNC: 8.1 MG/DL — LOW (ref 8.4–10.5)
CHLORIDE SERPL-SCNC: 94 MMOL/L — LOW (ref 98–107)
CO2 SERPL-SCNC: 17 MMOL/L — LOW (ref 22–31)
CREAT SERPL-MCNC: 2.13 MG/DL — HIGH (ref 0.5–1.3)
EGFR: 36 ML/MIN/1.73M2 — LOW
EOSINOPHIL # BLD AUTO: 0 K/UL — SIGNIFICANT CHANGE UP (ref 0–0.5)
EOSINOPHIL NFR BLD AUTO: 0 % — SIGNIFICANT CHANGE UP (ref 0–6)
GLUCOSE BLDC GLUCOMTR-MCNC: 168 MG/DL — HIGH (ref 70–99)
GLUCOSE BLDC GLUCOMTR-MCNC: 180 MG/DL — HIGH (ref 70–99)
GLUCOSE BLDC GLUCOMTR-MCNC: 185 MG/DL — HIGH (ref 70–99)
GLUCOSE BLDC GLUCOMTR-MCNC: 235 MG/DL — HIGH (ref 70–99)
GLUCOSE SERPL-MCNC: 195 MG/DL — HIGH (ref 70–99)
HCT VFR BLD CALC: 38.6 % — LOW (ref 39–50)
HGB BLD-MCNC: 12.5 G/DL — LOW (ref 13–17)
IANC: 12.35 K/UL — HIGH (ref 1.8–7.4)
IMM GRANULOCYTES NFR BLD AUTO: 1 % — HIGH (ref 0–0.9)
INR BLD: 1.24 RATIO — HIGH (ref 0.85–1.18)
LYMPHOCYTES # BLD AUTO: 0.97 K/UL — LOW (ref 1–3.3)
LYMPHOCYTES # BLD AUTO: 6.6 % — LOW (ref 13–44)
MAGNESIUM SERPL-MCNC: 2.6 MG/DL — SIGNIFICANT CHANGE UP (ref 1.6–2.6)
MCHC RBC-ENTMCNC: 25 PG — LOW (ref 27–34)
MCHC RBC-ENTMCNC: 32.4 GM/DL — SIGNIFICANT CHANGE UP (ref 32–36)
MCV RBC AUTO: 77 FL — LOW (ref 80–100)
MITOCHONDRIA AB SER-ACNC: SIGNIFICANT CHANGE UP
MONOCYTES # BLD AUTO: 1.12 K/UL — HIGH (ref 0–0.9)
MONOCYTES NFR BLD AUTO: 7.7 % — SIGNIFICANT CHANGE UP (ref 2–14)
NEUTROPHILS # BLD AUTO: 12.35 K/UL — HIGH (ref 1.8–7.4)
NEUTROPHILS NFR BLD AUTO: 84.6 % — HIGH (ref 43–77)
NRBC # BLD: 0 /100 WBCS — SIGNIFICANT CHANGE UP (ref 0–0)
NRBC # FLD: 0.08 K/UL — HIGH (ref 0–0)
PHOSPHATE SERPL-MCNC: 4.4 MG/DL — SIGNIFICANT CHANGE UP (ref 2.5–4.5)
PLATELET # BLD AUTO: 217 K/UL — SIGNIFICANT CHANGE UP (ref 150–400)
POTASSIUM SERPL-MCNC: 5 MMOL/L — SIGNIFICANT CHANGE UP (ref 3.5–5.3)
POTASSIUM SERPL-SCNC: 5 MMOL/L — SIGNIFICANT CHANGE UP (ref 3.5–5.3)
PROT SERPL-MCNC: 6.1 G/DL — SIGNIFICANT CHANGE UP (ref 6–8.3)
PROTHROM AB SERPL-ACNC: 13.9 SEC — HIGH (ref 9.5–13)
RBC # BLD: 5.01 M/UL — SIGNIFICANT CHANGE UP (ref 4.2–5.8)
RBC # FLD: 22 % — HIGH (ref 10.3–14.5)
SMOOTH MUSCLE AB SER-ACNC: SIGNIFICANT CHANGE UP
SODIUM SERPL-SCNC: 128 MMOL/L — LOW (ref 135–145)
WBC # BLD: 14.61 K/UL — HIGH (ref 3.8–10.5)
WBC # FLD AUTO: 14.61 K/UL — HIGH (ref 3.8–10.5)

## 2024-08-27 PROCEDURE — 99233 SBSQ HOSP IP/OBS HIGH 50: CPT | Mod: GC

## 2024-08-27 PROCEDURE — 99253 IP/OBS CNSLTJ NEW/EST LOW 45: CPT

## 2024-08-27 RX ORDER — ALBUMIN (HUMAN) 5 G/20ML
250 SOLUTION INTRAVENOUS EVERY 8 HOURS
Refills: 0 | Status: DISCONTINUED | OUTPATIENT
Start: 2024-08-27 | End: 2024-08-27

## 2024-08-27 RX ORDER — ALBUMIN (HUMAN) 5 G/20ML
50 SOLUTION INTRAVENOUS EVERY 6 HOURS
Refills: 0 | Status: DISCONTINUED | OUTPATIENT
Start: 2024-08-27 | End: 2024-09-03

## 2024-08-27 RX ORDER — HYDROMORPHONE HYDROCHLORIDE 2 MG/1
0.5 TABLET ORAL EVERY 6 HOURS
Refills: 0 | Status: DISCONTINUED | OUTPATIENT
Start: 2024-08-27 | End: 2024-08-28

## 2024-08-27 RX ORDER — CHLORHEXIDINE GLUCONATE 40 MG/ML
1 SOLUTION TOPICAL DAILY
Refills: 0 | Status: DISCONTINUED | OUTPATIENT
Start: 2024-08-27 | End: 2024-09-04

## 2024-08-27 RX ADMIN — SODIUM CHLORIDE 1 GRAM(S): 9 INJECTION INTRAMUSCULAR; INTRAVENOUS; SUBCUTANEOUS at 17:33

## 2024-08-27 RX ADMIN — POLYETHYLENE GLYCOL 3350 17 GRAM(S): 17 POWDER, FOR SOLUTION ORAL at 11:28

## 2024-08-27 RX ADMIN — ALBUMIN (HUMAN) 125 MILLILITER(S): 5 SOLUTION INTRAVENOUS at 15:31

## 2024-08-27 RX ADMIN — Medication 5000 UNIT(S): at 05:42

## 2024-08-27 RX ADMIN — CHLORHEXIDINE GLUCONATE 1 APPLICATION(S): 40 SOLUTION TOPICAL at 13:03

## 2024-08-27 RX ADMIN — ALBUMIN (HUMAN) 50 MILLILITER(S): 5 SOLUTION INTRAVENOUS at 05:43

## 2024-08-27 RX ADMIN — Medication 4: at 09:03

## 2024-08-27 RX ADMIN — Medication 4 UNIT(S): at 18:07

## 2024-08-27 RX ADMIN — HYDROMORPHONE HYDROCHLORIDE 0.5 MILLIGRAM(S): 2 TABLET ORAL at 05:41

## 2024-08-27 RX ADMIN — SODIUM CHLORIDE 1 GRAM(S): 9 INJECTION INTRAMUSCULAR; INTRAVENOUS; SUBCUTANEOUS at 05:42

## 2024-08-27 RX ADMIN — HYDROMORPHONE HYDROCHLORIDE 0.5 MILLIGRAM(S): 2 TABLET ORAL at 15:21

## 2024-08-27 RX ADMIN — HYDROMORPHONE HYDROCHLORIDE 0.5 MILLIGRAM(S): 2 TABLET ORAL at 06:40

## 2024-08-27 RX ADMIN — Medication 2: at 13:00

## 2024-08-27 RX ADMIN — Medication 2 TABLET(S): at 22:00

## 2024-08-27 RX ADMIN — HYDROMORPHONE HYDROCHLORIDE 0.5 MILLIGRAM(S): 2 TABLET ORAL at 14:21

## 2024-08-27 RX ADMIN — Medication 2: at 18:07

## 2024-08-27 RX ADMIN — Medication 4 UNIT(S): at 09:04

## 2024-08-27 RX ADMIN — Medication 4 UNIT(S): at 13:01

## 2024-08-27 RX ADMIN — Medication 100 MILLIGRAM(S): at 22:00

## 2024-08-27 RX ADMIN — Medication 5000 UNIT(S): at 22:00

## 2024-08-27 RX ADMIN — Medication 5000 UNIT(S): at 13:00

## 2024-08-27 RX ADMIN — Medication 0: at 22:14

## 2024-08-27 RX ADMIN — INSULIN GLARGINE 13 UNIT(S): 100 INJECTION, SOLUTION SUBCUTANEOUS at 22:13

## 2024-08-27 RX ADMIN — Medication 100 MILLIGRAM(S): at 22:03

## 2024-08-27 NOTE — DIETITIAN INITIAL EVALUATION ADULT - SIGNS/SYMPTOMS
as evidenced by DM2, current RIGO and altered electrolytes as evidenced by stage IV colorectal cancer with mets to liver and lung s/p chemo and RT

## 2024-08-27 NOTE — PROGRESS NOTE ADULT - PROBLEM SELECTOR PLAN 2
- Significant new transaminitis with hyperbilirubinemia to 20.4, majority direct bilirubin  - Patient reports noticing yellowing of his eyes about 10 days ago, noticing grey colored BMs since last week  - Denies abd pain, nausea/emesis, current diarrhea/constipation  - On examination abdomen without TTP, no RUQ pain, good bowel sounds. Distended with hepatomegaly  - CT abd pelvis shows worsening liver mets, intrahepatic ductal dilation  - Bili elevated to 20.4 on admission with 16.6 direct. ASR  - US Abdomen w/ doppler: Multiple liver metastases with compression on the hepatic vasculature. Small caliber right and main portal veins, without evidence of demonstrable flow. Thrombus is not excluded.  - Hep C Antibody positive    Plan:  - GI following, appreciate recs  - F/u on KATHE, AMA, smooth muscle and hep C viral load  - D/c MRCP as unnecessary in patient with known multiple liver mets  - Start IV albumin 25% for 3 doses  - Awaiting pain control recs per palliative  - Trend CMP, coags - Significant new transaminitis with hyperbilirubinemia to 20.4, majority direct bilirubin  - CT abd pelvis shows worsening liver mets, intrahepatic ductal dilation  - Bili elevated to 20.4 on admission with 16.6 direct.  - US Abdomen w/ doppler: Multiple liver metastases with compression on the hepatic vasculature. Small caliber right and main portal veins, without evidence of demonstrable flow. Thrombus is not excluded.  - Hep C Antibody positive but viral load undetectable.   - AMA and smooth muscle negative    Plan:  - GI signed off due to no further interventions in setting of advanced terminal disease  - F/u on KATHE  - Pain control as below  - Trend CMP, coags

## 2024-08-27 NOTE — DIETITIAN INITIAL EVALUATION ADULT - REASON FOR ADMISSION
Shortness of breath  Per chart review, Patient is a 54y Male with a hx of Stage IV colorectal cancer w/ mets to liver and lung s/p chemo, now undergoing palliative radiation, HTN, HLD, and T2DM who presents with SOB, found to be in acute hypoxic respiratory failure with profound transaminitis. Recent imaging shows progression of metastatic liver & lung disease.

## 2024-08-27 NOTE — DIETITIAN INITIAL EVALUATION ADULT - ADD RECOMMEND
1. Recommend Glucerna Therapeutic Nutrition 8 oz. 2x/day (440kcal, 20gm protein daily)   2. Food and nutrition department to honor food and fluid preferences.  3. Nursing to document PO in RN flow sheets and monitor weekly weights.  4. Continue to monitor skin integrity, bowel regimen, and nutrition pertinent labs.

## 2024-08-27 NOTE — PROGRESS NOTE ADULT - PROBLEM SELECTOR PLAN 5
#Dyspnea on exertion  #BLE edema  Presenting with SOB w/ exertion and hypoxia. CTA without PE or pleural effusion, but with increased burden of pulmonary mets and liver mets. Trp <6 x 2. BNP 76. Bedside POCUS with normal cardiac contractility. Inspiratory stridor on physical exam (now improved). Suspect his AHRF is in setting of worsening metastatic disease, lower suspicion for PNA as no infiltrates noted, and lower suspicion for ADHF as patient with low proBNP, no pulm edema/effusions.  - On RA now  - TTE difficult study  - BLE Duplex neg for DVT    Plan:  - Duonebs q6 prn  - May require d/c on home O2 #Dyspnea on exertion  #BLE edema  Presenting with SOB w/ exertion and hypoxia. CTA without PE or pleural effusion, but with increased burden of pulmonary mets and liver mets. Trp <6 x 2. BNP 76. Bedside POCUS with normal cardiac contractility. Inspiratory stridor on physical exam (now improved). Suspect his AHRF is in setting of worsening metastatic disease, lower suspicion for PNA as no infiltrates noted, and lower suspicion for ADHF as patient with low proBNP, no pulm edema/effusions.  - On RA now  - TTE difficult study  - BLE Duplex neg for DVT    Plan:  - Duonebs q6 prn  - May require d/c on home O2 for comfort

## 2024-08-27 NOTE — CONSULT NOTE ADULT - PROBLEM SELECTOR RECOMMENDATION 9
Baseline SCr normal at 0.87. Patient received contrast on 6/23 and 6/24. Today SCr 2.36. DDX includes MATHEW. Patient also received lisinopril 8/26, which may have attributed to RIGO. CT abd pelvis and US doppler abdomen showing compression of the intrahepatic IVC by the metastatic disease. Thus, DDX also includes RIGO 2/2 renal vein compression 2/2 intrahepatic IVC compression iso progressing metastases. Recommend IV albumin. Monitor labs and urine output. Avoid NSAIDs, ACEI/ARBS, RCA and nephrotoxins. Dose medications as per eGFR. Baseline SCr normal at 0.87. Patient received contrast on 6/23 and 6/24. Today SCr 2.36. DDX includes MATHEW. Patient also received lisinopril 8/26, which may have attributed to RIGO. CT abd pelvis and US doppler abdomen showing compression of the intrahepatic IVC by the metastatic disease. Thus, DDX also includes RIGO 2/2 renal vein congestion 2/2 intrahepatic IVC compression iso progressing metastases. Recommend IV albumin. Monitor labs and urine output. Avoid NSAIDs, ACEI/ARBS, RCA and nephrotoxins. Dose medications as per eGFR.

## 2024-08-27 NOTE — PROGRESS NOTE ADULT - PROBLEM SELECTOR PLAN 10
DVT ppx: Lovenox 40mg daily  Diet: Consistent carb/DASH  Dispo: pending clinical course.   Code Status: FULL CODE DVT ppx: heparin subq  Diet: Consistent carb/DASH  Dispo: pending clinical course. Likely D/C on home hospice    Code Status: FULL CODE

## 2024-08-27 NOTE — DIETITIAN INITIAL EVALUATION ADULT - PROBLEM SELECTOR PLAN 4
Hyperglycemic on admission to >400. Given Lispro 6u in ED. BHB 0.8. No acidosis and AG was wnl.  - Only on oral metformin and glipizide at home -> hold while hospitalized  - LDSS, will likely need standing  - A1c, if significantly elevated would obtain endocrine eval    Addendum:  - This AM patient with slight AG elevation, BG is trending down but will recheck BHB, VBG, CMP

## 2024-08-27 NOTE — DIETITIAN INITIAL EVALUATION ADULT - ETIOLOGY
related to increased nutrient demands for catabolic illness on active cancer treatment related to endocrine/renal organ related dysfunction

## 2024-08-27 NOTE — DIETITIAN INITIAL EVALUATION ADULT - ORAL INTAKE PTA/DIET HISTORY
Patient is A&Ox3 at baseline.  Patient confirms NKFA, no food intolerance, no pork/beef per preference.  Patient states his sister cooks for him.  Reports chronic poor oral intake due to pain around left side of abdominal area when he eats.  Reports height 67 inch and  lbs~ 2-3 weeks ago.

## 2024-08-27 NOTE — PROGRESS NOTE ADULT - PROBLEM SELECTOR PLAN 1
Creat 0.94 -->1.36 (08/26/24)    Patient has anasarca as seen on CT imaging and in setting of acute liver injury, RIGO likely 2/2 to worsening hepatic function and hypovolemia.   - Concerning for hepatorenal syndrome    Plan:  - IV albumin 25% x 3 doses  - Continue to monitor Creat 0.94 -->1.36 --> 2.13 (08/27/24)    Patient has anasarca as seen on CT imaging and in setting of acute liver injury, RIGO likely 2/2 to worsening hepatic function and hypovolemia.   - Concerning for hepatorenal syndrome  - s/p IV albumin 25% x 3 doses 08/26/24    Plan:  - nephro consulted  - Continue to monitor

## 2024-08-27 NOTE — DIETITIAN INITIAL EVALUATION ADULT - PROBLEM SELECTOR PLAN 2
Reports dysuria and hematuria x1 week, no urinary frequency though. Here noted to have leukocytosis to 17k with tachycardia and positive UA concerning for sepsis 2/2 UTI.  - Ceftriaxone 1g likely for 7 days (8/23-  - f/u UCx  - Check BCx x2  - Lactate elevated to 3.9 x2, likely has poor clearance 2/2 worsening liver mets, repeat in AM

## 2024-08-27 NOTE — PROGRESS NOTE ADULT - PROBLEM SELECTOR PLAN 4
Reports dysuria and hematuria x1 week, no urinary frequency though. Here noted to have leukocytosis to 17k with tachycardia and positive UA concerning for sepsis 2/2 UTI.  Lactate elevated to 3.9 x2 --> 3.3  UA small LE and + nitrite  Ucx > 100,000 Gram negative rods  Bcx : NGTD    - Ceftriaxone 1g likely for 7 days (8/24-08/31)  - f/u UCx speciation   - Likely has poor clearance 2/2 worsening liver mets, repeat lactate in AM Reports dysuria and hematuria x1 week, no urinary frequency though. Here noted to have leukocytosis to 17k with tachycardia and positive UA concerning for sepsis 2/2 UTI.  Lactate elevated to 3.9 x2 --> 3.3  UA small LE and + nitrite  Ucx > 100,000 Gram negative rods  Bcx : NGTD    - Ceftriaxone 1g likely for 7 days (8/24-08/31)  - f/u UCx speciation   - Likely has poor clearance 2/2 worsening liver mets

## 2024-08-27 NOTE — PROGRESS NOTE ADULT - ASSESSMENT
BASSAM LOGAN is a 54y Male with a hx of Stage IV colorectal cancer w/ mets to liver and lung s/p chemo, now undergoing palliative radiation, HTN, HLD, and T2DM who presents with SOB, found to be in acute hypoxic respiratory failure with profound transaminitis. Recent imaging shows progression of metastatic liver & lung disease.  BASSAM LOGAN is a 54y Male with a hx of Stage IV colorectal cancer w/ mets to liver and lung s/p chemo (4th line), HTN, HLD, and T2DM who presents with SOB, found to be in acute hypoxic respiratory failure with profound transaminitis. Recent imaging shows progression of metastatic liver & lung disease.

## 2024-08-27 NOTE — DIETITIAN INITIAL EVALUATION ADULT - PERTINENT MEDS FT
MEDICATIONS  (STANDING):  cefTRIAXone   IVPB 1000 milliGRAM(s) IV Intermittent every 24 hours  chlorhexidine 2% Cloths 1 Application(s) Topical daily  dextrose 5%. 1000 milliLiter(s) (100 mL/Hr) IV Continuous <Continuous>  dextrose 5%. 1000 milliLiter(s) (50 mL/Hr) IV Continuous <Continuous>  dextrose 50% Injectable 25 Gram(s) IV Push once  dextrose 50% Injectable 12.5 Gram(s) IV Push once  dextrose 50% Injectable 25 Gram(s) IV Push once  glucagon  Injectable 1 milliGRAM(s) IntraMuscular once  heparin   Injectable 5000 Unit(s) SubCutaneous every 8 hours  insulin glargine Injectable (LANTUS) 13 Unit(s) SubCutaneous at bedtime  insulin lispro (ADMELOG) corrective regimen sliding scale   SubCutaneous three times a day before meals  insulin lispro (ADMELOG) corrective regimen sliding scale   SubCutaneous at bedtime  insulin lispro Injectable (ADMELOG) 4 Unit(s) SubCutaneous three times a day before meals  polyethylene glycol 3350 17 Gram(s) Oral daily  senna 2 Tablet(s) Oral at bedtime  sodium chloride 1 Gram(s) Oral two times a day    MEDICATIONS  (PRN):  albuterol/ipratropium for Nebulization 3 milliLiter(s) Nebulizer every 6 hours PRN Shortness of Breath and/or Wheezing  benzonatate 100 milliGRAM(s) Oral every 8 hours PRN Cough  dextrose Oral Gel 15 Gram(s) Oral once PRN Blood Glucose LESS THAN 70 milliGRAM(s)/deciliter  morphine  - Injectable 4 milliGRAM(s) IV Push every 4 hours PRN Severe Pain (7 - 10)

## 2024-08-27 NOTE — DIETITIAN INITIAL EVALUATION ADULT - OTHER INFO
Patient is receiving a CSTCHOSN diet order. Reports poor PO intake with poor appetite, consistent with 0-25% per RN intake flowsheet. Denies difficulty chewing or swallowing of current diet at this time. No nausea, vomit, diarrhea, constipation. Pt is currently on bowel regimen, last BM reported 2 days ago per pt. Noted 2+ right and left foot edema, no pressure injury per RN flowsheets. Labs notable for elevated LFTs due to transaminitis (+ jaundice), ^BUN and ^creatinine for RIGO. Dosing weight is 85.6kg/188.3 lbs (8/24), height as 170.2cm, BM calculated at 29.6-appeared to be at baseline from appearance. Patient noted on consistent carb diet, lantus 13 U qHS, admelog 4U ac TID, POCT 161-258mg/dl (8/25-8/27), Hgb A1c@ 8.2% (8/24). Nutrition education deferred at this time due to patient not well enough to receive DM education. RD to remain available for further nutrition intervention as indicated.

## 2024-08-27 NOTE — CONSULT NOTE ADULT - ATTENDING COMMENTS
"Chief Complaint  Annual Exam    Subjective        Bebeto Burgess presents to [facility]  History of Present Illness patient here for physical.  Overall has been doing well but is concerned about cough that has been going on since mid September and was productive-seemed to improve and then after few days worsened-started abx and did not help at all.  Was seen by another provider in office while I was not available and given steroidal inhaler which was very expensive and provider seemed ambivalent as to whether or not it was really required so did not take it.  He did not use cough medicine with codeine as cough is more during the day when he would not be able to take medication.  He reports now the cough is dry and noted frequently during the day and when speaking.  Works as missed  and has quite a few exposures but no known close exposures to COVID, RSV, flu.    He had a chest x-ray done when seen by Dr. Rocha on 10/13/2023.  It showed some abnormal markings and patient and spouse have been very anxious about the findings.  Patient is a non-smoker.  Initially felt like he had COVID again and was very fatigued and had a terrible headache.  This has all now mostly improved.  Is a little fatigued still from cough.  He has no fever or chills, no abdominal symptoms.  No chest pain.  Does feel little short of breath with exertion but mild.  He does have pretty significant fall allergies and not taking daily allergy medication.    PHQ-9 Total Score: PHQ-9: Brief Depression Severity Measure Score: 0      Objective   Vital Signs:  /80 (BP Location: Right arm, Patient Position: Sitting, Cuff Size: Adult)   Pulse 63   Ht 162.6 cm (64.02\")   Wt 82 kg (180 lb 12.8 oz)   SpO2 97%   BMI 31.01 kg/m²   Estimated body mass index is 31.01 kg/m² as calculated from the following:    Height as of this encounter: 162.6 cm (64.02\").    Weight as of this encounter: 82 kg (180 lb 12.8 "
oz).       BMI is >= 30 and <35. (Class 1 Obesity). The following options were offered after discussion;: weight loss educational material (shared in after visit summary)      Physical Exam  Vitals and nursing note reviewed.   Constitutional:       General: He is not in acute distress.     Appearance: He is well-developed. He is not ill-appearing.   HENT:      Head: Normocephalic and atraumatic.      Right Ear: Tympanic membrane, ear canal and external ear normal.      Left Ear: Tympanic membrane, ear canal and external ear normal.      Mouth/Throat:      Mouth: Mucous membranes are moist.      Pharynx: Uvula midline. No posterior oropharyngeal erythema.   Eyes:      General: No scleral icterus.        Right eye: No discharge.         Left eye: No discharge.      Conjunctiva/sclera: Conjunctivae normal.   Neck:      Thyroid: No thyromegaly.   Cardiovascular:      Rate and Rhythm: Normal rate and regular rhythm.      Heart sounds: Normal heart sounds.   Pulmonary:      Effort: Pulmonary effort is normal.      Breath sounds: Normal breath sounds. No wheezing, rhonchi or rales.      Comments: Frequent dry bronchospastic cough   Abdominal:      General: Bowel sounds are normal.      Palpations: Abdomen is soft.      Tenderness: There is no abdominal tenderness.   Musculoskeletal:         General: No deformity.      Cervical back: Neck supple.      Comments: Gait smooth and steady   Lymphadenopathy:      Cervical: No cervical adenopathy.   Skin:     General: Skin is warm and dry.   Neurological:      General: No focal deficit present.      Mental Status: He is alert and oriented to person, place, and time.   Psychiatric:         Mood and Affect: Mood normal.         Behavior: Behavior normal.        Result Review :                   Assessment and Plan   Diagnoses and all orders for this visit:    1. Routine general medical examination at a health care facility (Primary)  -     CBC w AUTO Differential  -     
Comprehensive Metabolic Panel  -     Hemoglobin A1c  -     Lipid Panel With LDL / HDL Ratio  -     TSH Rfx On Abnormal To Free T4    2. Acute cough    3. Screening for prostate cancer  -     PSA DIAGNOSTIC ONLY    Appropriate health maintenance and prevention topics specific for this patient were discussed today.  Additionally, health goals, and health concerns addressed as appropriate.  Pt was encouraged to stay up to date on recommended screenings and vaccines based on USPSTF guidelines.     Reviewed notes from visit here on 10/13/2023 as well as chest x-ray done same day and discussed results with patient and spouse.  Discussed that there is sometimes a lag from symptoms to chest x-ray.  He had another chest x-ray done this morning.  Will review and make further recommendations once available.  I think he likely has post pneumonia cough that is probably exacerbated by allergies.  It is now more bronchospastic.  I have given him Asmanex inhaler to use as needed to see if we can get it resolved.  Recommend good allergy control.  He will have labs done today and will be able to review white count for abnormalities.  If any worsening he will let me know.  He will let me know how he is doing with trial of asmanex.          Follow Up   No follow-ups on file.  Patient was given instructions and counseling regarding his condition or for health maintenance advice. Please see specific information pulled into the AVS if appropriate.         
55 y/o man with metastatic colorectal cancer, NABILA, progressing with dyspnea and biliary obstruction. Improving pulmonary function; was on RA and comfortable during exam today. However, jaundiced and hyperbili on scan, GI already on board to consideration of ERCP. Has a terminal condition though reasonably good performance status and further chemotherapy is planned in the out-patient setting. Palliative care for symptom support.  Bubba Nicholson MD PhD  Oncology Attending
seen and evaluated.  agree with above.  hemodynamic RIGO monitor urine output BMP.  use albumin 25% 50 cc Q 6h

## 2024-08-27 NOTE — PROGRESS NOTE ADULT - PROBLEM SELECTOR PLAN 3
Stage IV colorectal cancer with mets to lung and liver s/p chemo, Currently getting RT to his R hip, plans for possible retreatment with FOLFOX + maia as per outpatient notes. Follows with Dr. Dre Gardner at [facility].  CEA 1142      - F/u on Heme onc recs   - Palliative consulted, follow up recs.   - Trend CBC. CMP, coags Stage IV colorectal cancer with mets to lung and liver s/p chemo, Currently getting RT to his R hip, plans for possible retreatment with FOLFOX + maia as per outpatient notes. Follows with Dr. Dre Gardner at [facility].  CEA 1142    - Patient and family requested home hospice with comfort care  - Patient appointed both sisters as healthcare proxies    - F/u on Heme onc recs   - Palliative consulted, follow up recs.   - Trend CBC. CMP, coags Stage IV colorectal cancer with mets to lung and liver s/p chemo (4th line), Currently getting RT to his R hip. Follows with Dr. Dre Gardner at [facility]. Patient has terminal illness  CEA 1142    - Patient and family requested home hospice with comfort care but still asking for daily labs still  - Patient appointed both sisters as healthcare proxies    - F/u on Heme onc recs. No plan for chemo at this time  - Palliative consulted, follow up recs.   - Plan for further confirmation on code status and comfort care after family meeting tomorrow  - CM to be informed about home hospice  - Trend CBC. CMP, coags    Pain:  - IV morphine 4mg q4h PRN

## 2024-08-27 NOTE — PROGRESS NOTE ADULT - PROBLEM SELECTOR PLAN 7
Na 124 on admission but 129 when corrected for hyperglycemia  - Now likely 2/2 to hyperbilirubinemia  - CTM Na 124 on admission but 129 when corrected for hyperglycemia  - Now likely 2/2 to hyperbilirubinemia  - Salt tabs  - CTM

## 2024-08-27 NOTE — DIETITIAN INITIAL EVALUATION ADULT - PROBLEM SELECTOR PLAN 3
- Significant new transaminitis with hyperbilirubinemia to 20.4, majority direct bilirubin  - Patient reports noticing yellowing of his eyes about 10 days ago, noticing grey colored BMs since last week  - Denies abd pain, nausea/emesis, current diarrhea/constipation  - On examination abdomen without TTP, no RUQ pain, good bowel sounds  - CTA Chest incidentally captured some of the liver, shows worsening liver mets, no comments on the biliary structure -> will check US Abdomen Duplex for further evaluation, GI consult emailed

## 2024-08-27 NOTE — DIETITIAN INITIAL EVALUATION ADULT - PROBLEM SELECTOR PLAN 1
#Dyspnea on exertion  #BLE edema  Presenting with SOB w/ exertion and hypoxia. CTA without PE or pleural effusion, but with increased burden of pulmonary mets and liver mets. Trp <6. BNP 76. Bedside POCUS with normal cardiac contractility. Inspiratory stridor on physical exam (now improved). Suspect his AHRF is in setting of worsening metastatic disease, lower suspicion for PNA as no infiltrates noted, and lower suspicion for ADHF as patient with low proBNP, no pulm edema/effusions.  - Duonebs q6 prn  - wean oxygen as tolerated  - TTE, monitor on telemetry for now  - Neck XR for his stridor (though now improved)  - BLE Duplex  - Check report trop in AM for stability

## 2024-08-27 NOTE — PROGRESS NOTE ADULT - SUBJECTIVE AND OBJECTIVE BOX
Desmond Leung M.D  Resident  Department of Medicine  Available on [employer] Teams    Patient is a 54y old  Male who presents with a chief complaint of SOB (26 Aug 2024 18:34)      SUBJECTIVE / OVERNIGHT EVENTS: Patient seen and examined at bedside.     ADDITIONAL REVIEW OF SYSTEMS:    MEDICATIONS  (STANDING):  cefTRIAXone   IVPB 1000 milliGRAM(s) IV Intermittent every 24 hours  dextrose 5%. 1000 milliLiter(s) (50 mL/Hr) IV Continuous <Continuous>  dextrose 5%. 1000 milliLiter(s) (100 mL/Hr) IV Continuous <Continuous>  dextrose 50% Injectable 25 Gram(s) IV Push once  dextrose 50% Injectable 25 Gram(s) IV Push once  dextrose 50% Injectable 12.5 Gram(s) IV Push once  glucagon  Injectable 1 milliGRAM(s) IntraMuscular once  heparin   Injectable 5000 Unit(s) SubCutaneous every 8 hours  insulin glargine Injectable (LANTUS) 13 Unit(s) SubCutaneous at bedtime  insulin lispro (ADMELOG) corrective regimen sliding scale   SubCutaneous three times a day before meals  insulin lispro (ADMELOG) corrective regimen sliding scale   SubCutaneous at bedtime  insulin lispro Injectable (ADMELOG) 4 Unit(s) SubCutaneous three times a day before meals  polyethylene glycol 3350 17 Gram(s) Oral daily  senna 2 Tablet(s) Oral at bedtime  sodium chloride 1 Gram(s) Oral two times a day    MEDICATIONS  (PRN):  albuterol/ipratropium for Nebulization 3 milliLiter(s) Nebulizer every 6 hours PRN Shortness of Breath and/or Wheezing  benzonatate 100 milliGRAM(s) Oral every 8 hours PRN Cough  dextrose Oral Gel 15 Gram(s) Oral once PRN Blood Glucose LESS THAN 70 milliGRAM(s)/deciliter  HYDROmorphone  Injectable 0.5 milliGRAM(s) IV Push every 6 hours PRN Severe Pain (7 - 10)      CAPILLARY BLOOD GLUCOSE      POCT Blood Glucose.: 181 mg/dL (26 Aug 2024 22:10)  POCT Blood Glucose.: 176 mg/dL (26 Aug 2024 21:19)  POCT Blood Glucose.: 161 mg/dL (26 Aug 2024 17:50)  POCT Blood Glucose.: 169 mg/dL (26 Aug 2024 12:46)  POCT Blood Glucose.: 192 mg/dL (26 Aug 2024 10:39)  POCT Blood Glucose.: 181 mg/dL (26 Aug 2024 08:37)    I&O's Summary      PHYSICAL EXAM:    Vital Signs Last 24 Hrs  T(C): 36.4 (27 Aug 2024 05:00), Max: 37 (26 Aug 2024 21:10)  T(F): 97.6 (27 Aug 2024 05:00), Max: 98.6 (26 Aug 2024 21:10)  HR: 111 (27 Aug 2024 05:00) (102 - 112)  BP: 116/80 (27 Aug 2024 05:00) (102/61 - 131/74)  BP(mean): --  RR: 18 (27 Aug 2024 05:00) (17 - 18)  SpO2: 97% (27 Aug 2024 05:00) (92% - 98%)    Parameters below as of 27 Aug 2024 05:00  Patient On (Oxygen Delivery Method): room air        CONSTITUTIONAL: NAD, well-developed, well-groomed  EYES: Conjunctiva and sclera clear  ENMT: Moist oral mucosa, no pharyngeal injection or exudates; normal dentition  NECK: Supple, no palpable masses; no thyromegaly  RESPIRATORY: Normal respiratory effort; lungs are clear to auscultation bilaterally  CARDIOVASCULAR: Regular rate and rhythm, normal S1 and S2, no murmur/rub/gallop; No lower extremity edema; Peripheral pulses are 2+ bilaterally  ABDOMEN: Soft, nontender to palpation, normoactive bowel sounds, no rebound/guarding  MUSCULOSKELETAL: No clubbing or cyanosis of digits; no joint swelling or tenderness to palpation  PSYCH: A+O to person, place, and time; affect appropriate  NEUROLOGY: CN 2-12 are intact and symmetric; no gross sensory deficits   SKIN: No rashes; no palpable lesions    LABS:                        12.7   15.57 )-----------( 205      ( 26 Aug 2024 04:00 )             39.2     08-26    127<L>  |  97<L>  |  42<H>  ----------------------------<  178<H>  5.4<H>   |  18<L>  |  1.36<H>    Ca    8.1<L>      26 Aug 2024 04:00  Phos  2.9     08-26  Mg     2.50     08-26    TPro  5.8<L>  /  Alb  1.9<L>  /  TBili  18.5<H>  /  DBili  x   /  AST  312<H>  /  ALT  276<H>  /  AlkPhos  1419<H>  08-26          Urinalysis Basic - ( 26 Aug 2024 04:00 )    Color: x / Appearance: x / SG: x / pH: x  Gluc: 178 mg/dL / Ketone: x  / Bili: x / Urobili: x   Blood: x / Protein: x / Nitrite: x   Leuk Esterase: x / RBC: x / WBC x   Sq Epi: x / Non Sq Epi: x / Bacteria: x        Culture - Blood (collected 24 Aug 2024 10:45)  Source: .Blood Blood-Peripheral  Preliminary Report (26 Aug 2024 13:01):    No growth at 48 Hours    Culture - Blood (collected 24 Aug 2024 10:36)  Source: .Blood Blood-Peripheral  Preliminary Report (26 Aug 2024 13:01):    No growth at 48 Hours        RADIOLOGY & ADDITIONAL TESTS: No new imaging  Results Reviewed: Yes  Imaging Personally Reviewed:  Electrocardiogram Personally Reviewed:    COORDINATION OF CARE:  Care Discussed with Consultants/Other Providers [Y/N]:  Prior or Outpatient Records Reviewed [Y/N]:   Desmond Leung M.D  Resident  Department of Medicine  Available on [employer] Teams    Patient is a 54y old  Male who presents with a chief complaint of SOB (26 Aug 2024 18:34)      SUBJECTIVE / OVERNIGHT EVENTS: Patient seen and examined at bedside. Patient would like to remain full code, however, states he would like home hospice. Patient also wants comfort care but still requests for daily labs. Is having a family meeting tomorrow during which he hopes to decide on code status and plans. He also complained of pain with breathing especially at the left lower chest.    ADDITIONAL REVIEW OF SYSTEMS:    MEDICATIONS  (STANDING):  cefTRIAXone   IVPB 1000 milliGRAM(s) IV Intermittent every 24 hours  dextrose 5%. 1000 milliLiter(s) (50 mL/Hr) IV Continuous <Continuous>  dextrose 5%. 1000 milliLiter(s) (100 mL/Hr) IV Continuous <Continuous>  dextrose 50% Injectable 25 Gram(s) IV Push once  dextrose 50% Injectable 25 Gram(s) IV Push once  dextrose 50% Injectable 12.5 Gram(s) IV Push once  glucagon  Injectable 1 milliGRAM(s) IntraMuscular once  heparin   Injectable 5000 Unit(s) SubCutaneous every 8 hours  insulin glargine Injectable (LANTUS) 13 Unit(s) SubCutaneous at bedtime  insulin lispro (ADMELOG) corrective regimen sliding scale   SubCutaneous three times a day before meals  insulin lispro (ADMELOG) corrective regimen sliding scale   SubCutaneous at bedtime  insulin lispro Injectable (ADMELOG) 4 Unit(s) SubCutaneous three times a day before meals  polyethylene glycol 3350 17 Gram(s) Oral daily  senna 2 Tablet(s) Oral at bedtime  sodium chloride 1 Gram(s) Oral two times a day    MEDICATIONS  (PRN):  albuterol/ipratropium for Nebulization 3 milliLiter(s) Nebulizer every 6 hours PRN Shortness of Breath and/or Wheezing  benzonatate 100 milliGRAM(s) Oral every 8 hours PRN Cough  dextrose Oral Gel 15 Gram(s) Oral once PRN Blood Glucose LESS THAN 70 milliGRAM(s)/deciliter  HYDROmorphone  Injectable 0.5 milliGRAM(s) IV Push every 6 hours PRN Severe Pain (7 - 10)      CAPILLARY BLOOD GLUCOSE      POCT Blood Glucose.: 181 mg/dL (26 Aug 2024 22:10)  POCT Blood Glucose.: 176 mg/dL (26 Aug 2024 21:19)  POCT Blood Glucose.: 161 mg/dL (26 Aug 2024 17:50)  POCT Blood Glucose.: 169 mg/dL (26 Aug 2024 12:46)  POCT Blood Glucose.: 192 mg/dL (26 Aug 2024 10:39)  POCT Blood Glucose.: 181 mg/dL (26 Aug 2024 08:37)    I&O's Summary      PHYSICAL EXAM:    Vital Signs Last 24 Hrs  T(C): 36.4 (27 Aug 2024 05:00), Max: 37 (26 Aug 2024 21:10)  T(F): 97.6 (27 Aug 2024 05:00), Max: 98.6 (26 Aug 2024 21:10)  HR: 111 (27 Aug 2024 05:00) (102 - 112)  BP: 116/80 (27 Aug 2024 05:00) (102/61 - 131/74)  BP(mean): --  RR: 18 (27 Aug 2024 05:00) (17 - 18)  SpO2: 97% (27 Aug 2024 05:00) (92% - 98%)    Parameters below as of 27 Aug 2024 05:00  Patient On (Oxygen Delivery Method): room air        CONSTITUTIONAL: NAD, well-developed, well-groomed  EYES: scleral icterus  RESPIRATORY: Normal respiratory effort; Shallow breaths with decreased breath sounds  CARDIOVASCULAR: Regular rate and rhythm, normal S1 and S2, no murmur/rub/gallop; No lower extremity edema; Peripheral pulses are 2+ bilaterally  ABDOMEN: Distended with hepatomegaly. Mild pain with palpation of upper abdomen. Decreased bowel sounds, no rebound/guarding  PSYCH: A+O to person, place, and time; affect appropriate  SKIN: No rashes; no palpable lesions    LABS:                        12.7   15.57 )-----------( 205      ( 26 Aug 2024 04:00 )             39.2     08-26    127<L>  |  97<L>  |  42<H>  ----------------------------<  178<H>  5.4<H>   |  18<L>  |  1.36<H>    Ca    8.1<L>      26 Aug 2024 04:00  Phos  2.9     08-26  Mg     2.50     08-26    TPro  5.8<L>  /  Alb  1.9<L>  /  TBili  18.5<H>  /  DBili  x   /  AST  312<H>  /  ALT  276<H>  /  AlkPhos  1419<H>  08-26          Urinalysis Basic - ( 26 Aug 2024 04:00 )    Color: x / Appearance: x / SG: x / pH: x  Gluc: 178 mg/dL / Ketone: x  / Bili: x / Urobili: x   Blood: x / Protein: x / Nitrite: x   Leuk Esterase: x / RBC: x / WBC x   Sq Epi: x / Non Sq Epi: x / Bacteria: x        Culture - Blood (collected 24 Aug 2024 10:45)  Source: .Blood Blood-Peripheral  Preliminary Report (26 Aug 2024 13:01):    No growth at 48 Hours    Culture - Blood (collected 24 Aug 2024 10:36)  Source: .Blood Blood-Peripheral  Preliminary Report (26 Aug 2024 13:01):    No growth at 48 Hours        RADIOLOGY & ADDITIONAL TESTS: No new imaging  Results Reviewed: Yes  Imaging Personally Reviewed:  Electrocardiogram Personally Reviewed:    COORDINATION OF CARE:  Care Discussed with Consultants/Other Providers [Y/N]:  Prior or Outpatient Records Reviewed [Y/N]:

## 2024-08-27 NOTE — CONSULT NOTE ADULT - CONSULT REASON
RIGO
complex medical decision making in the setting of serious illness
metastatic malignancy
Elevated LFTs

## 2024-08-27 NOTE — DIETITIAN INITIAL EVALUATION ADULT - PROBLEM SELECTOR PLAN 10
- Patient reports taking Xarelto 10mg daily but not sure why, denies history of VTE, also reports taking metformin but last prescribed last year as per [employer], also states he is on duloxetine for his b/l peripheral neuropathy but not seen on [facility]  - [facility] pharmacist emailed to help verify his medications

## 2024-08-27 NOTE — CONSULT NOTE ADULT - ASSESSMENT
RIGO 2/2 MATHEW  RIGO multifactorial, 2/2 MATHEW vs lisinopril use vs renal vein compression 2/2 intrahepatic IVC compression iso progressing metastases

## 2024-08-27 NOTE — DIETITIAN INITIAL EVALUATION ADULT - PROBLEM SELECTOR PLAN 6
Stage IV colorectal cancer with mets to lung and liver s/p chemo, Currently getting RT to his R hip, plans for possible retreatment with FOLFOX + maia as per outpatient notes. Follows with Dr. Dre Gardner.  - consult Onc in AM

## 2024-08-27 NOTE — CONSULT NOTE ADULT - SUBJECTIVE AND OBJECTIVE BOX
[facility] DIVISION OF KIDNEY DISEASES AND HYPERTENSION -- [phone]  -- INITIAL CONSULT NOTE  --------------------------------------------------------------------------------  HPI: 54M PMH Stage 4 colorectal cancer with mets to the liver and lung, s/p chemo (FOLFOX) and now on palliative radiation, HTN, HLD, T2DM. Initially presented with SOB. Found to have profound transaminitis, elevated bilirubin 20.4 (predominant direct), CT C/A/P showing progression of pulmonary and hepatic metastatic disease, mild to moderate intrahepatic bile duct dilatation, increased from prior, compression of the intrahepatic IVC by the metastatic disease. Endorsed complaints of dysuria, for which UA was done, showing UTI - UA also showing RBCs, protein, casts. UCX >100k GNRs, for which patient was treated with ceftriaxone.   Home medications lisinopril, amlodipine glipizide, metformin, Xarelto duloxetine, oxycodone.     Detailed kidney HX: Patient with no known history of kidney disease. SCr on admission was 0.87. Patient received contrast on 6/23 and 6/24. Today SCr 2.36.    Nephrology consulted for RIGO.       PAST HISTORY  --------------------------------------------------------------------------------  PAST MEDICAL & SURGICAL HISTORY:  Colon cancer      HTN (hypertension)      HLD (hyperlipidemia)      T2DM (type 2 diabetes mellitus)      S/P partial colectomy        FAMILY HISTORY:  No pertinent family history in first degree relatives      PAST SOCIAL HISTORY:    ALLERGIES & MEDICATIONS  --------------------------------------------------------------------------------  Allergies    No Known Allergies    Intolerances      Standing Inpatient Medications  cefTRIAXone   IVPB 1000 milliGRAM(s) IV Intermittent every 24 hours  dextrose 5%. 1000 milliLiter(s) IV Continuous <Continuous>  dextrose 5%. 1000 milliLiter(s) IV Continuous <Continuous>  dextrose 50% Injectable 25 Gram(s) IV Push once  dextrose 50% Injectable 12.5 Gram(s) IV Push once  dextrose 50% Injectable 25 Gram(s) IV Push once  glucagon  Injectable 1 milliGRAM(s) IntraMuscular once  heparin   Injectable 5000 Unit(s) SubCutaneous every 8 hours  insulin glargine Injectable (LANTUS) 13 Unit(s) SubCutaneous at bedtime  insulin lispro (ADMELOG) corrective regimen sliding scale   SubCutaneous three times a day before meals  insulin lispro (ADMELOG) corrective regimen sliding scale   SubCutaneous at bedtime  insulin lispro Injectable (ADMELOG) 4 Unit(s) SubCutaneous three times a day before meals  polyethylene glycol 3350 17 Gram(s) Oral daily  senna 2 Tablet(s) Oral at bedtime  sodium chloride 1 Gram(s) Oral two times a day    PRN Inpatient Medications  albuterol/ipratropium for Nebulization 3 milliLiter(s) Nebulizer every 6 hours PRN  benzonatate 100 milliGRAM(s) Oral every 8 hours PRN  dextrose Oral Gel 15 Gram(s) Oral once PRN  morphine  - Injectable 4 milliGRAM(s) IV Push every 4 hours PRN      REVIEW OF SYSTEMS  --------------------------------------------------------------------------------  Gen: No fevers/chills  Head/Eyes/Ears: +headache  Respiratory: +SOB  CV: No chest pain  GI: +abdominal pain. No diarrhea  : +dysuria, +left sided flank pain. No hematuria  MSK: No  edema  Skin: No rashes  Heme: No easy bruising or bleeding    All other systems were reviewed and are negative, except as noted.    VITALS/PHYSICAL EXAM  --------------------------------------------------------------------------------  T(C): 36.6 (08-27-24 @ 09:00), Max: 37 (08-26-24 @ 21:10)  HR: 108 (08-27-24 @ 09:00) (102 - 111)  BP: 126/84 (08-27-24 @ 09:00) (102/61 - 131/74)  RR: 18 (08-27-24 @ 09:00) (17 - 18)  SpO2: 95% (08-27-24 @ 09:00) (92% - 97%)  Wt(kg): --          Physical Exam:  	Gen: NAD  	HEENT: +scleral icterus  	Pulm: +CTA b/l with decreased BS at the bases  	CV: S1S2+  	Abd: +distended, soft, +BS    	Ext: 1+LE edema B/L  	Neuro: Awake  	Skin: Warm and dry    LABS/STUDIES  --------------------------------------------------------------------------------              12.5   14.61 >-----------<  217      [08-27-24 @ 06:25]              38.6     128  |  94  |  62  ----------------------------<  195      [08-27-24 @ 06:25]  5.0   |  17  |  2.13        Ca     8.1     [08-27-24 @ 06:25]      Mg     2.60     [08-27-24 @ 06:25]      Phos  4.4     [08-27-24 @ 06:25]    TPro  6.1  /  Alb  2.4  /  TBili  21.5  /  DBili  x   /  AST  230  /  ALT  215  /  AlkPhos  1356  [08-27-24 @ 06:25]    PT/INR: PT 13.9 , INR 1.24       [08-27-24 @ 06:25]  PTT: 32.2       [08-27-24 @ 06:25]      Creatinine Trend:  SCr 2.13 [08-27 @ 06:25]  SCr 1.36 [08-26 @ 04:00]  SCr 0.94 [08-25 @ 04:40]  SCr 0.82 [08-24 @ 11:10]  SCr 0.75 [08-24 @ 06:00]    Urinalysis - [08-27-24 @ 06:25]      Color  / Appearance  / SG  / pH       Gluc 195 / Ketone   / Bili  / Urobili        Blood  / Protein  / Leuk Est  / Nitrite       RBC  / WBC  / Hyaline  / Gran  / Sq Epi  / Non Sq Epi  / Bacteria       HBsAb Reactive      [08-25-24 @ 04:40]  HBsAg Nonreact      [08-25-24 @ 04:40]  HCV 1.54, Reactive      [08-25-24 @ 04:40]   [facility] DIVISION OF KIDNEY DISEASES AND HYPERTENSION -- [phone]  -- INITIAL CONSULT NOTE  --------------------------------------------------------------------------------  HPI: 54M PMH Stage 4 colorectal cancer with mets to the liver and lung, s/p chemo (FOLFOX) and now on palliative radiation, HTN, HLD, T2DM. Initially presented with SOB. Found to have profound transaminitis, elevated bilirubin 20.4 (predominant direct), CT C/A/P showing progression of pulmonary and hepatic metastatic disease, mild to moderate intrahepatic bile duct dilatation, increased from prior, compression of the intrahepatic IVC by the metastatic disease. Endorsed complaints of dysuria, for which UA was done, showing UTI - UA also showing RBCs, protein, casts. UCX >100k GNRs, for which patient was treated with ceftriaxone.   Home medications lisinopril, amlodipine glipizide, metformin, Xarelto, duloxetine, oxycodone.     Detailed kidney HX: Patient with no known history of kidney disease. SCr on admission was 0.87. Patient received contrast on 6/23 and 6/24. Today SCr 2.36.    Nephrology consulted for RIGO.       PAST HISTORY  --------------------------------------------------------------------------------  PAST MEDICAL & SURGICAL HISTORY:  Colon cancer      HTN (hypertension)      HLD (hyperlipidemia)      T2DM (type 2 diabetes mellitus)      S/P partial colectomy        FAMILY HISTORY:  No pertinent family history in first degree relatives      PAST SOCIAL HISTORY:    ALLERGIES & MEDICATIONS  --------------------------------------------------------------------------------  Allergies    No Known Allergies    Intolerances      Standing Inpatient Medications  cefTRIAXone   IVPB 1000 milliGRAM(s) IV Intermittent every 24 hours  dextrose 5%. 1000 milliLiter(s) IV Continuous <Continuous>  dextrose 5%. 1000 milliLiter(s) IV Continuous <Continuous>  dextrose 50% Injectable 25 Gram(s) IV Push once  dextrose 50% Injectable 12.5 Gram(s) IV Push once  dextrose 50% Injectable 25 Gram(s) IV Push once  glucagon  Injectable 1 milliGRAM(s) IntraMuscular once  heparin   Injectable 5000 Unit(s) SubCutaneous every 8 hours  insulin glargine Injectable (LANTUS) 13 Unit(s) SubCutaneous at bedtime  insulin lispro (ADMELOG) corrective regimen sliding scale   SubCutaneous three times a day before meals  insulin lispro (ADMELOG) corrective regimen sliding scale   SubCutaneous at bedtime  insulin lispro Injectable (ADMELOG) 4 Unit(s) SubCutaneous three times a day before meals  polyethylene glycol 3350 17 Gram(s) Oral daily  senna 2 Tablet(s) Oral at bedtime  sodium chloride 1 Gram(s) Oral two times a day    PRN Inpatient Medications  albuterol/ipratropium for Nebulization 3 milliLiter(s) Nebulizer every 6 hours PRN  benzonatate 100 milliGRAM(s) Oral every 8 hours PRN  dextrose Oral Gel 15 Gram(s) Oral once PRN  morphine  - Injectable 4 milliGRAM(s) IV Push every 4 hours PRN      REVIEW OF SYSTEMS  --------------------------------------------------------------------------------  Gen: No fevers/chills  Head/Eyes/Ears: +headache  Respiratory: +SOB  CV: No chest pain  GI: +abdominal pain. No diarrhea  : +dysuria, +left sided flank pain. No hematuria  MSK: No  edema  Skin: No rashes  Heme: No easy bruising or bleeding    All other systems were reviewed and are negative, except as noted.    VITALS/PHYSICAL EXAM  --------------------------------------------------------------------------------  T(C): 36.6 (08-27-24 @ 09:00), Max: 37 (08-26-24 @ 21:10)  HR: 108 (08-27-24 @ 09:00) (102 - 111)  BP: 126/84 (08-27-24 @ 09:00) (102/61 - 131/74)  RR: 18 (08-27-24 @ 09:00) (17 - 18)  SpO2: 95% (08-27-24 @ 09:00) (92% - 97%)  Wt(kg): --          Physical Exam:  	Gen: NAD  	HEENT: +scleral icterus  	Pulm: +CTA b/l with decreased BS at the bases  	CV: S1S2+  	Abd: +distended, soft, +BS. +L CVA TTP    	Ext: 1+LE edema B/L  	Neuro: Awake  	Skin: Warm and dry    LABS/STUDIES  --------------------------------------------------------------------------------              12.5   14.61 >-----------<  217      [08-27-24 @ 06:25]              38.6     128  |  94  |  62  ----------------------------<  195      [08-27-24 @ 06:25]  5.0   |  17  |  2.13        Ca     8.1     [08-27-24 @ 06:25]      Mg     2.60     [08-27-24 @ 06:25]      Phos  4.4     [08-27-24 @ 06:25]    TPro  6.1  /  Alb  2.4  /  TBili  21.5  /  DBili  x   /  AST  230  /  ALT  215  /  AlkPhos  1356  [08-27-24 @ 06:25]    PT/INR: PT 13.9 , INR 1.24       [08-27-24 @ 06:25]  PTT: 32.2       [08-27-24 @ 06:25]      Creatinine Trend:  SCr 2.13 [08-27 @ 06:25]  SCr 1.36 [08-26 @ 04:00]  SCr 0.94 [08-25 @ 04:40]  SCr 0.82 [08-24 @ 11:10]  SCr 0.75 [08-24 @ 06:00]    Urinalysis - [08-27-24 @ 06:25]      Color  / Appearance  / SG  / pH       Gluc 195 / Ketone   / Bili  / Urobili        Blood  / Protein  / Leuk Est  / Nitrite       RBC  / WBC  / Hyaline  / Gran  / Sq Epi  / Non Sq Epi  / Bacteria       HBsAb Reactive      [08-25-24 @ 04:40]  HBsAg Nonreact      [08-25-24 @ 04:40]  HCV 1.54, Reactive      [08-25-24 @ 04:40]   [facility] DIVISION OF KIDNEY DISEASES AND HYPERTENSION -- [phone]  -- INITIAL CONSULT NOTE  --------------------------------------------------------------------------------    HPI: 54M [facility] Stage 4 colorectal cancer with mets to the liver and lung, s/p chemo (FOLFOX) and now on palliative radiation, HTN, HLD, T2DM. Initially presented with SOB. Found to have profound transaminitis, elevated bilirubin 20.4 (predominant direct), CT C/A/P showing progression of pulmonary and hepatic metastatic disease, mild to moderate intrahepatic bile duct dilatation, increased from prior, compression of the intrahepatic IVC by the metastatic disease. Endorsed complaints of dysuria, for which UA was done, showing UTI - UA also showing RBCs, protein, casts. UCX >100k GNRs, for which patient was treated with ceftriaxone.   Home medications lisinopril, amlodipine glipizide, metformin, Xarelto, duloxetine, oxycodone.     Detailed kidney HX: Patient with no known history of kidney disease. SCr on admission was 0.87. Patient received contrast on 6/23 and 6/24. Today SCr 2.36.  Also received lisinopril until 8/26.    Nephrology consulted for RIGO.       PAST HISTORY  --------------------------------------------------------------------------------  PAST MEDICAL & SURGICAL HISTORY:  Colon cancer      HTN (hypertension)      HLD (hyperlipidemia)      T2DM (type 2 diabetes mellitus)      S/P partial colectomy        FAMILY HISTORY:  No pertinent family history in first degree relatives      PAST SOCIAL HISTORY:    ALLERGIES & MEDICATIONS  --------------------------------------------------------------------------------  Allergies    No Known Allergies    Intolerances      Standing Inpatient Medications  cefTRIAXone   IVPB 1000 milliGRAM(s) IV Intermittent every 24 hours  dextrose 5%. 1000 milliLiter(s) IV Continuous <Continuous>  dextrose 5%. 1000 milliLiter(s) IV Continuous <Continuous>  dextrose 50% Injectable 25 Gram(s) IV Push once  dextrose 50% Injectable 12.5 Gram(s) IV Push once  dextrose 50% Injectable 25 Gram(s) IV Push once  glucagon  Injectable 1 milliGRAM(s) IntraMuscular once  heparin   Injectable 5000 Unit(s) SubCutaneous every 8 hours  insulin glargine Injectable (LANTUS) 13 Unit(s) SubCutaneous at bedtime  insulin lispro (ADMELOG) corrective regimen sliding scale   SubCutaneous three times a day before meals  insulin lispro (ADMELOG) corrective regimen sliding scale   SubCutaneous at bedtime  insulin lispro Injectable (ADMELOG) 4 Unit(s) SubCutaneous three times a day before meals  polyethylene glycol 3350 17 Gram(s) Oral daily  senna 2 Tablet(s) Oral at bedtime  sodium chloride 1 Gram(s) Oral two times a day    PRN Inpatient Medications  albuterol/ipratropium for Nebulization 3 milliLiter(s) Nebulizer every 6 hours PRN  benzonatate 100 milliGRAM(s) Oral every 8 hours PRN  dextrose Oral Gel 15 Gram(s) Oral once PRN  morphine  - Injectable 4 milliGRAM(s) IV Push every 4 hours PRN      REVIEW OF SYSTEMS  --------------------------------------------------------------------------------  Gen: No fevers/chills  Head/Eyes/Ears: +headache  Respiratory: +SOB  CV: No chest pain  GI: +abdominal pain. No diarrhea  : +dysuria, +left sided flank pain. No hematuria  MSK: No  edema  Skin: No rashes  Heme: No easy bruising or bleeding    All other systems were reviewed and are negative, except as noted.    VITALS/PHYSICAL EXAM  --------------------------------------------------------------------------------  T(C): 36.6 (08-27-24 @ 09:00), Max: 37 (08-26-24 @ 21:10)  HR: 108 (08-27-24 @ 09:00) (102 - 111)  BP: 126/84 (08-27-24 @ 09:00) (102/61 - 131/74)  RR: 18 (08-27-24 @ 09:00) (17 - 18)  SpO2: 95% (08-27-24 @ 09:00) (92% - 97%)  Wt(kg): --          Physical Exam:  	Gen: NAD  	HEENT: +scleral icterus  	Pulm: +CTA b/l with decreased BS at the bases  	CV: S1S2+  	Abd: +distended, soft, +BS. +L CVA TTP    	Ext: 1+LE edema B/L  	Neuro: Awake  	Skin: Warm and dry    LABS/STUDIES  --------------------------------------------------------------------------------              12.5   14.61 >-----------<  217      [08-27-24 @ 06:25]              38.6     128  |  94  |  62  ----------------------------<  195      [08-27-24 @ 06:25]  5.0   |  17  |  2.13        Ca     8.1     [08-27-24 @ 06:25]      Mg     2.60     [08-27-24 @ 06:25]      Phos  4.4     [08-27-24 @ 06:25]    TPro  6.1  /  Alb  2.4  /  TBili  21.5  /  DBili  x   /  AST  230  /  ALT  215  /  AlkPhos  1356  [08-27-24 @ 06:25]    PT/INR: PT 13.9 , INR 1.24       [08-27-24 @ 06:25]  PTT: 32.2       [08-27-24 @ 06:25]      Creatinine Trend:  SCr 2.13 [08-27 @ 06:25]  SCr 1.36 [08-26 @ 04:00]  SCr 0.94 [08-25 @ 04:40]  SCr 0.82 [08-24 @ 11:10]  SCr 0.75 [08-24 @ 06:00]    Urinalysis - [08-27-24 @ 06:25]      Color  / Appearance  / SG  / pH       Gluc 195 / Ketone   / Bili  / Urobili        Blood  / Protein  / Leuk Est  / Nitrite       RBC  / WBC  / Hyaline  / Gran  / Sq Epi  / Non Sq Epi  / Bacteria       HBsAb Reactive      [08-25-24 @ 04:40]  HBsAg Nonreact      [08-25-24 @ 04:40]  HCV 1.54, Reactive      [08-25-24 @ 04:40]

## 2024-08-28 ENCOUNTER — APPOINTMENT (OUTPATIENT)
Dept: HEMATOLOGY ONCOLOGY | Facility: CLINIC | Age: 54
End: 2024-08-28

## 2024-08-28 LAB
-  AMOXICILLIN/CLAVULANIC ACID: SIGNIFICANT CHANGE UP
-  AMPICILLIN/SULBACTAM: SIGNIFICANT CHANGE UP
-  AMPICILLIN: SIGNIFICANT CHANGE UP
-  AZTREONAM: SIGNIFICANT CHANGE UP
-  CEFAZOLIN: SIGNIFICANT CHANGE UP
-  CEFEPIME: SIGNIFICANT CHANGE UP
-  CEFOXITIN: SIGNIFICANT CHANGE UP
-  CEFTRIAXONE: SIGNIFICANT CHANGE UP
-  CEFUROXIME: SIGNIFICANT CHANGE UP
-  CIPROFLOXACIN: SIGNIFICANT CHANGE UP
-  ERTAPENEM: SIGNIFICANT CHANGE UP
-  GENTAMICIN: SIGNIFICANT CHANGE UP
-  IMIPENEM: SIGNIFICANT CHANGE UP
-  LEVOFLOXACIN: SIGNIFICANT CHANGE UP
-  MEROPENEM: SIGNIFICANT CHANGE UP
-  NITROFURANTOIN: SIGNIFICANT CHANGE UP
-  PIPERACILLIN/TAZOBACTAM: SIGNIFICANT CHANGE UP
-  TOBRAMYCIN: SIGNIFICANT CHANGE UP
-  TRIMETHOPRIM/SULFAMETHOXAZOLE: SIGNIFICANT CHANGE UP
ALBUMIN SERPL ELPH-MCNC: 3 G/DL — LOW (ref 3.3–5)
ALP SERPL-CCNC: 1454 U/L — HIGH (ref 40–120)
ALT FLD-CCNC: 193 U/L — HIGH (ref 4–41)
ANION GAP SERPL CALC-SCNC: 18 MMOL/L — HIGH (ref 7–14)
APTT BLD: 30.3 SEC — SIGNIFICANT CHANGE UP (ref 24.5–35.6)
AST SERPL-CCNC: 230 U/L — HIGH (ref 4–40)
BASOPHILS # BLD AUTO: 0.01 K/UL — SIGNIFICANT CHANGE UP (ref 0–0.2)
BASOPHILS NFR BLD AUTO: 0.1 % — SIGNIFICANT CHANGE UP (ref 0–2)
BILIRUB SERPL-MCNC: 23.7 MG/DL — HIGH (ref 0.2–1.2)
BUN SERPL-MCNC: 74 MG/DL — HIGH (ref 7–23)
CALCIUM SERPL-MCNC: 8.3 MG/DL — LOW (ref 8.4–10.5)
CHLORIDE SERPL-SCNC: 94 MMOL/L — LOW (ref 98–107)
CO2 SERPL-SCNC: 16 MMOL/L — LOW (ref 22–31)
CREAT SERPL-MCNC: 2.9 MG/DL — HIGH (ref 0.5–1.3)
EGFR: 25 ML/MIN/1.73M2 — LOW
EOSINOPHIL # BLD AUTO: 0.01 K/UL — SIGNIFICANT CHANGE UP (ref 0–0.5)
EOSINOPHIL NFR BLD AUTO: 0.1 % — SIGNIFICANT CHANGE UP (ref 0–6)
GLUCOSE BLDC GLUCOMTR-MCNC: 105 MG/DL — HIGH (ref 70–99)
GLUCOSE BLDC GLUCOMTR-MCNC: 121 MG/DL — HIGH (ref 70–99)
GLUCOSE BLDC GLUCOMTR-MCNC: 150 MG/DL — HIGH (ref 70–99)
GLUCOSE BLDC GLUCOMTR-MCNC: 188 MG/DL — HIGH (ref 70–99)
GLUCOSE SERPL-MCNC: 153 MG/DL — HIGH (ref 70–99)
HCT VFR BLD CALC: 35.8 % — LOW (ref 39–50)
HGB BLD-MCNC: 11.6 G/DL — LOW (ref 13–17)
IANC: 12.32 K/UL — HIGH (ref 1.8–7.4)
IMM GRANULOCYTES NFR BLD AUTO: 1 % — HIGH (ref 0–0.9)
INR BLD: 1.16 RATIO — SIGNIFICANT CHANGE UP (ref 0.85–1.18)
LYMPHOCYTES # BLD AUTO: 1.11 K/UL — SIGNIFICANT CHANGE UP (ref 1–3.3)
LYMPHOCYTES # BLD AUTO: 7.5 % — LOW (ref 13–44)
MAGNESIUM SERPL-MCNC: 2.7 MG/DL — HIGH (ref 1.6–2.6)
MCHC RBC-ENTMCNC: 24.8 PG — LOW (ref 27–34)
MCHC RBC-ENTMCNC: 32.4 GM/DL — SIGNIFICANT CHANGE UP (ref 32–36)
MCV RBC AUTO: 76.7 FL — LOW (ref 80–100)
METHOD TYPE: SIGNIFICANT CHANGE UP
MONOCYTES # BLD AUTO: 1.28 K/UL — HIGH (ref 0–0.9)
MONOCYTES NFR BLD AUTO: 8.6 % — SIGNIFICANT CHANGE UP (ref 2–14)
MRSA PCR RESULT.: SIGNIFICANT CHANGE UP
NEUTROPHILS # BLD AUTO: 12.32 K/UL — HIGH (ref 1.8–7.4)
NEUTROPHILS NFR BLD AUTO: 82.7 % — HIGH (ref 43–77)
NRBC # BLD: 0 /100 WBCS — SIGNIFICANT CHANGE UP (ref 0–0)
NRBC # FLD: 0.11 K/UL — HIGH (ref 0–0)
PHOSPHATE SERPL-MCNC: 4.8 MG/DL — HIGH (ref 2.5–4.5)
PLATELET # BLD AUTO: 217 K/UL — SIGNIFICANT CHANGE UP (ref 150–400)
POTASSIUM SERPL-MCNC: 5.1 MMOL/L — SIGNIFICANT CHANGE UP (ref 3.5–5.3)
POTASSIUM SERPL-SCNC: 5.1 MMOL/L — SIGNIFICANT CHANGE UP (ref 3.5–5.3)
PROT SERPL-MCNC: 6.8 G/DL — SIGNIFICANT CHANGE UP (ref 6–8.3)
PROTHROM AB SERPL-ACNC: 12.9 SEC — SIGNIFICANT CHANGE UP (ref 9.5–13)
RBC # BLD: 4.67 M/UL — SIGNIFICANT CHANGE UP (ref 4.2–5.8)
RBC # FLD: 22.2 % — HIGH (ref 10.3–14.5)
S AUREUS DNA NOSE QL NAA+PROBE: SIGNIFICANT CHANGE UP
SODIUM SERPL-SCNC: 128 MMOL/L — LOW (ref 135–145)
WBC # BLD: 14.88 K/UL — HIGH (ref 3.8–10.5)
WBC # FLD AUTO: 14.88 K/UL — HIGH (ref 3.8–10.5)

## 2024-08-28 PROCEDURE — 99233 SBSQ HOSP IP/OBS HIGH 50: CPT | Mod: GC

## 2024-08-28 PROCEDURE — 76705 ECHO EXAM OF ABDOMEN: CPT | Mod: 26

## 2024-08-28 PROCEDURE — 99233 SBSQ HOSP IP/OBS HIGH 50: CPT

## 2024-08-28 PROCEDURE — 99498 ADVNCD CARE PLAN ADDL 30 MIN: CPT | Mod: 25

## 2024-08-28 PROCEDURE — 99497 ADVNCD CARE PLAN 30 MIN: CPT | Mod: 25

## 2024-08-28 RX ORDER — HYDROMORPHONE HYDROCHLORIDE 2 MG/1
0.5 TABLET ORAL EVERY 4 HOURS
Refills: 0 | Status: DISCONTINUED | OUTPATIENT
Start: 2024-08-28 | End: 2024-08-29

## 2024-08-28 RX ORDER — POLYETHYLENE GLYCOL 3350 17 G/17G
17 POWDER, FOR SOLUTION ORAL
Refills: 0 | Status: DISCONTINUED | OUTPATIENT
Start: 2024-08-28 | End: 2024-09-04

## 2024-08-28 RX ADMIN — Medication 4 UNIT(S): at 12:51

## 2024-08-28 RX ADMIN — HYDROMORPHONE HYDROCHLORIDE 0.5 MILLIGRAM(S): 2 TABLET ORAL at 11:02

## 2024-08-28 RX ADMIN — Medication 5000 UNIT(S): at 13:08

## 2024-08-28 RX ADMIN — ALBUMIN (HUMAN) 50 MILLILITER(S): 5 SOLUTION INTRAVENOUS at 11:04

## 2024-08-28 RX ADMIN — ALBUMIN (HUMAN) 50 MILLILITER(S): 5 SOLUTION INTRAVENOUS at 05:46

## 2024-08-28 RX ADMIN — HYDROMORPHONE HYDROCHLORIDE 0.5 MILLIGRAM(S): 2 TABLET ORAL at 15:29

## 2024-08-28 RX ADMIN — ALBUMIN (HUMAN) 50 MILLILITER(S): 5 SOLUTION INTRAVENOUS at 00:34

## 2024-08-28 RX ADMIN — SODIUM CHLORIDE 1 GRAM(S): 9 INJECTION INTRAMUSCULAR; INTRAVENOUS; SUBCUTANEOUS at 05:50

## 2024-08-28 RX ADMIN — Medication 10 MILLIGRAM(S): at 17:06

## 2024-08-28 RX ADMIN — HYDROMORPHONE HYDROCHLORIDE 0.5 MILLIGRAM(S): 2 TABLET ORAL at 03:37

## 2024-08-28 RX ADMIN — CHLORHEXIDINE GLUCONATE 1 APPLICATION(S): 40 SOLUTION TOPICAL at 11:07

## 2024-08-28 RX ADMIN — ALBUMIN (HUMAN) 50 MILLILITER(S): 5 SOLUTION INTRAVENOUS at 17:02

## 2024-08-28 RX ADMIN — Medication 5000 UNIT(S): at 05:49

## 2024-08-28 RX ADMIN — INSULIN GLARGINE 13 UNIT(S): 100 INJECTION, SOLUTION SUBCUTANEOUS at 22:27

## 2024-08-28 RX ADMIN — POLYETHYLENE GLYCOL 3350 17 GRAM(S): 17 POWDER, FOR SOLUTION ORAL at 11:05

## 2024-08-28 RX ADMIN — HYDROMORPHONE HYDROCHLORIDE 0.5 MILLIGRAM(S): 2 TABLET ORAL at 10:02

## 2024-08-28 RX ADMIN — HYDROMORPHONE HYDROCHLORIDE 0.5 MILLIGRAM(S): 2 TABLET ORAL at 18:46

## 2024-08-28 RX ADMIN — HYDROMORPHONE HYDROCHLORIDE 0.5 MILLIGRAM(S): 2 TABLET ORAL at 17:46

## 2024-08-28 RX ADMIN — Medication 100 MILLIGRAM(S): at 22:09

## 2024-08-28 RX ADMIN — Medication 2 TABLET(S): at 22:04

## 2024-08-28 RX ADMIN — HYDROMORPHONE HYDROCHLORIDE 0.5 MILLIGRAM(S): 2 TABLET ORAL at 04:37

## 2024-08-28 RX ADMIN — HYDROMORPHONE HYDROCHLORIDE 0.5 MILLIGRAM(S): 2 TABLET ORAL at 14:29

## 2024-08-28 RX ADMIN — Medication 4 UNIT(S): at 18:01

## 2024-08-28 RX ADMIN — POLYETHYLENE GLYCOL 3350 17 GRAM(S): 17 POWDER, FOR SOLUTION ORAL at 17:05

## 2024-08-28 RX ADMIN — Medication 5000 UNIT(S): at 22:04

## 2024-08-28 RX ADMIN — Medication 2: at 08:53

## 2024-08-28 RX ADMIN — SODIUM CHLORIDE 1 GRAM(S): 9 INJECTION INTRAMUSCULAR; INTRAVENOUS; SUBCUTANEOUS at 17:05

## 2024-08-28 RX ADMIN — Medication 4 UNIT(S): at 08:53

## 2024-08-28 NOTE — DISCHARGE NOTE PROVIDER - HOSPITAL COURSE
HPI:  BASSAM LOGAN is a 54y Male with a hx of Stage IV colorectal cancer w/ mets to liver and lung s/p chemo, now undergoing palliative radiation, HTN, HLD, and T2DM who presents with SOB    Patient reports developing SOB with exertion about a month ago, which has gradually worsened. Initially he had SOB while going up stairs, but now has SOB even with short distances. No SOB at rest. Reports a mild cough with his SOB, mostly non-productive, no hemoptysis. Also notes fast heart rate with exertion but denies chest pain, dizziness, or LOC. States ambulation is also limited by swelling in his feet, states the swelling is new over the past 5 days, no pain associated with the swelling. Reports mild orthopnea but no PND. Also reports dysuria and hematuria since last week but no urinary frequency. Denies fevers/chills, abd pain, nausea/emesis, diarrhea/constipation, hematemesis, hematochezia, or melena. States his BMs are grey in color for the past 1 week. No other acute  complaints. No sick contacts or recent travel.    In the ED, patient was put on NC 2L due to desat to upper 80s%. Hemodynamically stable. Bedside POCUS with grossly normal cardiac function. Course complicated by hyperglycemia and UTI. Given Ceftriaxone and Lispro 6u in ED. (24 Aug 2024 02:59)    Hospital Course:  Admitted to medicine with O2 sat improving and only requiring O2 for comfort. Marked direct bilirubinemia to 16s on admission but MCRP not done as will not . Acute liver injury most likely secondary to worsening liver masses as seen on imaging. Autoimmune liver injury causes negative. U/S abdomen showed no ascites even though patient had worsening abdominal distension. Hep C ab positive, but no detectable viral load. Patient kidney function started worsening, likely 2/2 hepatorenal syndrome in setting of almost no to very slow flow through hepatic portal veins. Patient treated with albumin but electrolytes continued to worsen. Palliative was consulted and after discussion with family, patient was placed DNR/DNI with home hospice.     Important Medication Changes and Reason:      Active or Pending Issues Requiring Follow-up:      Advanced Directives:   [ ] Full code  [X] DNR  [X] Hospice    Discharge Diagnoses:  Acute liver injury 2/2 metastatic colon cancer       HPI:  BASSAM LOGAN is a 54y Male with a hx of Stage IV colorectal cancer w/ mets to liver and lung s/p chemo, now undergoing palliative radiation, HTN, HLD, and T2DM who presents with SOB    Patient reports developing SOB with exertion about a month ago, which has gradually worsened. Initially he had SOB while going up stairs, but now has SOB even with short distances. No SOB at rest. Reports a mild cough with his SOB, mostly non-productive, no hemoptysis. Also notes fast heart rate with exertion but denies chest pain, dizziness, or LOC. States ambulation is also limited by swelling in his feet, states the swelling is new over the past 5 days, no pain associated with the swelling. Reports mild orthopnea but no PND. Also reports dysuria and hematuria since last week but no urinary frequency. Denies fevers/chills, abd pain, nausea/emesis, diarrhea/constipation, hematemesis, hematochezia, or melena. States his BMs are grey in color for the past 1 week. No other acute  complaints. No sick contacts or recent travel.    In the ED, patient was put on NC 2L due to desat to upper 80s%. Hemodynamically stable. Bedside POCUS with grossly normal cardiac function. Course complicated by hyperglycemia and UTI. Given Ceftriaxone and Lispro 6u in ED. (24 Aug 2024 02:59)    Hospital Course:  Admitted to medicine with O2 sat improving and only requiring O2 for comfort. Marked direct bilirubinemia to 16s on admission but MCRP not done as will not . Acute liver injury most likely secondary to worsening liver masses as seen on imaging. Autoimmune liver injury causes negative. U/S abdomen showed no ascites even though patient had worsening abdominal distension. Hep C ab positive, but no detectable viral load. Patient kidney function started worsening, likely 2/2 hepatorenal syndrome in setting of almost no to very slow flow through hepatic portal veins. Patient treated with albumin but electrolytes continued to worsen. He  received a 7 day course of Ceftriaxone for UTI. Palliative was consulted and after discussion with family, patient was placed DNR/DNI with home hospice referral. On repeat lab draws, pt had persistent hyperkalemia (max 7 hemolyzed) which was treated with insulin, D50 and Lokelma; no EKG changes. On 9/3 after discussion with patient, opted to stop with daily lab draws. He was treated with 2mg PO Dilaudid q6h for pain.  His home diabetes medications were held and he received insulin for glycemic control. His home blood pressure medications were held.    The patient is afebrile, hemodynamically stable and medically optimized for discharge home with hospice care. On day of discharge, patient is clinically stable with no new exam findings or acute symptoms compared to prior. The patient was seen by the attending physician on the date of discharge and deemed stable and acceptable for discharge. The patient's chronic medical conditions were treated accordingly per the patient's home medication regimen. The patient's medication reconciliation (with changes made to chronic medications), follow up appointments, discharge orders, instructions, and significant lab and diagnostic studies are as noted.      Important Medication Changes and Reason:  -Hold home blood pressure medications (Amlodipine 10mg, Lisinopril 5mg)  -D/c home oxycodone 5mg - will send with prescription for Dilaudid 2mg PO q6 for pain      Active or Pending Issues Requiring Follow-up:  Metastatic colon cancer, discharge w/ home hospice care      Advanced Directives:   [ ] Full code  [X] DNR  [X] Hospice    Discharge Diagnoses:  Acute liver injury 2/2 metastatic colon cancer  Acute hypoxic respiratory failure

## 2024-08-28 NOTE — PROGRESS NOTE ADULT - CONVERSATION DETAILS
Palliative consulted for complex medical decision making in the setting of serious illness.     Family meeting held with patient, his sister, and multiple cousins, some in the healthcare field.     Elicited patient and family’s understanding of patient’s illness and prognosis. Reviewed patient's complicated medical course including worsening metastatic cancer in liver and lungs. Answered multiple questions by cousins. One believing patient has ascites and wondering if he can have a catheter to fluid removal.     Discussed that with worsening, patient was high risk with further medical events, hospitalizations. Introduced Advance Care Planning to discuss future medical decisions and treatment options in the event patient has further deterioration in condition and Advanced Directives such as MOLST to discuss specific preferences for Life Sustaining Treatments in the future. Discussed code status, recommended DNR/DNI as CPR/intubation have poor outcomes in a patient with such serious illness, would not reverse underlying diseases and can be burdensome at end of life. Discussed option of deescalation of hospitalizations and burdensome medical interventions and transitioning to comfort focused care. Such care can be ushered in with support services of hospice.     Patient agreed to home hospice referral and DNR/DNI. MOLST completed.     Family shared patient is the "glue" that holds the family together, is always calm and never becomes upset with anyone. 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   Patient and family appreciative of discussion.

## 2024-08-28 NOTE — PROGRESS NOTE ADULT - ASSESSMENT
RIGO multifactorial, 2/2 MATHEW vs lisinopril use vs renal vein compression 2/2 intrahepatic IVC compression iso progressing metastases

## 2024-08-28 NOTE — DISCHARGE NOTE PROVIDER - NSDCMRMEDTOKEN_GEN_ALL_CORE_FT
amLODIPine 10 mg oral tablet: 1 tab(s) orally once a day  benzonatate 100 mg oral capsule: 1 cap(s) orally every 8 hours  DULoxetine 30 mg oral delayed release capsule: 1 cap(s) orally once a day  GlipiZIDE XL 10 mg oral tablet, extended release: 1 tab(s) orally once a day  lisinopril 5 mg oral tablet: 1 tab(s) orally once a day  metFORMIN 500 mg oral tablet: 1 tab(s) orally 2 times a day  metoclopramide 10 mg oral tablet: 1 tab(s) orally every 6 hours as needed for  nausea  oxyCODONE 5 mg oral tablet: 1 tab(s) orally every 6 hours as needed for  pain  rosuvastatin 10 mg oral tablet: 1 tab(s) orally once a day  Xarelto 20 mg oral tablet: 1 tab(s) orally once a day

## 2024-08-28 NOTE — PROGRESS NOTE ADULT - PROBLEM SELECTOR PLAN 4
For GOC   Referred to caregiver support and child life for patient's 12 year old son     If patient interested in home hospice, notify CM to place referral     In the event of worsening symptoms, please contact the Palliative Medicine team via pager (if the patient is at [facility] #[phone] or if the patient is at [facility] #[phone]) The Geriatric and Palliative Medicine service has coverage 24 hours a day/ 7 days a week to provide medical recommendations regarding symptom management needs via telephone. For C   f/u home hospice referral     In the event of worsening symptoms, please contact the Palliative Medicine team via pager (if the patient is at [facility] #[phone] or if the patient is at [facility] #[phone]) The Geriatric and Palliative Medicine service has coverage 24 hours a day/ 7 days a week to provide medical recommendations regarding symptom management needs via telephone.

## 2024-08-28 NOTE — PROGRESS NOTE ADULT - SUBJECTIVE AND OBJECTIVE BOX
[facility] DIVISION OF KIDNEY DISEASES AND HYPERTENSION   FOLLOW UP NOTE    --------------------------------------------------------------------------------  Chief Complaint:    24 hour events/subjective: Pt. was seen and examined today. Overnight events  noted.       PAST HISTORY  --------------------------------------------------------------------------------  No significant changes to PMH, PSH, FHx, SHx, unless otherwise noted    ALLERGIES & MEDICATIONS  --------------------------------------------------------------------------------  Allergies    No Known Allergies    Intolerances      Standing Inpatient Medications  albumin human 25% IVPB 50 milliLiter(s) IV Intermittent every 6 hours  cefTRIAXone   IVPB 1000 milliGRAM(s) IV Intermittent every 24 hours  chlorhexidine 2% Cloths 1 Application(s) Topical daily  dextrose 5%. 1000 milliLiter(s) IV Continuous <Continuous>  dextrose 5%. 1000 milliLiter(s) IV Continuous <Continuous>  dextrose 50% Injectable 12.5 Gram(s) IV Push once  dextrose 50% Injectable 25 Gram(s) IV Push once  dextrose 50% Injectable 25 Gram(s) IV Push once  glucagon  Injectable 1 milliGRAM(s) IntraMuscular once  heparin   Injectable 5000 Unit(s) SubCutaneous every 8 hours  insulin glargine Injectable (LANTUS) 13 Unit(s) SubCutaneous at bedtime  insulin lispro (ADMELOG) corrective regimen sliding scale   SubCutaneous three times a day before meals  insulin lispro (ADMELOG) corrective regimen sliding scale   SubCutaneous at bedtime  insulin lispro Injectable (ADMELOG) 4 Unit(s) SubCutaneous three times a day before meals  polyethylene glycol 3350 17 Gram(s) Oral daily  senna 2 Tablet(s) Oral at bedtime  sodium chloride 1 Gram(s) Oral two times a day    PRN Inpatient Medications  albuterol/ipratropium for Nebulization 3 milliLiter(s) Nebulizer every 6 hours PRN  benzonatate 100 milliGRAM(s) Oral every 8 hours PRN  dextrose Oral Gel 15 Gram(s) Oral once PRN  HYDROmorphone  Injectable 0.5 milliGRAM(s) IV Push every 6 hours PRN  HYDROmorphone  Injectable 0.5 milliGRAM(s) IV Push every 6 hours PRN      REVIEW OF SYSTEMS  --------------------------------------------------------------------------------  Gen: No fevers/chills  Head/Eyes/Ears: No HA   Respiratory: No dyspnea, cough  CV: No chest pain  GI: No abdominal pain, diarrhea  : No dysuria, hematuria  MSK: No  edema  Skin: No rashes  Heme: No easy bruising or bleeding    All other systems were reviewed and are negative, except as noted.    VITALS/PHYSICAL EXAM  --------------------------------------------------------------------------------  T(C): 36.3 (08-28-24 @ 05:00), Max: 36.8 (08-27-24 @ 13:00)  HR: 95 (08-28-24 @ 05:00) (95 - 107)  BP: 104/64 (08-28-24 @ 05:00) (102/63 - 127/72)  RR: 19 (08-28-24 @ 05:00) (17 - 19)  SpO2: 99% (08-28-24 @ 05:00) (96% - 100%)  Wt(kg): --        08-27-24 @ 07:01  -  08-28-24 @ 07:00  --------------------------------------------------------  IN: 780 mL / OUT: 800 mL / NET: -20 mL        Physical Exam:  	Gen: NAD  	HEENT: Anicteric  	Pulm: CTA B/L  	CV: S1S2+  	Abd: Soft, +BS           Transplant site: RLQ non tender, well healed surgical scar+  	Ext: No LE edema B/L  	Neuro: Awake          :[place] cath+ with clear urine  	Skin: Warm and dry  	Dialysis access:      LABS/STUDIES  --------------------------------------------------------------------------------              11.6   14.88 >-----------<  217      [08-28-24 @ 06:16]              35.8     128  |  94  |  74  ----------------------------<  153      [08-28-24 @ 06:16]  5.1   |  16  |  2.90        Ca     8.3     [08-28-24 @ 06:16]      Mg     2.70     [08-28-24 @ 06:16]      Phos  4.8     [08-28-24 @ 06:16]    TPro  6.8  /  Alb  3.0  /  TBili  23.7  /  DBili  x   /  AST  230  /  ALT  193  /  AlkPhos  1454  [08-28-24 @ 06:16]    PT/INR: PT 12.9 , INR 1.16       [08-28-24 @ 06:16]  PTT: 30.3       [08-28-24 @ 06:16]      Creatinine Trend:  SCr 2.90 [08-28 @ 06:16]  SCr 2.13 [08-27 @ 06:25]  SCr 1.36 [08-26 @ 04:00]  SCr 0.94 [08-25 @ 04:40]  SCr 0.82 [08-24 @ 11:10]    Urinalysis - [08-28-24 @ 06:16]      Color  / Appearance  / SG  / pH       Gluc 153 / Ketone   / Bili  / Urobili        Blood  / Protein  / Leuk Est  / Nitrite       RBC  / WBC  / Hyaline  / Gran  / Sq Epi  / Non Sq Epi  / Bacteria       HBsAb Reactive      [08-25-24 @ 04:40]  HBsAg Nonreact      [08-25-24 @ 04:40]  HCV 1.54, Reactive      [08-25-24 @ 04:40]    KATHE: titer Negative, pattern --      [08-25-24 @ 04:40]    Tacrolimus  Cyclosporine  Sirolimus  Mycophenolate  BK PCR  CMV PCR  Parvo PCR  EBV PCR [facility] DIVISION OF KIDNEY DISEASES AND HYPERTENSION   FOLLOW UP NOTE    --------------------------------------------------------------------------------  Chief Complaint:    24 hour events/subjective: Pt. was seen and examined today. No acute overnight events. Endorsing left sided lower abdominal pain and SOB, unchanged from yesterday.    PAST HISTORY  --------------------------------------------------------------------------------  No significant changes to PMH, PSH, FHx, SHx, unless otherwise noted    ALLERGIES & MEDICATIONS  --------------------------------------------------------------------------------  Allergies    No Known Allergies    Intolerances      Standing Inpatient Medications  albumin human 25% IVPB 50 milliLiter(s) IV Intermittent every 6 hours  cefTRIAXone   IVPB 1000 milliGRAM(s) IV Intermittent every 24 hours  chlorhexidine 2% Cloths 1 Application(s) Topical daily  dextrose 5%. 1000 milliLiter(s) IV Continuous <Continuous>  dextrose 5%. 1000 milliLiter(s) IV Continuous <Continuous>  dextrose 50% Injectable 12.5 Gram(s) IV Push once  dextrose 50% Injectable 25 Gram(s) IV Push once  dextrose 50% Injectable 25 Gram(s) IV Push once  glucagon  Injectable 1 milliGRAM(s) IntraMuscular once  heparin   Injectable 5000 Unit(s) SubCutaneous every 8 hours  insulin glargine Injectable (LANTUS) 13 Unit(s) SubCutaneous at bedtime  insulin lispro (ADMELOG) corrective regimen sliding scale   SubCutaneous three times a day before meals  insulin lispro (ADMELOG) corrective regimen sliding scale   SubCutaneous at bedtime  insulin lispro Injectable (ADMELOG) 4 Unit(s) SubCutaneous three times a day before meals  polyethylene glycol 3350 17 Gram(s) Oral daily  senna 2 Tablet(s) Oral at bedtime  sodium chloride 1 Gram(s) Oral two times a day    PRN Inpatient Medications  albuterol/ipratropium for Nebulization 3 milliLiter(s) Nebulizer every 6 hours PRN  benzonatate 100 milliGRAM(s) Oral every 8 hours PRN  dextrose Oral Gel 15 Gram(s) Oral once PRN  HYDROmorphone  Injectable 0.5 milliGRAM(s) IV Push every 6 hours PRN  HYDROmorphone  Injectable 0.5 milliGRAM(s) IV Push every 6 hours PRN      REVIEW OF SYSTEMS  --------------------------------------------------------------------------------  Gen: No fevers/chills  Head/Eyes/Ears: No HA   Respiratory: +dyspnea  CV: No chest pain  GI: +abdominal pain. No diarrhea  : No dysuria, hematuria  MSK: No  edema  Skin: No rashes  Heme: No easy bruising or bleeding    All other systems were reviewed and are negative, except as noted.    VITALS/PHYSICAL EXAM  --------------------------------------------------------------------------------  T(C): 36.3 (08-28-24 @ 05:00), Max: 36.8 (08-27-24 @ 13:00)  HR: 95 (08-28-24 @ 05:00) (95 - 107)  BP: 104/64 (08-28-24 @ 05:00) (102/63 - 127/72)  RR: 19 (08-28-24 @ 05:00) (17 - 19)  SpO2: 99% (08-28-24 @ 05:00) (96% - 100%)  Wt(kg): --        08-27-24 @ 07:01  -  08-28-24 @ 07:00  --------------------------------------------------------  IN: 780 mL / OUT: 800 mL / NET: -20 mL        Physical Exam:  	Gen: NAD  	HEENT: +scleral icterus  	Pulm: +CTA b/l with decreased BS at the bases  	CV: S1S2+  	Abd: +distended, soft, +BS. +L CVA TTP    	Ext: 1+LE edema B/L  	Neuro: Awake  	Skin: Warm and dry      LABS/STUDIES  --------------------------------------------------------------------------------              11.6   14.88 >-----------<  217      [08-28-24 @ 06:16]              35.8     128  |  94  |  74  ----------------------------<  153      [08-28-24 @ 06:16]  5.1   |  16  |  2.90        Ca     8.3     [08-28-24 @ 06:16]      Mg     2.70     [08-28-24 @ 06:16]      Phos  4.8     [08-28-24 @ 06:16]    TPro  6.8  /  Alb  3.0  /  TBili  23.7  /  DBili  x   /  AST  230  /  ALT  193  /  AlkPhos  1454  [08-28-24 @ 06:16]    PT/INR: PT 12.9 , INR 1.16       [08-28-24 @ 06:16]  PTT: 30.3       [08-28-24 @ 06:16]      Creatinine Trend:  SCr 2.90 [08-28 @ 06:16]  SCr 2.13 [08-27 @ 06:25]  SCr 1.36 [08-26 @ 04:00]  SCr 0.94 [08-25 @ 04:40]  SCr 0.82 [08-24 @ 11:10]    Urinalysis - [08-28-24 @ 06:16]      Color  / Appearance  / SG  / pH       Gluc 153 / Ketone   / Bili  / Urobili        Blood  / Protein  / Leuk Est  / Nitrite       RBC  / WBC  / Hyaline  / Gran  / Sq Epi  / Non Sq Epi  / Bacteria       HBsAb Reactive      [08-25-24 @ 04:40]  HBsAg Nonreact      [08-25-24 @ 04:40]  HCV 1.54, Reactive      [08-25-24 @ 04:40]    KATHE: titer Negative, pattern --      [08-25-24 @ 04:40] [facility] DIVISION OF KIDNEY DISEASES AND HYPERTENSION   FOLLOW UP NOTE    --------------------------------------------------------------------------------  Chief Complaint: RIGO    24 hour events/subjective: Pt. was seen and examined today. No acute overnight events. Endorsing left sided lower abdominal pain and SOB, unchanged from yesterday.    PAST HISTORY  --------------------------------------------------------------------------------  No significant changes to PMH, PSH, FHx, SHx, unless otherwise noted    ALLERGIES & MEDICATIONS  --------------------------------------------------------------------------------  Allergies    No Known Allergies    Intolerances      Standing Inpatient Medications  albumin human 25% IVPB 50 milliLiter(s) IV Intermittent every 6 hours  cefTRIAXone   IVPB 1000 milliGRAM(s) IV Intermittent every 24 hours  chlorhexidine 2% Cloths 1 Application(s) Topical daily  dextrose 5%. 1000 milliLiter(s) IV Continuous <Continuous>  dextrose 5%. 1000 milliLiter(s) IV Continuous <Continuous>  dextrose 50% Injectable 12.5 Gram(s) IV Push once  dextrose 50% Injectable 25 Gram(s) IV Push once  dextrose 50% Injectable 25 Gram(s) IV Push once  glucagon  Injectable 1 milliGRAM(s) IntraMuscular once  heparin   Injectable 5000 Unit(s) SubCutaneous every 8 hours  insulin glargine Injectable (LANTUS) 13 Unit(s) SubCutaneous at bedtime  insulin lispro (ADMELOG) corrective regimen sliding scale   SubCutaneous three times a day before meals  insulin lispro (ADMELOG) corrective regimen sliding scale   SubCutaneous at bedtime  insulin lispro Injectable (ADMELOG) 4 Unit(s) SubCutaneous three times a day before meals  polyethylene glycol 3350 17 Gram(s) Oral daily  senna 2 Tablet(s) Oral at bedtime  sodium chloride 1 Gram(s) Oral two times a day    PRN Inpatient Medications  albuterol/ipratropium for Nebulization 3 milliLiter(s) Nebulizer every 6 hours PRN  benzonatate 100 milliGRAM(s) Oral every 8 hours PRN  dextrose Oral Gel 15 Gram(s) Oral once PRN  HYDROmorphone  Injectable 0.5 milliGRAM(s) IV Push every 6 hours PRN  HYDROmorphone  Injectable 0.5 milliGRAM(s) IV Push every 6 hours PRN      REVIEW OF SYSTEMS  --------------------------------------------------------------------------------  Gen: No fevers/chills  Head/Eyes/Ears: No HA   Respiratory: +dyspnea  CV: No chest pain  GI: +abdominal pain. No diarrhea  : No dysuria, hematuria  MSK: No  edema  Skin: No rashes  Heme: No easy bruising or bleeding    All other systems were reviewed and are negative, except as noted.    VITALS/PHYSICAL EXAM  --------------------------------------------------------------------------------  T(C): 36.3 (08-28-24 @ 05:00), Max: 36.8 (08-27-24 @ 13:00)  HR: 95 (08-28-24 @ 05:00) (95 - 107)  BP: 104/64 (08-28-24 @ 05:00) (102/63 - 127/72)  RR: 19 (08-28-24 @ 05:00) (17 - 19)  SpO2: 99% (08-28-24 @ 05:00) (96% - 100%)  Wt(kg): --        08-27-24 @ 07:01  -  08-28-24 @ 07:00  --------------------------------------------------------  IN: 780 mL / OUT: 800 mL / NET: -20 mL        Physical Exam:  	Gen: NAD  	HEENT: +scleral icterus  	Pulm: +CTA b/l with decreased BS at the bases  	CV: S1S2+  	Abd: +distended, soft, +BS. +L CVA TTP    	Ext: 1+LE edema B/L  	Neuro: Awake  	Skin: Warm and dry      LABS/STUDIES  --------------------------------------------------------------------------------              11.6   14.88 >-----------<  217      [08-28-24 @ 06:16]              35.8     128  |  94  |  74  ----------------------------<  153      [08-28-24 @ 06:16]  5.1   |  16  |  2.90        Ca     8.3     [08-28-24 @ 06:16]      Mg     2.70     [08-28-24 @ 06:16]      Phos  4.8     [08-28-24 @ 06:16]    TPro  6.8  /  Alb  3.0  /  TBili  23.7  /  DBili  x   /  AST  230  /  ALT  193  /  AlkPhos  1454  [08-28-24 @ 06:16]    PT/INR: PT 12.9 , INR 1.16       [08-28-24 @ 06:16]  PTT: 30.3       [08-28-24 @ 06:16]      Creatinine Trend:  SCr 2.90 [08-28 @ 06:16]  SCr 2.13 [08-27 @ 06:25]  SCr 1.36 [08-26 @ 04:00]  SCr 0.94 [08-25 @ 04:40]  SCr 0.82 [08-24 @ 11:10]    Urinalysis - [08-28-24 @ 06:16]      Color  / Appearance  / SG  / pH       Gluc 153 / Ketone   / Bili  / Urobili        Blood  / Protein  / Leuk Est  / Nitrite       RBC  / WBC  / Hyaline  / Gran  / Sq Epi  / Non Sq Epi  / Bacteria       HBsAb Reactive      [08-25-24 @ 04:40]  HBsAg Nonreact      [08-25-24 @ 04:40]  HCV 1.54, Reactive      [08-25-24 @ 04:40]    KATHE: titer Negative, pattern --      [08-25-24 @ 04:40]

## 2024-08-28 NOTE — PROGRESS NOTE ADULT - PROBLEM SELECTOR PLAN 8
Pressure stable so far    - Hold home amlodipine 10mg and Lisinopril 5mg qd in setting of worsening liver function

## 2024-08-28 NOTE — PROGRESS NOTE ADULT - PROBLEM SELECTOR PLAN 3
Stage IV colorectal cancer with mets to lung and liver s/p chemo (4th line), Currently getting RT to his R hip. Follows with Dr. Dre Gardner at [facility]. Patient has terminal illness  CEA 1142    - Patient and family requested home hospice with comfort care but still asking for daily labs still  - Patient appointed both sisters as healthcare proxies    - F/u on Heme onc recs. No plan for chemo at this time  - Palliative consulted, follow up recs.   - Plan for further confirmation on code status and comfort care after family meeting tomorrow  - CM to be informed about home hospice  - Trend CBC. CMP, coags    Pain:  - IV morphine 4mg q4h PRN Stage IV colorectal cancer with mets to lung and liver s/p chemo (4th line), Currently getting RT to his R hip. Follows with Dr. Dre Gardner at [facility]. Patient has terminal illness  CEA 1142    - Patient and family requested home hospice with comfort care but still asking for daily labs still  - Patient appointed both sisters as healthcare proxies    - F/u on Heme onc recs. No plan for chemo at this time  - Palliative consulted, follow up recs.   - Plan for further confirmation on code status and comfort care after family meeting tomorrow  - CM to be informed about home hospice  - Trend CBC. CMP, coags    Pain:  - IV dilaudid 0.5mg q4h PRN  - Now DNR/DNI with home hospice.  - Appreciate further palliative pain recs

## 2024-08-28 NOTE — PROGRESS NOTE ADULT - SUBJECTIVE AND OBJECTIVE BOX
Indication of Geriatrics and Palliative Medicine Services:  [X  ] Complex Medical Decision Making   [X  ] Symptom/Pain management     DNR on chart:    INTERVAL EVENTS:     -------------------------------------------------------------------------------------------------------    PRESENT SYMPTOMS:     (from initial)     -------------------------------------------------------------------------------------------------------    I STOP:     -------------------------------------------------------------------------------------------------------    ITEMS UNCHECKED ARE NOT PRESENT    PHYSICAL:  Vital Signs Last 24 Hrs  T(C): 37.2 (28 Aug 2024 09:00), Max: 37.2 (28 Aug 2024 09:00)  T(F): 98.9 (28 Aug 2024 09:00), Max: 98.9 (28 Aug 2024 09:00)  HR: 107 (28 Aug 2024 10:00) (95 - 107)  BP: 110/67 (28 Aug 2024 10:00) (102/63 - 127/72)  BP(mean): --  RR: 18 (28 Aug 2024 09:00) (17 - 19)  SpO2: 98% (28 Aug 2024 09:00) (96% - 100%)    Parameters below as of 28 Aug 2024 09:00  Patient On (Oxygen Delivery Method): nasal cannula  O2 Flow (L/min): 2   I&O's Summary    27 Aug 2024 07:01  -  28 Aug 2024 07:00  --------------------------------------------------------  IN: 780 mL / OUT: 800 mL / NET: -20 mL        (from initial)     -------------------------------------------------------------------------------------------------------    LABS:                        11.6   14.88 )-----------( 217      ( 28 Aug 2024 06:16 )             35.8   08-28    128<L>  |  94<L>  |  74<H>  ----------------------------<  153<H>  5.1   |  16<L>  |  2.90<H>    Ca    8.3<L>      28 Aug 2024 06:16  Phos  4.8     08-28  Mg     2.70     08-28    TPro  6.8  /  Alb  3.0<L>  /  TBili  23.7<H>  /  DBili  x   /  AST  230<H>  /  ALT  193<H>  /  AlkPhos  1454<H>  08-28  PT/INR - ( 28 Aug 2024 06:16 )   PT: 12.9 sec;   INR: 1.16 ratio      PTT - ( 28 Aug 2024 06:16 )  PTT:30.3 sec    Urinalysis Basic - ( 28 Aug 2024 06:16 )    Color: x / Appearance: x / SG: x / pH: x  Gluc: 153 mg/dL / Ketone: x  / Bili: x / Urobili: x   Blood: x / Protein: x / Nitrite: x   Leuk Esterase: x / RBC: x / WBC x   Sq Epi: x / Non Sq Epi: x / Bacteria: x    -------------------------------------------------------------------------------------------------------    CRITICAL CARE:  [ ]Shock Present  [ ]Septic [ ]Cardiogenic [ ]Neurologic [ ]Hypovolemic [ ]Undifferentiated    [ ]Vasopressors [ ]Inotropes    [ ]Respiratory failure present [ ]Acute  [ ]Chronic [ ]Hypoxic  [ ]Hypercarbic [ ]Mixed   [ ]Mechanical Ventilation  [ ]Trach collar   [ ]Non-invasive ventilatory support   [ ]High-Flow   [ ]Oxygen mask/venti     [ ]Other organ failure     -------------------------------------------------------------------------------------------------------    RADIOLOGY & ADDITIONAL STUDIES:           -------------------------------------------------------------------------------------------------------  MEDICATIONS:     MEDICATIONS  (STANDING):  albumin human 25% IVPB 50 milliLiter(s) IV Intermittent every 6 hours  cefTRIAXone   IVPB 1000 milliGRAM(s) IV Intermittent every 24 hours  chlorhexidine 2% Cloths 1 Application(s) Topical daily  dextrose 5%. 1000 milliLiter(s) (100 mL/Hr) IV Continuous <Continuous>  dextrose 5%. 1000 milliLiter(s) (50 mL/Hr) IV Continuous <Continuous>  dextrose 50% Injectable 25 Gram(s) IV Push once  dextrose 50% Injectable 25 Gram(s) IV Push once  dextrose 50% Injectable 12.5 Gram(s) IV Push once  glucagon  Injectable 1 milliGRAM(s) IntraMuscular once  heparin   Injectable 5000 Unit(s) SubCutaneous every 8 hours  insulin glargine Injectable (LANTUS) 13 Unit(s) SubCutaneous at bedtime  insulin lispro (ADMELOG) corrective regimen sliding scale   SubCutaneous three times a day before meals  insulin lispro (ADMELOG) corrective regimen sliding scale   SubCutaneous at bedtime  insulin lispro Injectable (ADMELOG) 4 Unit(s) SubCutaneous three times a day before meals  polyethylene glycol 3350 17 Gram(s) Oral two times a day  senna 2 Tablet(s) Oral at bedtime  sodium chloride 1 Gram(s) Oral two times a day    MEDICATIONS  (PRN):  albuterol/ipratropium for Nebulization 3 milliLiter(s) Nebulizer every 6 hours PRN Shortness of Breath and/or Wheezing  benzonatate 100 milliGRAM(s) Oral every 8 hours PRN Cough  dextrose Oral Gel 15 Gram(s) Oral once PRN Blood Glucose LESS THAN 70 milliGRAM(s)/deciliter  HYDROmorphone  Injectable 0.5 milliGRAM(s) IV Push every 4 hours PRN Severe Pain (7 - 10)         Indication of Geriatrics and Palliative Medicine Services:  [X  ] Complex Medical Decision Making   [X  ] Symptom/Pain management     DNR on chart:    INTERVAL EVENTS:     -------------------------------------------------------------------------------------------------------    PRESENT SYMPTOMS:     [ ] Unable to self-report      [ ] PAINADS     [ ] RDOS    [ ] No     [X ] Yes     Source if other than patient:  [ ]Family   [ ]Team     PAIN:   If blank, patient unable to specify     [X ]yes [ ]no    1. Location- R abdominal   2. Radiation-  No   3. Quality- Like someone sitting   4. Timing- On and off   5. Minimal acceptable level/pain goal- 5/10   6. Aggravating factors- Worse with eating   7. QOL impact-     SYMPTOMS:   Dyspnea:                           [ ]Mild [ ]Moderate [ ]Severe  Anxiety:                             [ ]Mild [ ]Moderate [ ]Severe  Fatigue:                             [ ]Mild [ ]Moderate [ ]Severe  Nausea/Vomiting:              [ ]Mild [ ]Moderate [ ]Severe  Loss of appetite:                [ ]Mild [ ]Moderate [X ]Severe  Constipation:                     [ ]Mild [ ]Moderate [ ]Severe    Other Symptoms:  [X ]All other review of systems negative     -------------------------------------------------------------------------------------------------------    I STOP: [id number]    Prescription Information      PDI Filter:    PDI	Current Rx	Drug Type	Rx Written	Rx Dispensed	Drug	Quantity	Days Supply	Prescriber Name	Prescriber KATINA #	Payment Method	Dispenser  A	Y	O	08/15/2024	08/19/2024	morphine sulf er 15 mg tablet	60	30	Lulu Gomes	[id number]	[facility]  A	N	O	08/15/2024	08/19/2024	oxycodone hcl (ir) 5 mg tablet	30	3	Lulu Gomes	[id number]	[facility]  A	N	O	07/26/2024	07/26/2024	oxycodone hcl (ir) 5 mg tablet	30	3	Dre Gardner	[id number]	[facility]  A	N	O	06/28/2024	07/02/2024	oxycodone hcl (ir) 5 mg tablet	30	3	Dre Gardner	[id number]	[facility]	N	O	05/21/2024	05/21/2024	tramadol hcl 50 mg tablet	6	2	Emma Price	[id number]	[facility] #[id number]    -------------------------------------------------------------------------------------------------------    ITEMS UNCHECKED ARE NOT PRESENT    PHYSICAL:  Vital Signs Last 24 Hrs  T(C): 37.2 (28 Aug 2024 09:00), Max: 37.2 (28 Aug 2024 09:00)  T(F): 98.9 (28 Aug 2024 09:00), Max: 98.9 (28 Aug 2024 09:00)  HR: 107 (28 Aug 2024 10:00) (95 - 107)  BP: 110/67 (28 Aug 2024 10:00) (102/63 - 127/72)  BP(mean): --  RR: 18 (28 Aug 2024 09:00) (17 - 19)  SpO2: 98% (28 Aug 2024 09:00) (96% - 100%)    Parameters below as of 28 Aug 2024 09:00  Patient On (Oxygen Delivery Method): nasal cannula  O2 Flow (L/min): 2   I&O's Summary    27 Aug 2024 07:01  -  28 Aug 2024 07:00  --------------------------------------------------------  IN: 780 mL / OUT: 800 mL / NET: -20 mL    GENERAL:  [ ]Cachexia  [ ] Frail  [X ]Awake  [X ]Oriented   [ ]Lethargic  [ ]Unarousable  [ ]Verbal  [ ]Non-Verbal    BEHAVIORAL:   [ ] Anxiety  [ ] Delirium [ ] Agitation [ ] Other    HEENT:   [ ]Normal   [ ]Dry mouth   [ ]ET Tube/Trach  [ ]Oral lesions  Icteric sclera     PULMONARY:   [X ]Clear              [ ]Tachypnea  [ ]Audible excessive secretions   [ ]Rhonchi        [ ]Right [ ]Left [ ]Bilateral  [ ]Crackles        [ ]Right [ ]Left [ ]Bilateral  [ ]Wheezing     [ ]Right [ ]Left [ ]Bilateral  [ ]Diminished breath sounds [ ]right [ ]left [ ]bilateral    CARDIOVASCULAR:    [X ]Regular [ ]Irregular [ ]Tachy  [ ]Joshua [ ]Murmur [ ]Other    GASTROINTESTINAL:  [ ]Soft  [X ]Distended   [ ]+BS  [ ]Non tender [X ]Tender  [ ]Other [ ]PEG [ ]OGT/ NGT      GENITOURINARY:  [X ]Normal [ ] Incontinent   [ ]Oliguria/Anuria   [ ][place]    MUSCULOSKELETAL:   [X ]Normal   [ ]Weakness  [ ]Bed/Wheelchair bound [ ]Edema    NEUROLOGIC:   [X ]No focal deficits  [ ]Cognitive impairment  [ ]Dysphagia [ ]Dysarthria [ ]Paresis [ ]Other     SKIN:   [X ]Normal  [ ]Rash  [ ]Other  [ ]Pressure ulcer(s)       Present on admission [ ]y [ ]n    -------------------------------------------------------------------------------------------------------    LABS:                        11.6   14.88 )-----------( 217      ( 28 Aug 2024 06:16 )             35.8   08-28    128<L>  |  94<L>  |  74<H>  ----------------------------<  153<H>  5.1   |  16<L>  |  2.90<H>    Ca    8.3<L>      28 Aug 2024 06:16  Phos  4.8     08-28  Mg     2.70     08-28    TPro  6.8  /  Alb  3.0<L>  /  TBili  23.7<H>  /  DBili  x   /  AST  230<H>  /  ALT  193<H>  /  AlkPhos  1454<H>  08-28  PT/INR - ( 28 Aug 2024 06:16 )   PT: 12.9 sec;   INR: 1.16 ratio      PTT - ( 28 Aug 2024 06:16 )  PTT:30.3 sec    Urinalysis Basic - ( 28 Aug 2024 06:16 )    Color: x / Appearance: x / SG: x / pH: x  Gluc: 153 mg/dL / Ketone: x  / Bili: x / Urobili: x   Blood: x / Protein: x / Nitrite: x   Leuk Esterase: x / RBC: x / WBC x   Sq Epi: x / Non Sq Epi: x / Bacteria: x    -------------------------------------------------------------------------------------------------------    CRITICAL CARE:  [ ]Shock Present  [ ]Septic [ ]Cardiogenic [ ]Neurologic [ ]Hypovolemic [ ]Undifferentiated    [ ]Vasopressors [ ]Inotropes    [ ]Respiratory failure present [ ]Acute  [ ]Chronic [ ]Hypoxic  [ ]Hypercarbic [ ]Mixed   [ ]Mechanical Ventilation  [ ]Trach collar   [ ]Non-invasive ventilatory support   [ ]High-Flow   [ ]Oxygen mask/venti     [ ]Other organ failure     -------------------------------------------------------------------------------------------------------    RADIOLOGY & ADDITIONAL STUDIES:       < from: CT Abdomen and Pelvis w/ IV Cont (08.24.24 @ 10:58) >    IMPRESSION:  *  Progression of pulmonary and hepatic metastatic disease, as detailed   above.  *  Mild to moderate intrahepatic bile duct dilatation, increased from   prior.  *  New diffuse hypoenhancement of the right hepatic lobe, which may   reflect perfusional alteration secondary to metastatic burden. There is   compressionof the intrahepatic IVC by the metastatic disease. The   hepatic veins are not visualized. Doppler ultrasound could be performed   for further evaluation as clinically warranted.  *  Multifocal ill-defined patchy foci of cortical  hypoenhancement left   kidney. Correlate with urinalysis to exclude pyelonephritis. Recommend   attention on close follow-up imaging. No hydronephrosis.  *  Nonspecific gallbladder and right-sided colonic wall thickening,   possibly reactive.  *  New small volume abdominopelvic ascites. Diffuse anasarca.    < end of copied text >    < from: US Abdomen Doppler (08.24.24 @ 15:18) >  IMPRESSION:  Multiple liver metastases with compression on the hepatic vasculature.  Small caliber right and main portal veins, without evidence of   demonstrable flow. Findings may be reflect a combination of slow flow and   extrinsic compression. Thrombus is not excluded.    < end of copied text >    < from: CT Angio Chest PE Protocol w/ IV Cont (08.23.24 @ 22:49) >  IMPRESSION:  No pulmonary embolus.    Increased size of innumerable bilateral pulmonary metastases.    Diffuse hepatic metastases several of which demonstrate increase in size.    < end of copied text >    -------------------------------------------------------------------------------------------------------  MEDICATIONS:     MEDICATIONS  (STANDING):  albumin human 25% IVPB 50 milliLiter(s) IV Intermittent every 6 hours  cefTRIAXone   IVPB 1000 milliGRAM(s) IV Intermittent every 24 hours  chlorhexidine 2% Cloths 1 Application(s) Topical daily  dextrose 5%. 1000 milliLiter(s) (100 mL/Hr) IV Continuous <Continuous>  dextrose 5%. 1000 milliLiter(s) (50 mL/Hr) IV Continuous <Continuous>  dextrose 50% Injectable 25 Gram(s) IV Push once  dextrose 50% Injectable 25 Gram(s) IV Push once  dextrose 50% Injectable 12.5 Gram(s) IV Push once  glucagon  Injectable 1 milliGRAM(s) IntraMuscular once  heparin   Injectable 5000 Unit(s) SubCutaneous every 8 hours  insulin glargine Injectable (LANTUS) 13 Unit(s) SubCutaneous at bedtime  insulin lispro (ADMELOG) corrective regimen sliding scale   SubCutaneous three times a day before meals  insulin lispro (ADMELOG) corrective regimen sliding scale   SubCutaneous at bedtime  insulin lispro Injectable (ADMELOG) 4 Unit(s) SubCutaneous three times a day before meals  polyethylene glycol 3350 17 Gram(s) Oral two times a day  senna 2 Tablet(s) Oral at bedtime  sodium chloride 1 Gram(s) Oral two times a day    MEDICATIONS  (PRN):  albuterol/ipratropium for Nebulization 3 milliLiter(s) Nebulizer every 6 hours PRN Shortness of Breath and/or Wheezing  benzonatate 100 milliGRAM(s) Oral every 8 hours PRN Cough  dextrose Oral Gel 15 Gram(s) Oral once PRN Blood Glucose LESS THAN 70 milliGRAM(s)/deciliter  HYDROmorphone  Injectable 0.5 milliGRAM(s) IV Push every 4 hours PRN Severe Pain (7 - 10)         Indication of Geriatrics and Palliative Medicine Services:  [X  ] Complex Medical Decision Making   [X  ] Symptom/Pain management     DNR on chart: No     INTERVAL EVENTS: Patient seen this AM, having some pain in abdomen. Discussed pain regimen. Patient seen again in PM for family meeting. See below for GOC.     -------------------------------------------------------------------------------------------------------    PRESENT SYMPTOMS:     [ ] Unable to self-report      [ ] PAINADS     [ ] RDOS    [ ] No     [X ] Yes     Source if other than patient:  [ ]Family   [ ]Team     PAIN:   If blank, patient unable to specify     [X ]yes [ ]no    1. Location- R abdominal   2. Radiation-  No   3. Quality- Like someone sitting   4. Timing- On and off   5. Minimal acceptable level/pain goal- 5/10   6. Aggravating factors- Worse with eating   7. QOL impact-     SYMPTOMS:   Dyspnea:                           [ ]Mild [ ]Moderate [ ]Severe  Anxiety:                             [ ]Mild [ ]Moderate [ ]Severe  Fatigue:                             [ ]Mild [ ]Moderate [ ]Severe  Nausea/Vomiting:              [ ]Mild [ ]Moderate [ ]Severe  Loss of appetite:                [ ]Mild [ ]Moderate [X ]Severe  Constipation:                     [ ]Mild [ ]Moderate [ ]Severe    Other Symptoms:  [X ]All other review of systems negative     -------------------------------------------------------------------------------------------------------    I STOP: [id number]    Prescription Information      PDI Filter:    PDI	Current Rx	Drug Type	Rx Written	Rx Dispensed	Drug	Quantity	Days Supply	Prescriber Name	Prescriber KATINA #	Payment Method	Dispenser  A	Y	O	08/15/2024	08/19/2024	morphine sulf er 15 mg tablet	60	30	Lulu Gomes	[id number]	[facility]	N	O	08/15/2024	08/19/2024	oxycodone hcl (ir) 5 mg tablet	30	3	Lulu Gomes	[id number]	[facility]	N	O	07/26/2024	07/26/2024	oxycodone hcl (ir) 5 mg tablet	30	3	Dre Gardner	[id number]	[facility]	N	O	06/28/2024	07/02/2024	oxycodone hcl (ir) 5 mg tablet	30	3	Dre Gardner	[id number]	[facility]	N	O	05/21/2024	05/21/2024	tramadol hcl 50 mg tablet	6	2	Emma Price	[id number]	[id number]    -------------------------------------------------------------------------------------------------------    ITEMS UNCHECKED ARE NOT PRESENT    PHYSICAL:  Vital Signs Last 24 Hrs  T(C): 37.2 (28 Aug 2024 09:00), Max: 37.2 (28 Aug 2024 09:00)  T(F): 98.9 (28 Aug 2024 09:00), Max: 98.9 (28 Aug 2024 09:00)  HR: 107 (28 Aug 2024 10:00) (95 - 107)  BP: 110/67 (28 Aug 2024 10:00) (102/63 - 127/72)  BP(mean): --  RR: 18 (28 Aug 2024 09:00) (17 - 19)  SpO2: 98% (28 Aug 2024 09:00) (96% - 100%)    Parameters below as of 28 Aug 2024 09:00  Patient On (Oxygen Delivery Method): nasal cannula  O2 Flow (L/min): 2   I&O's Summary    27 Aug 2024 07:01  -  28 Aug 2024 07:00  --------------------------------------------------------  IN: 780 mL / OUT: 800 mL / NET: -20 mL    GENERAL:  [ ]Cachexia  [ ] Frail  [X ]Awake  [X ]Oriented   [ ]Lethargic  [ ]Unarousable  [ ]Verbal  [ ]Non-Verbal    BEHAVIORAL:   [ ] Anxiety  [ ] Delirium [ ] Agitation [ ] Other    HEENT:   [ ]Normal   [ ]Dry mouth   [ ]ET Tube/Trach  [ ]Oral lesions  Icteric sclera     PULMONARY:   [X ]Clear              [ ]Tachypnea  [ ]Audible excessive secretions   [ ]Rhonchi        [ ]Right [ ]Left [ ]Bilateral  [ ]Crackles        [ ]Right [ ]Left [ ]Bilateral  [ ]Wheezing     [ ]Right [ ]Left [ ]Bilateral  [ ]Diminished breath sounds [ ]right [ ]left [ ]bilateral    CARDIOVASCULAR:    [X ]Regular [ ]Irregular [ ]Tachy  [ ]Joshua [ ]Murmur [ ]Other    GASTROINTESTINAL:  [ ]Soft  [X ]Distended   [ ]+BS  [ ]Non tender [X ]Tender  [ ]Other [ ]PEG [ ]OGT/ NGT      GENITOURINARY:  [X ]Normal [ ] Incontinent   [ ]Oliguria/Anuria   [ ][place]    MUSCULOSKELETAL:   [X ]Normal   [ ]Weakness  [ ]Bed/Wheelchair bound [ ]Edema    NEUROLOGIC:   [X ]No focal deficits  [ ]Cognitive impairment  [ ]Dysphagia [ ]Dysarthria [ ]Paresis [ ]Other     SKIN:   [X ]Normal  [ ]Rash  [ ]Other  [ ]Pressure ulcer(s)       Present on admission [ ]y [ ]n    -------------------------------------------------------------------------------------------------------    LABS:                        11.6   14.88 )-----------( 217      ( 28 Aug 2024 06:16 )             35.8   08-28    128<L>  |  94<L>  |  74<H>  ----------------------------<  153<H>  5.1   |  16<L>  |  2.90<H>    Ca    8.3<L>      28 Aug 2024 06:16  Phos  4.8     08-28  Mg     2.70     08-28    TPro  6.8  /  Alb  3.0<L>  /  TBili  23.7<H>  /  DBili  x   /  AST  230<H>  /  ALT  193<H>  /  AlkPhos  1454<H>  08-28  PT/INR - ( 28 Aug 2024 06:16 )   PT: 12.9 sec;   INR: 1.16 ratio      PTT - ( 28 Aug 2024 06:16 )  PTT:30.3 sec    Urinalysis Basic - ( 28 Aug 2024 06:16 )    Color: x / Appearance: x / SG: x / pH: x  Gluc: 153 mg/dL / Ketone: x  / Bili: x / Urobili: x   Blood: x / Protein: x / Nitrite: x   Leuk Esterase: x / RBC: x / WBC x   Sq Epi: x / Non Sq Epi: x / Bacteria: x    -------------------------------------------------------------------------------------------------------    CRITICAL CARE:  [ ]Shock Present  [ ]Septic [ ]Cardiogenic [ ]Neurologic [ ]Hypovolemic [ ]Undifferentiated    [ ]Vasopressors [ ]Inotropes    [ ]Respiratory failure present [ ]Acute  [ ]Chronic [ ]Hypoxic  [ ]Hypercarbic [ ]Mixed   [ ]Mechanical Ventilation  [ ]Trach collar   [ ]Non-invasive ventilatory support   [ ]High-Flow   [ ]Oxygen mask/venti     [ ]Other organ failure     -------------------------------------------------------------------------------------------------------    RADIOLOGY & ADDITIONAL STUDIES:       < from: CT Abdomen and Pelvis w/ IV Cont (08.24.24 @ 10:58) >    IMPRESSION:  *  Progression of pulmonary and hepatic metastatic disease, as detailed   above.  *  Mild to moderate intrahepatic bile duct dilatation, increased from   prior.  *  New diffuse hypoenhancement of the right hepatic lobe, which may   reflect perfusional alteration secondary to metastatic burden. There is   compressionof the intrahepatic IVC by the metastatic disease. The   hepatic veins are not visualized. Doppler ultrasound could be performed   for further evaluation as clinically warranted.  *  Multifocal ill-defined patchy foci of cortical  hypoenhancement left   kidney. Correlate with urinalysis to exclude pyelonephritis. Recommend   attention on close follow-up imaging. No hydronephrosis.  *  Nonspecific gallbladder and right-sided colonic wall thickening,   possibly reactive.  *  New small volume abdominopelvic ascites. Diffuse anasarca.    < end of copied text >    < from: US Abdomen Doppler (08.24.24 @ 15:18) >  IMPRESSION:  Multiple liver metastases with compression on the hepatic vasculature.  Small caliber right and main portal veins, without evidence of   demonstrable flow. Findings may be reflect a combination of slow flow and   extrinsic compression. Thrombus is not excluded.    < end of copied text >    < from: CT Angio Chest PE Protocol w/ IV Cont (08.23.24 @ 22:49) >  IMPRESSION:  No pulmonary embolus.    Increased size of innumerable bilateral pulmonary metastases.    Diffuse hepatic metastases several of which demonstrate increase in size.    < end of copied text >    -------------------------------------------------------------------------------------------------------  MEDICATIONS:     MEDICATIONS  (STANDING):  albumin human 25% IVPB 50 milliLiter(s) IV Intermittent every 6 hours  cefTRIAXone   IVPB 1000 milliGRAM(s) IV Intermittent every 24 hours  chlorhexidine 2% Cloths 1 Application(s) Topical daily  dextrose 5%. 1000 milliLiter(s) (100 mL/Hr) IV Continuous <Continuous>  dextrose 5%. 1000 milliLiter(s) (50 mL/Hr) IV Continuous <Continuous>  dextrose 50% Injectable 25 Gram(s) IV Push once  dextrose 50% Injectable 25 Gram(s) IV Push once  dextrose 50% Injectable 12.5 Gram(s) IV Push once  glucagon  Injectable 1 milliGRAM(s) IntraMuscular once  heparin   Injectable 5000 Unit(s) SubCutaneous every 8 hours  insulin glargine Injectable (LANTUS) 13 Unit(s) SubCutaneous at bedtime  insulin lispro (ADMELOG) corrective regimen sliding scale   SubCutaneous three times a day before meals  insulin lispro (ADMELOG) corrective regimen sliding scale   SubCutaneous at bedtime  insulin lispro Injectable (ADMELOG) 4 Unit(s) SubCutaneous three times a day before meals  polyethylene glycol 3350 17 Gram(s) Oral two times a day  senna 2 Tablet(s) Oral at bedtime  sodium chloride 1 Gram(s) Oral two times a day    MEDICATIONS  (PRN):  albuterol/ipratropium for Nebulization 3 milliLiter(s) Nebulizer every 6 hours PRN Shortness of Breath and/or Wheezing  benzonatate 100 milliGRAM(s) Oral every 8 hours PRN Cough  dextrose Oral Gel 15 Gram(s) Oral once PRN Blood Glucose LESS THAN 70 milliGRAM(s)/deciliter  HYDROmorphone  Injectable 0.5 milliGRAM(s) IV Push every 4 hours PRN Severe Pain (7 - 10)

## 2024-08-28 NOTE — PROGRESS NOTE ADULT - PROBLEM SELECTOR PLAN 2
- Diagnosed in 2021    - Diffuse mets- liver, lungs   - POD despite multiple lines of treatment   - Follows with Dr. Mathew at [facility]  - Poor prognosis, T bili elevated   - Hospice recommended - Diagnosed in 2021    - Diffuse mets- liver, lungs   - POD despite multiple lines of treatment   - Follows with Dr. Mathew at [facility]  - Poor prognosis, T bili elevated   - Worsening acute renal failure   - Hospice recommended

## 2024-08-28 NOTE — PROGRESS NOTE ADULT - PROBLEM SELECTOR PLAN 10
DVT ppx: heparin subq  Diet: Consistent carb/DASH  Dispo: pending clinical course. Likely D/C on home hospice    Code Status: FULL CODE

## 2024-08-28 NOTE — DISCHARGE NOTE PROVIDER - NSDCCPCAREPLAN_GEN_ALL_CORE_FT
PRINCIPAL DISCHARGE DIAGNOSIS  Diagnosis: Liver injury  Assessment and Plan of Treatment: You were admitted for shortness of breath but were incidentally found to have liver injury which was shown to be due to a worsening of your cancer. Upon discussion with the liver doctors and oncologists, it was determined that there was nothing more we could do towards the treatment of your worsening liver function. You were placed on home hospice, DNR/DNI     PRINCIPAL DISCHARGE DIAGNOSIS  Diagnosis: Liver injury  Assessment and Plan of Treatment: You were seen in the hospital for shortness of breath. During your hospital stay, you were found to have an acute liver injury which is related to worsening of your cancer. You were also found to have worsening kidney function, which is also related to rhe cancer.  After discussion with the liver specialists and oncologists, it was determined that there was nothing more we could do towards the treatment of your worsening liver function.   You will be discharged home with home hospice care. The hospice services can help with medication and symptom management while you are at home.   While in the hospital, we gave you a medication called Dilaudid to help with pain/discomfort. We will send a prescription for this medication to take at home. We also recommend that you stop your home blood pressure medications (Amlodipine, Lisinopril), because your blood pressure was stable in the hospital.

## 2024-08-28 NOTE — PROGRESS NOTE ADULT - PROBLEM SELECTOR PLAN 3
- 8/26- See GOC. Discussed hospice, code status. Patient appointed sisters Zachary and Ally as HCP. Sisters interested in second opinion at [facility] [place] - 8/26- See GOC. Discussed hospice, code status. Patient appointed sisters Zachary and Ally as HCP. [place] [facility] [place] 8/28- See GOC. Patient agreed to DNR/DNI

## 2024-08-28 NOTE — PROGRESS NOTE ADULT - PROBLEM SELECTOR PLAN 1
Baseline SCr normal at 0.87. Patient received contrast on 6/23 and 6/24. SCr today 2.9 from 2.13.  DDX includes MATHEW. Patient also received lisinopril 8/26, which may have attributed to RIGO. CT abd pelvis and US doppler abdomen showing compression of the intrahepatic IVC by the metastatic disease. Thus, DDX also includes RIGO 2/2 renal vein congestion 2/2 intrahepatic IVC compression iso progressing metastases. Recommend IV albumin 25% 50 cc Q6h. Monitor labs and urine output. Avoid NSAIDs, ACEI/ARBS, RCA and nephrotoxins. Dose medications as per eGFR. Baseline SCr normal at 0.87. Patient received contrast on 6/23 and 6/24. SCr worse today 2.9 from 2.13.  DDX includes MATHEW. Patient also received lisinopril 8/26, which may have attributed to RIGO. CT abd pelvis and US doppler abdomen showing compression of the intrahepatic IVC by the metastatic disease. Thus, DDX also includes RIGO 2/2 renal vein congestion 2/2 intrahepatic IVC compression iso progressing metastases. Recommend IV albumin 25% 50 cc Q6h. Monitor labs and urine output. Avoid NSAIDs, ACEI/ARBS, RCA and nephrotoxins. Dose medications as per eGFR.

## 2024-08-28 NOTE — PROGRESS NOTE ADULT - ASSESSMENT
BASSAM LOGAN is a 54y Male with a hx of Stage IV colorectal cancer w/ mets to liver and lung s/p chemo (4th line), HTN, HLD, and T2DM who presents with SOB, found to be in acute hypoxic respiratory failure with profound transaminitis. Recent imaging shows progression of metastatic liver & lung disease.  BASSAM LOGAN is a 54y Male with a hx of Stage IV colorectal cancer w/ mets to liver and lung s/p chemo (4th line), HTN, HLD, and T2DM who presents with SOB, found to be in acute hypoxic respiratory failure with profound transaminitis. Recent imaging shows progression of metastatic liver & lung disease. Goals of care discussion today

## 2024-08-28 NOTE — DISCHARGE NOTE PROVIDER - NSDCCPTREATMENT_GEN_ALL_CORE_FT
PRINCIPAL PROCEDURE  Procedure: CT abdomen pelvis  Findings and Treatment: IMPRESSION:  *  Progression of pulmonary and hepatic metastatic disease, as detailed   above.  *  Mild to moderate intrahepatic bile duct dilatation, increased from   prior.  *  New diffuse hypoenhancement of the right hepatic lobe, which may   reflect perfusional alteration secondary to metastatic burden. There is   compressionof the intrahepatic IVC by the metastatic disease. The   hepatic veins are not visualized. Doppler ultrasound could be performed   for further evaluation as clinically warranted.  *  Multifocal ill-defined patchy foci of cortical  hypoenhancement left   kidney. Correlate with urinalysis to exclude pyelonephritis. Recommend   attention on close follow-up imaging. No hydronephrosis.  *  Nonspecific gallbladder and right-sided colonic wall thickening,   possibly reactive.  *  New small volume abdominopelvic ascites. Diffuse anasarca.      SECONDARY PROCEDURE  Procedure: Complete ultrasound of abdomen  Findings and Treatment: Impression:  Multiple liver metastases with compression on the hepatic vasculature.  Small caliber right and main portal veins, without evidence of   demonstrable flow. Findings may be reflect a combination of slow flow and extrinsic compression. Thrombus is not excluded.

## 2024-08-28 NOTE — PROGRESS NOTE ADULT - PROBLEM SELECTOR PLAN 1
Creat 0.94 -->1.36 --> 2.13 (08/27/24)    Patient has anasarca as seen on CT imaging and in setting of acute liver injury, RIGO likely 2/2 to worsening hepatic function and hypovolemia.   - Concerning for hepatorenal syndrome  - s/p IV albumin 25% x 3 doses 08/26/24    Plan:  - nephro consulted  - Continue to monitor Creat 0.94 -->1.36 --> 2.13 --> 2.90 (08/28/24)    Patient has anasarca as seen on CT imaging and in setting of acute liver injury, RIGO likely 2/2 to worsening hepatic function and hypovolemia.   - Concerning for hepatorenal syndrome  - s/p IV albumin 25% x 3 doses 08/26/24    Plan:  - nephro consulted  - Continue IV Albumin 25% q6h  - Continue to monitor

## 2024-08-28 NOTE — PROGRESS NOTE ADULT - PROBLEM SELECTOR PLAN 5
#Dyspnea on exertion  #BLE edema  Presenting with SOB w/ exertion and hypoxia. CTA without PE or pleural effusion, but with increased burden of pulmonary mets and liver mets. Trp <6 x 2. BNP 76. Bedside POCUS with normal cardiac contractility. Inspiratory stridor on physical exam (now improved). Suspect his AHRF is in setting of worsening metastatic disease, lower suspicion for PNA as no infiltrates noted, and lower suspicion for ADHF as patient with low proBNP, no pulm edema/effusions.  - On RA now  - TTE difficult study  - BLE Duplex neg for DVT    Plan:  - Duonebs q6 prn  - May require d/c on home O2 for comfort

## 2024-08-28 NOTE — PROGRESS NOTE ADULT - PROBLEM SELECTOR PLAN 2
- Significant new transaminitis with hyperbilirubinemia to 20.4, majority direct bilirubin  - CT abd pelvis shows worsening liver mets, intrahepatic ductal dilation  - Bili elevated to 20.4 on admission with 16.6 direct.  - US Abdomen w/ doppler: Multiple liver metastases with compression on the hepatic vasculature. Small caliber right and main portal veins, without evidence of demonstrable flow. Thrombus is not excluded.  - Hep C Antibody positive but viral load undetectable.   - AMA and smooth muscle negative    Plan:  - GI signed off due to no further interventions in setting of advanced terminal disease  - F/u on KATHE  - Pain control as below  - Trend CMP, coags - Significant new transaminitis with hyperbilirubinemia to 20.4, majority direct bilirubin  - CT abd pelvis shows worsening liver mets, intrahepatic ductal dilation  - Bili elevated to 20.4 on admission with 16.6 direct.  - US Abdomen w/ doppler: Multiple liver metastases with compression on the hepatic vasculature. Small caliber right and main portal veins, without evidence of demonstrable flow. Thrombus is not excluded.  - Hep C Antibody positive but viral load undetectable.   - AMA and smooth muscle negative    Plan:  - GI signed off due to no further interventions in setting of advanced terminal disease  - limited U/S Abd to check for ascites  - Pain control as below  - Trend CMP, coags

## 2024-08-28 NOTE — PROGRESS NOTE ADULT - PROBLEM SELECTOR PLAN 4
Reports dysuria and hematuria x1 week, no urinary frequency though. Here noted to have leukocytosis to 17k with tachycardia and positive UA concerning for sepsis 2/2 UTI.  Lactate elevated to 3.9 x2 --> 3.3  UA small LE and + nitrite  Ucx > 100,000 Gram negative rods  Bcx : NGTD    - Ceftriaxone 1g likely for 7 days (8/24-08/31)  - f/u UCx speciation   - Likely has poor clearance 2/2 worsening liver mets Reports dysuria and hematuria x1 week, no urinary frequency though. Here noted to have leukocytosis to 17k with tachycardia and positive UA concerning for sepsis 2/2 UTI.  Ucx - E.Coli  Bcx : NGTD    - Ceftriaxone 1g likely for 7 days (8/24-08/31)  - f/u UCx speciation   - Likely has poor clearance 2/2 worsening liver mets

## 2024-08-28 NOTE — PROGRESS NOTE ADULT - SUBJECTIVE AND OBJECTIVE BOX
Desmond Leung M.D  Resident  Department of Medicine  Available on [employer] Teams    Patient is a 54y old  Male who presents with a chief complaint of SOB (28 Aug 2024 10:42)      SUBJECTIVE / OVERNIGHT EVENTS: Patient seen and examined at bedside. Said he did not sleep that well and his pain is returning. His family to be here with him today and to discuss goals of care    ADDITIONAL REVIEW OF SYSTEMS:    MEDICATIONS  (STANDING):  albumin human 25% IVPB 50 milliLiter(s) IV Intermittent every 6 hours  cefTRIAXone   IVPB 1000 milliGRAM(s) IV Intermittent every 24 hours  chlorhexidine 2% Cloths 1 Application(s) Topical daily  dextrose 5%. 1000 milliLiter(s) (100 mL/Hr) IV Continuous <Continuous>  dextrose 5%. 1000 milliLiter(s) (50 mL/Hr) IV Continuous <Continuous>  dextrose 50% Injectable 25 Gram(s) IV Push once  dextrose 50% Injectable 12.5 Gram(s) IV Push once  dextrose 50% Injectable 25 Gram(s) IV Push once  glucagon  Injectable 1 milliGRAM(s) IntraMuscular once  heparin   Injectable 5000 Unit(s) SubCutaneous every 8 hours  insulin glargine Injectable (LANTUS) 13 Unit(s) SubCutaneous at bedtime  insulin lispro (ADMELOG) corrective regimen sliding scale   SubCutaneous three times a day before meals  insulin lispro (ADMELOG) corrective regimen sliding scale   SubCutaneous at bedtime  insulin lispro Injectable (ADMELOG) 4 Unit(s) SubCutaneous three times a day before meals  polyethylene glycol 3350 17 Gram(s) Oral two times a day  senna 2 Tablet(s) Oral at bedtime  sodium chloride 1 Gram(s) Oral two times a day    MEDICATIONS  (PRN):  albuterol/ipratropium for Nebulization 3 milliLiter(s) Nebulizer every 6 hours PRN Shortness of Breath and/or Wheezing  benzonatate 100 milliGRAM(s) Oral every 8 hours PRN Cough  dextrose Oral Gel 15 Gram(s) Oral once PRN Blood Glucose LESS THAN 70 milliGRAM(s)/deciliter  HYDROmorphone  Injectable 0.5 milliGRAM(s) IV Push every 4 hours PRN Severe Pain (7 - 10)      CAPILLARY BLOOD GLUCOSE      POCT Blood Glucose.: 121 mg/dL (28 Aug 2024 12:25)  POCT Blood Glucose.: 188 mg/dL (28 Aug 2024 08:51)  POCT Blood Glucose.: 168 mg/dL (27 Aug 2024 22:06)  POCT Blood Glucose.: 180 mg/dL (27 Aug 2024 17:45)    I&O's Summary    27 Aug 2024 07:01  -  28 Aug 2024 07:00  --------------------------------------------------------  IN: 780 mL / OUT: 800 mL / NET: -20 mL        PHYSICAL EXAM:    Vital Signs Last 24 Hrs  T(C): 36.8 (28 Aug 2024 13:00), Max: 37.2 (28 Aug 2024 09:00)  T(F): 98.2 (28 Aug 2024 13:00), Max: 98.9 (28 Aug 2024 09:00)  HR: 108 (28 Aug 2024 13:00) (95 - 108)  BP: 135/79 (28 Aug 2024 13:00) (102/63 - 135/79)  BP(mean): --  RR: 18 (28 Aug 2024 13:00) (17 - 19)  SpO2: 98% (28 Aug 2024 13:00) (96% - 100%)    Parameters below as of 28 Aug 2024 13:00  Patient On (Oxygen Delivery Method): nasal cannula  O2 Flow (L/min): 2      CONSTITUTIONAL: NAD, well-developed, well-groomed  EYES: Scleral icterus  RESPIRATORY: Normal respiratory effort; Shallow breaths  CARDIOVASCULAR: Regular rate and rhythm, normal S1 and S2, no murmur/rub/gallop; 2+ lower extremity edema on R to thigh and 1+ on L to knees; Peripheral pulses are 2+ bilaterally  ABDOMEN: distended, mildly tender to palpation. Hepatomegaly. +BS  PSYCH: A+O to person, place, and time; affect appropriate   SKIN: No rashes; no palpable lesions    LABS:                        11.6   14.88 )-----------( 217      ( 28 Aug 2024 06:16 )             35.8     08-28    128<L>  |  94<L>  |  74<H>  ----------------------------<  153<H>  5.1   |  16<L>  |  2.90<H>    Ca    8.3<L>      28 Aug 2024 06:16  Phos  4.8     08-28  Mg     2.70     08-28    TPro  6.8  /  Alb  3.0<L>  /  TBili  23.7<H>  /  DBili  x   /  AST  230<H>  /  ALT  193<H>  /  AlkPhos  1454<H>  08-28    PT/INR - ( 28 Aug 2024 06:16 )   PT: 12.9 sec;   INR: 1.16 ratio         PTT - ( 28 Aug 2024 06:16 )  PTT:30.3 sec      Urinalysis Basic - ( 28 Aug 2024 06:16 )    Color: x / Appearance: x / SG: x / pH: x  Gluc: 153 mg/dL / Ketone: x  / Bili: x / Urobili: x   Blood: x / Protein: x / Nitrite: x   Leuk Esterase: x / RBC: x / WBC x   Sq Epi: x / Non Sq Epi: x / Bacteria: x          RADIOLOGY & ADDITIONAL TESTS: No new imaging  Results Reviewed: Yes  Imaging Personally Reviewed:  Electrocardiogram Personally Reviewed:    COORDINATION OF CARE:  Care Discussed with Consultants/Other Providers [Y/N]:  Prior or Outpatient Records Reviewed [Y/N]:

## 2024-08-28 NOTE — PROGRESS NOTE ADULT - PROBLEM SELECTOR PLAN 7
Na 124 on admission but 129 when corrected for hyperglycemia  - Now likely 2/2 to hyperbilirubinemia  - Salt tabs  - CTM

## 2024-08-28 NOTE — DISCHARGE NOTE PROVIDER - NSDCFUSCHEDAPPT_GEN_ALL_CORE_FT
Dre Gardner  [facility]  [facility]  Scheduled Appointment: 09/12/2024    [facility]  [facility]  Scheduled Appointment: 09/12/2024    [facility]  [facility]  Scheduled Appointment: 09/14/2024    Natalia Bateman  [address]  Scheduled Appointment: 09/23/2024    Dre Gardner  [facility]  [facility]  Scheduled Appointment: 09/26/2024    [facility]  [facility]  Scheduled Appointment: 09/26/2024    [facility]  Scheduled Appointment: 09/28/2024     [facility]  [facility] [address]  Scheduled Appointment: 09/23/2024

## 2024-08-28 NOTE — PROGRESS NOTE ADULT - PROBLEM SELECTOR PLAN 1
- Recommend IV morphine 4 mg q4h PRN for severe pain - c/w IV dilaudid 0.5 mg q4h PRN for severe pain - c/w IV dilaudid 0.5 mg q4h PRN for severe pain ( PRN use in past 24 hours from 8 AM to 8 AM: IV dilaudid 0.5 mg x 2 doses)  - US abdomen showing no ascites

## 2024-08-29 ENCOUNTER — APPOINTMENT (OUTPATIENT)
Dept: INFUSION THERAPY | Facility: HOSPITAL | Age: 54
End: 2024-08-29

## 2024-08-29 LAB
-  AMPICILLIN: SIGNIFICANT CHANGE UP
-  CIPROFLOXACIN: SIGNIFICANT CHANGE UP
-  LEVOFLOXACIN: SIGNIFICANT CHANGE UP
-  NITROFURANTOIN: SIGNIFICANT CHANGE UP
-  TETRACYCLINE: SIGNIFICANT CHANGE UP
-  VANCOMYCIN: SIGNIFICANT CHANGE UP
ALBUMIN SERPL ELPH-MCNC: 3.2 G/DL — LOW (ref 3.3–5)
ALP SERPL-CCNC: 1323 U/L — HIGH (ref 40–120)
ALT FLD-CCNC: 155 U/L — HIGH (ref 4–41)
ANION GAP SERPL CALC-SCNC: 21 MMOL/L — HIGH (ref 7–14)
APTT BLD: 31.2 SEC — SIGNIFICANT CHANGE UP (ref 24.5–35.6)
AST SERPL-CCNC: 209 U/L — HIGH (ref 4–40)
BASOPHILS # BLD AUTO: 0.02 K/UL — SIGNIFICANT CHANGE UP (ref 0–0.2)
BASOPHILS NFR BLD AUTO: 0.1 % — SIGNIFICANT CHANGE UP (ref 0–2)
BILIRUB SERPL-MCNC: 25.3 MG/DL — HIGH (ref 0.2–1.2)
BUN SERPL-MCNC: 83 MG/DL — HIGH (ref 7–23)
CALCIUM SERPL-MCNC: 8.9 MG/DL — SIGNIFICANT CHANGE UP (ref 8.4–10.5)
CHLORIDE SERPL-SCNC: 96 MMOL/L — LOW (ref 98–107)
CO2 SERPL-SCNC: 14 MMOL/L — LOW (ref 22–31)
CREAT SERPL-MCNC: 3.21 MG/DL — HIGH (ref 0.5–1.3)
CULTURE RESULTS: ABNORMAL
CULTURE RESULTS: SIGNIFICANT CHANGE UP
CULTURE RESULTS: SIGNIFICANT CHANGE UP
EGFR: 22 ML/MIN/1.73M2 — LOW
EOSINOPHIL # BLD AUTO: 0.02 K/UL — SIGNIFICANT CHANGE UP (ref 0–0.5)
EOSINOPHIL NFR BLD AUTO: 0.1 % — SIGNIFICANT CHANGE UP (ref 0–6)
GLUCOSE BLDC GLUCOMTR-MCNC: 116 MG/DL — HIGH (ref 70–99)
GLUCOSE BLDC GLUCOMTR-MCNC: 116 MG/DL — HIGH (ref 70–99)
GLUCOSE BLDC GLUCOMTR-MCNC: 129 MG/DL — HIGH (ref 70–99)
GLUCOSE BLDC GLUCOMTR-MCNC: 161 MG/DL — HIGH (ref 70–99)
GLUCOSE SERPL-MCNC: 105 MG/DL — HIGH (ref 70–99)
HCT VFR BLD CALC: 35.4 % — LOW (ref 39–50)
HGB BLD-MCNC: 11.3 G/DL — LOW (ref 13–17)
IANC: 12.35 K/UL — HIGH (ref 1.8–7.4)
IMM GRANULOCYTES NFR BLD AUTO: 0.9 % — SIGNIFICANT CHANGE UP (ref 0–0.9)
LYMPHOCYTES # BLD AUTO: 0.97 K/UL — LOW (ref 1–3.3)
LYMPHOCYTES # BLD AUTO: 6.5 % — LOW (ref 13–44)
MAGNESIUM SERPL-MCNC: 2.8 MG/DL — HIGH (ref 1.6–2.6)
MCHC RBC-ENTMCNC: 24.2 PG — LOW (ref 27–34)
MCHC RBC-ENTMCNC: 31.9 GM/DL — LOW (ref 32–36)
MCV RBC AUTO: 76 FL — LOW (ref 80–100)
METHOD TYPE: SIGNIFICANT CHANGE UP
MONOCYTES # BLD AUTO: 1.37 K/UL — HIGH (ref 0–0.9)
MONOCYTES NFR BLD AUTO: 9.2 % — SIGNIFICANT CHANGE UP (ref 2–14)
NEUTROPHILS # BLD AUTO: 12.35 K/UL — HIGH (ref 1.8–7.4)
NEUTROPHILS NFR BLD AUTO: 83.2 % — HIGH (ref 43–77)
NRBC # BLD: 0 /100 WBCS — SIGNIFICANT CHANGE UP (ref 0–0)
NRBC # FLD: 0.14 K/UL — HIGH (ref 0–0)
ORGANISM # SPEC MICROSCOPIC CNT: ABNORMAL
PHOSPHATE SERPL-MCNC: 5.1 MG/DL — HIGH (ref 2.5–4.5)
PLATELET # BLD AUTO: 214 K/UL — SIGNIFICANT CHANGE UP (ref 150–400)
POTASSIUM SERPL-MCNC: 5.3 MMOL/L — SIGNIFICANT CHANGE UP (ref 3.5–5.3)
POTASSIUM SERPL-SCNC: 5.3 MMOL/L — SIGNIFICANT CHANGE UP (ref 3.5–5.3)
PROT SERPL-MCNC: 6.7 G/DL — SIGNIFICANT CHANGE UP (ref 6–8.3)
RBC # BLD: 4.66 M/UL — SIGNIFICANT CHANGE UP (ref 4.2–5.8)
RBC # FLD: 22 % — HIGH (ref 10.3–14.5)
SODIUM SERPL-SCNC: 131 MMOL/L — LOW (ref 135–145)
SPECIMEN SOURCE: SIGNIFICANT CHANGE UP
WBC # BLD: 14.86 K/UL — HIGH (ref 3.8–10.5)
WBC # FLD AUTO: 14.86 K/UL — HIGH (ref 3.8–10.5)

## 2024-08-29 PROCEDURE — 99233 SBSQ HOSP IP/OBS HIGH 50: CPT

## 2024-08-29 PROCEDURE — 99233 SBSQ HOSP IP/OBS HIGH 50: CPT | Mod: GC

## 2024-08-29 RX ORDER — HYDROMORPHONE HYDROCHLORIDE 2 MG/1
0.5 TABLET ORAL EVERY 6 HOURS
Refills: 0 | Status: DISCONTINUED | OUTPATIENT
Start: 2024-08-29 | End: 2024-08-30

## 2024-08-29 RX ORDER — HYDROMORPHONE HYDROCHLORIDE 2 MG/1
0.5 TABLET ORAL EVERY 4 HOURS
Refills: 0 | Status: DISCONTINUED | OUTPATIENT
Start: 2024-08-29 | End: 2024-09-03

## 2024-08-29 RX ORDER — SODIUM ZIRCONIUM CYCLOSILICATE 5 G/5G
10 POWDER, FOR SUSPENSION ORAL ONCE
Refills: 0 | Status: COMPLETED | OUTPATIENT
Start: 2024-08-29 | End: 2024-08-29

## 2024-08-29 RX ORDER — INSULIN GLARGINE 100 [IU]/ML
8 INJECTION, SOLUTION SUBCUTANEOUS AT BEDTIME
Refills: 0 | Status: DISCONTINUED | OUTPATIENT
Start: 2024-08-29 | End: 2024-09-03

## 2024-08-29 RX ORDER — SODIUM ZIRCONIUM CYCLOSILICATE 5 G/5G
10 POWDER, FOR SUSPENSION ORAL DAILY
Refills: 0 | Status: DISCONTINUED | OUTPATIENT
Start: 2024-08-29 | End: 2024-08-29

## 2024-08-29 RX ADMIN — Medication 4 UNIT(S): at 13:09

## 2024-08-29 RX ADMIN — Medication 6 MILLIGRAM(S): at 22:23

## 2024-08-29 RX ADMIN — HYDROMORPHONE HYDROCHLORIDE 0.5 MILLIGRAM(S): 2 TABLET ORAL at 19:38

## 2024-08-29 RX ADMIN — ALBUMIN (HUMAN) 50 MILLILITER(S): 5 SOLUTION INTRAVENOUS at 05:10

## 2024-08-29 RX ADMIN — HYDROMORPHONE HYDROCHLORIDE 0.5 MILLIGRAM(S): 2 TABLET ORAL at 18:38

## 2024-08-29 RX ADMIN — CHLORHEXIDINE GLUCONATE 1 APPLICATION(S): 40 SOLUTION TOPICAL at 12:23

## 2024-08-29 RX ADMIN — Medication 2: at 13:08

## 2024-08-29 RX ADMIN — ALBUMIN (HUMAN) 50 MILLILITER(S): 5 SOLUTION INTRAVENOUS at 23:53

## 2024-08-29 RX ADMIN — INSULIN GLARGINE 8 UNIT(S): 100 INJECTION, SOLUTION SUBCUTANEOUS at 22:54

## 2024-08-29 RX ADMIN — HYDROMORPHONE HYDROCHLORIDE 0.5 MILLIGRAM(S): 2 TABLET ORAL at 23:53

## 2024-08-29 RX ADMIN — Medication 5000 UNIT(S): at 22:23

## 2024-08-29 RX ADMIN — SODIUM ZIRCONIUM CYCLOSILICATE 10 GRAM(S): 5 POWDER, FOR SUSPENSION ORAL at 11:05

## 2024-08-29 RX ADMIN — Medication 4 UNIT(S): at 09:23

## 2024-08-29 RX ADMIN — ALBUMIN (HUMAN) 50 MILLILITER(S): 5 SOLUTION INTRAVENOUS at 00:43

## 2024-08-29 RX ADMIN — ALBUMIN (HUMAN) 50 MILLILITER(S): 5 SOLUTION INTRAVENOUS at 18:40

## 2024-08-29 RX ADMIN — POLYETHYLENE GLYCOL 3350 17 GRAM(S): 17 POWDER, FOR SOLUTION ORAL at 18:39

## 2024-08-29 RX ADMIN — Medication 100 MILLIGRAM(S): at 22:28

## 2024-08-29 RX ADMIN — SODIUM CHLORIDE 1 GRAM(S): 9 INJECTION INTRAMUSCULAR; INTRAVENOUS; SUBCUTANEOUS at 05:13

## 2024-08-29 RX ADMIN — HYDROMORPHONE HYDROCHLORIDE 0.5 MILLIGRAM(S): 2 TABLET ORAL at 12:04

## 2024-08-29 RX ADMIN — Medication 4 UNIT(S): at 18:35

## 2024-08-29 RX ADMIN — HYDROMORPHONE HYDROCHLORIDE 0.5 MILLIGRAM(S): 2 TABLET ORAL at 11:04

## 2024-08-29 RX ADMIN — POLYETHYLENE GLYCOL 3350 17 GRAM(S): 17 POWDER, FOR SOLUTION ORAL at 05:14

## 2024-08-29 RX ADMIN — Medication 10 MILLIGRAM(S): at 07:19

## 2024-08-29 RX ADMIN — SODIUM CHLORIDE 1 GRAM(S): 9 INJECTION INTRAMUSCULAR; INTRAVENOUS; SUBCUTANEOUS at 18:40

## 2024-08-29 RX ADMIN — Medication 5000 UNIT(S): at 05:13

## 2024-08-29 RX ADMIN — ALBUMIN (HUMAN) 50 MILLILITER(S): 5 SOLUTION INTRAVENOUS at 12:19

## 2024-08-29 NOTE — PROGRESS NOTE ADULT - PROBLEM SELECTOR PLAN 3
- 8/26- See GOC. Discussed hospice, code status. Patient appointed sisters Zachary and Ally as HCP. [place] [facility] [place]  - 8/28- See GOC. Patient agreed to DNR/DNI

## 2024-08-29 NOTE — PROGRESS NOTE ADULT - ASSESSMENT
BASSAM LOGAN is a 54y Male with a hx of Stage IV colorectal cancer w/ mets to liver and lung s/p chemo (4th line), HTN, HLD, and T2DM who presents with SOB, found to be in acute hypoxic respiratory failure with profound transaminitis. Recent imaging shows progression of metastatic liver & lung disease. Goals of care discussion today

## 2024-08-29 NOTE — PROGRESS NOTE ADULT - SUBJECTIVE AND OBJECTIVE BOX
Indication of Geriatrics and Palliative Medicine Services:  [X  ] Complex Medical Decision Making   [X  ] Symptom/Pain management     DNR on chart: No     INTERVAL EVENTS:     -------------------------------------------------------------------------------------------------------    PRESENT SYMPTOMS:     [ ] Unable to self-report      [ ] PAINADS     [ ] RDOS    [ ] No     [X ] Yes     Source if other than patient:  [ ]Family   [ ]Team     PAIN:   If blank, patient unable to specify     [X ]yes [ ]no    1. Location- R abdominal   2. Radiation-  No   3. Quality- Like someone sitting   4. Timing- On and off   5. Minimal acceptable level/pain goal- 5/10   6. Aggravating factors- Worse with eating   7. QOL impact-     SYMPTOMS:   Dyspnea:                           [ ]Mild [ ]Moderate [ ]Severe  Anxiety:                             [ ]Mild [ ]Moderate [ ]Severe  Fatigue:                             [ ]Mild [ ]Moderate [ ]Severe  Nausea/Vomiting:              [ ]Mild [ ]Moderate [ ]Severe  Loss of appetite:                [ ]Mild [ ]Moderate [X ]Severe  Constipation:                     [ ]Mild [ ]Moderate [ ]Severe    Other Symptoms:  [X ]All other review of systems negative     -------------------------------------------------------------------------------------------------------    I STOP: [id number]    Prescription Information      PDI Filter:    PDI	Current Rx	Drug Type	Rx Written	Rx Dispensed	Drug	Quantity	Days Supply	Prescriber Name	Prescriber KATINA #	Payment Method	Dispenser  A	Y	O	08/15/2024	08/19/2024	morphine sulf er 15 mg tablet	60	30	Lulu Gomes	[id number]	[facility]  A	N	O	08/15/2024	08/19/2024	oxycodone hcl (ir) 5 mg tablet	30	3	Lulu Gomes	[id number]	[facility]  A	N	O	07/26/2024	07/26/2024	oxycodone hcl (ir) 5 mg tablet	30	3	Dre Gardner	[id number]	[facility]  A	N	O	06/28/2024	07/02/2024	oxycodone hcl (ir) 5 mg tablet	30	3	Dre Gardner	[id number]	[facility]  B	N	O	05/21/2024	05/21/2024	tramadol hcl 50 mg tablet	6	2	Emma Price	[id number]	[facility] #[id number]    -------------------------------------------------------------------------------------------------------    ITEMS UNCHECKED ARE NOT PRESENT    PHYSICAL:  Vital Signs Last 24 Hrs  T(C): 36.9 (29 Aug 2024 05:00), Max: 36.9 (29 Aug 2024 05:00)  T(F): 98.4 (29 Aug 2024 05:00), Max: 98.4 (29 Aug 2024 05:00)  HR: 105 (29 Aug 2024 05:00) (97 - 108)  BP: 122/64 (29 Aug 2024 05:00) (110/61 - 135/81)  BP(mean): --  RR: 18 (29 Aug 2024 05:00) (18 - 19)  SpO2: 97% (29 Aug 2024 05:00) (95% - 99%)    Parameters below as of 29 Aug 2024 05:00  Patient On (Oxygen Delivery Method): nasal cannula  O2 Flow (L/min): 2      GENERAL:  [ ]Cachexia  [ ] Frail  [X ]Awake  [X ]Oriented   [ ]Lethargic  [ ]Unarousable  [ ]Verbal  [ ]Non-Verbal    BEHAVIORAL:   [ ] Anxiety  [ ] Delirium [ ] Agitation [ ] Other    HEENT:   [ ]Normal   [ ]Dry mouth   [ ]ET Tube/Trach  [ ]Oral lesions  Icteric sclera     PULMONARY:   [X ]Clear              [ ]Tachypnea  [ ]Audible excessive secretions   [ ]Rhonchi        [ ]Right [ ]Left [ ]Bilateral  [ ]Crackles        [ ]Right [ ]Left [ ]Bilateral  [ ]Wheezing     [ ]Right [ ]Left [ ]Bilateral  [ ]Diminished breath sounds [ ]right [ ]left [ ]bilateral    CARDIOVASCULAR:    [X ]Regular [ ]Irregular [ ]Tachy  [ ]Joshua [ ]Murmur [ ]Other    GASTROINTESTINAL:  [ ]Soft  [X ]Distended   [ ]+BS  [ ]Non tender [X ]Tender  [ ]Other [ ]PEG [ ]OGT/ NGT      GENITOURINARY:  [X ]Normal [ ] Incontinent   [ ]Oliguria/Anuria   [ ][place]    MUSCULOSKELETAL:   [X ]Normal   [ ]Weakness  [ ]Bed/Wheelchair bound [ ]Edema    NEUROLOGIC:   [X ]No focal deficits  [ ]Cognitive impairment  [ ]Dysphagia [ ]Dysarthria [ ]Paresis [ ]Other     SKIN:   [X ]Normal  [ ]Rash  [ ]Other  [ ]Pressure ulcer(s)       Present on admission [ ]y [ ]n    -------------------------------------------------------------------------------------------------------    LABS:                          11.3   14.86 )-----------( 214      ( 29 Aug 2024 06:26 )             35.4     08-29    131<L>  |  96<L>  |  83<H>  ----------------------------<  105<H>  5.3   |  14<L>  |  3.21<H>    Ca    8.9      29 Aug 2024 06:26  Phos  5.1     08-29  Mg     2.80     08-29    TPro  6.7  /  Alb  3.2<L>  /  TBili  25.3<H>  /  DBili  x   /  AST  209<H>  /  ALT  155<H>  /  AlkPhos  1323<H>  08-29    -------------------------------------------------------------------------------------------------------    CRITICAL CARE:  [ ]Shock Present  [ ]Septic [ ]Cardiogenic [ ]Neurologic [ ]Hypovolemic [ ]Undifferentiated    [ ]Vasopressors [ ]Inotropes    [ ]Respiratory failure present [ ]Acute  [ ]Chronic [ ]Hypoxic  [ ]Hypercarbic [ ]Mixed   [ ]Mechanical Ventilation  [ ]Trach collar   [ ]Non-invasive ventilatory support   [ ]High-Flow   [ ]Oxygen mask/venti     [ ]Other organ failure     -------------------------------------------------------------------------------------------------------    RADIOLOGY & ADDITIONAL STUDIES:       < from: CT Abdomen and Pelvis w/ IV Cont (08.24.24 @ 10:58) >    IMPRESSION:  *  Progression of pulmonary and hepatic metastatic disease, as detailed   above.  *  Mild to moderate intrahepatic bile duct dilatation, increased from   prior.  *  New diffuse hypoenhancement of the right hepatic lobe, which may   reflect perfusional alteration secondary to metastatic burden. There is   compressionof the intrahepatic IVC by the metastatic disease. The   hepatic veins are not visualized. Doppler ultrasound could be performed   for further evaluation as clinically warranted.  *  Multifocal ill-defined patchy foci of cortical  hypoenhancement left   kidney. Correlate with urinalysis to exclude pyelonephritis. Recommend   attention on close follow-up imaging. No hydronephrosis.  *  Nonspecific gallbladder and right-sided colonic wall thickening,   possibly reactive.  *  New small volume abdominopelvic ascites. Diffuse anasarca.    < end of copied text >    < from: US Abdomen Doppler (08.24.24 @ 15:18) >  IMPRESSION:  Multiple liver metastases with compression on the hepatic vasculature.  Small caliber right and main portal veins, without evidence of   demonstrable flow. Findings may be reflect a combination of slow flow and   extrinsic compression. Thrombus is not excluded.    < end of copied text >    < from: CT Angio Chest PE Protocol w/ IV Cont (08.23.24 @ 22:49) >  IMPRESSION:  No pulmonary embolus.    Increased size of innumerable bilateral pulmonary metastases.    Diffuse hepatic metastases several of which demonstrate increase in size.    < end of copied text >    -------------------------------------------------------------------------------------------------------  MEDICATIONS:     MEDICATIONS  (STANDING):  albumin human 25% IVPB 50 milliLiter(s) IV Intermittent every 6 hours  cefTRIAXone   IVPB 1000 milliGRAM(s) IV Intermittent every 24 hours  chlorhexidine 2% Cloths 1 Application(s) Topical daily  dextrose 5%. 1000 milliLiter(s) (50 mL/Hr) IV Continuous <Continuous>  dextrose 5%. 1000 milliLiter(s) (100 mL/Hr) IV Continuous <Continuous>  dextrose 50% Injectable 12.5 Gram(s) IV Push once  dextrose 50% Injectable 25 Gram(s) IV Push once  dextrose 50% Injectable 25 Gram(s) IV Push once  glucagon  Injectable 1 milliGRAM(s) IntraMuscular once  heparin   Injectable 5000 Unit(s) SubCutaneous every 8 hours  HYDROmorphone  Injectable 0.5 milliGRAM(s) IV Push every 6 hours  insulin glargine Injectable (LANTUS) 13 Unit(s) SubCutaneous at bedtime  insulin lispro (ADMELOG) corrective regimen sliding scale   SubCutaneous three times a day before meals  insulin lispro (ADMELOG) corrective regimen sliding scale   SubCutaneous at bedtime  insulin lispro Injectable (ADMELOG) 4 Unit(s) SubCutaneous three times a day before meals  melatonin 6 milliGRAM(s) Oral at bedtime  polyethylene glycol 3350 17 Gram(s) Oral two times a day  senna 2 Tablet(s) Oral at bedtime  sodium chloride 1 Gram(s) Oral two times a day    MEDICATIONS  (PRN):  albuterol/ipratropium for Nebulization 3 milliLiter(s) Nebulizer every 6 hours PRN Shortness of Breath and/or Wheezing  benzonatate 100 milliGRAM(s) Oral every 8 hours PRN Cough  dextrose Oral Gel 15 Gram(s) Oral once PRN Blood Glucose LESS THAN 70 milliGRAM(s)/deciliter  HYDROmorphone  Injectable 0.5 milliGRAM(s) IV Push every 4 hours PRN Breakthrough Pain         Indication of Geriatrics and Palliative Medicine Services:  [X  ] Complex Medical Decision Making   [X  ] Symptom/Pain management     DNR on chart: No     INTERVAL EVENTS: Patient seen this AM with family including sister at bedside. Patient had BM. He is having abdominal pain however he is not asking for pain medications. Discussed he should ask for pain meds if needed for his own comfort. Agreed with starting ATC IV dilaudid as well.   Sister notified [facility] hospice agency should be reaching out to them.     -------------------------------------------------------------------------------------------------------    PRESENT SYMPTOMS:     [ ] Unable to self-report      [ ] PAINADS     [ ] RDOS    [ ] No     [X ] Yes     Source if other than patient:  [ ]Family   [ ]Team     PAIN:   If blank, patient unable to specify     [X ]yes [ ]no    1. Location- R abdominal   2. Radiation-  No   3. Quality- Like someone sitting   4. Timing- On and off   5. Minimal acceptable level/pain goal- 5/10   6. Aggravating factors- Worse with eating   7. QOL impact-     SYMPTOMS:   Dyspnea:                           [ ]Mild [ ]Moderate [ ]Severe  Anxiety:                             [ ]Mild [ ]Moderate [ ]Severe  Fatigue:                             [ ]Mild [ ]Moderate [ ]Severe  Nausea/Vomiting:              [ ]Mild [ ]Moderate [ ]Severe  Loss of appetite:                [ ]Mild [ ]Moderate [X ]Severe  Constipation:                     [ ]Mild [ ]Moderate [ ]Severe    Other Symptoms:  [X ]All other review of systems negative     -------------------------------------------------------------------------------------------------------    I STOP: [id number]    Prescription Information      PDI Filter:    PDI	Current Rx	Drug Type	Rx Written	Rx Dispensed	Drug	Quantity	Days Supply	Prescriber Name	Prescriber KATINA #	Payment Method	Dispenser  A	Y	O	08/15/2024	08/19/2024	morphine sulf er 15 mg tablet	60	30	Eliseo Lulu ZELDA	[id number]	[facility]  A	N	O	08/15/2024	08/19/2024	oxycodone hcl (ir) 5 mg tablet	30	3	Karma Gomesjoe NDIAYE	[id number]	[facility]  A	N	O	07/26/2024	07/26/2024	oxycodone hcl (ir) 5 mg tablet	30	3	Dre Gardner	[id number]	[facility]  A	N	O	06/28/2024	07/02/2024	oxycodone hcl (ir) 5 mg tablet	30	3	Dre Gardner	[id number]	[facility]  B	N	O	05/21/2024	05/21/2024	tramadol hcl 50 mg tablet	6	2	Emma Price	[id number]	[facility] #[id number]    -------------------------------------------------------------------------------------------------------    ITEMS UNCHECKED ARE NOT PRESENT    PHYSICAL:  Vital Signs Last 24 Hrs  T(C): 36.8 (29 Aug 2024 14:40), Max: 36.9 (29 Aug 2024 05:00)  T(F): 98.2 (29 Aug 2024 14:40), Max: 98.4 (29 Aug 2024 05:00)  HR: 98 (29 Aug 2024 14:40) (97 - 105)  BP: 128/77 (29 Aug 2024 14:40) (110/61 - 135/81)  BP(mean): --  RR: 18 (29 Aug 2024 14:40) (18 - 19)  SpO2: 98% (29 Aug 2024 14:40) (95% - 99%)    Parameters below as of 29 Aug 2024 14:40  Patient On (Oxygen Delivery Method): nasal cannula  O2 Flow (L/min): 2    GENERAL:  [ ]Cachexia  [ ] Frail  [X ]Awake  [X ]Oriented   [ ]Lethargic  [ ]Unarousable  [ ]Verbal  [ ]Non-Verbal    BEHAVIORAL:   [ ] Anxiety  [ ] Delirium [ ] Agitation [ ] Other    HEENT:   [ ]Normal   [ ]Dry mouth   [ ]ET Tube/Trach  [ ]Oral lesions  Icteric sclera     PULMONARY:   [X ]Clear              [ ]Tachypnea  [ ]Audible excessive secretions   [ ]Rhonchi        [ ]Right [ ]Left [ ]Bilateral  [ ]Crackles        [ ]Right [ ]Left [ ]Bilateral  [ ]Wheezing     [ ]Right [ ]Left [ ]Bilateral  [ ]Diminished breath sounds [ ]right [ ]left [ ]bilateral    CARDIOVASCULAR:    [X ]Regular [ ]Irregular [ ]Tachy  [ ]Joshua [ ]Murmur [ ]Other    GASTROINTESTINAL:  [ ]Soft  [X ]Distended   [ ]+BS  [ ]Non tender [X ]Tender  [ ]Other [ ]PEG [ ]OGT/ NGT      GENITOURINARY:  [X ]Normal [ ] Incontinent   [ ]Oliguria/Anuria   [ ][place]    MUSCULOSKELETAL:   [X ]Normal   [ ]Weakness  [ ]Bed/Wheelchair bound [ ]Edema    NEUROLOGIC:   [X ]No focal deficits  [ ]Cognitive impairment  [ ]Dysphagia [ ]Dysarthria [ ]Paresis [ ]Other     SKIN:   [X ]Normal  [ ]Rash  [ ]Other  [ ]Pressure ulcer(s)       Present on admission [ ]y [ ]n    -------------------------------------------------------------------------------------------------------    LABS:                          11.3   14.86 )-----------( 214      ( 29 Aug 2024 06:26 )             35.4     08-29    131<L>  |  96<L>  |  83<H>  ----------------------------<  105<H>  5.3   |  14<L>  |  3.21<H>    Ca    8.9      29 Aug 2024 06:26  Phos  5.1     08-29  Mg     2.80     08-29    TPro  6.7  /  Alb  3.2<L>  /  TBili  25.3<H>  /  DBili  x   /  AST  209<H>  /  ALT  155<H>  /  AlkPhos  1323<H>  08-29    -------------------------------------------------------------------------------------------------------    CRITICAL CARE:  [ ]Shock Present  [ ]Septic [ ]Cardiogenic [ ]Neurologic [ ]Hypovolemic [ ]Undifferentiated    [ ]Vasopressors [ ]Inotropes    [ ]Respiratory failure present [ ]Acute  [ ]Chronic [ ]Hypoxic  [ ]Hypercarbic [ ]Mixed   [ ]Mechanical Ventilation  [ ]Trach collar   [ ]Non-invasive ventilatory support   [ ]High-Flow   [ ]Oxygen mask/venti     [ ]Other organ failure     -------------------------------------------------------------------------------------------------------    RADIOLOGY & ADDITIONAL STUDIES:       < from: CT Abdomen and Pelvis w/ IV Cont (08.24.24 @ 10:58) >    IMPRESSION:  *  Progression of pulmonary and hepatic metastatic disease, as detailed   above.  *  Mild to moderate intrahepatic bile duct dilatation, increased from   prior.  *  New diffuse hypoenhancement of the right hepatic lobe, which may   reflect perfusional alteration secondary to metastatic burden. There is   compressionof the intrahepatic IVC by the metastatic disease. The   hepatic veins are not visualized. Doppler ultrasound could be performed   for further evaluation as clinically warranted.  *  Multifocal ill-defined patchy foci of cortical  hypoenhancement left   kidney. Correlate with urinalysis to exclude pyelonephritis. Recommend   attention on close follow-up imaging. No hydronephrosis.  *  Nonspecific gallbladder and right-sided colonic wall thickening,   possibly reactive.  *  New small volume abdominopelvic ascites. Diffuse anasarca.    < end of copied text >    < from: US Abdomen Doppler (08.24.24 @ 15:18) >  IMPRESSION:  Multiple liver metastases with compression on the hepatic vasculature.  Small caliber right and main portal veins, without evidence of   demonstrable flow. Findings may be reflect a combination of slow flow and   extrinsic compression. Thrombus is not excluded.    < end of copied text >    < from: CT Angio Chest PE Protocol w/ IV Cont (08.23.24 @ 22:49) >  IMPRESSION:  No pulmonary embolus.    Increased size of innumerable bilateral pulmonary metastases.    Diffuse hepatic metastases several of which demonstrate increase in size.    < end of copied text >    -------------------------------------------------------------------------------------------------------  MEDICATIONS:     MEDICATIONS  (STANDING):  albumin human 25% IVPB 50 milliLiter(s) IV Intermittent every 6 hours  cefTRIAXone   IVPB 1000 milliGRAM(s) IV Intermittent every 24 hours  chlorhexidine 2% Cloths 1 Application(s) Topical daily  dextrose 5%. 1000 milliLiter(s) (100 mL/Hr) IV Continuous <Continuous>  dextrose 5%. 1000 milliLiter(s) (50 mL/Hr) IV Continuous <Continuous>  dextrose 50% Injectable 25 Gram(s) IV Push once  dextrose 50% Injectable 12.5 Gram(s) IV Push once  dextrose 50% Injectable 25 Gram(s) IV Push once  glucagon  Injectable 1 milliGRAM(s) IntraMuscular once  heparin   Injectable 5000 Unit(s) SubCutaneous every 8 hours  HYDROmorphone  Injectable 0.5 milliGRAM(s) IV Push every 6 hours  insulin glargine Injectable (LANTUS) 13 Unit(s) SubCutaneous at bedtime  insulin lispro (ADMELOG) corrective regimen sliding scale   SubCutaneous three times a day before meals  insulin lispro (ADMELOG) corrective regimen sliding scale   SubCutaneous at bedtime  insulin lispro Injectable (ADMELOG) 4 Unit(s) SubCutaneous three times a day before meals  melatonin 6 milliGRAM(s) Oral at bedtime  polyethylene glycol 3350 17 Gram(s) Oral two times a day  senna 2 Tablet(s) Oral at bedtime  sodium chloride 1 Gram(s) Oral two times a day    MEDICATIONS  (PRN):  albuterol/ipratropium for Nebulization 3 milliLiter(s) Nebulizer every 6 hours PRN Shortness of Breath and/or Wheezing  benzonatate 100 milliGRAM(s) Oral every 8 hours PRN Cough  dextrose Oral Gel 15 Gram(s) Oral once PRN Blood Glucose LESS THAN 70 milliGRAM(s)/deciliter  HYDROmorphone  Injectable 0.5 milliGRAM(s) IV Push every 4 hours PRN Breakthrough Pain

## 2024-08-29 NOTE — PROGRESS NOTE ADULT - NS ATTEST RISK PROBLEM GEN_ALL_CORE FT
Decision for DNR/DNI  IV controlled substances Patient is seriously ill with metastatic cancer- progression of disease   High risk of complications, further morbidity and mortality   IV controlled substances

## 2024-08-29 NOTE — PROGRESS NOTE ADULT - PROBLEM SELECTOR PLAN 2
- Diagnosed in 2021    - Diffuse mets- liver, lungs   - POD despite multiple lines of treatment   - Follows with Dr. Mathew at [facility]  - Poor prognosis, T bili elevated   - Worsening acute renal failure   - Hospice recommended

## 2024-08-29 NOTE — HOSPICE CARE NOTE - CONVESATION DETAILS
[facility] hospice manager spoke with patients sister Geo. Geo states that patient was going to go live with his sisteer Fowlkes. She states that Fowlkes works and was counting on us providing home attendants. Patient cannot stay home alone. Patient needs a safe discharge plan.

## 2024-08-29 NOTE — PROGRESS NOTE ADULT - PROBLEM SELECTOR PLAN 2
- Significant new transaminitis with hyperbilirubinemia to 20.4, majority direct bilirubin  - CT abd pelvis shows worsening liver mets, intrahepatic ductal dilation  - Bili elevated to 20.4 on admission with 16.6 direct.  - US Abdomen w/ doppler: Multiple liver metastases with compression on the hepatic vasculature. Small caliber right and main portal veins, without evidence of demonstrable flow. Thrombus is not excluded.  - Hep C Antibody positive but viral load undetectable.   - AMA and smooth muscle negative    Plan:  - GI signed off due to no further interventions in setting of advanced terminal disease  - limited U/S Abd to check for ascites  - Pain control as below  - Trend CMP, coags

## 2024-08-29 NOTE — PROGRESS NOTE ADULT - PROBLEM SELECTOR PLAN 4
Reports dysuria and hematuria x1 week, no urinary frequency though. Here noted to have leukocytosis to 17k with tachycardia and positive UA concerning for sepsis 2/2 UTI.  Ucx - E.Coli  Bcx : NGTD    - Ceftriaxone 1g likely for 7 days (8/24-08/31)  - f/u UCx speciation   - Likely has poor clearance 2/2 worsening liver mets

## 2024-08-29 NOTE — PROGRESS NOTE ADULT - PROBLEM SELECTOR PLAN 4
For C   f/u home hospice referral to [facility]     In the event of worsening symptoms, please contact the Palliative Medicine team via pager (if the patient is at [facility] #[phone] or if the patient is at [facility] #[phone]) The Geriatric and Palliative Medicine service has coverage 24 hours a day/ 7 days a week to provide medical recommendations regarding symptom management needs via telephone.

## 2024-08-29 NOTE — PROGRESS NOTE ADULT - PROBLEM SELECTOR PLAN 3
Stage IV colorectal cancer with mets to lung and liver s/p chemo (4th line), Currently getting RT to his R hip. Follows with Dr. Dre Gardner at [facility]. Patient has terminal illness  CEA 1142    - Patient and family requested home hospice with comfort care but still asking for daily labs still  - Patient appointed both sisters as healthcare proxies    - F/u on Heme onc recs. No plan for chemo at this time  - Palliative consulted, follow up recs.   - Plan for further confirmation on code status and comfort care after family meeting tomorrow  - CM to be informed about home hospice  - Trend CBC. CMP, coags    Pain:  - IV dilaudid 0.5mg q4h PRN  - Now DNR/DNI with home hospice.  - Appreciate further palliative pain recs Stage IV colorectal cancer with mets to lung and liver s/p chemo (4th line), Currently getting RT to his R hip. Follows with Dr. Dre Gardner at [facility]. Patient has terminal illness  CEA 1142    - Patient and family requested home hospice with comfort care but still asking for daily labs still  - Patient appointed both sisters as healthcare proxies    - F/u on Heme onc recs. No plan for chemo at this time  - Palliative consulted, follow up recs.   - Plan for further confirmation on code status and comfort care after family meeting tomorrow  - CM to be informed about home hospice  - Trend CBC. CMP, coags    Pain:  - IV dilaudid 0.5mg q6h standing, per Pall care  -IV dilaudid 0.5mg q4h prn for breakthrough pain  - Now DNR/DNI pending home hospice.  - Appreciate further palliative pain recs

## 2024-08-29 NOTE — PROGRESS NOTE ADULT - PROBLEM SELECTOR PLAN 1
- c/w IV dilaudid 0.5 mg q4h PRN for severe pain ( PRN use in past 24 hours from 8 AM to 8 AM: IV dilaudid 0.5 mg x 3 doses)  - US abdomen showing no ascites  - Started IV dilaudid 0.5 mg q6h ATC  - c/w IV dilaudid 0.5 mg q4h PRN for breakthrough pain

## 2024-08-29 NOTE — PROGRESS NOTE ADULT - PROBLEM SELECTOR PLAN 1
Creat 0.94 -->1.36 --> 2.13 --> 2.90 (08/28/24)    Patient has anasarca as seen on CT imaging and in setting of acute liver injury, RIGO likely 2/2 to worsening hepatic function and hypovolemia.   - Concerning for hepatorenal syndrome  - s/p IV albumin 25% x 3 doses 08/26/24    Plan:  - nephro consulted  - Continue IV Albumin 25% q6h  - Continue to monitor

## 2024-08-29 NOTE — PROGRESS NOTE ADULT - SUBJECTIVE AND OBJECTIVE BOX
INCOMPLETE    INTERVAL:  SUBJECTIVE: Patient examined bedside this AM.    OBJECTIVE:  ICU Vital Signs Last 24 Hrs  T(C): 36.9 (29 Aug 2024 05:00), Max: 37.2 (28 Aug 2024 09:00)  T(F): 98.4 (29 Aug 2024 05:00), Max: 98.9 (28 Aug 2024 09:00)  HR: 105 (29 Aug 2024 05:00) (97 - 108)  BP: 122/64 (29 Aug 2024 05:00) (110/61 - 135/81)  BP(mean): --  ABP: --  ABP(mean): --  RR: 18 (29 Aug 2024 05:00) (18 - 19)  SpO2: 97% (29 Aug 2024 05:00) (95% - 99%)    O2 Parameters below as of 29 Aug 2024 05:00  Patient On (Oxygen Delivery Method): nasal cannula  O2 Flow (L/min): 2            08-28 @ 07:01  -  08-29 @ 07:00  --------------------------------------------------------  IN: 780 mL / OUT: 0 mL / NET: 780 mL      CAPILLARY BLOOD GLUCOSE      POCT Blood Glucose.: 105 mg/dL (28 Aug 2024 22:04)      PHYSICAL EXAM:  General: Well-groomed, NAD, laying in bed, on RA  HEENT: PERRLA, EOMI, non-icteric  Neck:  symmetric,  JVD absent  Respiratory: Clear to ascultation bilaterally, no crackles/rales, no Resp distress; no accessory muscle use  Cardiovascular:  RRR, no murmurs/rubs/gallops  Abdomen: Soft, NT, ND  Extremities: No edema noted  Skin: No rashes or lesions noted  Neurological: Sensation grossly intact; strength 5/5 in all extremities.  Psychiatry: AOx3, appropriate insight/judgement, appropriate affect, recent/remote memory intact    PRN Meds:  albuterol/ipratropium for Nebulization 3 milliLiter(s) Nebulizer every 6 hours PRN  benzonatate 100 milliGRAM(s) Oral every 8 hours PRN  dextrose Oral Gel 15 Gram(s) Oral once PRN  HYDROmorphone  Injectable 0.5 milliGRAM(s) IV Push every 4 hours PRN  HYDROmorphone  Injectable 0.5 milliGRAM(s) IV Push every 4 hours PRN      LABS:                        11.6   14.88 )-----------( 217      ( 28 Aug 2024 06:16 )             35.8     Hgb Trend: 11.6<--, 12.5<--, 12.7<--, 13.9<--, 11.8<--  08-28    128<L>  |  94<L>  |  74<H>  ----------------------------<  153<H>  5.1   |  16<L>  |  2.90<H>    Ca    8.3<L>      28 Aug 2024 06:16  Phos  4.8     08-28  Mg     2.70     08-28    TPro  6.8  /  Alb  3.0<L>  /  TBili  23.7<H>  /  DBili  x   /  AST  230<H>  /  ALT  193<H>  /  AlkPhos  1454<H>  08-28    Creatinine Trend: 2.90<--, 2.13<--, 1.36<--, 0.94<--, 0.82<--, 0.75<--  PT/INR - ( 28 Aug 2024 06:16 )   PT: 12.9 sec;   INR: 1.16 ratio         PTT - ( 28 Aug 2024 06:16 )  PTT:30.3 sec  Urinalysis Basic - ( 28 Aug 2024 06:16 )    Color: x / Appearance: x / SG: x / pH: x  Gluc: 153 mg/dL / Ketone: x  / Bili: x / Urobili: x   Blood: x / Protein: x / Nitrite: x   Leuk Esterase: x / RBC: x / WBC x   Sq Epi: x / Non Sq Epi: x / Bacteria: x            MICROBIOLOGY:       RADIOLOGY:  [ ] Reviewed and interpreted by me    EKG: INTERVAL: NAEO  SUBJECTIVE: Patient examined bedside this AM. States he did not sleep because of abdominal pain. Was unsure of when he could get pain medication.    OBJECTIVE:  ICU Vital Signs Last 24 Hrs  T(C): 36.9 (29 Aug 2024 05:00), Max: 37.2 (28 Aug 2024 09:00)  T(F): 98.4 (29 Aug 2024 05:00), Max: 98.9 (28 Aug 2024 09:00)  HR: 105 (29 Aug 2024 05:00) (97 - 108)  BP: 122/64 (29 Aug 2024 05:00) (110/61 - 135/81)  BP(mean): --  ABP: --  ABP(mean): --  RR: 18 (29 Aug 2024 05:00) (18 - 19)  SpO2: 97% (29 Aug 2024 05:00) (95% - 99%)    O2 Parameters below as of 29 Aug 2024 05:00  Patient On (Oxygen Delivery Method): nasal cannula  O2 Flow (L/min): 2            08-28 @ 07:01  -  08-29 @ 07:00  --------------------------------------------------------  IN: 780 mL / OUT: 0 mL / NET: 780 mL      CAPILLARY BLOOD GLUCOSE      POCT Blood Glucose.: 105 mg/dL (28 Aug 2024 22:04)      PHYSICAL EXAM:  General: mild distress from pain, sitting in chair on 2L NC  HEENT: scleral icterus  Neck:  symmetric,  JVD absent  Respiratory: Clear to ascultation bilaterally, no crackles/rales, no Resp distress; no accessory muscle use  Cardiovascular:  RRR, no murmurs/rubs/gallops  Abdomen: distended  Extremities: 2+ pitting edema similar to previous   Skin: No rashes or lesions noted  Neurological: not assessed  Psychiatry: AOx3,     PRN Meds:  albuterol/ipratropium for Nebulization 3 milliLiter(s) Nebulizer every 6 hours PRN  benzonatate 100 milliGRAM(s) Oral every 8 hours PRN  dextrose Oral Gel 15 Gram(s) Oral once PRN  HYDROmorphone  Injectable 0.5 milliGRAM(s) IV Push every 4 hours PRN  HYDROmorphone  Injectable 0.5 milliGRAM(s) IV Push every 4 hours PRN      LABS:                        11.6   14.88 )-----------( 217      ( 28 Aug 2024 06:16 )             35.8     Hgb Trend: 11.6<--, 12.5<--, 12.7<--, 13.9<--, 11.8<--  08-28    128<L>  |  94<L>  |  74<H>  ----------------------------<  153<H>  5.1   |  16<L>  |  2.90<H>    Ca    8.3<L>      28 Aug 2024 06:16  Phos  4.8     08-28  Mg     2.70     08-28    TPro  6.8  /  Alb  3.0<L>  /  TBili  23.7<H>  /  DBili  x   /  AST  230<H>  /  ALT  193<H>  /  AlkPhos  1454<H>  08-28    Creatinine Trend: 2.90<--, 2.13<--, 1.36<--, 0.94<--, 0.82<--, 0.75<--  PT/INR - ( 28 Aug 2024 06:16 )   PT: 12.9 sec;   INR: 1.16 ratio         PTT - ( 28 Aug 2024 06:16 )  PTT:30.3 sec  Urinalysis Basic - ( 28 Aug 2024 06:16 )    Color: x / Appearance: x / SG: x / pH: x  Gluc: 153 mg/dL / Ketone: x  / Bili: x / Urobili: x   Blood: x / Protein: x / Nitrite: x   Leuk Esterase: x / RBC: x / WBC x   Sq Epi: x / Non Sq Epi: x / Bacteria: x            MICROBIOLOGY:       RADIOLOGY:  [ ] Reviewed and interpreted by me    EKG:

## 2024-08-30 LAB
ALBUMIN SERPL ELPH-MCNC: 3.3 G/DL — SIGNIFICANT CHANGE UP (ref 3.3–5)
ALP SERPL-CCNC: 1261 U/L — HIGH (ref 40–120)
ALT FLD-CCNC: 142 U/L — HIGH (ref 4–41)
ANION GAP SERPL CALC-SCNC: 23 MMOL/L — HIGH (ref 7–14)
AST SERPL-CCNC: 202 U/L — HIGH (ref 4–40)
BASOPHILS # BLD AUTO: 0.02 K/UL — SIGNIFICANT CHANGE UP (ref 0–0.2)
BASOPHILS NFR BLD AUTO: 0.1 % — SIGNIFICANT CHANGE UP (ref 0–2)
BILIRUB SERPL-MCNC: 28.7 MG/DL — HIGH (ref 0.2–1.2)
BUN SERPL-MCNC: 98 MG/DL — HIGH (ref 7–23)
CALCIUM SERPL-MCNC: 8.8 MG/DL — SIGNIFICANT CHANGE UP (ref 8.4–10.5)
CHLORIDE SERPL-SCNC: 95 MMOL/L — LOW (ref 98–107)
CO2 SERPL-SCNC: 15 MMOL/L — LOW (ref 22–31)
CREAT SERPL-MCNC: 3.33 MG/DL — HIGH (ref 0.5–1.3)
EGFR: 21 ML/MIN/1.73M2 — LOW
EOSINOPHIL # BLD AUTO: 0.03 K/UL — SIGNIFICANT CHANGE UP (ref 0–0.5)
EOSINOPHIL NFR BLD AUTO: 0.2 % — SIGNIFICANT CHANGE UP (ref 0–6)
GLUCOSE BLDC GLUCOMTR-MCNC: 139 MG/DL — HIGH (ref 70–99)
GLUCOSE BLDC GLUCOMTR-MCNC: 143 MG/DL — HIGH (ref 70–99)
GLUCOSE BLDC GLUCOMTR-MCNC: 144 MG/DL — HIGH (ref 70–99)
GLUCOSE BLDC GLUCOMTR-MCNC: 162 MG/DL — HIGH (ref 70–99)
GLUCOSE SERPL-MCNC: 88 MG/DL — SIGNIFICANT CHANGE UP (ref 70–99)
HCT VFR BLD CALC: 35.3 % — LOW (ref 39–50)
HGB BLD-MCNC: 11.5 G/DL — LOW (ref 13–17)
IANC: 12.27 K/UL — HIGH (ref 1.8–7.4)
IMM GRANULOCYTES NFR BLD AUTO: 0.8 % — SIGNIFICANT CHANGE UP (ref 0–0.9)
LYMPHOCYTES # BLD AUTO: 0.89 K/UL — LOW (ref 1–3.3)
LYMPHOCYTES # BLD AUTO: 6.1 % — LOW (ref 13–44)
MAGNESIUM SERPL-MCNC: 2.8 MG/DL — HIGH (ref 1.6–2.6)
MCHC RBC-ENTMCNC: 24.8 PG — LOW (ref 27–34)
MCHC RBC-ENTMCNC: 32.6 GM/DL — SIGNIFICANT CHANGE UP (ref 32–36)
MCV RBC AUTO: 76.2 FL — LOW (ref 80–100)
MONOCYTES # BLD AUTO: 1.28 K/UL — HIGH (ref 0–0.9)
MONOCYTES NFR BLD AUTO: 8.8 % — SIGNIFICANT CHANGE UP (ref 2–14)
NEUTROPHILS # BLD AUTO: 12.27 K/UL — HIGH (ref 1.8–7.4)
NEUTROPHILS NFR BLD AUTO: 84 % — HIGH (ref 43–77)
NRBC # BLD: 0 /100 WBCS — SIGNIFICANT CHANGE UP (ref 0–0)
NRBC # FLD: 0.13 K/UL — HIGH (ref 0–0)
PHOSPHATE SERPL-MCNC: 4.9 MG/DL — HIGH (ref 2.5–4.5)
PLATELET # BLD AUTO: 234 K/UL — SIGNIFICANT CHANGE UP (ref 150–400)
POTASSIUM SERPL-MCNC: 5.2 MMOL/L — SIGNIFICANT CHANGE UP (ref 3.5–5.3)
POTASSIUM SERPL-SCNC: 5.2 MMOL/L — SIGNIFICANT CHANGE UP (ref 3.5–5.3)
PROT SERPL-MCNC: 6.4 G/DL — SIGNIFICANT CHANGE UP (ref 6–8.3)
RBC # BLD: 4.63 M/UL — SIGNIFICANT CHANGE UP (ref 4.2–5.8)
RBC # FLD: 21.8 % — HIGH (ref 10.3–14.5)
SODIUM SERPL-SCNC: 133 MMOL/L — LOW (ref 135–145)
WBC # BLD: 14.61 K/UL — HIGH (ref 3.8–10.5)
WBC # FLD AUTO: 14.61 K/UL — HIGH (ref 3.8–10.5)

## 2024-08-30 PROCEDURE — 99233 SBSQ HOSP IP/OBS HIGH 50: CPT

## 2024-08-30 PROCEDURE — 99232 SBSQ HOSP IP/OBS MODERATE 35: CPT | Mod: GC

## 2024-08-30 RX ORDER — HYDROMORPHONE HYDROCHLORIDE 2 MG/1
2 TABLET ORAL EVERY 6 HOURS
Refills: 0 | Status: DISCONTINUED | OUTPATIENT
Start: 2024-08-30 | End: 2024-09-04

## 2024-08-30 RX ADMIN — POLYETHYLENE GLYCOL 3350 17 GRAM(S): 17 POWDER, FOR SOLUTION ORAL at 06:13

## 2024-08-30 RX ADMIN — ALBUMIN (HUMAN) 50 MILLILITER(S): 5 SOLUTION INTRAVENOUS at 18:05

## 2024-08-30 RX ADMIN — HYDROMORPHONE HYDROCHLORIDE 0.5 MILLIGRAM(S): 2 TABLET ORAL at 08:52

## 2024-08-30 RX ADMIN — POLYETHYLENE GLYCOL 3350 17 GRAM(S): 17 POWDER, FOR SOLUTION ORAL at 18:05

## 2024-08-30 RX ADMIN — HYDROMORPHONE HYDROCHLORIDE 0.5 MILLIGRAM(S): 2 TABLET ORAL at 02:06

## 2024-08-30 RX ADMIN — Medication 5000 UNIT(S): at 22:43

## 2024-08-30 RX ADMIN — HYDROMORPHONE HYDROCHLORIDE 0.5 MILLIGRAM(S): 2 TABLET ORAL at 01:06

## 2024-08-30 RX ADMIN — Medication 5000 UNIT(S): at 13:11

## 2024-08-30 RX ADMIN — HYDROMORPHONE HYDROCHLORIDE 2 MILLIGRAM(S): 2 TABLET ORAL at 18:04

## 2024-08-30 RX ADMIN — HYDROMORPHONE HYDROCHLORIDE 0.5 MILLIGRAM(S): 2 TABLET ORAL at 00:53

## 2024-08-30 RX ADMIN — SODIUM CHLORIDE 1 GRAM(S): 9 INJECTION INTRAMUSCULAR; INTRAVENOUS; SUBCUTANEOUS at 06:11

## 2024-08-30 RX ADMIN — HYDROMORPHONE HYDROCHLORIDE 0.5 MILLIGRAM(S): 2 TABLET ORAL at 07:52

## 2024-08-30 RX ADMIN — HYDROMORPHONE HYDROCHLORIDE 2 MILLIGRAM(S): 2 TABLET ORAL at 19:04

## 2024-08-30 RX ADMIN — Medication 4 UNIT(S): at 13:05

## 2024-08-30 RX ADMIN — Medication 100 MILLIGRAM(S): at 22:45

## 2024-08-30 RX ADMIN — Medication 5000 UNIT(S): at 06:12

## 2024-08-30 RX ADMIN — HYDROMORPHONE HYDROCHLORIDE 0.5 MILLIGRAM(S): 2 TABLET ORAL at 13:20

## 2024-08-30 RX ADMIN — HYDROMORPHONE HYDROCHLORIDE 0.5 MILLIGRAM(S): 2 TABLET ORAL at 07:13

## 2024-08-30 RX ADMIN — Medication 100 MILLIGRAM(S): at 07:13

## 2024-08-30 RX ADMIN — Medication 4 UNIT(S): at 09:26

## 2024-08-30 RX ADMIN — HYDROMORPHONE HYDROCHLORIDE 0.5 MILLIGRAM(S): 2 TABLET ORAL at 06:13

## 2024-08-30 RX ADMIN — INSULIN GLARGINE 8 UNIT(S): 100 INJECTION, SOLUTION SUBCUTANEOUS at 22:43

## 2024-08-30 RX ADMIN — ALBUMIN (HUMAN) 50 MILLILITER(S): 5 SOLUTION INTRAVENOUS at 12:21

## 2024-08-30 RX ADMIN — CHLORHEXIDINE GLUCONATE 1 APPLICATION(S): 40 SOLUTION TOPICAL at 12:25

## 2024-08-30 RX ADMIN — Medication 6 MILLIGRAM(S): at 22:45

## 2024-08-30 RX ADMIN — HYDROMORPHONE HYDROCHLORIDE 0.5 MILLIGRAM(S): 2 TABLET ORAL at 12:20

## 2024-08-30 RX ADMIN — SODIUM CHLORIDE 1 GRAM(S): 9 INJECTION INTRAMUSCULAR; INTRAVENOUS; SUBCUTANEOUS at 18:04

## 2024-08-30 RX ADMIN — Medication 2 TABLET(S): at 22:45

## 2024-08-30 RX ADMIN — ALBUMIN (HUMAN) 50 MILLILITER(S): 5 SOLUTION INTRAVENOUS at 06:13

## 2024-08-30 NOTE — PROGRESS NOTE ADULT - NS ATTEST RISK PROBLEM GEN_ALL_CORE FT
Patient is seriously ill with metastatic cancer- progression of disease   High risk of complications, further morbidity and mortality   IV controlled substances

## 2024-08-30 NOTE — PROGRESS NOTE ADULT - PROBLEM SELECTOR PLAN 1
Creat 0.94 -->1.36 --> 2.13 --> 2.90 (08/28/24)    Patient has anasarca as seen on CT imaging and in setting of acute liver injury, RIGO likely 2/2 to worsening hepatic function and hypovolemia.   - Concerning for hepatorenal syndrome  - s/p IV albumin 25% x 3 doses 08/26/24    Plan:  - nephro consulted  - Continue IV Albumin 25% q6h  - Continue to monitor Creat 0.94 -->1.36 --> 2.13 --> 2.90 --> 3.33 (08/30/24)    Patient has anasarca as seen on CT imaging and in setting of acute liver injury, RIGO likely 2/2 to worsening hepatic function and hypovolemia.   - Concerning for hepatorenal syndrome  - s/p IV albumin 25% x 3 doses 08/26/24    Plan:  - nephro consulted  - Continue IV Albumin 25% q6h  - Continue to monitor

## 2024-08-30 NOTE — PROGRESS NOTE ADULT - PROBLEM SELECTOR PLAN 4
Reports dysuria and hematuria x1 week, no urinary frequency though. Here noted to have leukocytosis to 17k with tachycardia and positive UA concerning for sepsis 2/2 UTI.  Ucx - E.Coli  Bcx : NGTD    - Ceftriaxone 1g likely for 7 days (8/24-08/31)  - f/u UCx speciation   - Likely has poor clearance 2/2 worsening liver mets Reports dysuria and hematuria x1 week, no urinary frequency though. Here noted to have leukocytosis to 17k with tachycardia and positive UA concerning for sepsis 2/2 UTI.  Ucx - E. Coli, E. Faecalis (pan-sensitive)  Bcx : NGTD    - Ceftriaxone 1g likely for 7 days (8/24-08/31)  - Likely has poor clearance 2/2 worsening liver mets

## 2024-08-30 NOTE — PROGRESS NOTE ADULT - PROBLEM SELECTOR PLAN 1
- c/w IV dilaudid 0.5 mg q4h PRN for severe pain (PRN use in past 24 hours from 8 AM to 8 AM: IV dilaudid 0.5 mg x 2 doses)  - Pain improved   - Discontinued IV dilaudid 0.5 mg q6h ATC  - Started PO dilaudid 2 mg q6h ATC   - c/w IV dilaudid 0.5 mg q4h PRN for breakthrough pain

## 2024-08-30 NOTE — PROGRESS NOTE ADULT - PROBLEM SELECTOR PROBLEM 8
HTN (hypertension) Hemigard Intro: Due to skin fragility and wound tension, it was decided to use HEMIGARD adhesive retention suture devices to permit a linear closure. The skin was cleaned and dried for a 6cm distance away from the wound. Excessive hair, if present, was removed to allow for adhesion.

## 2024-08-30 NOTE — PROGRESS NOTE ADULT - SUBJECTIVE AND OBJECTIVE BOX
Desmond Leung M.D  Resident  Department of Medicine  Available on [employer] Teams    Patient is a 54y old  Male who presents with a chief complaint of SOB (29 Aug 2024 11:52)      SUBJECTIVE / OVERNIGHT EVENTS: Patient seen and examined at bedside.     ADDITIONAL REVIEW OF SYSTEMS:    MEDICATIONS  (STANDING):  albumin human 25% IVPB 50 milliLiter(s) IV Intermittent every 6 hours  cefTRIAXone   IVPB 1000 milliGRAM(s) IV Intermittent every 24 hours  chlorhexidine 2% Cloths 1 Application(s) Topical daily  dextrose 5%. 1000 milliLiter(s) (100 mL/Hr) IV Continuous <Continuous>  dextrose 5%. 1000 milliLiter(s) (50 mL/Hr) IV Continuous <Continuous>  dextrose 50% Injectable 25 Gram(s) IV Push once  dextrose 50% Injectable 12.5 Gram(s) IV Push once  dextrose 50% Injectable 25 Gram(s) IV Push once  glucagon  Injectable 1 milliGRAM(s) IntraMuscular once  heparin   Injectable 5000 Unit(s) SubCutaneous every 8 hours  HYDROmorphone  Injectable 0.5 milliGRAM(s) IV Push every 6 hours  insulin glargine Injectable (LANTUS) 8 Unit(s) SubCutaneous at bedtime  insulin lispro (ADMELOG) corrective regimen sliding scale   SubCutaneous three times a day before meals  insulin lispro (ADMELOG) corrective regimen sliding scale   SubCutaneous at bedtime  insulin lispro Injectable (ADMELOG) 4 Unit(s) SubCutaneous three times a day before meals  melatonin 6 milliGRAM(s) Oral at bedtime  polyethylene glycol 3350 17 Gram(s) Oral two times a day  senna 2 Tablet(s) Oral at bedtime  sodium chloride 1 Gram(s) Oral two times a day    MEDICATIONS  (PRN):  albuterol/ipratropium for Nebulization 3 milliLiter(s) Nebulizer every 6 hours PRN Shortness of Breath and/or Wheezing  benzonatate 100 milliGRAM(s) Oral every 8 hours PRN Cough  dextrose Oral Gel 15 Gram(s) Oral once PRN Blood Glucose LESS THAN 70 milliGRAM(s)/deciliter  HYDROmorphone  Injectable 0.5 milliGRAM(s) IV Push every 4 hours PRN Breakthrough Pain      CAPILLARY BLOOD GLUCOSE      POCT Blood Glucose.: 129 mg/dL (29 Aug 2024 22:07)  POCT Blood Glucose.: 116 mg/dL (29 Aug 2024 18:07)  POCT Blood Glucose.: 161 mg/dL (29 Aug 2024 13:07)  POCT Blood Glucose.: 116 mg/dL (29 Aug 2024 09:09)    I&O's Summary      PHYSICAL EXAM:    Vital Signs Last 24 Hrs  T(C): 36.4 (30 Aug 2024 05:47), Max: 36.8 (29 Aug 2024 14:40)  T(F): 97.6 (30 Aug 2024 05:47), Max: 98.2 (29 Aug 2024 14:40)  HR: 67 (30 Aug 2024 05:47) (67 - 106)  BP: 130/72 (30 Aug 2024 05:47) (119/75 - 144/78)  BP(mean): --  RR: 18 (30 Aug 2024 05:47) (17 - 18)  SpO2: 96% (30 Aug 2024 05:47) (96% - 99%)    Parameters below as of 30 Aug 2024 05:47  Patient On (Oxygen Delivery Method): nasal cannula  O2 Flow (L/min): 2      CONSTITUTIONAL: NAD, well-developed, well-groomed  EYES: Conjunctiva and sclera clear  ENMT: Moist oral mucosa, no pharyngeal injection or exudates; normal dentition  NECK: Supple, no palpable masses; no thyromegaly  RESPIRATORY: Normal respiratory effort; lungs are clear to auscultation bilaterally  CARDIOVASCULAR: Regular rate and rhythm, normal S1 and S2, no murmur/rub/gallop; No lower extremity edema; Peripheral pulses are 2+ bilaterally  ABDOMEN: Soft, nontender to palpation, normoactive bowel sounds, no rebound/guarding  MUSCULOSKELETAL: No clubbing or cyanosis of digits; no joint swelling or tenderness to palpation  PSYCH: A+O to person, place, and time; affect appropriate  NEUROLOGY: CN 2-12 are intact and symmetric; no gross sensory deficits   SKIN: No rashes; no palpable lesions    LABS:                        11.3   14.86 )-----------( 214      ( 29 Aug 2024 06:26 )             35.4     08-29    131<L>  |  96<L>  |  83<H>  ----------------------------<  105<H>  5.3   |  14<L>  |  3.21<H>    Ca    8.9      29 Aug 2024 06:26  Phos  5.1     08-29  Mg     2.80     08-29    TPro  6.7  /  Alb  3.2<L>  /  TBili  25.3<H>  /  DBili  x   /  AST  209<H>  /  ALT  155<H>  /  AlkPhos  1323<H>  08-29    PTT - ( 29 Aug 2024 06:26 )  PTT:31.2 sec      Urinalysis Basic - ( 29 Aug 2024 06:26 )    Color: x / Appearance: x / SG: x / pH: x  Gluc: 105 mg/dL / Ketone: x  / Bili: x / Urobili: x   Blood: x / Protein: x / Nitrite: x   Leuk Esterase: x / RBC: x / WBC x   Sq Epi: x / Non Sq Epi: x / Bacteria: x          RADIOLOGY & ADDITIONAL TESTS: No new imaging  Results Reviewed: Yes  Imaging Personally Reviewed:  Electrocardiogram Personally Reviewed:    COORDINATION OF CARE:  Care Discussed with Consultants/Other Providers [Y/N]:  Prior or Outpatient Records Reviewed [Y/N]:   Desmond Leung M.D  Resident  Department of Medicine  Available on [employer] Teams    Patient is a 54y old  Male who presents with a chief complaint of SOB (29 Aug 2024 11:52)      SUBJECTIVE / OVERNIGHT EVENTS: Patient seen and examined at bedside. Spoke with family by bedside. Reiterated patients terminal condition and family was accepting. Per , patient ok to be d/c'ed home now on hospice but will require everything to be setup. [facility] d/c per  is sept 3rd.     ADDITIONAL REVIEW OF SYSTEMS:    MEDICATIONS  (STANDING):  albumin human 25% IVPB 50 milliLiter(s) IV Intermittent every 6 hours  cefTRIAXone   IVPB 1000 milliGRAM(s) IV Intermittent every 24 hours  chlorhexidine 2% Cloths 1 Application(s) Topical daily  dextrose 5%. 1000 milliLiter(s) (100 mL/Hr) IV Continuous <Continuous>  dextrose 5%. 1000 milliLiter(s) (50 mL/Hr) IV Continuous <Continuous>  dextrose 50% Injectable 25 Gram(s) IV Push once  dextrose 50% Injectable 12.5 Gram(s) IV Push once  dextrose 50% Injectable 25 Gram(s) IV Push once  glucagon  Injectable 1 milliGRAM(s) IntraMuscular once  heparin   Injectable 5000 Unit(s) SubCutaneous every 8 hours  HYDROmorphone  Injectable 0.5 milliGRAM(s) IV Push every 6 hours  insulin glargine Injectable (LANTUS) 8 Unit(s) SubCutaneous at bedtime  insulin lispro (ADMELOG) corrective regimen sliding scale   SubCutaneous three times a day before meals  insulin lispro (ADMELOG) corrective regimen sliding scale   SubCutaneous at bedtime  insulin lispro Injectable (ADMELOG) 4 Unit(s) SubCutaneous three times a day before meals  melatonin 6 milliGRAM(s) Oral at bedtime  polyethylene glycol 3350 17 Gram(s) Oral two times a day  senna 2 Tablet(s) Oral at bedtime  sodium chloride 1 Gram(s) Oral two times a day    MEDICATIONS  (PRN):  albuterol/ipratropium for Nebulization 3 milliLiter(s) Nebulizer every 6 hours PRN Shortness of Breath and/or Wheezing  benzonatate 100 milliGRAM(s) Oral every 8 hours PRN Cough  dextrose Oral Gel 15 Gram(s) Oral once PRN Blood Glucose LESS THAN 70 milliGRAM(s)/deciliter  HYDROmorphone  Injectable 0.5 milliGRAM(s) IV Push every 4 hours PRN Breakthrough Pain      CAPILLARY BLOOD GLUCOSE      POCT Blood Glucose.: 129 mg/dL (29 Aug 2024 22:07)  POCT Blood Glucose.: 116 mg/dL (29 Aug 2024 18:07)  POCT Blood Glucose.: 161 mg/dL (29 Aug 2024 13:07)  POCT Blood Glucose.: 116 mg/dL (29 Aug 2024 09:09)    I&O's Summary      PHYSICAL EXAM:    Vital Signs Last 24 Hrs  T(C): 36.4 (30 Aug 2024 05:47), Max: 36.8 (29 Aug 2024 14:40)  T(F): 97.6 (30 Aug 2024 05:47), Max: 98.2 (29 Aug 2024 14:40)  HR: 67 (30 Aug 2024 05:47) (67 - 106)  BP: 130/72 (30 Aug 2024 05:47) (119/75 - 144/78)  BP(mean): --  RR: 18 (30 Aug 2024 05:47) (17 - 18)  SpO2: 96% (30 Aug 2024 05:47) (96% - 99%)    Parameters below as of 30 Aug 2024 05:47  Patient On (Oxygen Delivery Method): nasal cannula  O2 Flow (L/min): 2      General: Pain well controlled. Seated in bed on 2L NC  HEENT: scleral icterus  Neck:  symmetric,  JVD absent  Respiratory: Clear to ascultation bilaterally, no crackles/rales, no Resp distress; no accessory muscle use  Cardiovascular:  RRR, no murmurs/rubs/gallops  Abdomen: distended  Extremities: 2+ pitting edema to thighs  Skin: No rashes or lesions noted  Neurological: not assessed  Psychiatry: AOx3,     LABS:                        11.3   14.86 )-----------( 214      ( 29 Aug 2024 06:26 )             35.4     08-29    131<L>  |  96<L>  |  83<H>  ----------------------------<  105<H>  5.3   |  14<L>  |  3.21<H>    Ca    8.9      29 Aug 2024 06:26  Phos  5.1     08-29  Mg     2.80     08-29    TPro  6.7  /  Alb  3.2<L>  /  TBili  25.3<H>  /  DBili  x   /  AST  209<H>  /  ALT  155<H>  /  AlkPhos  1323<H>  08-29    PTT - ( 29 Aug 2024 06:26 )  PTT:31.2 sec      Urinalysis Basic - ( 29 Aug 2024 06:26 )    Color: x / Appearance: x / SG: x / pH: x  Gluc: 105 mg/dL / Ketone: x  / Bili: x / Urobili: x   Blood: x / Protein: x / Nitrite: x   Leuk Esterase: x / RBC: x / WBC x   Sq Epi: x / Non Sq Epi: x / Bacteria: x          RADIOLOGY & ADDITIONAL TESTS: No new imaging  Results Reviewed: Yes  Imaging Personally Reviewed:  Electrocardiogram Personally Reviewed:    COORDINATION OF CARE:  Care Discussed with Consultants/Other Providers [Y/N]:  Prior or Outpatient Records Reviewed [Y/N]:

## 2024-08-30 NOTE — PROGRESS NOTE ADULT - PROBLEM SELECTOR PLAN 4
For pain management   Pending home hospice referral     In the event of worsening symptoms, please contact the Palliative Medicine team via pager (if the patient is at [facility] #[phone] or if the patient is at [facility] #[phone]) The Geriatric and Palliative Medicine service has coverage 24 hours a day/ 7 days a week to provide medical recommendations regarding symptom management needs via telephone.

## 2024-08-30 NOTE — PROGRESS NOTE ADULT - PROBLEM SELECTOR PLAN 2
- Diagnosed in 2021    - Diffuse mets- liver, lungs   - POD despite multiple lines of treatment   - Follows with Dr. Mathew at [facility]  - Poor prognosis, T bili elevated   - Worsening acute renal failure   - Hospice recommended

## 2024-08-30 NOTE — PROGRESS NOTE ADULT - PROBLEM SELECTOR PLAN 3
- 8/26- See GOC. Discussed hospice, code status. Patient appointed sisters Zachary and Ally as HCP. Sisters interested in second opinion at [facility]  - 8/28- See GOC. Patient agreed to DNR/DNI

## 2024-08-30 NOTE — PROGRESS NOTE ADULT - PROBLEM SELECTOR PLAN 3
Stage IV colorectal cancer with mets to lung and liver s/p chemo (4th line), Currently getting RT to his R hip. Follows with Dr. Dre Gardner at [facility]. Patient has terminal illness  CEA 1142    - Patient and family requested home hospice with comfort care but still asking for daily labs still  - Patient appointed both sisters as healthcare proxies    - F/u on Heme onc recs. No plan for chemo at this time  - Palliative consulted, follow up recs.   - Plan for further confirmation on code status and comfort care after family meeting tomorrow  - CM to be informed about home hospice  - Trend CBC. CMP, coags    Pain:  - IV dilaudid 0.5mg q6h standing, per Pall care  -IV dilaudid 0.5mg q4h prn for breakthrough pain  - Now DNR/DNI pending home hospice.  - Appreciate further palliative pain recs Stage IV colorectal cancer with mets to lung and liver s/p chemo (4th line), Currently getting RT to his R hip. Follows with Dr. Dre Gardner at [facility]. Patient has terminal illness  CEA 1142    - Patient and family requested home hospice with comfort care but still asking for daily labs still  - Patient appointed both sisters as healthcare proxies    - F/u on Heme onc recs. No plan for chemo at this time  - Palliative consulted, follow up recs.   - CM setting up home hospice  - Trend CBC. CMP, coags    Pain:  - IV dilaudid 0.5mg q6h standing, per Pall care  -IV dilaudid 0.5mg q4h prn for breakthrough pain  - Now DNR/DNI pending home hospice.  - Appreciate further palliative pain recs Stage IV colorectal cancer with mets to lung and liver s/p chemo (4th line), Currently getting RT to his R hip. Follows with Dr. Dre Gardner at [facility]. Patient has terminal illness  CEA 1142    - Patient and family requested home hospice with comfort care but still asking for daily labs still  - Patient appointed both sisters as healthcare proxies    - F/u on Heme onc recs. No plan for chemo at this time  - Palliative consulted, follow up recs.   - CM setting up home hospice  - Trend CBC. CMP, coags    Pain:  - IV dilaudid 2mg q6h standing, per Pall care  -IV dilaudid 0.5mg q4h prn for breakthrough pain  - Now DNR/DNI pending home hospice.  - Appreciate further palliative pain recs

## 2024-08-30 NOTE — PROGRESS NOTE ADULT - ASSESSMENT
BASSAM LOGAN is a 54y Male with a hx of Stage IV colorectal cancer w/ mets to liver and lung s/p chemo (4th line), HTN, HLD, and T2DM who presents with SOB, found to be in acute hypoxic respiratory failure with profound transaminitis. Recent imaging shows progression of metastatic liver & lung disease. Goals of care discussion today BASSAM LOGAN is a 54y Male with a hx of Stage IV colorectal cancer w/ mets to liver and lung s/p chemo (4th line), HTN, HLD, and T2DM who presents with SOB, found to be in acute hypoxic respiratory failure with profound transaminitis. Recent imaging shows progression of metastatic liver & lung disease. Dispo planning for home hospice

## 2024-08-30 NOTE — PROGRESS NOTE ADULT - PROBLEM SELECTOR PLAN 2
- Significant new transaminitis with hyperbilirubinemia to 20.4, majority direct bilirubin  - CT abd pelvis shows worsening liver mets, intrahepatic ductal dilation  - Bili elevated to 20.4 on admission with 16.6 direct.  - US Abdomen w/ doppler: Multiple liver metastases with compression on the hepatic vasculature. Small caliber right and main portal veins, without evidence of demonstrable flow. Thrombus is not excluded.  - Hep C Antibody positive but viral load undetectable.   - AMA and smooth muscle negative    Plan:  - GI signed off due to no further interventions in setting of advanced terminal disease  - limited U/S Abd to check for ascites  - Pain control as below  - Trend CMP, coags - Significant new transaminitis with hyperbilirubinemia to 20.4, majority direct bilirubin  - CT abd pelvis shows worsening liver mets, intrahepatic ductal dilation  - Bili elevated to 20.4 on admission with 16.6 direct.  - US Abdomen w/ doppler: Multiple liver metastases with compression on the hepatic vasculature. Small caliber right and main portal veins, without evidence of demonstrable flow. Thrombus is not excluded.  - Hep C Antibody positive but viral load undetectable.   - AMA and smooth muscle negative    Plan:  - GI signed off due to no further interventions in setting of advanced terminal disease  - Pain control as below  - Trend CMP, coags

## 2024-08-30 NOTE — PROGRESS NOTE ADULT - SUBJECTIVE AND OBJECTIVE BOX
Indication of Geriatrics and Palliative Medicine Services:  [X  ] Complex Medical Decision Making   [X  ] Symptom/Pain management     DNR on chart: No     INTERVAL EVENTS: Patient seen this PM with cousin at bedside. Patient feeling much improved today, says pain is better controlled as well. Discussed changing to oral meds, can keep IV as breakthrough.     -------------------------------------------------------------------------------------------------------    PRESENT SYMPTOMS:     [ ] Unable to self-report      [ ] PAINADS     [ ] RDOS    [ ] No     [X ] Yes     Source if other than patient:  [ ]Family   [ ]Team     PAIN:   If blank, patient unable to specify     [X ]yes [ ]no    1. Location- R abdominal   2. Radiation-  No   3. Quality- Like someone sitting   4. Timing- On and off   5. Minimal acceptable level/pain goal- 5/10   6. Aggravating factors- Worse with eating   7. QOL impact-     SYMPTOMS:   Dyspnea:                           [ ]Mild [ ]Moderate [ ]Severe  Anxiety:                             [ ]Mild [ ]Moderate [ ]Severe  Fatigue:                             [ ]Mild [ ]Moderate [ ]Severe  Nausea/Vomiting:              [ ]Mild [ ]Moderate [ ]Severe  Loss of appetite:                [ ]Mild [ ]Moderate [X ]Severe  Constipation:                     [ ]Mild [ ]Moderate [ ]Severe    Other Symptoms:  [X ]All other review of systems negative     -------------------------------------------------------------------------------------------------------    I STOP: [id number]    Prescription Information      PDI Filter:    PDI	Current Rx	Drug Type	Rx Written	Rx Dispensed	Drug	Quantity	Days Supply	Prescriber Name	Prescriber KATINA #	Payment Method	Dispenser  A	Y	O	08/15/2024	08/19/2024	morphine sulf er 15 mg tablet	60	30	Lulu Gomes	[id number]	[facility]  A	N	O	08/15/2024	08/19/2024	oxycodone hcl (ir) 5 mg tablet	30	3	Lulu Gomes	[id number]	[facility]  A	N	O	07/26/2024	07/26/2024	oxycodone hcl (ir) 5 mg tablet	30	3	Dre Gardner	[id number]	[facility]  A	N	O	06/28/2024	07/02/2024	oxycodone hcl (ir) 5 mg tablet	30	3	Dre Gardner	[id number]	[facility]	N	O	05/21/2024	05/21/2024	tramadol hcl 50 mg tablet	6	2	Emma Price	[id number]	[id number]    -------------------------------------------------------------------------------------------------------    ITEMS UNCHECKED ARE NOT PRESENT    PHYSICAL:  Vital Signs Last 24 Hrs  T(C): 36.4 (30 Aug 2024 05:47), Max: 36.6 (29 Aug 2024 21:51)  T(F): 97.6 (30 Aug 2024 05:47), Max: 97.8 (29 Aug 2024 21:51)  HR: 105 (30 Aug 2024 07:45) (67 - 106)  BP: 111/57 (30 Aug 2024 07:45) (111/57 - 144/78)  BP(mean): --  RR: 18 (30 Aug 2024 07:45) (17 - 18)  SpO2: 98% (30 Aug 2024 07:45) (96% - 99%)    Parameters below as of 30 Aug 2024 07:45  Patient On (Oxygen Delivery Method): nasal cannula  O2 Flow (L/min): 2      GENERAL:  [ ]Cachexia  [ ] Frail  [X ]Awake  [X ]Oriented   [ ]Lethargic  [ ]Unarousable  [ ]Verbal  [ ]Non-Verbal    BEHAVIORAL:   [ ] Anxiety  [ ] Delirium [ ] Agitation [ ] Other    HEENT:   [ ]Normal   [ ]Dry mouth   [ ]ET Tube/Trach  [ ]Oral lesions  Icteric sclera     PULMONARY:   [X ]Clear              [ ]Tachypnea  [ ]Audible excessive secretions   [ ]Rhonchi        [ ]Right [ ]Left [ ]Bilateral  [ ]Crackles        [ ]Right [ ]Left [ ]Bilateral  [ ]Wheezing     [ ]Right [ ]Left [ ]Bilateral  [ ]Diminished breath sounds [ ]right [ ]left [ ]bilateral    CARDIOVASCULAR:    [X ]Regular [ ]Irregular [ ]Tachy  [ ]Joshua [ ]Murmur [ ]Other    GASTROINTESTINAL:  [ ]Soft  [X ]Distended   [ ]+BS  [ ]Non tender [X ]Tender  [ ]Other [ ]PEG [ ]OGT/ NGT      GENITOURINARY:  [X ]Normal [ ] Incontinent   [ ]Oliguria/Anuria   [ ][place]    MUSCULOSKELETAL:   [X ]Normal   [ ]Weakness  [ ]Bed/Wheelchair bound [ ]Edema    NEUROLOGIC:   [X ]No focal deficits  [ ]Cognitive impairment  [ ]Dysphagia [ ]Dysarthria [ ]Paresis [ ]Other     SKIN:   [X ]Normal  [ ]Rash  [ ]Other  [ ]Pressure ulcer(s)       Present on admission [ ]y [ ]n    -------------------------------------------------------------------------------------------------------    LABS:                          11.5   14.61 )-----------( 234      ( 30 Aug 2024 06:57 )             35.3     08-30    133<L>  |  95<L>  |  98<H>  ----------------------------<  88  5.2   |  15<L>  |  3.33<H>    Ca    8.8      30 Aug 2024 07:37  Phos  4.9     08-30  Mg     2.80     08-30    TPro  6.4  /  Alb  3.3  /  TBili  28.7<H>  /  DBili  x   /  AST  202<H>  /  ALT  142<H>  /  AlkPhos  1261<H>  08-30      -------------------------------------------------------------------------------------------------------    CRITICAL CARE:  [ ]Shock Present  [ ]Septic [ ]Cardiogenic [ ]Neurologic [ ]Hypovolemic [ ]Undifferentiated    [ ]Vasopressors [ ]Inotropes    [ ]Respiratory failure present [ ]Acute  [ ]Chronic [ ]Hypoxic  [ ]Hypercarbic [ ]Mixed   [ ]Mechanical Ventilation  [ ]Trach collar   [ ]Non-invasive ventilatory support   [ ]High-Flow   [ ]Oxygen mask/venti     [ ]Other organ failure     -------------------------------------------------------------------------------------------------------    RADIOLOGY & ADDITIONAL STUDIES:       < from: CT Abdomen and Pelvis w/ IV Cont (08.24.24 @ 10:58) >    IMPRESSION:  *  Progression of pulmonary and hepatic metastatic disease, as detailed   above.  *  Mild to moderate intrahepatic bile duct dilatation, increased from   prior.  *  New diffuse hypoenhancement of the right hepatic lobe, which may   reflect perfusional alteration secondary to metastatic burden. There is   compressionof the intrahepatic IVC by the metastatic disease. The   hepatic veins are not visualized. Doppler ultrasound could be performed   for further evaluation as clinically warranted.  *  Multifocal ill-defined patchy foci of cortical  hypoenhancement left   kidney. Correlate with urinalysis to exclude pyelonephritis. Recommend   attention on close follow-up imaging. No hydronephrosis.  *  Nonspecific gallbladder and right-sided colonic wall thickening,   possibly reactive.  *  New small volume abdominopelvic ascites. Diffuse anasarca.    < end of copied text >    < from: US Abdomen Doppler (08.24.24 @ 15:18) >  IMPRESSION:  Multiple liver metastases with compression on the hepatic vasculature.  Small caliber right and main portal veins, without evidence of   demonstrable flow. Findings may be reflect a combination of slow flow and   extrinsic compression. Thrombus is not excluded.    < end of copied text >    < from: CT Angio Chest PE Protocol w/ IV Cont (08.23.24 @ 22:49) >  IMPRESSION:  No pulmonary embolus.    Increased size of innumerable bilateral pulmonary metastases.    Diffuse hepatic metastases several of which demonstrate increase in size.    < end of copied text >    -------------------------------------------------------------------------------------------------------  MEDICATIONS:     MEDICATIONS  (STANDING):  albumin human 25% IVPB 50 milliLiter(s) IV Intermittent every 6 hours  cefTRIAXone   IVPB 1000 milliGRAM(s) IV Intermittent every 24 hours  chlorhexidine 2% Cloths 1 Application(s) Topical daily  dextrose 5%. 1000 milliLiter(s) (100 mL/Hr) IV Continuous <Continuous>  dextrose 5%. 1000 milliLiter(s) (50 mL/Hr) IV Continuous <Continuous>  dextrose 50% Injectable 25 Gram(s) IV Push once  dextrose 50% Injectable 12.5 Gram(s) IV Push once  dextrose 50% Injectable 25 Gram(s) IV Push once  glucagon  Injectable 1 milliGRAM(s) IntraMuscular once  heparin   Injectable 5000 Unit(s) SubCutaneous every 8 hours  HYDROmorphone  Injectable 0.5 milliGRAM(s) IV Push every 6 hours  insulin glargine Injectable (LANTUS) 13 Unit(s) SubCutaneous at bedtime  insulin lispro (ADMELOG) corrective regimen sliding scale   SubCutaneous three times a day before meals  insulin lispro (ADMELOG) corrective regimen sliding scale   SubCutaneous at bedtime  insulin lispro Injectable (ADMELOG) 4 Unit(s) SubCutaneous three times a day before meals  melatonin 6 milliGRAM(s) Oral at bedtime  polyethylene glycol 3350 17 Gram(s) Oral two times a day  senna 2 Tablet(s) Oral at bedtime  sodium chloride 1 Gram(s) Oral two times a day    MEDICATIONS  (PRN):  albuterol/ipratropium for Nebulization 3 milliLiter(s) Nebulizer every 6 hours PRN Shortness of Breath and/or Wheezing  benzonatate 100 milliGRAM(s) Oral every 8 hours PRN Cough  dextrose Oral Gel 15 Gram(s) Oral once PRN Blood Glucose LESS THAN 70 milliGRAM(s)/deciliter  HYDROmorphone  Injectable 0.5 milliGRAM(s) IV Push every 4 hours PRN Breakthrough Pain

## 2024-08-31 ENCOUNTER — APPOINTMENT (OUTPATIENT)
Dept: INFUSION THERAPY | Facility: HOSPITAL | Age: 54
End: 2024-08-31

## 2024-08-31 LAB
ALBUMIN SERPL ELPH-MCNC: 3.2 G/DL — LOW (ref 3.3–5)
ALP SERPL-CCNC: 1094 U/L — HIGH (ref 40–120)
ALT FLD-CCNC: 131 U/L — HIGH (ref 4–41)
ANION GAP SERPL CALC-SCNC: 20 MMOL/L — HIGH (ref 7–14)
ANION GAP SERPL CALC-SCNC: 21 MMOL/L — HIGH (ref 7–14)
ANION GAP SERPL CALC-SCNC: 21 MMOL/L — HIGH (ref 7–14)
AST SERPL-CCNC: 198 U/L — HIGH (ref 4–40)
BASOPHILS # BLD AUTO: 0.02 K/UL — SIGNIFICANT CHANGE UP (ref 0–0.2)
BASOPHILS NFR BLD AUTO: 0.1 % — SIGNIFICANT CHANGE UP (ref 0–2)
BILIRUB SERPL-MCNC: 27.3 MG/DL — HIGH (ref 0.2–1.2)
BUN SERPL-MCNC: 100 MG/DL — HIGH (ref 7–23)
BUN SERPL-MCNC: 100 MG/DL — HIGH (ref 7–23)
BUN SERPL-MCNC: 96 MG/DL — HIGH (ref 7–23)
CALCIUM SERPL-MCNC: 9 MG/DL — SIGNIFICANT CHANGE UP (ref 8.4–10.5)
CALCIUM SERPL-MCNC: 9.1 MG/DL — SIGNIFICANT CHANGE UP (ref 8.4–10.5)
CALCIUM SERPL-MCNC: 9.1 MG/DL — SIGNIFICANT CHANGE UP (ref 8.4–10.5)
CHLORIDE SERPL-SCNC: 97 MMOL/L — LOW (ref 98–107)
CO2 SERPL-SCNC: 13 MMOL/L — LOW (ref 22–31)
CO2 SERPL-SCNC: 15 MMOL/L — LOW (ref 22–31)
CO2 SERPL-SCNC: 16 MMOL/L — LOW (ref 22–31)
CREAT SERPL-MCNC: 3.47 MG/DL — HIGH (ref 0.5–1.3)
CREAT SERPL-MCNC: 3.49 MG/DL — HIGH (ref 0.5–1.3)
CREAT SERPL-MCNC: 3.52 MG/DL — HIGH (ref 0.5–1.3)
EGFR: 20 ML/MIN/1.73M2 — LOW
EOSINOPHIL # BLD AUTO: 0.03 K/UL — SIGNIFICANT CHANGE UP (ref 0–0.5)
EOSINOPHIL NFR BLD AUTO: 0.2 % — SIGNIFICANT CHANGE UP (ref 0–6)
GLUCOSE BLDC GLUCOMTR-MCNC: 119 MG/DL — HIGH (ref 70–99)
GLUCOSE BLDC GLUCOMTR-MCNC: 120 MG/DL — HIGH (ref 70–99)
GLUCOSE BLDC GLUCOMTR-MCNC: 152 MG/DL — HIGH (ref 70–99)
GLUCOSE BLDC GLUCOMTR-MCNC: 160 MG/DL — HIGH (ref 70–99)
GLUCOSE BLDC GLUCOMTR-MCNC: 170 MG/DL — HIGH (ref 70–99)
GLUCOSE BLDC GLUCOMTR-MCNC: 185 MG/DL — HIGH (ref 70–99)
GLUCOSE SERPL-MCNC: 108 MG/DL — HIGH (ref 70–99)
GLUCOSE SERPL-MCNC: 136 MG/DL — HIGH (ref 70–99)
GLUCOSE SERPL-MCNC: 94 MG/DL — SIGNIFICANT CHANGE UP (ref 70–99)
HCT VFR BLD CALC: 30.6 % — LOW (ref 39–50)
HGB BLD-MCNC: 10.2 G/DL — LOW (ref 13–17)
IANC: 12.52 K/UL — HIGH (ref 1.8–7.4)
IMM GRANULOCYTES NFR BLD AUTO: 0.7 % — SIGNIFICANT CHANGE UP (ref 0–0.9)
LYMPHOCYTES # BLD AUTO: 1.01 K/UL — SIGNIFICANT CHANGE UP (ref 1–3.3)
LYMPHOCYTES # BLD AUTO: 6.7 % — LOW (ref 13–44)
MAGNESIUM SERPL-MCNC: 2.8 MG/DL — HIGH (ref 1.6–2.6)
MCHC RBC-ENTMCNC: 24.8 PG — LOW (ref 27–34)
MCHC RBC-ENTMCNC: 33.3 GM/DL — SIGNIFICANT CHANGE UP (ref 32–36)
MCV RBC AUTO: 74.5 FL — LOW (ref 80–100)
MONOCYTES # BLD AUTO: 1.34 K/UL — HIGH (ref 0–0.9)
MONOCYTES NFR BLD AUTO: 8.9 % — SIGNIFICANT CHANGE UP (ref 2–14)
NEUTROPHILS # BLD AUTO: 12.52 K/UL — HIGH (ref 1.8–7.4)
NEUTROPHILS NFR BLD AUTO: 83.4 % — HIGH (ref 43–77)
NRBC # BLD: 0 /100 WBCS — SIGNIFICANT CHANGE UP (ref 0–0)
NRBC # FLD: 0.11 K/UL — HIGH (ref 0–0)
PHOSPHATE SERPL-MCNC: 5.2 MG/DL — HIGH (ref 2.5–4.5)
PLATELET # BLD AUTO: 208 K/UL — SIGNIFICANT CHANGE UP (ref 150–400)
POTASSIUM SERPL-MCNC: 5.8 MMOL/L — HIGH (ref 3.5–5.3)
POTASSIUM SERPL-MCNC: 6 MMOL/L — HIGH (ref 3.5–5.3)
POTASSIUM SERPL-MCNC: 7 MMOL/L — CRITICAL HIGH (ref 3.5–5.3)
POTASSIUM SERPL-SCNC: 5.8 MMOL/L — HIGH (ref 3.5–5.3)
POTASSIUM SERPL-SCNC: 6 MMOL/L — HIGH (ref 3.5–5.3)
POTASSIUM SERPL-SCNC: 7 MMOL/L — CRITICAL HIGH (ref 3.5–5.3)
PROT SERPL-MCNC: 6 G/DL — SIGNIFICANT CHANGE UP (ref 6–8.3)
RBC # BLD: 4.11 M/UL — LOW (ref 4.2–5.8)
RBC # FLD: 21.5 % — HIGH (ref 10.3–14.5)
SODIUM SERPL-SCNC: 131 MMOL/L — LOW (ref 135–145)
SODIUM SERPL-SCNC: 133 MMOL/L — LOW (ref 135–145)
SODIUM SERPL-SCNC: 133 MMOL/L — LOW (ref 135–145)
WBC # BLD: 15.02 K/UL — HIGH (ref 3.8–10.5)
WBC # FLD AUTO: 15.02 K/UL — HIGH (ref 3.8–10.5)

## 2024-08-31 PROCEDURE — 93010 ELECTROCARDIOGRAM REPORT: CPT

## 2024-08-31 PROCEDURE — 99232 SBSQ HOSP IP/OBS MODERATE 35: CPT | Mod: GC

## 2024-08-31 RX ORDER — DEXTROSE 15 G/33 G
50 GEL IN PACKET (GRAM) ORAL ONCE
Refills: 0 | Status: COMPLETED | OUTPATIENT
Start: 2024-08-31 | End: 2024-08-31

## 2024-08-31 RX ORDER — INSULIN REGULAR, HUMAN 100/ML (3)
5 INSULIN PEN (ML) SUBCUTANEOUS ONCE
Refills: 0 | Status: COMPLETED | OUTPATIENT
Start: 2024-08-31 | End: 2024-08-31

## 2024-08-31 RX ORDER — SODIUM ZIRCONIUM CYCLOSILICATE 5 G/5G
10 POWDER, FOR SUSPENSION ORAL THREE TIMES A DAY
Refills: 0 | Status: COMPLETED | OUTPATIENT
Start: 2024-08-31 | End: 2024-08-31

## 2024-08-31 RX ORDER — SODIUM ZIRCONIUM CYCLOSILICATE 5 G/5G
10 POWDER, FOR SUSPENSION ORAL THREE TIMES A DAY
Refills: 0 | Status: DISCONTINUED | OUTPATIENT
Start: 2024-08-31 | End: 2024-08-31

## 2024-08-31 RX ADMIN — ALBUMIN (HUMAN) 50 MILLILITER(S): 5 SOLUTION INTRAVENOUS at 00:11

## 2024-08-31 RX ADMIN — Medication 50 MILLILITER(S): at 17:54

## 2024-08-31 RX ADMIN — ALBUMIN (HUMAN) 50 MILLILITER(S): 5 SOLUTION INTRAVENOUS at 12:35

## 2024-08-31 RX ADMIN — HYDROMORPHONE HYDROCHLORIDE 2 MILLIGRAM(S): 2 TABLET ORAL at 23:20

## 2024-08-31 RX ADMIN — SODIUM CHLORIDE 1 GRAM(S): 9 INJECTION INTRAMUSCULAR; INTRAVENOUS; SUBCUTANEOUS at 06:24

## 2024-08-31 RX ADMIN — Medication 5000 UNIT(S): at 06:36

## 2024-08-31 RX ADMIN — Medication 5 UNIT(S): at 17:59

## 2024-08-31 RX ADMIN — SODIUM ZIRCONIUM CYCLOSILICATE 10 GRAM(S): 5 POWDER, FOR SUSPENSION ORAL at 14:35

## 2024-08-31 RX ADMIN — Medication 6 MILLIGRAM(S): at 22:56

## 2024-08-31 RX ADMIN — ALBUMIN (HUMAN) 50 MILLILITER(S): 5 SOLUTION INTRAVENOUS at 18:00

## 2024-08-31 RX ADMIN — Medication 5000 UNIT(S): at 02:30

## 2024-08-31 RX ADMIN — SODIUM ZIRCONIUM CYCLOSILICATE 10 GRAM(S): 5 POWDER, FOR SUSPENSION ORAL at 08:24

## 2024-08-31 RX ADMIN — Medication 2: at 09:11

## 2024-08-31 RX ADMIN — Medication 4 UNIT(S): at 19:26

## 2024-08-31 RX ADMIN — ALBUMIN (HUMAN) 50 MILLILITER(S): 5 SOLUTION INTRAVENOUS at 23:03

## 2024-08-31 RX ADMIN — INSULIN GLARGINE 8 UNIT(S): 100 INJECTION, SOLUTION SUBCUTANEOUS at 22:59

## 2024-08-31 RX ADMIN — CHLORHEXIDINE GLUCONATE 1 APPLICATION(S): 40 SOLUTION TOPICAL at 12:17

## 2024-08-31 RX ADMIN — HYDROMORPHONE HYDROCHLORIDE 2 MILLIGRAM(S): 2 TABLET ORAL at 12:16

## 2024-08-31 RX ADMIN — Medication 4 UNIT(S): at 13:24

## 2024-08-31 RX ADMIN — HYDROMORPHONE HYDROCHLORIDE 2 MILLIGRAM(S): 2 TABLET ORAL at 17:59

## 2024-08-31 RX ADMIN — Medication 2: at 19:25

## 2024-08-31 RX ADMIN — ALBUMIN (HUMAN) 50 MILLILITER(S): 5 SOLUTION INTRAVENOUS at 06:26

## 2024-08-31 RX ADMIN — Medication 5 UNIT(S): at 08:21

## 2024-08-31 RX ADMIN — SODIUM ZIRCONIUM CYCLOSILICATE 10 GRAM(S): 5 POWDER, FOR SUSPENSION ORAL at 22:53

## 2024-08-31 RX ADMIN — Medication 2 TABLET(S): at 22:56

## 2024-08-31 RX ADMIN — Medication 4 UNIT(S): at 09:12

## 2024-08-31 RX ADMIN — POLYETHYLENE GLYCOL 3350 17 GRAM(S): 17 POWDER, FOR SOLUTION ORAL at 18:00

## 2024-08-31 RX ADMIN — HYDROMORPHONE HYDROCHLORIDE 2 MILLIGRAM(S): 2 TABLET ORAL at 06:23

## 2024-08-31 RX ADMIN — HYDROMORPHONE HYDROCHLORIDE 2 MILLIGRAM(S): 2 TABLET ORAL at 01:24

## 2024-08-31 RX ADMIN — HYDROMORPHONE HYDROCHLORIDE 2 MILLIGRAM(S): 2 TABLET ORAL at 00:24

## 2024-08-31 RX ADMIN — Medication 5000 UNIT(S): at 23:01

## 2024-08-31 RX ADMIN — POLYETHYLENE GLYCOL 3350 17 GRAM(S): 17 POWDER, FOR SOLUTION ORAL at 06:24

## 2024-08-31 RX ADMIN — SODIUM CHLORIDE 1 GRAM(S): 9 INJECTION INTRAMUSCULAR; INTRAVENOUS; SUBCUTANEOUS at 17:59

## 2024-08-31 RX ADMIN — Medication 50 MILLILITER(S): at 08:18

## 2024-08-31 NOTE — PROGRESS NOTE ADULT - SUBJECTIVE AND OBJECTIVE BOX
Desmond Leung M.D  Resident  Department of Medicine  Available on [employer] Teams    Patient is a 54y old  Male who presents with a chief complaint of SOB (31 Aug 2024 08:01)      SUBJECTIVE / OVERNIGHT EVENTS: Patient seen and examined at bedside. Pain better controlled and said family will visit today. Only needed one PRN dilaudid last 24 hours      ADDITIONAL REVIEW OF SYSTEMS:    MEDICATIONS  (STANDING):  albumin human 25% IVPB 50 milliLiter(s) IV Intermittent every 6 hours  chlorhexidine 2% Cloths 1 Application(s) Topical daily  dextrose 5%. 1000 milliLiter(s) (50 mL/Hr) IV Continuous <Continuous>  dextrose 5%. 1000 milliLiter(s) (100 mL/Hr) IV Continuous <Continuous>  dextrose 50% Injectable 12.5 Gram(s) IV Push once  dextrose 50% Injectable 25 Gram(s) IV Push once  dextrose 50% Injectable 25 Gram(s) IV Push once  glucagon  Injectable 1 milliGRAM(s) IntraMuscular once  heparin   Injectable 5000 Unit(s) SubCutaneous every 8 hours  HYDROmorphone   Tablet 2 milliGRAM(s) Oral every 6 hours  insulin glargine Injectable (LANTUS) 8 Unit(s) SubCutaneous at bedtime  insulin lispro (ADMELOG) corrective regimen sliding scale   SubCutaneous three times a day before meals  insulin lispro (ADMELOG) corrective regimen sliding scale   SubCutaneous at bedtime  insulin lispro Injectable (ADMELOG) 4 Unit(s) SubCutaneous three times a day before meals  melatonin 6 milliGRAM(s) Oral at bedtime  polyethylene glycol 3350 17 Gram(s) Oral two times a day  senna 2 Tablet(s) Oral at bedtime  sodium chloride 1 Gram(s) Oral two times a day  sodium zirconium cyclosilicate 10 Gram(s) Oral three times a day    MEDICATIONS  (PRN):  albuterol/ipratropium for Nebulization 3 milliLiter(s) Nebulizer every 6 hours PRN Shortness of Breath and/or Wheezing  benzonatate 100 milliGRAM(s) Oral every 8 hours PRN Cough  dextrose Oral Gel 15 Gram(s) Oral once PRN Blood Glucose LESS THAN 70 milliGRAM(s)/deciliter  HYDROmorphone  Injectable 0.5 milliGRAM(s) IV Push every 4 hours PRN Breakthrough Pain      CAPILLARY BLOOD GLUCOSE      POCT Blood Glucose.: 185 mg/dL (31 Aug 2024 08:54)  POCT Blood Glucose.: 152 mg/dL (31 Aug 2024 08:17)  POCT Blood Glucose.: 162 mg/dL (30 Aug 2024 22:18)  POCT Blood Glucose.: 143 mg/dL (30 Aug 2024 17:41)  POCT Blood Glucose.: 144 mg/dL (30 Aug 2024 12:52)    I&O's Summary      PHYSICAL EXAM:    Vital Signs Last 24 Hrs  T(C): 36.4 (31 Aug 2024 07:49), Max: 36.9 (30 Aug 2024 21:39)  T(F): 97.5 (31 Aug 2024 07:49), Max: 98.5 (30 Aug 2024 21:39)  HR: 100 (31 Aug 2024 07:49) (100 - 105)  BP: 130/72 (31 Aug 2024 07:49) (111/71 - 130/72)  BP(mean): --  RR: 18 (31 Aug 2024 07:49) (18 - 18)  SpO2: 99% (31 Aug 2024 07:49) (95% - 100%)    Parameters below as of 31 Aug 2024 07:49  Patient On (Oxygen Delivery Method): nasal cannula  O2 Flow (L/min): 2      CONSTITUTIONAL: NAD, well-developed, well-groomed  EYES: scleral icterus  RESPIRATORY: Normal respiratory effort; lungs are clear to auscultation bilaterally  CARDIOVASCULAR: Regular rate and rhythm, normal S1 and S2, no murmur/rub/gallop; BLE edema to thighs 2+; Peripheral pulses are 2+ bilaterally  ABDOMEN: distended, nontender to palpation, normoactive bowel sounds, no rebound/guarding  PSYCH: A+O to person, place, and time; affect appropriate  SKIN: No rashes; no palpable lesions    LABS:                        10.2   15.02 )-----------( 208      ( 31 Aug 2024 04:59 )             30.6     08-31    133<L>  |  97<L>  |  96<H>  ----------------------------<  108<H>  6.0<H>   |  15<L>  |  3.49<H>    Ca    9.1      31 Aug 2024 04:59  Phos  5.2     08-31  Mg     2.80     08-31    TPro  6.0  /  Alb  3.2<L>  /  TBili  27.3<H>  /  DBili  x   /  AST  198<H>  /  ALT  131<H>  /  AlkPhos  1094<H>  08-31          Urinalysis Basic - ( 31 Aug 2024 04:59 )    Color: x / Appearance: x / SG: x / pH: x  Gluc: 108 mg/dL / Ketone: x  / Bili: x / Urobili: x   Blood: x / Protein: x / Nitrite: x   Leuk Esterase: x / RBC: x / WBC x   Sq Epi: x / Non Sq Epi: x / Bacteria: x          RADIOLOGY & ADDITIONAL TESTS: No new imaging  Results Reviewed: Yes  Imaging Personally Reviewed:  Electrocardiogram Personally Reviewed:    COORDINATION OF CARE:  Care Discussed with Consultants/Other Providers [Y/N]:  Prior or Outpatient Records Reviewed [Y/N]:

## 2024-08-31 NOTE — PROGRESS NOTE ADULT - PROBLEM SELECTOR PLAN 4
Reports dysuria and hematuria x1 week, no urinary frequency though. Here noted to have leukocytosis to 17k with tachycardia and positive UA concerning for sepsis 2/2 UTI.  Ucx - E. Coli, E. Faecalis (pan-sensitive)  Bcx : NGTD    - Ceftriaxone 1g likely for 7 days (8/24-08/31)  - Likely has poor clearance 2/2 worsening liver mets

## 2024-08-31 NOTE — PROGRESS NOTE ADULT - ASSESSMENT
BASSAM LOGAN is a 54y Male with a hx of Stage IV colorectal cancer w/ mets to liver and lung s/p chemo (4th line), HTN, HLD, and T2DM who presents with SOB, found to be in acute hypoxic respiratory failure with profound transaminitis. Recent imaging shows progression of metastatic liver & lung disease. Dispo planning for home hospice

## 2024-08-31 NOTE — PROVIDER CONTACT NOTE (CRITICAL VALUE NOTIFICATION) - BACKGROUND
Patient has a PMH of HTN, HLD. Patient admitted for shortness of breath
Hx of HTN, HLD, DM2 Colon Cancer

## 2024-08-31 NOTE — PROGRESS NOTE ADULT - PROBLEM SELECTOR PLAN 10
DVT ppx: heparin subq  Diet: Consistent carb/DASH  Dispo: Pending home hospice to be setup for D/C    Code Status: FULL CODE

## 2024-08-31 NOTE — PROGRESS NOTE ADULT - PROBLEM SELECTOR PLAN 3
Stage IV colorectal cancer with mets to lung and liver s/p chemo (4th line), Currently getting RT to his R hip. Follows with Dr. Dre Gardner at [facility]. Patient has terminal illness  CEA 1142    - Patient and family requested home hospice with comfort care but still asking for daily labs still  - Patient appointed both sisters as healthcare proxies    - F/u on Heme onc recs. No plan for chemo at this time  - Palliative consulted, follow up recs.   - CM setting up home hospice  - Trend CBC. CMP, coags    Pain:  - IV dilaudid 2mg q6h standing, per Pall care  -IV dilaudid 0.5mg q4h prn for breakthrough pain (needed one PRN last 24 hours)  - Now DNR/DNI pending home hospice.  - Appreciate further palliative pain recs

## 2024-08-31 NOTE — PROGRESS NOTE ADULT - PROBLEM SELECTOR PLAN 2
- Significant new transaminitis with hyperbilirubinemia to 20.4, majority direct bilirubin  - CT abd pelvis shows worsening liver mets, intrahepatic ductal dilation  - Bili elevated to 20.4 on admission with 16.6 direct.  - US Abdomen w/ doppler: Multiple liver metastases with compression on the hepatic vasculature. Small caliber right and main portal veins, without evidence of demonstrable flow. Thrombus is not excluded.  - Hep C Antibody positive but viral load undetectable.   - AMA and smooth muscle negative    Plan:  - GI signed off due to no further interventions in setting of advanced terminal disease  - Pain control as below  - Trend CMP, coags

## 2024-08-31 NOTE — PROGRESS NOTE ADULT - PROBLEM SELECTOR PLAN 1
Creat 0.94 -->1.36 --> 2.13 --> 2.90 --> 3.33 (08/30/24)    Patient has anasarca as seen on CT imaging and in setting of acute liver injury, RIGO likely 2/2 to worsening hepatic function and hypovolemia.   - Concerning for hepatorenal syndrome  - s/p IV albumin 25% x 3 doses 08/26/24  - K 6 08/31    Plan:  - nephro following. Appreciate recs  - Insulin D5 for hyperkalemia  - Lokelma 10 x 3 doses  - Continue IV Albumin 25% q6h  - Continue to monitor

## 2024-09-01 LAB
ALBUMIN SERPL ELPH-MCNC: 3.3 G/DL — SIGNIFICANT CHANGE UP (ref 3.3–5)
ALP SERPL-CCNC: 1139 U/L — HIGH (ref 40–120)
ALT FLD-CCNC: 124 U/L — HIGH (ref 4–41)
ANION GAP SERPL CALC-SCNC: 22 MMOL/L — HIGH (ref 7–14)
ANION GAP SERPL CALC-SCNC: 24 MMOL/L — HIGH (ref 7–14)
AST SERPL-CCNC: 221 U/L — HIGH (ref 4–40)
BASOPHILS # BLD AUTO: 0.01 K/UL — SIGNIFICANT CHANGE UP (ref 0–0.2)
BASOPHILS NFR BLD AUTO: 0.1 % — SIGNIFICANT CHANGE UP (ref 0–2)
BILIRUB SERPL-MCNC: 28.8 MG/DL — HIGH (ref 0.2–1.2)
BUN SERPL-MCNC: 106 MG/DL — HIGH (ref 7–23)
BUN SERPL-MCNC: 106 MG/DL — HIGH (ref 7–23)
CALCIUM SERPL-MCNC: 8.4 MG/DL — SIGNIFICANT CHANGE UP (ref 8.4–10.5)
CALCIUM SERPL-MCNC: 9.5 MG/DL — SIGNIFICANT CHANGE UP (ref 8.4–10.5)
CHLORIDE SERPL-SCNC: 96 MMOL/L — LOW (ref 98–107)
CHLORIDE SERPL-SCNC: 97 MMOL/L — LOW (ref 98–107)
CO2 SERPL-SCNC: 14 MMOL/L — LOW (ref 22–31)
CO2 SERPL-SCNC: 14 MMOL/L — LOW (ref 22–31)
CREAT SERPL-MCNC: 3.64 MG/DL — HIGH (ref 0.5–1.3)
CREAT SERPL-MCNC: 3.8 MG/DL — HIGH (ref 0.5–1.3)
EGFR: 18 ML/MIN/1.73M2 — LOW
EGFR: 19 ML/MIN/1.73M2 — LOW
EOSINOPHIL # BLD AUTO: 0.05 K/UL — SIGNIFICANT CHANGE UP (ref 0–0.5)
EOSINOPHIL NFR BLD AUTO: 0.3 % — SIGNIFICANT CHANGE UP (ref 0–6)
GLUCOSE BLDC GLUCOMTR-MCNC: 117 MG/DL — HIGH (ref 70–99)
GLUCOSE BLDC GLUCOMTR-MCNC: 118 MG/DL — HIGH (ref 70–99)
GLUCOSE BLDC GLUCOMTR-MCNC: 118 MG/DL — HIGH (ref 70–99)
GLUCOSE BLDC GLUCOMTR-MCNC: 137 MG/DL — HIGH (ref 70–99)
GLUCOSE BLDC GLUCOMTR-MCNC: 178 MG/DL — HIGH (ref 70–99)
GLUCOSE SERPL-MCNC: 110 MG/DL — HIGH (ref 70–99)
GLUCOSE SERPL-MCNC: 75 MG/DL — SIGNIFICANT CHANGE UP (ref 70–99)
HCT VFR BLD CALC: 31.1 % — LOW (ref 39–50)
HGB BLD-MCNC: 10.3 G/DL — LOW (ref 13–17)
IANC: 13.53 K/UL — HIGH (ref 1.8–7.4)
IMM GRANULOCYTES NFR BLD AUTO: 1.3 % — HIGH (ref 0–0.9)
LYMPHOCYTES # BLD AUTO: 1.13 K/UL — SIGNIFICANT CHANGE UP (ref 1–3.3)
LYMPHOCYTES # BLD AUTO: 6.9 % — LOW (ref 13–44)
MAGNESIUM SERPL-MCNC: 3 MG/DL — HIGH (ref 1.6–2.6)
MCHC RBC-ENTMCNC: 25.1 PG — LOW (ref 27–34)
MCHC RBC-ENTMCNC: 33.1 GM/DL — SIGNIFICANT CHANGE UP (ref 32–36)
MCV RBC AUTO: 75.7 FL — LOW (ref 80–100)
MONOCYTES # BLD AUTO: 1.41 K/UL — HIGH (ref 0–0.9)
MONOCYTES NFR BLD AUTO: 8.6 % — SIGNIFICANT CHANGE UP (ref 2–14)
NEUTROPHILS # BLD AUTO: 13.53 K/UL — HIGH (ref 1.8–7.4)
NEUTROPHILS NFR BLD AUTO: 82.8 % — HIGH (ref 43–77)
NRBC # BLD: 0 /100 WBCS — SIGNIFICANT CHANGE UP (ref 0–0)
NRBC # FLD: 0.11 K/UL — HIGH (ref 0–0)
PHOSPHATE SERPL-MCNC: 4.6 MG/DL — HIGH (ref 2.5–4.5)
PLATELET # BLD AUTO: 215 K/UL — SIGNIFICANT CHANGE UP (ref 150–400)
POTASSIUM SERPL-MCNC: 5.7 MMOL/L — HIGH (ref 3.5–5.3)
POTASSIUM SERPL-MCNC: 5.8 MMOL/L — HIGH (ref 3.5–5.3)
POTASSIUM SERPL-SCNC: 5.7 MMOL/L — HIGH (ref 3.5–5.3)
POTASSIUM SERPL-SCNC: 5.8 MMOL/L — HIGH (ref 3.5–5.3)
PROT SERPL-MCNC: 6.4 G/DL — SIGNIFICANT CHANGE UP (ref 6–8.3)
RBC # BLD: 4.11 M/UL — LOW (ref 4.2–5.8)
RBC # FLD: 21.8 % — HIGH (ref 10.3–14.5)
SODIUM SERPL-SCNC: 133 MMOL/L — LOW (ref 135–145)
SODIUM SERPL-SCNC: 134 MMOL/L — LOW (ref 135–145)
WBC # BLD: 16.34 K/UL — HIGH (ref 3.8–10.5)
WBC # FLD AUTO: 16.34 K/UL — HIGH (ref 3.8–10.5)

## 2024-09-01 PROCEDURE — 93010 ELECTROCARDIOGRAM REPORT: CPT

## 2024-09-01 PROCEDURE — 99232 SBSQ HOSP IP/OBS MODERATE 35: CPT | Mod: GC

## 2024-09-01 RX ORDER — INSULIN REGULAR, HUMAN 100/ML (3)
5 INSULIN PEN (ML) SUBCUTANEOUS ONCE
Refills: 0 | Status: COMPLETED | OUTPATIENT
Start: 2024-09-01 | End: 2024-09-01

## 2024-09-01 RX ORDER — DEXTROSE 15 G/33 G
50 GEL IN PACKET (GRAM) ORAL ONCE
Refills: 0 | Status: COMPLETED | OUTPATIENT
Start: 2024-09-01 | End: 2024-09-01

## 2024-09-01 RX ORDER — SODIUM ZIRCONIUM CYCLOSILICATE 5 G/5G
10 POWDER, FOR SUSPENSION ORAL THREE TIMES A DAY
Refills: 0 | Status: COMPLETED | OUTPATIENT
Start: 2024-09-01 | End: 2024-09-02

## 2024-09-01 RX ADMIN — Medication 100 MILLIGRAM(S): at 06:10

## 2024-09-01 RX ADMIN — ALBUMIN (HUMAN) 50 MILLILITER(S): 5 SOLUTION INTRAVENOUS at 06:11

## 2024-09-01 RX ADMIN — ALBUMIN (HUMAN) 50 MILLILITER(S): 5 SOLUTION INTRAVENOUS at 23:30

## 2024-09-01 RX ADMIN — HYDROMORPHONE HYDROCHLORIDE 2 MILLIGRAM(S): 2 TABLET ORAL at 23:30

## 2024-09-01 RX ADMIN — Medication 4 UNIT(S): at 18:19

## 2024-09-01 RX ADMIN — Medication 5000 UNIT(S): at 22:49

## 2024-09-01 RX ADMIN — HYDROMORPHONE HYDROCHLORIDE 2 MILLIGRAM(S): 2 TABLET ORAL at 06:05

## 2024-09-01 RX ADMIN — Medication 50 MILLILITER(S): at 09:16

## 2024-09-01 RX ADMIN — Medication 4 UNIT(S): at 13:32

## 2024-09-01 RX ADMIN — Medication 5000 UNIT(S): at 06:09

## 2024-09-01 RX ADMIN — Medication 6 MILLIGRAM(S): at 22:49

## 2024-09-01 RX ADMIN — CHLORHEXIDINE GLUCONATE 1 APPLICATION(S): 40 SOLUTION TOPICAL at 12:19

## 2024-09-01 RX ADMIN — SODIUM ZIRCONIUM CYCLOSILICATE 10 GRAM(S): 5 POWDER, FOR SUSPENSION ORAL at 13:32

## 2024-09-01 RX ADMIN — Medication 100 MILLIGRAM(S): at 22:49

## 2024-09-01 RX ADMIN — INSULIN GLARGINE 8 UNIT(S): 100 INJECTION, SOLUTION SUBCUTANEOUS at 22:49

## 2024-09-01 RX ADMIN — POLYETHYLENE GLYCOL 3350 17 GRAM(S): 17 POWDER, FOR SOLUTION ORAL at 06:06

## 2024-09-01 RX ADMIN — Medication 4 UNIT(S): at 10:24

## 2024-09-01 RX ADMIN — SODIUM CHLORIDE 1 GRAM(S): 9 INJECTION INTRAMUSCULAR; INTRAVENOUS; SUBCUTANEOUS at 06:05

## 2024-09-01 RX ADMIN — Medication 5 UNIT(S): at 09:16

## 2024-09-01 RX ADMIN — POLYETHYLENE GLYCOL 3350 17 GRAM(S): 17 POWDER, FOR SOLUTION ORAL at 18:18

## 2024-09-01 RX ADMIN — Medication 2 TABLET(S): at 22:49

## 2024-09-01 RX ADMIN — ALBUMIN (HUMAN) 50 MILLILITER(S): 5 SOLUTION INTRAVENOUS at 12:12

## 2024-09-01 RX ADMIN — ALBUMIN (HUMAN) 50 MILLILITER(S): 5 SOLUTION INTRAVENOUS at 18:19

## 2024-09-01 RX ADMIN — SODIUM CHLORIDE 1 GRAM(S): 9 INJECTION INTRAMUSCULAR; INTRAVENOUS; SUBCUTANEOUS at 18:18

## 2024-09-01 RX ADMIN — HYDROMORPHONE HYDROCHLORIDE 2 MILLIGRAM(S): 2 TABLET ORAL at 18:18

## 2024-09-01 RX ADMIN — HYDROMORPHONE HYDROCHLORIDE 2 MILLIGRAM(S): 2 TABLET ORAL at 00:20

## 2024-09-01 RX ADMIN — HYDROMORPHONE HYDROCHLORIDE 2 MILLIGRAM(S): 2 TABLET ORAL at 12:11

## 2024-09-01 RX ADMIN — Medication 5000 UNIT(S): at 13:33

## 2024-09-01 NOTE — PROGRESS NOTE ADULT - PROBLEM SELECTOR PLAN 4
Reports dysuria and hematuria x1 week, no urinary frequency though. Here noted to have leukocytosis to 17k with tachycardia and positive UA concerning for sepsis 2/2 UTI.  Ucx - E. Coli, E. Faecalis (pan-sensitive)  Bcx : NGTD    - Ceftriaxone 1g likely for 7 days (8/24-08/31)  - Likely has poor clearance 2/2 worsening liver mets Reports dysuria and hematuria x1 week, no urinary frequency though. Here noted to have leukocytosis to 17k with tachycardia and positive UA concerning for sepsis 2/2 UTI.  Ucx - E. Coli, E. Faecalis (pan-sensitive)  Bcx : NGTD  - Denied any dysuria or difficulty urinating 09/01    - s/p Ceftriaxone 1g for 7 days (8/24-08/31)  - Likely has poor clearance 2/2 worsening liver mets

## 2024-09-01 NOTE — PROGRESS NOTE ADULT - PROBLEM SELECTOR PLAN 3
Stage IV colorectal cancer with mets to lung and liver s/p chemo (4th line), Currently getting RT to his R hip. Follows with Dr. Dre Gardner at [facility]. Patient has terminal illness  CEA 1142    - Patient and family requested home hospice with comfort care but still asking for daily labs still  - Patient appointed both sisters as healthcare proxies    - F/u on Heme onc recs. No plan for chemo at this time  - Palliative consulted, follow up recs.   - CM setting up home hospice  - Trend CBC. CMP, coags    Pain:  - IV dilaudid 2mg q6h standing, per Pall care  -IV dilaudid 0.5mg q4h prn for breakthrough pain  - Now DNR/DNI pending home hospice.  - Appreciate further palliative pain recs Stage IV colorectal cancer with mets to lung and liver s/p chemo (4th line), Currently getting RT to his R hip. Follows with Dr. Dre Gardner at [facility]. Patient has terminal illness  CEA 1142    - Patient and family requested home hospice with comfort care but still asking for daily labs still  - Patient appointed both sisters as healthcare proxies    - F/u on Heme onc recs. No plan for chemo at this time  - Palliative consulted, follow up recs.   - CM setting up home hospice  - Trend CBC. CMP, coags    Pain:  - IV dilaudid 2mg q6h standing, per Pall care  -IV dilaudid 0.5mg q4h prn for breakthrough pain (No breakthrough use last 24 hours)  - Now DNR/DNI pending home hospice.  - Appreciate further palliative pain recs

## 2024-09-01 NOTE — PROGRESS NOTE ADULT - SUBJECTIVE AND OBJECTIVE BOX
Desmond Leung M.D  Resident  Department of Medicine  Available on [employer] Teams    Patient is a 54y old  Male who presents with a chief complaint of SOB (31 Aug 2024 08:01)      SUBJECTIVE / OVERNIGHT EVENTS: Patient seen and examined at bedside. Overnight BMP showed K 5.8. Repeat EKG wnl. This am K was 5.7. We shifted it once more with insulin + D50 and started lokelma x 3 doses. Patient denied any CP, palpitations or increase in SOB.     ADDITIONAL REVIEW OF SYSTEMS:    MEDICATIONS  (STANDING):  albumin human 25% IVPB 50 milliLiter(s) IV Intermittent every 6 hours  chlorhexidine 2% Cloths 1 Application(s) Topical daily  dextrose 5%. 1000 milliLiter(s) (100 mL/Hr) IV Continuous <Continuous>  dextrose 5%. 1000 milliLiter(s) (50 mL/Hr) IV Continuous <Continuous>  dextrose 50% Injectable 25 Gram(s) IV Push once  dextrose 50% Injectable 25 Gram(s) IV Push once  dextrose 50% Injectable 12.5 Gram(s) IV Push once  glucagon  Injectable 1 milliGRAM(s) IntraMuscular once  heparin   Injectable 5000 Unit(s) SubCutaneous every 8 hours  HYDROmorphone   Tablet 2 milliGRAM(s) Oral every 6 hours  insulin glargine Injectable (LANTUS) 8 Unit(s) SubCutaneous at bedtime  insulin lispro (ADMELOG) corrective regimen sliding scale   SubCutaneous three times a day before meals  insulin lispro (ADMELOG) corrective regimen sliding scale   SubCutaneous at bedtime  insulin lispro Injectable (ADMELOG) 4 Unit(s) SubCutaneous three times a day before meals  melatonin 6 milliGRAM(s) Oral at bedtime  polyethylene glycol 3350 17 Gram(s) Oral two times a day  senna 2 Tablet(s) Oral at bedtime  sodium chloride 1 Gram(s) Oral two times a day  sodium zirconium cyclosilicate 10 Gram(s) Oral three times a day    MEDICATIONS  (PRN):  albuterol/ipratropium for Nebulization 3 milliLiter(s) Nebulizer every 6 hours PRN Shortness of Breath and/or Wheezing  benzonatate 100 milliGRAM(s) Oral every 8 hours PRN Cough  dextrose Oral Gel 15 Gram(s) Oral once PRN Blood Glucose LESS THAN 70 milliGRAM(s)/deciliter  HYDROmorphone  Injectable 0.5 milliGRAM(s) IV Push every 4 hours PRN Breakthrough Pain      CAPILLARY BLOOD GLUCOSE      POCT Blood Glucose.: 117 mg/dL (01 Sep 2024 08:51)  POCT Blood Glucose.: 119 mg/dL (31 Aug 2024 22:16)  POCT Blood Glucose.: 170 mg/dL (31 Aug 2024 19:22)  POCT Blood Glucose.: 160 mg/dL (31 Aug 2024 17:53)  POCT Blood Glucose.: 120 mg/dL (31 Aug 2024 12:52)    I&O's Summary    31 Aug 2024 07:01  -  01 Sep 2024 07:00  --------------------------------------------------------  IN: 100 mL / OUT: 0 mL / NET: 100 mL        PHYSICAL EXAM:    Vital Signs Last 24 Hrs  T(C): 36.3 (01 Sep 2024 06:00), Max: 36.9 (31 Aug 2024 14:30)  T(F): 97.3 (01 Sep 2024 06:00), Max: 98.4 (31 Aug 2024 14:30)  HR: 100 (01 Sep 2024 06:00) (81 - 100)  BP: 123/70 (01 Sep 2024 06:00) (111/67 - 123/70)  BP(mean): --  RR: 17 (01 Sep 2024 06:00) (17 - 18)  SpO2: 99% (01 Sep 2024 06:00) (99% - 100%)    Parameters below as of 01 Sep 2024 06:00  Patient On (Oxygen Delivery Method): nasal cannula  O2 Flow (L/min): 2      CONSTITUTIONAL: NAD, fatigued and laying in bed  EYES: Scleral icterus  ENMT: Moist oral mucosa, no pharyngeal injection or exudates; normal dentition  RESPIRATORY: Shallow breaths; lungs are clear to auscultation bilaterally. On 2L NC for comfort.   CARDIOVASCULAR: Regular rate and rhythm, normal S1 and S2, no murmur/rub/gallop; No lower extremity edema; Peripheral pulses are 2+ bilaterally  ABDOMEN: Soft, nontender to palpation, normoactive bowel sounds, no rebound/guarding  MUSCULOSKELETAL: No clubbing or cyanosis of digits; no joint swelling or tenderness to palpation  PSYCH: A+O to person, place, and time; affect appropriate  NEUROLOGY: CN 2-12 are intact and symmetric; no gross sensory deficits   SKIN: No rashes; no palpable lesions    LABS:                        10.3   16.34 )-----------( 215      ( 01 Sep 2024 04:47 )             31.1     09-01    133<L>  |  97<L>  |  106<H>  ----------------------------<  75  5.7<H>   |  14<L>  |  3.80<H>    Ca    8.4      01 Sep 2024 04:47  Phos  4.6     09-01  Mg     3.00     09-01    TPro  6.4  /  Alb  3.3  /  TBili  28.8<H>  /  DBili  x   /  AST  221<H>  /  ALT  124<H>  /  AlkPhos  1139<H>  09-01          Urinalysis Basic - ( 01 Sep 2024 04:47 )    Color: x / Appearance: x / SG: x / pH: x  Gluc: 75 mg/dL / Ketone: x  / Bili: x / Urobili: x   Blood: x / Protein: x / Nitrite: x   Leuk Esterase: x / RBC: x / WBC x   Sq Epi: x / Non Sq Epi: x / Bacteria: x          RADIOLOGY & ADDITIONAL TESTS: No new imaging  Results Reviewed: Yes  Imaging Personally Reviewed:  Electrocardiogram Personally Reviewed:    COORDINATION OF CARE:  Care Discussed with Consultants/Other Providers [Y/N]:  Prior or Outpatient Records Reviewed [Y/N]:   Desmond Leung M.D  Resident  Department of Medicine  Available on [employer] Teams    Patient is a 54y old  Male who presents with a chief complaint of SOB (31 Aug 2024 08:01)      SUBJECTIVE / OVERNIGHT EVENTS: Patient seen and examined at bedside. Overnight BMP showed K 5.8. Repeat EKG wnl. This am K was 5.7. We shifted it once more with insulin + D50 and started lokelma x 3 doses. Patient denied any CP, palpitations or increase in SOB. No PRN pain med use last 24 hours     ADDITIONAL REVIEW OF SYSTEMS:    MEDICATIONS  (STANDING):  albumin human 25% IVPB 50 milliLiter(s) IV Intermittent every 6 hours  chlorhexidine 2% Cloths 1 Application(s) Topical daily  dextrose 5%. 1000 milliLiter(s) (100 mL/Hr) IV Continuous <Continuous>  dextrose 5%. 1000 milliLiter(s) (50 mL/Hr) IV Continuous <Continuous>  dextrose 50% Injectable 25 Gram(s) IV Push once  dextrose 50% Injectable 25 Gram(s) IV Push once  dextrose 50% Injectable 12.5 Gram(s) IV Push once  glucagon  Injectable 1 milliGRAM(s) IntraMuscular once  heparin   Injectable 5000 Unit(s) SubCutaneous every 8 hours  HYDROmorphone   Tablet 2 milliGRAM(s) Oral every 6 hours  insulin glargine Injectable (LANTUS) 8 Unit(s) SubCutaneous at bedtime  insulin lispro (ADMELOG) corrective regimen sliding scale   SubCutaneous three times a day before meals  insulin lispro (ADMELOG) corrective regimen sliding scale   SubCutaneous at bedtime  insulin lispro Injectable (ADMELOG) 4 Unit(s) SubCutaneous three times a day before meals  melatonin 6 milliGRAM(s) Oral at bedtime  polyethylene glycol 3350 17 Gram(s) Oral two times a day  senna 2 Tablet(s) Oral at bedtime  sodium chloride 1 Gram(s) Oral two times a day  sodium zirconium cyclosilicate 10 Gram(s) Oral three times a day    MEDICATIONS  (PRN):  albuterol/ipratropium for Nebulization 3 milliLiter(s) Nebulizer every 6 hours PRN Shortness of Breath and/or Wheezing  benzonatate 100 milliGRAM(s) Oral every 8 hours PRN Cough  dextrose Oral Gel 15 Gram(s) Oral once PRN Blood Glucose LESS THAN 70 milliGRAM(s)/deciliter  HYDROmorphone  Injectable 0.5 milliGRAM(s) IV Push every 4 hours PRN Breakthrough Pain      CAPILLARY BLOOD GLUCOSE      POCT Blood Glucose.: 117 mg/dL (01 Sep 2024 08:51)  POCT Blood Glucose.: 119 mg/dL (31 Aug 2024 22:16)  POCT Blood Glucose.: 170 mg/dL (31 Aug 2024 19:22)  POCT Blood Glucose.: 160 mg/dL (31 Aug 2024 17:53)  POCT Blood Glucose.: 120 mg/dL (31 Aug 2024 12:52)    I&O's Summary    31 Aug 2024 07:01  -  01 Sep 2024 07:00  --------------------------------------------------------  IN: 100 mL / OUT: 0 mL / NET: 100 mL        PHYSICAL EXAM:    Vital Signs Last 24 Hrs  T(C): 36.3 (01 Sep 2024 06:00), Max: 36.9 (31 Aug 2024 14:30)  T(F): 97.3 (01 Sep 2024 06:00), Max: 98.4 (31 Aug 2024 14:30)  HR: 100 (01 Sep 2024 06:00) (81 - 100)  BP: 123/70 (01 Sep 2024 06:00) (111/67 - 123/70)  BP(mean): --  RR: 17 (01 Sep 2024 06:00) (17 - 18)  SpO2: 99% (01 Sep 2024 06:00) (99% - 100%)    Parameters below as of 01 Sep 2024 06:00  Patient On (Oxygen Delivery Method): nasal cannula  O2 Flow (L/min): 2      CONSTITUTIONAL: NAD, fatigued and laying in bed  EYES: Scleral icterus  ENMT: Moist oral mucosa, no pharyngeal injection or exudates; normal dentition  RESPIRATORY: Shallow breaths; lungs are clear to auscultation bilaterally. On 2L NC for comfort.   CARDIOVASCULAR: Regular rate and rhythm, normal S1 and S2, no murmur/rub/gallop; BLE edema 2+ to thighs. Slowly worsening; Peripheral pulses are 2+ bilaterally  ABDOMEN: mild distension, nontender to palpation, normoactive bowel sounds, no rebound/guarding. Hepatomegaly present  PSYCH: A+O to person, place, and time; affect appropriate  SKIN: No rashes; no palpable lesions. Shiny skin at BLE    LABS:                        10.3   16.34 )-----------( 215      ( 01 Sep 2024 04:47 )             31.1     09-01    133<L>  |  97<L>  |  106<H>  ----------------------------<  75  5.7<H>   |  14<L>  |  3.80<H>    Ca    8.4      01 Sep 2024 04:47  Phos  4.6     09-01  Mg     3.00     09-01    TPro  6.4  /  Alb  3.3  /  TBili  28.8<H>  /  DBili  x   /  AST  221<H>  /  ALT  124<H>  /  AlkPhos  1139<H>  09-01          Urinalysis Basic - ( 01 Sep 2024 04:47 )    Color: x / Appearance: x / SG: x / pH: x  Gluc: 75 mg/dL / Ketone: x  / Bili: x / Urobili: x   Blood: x / Protein: x / Nitrite: x   Leuk Esterase: x / RBC: x / WBC x   Sq Epi: x / Non Sq Epi: x / Bacteria: x          RADIOLOGY & ADDITIONAL TESTS: No new imaging  Results Reviewed: Yes  Imaging Personally Reviewed:  Electrocardiogram Personally Reviewed:    COORDINATION OF CARE:  Care Discussed with Consultants/Other Providers [Y/N]:  Prior or Outpatient Records Reviewed [Y/N]:

## 2024-09-01 NOTE — PROGRESS NOTE ADULT - PROBLEM SELECTOR PLAN 5
#Dyspnea on exertion  #BLE edema  Presenting with SOB w/ exertion and hypoxia. CTA without PE or pleural effusion, but with increased burden of pulmonary mets and liver mets. Trp <6 x 2. BNP 76. Bedside POCUS with normal cardiac contractility. Inspiratory stridor on physical exam (now improved). Suspect his AHRF is in setting of worsening metastatic disease, lower suspicion for PNA as no infiltrates noted, and lower suspicion for ADHF as patient with low proBNP, no pulm edema/effusions.  - On RA now  - TTE difficult study  - BLE Duplex neg for DVT    Plan:  - Duonebs q6 prn  - May require d/c on home O2 for comfort #Dyspnea on exertion  #BLE edema  Presenting with SOB w/ exertion and hypoxia. CTA without PE or pleural effusion, but with increased burden of pulmonary mets and liver mets. Trp <6 x 2. BNP 76. Bedside POCUS with normal cardiac contractility. Inspiratory stridor on physical exam (now improved). Suspect his AHRF is in setting of worsening metastatic disease, lower suspicion for PNA as no infiltrates noted, and lower suspicion for ADHF as patient with low proBNP, no pulm edema/effusions.  - On 2L NC for comfort PRN  - TTE difficult study  - BLE Duplex neg for DVT    Plan:  - Duonebs q6 prn  - May require d/c on home O2 for comfort

## 2024-09-01 NOTE — PROGRESS NOTE ADULT - PROBLEM SELECTOR PLAN 1
Creat 0.94 -->1.36 --> 2.13 --> 2.90 --> 3.33 (08/30/24)    Patient has anasarca as seen on CT imaging and in setting of acute liver injury, RIGO likely 2/2 to worsening hepatic function and hypovolemia.   - Concerning for hepatorenal syndrome  - s/p IV albumin 25% x 3 doses 08/26/24    Plan:  - nephro consulted  - Continue IV Albumin 25% q6h  - Continue to monitor Creat 0.94 -->1.36 --> 2.13 --> 2.90 --> 3.33 --> 3.80 (08/30/24)    Patient has anasarca as seen on CT imaging and in setting of acute liver injury, RIGO likely 2/2 to worsening hepatic function and hypovolemia.   - Concerning for hepatorenal syndrome  - s/p IV albumin 25% x 3 doses 08/26/24  - K rising (max 7 hemolyzed), now s/p insulin + D50 with lokelma 3x. EKG x2 wnl  - K this am 5.7    Plan:  - nephro following, however, in setting of terminal disease with no plans for HD there may be nothing more that can be done. Appreciate recs  - Repeat lokelma 3x today. Recheck BMP at 2pm  - Repeat insulin + D50 today  - Continue IV Albumin 25% q6h  - Continue to monitor

## 2024-09-02 DIAGNOSIS — E87.5 HYPERKALEMIA: ICD-10-CM

## 2024-09-02 LAB
ALBUMIN SERPL ELPH-MCNC: 3.5 G/DL — SIGNIFICANT CHANGE UP (ref 3.3–5)
ALP SERPL-CCNC: 1052 U/L — HIGH (ref 40–120)
ALT FLD-CCNC: 124 U/L — HIGH (ref 4–41)
ANION GAP SERPL CALC-SCNC: 22 MMOL/L — HIGH (ref 7–14)
ANION GAP SERPL CALC-SCNC: 23 MMOL/L — HIGH (ref 7–14)
APTT BLD: 38 SEC — HIGH (ref 24.5–35.6)
AST SERPL-CCNC: 241 U/L — HIGH (ref 4–40)
BASOPHILS # BLD AUTO: 0.02 K/UL — SIGNIFICANT CHANGE UP (ref 0–0.2)
BASOPHILS NFR BLD AUTO: 0.1 % — SIGNIFICANT CHANGE UP (ref 0–2)
BILIRUB SERPL-MCNC: 31.2 MG/DL — HIGH (ref 0.2–1.2)
BUN SERPL-MCNC: 109 MG/DL — HIGH (ref 7–23)
BUN SERPL-MCNC: 109 MG/DL — HIGH (ref 7–23)
CALCIUM SERPL-MCNC: 9.2 MG/DL — SIGNIFICANT CHANGE UP (ref 8.4–10.5)
CALCIUM SERPL-MCNC: 9.2 MG/DL — SIGNIFICANT CHANGE UP (ref 8.4–10.5)
CHLORIDE SERPL-SCNC: 97 MMOL/L — LOW (ref 98–107)
CHLORIDE SERPL-SCNC: 98 MMOL/L — SIGNIFICANT CHANGE UP (ref 98–107)
CO2 SERPL-SCNC: 14 MMOL/L — LOW (ref 22–31)
CO2 SERPL-SCNC: 15 MMOL/L — LOW (ref 22–31)
CREAT SERPL-MCNC: 4.31 MG/DL — HIGH (ref 0.5–1.3)
CREAT SERPL-MCNC: 4.34 MG/DL — HIGH (ref 0.5–1.3)
EGFR: 15 ML/MIN/1.73M2 — LOW
EGFR: 15 ML/MIN/1.73M2 — LOW
EOSINOPHIL # BLD AUTO: 0.03 K/UL — SIGNIFICANT CHANGE UP (ref 0–0.5)
EOSINOPHIL NFR BLD AUTO: 0.2 % — SIGNIFICANT CHANGE UP (ref 0–6)
GLUCOSE BLDC GLUCOMTR-MCNC: 130 MG/DL — HIGH (ref 70–99)
GLUCOSE BLDC GLUCOMTR-MCNC: 132 MG/DL — HIGH (ref 70–99)
GLUCOSE BLDC GLUCOMTR-MCNC: 95 MG/DL — SIGNIFICANT CHANGE UP (ref 70–99)
GLUCOSE BLDC GLUCOMTR-MCNC: 96 MG/DL — SIGNIFICANT CHANGE UP (ref 70–99)
GLUCOSE SERPL-MCNC: 116 MG/DL — HIGH (ref 70–99)
GLUCOSE SERPL-MCNC: 79 MG/DL — SIGNIFICANT CHANGE UP (ref 70–99)
HCT VFR BLD CALC: 29.4 % — LOW (ref 39–50)
HGB BLD-MCNC: 9.8 G/DL — LOW (ref 13–17)
IANC: 12.79 K/UL — HIGH (ref 1.8–7.4)
IMM GRANULOCYTES NFR BLD AUTO: 1.6 % — HIGH (ref 0–0.9)
INR BLD: 1.47 RATIO — HIGH (ref 0.85–1.18)
LYMPHOCYTES # BLD AUTO: 1.04 K/UL — SIGNIFICANT CHANGE UP (ref 1–3.3)
LYMPHOCYTES # BLD AUTO: 6.8 % — LOW (ref 13–44)
MAGNESIUM SERPL-MCNC: 3.2 MG/DL — HIGH (ref 1.6–2.6)
MAGNESIUM SERPL-MCNC: 3.3 MG/DL — HIGH (ref 1.6–2.6)
MCHC RBC-ENTMCNC: 24.9 PG — LOW (ref 27–34)
MCHC RBC-ENTMCNC: 33.3 GM/DL — SIGNIFICANT CHANGE UP (ref 32–36)
MCV RBC AUTO: 74.8 FL — LOW (ref 80–100)
MONOCYTES # BLD AUTO: 1.14 K/UL — HIGH (ref 0–0.9)
MONOCYTES NFR BLD AUTO: 7.5 % — SIGNIFICANT CHANGE UP (ref 2–14)
NEUTROPHILS # BLD AUTO: 12.79 K/UL — HIGH (ref 1.8–7.4)
NEUTROPHILS NFR BLD AUTO: 83.8 % — HIGH (ref 43–77)
NRBC # BLD: 0 /100 WBCS — SIGNIFICANT CHANGE UP (ref 0–0)
NRBC # FLD: 0.08 K/UL — HIGH (ref 0–0)
PHOSPHATE SERPL-MCNC: 5.6 MG/DL — HIGH (ref 2.5–4.5)
PHOSPHATE SERPL-MCNC: 6.2 MG/DL — HIGH (ref 2.5–4.5)
PLATELET # BLD AUTO: 191 K/UL — SIGNIFICANT CHANGE UP (ref 150–400)
POTASSIUM SERPL-MCNC: 5.7 MMOL/L — HIGH (ref 3.5–5.3)
POTASSIUM SERPL-MCNC: 5.7 MMOL/L — HIGH (ref 3.5–5.3)
POTASSIUM SERPL-SCNC: 5.7 MMOL/L — HIGH (ref 3.5–5.3)
POTASSIUM SERPL-SCNC: 5.7 MMOL/L — HIGH (ref 3.5–5.3)
PROT SERPL-MCNC: 6.1 G/DL — SIGNIFICANT CHANGE UP (ref 6–8.3)
PROTHROM AB SERPL-ACNC: 16.4 SEC — HIGH (ref 9.5–13)
RBC # BLD: 3.93 M/UL — LOW (ref 4.2–5.8)
RBC # FLD: 22.2 % — HIGH (ref 10.3–14.5)
SODIUM SERPL-SCNC: 134 MMOL/L — LOW (ref 135–145)
SODIUM SERPL-SCNC: 135 MMOL/L — SIGNIFICANT CHANGE UP (ref 135–145)
WBC # BLD: 15.26 K/UL — HIGH (ref 3.8–10.5)
WBC # FLD AUTO: 15.26 K/UL — HIGH (ref 3.8–10.5)

## 2024-09-02 PROCEDURE — 99232 SBSQ HOSP IP/OBS MODERATE 35: CPT | Mod: GC

## 2024-09-02 RX ORDER — SODIUM ZIRCONIUM CYCLOSILICATE 5 G/5G
10 POWDER, FOR SUSPENSION ORAL THREE TIMES A DAY
Refills: 0 | Status: COMPLETED | OUTPATIENT
Start: 2024-09-02 | End: 2024-09-03

## 2024-09-02 RX ORDER — GUAIFENESIN 100 MG/5ML
100 LIQUID ORAL
Refills: 0 | Status: DISCONTINUED | OUTPATIENT
Start: 2024-09-02 | End: 2024-09-04

## 2024-09-02 RX ORDER — SODIUM ZIRCONIUM CYCLOSILICATE 5 G/5G
10 POWDER, FOR SUSPENSION ORAL
Refills: 0 | Status: DISCONTINUED | OUTPATIENT
Start: 2024-09-02 | End: 2024-09-02

## 2024-09-02 RX ADMIN — HYDROMORPHONE HYDROCHLORIDE 2 MILLIGRAM(S): 2 TABLET ORAL at 23:40

## 2024-09-02 RX ADMIN — SODIUM ZIRCONIUM CYCLOSILICATE 10 GRAM(S): 5 POWDER, FOR SUSPENSION ORAL at 03:47

## 2024-09-02 RX ADMIN — HYDROMORPHONE HYDROCHLORIDE 2 MILLIGRAM(S): 2 TABLET ORAL at 18:33

## 2024-09-02 RX ADMIN — POLYETHYLENE GLYCOL 3350 17 GRAM(S): 17 POWDER, FOR SOLUTION ORAL at 05:05

## 2024-09-02 RX ADMIN — INSULIN GLARGINE 8 UNIT(S): 100 INJECTION, SOLUTION SUBCUTANEOUS at 22:35

## 2024-09-02 RX ADMIN — SODIUM CHLORIDE 1 GRAM(S): 9 INJECTION INTRAMUSCULAR; INTRAVENOUS; SUBCUTANEOUS at 17:32

## 2024-09-02 RX ADMIN — ALBUMIN (HUMAN) 50 MILLILITER(S): 5 SOLUTION INTRAVENOUS at 17:32

## 2024-09-02 RX ADMIN — ALBUMIN (HUMAN) 50 MILLILITER(S): 5 SOLUTION INTRAVENOUS at 11:03

## 2024-09-02 RX ADMIN — HYDROMORPHONE HYDROCHLORIDE 2 MILLIGRAM(S): 2 TABLET ORAL at 12:02

## 2024-09-02 RX ADMIN — CHLORHEXIDINE GLUCONATE 1 APPLICATION(S): 40 SOLUTION TOPICAL at 11:06

## 2024-09-02 RX ADMIN — Medication 5000 UNIT(S): at 22:35

## 2024-09-02 RX ADMIN — ALBUMIN (HUMAN) 50 MILLILITER(S): 5 SOLUTION INTRAVENOUS at 05:10

## 2024-09-02 RX ADMIN — HYDROMORPHONE HYDROCHLORIDE 2 MILLIGRAM(S): 2 TABLET ORAL at 17:33

## 2024-09-02 RX ADMIN — SODIUM CHLORIDE 1 GRAM(S): 9 INJECTION INTRAMUSCULAR; INTRAVENOUS; SUBCUTANEOUS at 05:05

## 2024-09-02 RX ADMIN — Medication 6 MILLIGRAM(S): at 22:35

## 2024-09-02 RX ADMIN — POLYETHYLENE GLYCOL 3350 17 GRAM(S): 17 POWDER, FOR SOLUTION ORAL at 17:32

## 2024-09-02 RX ADMIN — HYDROMORPHONE HYDROCHLORIDE 2 MILLIGRAM(S): 2 TABLET ORAL at 06:05

## 2024-09-02 RX ADMIN — Medication 5000 UNIT(S): at 05:05

## 2024-09-02 RX ADMIN — Medication 5000 UNIT(S): at 13:04

## 2024-09-02 RX ADMIN — HYDROMORPHONE HYDROCHLORIDE 2 MILLIGRAM(S): 2 TABLET ORAL at 00:30

## 2024-09-02 RX ADMIN — ALBUMIN (HUMAN) 50 MILLILITER(S): 5 SOLUTION INTRAVENOUS at 23:41

## 2024-09-02 RX ADMIN — SODIUM ZIRCONIUM CYCLOSILICATE 10 GRAM(S): 5 POWDER, FOR SUSPENSION ORAL at 13:05

## 2024-09-02 RX ADMIN — SODIUM ZIRCONIUM CYCLOSILICATE 10 GRAM(S): 5 POWDER, FOR SUSPENSION ORAL at 07:10

## 2024-09-02 RX ADMIN — HYDROMORPHONE HYDROCHLORIDE 2 MILLIGRAM(S): 2 TABLET ORAL at 11:02

## 2024-09-02 RX ADMIN — GUAIFENESIN 100 MILLIGRAM(S): 100 LIQUID ORAL at 17:33

## 2024-09-02 RX ADMIN — HYDROMORPHONE HYDROCHLORIDE 2 MILLIGRAM(S): 2 TABLET ORAL at 05:05

## 2024-09-02 RX ADMIN — GUAIFENESIN 100 MILLIGRAM(S): 100 LIQUID ORAL at 22:58

## 2024-09-02 NOTE — PROGRESS NOTE ADULT - PROBLEM SELECTOR PLAN 4
Reports dysuria and hematuria x1 week, no urinary frequency though. Here noted to have leukocytosis to 17k with tachycardia and positive UA concerning for sepsis 2/2 UTI.  Ucx - E. Coli, E. Faecalis (pan-sensitive)  Bcx : NGTD  - Denied any dysuria or difficulty urinating 09/01    - s/p Ceftriaxone 1g for 7 days (8/24-08/31)  - Likely has poor clearance 2/2 worsening liver mets Stage IV colorectal cancer with mets to lung and liver s/p chemo (4th line), Currently getting RT to his R hip. Follows with Dr. Dre Gardner at [facility]. Patient has terminal illness  CEA 1142    - Patient and family requested home hospice with comfort care but still asking for daily labs still  - Patient appointed both sisters as healthcare proxies  - F/u on Heme onc recs. No plan for chemo at this time  - Palliative consulted, follow up recs.   - CM setting up home hospice  - Trend CBC. CMP, coags    Pain:  - IV dilaudid 2mg q6h standing, per Pall care  -IV dilaudid 0.5mg q4h prn for breakthrough pain (No breakthrough use last 24 hours)  - Now DNR/DNI pending home hospice.  - Appreciate further palliative pain recs

## 2024-09-02 NOTE — PROGRESS NOTE ADULT - PROBLEM SELECTOR PLAN 6
Subjective:       History was provided by the mother. Irving Chan is a 15 m.o. male who presents for evaluation of sore throat. Symptoms began several days ago. Pain is moderate. Fever is present, low grade, 100-101. Other associated symptoms have included decreased appetite. Fluid intake is fair. There has been contact with an individual with known strep. Current medications include none. No past medical history on file. There are no active problems to display for this patient. Past Surgical History:   Procedure Laterality Date    FRENULECTOMY  2018    dr smith     Current Outpatient Medications   Medication Sig Dispense Refill    amoxicillin (AMOXIL) 400 MG/5ML suspension Take 6.6 mLs by mouth 2 times daily for 10 days 132 mL 0    DiphenhydrAMINE HCl (BENADRYL PO) Take by mouth      acetaminophen (TYLENOL) 160 MG/5ML suspension Take 160 mg by mouth       No current facility-administered medications for this visit. Allergies   Allergen Reactions    Milk Protein        Review of Systems  Pertinent items are noted in HPI       Objective:      Pulse 96   Temp 100.1 °F (37.8 °C)   Wt 25 lb 12.8 oz (11.7 kg)     General: alert, appears stated age, cooperative and flushed   HEENT:  right and left TM red, dull, bulging and tonsils red, enlarged, with exudate present   Neck: moderate anterior cervical adenopathy, supple, symmetrical, trachea midline and thyroid not enlarged, symmetric, no tenderness/mass/nodules   Lungs: clear to auscultation bilaterally   Heart: regular rate and rhythm, S1, S2 normal, no murmur, click, rub or gallop   Skin:  reveals no rash           Assessment/Plan:   Diagnosis Orders   1. Acute viral pharyngitis  POCT rapid strep A    amoxicillin (AMOXIL) 400 MG/5ML suspension   2. Streptococcal sore throat  amoxicillin (AMOXIL) 400 MG/5ML suspension     -Positive in office strep testing with recent exposure. Antibiotics sent to the pharmacy.  Patient will remain out out of  until 24 hours on medication or without fever. Advised mother taken directly to the emergency department if anything worsens in the interim. Counseled regarding above diagnosis, including possible risks and complications,  especially if left uncontrolled. Counseled regarding the possible side effects, risks, benefits and alternatives to treatment; patient and/or guardian verbalizes understanding, agrees, feels comfortable with and wishes to proceed with above treatment plan. Call or go to ED immediately if symptoms worsen or persist. Advised patient to call with any new medication issues, and, as applicable, read allRx info from pharmacy to assure aware of all possible risks and side effects of medication before taking. As applicable, educational materialsand/or home exercises printed for patient's review and were included in patient instructions on his/her After Visit Summary and given to patient at the end of visit. Patient and/or guardian given opportunity to ask questions/raise concerns. The patient verbalized comfort and understanding ofinstructions. Willard Escobedo D.O.   12:06 PM  5/14/2019       This document may have been prepared at least partially through the use of voice recognition software. Although effort is taken to assure the accuracy of this document, it is possible that grammatical, syntax,  or spelling errors may occur. Hyperglycemic on admission to >400. Given Lispro 6u in ED. BHB 0.8. No acidosis and AG was wnl.  - Only on oral metformin and glipizide at home  - LDSS, will likely need standing  - A1c 8.2 08/24  - BHB 08 --> 0.2  - Anion gap 15 --> 13    Plan:  - Hold metformin and glipizide  - Lantus 13u qhs and 4u premeal. ISS pre meal and nightly  - Trend BG #Dyspnea on exertion  #BLE edema  Presenting with SOB w/ exertion and hypoxia. CTA without PE or pleural effusion, but with increased burden of pulmonary mets and liver mets. Trp <6 x 2. BNP 76. Bedside POCUS with normal cardiac contractility. Inspiratory stridor on physical exam (now improved). Suspect his AHRF is in setting of worsening metastatic disease, lower suspicion for PNA as no infiltrates noted, and lower suspicion for ADHF as patient with low proBNP, no pulm edema/effusions.  - On 2L NC for comfort PRN  - TTE difficult study  - BLE Duplex neg for DVT    Plan:  - Duonebs q6 prn  - May require d/c on home O2 for comfort

## 2024-09-02 NOTE — PROGRESS NOTE ADULT - SUBJECTIVE AND OBJECTIVE BOX
incomplete Patient is a 54y old  Male who presents with a chief complaint of SOB (02 Sep 2024 07:06)      INTERVAL HX:  No acute overnight events. Pt seen and examined at bedside. Pt reported that he has no new concerns but continues to endorses b/l LE swelling and shortness of breath. Denies chest pain, headaches, fevers/chills, n/v. Discussed plan of care w/ patient.    Allergies:  No Known Allergies    Medications:  albumin human 25% IVPB 50 milliLiter(s) IV Intermittent every 6 hours  albuterol/ipratropium for Nebulization 3 milliLiter(s) Nebulizer every 6 hours PRN  benzonatate 100 milliGRAM(s) Oral every 8 hours PRN  chlorhexidine 2% Cloths 1 Application(s) Topical daily  dextrose 5%. 1000 milliLiter(s) IV Continuous <Continuous>  dextrose 5%. 1000 milliLiter(s) IV Continuous <Continuous>  dextrose 50% Injectable 12.5 Gram(s) IV Push once  dextrose 50% Injectable 25 Gram(s) IV Push once  dextrose 50% Injectable 25 Gram(s) IV Push once  dextrose Oral Gel 15 Gram(s) Oral once PRN  glucagon  Injectable 1 milliGRAM(s) IntraMuscular once  heparin   Injectable 5000 Unit(s) SubCutaneous every 8 hours  HYDROmorphone   Tablet 2 milliGRAM(s) Oral every 6 hours  HYDROmorphone  Injectable 0.5 milliGRAM(s) IV Push every 4 hours PRN  insulin glargine Injectable (LANTUS) 8 Unit(s) SubCutaneous at bedtime  insulin lispro (ADMELOG) corrective regimen sliding scale   SubCutaneous three times a day before meals  insulin lispro (ADMELOG) corrective regimen sliding scale   SubCutaneous at bedtime  insulin lispro Injectable (ADMELOG) 4 Unit(s) SubCutaneous three times a day before meals  melatonin 6 milliGRAM(s) Oral at bedtime  polyethylene glycol 3350 17 Gram(s) Oral two times a day  senna 2 Tablet(s) Oral at bedtime  sodium chloride 1 Gram(s) Oral two times a day  sodium zirconium cyclosilicate 10 Gram(s) Oral three times a day    Vitals:  T(C): 36.5 (09-02-24 @ 05:11), Max: 36.9 (09-01-24 @ 21:54)  HR: 102 (09-02-24 @ 05:11) (102 - 106)  BP: 129/72 (09-02-24 @ 05:11) (109/70 - 129/72)  RR: 17 (09-02-24 @ 05:16) (17 - 18)  SpO2: 97% (09-02-24 @ 05:16) (91% - 100%)  I/O's:    09-01-24 @ 07:01  -  09-02-24 @ 07:00  --------------------------------------------------------  IN: 100 mL / OUT: 400 mL / NET: -300 mL      Physical Exam:  GENERAL: resting in bed, NAD  HEAD: normocephalic, atraumatic  HEENT: +scleral icterus, oral mucosa dry  CARDIAC: RRR  PULM: normal breath sounds, clear to ascultation bilaterally, no rales, rhonchi, wheezing  GI: Abd distended but soft, nontender, no rebound tenderness, no guarding, no rigidity  NEURO: no focal motor or sensory deficits, AAOx3  MSK: b/l LE edema to level of thighs  SKIN: well-perfused, extremities warm, no visible rashes  PSYCH: appropriate mood and affect      Labs:                        9.8    15.26 )-----------( 191      ( 02 Sep 2024 06:57 )             29.4     09-02    135  |  98  |  109<H>  ----------------------------<  79  5.7<H>   |  14<L>  |  4.31<H>    Ca    9.2      02 Sep 2024 06:57  Phos  5.6     09-02  Mg     3.30     09-02    TPro  6.1  /  Alb  3.5  /  TBili  31.2<H>  /  DBili  x   /  AST  241<H>  /  ALT  124<H>  /  AlkPhos  1052<H>  09-02    PT/INR - ( 02 Sep 2024 06:57 )   PT: 16.4 sec;   INR: 1.47 ratio         PTT - ( 02 Sep 2024 06:57 )  PTT:38.0 sec    Radiology/Procedures: Reviewed.

## 2024-09-02 NOTE — PROGRESS NOTE ADULT - PROBLEM SELECTOR PLAN 8
Pressure stable so far    - Hold home amlodipine 10mg and Lisinopril 5mg qd in setting of worsening liver function Na 124 on admission but 129 when corrected for hyperglycemia  - Now likely 2/2 to hyperbilirubinemia  - Salt tabs  - CTM

## 2024-09-02 NOTE — PROGRESS NOTE ADULT - PROBLEM SELECTOR PLAN 3
Stage IV colorectal cancer with mets to lung and liver s/p chemo (4th line), Currently getting RT to his R hip. Follows with Dr. Dre Gardner at [facility]. Patient has terminal illness  CEA 1142    - Patient and family requested home hospice with comfort care but still asking for daily labs still  - Patient appointed both sisters as healthcare proxies    - F/u on Heme onc recs. No plan for chemo at this time  - Palliative consulted, follow up recs.   - CM setting up home hospice  - Trend CBC. CMP, coags    Pain:  - IV dilaudid 2mg q6h standing, per Pall care  -IV dilaudid 0.5mg q4h prn for breakthrough pain (No breakthrough use last 24 hours)  - Now DNR/DNI pending home hospice.  - Appreciate further palliative pain recs - Significant new transaminitis with hyperbilirubinemia to 20.4, majority direct bilirubin  - CT abd pelvis shows worsening liver mets, intrahepatic ductal dilation  - Bili elevated to 20.4 on admission with 16.6 direct.  - US Abdomen w/ doppler: Multiple liver metastases with compression on the hepatic vasculature. Small caliber right and main portal veins, without evidence of demonstrable flow. Thrombus is not excluded.  - Hep C Antibody positive but viral load undetectable.   - AMA and smooth muscle negative    Plan:  - GI signed off due to no further interventions in setting of advanced terminal disease  - Pain control as below  - Trend CMP, coags

## 2024-09-02 NOTE — PROGRESS NOTE ADULT - PROBLEM SELECTOR PLAN 2
- Significant new transaminitis with hyperbilirubinemia to 20.4, majority direct bilirubin  - CT abd pelvis shows worsening liver mets, intrahepatic ductal dilation  - Bili elevated to 20.4 on admission with 16.6 direct.  - US Abdomen w/ doppler: Multiple liver metastases with compression on the hepatic vasculature. Small caliber right and main portal veins, without evidence of demonstrable flow. Thrombus is not excluded.  - Hep C Antibody positive but viral load undetectable.   - AMA and smooth muscle negative    Plan:  - GI signed off due to no further interventions in setting of advanced terminal disease  - Pain control as below  - Trend CMP, coags K rising (max 7 hemolyzed), now s/p insulin + D50 with lokelma 3x. EKG x2 wnl. K this am 5.7    - q12 labs - check BMP 2pm  - Lokelma 10mg TID

## 2024-09-02 NOTE — PROGRESS NOTE ADULT - PROBLEM SELECTOR PLAN 1
Creat 0.94 -->1.36 --> 2.13 --> 2.90 --> 3.33 --> 3.80 (08/30/24)    Patient has anasarca as seen on CT imaging and in setting of acute liver injury, RIGO likely 2/2 to worsening hepatic function and hypovolemia.   - Concerning for hepatorenal syndrome  - s/p IV albumin 25% x 3 doses 08/26/24  - K rising (max 7 hemolyzed), now s/p insulin + D50 with lokelma 3x. EKG x2 wnl  - K this am 5.7    Plan:  - nephro following, however, in setting of terminal disease with no plans for HD there may be nothing more that can be done. Appreciate recs  - Repeat lokelma 3x today. Recheck BMP at 2pm  - Repeat insulin + D50 today  - Continue IV Albumin 25% q6h  - Continue to monitor Creat 0.94 -->1.36 --> 2.13 --> 2.90 --> 3.33 --> 3.80 (08/30/24); anasarca as seen on CT imaging and in setting of acute liver injury, RIGO likely 2/2 to worsening hepatic function and hypovolemia.   - Concerning for hepatorenal syndrome  - s/p IV albumin 25% x 3 doses 08/26/24    Plan:  - nephro following, however, in setting of terminal disease with no plans for HD there may be nothing more that can be done. Appreciate recs  - q12 labs - check BMP 2pm  - Continue IV Albumin 25% q6h

## 2024-09-02 NOTE — PROGRESS NOTE ADULT - PROBLEM SELECTOR PLAN 7
Na 124 on admission but 129 when corrected for hyperglycemia  - Now likely 2/2 to hyperbilirubinemia  - Salt tabs  - CTM Hyperglycemic on admission to >400. Given Lispro 6u in ED. BHB 0.8. No acidosis and AG was wnl.  - Only on oral metformin and glipizide at home  - LDSS, will likely need standing  - A1c 8.2 08/24  - BHB 08 --> 0.2  - Anion gap 15 --> 13    Plan:  - Hold metformin and glipizide  - Lantus 13u qhs and 4u premeal. ISS pre meal and nightly  - Trend BG

## 2024-09-02 NOTE — PROGRESS NOTE ADULT - PROBLEM SELECTOR PLAN 11
- Patient reports taking Xarelto 10mg daily but not sure why, denies history of VTE, also reports taking metformin but last prescribed last year as per [employer], also states he is on duloxetine for his b/l peripheral neuropathy but not seen on [facility]    - Per [facility] patient last filled Xarelto 05/24 for 30 days. His sister Ally was also unsure about the meds he takes  - Med Hx pharmacist emailed to help verify his medications DVT ppx: heparin subq  Diet: Consistent carb/DASH  Dispo: pending clinical course. Likely D/C on home hospice    Code Status: FULL CODE DVT ppx: heparin subq  Diet: Consistent carb/DASH  Dispo: Plan for D/C on home hospice    Code Status: DNR/DNI

## 2024-09-02 NOTE — PROGRESS NOTE ADULT - PROBLEM SELECTOR PLAN 10
DVT ppx: heparin subq  Diet: Consistent carb/DASH  Dispo: pending clinical course. Likely D/C on home hospice    Code Status: FULL CODE - Hold home rosuvastatin 10mg qd given elevated transaminases

## 2024-09-02 NOTE — PROGRESS NOTE ADULT - PROBLEM SELECTOR PLAN 5
#Dyspnea on exertion  #BLE edema  Presenting with SOB w/ exertion and hypoxia. CTA without PE or pleural effusion, but with increased burden of pulmonary mets and liver mets. Trp <6 x 2. BNP 76. Bedside POCUS with normal cardiac contractility. Inspiratory stridor on physical exam (now improved). Suspect his AHRF is in setting of worsening metastatic disease, lower suspicion for PNA as no infiltrates noted, and lower suspicion for ADHF as patient with low proBNP, no pulm edema/effusions.  - On 2L NC for comfort PRN  - TTE difficult study  - BLE Duplex neg for DVT    Plan:  - Duonebs q6 prn  - May require d/c on home O2 for comfort Reports dysuria and hematuria x1 week, no urinary frequency though. Here noted to have leukocytosis to 17k with tachycardia and positive UA concerning for sepsis 2/2 UTI.  Ucx - E. Coli, E. Faecalis (pan-sensitive)  Bcx : NGTD  - Denied any dysuria or difficulty urinating 09/01    - s/p Ceftriaxone 1g for 7 days (8/24-08/31)  - Likely has poor clearance 2/2 worsening liver mets

## 2024-09-02 NOTE — PROGRESS NOTE ADULT - ASSESSMENT
BASSAM LOGAN is a 54y Male with a hx of Stage IV colorectal cancer w/ mets to liver and lung s/p chemo (4th line), HTN, HLD, and T2DM who presents with SOB, found to be in acute hypoxic respiratory failure with profound transaminitis. Recent imaging shows progression of metastatic liver & lung disease. Dispo planning for home hospice BASSAM LOGAN is a 54y Male with a hx of Stage IV colorectal cancer w/ mets to liver and lung s/p chemo (4th line), HTN, HLD, and T2DM who presented with SOB, admitted for AHRF and acute liver injury i/s/o progression of metastatic disease. Dispo planning for home hospice

## 2024-09-02 NOTE — PROGRESS NOTE ADULT - PROBLEM SELECTOR PLAN 9
- Hold home rosuvastatin 10mg qd given elevated transaminases Pressure stable so far    - Hold home amlodipine 10mg and Lisinopril 5mg qd in setting of worsening liver function

## 2024-09-03 LAB
ALBUMIN SERPL ELPH-MCNC: 3.7 G/DL — SIGNIFICANT CHANGE UP (ref 3.3–5)
ALP SERPL-CCNC: 1129 U/L — HIGH (ref 40–120)
ALT FLD-CCNC: 133 U/L — HIGH (ref 4–41)
ANION GAP SERPL CALC-SCNC: 27 MMOL/L — HIGH (ref 7–14)
AST SERPL-CCNC: 241 U/L — HIGH (ref 4–40)
BILIRUB SERPL-MCNC: 33.9 MG/DL — HIGH (ref 0.2–1.2)
BUN SERPL-MCNC: 117 MG/DL — HIGH (ref 7–23)
CALCIUM SERPL-MCNC: 9.4 MG/DL — SIGNIFICANT CHANGE UP (ref 8.4–10.5)
CHLORIDE SERPL-SCNC: 97 MMOL/L — LOW (ref 98–107)
CO2 SERPL-SCNC: 13 MMOL/L — LOW (ref 22–31)
CREAT SERPL-MCNC: 4.74 MG/DL — HIGH (ref 0.5–1.3)
EGFR: 14 ML/MIN/1.73M2 — LOW
GLUCOSE BLDC GLUCOMTR-MCNC: 129 MG/DL — HIGH (ref 70–99)
GLUCOSE BLDC GLUCOMTR-MCNC: 141 MG/DL — HIGH (ref 70–99)
GLUCOSE BLDC GLUCOMTR-MCNC: 152 MG/DL — HIGH (ref 70–99)
GLUCOSE BLDC GLUCOMTR-MCNC: 178 MG/DL — HIGH (ref 70–99)
GLUCOSE SERPL-MCNC: 119 MG/DL — HIGH (ref 70–99)
HCT VFR BLD CALC: 32.7 % — LOW (ref 39–50)
HGB BLD-MCNC: 10.8 G/DL — LOW (ref 13–17)
MAGNESIUM SERPL-MCNC: 3.2 MG/DL — HIGH (ref 1.6–2.6)
MCHC RBC-ENTMCNC: 24.9 PG — LOW (ref 27–34)
MCHC RBC-ENTMCNC: 33 GM/DL — SIGNIFICANT CHANGE UP (ref 32–36)
MCV RBC AUTO: 75.5 FL — LOW (ref 80–100)
NRBC # BLD: 0 /100 WBCS — SIGNIFICANT CHANGE UP (ref 0–0)
NRBC # FLD: 0.08 K/UL — HIGH (ref 0–0)
PHOSPHATE SERPL-MCNC: 6.9 MG/DL — HIGH (ref 2.5–4.5)
PLATELET # BLD AUTO: 204 K/UL — SIGNIFICANT CHANGE UP (ref 150–400)
POTASSIUM SERPL-MCNC: 5.6 MMOL/L — HIGH (ref 3.5–5.3)
POTASSIUM SERPL-SCNC: 5.6 MMOL/L — HIGH (ref 3.5–5.3)
PROT SERPL-MCNC: 6.4 G/DL — SIGNIFICANT CHANGE UP (ref 6–8.3)
RBC # BLD: 4.33 M/UL — SIGNIFICANT CHANGE UP (ref 4.2–5.8)
RBC # FLD: 22.6 % — HIGH (ref 10.3–14.5)
SODIUM SERPL-SCNC: 137 MMOL/L — SIGNIFICANT CHANGE UP (ref 135–145)
WBC # BLD: 16.99 K/UL — HIGH (ref 3.8–10.5)
WBC # FLD AUTO: 16.99 K/UL — HIGH (ref 3.8–10.5)

## 2024-09-03 PROCEDURE — 99233 SBSQ HOSP IP/OBS HIGH 50: CPT

## 2024-09-03 PROCEDURE — 99232 SBSQ HOSP IP/OBS MODERATE 35: CPT | Mod: GC

## 2024-09-03 RX ORDER — SODIUM ZIRCONIUM CYCLOSILICATE 5 G/5G
10 POWDER, FOR SUSPENSION ORAL THREE TIMES A DAY
Refills: 0 | Status: COMPLETED | OUTPATIENT
Start: 2024-09-03 | End: 2024-09-04

## 2024-09-03 RX ADMIN — HYDROMORPHONE HYDROCHLORIDE 2 MILLIGRAM(S): 2 TABLET ORAL at 12:03

## 2024-09-03 RX ADMIN — Medication 6 MILLIGRAM(S): at 23:27

## 2024-09-03 RX ADMIN — HYDROMORPHONE HYDROCHLORIDE 2 MILLIGRAM(S): 2 TABLET ORAL at 06:02

## 2024-09-03 RX ADMIN — HYDROMORPHONE HYDROCHLORIDE 2 MILLIGRAM(S): 2 TABLET ORAL at 23:26

## 2024-09-03 RX ADMIN — HYDROMORPHONE HYDROCHLORIDE 2 MILLIGRAM(S): 2 TABLET ORAL at 11:03

## 2024-09-03 RX ADMIN — ALBUMIN (HUMAN) 50 MILLILITER(S): 5 SOLUTION INTRAVENOUS at 05:05

## 2024-09-03 RX ADMIN — HYDROMORPHONE HYDROCHLORIDE 2 MILLIGRAM(S): 2 TABLET ORAL at 17:02

## 2024-09-03 RX ADMIN — SODIUM CHLORIDE 1 GRAM(S): 9 INJECTION INTRAMUSCULAR; INTRAVENOUS; SUBCUTANEOUS at 05:03

## 2024-09-03 RX ADMIN — CHLORHEXIDINE GLUCONATE 1 APPLICATION(S): 40 SOLUTION TOPICAL at 13:10

## 2024-09-03 RX ADMIN — HYDROMORPHONE HYDROCHLORIDE 2 MILLIGRAM(S): 2 TABLET ORAL at 05:02

## 2024-09-03 RX ADMIN — POLYETHYLENE GLYCOL 3350 17 GRAM(S): 17 POWDER, FOR SOLUTION ORAL at 05:05

## 2024-09-03 RX ADMIN — SODIUM CHLORIDE 1 GRAM(S): 9 INJECTION INTRAMUSCULAR; INTRAVENOUS; SUBCUTANEOUS at 17:02

## 2024-09-03 RX ADMIN — SODIUM ZIRCONIUM CYCLOSILICATE 10 GRAM(S): 5 POWDER, FOR SUSPENSION ORAL at 13:07

## 2024-09-03 RX ADMIN — SODIUM ZIRCONIUM CYCLOSILICATE 10 GRAM(S): 5 POWDER, FOR SUSPENSION ORAL at 07:06

## 2024-09-03 RX ADMIN — SODIUM ZIRCONIUM CYCLOSILICATE 10 GRAM(S): 5 POWDER, FOR SUSPENSION ORAL at 02:03

## 2024-09-03 RX ADMIN — Medication 2 TABLET(S): at 23:27

## 2024-09-03 RX ADMIN — Medication 5000 UNIT(S): at 05:03

## 2024-09-03 RX ADMIN — Medication 2: at 13:06

## 2024-09-03 RX ADMIN — ALBUMIN (HUMAN) 50 MILLILITER(S): 5 SOLUTION INTRAVENOUS at 11:03

## 2024-09-03 RX ADMIN — HYDROMORPHONE HYDROCHLORIDE 2 MILLIGRAM(S): 2 TABLET ORAL at 18:02

## 2024-09-03 RX ADMIN — GUAIFENESIN 100 MILLIGRAM(S): 100 LIQUID ORAL at 23:34

## 2024-09-03 RX ADMIN — HYDROMORPHONE HYDROCHLORIDE 2 MILLIGRAM(S): 2 TABLET ORAL at 00:40

## 2024-09-03 RX ADMIN — SODIUM ZIRCONIUM CYCLOSILICATE 10 GRAM(S): 5 POWDER, FOR SUSPENSION ORAL at 23:27

## 2024-09-03 NOTE — PROGRESS NOTE ADULT - PROBLEM SELECTOR PLAN 1
- c/w IV dilaudid 0.5 mg q4h PRN for severe pain (PRN use in past 24 hours from 8 AM to 8 AM: IV dilaudid 0.5 mg x 2 doses)  - Pain improved  - PRN use in past 24 hours from 8 AM to 8 AM: none   - c/w PO dilaudid 2 mg q6h ATC   - c/w IV dilaudid 0.5 mg q4h PRN for breakthrough pain - Pain improved  - PRN use in past 24 hours from 8 AM to 8 AM: none   - c/w PO dilaudid 2 mg q6h ATC   - c/w IV dilaudid 0.5 mg q4h PRN for breakthrough pain

## 2024-09-03 NOTE — PROGRESS NOTE ADULT - PROBLEM SELECTOR PLAN 1
Creat 0.94 -->1.36 --> 2.13 --> 2.90 --> 3.33 --> 3.80 (08/30/24); anasarca as seen on CT imaging and in setting of acute liver injury, RIGO likely 2/2 to worsening hepatic function and hypovolemia.   - Concerning for hepatorenal syndrome  - s/p IV albumin 25% x 3 doses 08/26/24    Plan:  - nephro following, however, in setting of terminal disease with no plans for HD there may be nothing more that can be done. Appreciate recs  - will stop w/ blood draws, d/w patient  - Pt w/o IV access and did not want new IV, d/rosette IV albumin

## 2024-09-03 NOTE — PROGRESS NOTE ADULT - PROBLEM SELECTOR PLAN 6
Chief Complaint   Patient presents with    Back Pain    Neck Pain     Med refill         PMH     history of chronic multisite body pain  History of previous cervical thoracic and lumbar spine extensive fusion surgeries  Continues to suffer from chronic pain  On chronic opioid therapy in this clinic for several years  We have gradually weaned her opioid with slow titration  She is prescribed MS Contin 15 mg twice a day along with Percocet 7.5mg twice a day     Back  She has tried and failed SCS implanted in 2012 at [facility] but it was removed as it was not functioning well.  Lumbar MRI 2/2023   Posterior hardware fixation from L2-S1 with a prominent postoperative seroma in the adjacent posterior paraspinal soft tissues.  Multilevel degenerative change with canal stenosis at L1-2.  Moderate left and severe right foraminal narrowing at L1-2.   Thoracic MRI Multilevel degenerative disc disease with associated facet and ligamentous hypertrophy throughout the thoracic spine resulting in canal stenosis and foraminal narrowing bilaterally as described above.   7/11/23  s/p laminectomy and partial facetectomy T10, T11, T12, L1, as well as lysis of adhesions L1-L2.    Pt saw NS NP 11/2023  for f/u with POC to cont PT and EMG (scheduled for April )    Here today to f/u after bilat RFA of  the primary dorsal ramus of L5 and lateral branches of S1 and S2 nerves done 1/24/24 and not sure of relief yet, still sore from procedure and had recent trip and fall.      Neck  History of chronic neck pain with radiation down right arm  Diagnosis cervical stenosis  History of previous cervical surgery  May consider for cervical epidural steroid injection in future  12/21 Cervcial MRI   No fracture or bony destructive lesion. 2. Status post ACDF from C3-C5. 3. Mild central canal stenoses at C3-4, C5-6, C6-7 and C7-T1. 4.  Multilevel neural foraminal stenoses, worst (moderate to severe) at the left C6-7 level   Plans to  #Dyspnea on exertion  #BLE edema  Presenting with SOB w/ exertion and hypoxia. CTA without PE or pleural effusion, but with increased burden of pulmonary mets and liver mets. Trp <6 x 2. BNP 76. Bedside POCUS with normal cardiac contractility. Inspiratory stridor on physical exam (now improved). Suspect his AHRF is in setting of worsening metastatic disease, lower suspicion for PNA as no infiltrates noted, and lower suspicion for ADHF as patient with low proBNP, no pulm edema/effusions.  - On 2L NC for comfort PRN  - TTE difficult study  - BLE Duplex neg for DVT    Plan:  - Duonebs q6 prn

## 2024-09-03 NOTE — PROGRESS NOTE ADULT - PROBLEM SELECTOR PLAN 4
For pain management   Pending home hospice referral     In the event of worsening symptoms, please contact the Palliative Medicine team via pager (if the patient is at [facility] #[phone] or if the patient is at [facility] #[phone]) The Geriatric and Palliative Medicine service has coverage 24 hours a day/ 7 days a week to provide medical recommendations regarding symptom management needs via telephone. For pain management   Pending home hospice referral   Discussed with child life     In the event of worsening symptoms, please contact the Palliative Medicine team via pager (if the patient is at [facility] #[phone] or if the patient is at [facility] #[phone]) The Geriatric and Palliative Medicine service has coverage 24 hours a day/ 7 days a week to provide medical recommendations regarding symptom management needs via telephone.

## 2024-09-03 NOTE — PROGRESS NOTE ADULT - PROBLEM SELECTOR PLAN 2
- Diagnosed in 2021    - Diffuse mets- liver, lungs   - POD despite multiple lines of treatment   - Follows with Dr. Mathew at [facility]  - Poor prognosis, T bili elevated   - Worsening acute renal failure   - Hospice recommended

## 2024-09-03 NOTE — PROGRESS NOTE ADULT - PROBLEM SELECTOR PLAN 11
DVT ppx: heparin subq  Diet: Consistent carb/DASH  Dispo: Plan for D/C on home hospice  likely tomorrow  Code Status: DNR/DNI

## 2024-09-03 NOTE — PROGRESS NOTE ADULT - SUBJECTIVE AND OBJECTIVE BOX
Patient is a 54y old  Male who presents with a chief complaint of SOB (03 Sep 2024 11:49)      INTERVAL HX:  Pt seen and examined at bedside. No acute overnight events. No acute complaints elicited. Contacted by RN as pt was requesting A-stick after difficulty obtaining AM labs; d/w patient at bedside. Patient also lost IV access and did not want new IV placed. Opting to defer lab draws, likely discharge tomorrow     Allergies:  No Known Allergies    Medications:  albuterol/ipratropium for Nebulization 3 milliLiter(s) Nebulizer every 6 hours PRN  benzonatate 100 milliGRAM(s) Oral every 8 hours PRN  chlorhexidine 2% Cloths 1 Application(s) Topical daily  dextrose 5%. 1000 milliLiter(s) IV Continuous <Continuous>  dextrose 5%. 1000 milliLiter(s) IV Continuous <Continuous>  dextrose 50% Injectable 25 Gram(s) IV Push once  dextrose 50% Injectable 12.5 Gram(s) IV Push once  dextrose 50% Injectable 25 Gram(s) IV Push once  dextrose Oral Gel 15 Gram(s) Oral once PRN  glucagon  Injectable 1 milliGRAM(s) IntraMuscular once  guaiFENesin Oral Liquid (Sugar-Free) 100 milliGRAM(s) Oral two times a day PRN  HYDROmorphone   Tablet 2 milliGRAM(s) Oral every 6 hours  HYDROmorphone  Injectable 0.5 milliGRAM(s) IV Push every 4 hours PRN  insulin glargine Injectable (LANTUS) 8 Unit(s) SubCutaneous at bedtime  insulin lispro (ADMELOG) corrective regimen sliding scale   SubCutaneous three times a day before meals  insulin lispro (ADMELOG) corrective regimen sliding scale   SubCutaneous at bedtime  insulin lispro Injectable (ADMELOG) 4 Unit(s) SubCutaneous three times a day before meals  melatonin 6 milliGRAM(s) Oral at bedtime  polyethylene glycol 3350 17 Gram(s) Oral two times a day  senna 2 Tablet(s) Oral at bedtime  sodium chloride 1 Gram(s) Oral two times a day  sodium zirconium cyclosilicate 10 Gram(s) Oral three times a day    Vitals:  T(C): 36.7 (09-03-24 @ 15:06), Max: 36.7 (09-03-24 @ 15:06)  HR: 97 (09-03-24 @ 15:06) (97 - 102)  BP: 136/76 (09-03-24 @ 15:06) (130/78 - 136/76)  RR: 17 (09-03-24 @ 15:06) (17 - 17)  SpO2: 98% (09-03-24 @ 15:06) (96% - 98%)  I/O's:    Physical Exam:  GENERAL: resting in bed, NAD  HEAD: normocephalic, atraumatic  HEENT: +scleral icterus, oral mucosa dry  CARDIAC: RRR  PULM: no increased WOB, NC in place  GI: Abd distended but soft, nontender, no rebound tenderness, no guarding, no rigidity  NEURO: no focal motor or sensory deficits, AAOx3  MSK: b/l LE edema to level of thighs  SKIN: well-perfused, extremities warm, no visible rashes      Labs:                        10.8   16.99 )-----------( 204      ( 03 Sep 2024 09:40 )             32.7     09-03    137  |  97<L>  |  117<H>  ----------------------------<  119<H>  5.6<H>   |  13<L>  |  4.74<H>    Ca    9.4      03 Sep 2024 09:54  Phos  6.9     09-03  Mg     3.20     09-03    TPro  6.4  /  Alb  3.7  /  TBili  33.9<H>  /  DBili  x   /  AST  241<H>  /  ALT  133<H>  /  AlkPhos  1129<H>  09-03    PT/INR - ( 02 Sep 2024 06:57 )   PT: 16.4 sec;   INR: 1.47 ratio         PTT - ( 02 Sep 2024 06:57 )  PTT:38.0 sec    Radiology/Procedures: Reviewed.

## 2024-09-03 NOTE — PROGRESS NOTE ADULT - PROBLEM SELECTOR PLAN 4
Stage IV colorectal cancer with mets to lung and liver s/p chemo (4th line), Currently getting RT to his R hip. Follows with Dr. Dre Gardner at [facility]. Patient has terminal illness  CEA 1142    - Patient and family requested home hospice with comfort care, initially requesting daily labs but today discussed discontinuation of labs  - Patient appointed both sisters as healthcare proxies  - F/u on Heme onc recs. No plan for chemo at this time  - Palliative consulted, follow up recs.   - CM setting up home hospice  - Trend CBC. CMP, coags    Pain:  - dilaudid 2mg q6 ordered  - pt lost IV access, does not want new IV  - Now DNR/DNI pending home hospice.  - Appreciate further palliative pain recs

## 2024-09-03 NOTE — PROGRESS NOTE ADULT - PROBLEM SELECTOR PLAN 3
- 8/26- See GOC. Discussed hospice, code status. Patient appointed sisters Zachary and Ally as HCP. Sisters interested in second opinion at [facility]  - 8/28- See GO. Patient agreed to DNR/DNI, home hospice referral

## 2024-09-03 NOTE — PROGRESS NOTE ADULT - ATTENDING COMMENTS
55 yo M with metastatic colon cancer to liver, lung and hip now admitted for acute hypoxic respiratory failure and acute liver injury with jaundice. CT scan shows extensive progression of disease and large tumor burden. We have had extensive GoC discussion at bedside with multiple family members (sister, cousins) and patient. Patient wants to pursue comfort care which is appropriate given worsening liver function and rising bilirubin. Please consult palliative care for help with symptom management and make referral for hospice care. Discussed with patient's oncologist Dr. Gardner and patient and family at bedside.
Taryn Granda is a 54 year old gentleman with a PMHx of Stage IV colorectal cancer w/ mets to liver and lung s/p chemo, now undergoing palliative radiation, HTN, HLD, and T2DM who presented to the hospital with SOB. Noted to have markedly elevated liver enzymes for which hepatology team is following.    Predominantly cholestatic pattern. Imaging shows increased metastatic burden with mild to mod intrahepatic bile duct dilation (increased from prior).   His elevated liver chemistries is likely due to his worsened liver metastatic disease and infiltration as noted on CT abdomen and US of the abdomen.  This is in the setting of progressive disease while on treatment. Seen by Oncology team who have determined that he has limited options.  We discussed with the pt with his family at bedside. Although he is open to proceeding with palliative care, he is willing to be evaluated for a second opinion per his family's urging.   MRCP can be considered if within his goals of care to ensure no other underlying obstructive process   Can Trial Ursodiol to see if it helps.    Will follow      Yanira Santos MD/MPH  Transplant Hepatology
Patient seen and evaluated this morning.  Multifactorial RIGO.  Worsening today.  BP continues to be low.  Advise albumin.  Patient stated he is interested in going home with hospice.  No objection to d/c with home hospice.
Patient is seen and examined with the resident. Agree with above assessment and plan. patient admitted with worsening SOB, due to extensive lung mets. Will provide symptomatic treatment with oxygen and duonebs as needed. Also found to have transaminitis due to liver mets, will follow GI consult. Follow imaging studies. follow palliative care consult. On Iv abx for UTI. patient is deciding for Home Hospice care.
54 year old male with a history of Stage IV colorectal cancer with metastases to liver, lung, and bone s/p chemo, undergoing RT, HTN, Type 2 DM, HLD admitted for acute hypoxic respiratory failure and acute liver injury related to widespread metastatic disease.    Patient seen and examined at bedside today. Patient reports feeling well with no acute complaints today.     #Acute liver injury  -likely combination of worsening metastatic disease and ischemic injury from poor blood flow noted on US Doppler  -trend CMP daily - discussed worsening liver function with patient    #Metastatic colorectal cancer  -s/p palliative chemo, now on RT outpatient  -palliative care recs appreciated  -patient DNR/DNI, pending home hospice  -c/w Dilaudid PRN for pain control, added Dilaudid 0.5mg IV q6h ATC  -US abdomen with no ascites noted  -bowel regimen; give Dulcolax suppository PRN  -very poor prognosis - likely weeks to months (pt and family made aware)    #RIGO  -suspect due to pre-renal cause, hepatorenal syndrome, possible contrast-induced nephropathy  -trial of IV albumin  -d/rosette lisinopril  -continue to trend Cr- discussed worsening renal function with patient  -nephrology following, appreciate recs    #Hyperkalemia  - S/p hyperkalemia cocktail x 2  - Obtain EKG now  - Repeat BMP at 10:00 PM  - Start Lokelma 10 gm TID for today as per Nephro    #UTI  -c/w Ceftriaxone while inpatient (8/24-8/31)  -urine culture with E. coli.
54 year old male with a history of Stage IV colorectal cancer with metastases to liver, lung, and bone s/p chemo, undergoing RT, HTN, Type 2 DM, HLD admitted for acute hypoxic respiratory failure and acute liver injury related to widespread metastatic disease.    Patient seen and examined at bedside with family at bedside. Patient reports feeling better with no acute complaints today.     #Acute liver injury  -likely combination of worsening metastatic disease and ischemic injury from poor blood flow noted on US Doppler  -trend CMP daily - discussed worsening liver function with patient    #Metastatic colorectal cancer  -s/p palliative chemo, now on RT outpatient  -palliative care recs appreciated  -patient DNR/DNI, hospice referral sent to [facility], pending acceptance  -c/w Dilaudid PRN for pain control, added Dilaudid 0.5mg IV q6h ATC  -US abdomen with no ascites noted  -bowel regimen; give Dulcolax suppository PRN  -very poor prognosis - likely weeks (pt and family made aware)    #RIGO  -suspect due to pre-renal cause, hepatorenal syndrome, possible contrast-induced nephropathy  -trial of IV albumin  -d/rosette lisinopril  -continue to trend Cr- discussed worsening renal function with patient  -nephrology following, appreciate recs    #UTI  -c/w Ceftriaxone while inpatient (8/24-8/31)  -urine culture with E. coli
54 year old male with a history of Stage IV colorectal cancer with metastases to liver, lung, and bone s/p chemo, undergoing RT, HTN, Type 2 DM, HLD admitted for acute hypoxic respiratory failure and acute liver injury related to widespread metastatic disease.    Patient seen and examined at bedside. Family at bedside. Patient reports not being able to sleep last night due to abdominal pain. Had large BM this morning after suppository. Feels more SOB today.    #Acute liver injury  -likely combination of worsening metastatic disease and ischemic injury from poor blood flow noted on US Doppler  -trend CMP daily- discussed worsening liver function with patient    #Metastatic colorectal cancer  -s/p palliative chemo, now on RT outpatient  -palliative care consulted, appreciate recs  -patient DNR/DNI, hospice referral sent  -c/w Dilaudid PRN for pain control, added Dilaudid 0.5mg IV q6h ATC  -US abdomen with no ascites noted  -bowel regimen; give Dulcolax suppository PRN  -poor prognosis- likely weeks    #RIGO  -suspect due to pre-renal cause, hepatorenal syndrome, possible contrast-induced nephropathy  -trial of IV albumin  -d/rosette lisinopril  -continue to trend Cr- discussed worsening renal function with patient  -SOB likely respiratory compensation for acidosis  -nephrology following, appreciate recs    #Hyponatremia  -suspect pseudohyponatremia due to hyperbilirubinemia    #UTI  -c/w Ceftriaxone while inpatient  -urine culture with E. coli- plan for 7 days of treatment
54-year-old male with PMH of Stage IV colorectal cancer with metastases to liver, lung, and bone s/p chemo & RT, HTN, Type 2 DM, HLD admitted for acute hypoxic respiratory failure and acute liver injury related to widespread metastatic disease.    Acute liver injury  -likely combination of worsening metastatic disease and ischemic injury from poor blood flow noted on US Doppler  -avoid hepatotoxic medications     Metastatic colorectal cancer  -s/p palliative chemo  -palliative care recs appreciated  -patient DNR/DNI, pending home hospice  -c/w Dilaudid PRN for pain control  -bowel regimen - Dulcolax suppository PRN  -very poor prognosis -    #RIGO  -likely due to pre-renal cause, hepatorenal syndrome, possible contrast-induced nephropathy  -trial of IV albumin    #Hyperkalemia  - Likely 2/2 worsening RIGO  - improved    #UTI  -urine culture with E. coli  -completed a course of ceftriaxone from 8/24-8/31    Dispo: Home hospice, DC tomorrow
Patient is seen and examined with the resident. Agree with above assessment and plan. patient admitted with worsening SOB, due to extensive lung mets. Will provide symptomatic treatment with oxygen and duonebs as needed. Also found to have transaminitis due to liver mets, will follow GI consult. Follow imaging studies. follow palliative care consult. On Iv abx for UTI.
54 year old male with a history of Stage IV colorectal cancer with metastases to liver, lung, and bone s/p chemo, undergoing RT, HTN, Type 2 DM, HLD admitted for acute hypoxic respiratory failure and acute liver injury related to widespread metastatic disease.    Patient seen and examined at bedside today. Patient reports feeling well with no acute complaints today.     #Acute liver injury  -likely combination of worsening metastatic disease and ischemic injury from poor blood flow noted on US Doppler  -trend CMP daily - discussed worsening liver function with patient    #Metastatic colorectal cancer  -s/p palliative chemo, now on RT outpatient  -palliative care recs appreciated  -patient DNR/DNI, pending home hospice  -c/w Dilaudid PRN for pain control, added Dilaudid 0.5mg IV q6h ATC  -US abdomen with no ascites noted  -bowel regimen; give Dulcolax suppository PRN  -very poor prognosis - likely weeks to months (pt and family made aware)    #RIGO  -suspect due to pre-renal cause, hepatorenal syndrome, possible contrast-induced nephropathy  -trial of IV albumin  -d/rosette lisinopril  -continue to trend Cr- discussed worsening renal function with patient  -nephrology following, appreciate recs    #Hyperkalemia  - Likely 2/2 worsening RIGO  - S/p multiple hyperkalemia cocktails  - K+ improved from 7 yesterday to 5.7 today morning   - Repeat BMP today afternoon and monitor K+ at least twice a day   - C/w Lokelma 10 gm TID for today   - Nephro f/u appreciated    #UTI  -urine culture with E. coli  -completed a course of ceftriaxone from 8/24-8/31
54 year old male with a history of Stage IV colorectal cancer with metastases to liver, lung, and bone s/p chemo, undergoing RT, HTN, Type 2 DM, HLD admitted for acute hypoxic respiratory failure and acute liver injury related to widespread metastatic disease.    Patient seen and examined at bedside with palliative care team. Multiple family members present. Discussed GOC as well as updates on current medical care. Patient would like to go home with hospice services. Reports constipation and abdominal pain.    #Acute liver injury  -likely combination of worsening metastatic disease and ischemic injury from poor blood flow noted on US Doppler  -trend CMP daily    #Metastatic colorectal cancer  -s/p palliative chemo, now on RT outpatient  -palliative care consulted, appreciate recs  -GOC discussion today- patient DNR/DNI, hospice referral sent  -c/w Dilaudid PRN for pain control  -US abdomen to evaluate for ascites as cause for abdominal distention- no ascites noted  -bowel regimen; give Dulcolax suppository  -poor prognosis- likely days-weeks    #RIGO  -suspect due to pre-renal cause, hepatorenal syndrome, possible contrast-induced nephropathy  -trial of IV albumin  -d/rosette lisinopril  -continue to trend Cr  -nephrology consulted, appreciate recs    #Hyponatremia  -suspect pseudohyponatremia due to hyperbilirubinemia    #UTI  -c/w Ceftriaxone while inpatient  -urine culture with E. coli- plan for 7 days of treatment
54 year old male with a history of Stage IV colorectal cancer with metastases to liver, lung, and bone s/p chemo, undergoing RT, HTN, Type 2 DM, HLD admitted for acute hypoxic respiratory failure and acute liver injury related to widespread metastatic disease.    Patient seen and examined at bedside. Patient's sister at bedside, family on speakerphone. Family would like to pursue inpatient transfer to [facility] [place] if possible, they will call the hospital today to try to arrange for transfer. Patient states he is still having abdominal pain, morphine helped but did not help completely for the pain. Also feels constipated.    #Acute liver injury  -likely combination of worsening metastatic disease and ischemic injury from poor blood flow noted on US Doppler  -will d/c MRCP as it will likely not   -can trial IV albumin for 1 day, especially given RIGO  -trend CMP daily  -hep C Ab positive, pending RNA viral load  -poor prognosis considering metastatic disease    #Acute hypoxic respiratory failure  -likely due to worsening metastatic disease  -CT angio negative for PE  -may need supplemental O2 on discharge    #Metastatic colorectal cancer  -s/p palliative chemo, now on RT  -palliative care consulted for GOC  -bowel regimen    #RIGO  -suspect due to pre-renal cause, hepatorenal syndrome  -trial of IV albumin  -d/rosette lisinopril  -Lokelma for hyperkalemia  -continue to trend Cr    #Hyponatremia  -suspect pseudohyponatremia due to hyperbilirubinemia    #UTI  -UA positive, c/w Ceftriaxone empirically  -f/u urine culture- growing gram negative rods
54 year old male with a history of Stage IV colorectal cancer with metastases to liver, lung, and bone s/p chemo, undergoing RT, HTN, Type 2 DM, HLD admitted for acute hypoxic respiratory failure and acute liver injury related to widespread metastatic disease.    Patient seen and examined at bedside. Patient reports morphine only helps a little bit with his pain, and pain is currently 9/10. Family will be coming to visit tomorrow to discuss GOC.    #Acute liver injury  -likely combination of worsening metastatic disease and ischemic injury from poor blood flow noted on US Doppler  -trend CMP daily  -hep C Ab positive, undetectable viral load- possible resolved infection versus false positive    #Acute hypoxic respiratory failure  -likely due to worsening metastatic disease  -CT angio negative for PE  -may need supplemental O2 on discharge    #Metastatic colorectal cancer  -s/p palliative chemo, now on RT  -palliative care consulted, appreciate recs  -will change morphine to Dilaudid given significant liver impairment  -bowel regimen  -poor prognosis- likely days-weeks    #RIGO  -suspect due to pre-renal cause, hepatorenal syndrome, possible contrast-induced nephropathy  -trial of IV albumin  -d/rosette lisinopril  -continue to trend Cr  -nephrology consulted    #Hyponatremia  -suspect pseudohyponatremia due to hyperbilirubinemia    #UTI  -UA positive, c/w Ceftriaxone empirically  -f/u urine culture- growing gram negative rods
54-year-old male with PMH of Stage IV colorectal cancer with metastases to liver, lung, and bone s/p chemo & RT, HTN, Type 2 DM, HLD admitted for acute hypoxic respiratory failure and acute liver injury related to widespread metastatic disease.    Patient seen and examined at bedside today. Patient reports feeling well with no acute complaints today.     #Acute liver injury  -likely combination of worsening metastatic disease and ischemic injury from poor blood flow noted on US Doppler  -trend CMP daily - discussed worsening liver function with patient  -avoid hepatotoxic medications     #Metastatic colorectal cancer  -s/p palliative chemo, now on RT outpatient  -palliative care recs appreciated  -patient DNR/DNI, pending home hospice  -c/w Dilaudid PRN for pain control, added Dilaudid 0.5mg IV q6h ATC  -US abdomen with no ascites noted  -bowel regimen - Dulcolax suppository PRN  -very poor prognosis - likely weeks to months (pt and family made aware)    #RIGO  -likely due to pre-renal cause, hepatorenal syndrome, possible contrast-induced nephropathy  -trial of IV albumin  -d/c'ed lisinopril  -continue to trend Cr - discussed worsening renal function with patient  -nephrology following, appreciate recs    #Hyperkalemia  - Likely 2/2 worsening RIGO  - S/p multiple hyperkalemia cocktails  - K+ persistently elevated (5.7 today morning)   - Repeat BMP today afternoon and monitor K+ level twice a day   - C/w Lokelma 10 gm TID for today   - Nephro f/u appreciated    #UTI  -urine culture with E. coli  -completed a course of ceftriaxone from 8/24-8/31    Dispo: Home hospice

## 2024-09-03 NOTE — PROGRESS NOTE ADULT - SUBJECTIVE AND OBJECTIVE BOX
Indication of Geriatrics and Palliative Medicine Services:  [X  ] Complex Medical Decision Making   [X  ] Symptom/Pain management     DNR on chart: No     INTERVAL EVENTS:         -------------------------------------------------------------------------------------------------------    PRESENT SYMPTOMS:     [ ] Unable to self-report      [ ] PAINADS     [ ] RDOS    [ ] No     [X ] Yes     Source if other than patient:  [ ]Family   [ ]Team     PAIN:   If blank, patient unable to specify     [X ]yes [ ]no    1. Location- R abdominal   2. Radiation-  No   3. Quality- Like someone sitting   4. Timing- On and off   5. Minimal acceptable level/pain goal- 5/10   6. Aggravating factors- Worse with eating   7. QOL impact-     SYMPTOMS:   Dyspnea:                           [ ]Mild [ ]Moderate [ ]Severe  Anxiety:                             [ ]Mild [ ]Moderate [ ]Severe  Fatigue:                             [ ]Mild [ ]Moderate [ ]Severe  Nausea/Vomiting:              [ ]Mild [ ]Moderate [ ]Severe  Loss of appetite:                [ ]Mild [ ]Moderate [X ]Severe  Constipation:                     [ ]Mild [ ]Moderate [ ]Severe    Other Symptoms:  [X ]All other review of systems negative     -------------------------------------------------------------------------------------------------------    I STOP: [id number]    Prescription Information      PDI Filter:    PDI	Current Rx	Drug Type	Rx Written	Rx Dispensed	Drug	Quantity	Days Supply	Prescriber Name	Prescriber KATINA #	Payment Method	Dispenser  A	Y	O	08/15/2024	08/19/2024	morphine sulf er 15 mg tablet	60	30	Lulu Gomes	[id number]	[facility]  A	N	O	08/15/2024	08/19/2024	oxycodone hcl (ir) 5 mg tablet	30	3	Lulu Gomes	[id number]	[facility]  A	N	O	07/26/2024	07/26/2024	oxycodone hcl (ir) 5 mg tablet	30	3	Dre Gardner	[id number]	[facility]  A	N	O	06/28/2024	07/02/2024	oxycodone hcl (ir) 5 mg tablet	30	3	Dre Gardner	[id number]	[facility]  B	N	O	05/21/2024	05/21/2024	tramadol hcl 50 mg tablet	6	2	Emma Price	[id number]	[id number]    -------------------------------------------------------------------------------------------------------    ITEMS UNCHECKED ARE NOT PRESENT    PHYSICAL:  Vital Signs Last 24 Hrs  T(C): 36.3 (03 Sep 2024 05:00), Max: 36.4 (02 Sep 2024 14:57)  T(F): 97.4 (03 Sep 2024 05:00), Max: 97.6 (02 Sep 2024 14:57)  HR: 99 (03 Sep 2024 05:00) (98 - 102)  BP: 134/81 (03 Sep 2024 05:00) (118/68 - 134/81)  BP(mean): --  RR: 17 (03 Sep 2024 05:00) (17 - 17)  SpO2: 97% (03 Sep 2024 05:00) (96% - 97%)    Parameters below as of 03 Sep 2024 05:00  Patient On (Oxygen Delivery Method): nasal cannula  O2 Flow (L/min): 2      GENERAL:  [ ]Cachexia  [ ] Frail  [X ]Awake  [X ]Oriented   [ ]Lethargic  [ ]Unarousable  [ ]Verbal  [ ]Non-Verbal    BEHAVIORAL:   [ ] Anxiety  [ ] Delirium [ ] Agitation [ ] Other    HEENT:   [ ]Normal   [ ]Dry mouth   [ ]ET Tube/Trach  [ ]Oral lesions  Icteric sclera     PULMONARY:   [X ]Clear              [ ]Tachypnea  [ ]Audible excessive secretions   [ ]Rhonchi        [ ]Right [ ]Left [ ]Bilateral  [ ]Crackles        [ ]Right [ ]Left [ ]Bilateral  [ ]Wheezing     [ ]Right [ ]Left [ ]Bilateral  [ ]Diminished breath sounds [ ]right [ ]left [ ]bilateral    CARDIOVASCULAR:    [X ]Regular [ ]Irregular [ ]Tachy  [ ]Joshua [ ]Murmur [ ]Other    GASTROINTESTINAL:  [ ]Soft  [X ]Distended   [ ]+BS  [ ]Non tender [X ]Tender  [ ]Other [ ]PEG [ ]OGT/ NGT      GENITOURINARY:  [X ]Normal [ ] Incontinent   [ ]Oliguria/Anuria   [ ][place]    MUSCULOSKELETAL:   [X ]Normal   [ ]Weakness  [ ]Bed/Wheelchair bound [ ]Edema    NEUROLOGIC:   [X ]No focal deficits  [ ]Cognitive impairment  [ ]Dysphagia [ ]Dysarthria [ ]Paresis [ ]Other     SKIN:   [X ]Normal  [ ]Rash  [ ]Other  [ ]Pressure ulcer(s)       Present on admission [ ]y [ ]n    -------------------------------------------------------------------------------------------------------    LABS:                          10.8   16.99 )-----------( 204      ( 03 Sep 2024 09:40 )             32.7     09-03    137  |  97<L>  |  117<H>  ----------------------------<  119<H>  5.6<H>   |  13<L>  |  4.74<H>    Ca    9.4      03 Sep 2024 09:54  Phos  6.9     09-03  Mg     3.20     09-03    TPro  6.4  /  Alb  3.7  /  TBili  33.9<H>  /  DBili  x   /  AST  241<H>  /  ALT  133<H>  /  AlkPhos  1129<H>  09-03    -------------------------------------------------------------------------------------------------------    CRITICAL CARE:  [ ]Shock Present  [ ]Septic [ ]Cardiogenic [ ]Neurologic [ ]Hypovolemic [ ]Undifferentiated    [ ]Vasopressors [ ]Inotropes    [ ]Respiratory failure present [ ]Acute  [ ]Chronic [ ]Hypoxic  [ ]Hypercarbic [ ]Mixed   [ ]Mechanical Ventilation  [ ]Trach collar   [ ]Non-invasive ventilatory support   [ ]High-Flow   [ ]Oxygen mask/venti     [ ]Other organ failure     -------------------------------------------------------------------------------------------------------    RADIOLOGY & ADDITIONAL STUDIES:       < from: CT Abdomen and Pelvis w/ IV Cont (08.24.24 @ 10:58) >    IMPRESSION:  *  Progression of pulmonary and hepatic metastatic disease, as detailed   above.  *  Mild to moderate intrahepatic bile duct dilatation, increased from   prior.  *  New diffuse hypoenhancement of the right hepatic lobe, which may   reflect perfusional alteration secondary to metastatic burden. There is   compressionof the intrahepatic IVC by the metastatic disease. The   hepatic veins are not visualized. Doppler ultrasound could be performed   for further evaluation as clinically warranted.  *  Multifocal ill-defined patchy foci of cortical  hypoenhancement left   kidney. Correlate with urinalysis to exclude pyelonephritis. Recommend   attention on close follow-up imaging. No hydronephrosis.  *  Nonspecific gallbladder and right-sided colonic wall thickening,   possibly reactive.  *  New small volume abdominopelvic ascites. Diffuse anasarca.    < end of copied text >    < from: US Abdomen Doppler (08.24.24 @ 15:18) >  IMPRESSION:  Multiple liver metastases with compression on the hepatic vasculature.  Small caliber right and main portal veins, without evidence of   demonstrable flow. Findings may be reflect a combination of slow flow and   extrinsic compression. Thrombus is not excluded.    < end of copied text >    < from: CT Angio Chest PE Protocol w/ IV Cont (08.23.24 @ 22:49) >  IMPRESSION:  No pulmonary embolus.    Increased size of innumerable bilateral pulmonary metastases.    Diffuse hepatic metastases several of which demonstrate increase in size.    < end of copied text >    -------------------------------------------------------------------------------------------------------  MEDICATIONS:     MEDICATIONS  (STANDING):  albumin human 25% IVPB 50 milliLiter(s) IV Intermittent every 6 hours  chlorhexidine 2% Cloths 1 Application(s) Topical daily  dextrose 5%. 1000 milliLiter(s) (100 mL/Hr) IV Continuous <Continuous>  dextrose 5%. 1000 milliLiter(s) (50 mL/Hr) IV Continuous <Continuous>  dextrose 50% Injectable 25 Gram(s) IV Push once  dextrose 50% Injectable 12.5 Gram(s) IV Push once  dextrose 50% Injectable 25 Gram(s) IV Push once  glucagon  Injectable 1 milliGRAM(s) IntraMuscular once  HYDROmorphone   Tablet 2 milliGRAM(s) Oral every 6 hours  insulin glargine Injectable (LANTUS) 8 Unit(s) SubCutaneous at bedtime  insulin lispro (ADMELOG) corrective regimen sliding scale   SubCutaneous three times a day before meals  insulin lispro (ADMELOG) corrective regimen sliding scale   SubCutaneous at bedtime  insulin lispro Injectable (ADMELOG) 4 Unit(s) SubCutaneous three times a day before meals  melatonin 6 milliGRAM(s) Oral at bedtime  polyethylene glycol 3350 17 Gram(s) Oral two times a day  senna 2 Tablet(s) Oral at bedtime  sodium chloride 1 Gram(s) Oral two times a day  sodium zirconium cyclosilicate 10 Gram(s) Oral three times a day    MEDICATIONS  (PRN):  albuterol/ipratropium for Nebulization 3 milliLiter(s) Nebulizer every 6 hours PRN Shortness of Breath and/or Wheezing  benzonatate 100 milliGRAM(s) Oral every 8 hours PRN Cough  dextrose Oral Gel 15 Gram(s) Oral once PRN Blood Glucose LESS THAN 70 milliGRAM(s)/deciliter  guaiFENesin Oral Liquid (Sugar-Free) 100 milliGRAM(s) Oral two times a day PRN Cough  HYDROmorphone  Injectable 0.5 milliGRAM(s) IV Push every 4 hours PRN Breakthrough Pain         Indication of Geriatrics and Palliative Medicine Services:  [X  ] Complex Medical Decision Making   [X  ] Symptom/Pain management     DNR on chart: No     INTERVAL EVENTS: Patient seen this AM, in no distress. Patient states pain controlled, having worsening swelling of his legs but making urine. He shares over the weekend his 12 year old son came to visit and started crying.   Bilirubin and renal function worsening.     -------------------------------------------------------------------------------------------------------    PRESENT SYMPTOMS:     [ ] Unable to self-report      [ ] PAINADS     [ ] RDOS    [ ] No     [X ] Yes     Source if other than patient:  [ ]Family   [ ]Team     PAIN:   If blank, patient unable to specify     [X ]yes [ ]no    1. Location- R abdominal   2. Radiation-  No   3. Quality- Like someone sitting   4. Timing- On and off   5. Minimal acceptable level/pain goal- 5/10   6. Aggravating factors- Worse with eating   7. QOL impact-     SYMPTOMS:   Dyspnea:                           [ ]Mild [ ]Moderate [ ]Severe  Anxiety:                             [ ]Mild [ ]Moderate [ ]Severe  Fatigue:                             [ ]Mild [ ]Moderate [ ]Severe  Nausea/Vomiting:              [ ]Mild [ ]Moderate [ ]Severe  Loss of appetite:                [ ]Mild [ ]Moderate [X ]Severe  Constipation:                     [ ]Mild [ ]Moderate [ ]Severe    Other Symptoms:  [X ]All other review of systems negative     -------------------------------------------------------------------------------------------------------    I STOP: [id number]    Prescription Information      PDI Filter:    PDI	Current Rx	Drug Type	Rx Written	Rx Dispensed	Drug	Quantity	Days Supply	Prescriber Name	Prescriber KATINA #	Payment Method	Dispenser  A	Y	O	08/15/2024	08/19/2024	morphine sulf er 15 mg tablet	60	30	Lulu Gomes	[id number]	[facility]  A	N	O	08/15/2024	08/19/2024	oxycodone hcl (ir) 5 mg tablet	30	3	Lulu Gomes	[id number]	[facility]  A	N	O	07/26/2024	07/26/2024	oxycodone hcl (ir) 5 mg tablet	30	3	Dre Gardner	[id number]	[facility]  A	N	O	06/28/2024	07/02/2024	oxycodone hcl (ir) 5 mg tablet	30	3	Dre Gardner	[id number]	[facility]  B	N	O	05/21/2024	05/21/2024	tramadol hcl 50 mg tablet	6	2	Emma Price	[id number]	[id number]    -------------------------------------------------------------------------------------------------------    ITEMS UNCHECKED ARE NOT PRESENT    PHYSICAL:  Vital Signs Last 24 Hrs  T(C): 36.3 (03 Sep 2024 05:00), Max: 36.4 (02 Sep 2024 14:57)  T(F): 97.4 (03 Sep 2024 05:00), Max: 97.6 (02 Sep 2024 14:57)  HR: 99 (03 Sep 2024 05:00) (98 - 102)  BP: 134/81 (03 Sep 2024 05:00) (118/68 - 134/81)  BP(mean): --  RR: 17 (03 Sep 2024 05:00) (17 - 17)  SpO2: 97% (03 Sep 2024 05:00) (96% - 97%)    Parameters below as of 03 Sep 2024 05:00  Patient On (Oxygen Delivery Method): nasal cannula  O2 Flow (L/min): 2      GENERAL:  [ ]Cachexia  [ ] Frail  [X ]Awake  [X ]Oriented   [ ]Lethargic  [ ]Unarousable  [ ]Verbal  [ ]Non-Verbal    BEHAVIORAL:   [ ] Anxiety  [ ] Delirium [ ] Agitation [ ] Other    HEENT:   [ ]Normal   [ ]Dry mouth   [ ]ET Tube/Trach  [ ]Oral lesions  Icteric sclera     PULMONARY:   [X ]Clear              [ ]Tachypnea  [ ]Audible excessive secretions   [ ]Rhonchi        [ ]Right [ ]Left [ ]Bilateral  [ ]Crackles        [ ]Right [ ]Left [ ]Bilateral  [ ]Wheezing     [ ]Right [ ]Left [ ]Bilateral  [ ]Diminished breath sounds [ ]right [ ]left [ ]bilateral    CARDIOVASCULAR:    [X ]Regular [ ]Irregular [ ]Tachy  [ ]Joshua [ ]Murmur [ ]Other    GASTROINTESTINAL:  [ ]Soft  [X ]Distended   [ ]+BS  [ ]Non tender [X ]Tender  [ ]Other [ ]PEG [ ]OGT/ NGT      GENITOURINARY:  [X ]Normal [ ] Incontinent   [ ]Oliguria/Anuria   [ ][place]    MUSCULOSKELETAL:   [X ]Normal   [ ]Weakness  [ ]Bed/Wheelchair bound [ ]Edema    NEUROLOGIC:   [X ]No focal deficits  [ ]Cognitive impairment  [ ]Dysphagia [ ]Dysarthria [ ]Paresis [ ]Other     SKIN:   [X ]Normal  [ ]Rash  [ ]Other  [ ]Pressure ulcer(s)       Present on admission [ ]y [ ]n    -------------------------------------------------------------------------------------------------------    LABS:                          10.8   16.99 )-----------( 204      ( 03 Sep 2024 09:40 )             32.7     09-03    137  |  97<L>  |  117<H>  ----------------------------<  119<H>  5.6<H>   |  13<L>  |  4.74<H>    Ca    9.4      03 Sep 2024 09:54  Phos  6.9     09-03  Mg     3.20     09-03    TPro  6.4  /  Alb  3.7  /  TBili  33.9<H>  /  DBili  x   /  AST  241<H>  /  ALT  133<H>  /  AlkPhos  1129<H>  09-03    -------------------------------------------------------------------------------------------------------    CRITICAL CARE:  [ ]Shock Present  [ ]Septic [ ]Cardiogenic [ ]Neurologic [ ]Hypovolemic [ ]Undifferentiated    [ ]Vasopressors [ ]Inotropes    [ ]Respiratory failure present [ ]Acute  [ ]Chronic [ ]Hypoxic  [ ]Hypercarbic [ ]Mixed   [ ]Mechanical Ventilation  [ ]Trach collar   [ ]Non-invasive ventilatory support   [ ]High-Flow   [ ]Oxygen mask/venti     [ ]Other organ failure     -------------------------------------------------------------------------------------------------------    RADIOLOGY & ADDITIONAL STUDIES:       < from: CT Abdomen and Pelvis w/ IV Cont (08.24.24 @ 10:58) >    IMPRESSION:  *  Progression of pulmonary and hepatic metastatic disease, as detailed   above.  *  Mild to moderate intrahepatic bile duct dilatation, increased from   prior.  *  New diffuse hypoenhancement of the right hepatic lobe, which may   reflect perfusional alteration secondary to metastatic burden. There is   compressionof the intrahepatic IVC by the metastatic disease. The   hepatic veins are not visualized. Doppler ultrasound could be performed   for further evaluation as clinically warranted.  *  Multifocal ill-defined patchy foci of cortical  hypoenhancement left   kidney. Correlate with urinalysis to exclude pyelonephritis. Recommend   attention on close follow-up imaging. No hydronephrosis.  *  Nonspecific gallbladder and right-sided colonic wall thickening,   possibly reactive.  *  New small volume abdominopelvic ascites. Diffuse anasarca.    < end of copied text >    < from: US Abdomen Doppler (08.24.24 @ 15:18) >  IMPRESSION:  Multiple liver metastases with compression on the hepatic vasculature.  Small caliber right and main portal veins, without evidence of   demonstrable flow. Findings may be reflect a combination of slow flow and   extrinsic compression. Thrombus is not excluded.    < end of copied text >    < from: CT Angio Chest PE Protocol w/ IV Cont (08.23.24 @ 22:49) >  IMPRESSION:  No pulmonary embolus.    Increased size of innumerable bilateral pulmonary metastases.    Diffuse hepatic metastases several of which demonstrate increase in size.    < end of copied text >    -------------------------------------------------------------------------------------------------------  MEDICATIONS:     MEDICATIONS  (STANDING):  albumin human 25% IVPB 50 milliLiter(s) IV Intermittent every 6 hours  chlorhexidine 2% Cloths 1 Application(s) Topical daily  dextrose 5%. 1000 milliLiter(s) (100 mL/Hr) IV Continuous <Continuous>  dextrose 5%. 1000 milliLiter(s) (50 mL/Hr) IV Continuous <Continuous>  dextrose 50% Injectable 25 Gram(s) IV Push once  dextrose 50% Injectable 12.5 Gram(s) IV Push once  dextrose 50% Injectable 25 Gram(s) IV Push once  glucagon  Injectable 1 milliGRAM(s) IntraMuscular once  HYDROmorphone   Tablet 2 milliGRAM(s) Oral every 6 hours  insulin glargine Injectable (LANTUS) 8 Unit(s) SubCutaneous at bedtime  insulin lispro (ADMELOG) corrective regimen sliding scale   SubCutaneous three times a day before meals  insulin lispro (ADMELOG) corrective regimen sliding scale   SubCutaneous at bedtime  insulin lispro Injectable (ADMELOG) 4 Unit(s) SubCutaneous three times a day before meals  melatonin 6 milliGRAM(s) Oral at bedtime  polyethylene glycol 3350 17 Gram(s) Oral two times a day  senna 2 Tablet(s) Oral at bedtime  sodium chloride 1 Gram(s) Oral two times a day  sodium zirconium cyclosilicate 10 Gram(s) Oral three times a day    MEDICATIONS  (PRN):  albuterol/ipratropium for Nebulization 3 milliLiter(s) Nebulizer every 6 hours PRN Shortness of Breath and/or Wheezing  benzonatate 100 milliGRAM(s) Oral every 8 hours PRN Cough  dextrose Oral Gel 15 Gram(s) Oral once PRN Blood Glucose LESS THAN 70 milliGRAM(s)/deciliter  guaiFENesin Oral Liquid (Sugar-Free) 100 milliGRAM(s) Oral two times a day PRN Cough  HYDROmorphone  Injectable 0.5 milliGRAM(s) IV Push every 4 hours PRN Breakthrough Pain

## 2024-09-03 NOTE — PROGRESS NOTE ADULT - ASSESSMENT
CC:  Endometrial mass    HPI : Tiffany Masterson is a 80 y.o. female  for evaluation and discussion of treatment options for a thickened endometrium/endometrial mass seen on pelvic sonogram.  Patient denies persistent discomfort and denies any vaginal bleeding.  No other Gyn complaints reported.    Chart reviewed    Sonogram report/images reviewed    Past Medical History:   Diagnosis Date    Arthritis     Family history of malignant neoplasm of gastrointestinal tract mother    History of colonoscopy     unremarkable  by Dr. Javier.  Barium enema revealed diverticular disease.    Hyperlipidemia     Hypertension     Hypothyroidism     Migraine, ophthalmoplegic     Personal history of colonic polyps     TIA (transient ischemic attack)     with a normal angiogram in the past, Ocular migraines reported by patient, not TIA     Unspecified essential hypertension 2015     Past Surgical History:   Procedure Laterality Date    CATARACT EXTRACTION      right/ Dr. Lexis Nicolas     COLONOSCOPY       polyps    COLONOSCOPY N/A 2016    Procedure: COLONOSCOPY;  Surgeon: Bebeto Gonzalez MD;  Location: [address]);  Service: Endoscopy;  Laterality: N/A;    COLONOSCOPY W/ POLYPECTOMY  2013    polyp of colon    EYE SURGERY  11-10-14    right retina/Dr. Dalton Sierra ([facility])    JOINT REPLACEMENT  3/23/2011    left total hip replacement    TONSILLECTOMY      pt was 9 years old     Family History   Problem Relation Age of Onset    Cancer Mother 75        colon    Colon cancer Mother     Cancer Father         lung    Diabetes Other     Cancer Maternal Aunt 80        colon cancer    Diabetes Paternal Aunt     Breast cancer Neg Hx     Ovarian cancer Neg Hx      Social History   Substance Use Topics    Smoking status: Never Smoker    Smokeless tobacco: Never Used      Comment: The patient is not getting any regular exercise at this time.  She does enjoy traveling to  [place]    Alcohol use 1.2 oz/week     2 Shots of liquor per week      Comment: daily     OB History    Para Term  AB Living   3 3 3         SAB TAB Ectopic Multiple Live Births                  # Outcome Date GA Lbr Ed/2nd Weight Sex Delivery Anes PTL Lv   3 Term      Vag-Spont      2 Term      Vag-Spont      1 Term      Vag-Spont             BP (!) 132/50 (BP Location: Right arm)   Ht 5' 6" (1.676 m)   Wt 85.9 kg (189 lb 6 oz)   BMI 30.57 kg/m²     ROS:  GENERAL: Feeling well overall.   SKIN: Denies rash or lesions.   HEAD: Denies head injury or headache.   NODES: Denies enlarged lymph nodes.   CHEST: Denies chest pain or shortness of breath.   CARDIOVASCULAR: Denies palpitations or left sided chest pain.   ABDOMEN: No abdominal pain, constipation, diarrhea, nausea, vomiting or rectal bleeding.   URINARY: No frequency, dysuria, hematuria, or burning on urination.  REPRODUCTIVE: See HPI.   BREASTS: Denies pain, lumps, or nipple discharge.   HEMATOLOGIC: No easy bruisability.  MUSCULOSKELETAL: Denies joint pain or swelling.   NEUROLOGIC: Denies syncope or weakness.   PSYCHIATRIC: Denies depression, anxiety or mood swings.      PHYSICAL EXAM:  APPEARANCE: Well nourished, well developed, in no acute distress.  AFFECT: WNL, alert and oriented x 3  SKIN: No acne or hirsutism  NECK: Neck symmetric without masses or thyromegaly  NODES: No inguinal, cervical, axillary, or femoral lymph node enlargement  CHEST: Good respiratory effect  ABDOMEN: Soft.  No tenderness or masses.  No hepatosplenomegaly.  No hernias.  PELVIC: Normal external genitalia without lesions.  Normal hair distribution.  Adequate perineal body, normal urethral meatus.  Vagina atrophic without lesions or discharge.  Cervix pink, without lesions, discharge or tenderness.  No significant cystocele or rectocele.  Bimanual exam shows uterus to be normal size, regular, mobile and nontender.  Adnexa without masses or tenderness.  "   EXTREMITIES: No edema.    ASSESSMENT & PLAN:  1. Endometrial mass    2. Pelvic pain in female    I have discussed the risks, benefits, indications, and alternatives of the procedure in detail.  The patient verbalizes her understanding.  All questions answered.  Consents signed.  The patient agrees to proceed to surgery scheduling.    Plan:  Hysteroscopic resection of endometrial mass    Elliot Vanessa IV, MD             BASSAM LOGAN is a 54y Male with a hx of Stage IV colorectal cancer w/ mets to liver and lung s/p chemo (4th line), HTN, HLD, and T2DM who presented with SOB, admitted for AHRF and acute liver injury i/s/o progression of metastatic disease. Dispo planning for home hospice, likely d/c tomorrow

## 2024-09-03 NOTE — PROGRESS NOTE ADULT - PROBLEM SELECTOR PLAN 5
Reports dysuria and hematuria x1 week, no urinary frequency though. Here noted to have leukocytosis to 17k with tachycardia and positive UA concerning for sepsis 2/2 UTI.  Ucx - E. Coli, E. Faecalis (pan-sensitive)  Bcx : NGTD  - Denied any dysuria or difficulty urinating 09/01    - s/p Ceftriaxone 1g for 7 days (8/24-08/31)  - Likely has poor clearance 2/2 worsening liver mets

## 2024-09-03 NOTE — PROVIDER CONTACT NOTE (OTHER) - SITUATION
Pt is a hard stick and unable to find acceptable vein for blood draw. Vein finder illumination device used and still unable to find a acceptable vein for blood draw.
pt. blood pressure high 150/102

## 2024-09-03 NOTE — PROGRESS NOTE ADULT - PROBLEM SELECTOR PLAN 2
K rising (max 7 hemolyzed), now s/p insulin + D50 with lokelma 3x. EKG x2 wnl. K this am 5.7    - deferring lab draws d/w patient

## 2024-09-03 NOTE — PROGRESS NOTE ADULT - TIME BILLING
- Ordering, reviewing, and interpreting labs, testing, and imaging.  - Independently obtaining a review of systems and performing a physical exam  - Reviewing consultant documentation/recommendations in addition to discussing plan of care with consultants.  - Counselling and educating patient and family regarding interpretation of aforementioned items and plan of care.
Patient encounter, including chart review, medication review, patient interview, ordering labs and medications, interpreting labs and imaging results, and coordination of care with consultants
- Ordering, reviewing, and interpreting labs, testing, and imaging.  - Independently obtaining a review of systems and performing a physical exam  - Reviewing consultant documentation/recommendations in addition to discussing plan of care with consultants.  - Counselling and educating patient and family regarding interpretation of aforementioned items and plan of care.
Patient encounter, including chart review, medication review, patient interview, ordering labs and medications, interpreting labs and imaging results, and coordination of care with consultants
- Ordering, reviewing, and interpreting labs, testing, and imaging.  - Independently obtaining a review of systems and performing a physical exam  - Reviewing consultant documentation/recommendations in addition to discussing plan of care with consultants.  - Counselling and educating patient and family regarding interpretation of aforementioned items and plan of care.

## 2024-09-03 NOTE — PROGRESS NOTE ADULT - PROBLEM SELECTOR PLAN 7
Hyperglycemic on admission to >400. Given Lispro 6u in ED. BHB 0.8. No acidosis and AG was wnl.  - Only on oral metformin and glipizide at home  - LDSS, will likely need standing  - A1c 8.2 08/24  - BHB 08 --> 0.2  - Anion gap 15 --> 13    Plan:  - Hold metformin and glipizide  - Lantus, pre-meal d/rosette today - c/w sliding scale  - Trend BG

## 2024-09-03 NOTE — PROVIDER CONTACT NOTE (OTHER) - ASSESSMENT
Pt a&xox4 and calm in bed. No s/s of acute distress noted. Pt would like to be A stick by MD.
no distress, other vital signs stable, pt. safety maintained

## 2024-09-03 NOTE — PROVIDER CONTACT NOTE (OTHER) - BACKGROUND
Pt admitted for shortness of breath. Hx of colon rectal cancer, hypertension,  and hyperlipidemia.
colon rectal cancer, hypertension, hyperlipidemia

## 2024-09-04 ENCOUNTER — NON-APPOINTMENT (OUTPATIENT)
Age: 54
End: 2024-09-04

## 2024-09-04 ENCOUNTER — TRANSCRIPTION ENCOUNTER (OUTPATIENT)
Age: 54
End: 2024-09-04

## 2024-09-04 VITALS
DIASTOLIC BLOOD PRESSURE: 63 MMHG | RESPIRATION RATE: 18 BRPM | TEMPERATURE: 98 F | SYSTOLIC BLOOD PRESSURE: 119 MMHG | HEART RATE: 68 BPM | OXYGEN SATURATION: 97 %

## 2024-09-04 LAB
GLUCOSE BLDC GLUCOMTR-MCNC: 169 MG/DL — HIGH (ref 70–99)
GLUCOSE BLDC GLUCOMTR-MCNC: 196 MG/DL — HIGH (ref 70–99)

## 2024-09-04 PROCEDURE — 99233 SBSQ HOSP IP/OBS HIGH 50: CPT

## 2024-09-04 PROCEDURE — 99239 HOSP IP/OBS DSCHRG MGMT >30: CPT | Mod: GC

## 2024-09-04 RX ORDER — SENNA 187 MG
2 TABLET ORAL
Qty: 60 | Refills: 0
Start: 2024-09-04 | End: 2024-10-03

## 2024-09-04 RX ORDER — SODIUM CHLORIDE 9 MG/ML
1 INJECTION INTRAMUSCULAR; INTRAVENOUS; SUBCUTANEOUS
Qty: 60 | Refills: 0
Start: 2024-09-04 | End: 2024-10-03

## 2024-09-04 RX ORDER — POLYETHYLENE GLYCOL 3350 17 G/17G
17 POWDER, FOR SOLUTION ORAL
Qty: 476 | Refills: 0
Start: 2024-09-04 | End: 2024-09-17

## 2024-09-04 RX ORDER — RIVAROXABAN 10 MG/1
1 TABLET, FILM COATED ORAL
Refills: 0 | DISCHARGE

## 2024-09-04 RX ORDER — HYDROMORPHONE HYDROCHLORIDE 2 MG/1
1 TABLET ORAL
Qty: 20 | Refills: 0
Start: 2024-09-04 | End: 2024-09-08

## 2024-09-04 RX ORDER — GUAIFENESIN 100 MG/5ML
5 LIQUID ORAL
Qty: 0 | Refills: 0 | DISCHARGE
Start: 2024-09-04

## 2024-09-04 RX ORDER — GUAIFENESIN 100 MG/5ML
10 LIQUID ORAL
Qty: 600 | Refills: 0
Start: 2024-09-04 | End: 2024-10-03

## 2024-09-04 RX ORDER — METFORMIN HYDROCHLORIDE 850 MG/1
1 TABLET, FILM COATED ORAL
Refills: 0 | DISCHARGE

## 2024-09-04 RX ORDER — BENZONATATE 100 MG
1 CAPSULE ORAL
Refills: 0 | DISCHARGE

## 2024-09-04 RX ORDER — GLIPIZIDE 10 MG
1 TABLET ORAL
Refills: 0 | DISCHARGE

## 2024-09-04 RX ORDER — OXYCODONE HYDROCHLORIDE 5 MG/1
1 TABLET ORAL
Refills: 0 | DISCHARGE

## 2024-09-04 RX ADMIN — Medication 2: at 09:13

## 2024-09-04 RX ADMIN — HYDROMORPHONE HYDROCHLORIDE 2 MILLIGRAM(S): 2 TABLET ORAL at 06:50

## 2024-09-04 RX ADMIN — HYDROMORPHONE HYDROCHLORIDE 2 MILLIGRAM(S): 2 TABLET ORAL at 00:26

## 2024-09-04 RX ADMIN — POLYETHYLENE GLYCOL 3350 17 GRAM(S): 17 POWDER, FOR SOLUTION ORAL at 05:50

## 2024-09-04 RX ADMIN — Medication 2: at 12:43

## 2024-09-04 RX ADMIN — HYDROMORPHONE HYDROCHLORIDE 2 MILLIGRAM(S): 2 TABLET ORAL at 11:07

## 2024-09-04 RX ADMIN — SODIUM CHLORIDE 1 GRAM(S): 9 INJECTION INTRAMUSCULAR; INTRAVENOUS; SUBCUTANEOUS at 05:50

## 2024-09-04 RX ADMIN — HYDROMORPHONE HYDROCHLORIDE 2 MILLIGRAM(S): 2 TABLET ORAL at 12:07

## 2024-09-04 RX ADMIN — CHLORHEXIDINE GLUCONATE 1 APPLICATION(S): 40 SOLUTION TOPICAL at 11:09

## 2024-09-04 RX ADMIN — SODIUM ZIRCONIUM CYCLOSILICATE 10 GRAM(S): 5 POWDER, FOR SUSPENSION ORAL at 05:50

## 2024-09-04 RX ADMIN — HYDROMORPHONE HYDROCHLORIDE 2 MILLIGRAM(S): 2 TABLET ORAL at 05:50

## 2024-09-04 NOTE — DISCHARGE NOTE NURSING/CASE MANAGEMENT/SOCIAL WORK - NSDCPEFALRISK_GEN_ALL_CORE
For information on Fall & Injury Prevention, visit: [email or web address] OR  [email or web address] OR  https://www.cdc.gov/steadi/patient.html

## 2024-09-04 NOTE — PROGRESS NOTE ADULT - PROBLEM SELECTOR PLAN 2
- Diagnosed in 2021    - Diffuse mets- liver, lungs   - POD despite multiple lines of treatment   - Follows with Dr. Mathew at [facility]  - Poor prognosis, T bili elevated   - Worsening acute renal failure   - Hospice recommended

## 2024-09-04 NOTE — CHART NOTE - NSCHARTNOTEFT_GEN_A_CORE
Chart reviewed.  Patient is awaiting hospice.  Potassium noted to be elevated.  Advise continue medical management of potassium with Lokelma.  Call nephrology with questions.
Pt seen/examined at bedside.  Stable for DC with home hospice.  Transport set up, meds sent to pharmacy.  Plan discussed at length with patient.    DC time 40 minutes
HEPATOLOGY BRIEF NOTE:    Per chart review and conversation with primary team, patient interested in pursuing hospice care. Team holding off on further work up with MRCP given current goals of course.     Hepatology team to sign off. Please call back if any further questions or concerns.     Reach out via TEAMS if any further questions.      Sander Nino  GI/Hepatology Fellow    For any overnight emergencies, please contact on-call GI fellow.
Labs reviewed discussed with primary team.  The patient is awaiting hospice.  Limited options available for treatment of his underlying malignancy.  Creatinine is plateaued but potassium elevated.  Advise lokelma 10 G TID today and repeat BMP later this afternoon.

## 2024-09-04 NOTE — PROGRESS NOTE ADULT - PROBLEM SELECTOR PROBLEM 1
Cancer related pain
Sepsis due to urinary tract infection
Cancer related pain
RIGO (acute kidney injury)
Cancer related pain
RIGO (acute kidney injury)
Cancer related pain
RIGO (acute kidney injury)
Sepsis due to urinary tract infection
Cancer related pain
RIGO (acute kidney injury)

## 2024-09-04 NOTE — DISCHARGE NOTE NURSING/CASE MANAGEMENT/SOCIAL WORK - PATIENT PORTAL LINK FT
You can access the FollowMyHealth Patient Portal offered by [facility] by registering at the following website: [email or web address]. By joining [facility]’s FollowMyHealth portal, you will also be able to view your health information using other applications (apps) compatible with our system.

## 2024-09-04 NOTE — PROGRESS NOTE ADULT - PROBLEM SELECTOR PLAN 3
- 8/26- See GOC. Discussed hospice, code status. Patient appointed sisters Zachary and Ally as HCP. Sisters interested in second opinion at [facility]  - 8/28- See GO. Patient agreed to DNR/DNI, home hospice referral

## 2024-09-04 NOTE — PROGRESS NOTE ADULT - SUBJECTIVE AND OBJECTIVE BOX
Patient is a 54y old  Male who presents with a chief complaint of SOB (04 Sep 2024 12:43)      INTERVAL HX:  No acute overnight events. Pt seen and examined at bedside. Discussed plan for afternoon d/c, pt agreeable and feels ready to go home    Allergies:  No Known Allergies    Medications:  albuterol/ipratropium for Nebulization 3 milliLiter(s) Nebulizer every 6 hours PRN  benzonatate 100 milliGRAM(s) Oral every 8 hours PRN  chlorhexidine 2% Cloths 1 Application(s) Topical daily  dextrose 5%. 1000 milliLiter(s) IV Continuous <Continuous>  dextrose 5%. 1000 milliLiter(s) IV Continuous <Continuous>  dextrose 50% Injectable 25 Gram(s) IV Push once  dextrose 50% Injectable 12.5 Gram(s) IV Push once  dextrose 50% Injectable 25 Gram(s) IV Push once  dextrose Oral Gel 15 Gram(s) Oral once PRN  glucagon  Injectable 1 milliGRAM(s) IntraMuscular once  guaiFENesin Oral Liquid (Sugar-Free) 100 milliGRAM(s) Oral two times a day PRN  HYDROmorphone   Tablet 2 milliGRAM(s) Oral every 6 hours  insulin lispro (ADMELOG) corrective regimen sliding scale   SubCutaneous three times a day before meals  insulin lispro (ADMELOG) corrective regimen sliding scale   SubCutaneous at bedtime  melatonin 6 milliGRAM(s) Oral at bedtime  polyethylene glycol 3350 17 Gram(s) Oral two times a day  senna 2 Tablet(s) Oral at bedtime  sodium chloride 1 Gram(s) Oral two times a day    Vitals:  T(C): 36.4 (09-04-24 @ 14:13), Max: 36.7 (09-03-24 @ 15:06)  HR: 68 (09-04-24 @ 14:13) (68 - 97)  BP: 119/63 (09-04-24 @ 14:13) (117/72 - 136/76)  RR: 18 (09-04-24 @ 14:13) (17 - 19)  SpO2: 97% (09-04-24 @ 14:13) (97% - 100%)  I/O's:    09-03-24 @ 07:01  -  09-04-24 @ 07:00  --------------------------------------------------------  IN: 720 mL / OUT: 0 mL / NET: 720 mL      Physical Exam:  GENERAL: resting in bed, NAD  HEAD: normocephalic, atraumatic  HEENT: +scleral icterus, oral mucosa dry  CARDIAC: RRR  PULM: no increased WOB, NC in place  GI: Abd distended but soft, nontender, no rebound tenderness, no guarding, no rigidity  NEURO: no focal motor or sensory deficits, AAOx3  MSK: b/l LE edema to level of thighs  SKIN: well-perfused, extremities warm, no visible rashes    Labs:                        10.8   16.99 )-----------( 204      ( 03 Sep 2024 09:40 )             32.7     09-03    137  |  97<L>  |  117<H>  ----------------------------<  119<H>  5.6<H>   |  13<L>  |  4.74<H>    Ca    9.4      03 Sep 2024 09:54  Phos  6.9     09-03  Mg     3.20     09-03    TPro  6.4  /  Alb  3.7  /  TBili  33.9<H>  /  DBili  x   /  AST  241<H>  /  ALT  133<H>  /  AlkPhos  1129<H>  09-03        Radiology/Procedures: Reviewed.

## 2024-09-04 NOTE — PROGRESS NOTE ADULT - ASSESSMENT
BASSAM LOGAN is a 54y Male with a hx of Stage IV colorectal cancer w/ mets to liver and lung s/p chemo (4th line), HTN, HLD, and T2DM who presented with SOB, admitted for AHRF and acute liver injury i/s/o progression of metastatic disease. Dispo planning for home hospice, likely d/c tomorrow BASSAM LOGAN is a 54y Male with a hx of Stage IV colorectal cancer w/ mets to liver and lung s/p chemo (4th line), HTN, HLD, and T2DM who presented with SOB, admitted for AHRF and acute liver injury i/s/o progression of metastatic disease. Dispo planning for home hospice, d/c this afternoon

## 2024-09-04 NOTE — PROGRESS NOTE ADULT - PROVIDER SPECIALTY LIST ADULT
Internal Medicine
Internal Medicine
Palliative Care
Internal Medicine
Nephrology
Heme/Onc
Hepatology
Internal Medicine
Palliative Care
Internal Medicine
Internal Medicine
Palliative Care
Internal Medicine

## 2024-09-04 NOTE — PROGRESS NOTE ADULT - PROBLEM SELECTOR PLAN 11
DVT ppx: heparin subq  Diet: Consistent carb/DASH  Dispo: Plan for D/C on home hospice  likely tomorrow  Code Status: DNR/DNI DVT ppx: heparin subq  Diet: Consistent carb/DASH  Dispo: Plan for D/C on home hospice today  Code Status: DNR/DNI

## 2024-09-04 NOTE — PROGRESS NOTE ADULT - PROBLEM SELECTOR PLAN 6
#Dyspnea on exertion  #BLE edema  Presenting with SOB w/ exertion and hypoxia. CTA without PE or pleural effusion, but with increased burden of pulmonary mets and liver mets. Trp <6 x 2. BNP 76. Bedside POCUS with normal cardiac contractility. Inspiratory stridor on physical exam (now improved). Suspect his AHRF is in setting of worsening metastatic disease, lower suspicion for PNA as no infiltrates noted, and lower suspicion for ADHF as patient with low proBNP, no pulm edema/effusions.  - On 2L NC for comfort PRN  - TTE difficult study  - BLE Duplex neg for DVT    Plan:  - Duonebs q6 prn

## 2024-09-04 NOTE — PROGRESS NOTE ADULT - PROBLEM SELECTOR PLAN 4
Stage IV colorectal cancer with mets to lung and liver s/p chemo (4th line), Currently getting RT to his R hip. Follows with Dr. Dre Gardner at [facility]. Patient has terminal illness  CEA 1142    - Patient and family requested home hospice with comfort care, initially requesting daily labs but today discussed discontinuation of labs  - Patient appointed both sisters as healthcare proxies  - F/u on Heme onc recs. No plan for chemo at this time  - Palliative consulted, follow up recs.   - CM setting up home hospice  - Trend CBC. CMP, coags    Pain:  - dilaudid 2mg q6 ordered  - pt lost IV access, does not want new IV  - Now DNR/DNI pending home hospice.  - Appreciate further palliative pain recs

## 2024-09-04 NOTE — PROGRESS NOTE ADULT - PROBLEM SELECTOR PLAN 1
- Pain improved  - PRN use in past 24 hours from 8 AM to 8 AM: none   - c/w PO dilaudid 2 mg q6h ATC   - c/w IV dilaudid 0.5 mg q4h PRN for breakthrough pain

## 2024-09-04 NOTE — PROGRESS NOTE ADULT - SUBJECTIVE AND OBJECTIVE BOX
Indication of Geriatrics and Palliative Medicine Services:  [X  ] Complex Medical Decision Making   [X  ] Symptom/Pain management     DNR on chart: No     INTERVAL EVENTS: Patient seen this AM comfortable in no distress. Family at bedside. Patient's son with child life this AM.   Plan for dc today with home hospice.     -------------------------------------------------------------------------------------------------------    PRESENT SYMPTOMS:     [ ] Unable to self-report      [ ] PAINADS     [ ] RDOS    [ ] No     [X ] Yes     Source if other than patient:  [ ]Family   [ ]Team     PAIN:   If blank, patient unable to specify     [X ]yes [ ]no    1. Location- R abdominal   2. Radiation-  No   3. Quality- Like someone sitting   4. Timing- On and off   5. Minimal acceptable level/pain goal- 5/10   6. Aggravating factors- Worse with eating   7. QOL impact-     SYMPTOMS:   Dyspnea:                           [ ]Mild [ ]Moderate [ ]Severe  Anxiety:                             [ ]Mild [ ]Moderate [ ]Severe  Fatigue:                             [ ]Mild [ ]Moderate [ ]Severe  Nausea/Vomiting:              [ ]Mild [ ]Moderate [ ]Severe  Loss of appetite:                [ ]Mild [ ]Moderate [X ]Severe  Constipation:                     [ ]Mild [ ]Moderate [ ]Severe    Other Symptoms:  [X ]All other review of systems negative     -------------------------------------------------------------------------------------------------------    I STOP: [id number]    Prescription Information      PDI Filter:    PDI	Current Rx	Drug Type	Rx Written	Rx Dispensed	Drug	Quantity	Days Supply	Prescriber Name	Prescriber KATINA #	Payment Method	Dispenser  A	Y	O	08/15/2024	08/19/2024	morphine sulf er 15 mg tablet	60	30	Lulu Gomes	[id number]	[facility]	N	O	08/15/2024	08/19/2024	oxycodone hcl (ir) 5 mg tablet	30	3	Lulu Gomes	[id number]	[facility]  A	N	O	07/26/2024	07/26/2024	oxycodone hcl (ir) 5 mg tablet	30	3	Dre Gardner	[id number]	[facility]  A	N	O	06/28/2024	07/02/2024	oxycodone hcl (ir) 5 mg tablet	30	3	rDe Gardner	[id number]	[facility]	N	O	05/21/2024	05/21/2024	tramadol hcl 50 mg tablet	6	2	Emma Price	[id number]	[facility] #[id number]    -------------------------------------------------------------------------------------------------------    ITEMS UNCHECKED ARE NOT PRESENT    PHYSICAL:  Vital Signs Last 24 Hrs  T(C): 36.3 (04 Sep 2024 05:45), Max: 36.7 (03 Sep 2024 15:06)  T(F): 97.4 (04 Sep 2024 05:45), Max: 98.1 (03 Sep 2024 15:06)  HR: 93 (04 Sep 2024 05:45) (93 - 97)  BP: 133/76 (04 Sep 2024 05:45) (117/72 - 136/76)  BP(mean): --  RR: 19 (04 Sep 2024 05:45) (17 - 19)  SpO2: 98% (04 Sep 2024 05:45) (98% - 100%)    Parameters below as of 04 Sep 2024 05:45  Patient On (Oxygen Delivery Method): nasal cannula  O2 Flow (L/min): 2      GENERAL:  [ ]Cachexia  [ ] Frail  [X ]Awake  [X ]Oriented   [ ]Lethargic  [ ]Unarousable  [ ]Verbal  [ ]Non-Verbal    BEHAVIORAL:   [ ] Anxiety  [ ] Delirium [ ] Agitation [ ] Other    HEENT:   [ ]Normal   [ ]Dry mouth   [ ]ET Tube/Trach  [ ]Oral lesions  Icteric sclera     PULMONARY:   [X ]Clear              [ ]Tachypnea  [ ]Audible excessive secretions   [ ]Rhonchi        [ ]Right [ ]Left [ ]Bilateral  [ ]Crackles        [ ]Right [ ]Left [ ]Bilateral  [ ]Wheezing     [ ]Right [ ]Left [ ]Bilateral  [ ]Diminished breath sounds [ ]right [ ]left [ ]bilateral    CARDIOVASCULAR:    [X ]Regular [ ]Irregular [ ]Tachy  [ ]Joshua [ ]Murmur [ ]Other    GASTROINTESTINAL:  [ ]Soft  [X ]Distended   [ ]+BS  [ ]Non tender [X ]Tender  [ ]Other [ ]PEG [ ]OGT/ NGT      GENITOURINARY:  [X ]Normal [ ] Incontinent   [ ]Oliguria/Anuria   [ ][place]    MUSCULOSKELETAL:   [X ]Normal   [ ]Weakness  [ ]Bed/Wheelchair bound [ ]Edema    NEUROLOGIC:   [X ]No focal deficits  [ ]Cognitive impairment  [ ]Dysphagia [ ]Dysarthria [ ]Paresis [ ]Other     SKIN:   [X ]Normal  [ ]Rash  [ ]Other  [ ]Pressure ulcer(s)       Present on admission [ ]y [ ]n    -------------------------------------------------------------------------------------------------------    LABS:                          10.8   16.99 )-----------( 204      ( 03 Sep 2024 09:40 )             32.7     09-03    137  |  97<L>  |  117<H>  ----------------------------<  119<H>  5.6<H>   |  13<L>  |  4.74<H>    Ca    9.4      03 Sep 2024 09:54  Phos  6.9     09-03  Mg     3.20     09-03    TPro  6.4  /  Alb  3.7  /  TBili  33.9<H>  /  DBili  x   /  AST  241<H>  /  ALT  133<H>  /  AlkPhos  1129<H>  09-03      -------------------------------------------------------------------------------------------------------    CRITICAL CARE:  [ ]Shock Present  [ ]Septic [ ]Cardiogenic [ ]Neurologic [ ]Hypovolemic [ ]Undifferentiated    [ ]Vasopressors [ ]Inotropes    [ ]Respiratory failure present [ ]Acute  [ ]Chronic [ ]Hypoxic  [ ]Hypercarbic [ ]Mixed   [ ]Mechanical Ventilation  [ ]Trach collar   [ ]Non-invasive ventilatory support   [ ]High-Flow   [ ]Oxygen mask/venti     [ ]Other organ failure     -------------------------------------------------------------------------------------------------------    RADIOLOGY & ADDITIONAL STUDIES:       < from: CT Abdomen and Pelvis w/ IV Cont (08.24.24 @ 10:58) >    IMPRESSION:  *  Progression of pulmonary and hepatic metastatic disease, as detailed   above.  *  Mild to moderate intrahepatic bile duct dilatation, increased from   prior.  *  New diffuse hypoenhancement of the right hepatic lobe, which may   reflect perfusional alteration secondary to metastatic burden. There is   compressionof the intrahepatic IVC by the metastatic disease. The   hepatic veins are not visualized. Doppler ultrasound could be performed   for further evaluation as clinically warranted.  *  Multifocal ill-defined patchy foci of cortical  hypoenhancement left   kidney. Correlate with urinalysis to exclude pyelonephritis. Recommend   attention on close follow-up imaging. No hydronephrosis.  *  Nonspecific gallbladder and right-sided colonic wall thickening,   possibly reactive.  *  New small volume abdominopelvic ascites. Diffuse anasarca.    < end of copied text >    < from: US Abdomen Doppler (08.24.24 @ 15:18) >  IMPRESSION:  Multiple liver metastases with compression on the hepatic vasculature.  Small caliber right and main portal veins, without evidence of   demonstrable flow. Findings may be reflect a combination of slow flow and   extrinsic compression. Thrombus is not excluded.    < end of copied text >    < from: CT Angio Chest PE Protocol w/ IV Cont (08.23.24 @ 22:49) >  IMPRESSION:  No pulmonary embolus.    Increased size of innumerable bilateral pulmonary metastases.    Diffuse hepatic metastases several of which demonstrate increase in size.    < end of copied text >    -------------------------------------------------------------------------------------------------------  MEDICATIONS:     MEDICATIONS  (STANDING):  albumin human 25% IVPB 50 milliLiter(s) IV Intermittent every 6 hours  chlorhexidine 2% Cloths 1 Application(s) Topical daily  dextrose 5%. 1000 milliLiter(s) (100 mL/Hr) IV Continuous <Continuous>  dextrose 5%. 1000 milliLiter(s) (50 mL/Hr) IV Continuous <Continuous>  dextrose 50% Injectable 25 Gram(s) IV Push once  dextrose 50% Injectable 12.5 Gram(s) IV Push once  dextrose 50% Injectable 25 Gram(s) IV Push once  glucagon  Injectable 1 milliGRAM(s) IntraMuscular once  HYDROmorphone   Tablet 2 milliGRAM(s) Oral every 6 hours  insulin glargine Injectable (LANTUS) 8 Unit(s) SubCutaneous at bedtime  insulin lispro (ADMELOG) corrective regimen sliding scale   SubCutaneous three times a day before meals  insulin lispro (ADMELOG) corrective regimen sliding scale   SubCutaneous at bedtime  insulin lispro Injectable (ADMELOG) 4 Unit(s) SubCutaneous three times a day before meals  melatonin 6 milliGRAM(s) Oral at bedtime  polyethylene glycol 3350 17 Gram(s) Oral two times a day  senna 2 Tablet(s) Oral at bedtime  sodium chloride 1 Gram(s) Oral two times a day  sodium zirconium cyclosilicate 10 Gram(s) Oral three times a day    MEDICATIONS  (PRN):  albuterol/ipratropium for Nebulization 3 milliLiter(s) Nebulizer every 6 hours PRN Shortness of Breath and/or Wheezing  benzonatate 100 milliGRAM(s) Oral every 8 hours PRN Cough  dextrose Oral Gel 15 Gram(s) Oral once PRN Blood Glucose LESS THAN 70 milliGRAM(s)/deciliter  guaiFENesin Oral Liquid (Sugar-Free) 100 milliGRAM(s) Oral two times a day PRN Cough  HYDROmorphone  Injectable 0.5 milliGRAM(s) IV Push every 4 hours PRN Breakthrough Pain

## 2024-09-12 ENCOUNTER — APPOINTMENT (OUTPATIENT)
Dept: INFUSION THERAPY | Facility: HOSPITAL | Age: 54
End: 2024-09-12

## 2024-09-12 ENCOUNTER — APPOINTMENT (OUTPATIENT)
Dept: HEMATOLOGY ONCOLOGY | Facility: CLINIC | Age: 54
End: 2024-09-12

## 2024-09-14 ENCOUNTER — APPOINTMENT (OUTPATIENT)
Dept: INFUSION THERAPY | Facility: HOSPITAL | Age: 54
End: 2024-09-14

## 2024-09-23 ENCOUNTER — APPOINTMENT (OUTPATIENT)
Dept: RADIATION ONCOLOGY | Facility: CLINIC | Age: 54
End: 2024-09-23

## 2024-09-26 ENCOUNTER — APPOINTMENT (OUTPATIENT)
Dept: HEMATOLOGY ONCOLOGY | Facility: CLINIC | Age: 54
End: 2024-09-26

## 2024-09-26 ENCOUNTER — APPOINTMENT (OUTPATIENT)
Dept: INFUSION THERAPY | Facility: HOSPITAL | Age: 54
End: 2024-09-26

## 2024-09-28 ENCOUNTER — APPOINTMENT (OUTPATIENT)
Dept: INFUSION THERAPY | Facility: HOSPITAL | Age: 54
End: 2024-09-28

## 2025-03-18 NOTE — PROGRESS NOTE ADULT - PROVIDER SPECIALTY LIST ADULT
Cardiac Rehab Discharge Instructions:  Please call to set up your cardiac rehab orientation session using phone number sheet given. [facility] [place]. [phone].     Do not use Right arm/wrist for 24 hours.  Do not lift greater than 3-5 pounds on right arm for 5 days.   You may shower and remove dressing in 24 hours.  Do not drive or drink alcohol for 24 hours.       Driving: If you are discharged home the same day as your procedure, do not drive for 24 hours due to the relaxing medications you received during your procedure.    Bathing: After 24 hours, remove the dressing and shower normally. Gently clean puncture site with soap and water. Pat Dry; do not rub the skin over your puncture site. Do not submerge the puncture site in water in a tub bath, whirl-pool, or swimming pool until the skin is completely healed (usually about 5 days). If your puncture site is on your wrist, you should avoid dishwater until the site is healed. Soaking in water can increase the risk of infection at the insertion site.     Activity: To allow your artery to heal, limit the use of your affected arm/wrist for 24 hours. Avoid housework and strenuous activities for 5 days.  - Do not lift more than 3-5 pounds.  - Do not operate a lawnmower, , motorcycle, all terrain vehicle of power tools.  - Avoid excessive wrist movement (bending in either direction); do not use your affected hand/arm to support your weight when rising from chair or bed; no push ups, lifting garage doors, etc.   - Do not engage in vigorous exercise such as bowling, tennis, or golf.     Call your cardiology provider immediately if you notice any of the following signs of infection.  -Drainage at your puncture site.  -Warmth at your puncture site.  -Redness at your puncture site.  -Increased temperature (Greater than 100 Degrees F)                You may experience mild tingling in your hand and tenderness at the puncture site for up to 3 days; this is  normal. If this worsens or persists longer than 3 days, contact your doctor immediately. If you notice any increase in bruising, pain, swelling, or numbness at the puncture site call your cardiology provider immediately. If you experience bleeding, press down hard with your fingers directly on your puncture site and call 911 immediately. Do not drive yourself to the hospital.     If for any reason you are unable to reach your cardiology provider and you are concerned that you may be having a problem related to your cardiac catheterization, come to the Emergency Department or call 911.     Internal Medicine

## 2025-04-21 ENCOUNTER — NON-APPOINTMENT (OUTPATIENT)
Age: 55
End: 2025-04-21

## 2025-07-14 NOTE — ED PROCEDURE NOTE - CARDIAC WINDOWS
Parasternal Long/Parasternal Short/Apical 4-Chamber/Sub-Xyphoid View Higher Level of Care or Service Not Available